# Patient Record
Sex: MALE | Race: WHITE | Employment: OTHER | URBAN - NONMETROPOLITAN AREA
[De-identification: names, ages, dates, MRNs, and addresses within clinical notes are randomized per-mention and may not be internally consistent; named-entity substitution may affect disease eponyms.]

---

## 2017-01-03 ENCOUNTER — HOSPITAL ENCOUNTER (OUTPATIENT)
Dept: PAIN MANAGEMENT | Age: 42
Discharge: HOME | End: 2017-01-03
Payer: MEDICARE

## 2017-01-03 VITALS
HEART RATE: 74 BPM | BODY MASS INDEX: 28.85 KG/M2 | OXYGEN SATURATION: 97 % | WEIGHT: 232 LBS | HEIGHT: 75 IN | SYSTOLIC BLOOD PRESSURE: 177 MMHG | RESPIRATION RATE: 20 BRPM | DIASTOLIC BLOOD PRESSURE: 123 MMHG | TEMPERATURE: 97.5 F

## 2017-01-03 DIAGNOSIS — N50.819 TESTALGIA: ICD-10-CM

## 2017-01-03 PROCEDURE — G0463 HOSPITAL OUTPT CLINIC VISIT: HCPCS

## 2017-01-03 PROCEDURE — 80307 DRUG TEST PRSMV CHEM ANLYZR: CPT

## 2017-01-03 ASSESSMENT — PAIN DESCRIPTION - DESCRIPTORS: DESCRIPTORS: ACHING;CONSTANT

## 2017-01-03 ASSESSMENT — PAIN DESCRIPTION - ONSET: ONSET: ON-GOING

## 2017-01-03 ASSESSMENT — PAIN DESCRIPTION - FREQUENCY: FREQUENCY: CONTINUOUS

## 2017-01-03 ASSESSMENT — PAIN SCALES - GENERAL: PAINLEVEL_OUTOF10: 4

## 2017-01-03 ASSESSMENT — PAIN DESCRIPTION - LOCATION: LOCATION: SCROTUM;FLANK

## 2017-01-03 ASSESSMENT — PAIN DESCRIPTION - PAIN TYPE: TYPE: CHRONIC PAIN

## 2017-01-03 ASSESSMENT — ACTIVITIES OF DAILY LIVING (ADL): EFFECT OF PAIN ON DAILY ACTIVITIES: DAILY ACTIVITY

## 2017-01-03 ASSESSMENT — PAIN DESCRIPTION - PROGRESSION: CLINICAL_PROGRESSION: NOT CHANGED

## 2017-01-03 ASSESSMENT — PAIN DESCRIPTION - ORIENTATION: ORIENTATION: RIGHT;LEFT

## 2017-01-16 ENCOUNTER — APPOINTMENT (OUTPATIENT)
Dept: CT IMAGING | Facility: HOSPITAL | Age: 42
End: 2017-01-16

## 2017-01-16 ENCOUNTER — HOSPITAL ENCOUNTER (OUTPATIENT)
Facility: HOSPITAL | Age: 42
Setting detail: HOSPITAL OUTPATIENT SURGERY
End: 2017-01-16
Attending: UROLOGY | Admitting: UROLOGY

## 2017-01-16 ENCOUNTER — APPOINTMENT (OUTPATIENT)
Dept: INTERVENTIONAL RADIOLOGY/VASCULAR | Facility: HOSPITAL | Age: 42
End: 2017-01-16

## 2017-01-16 ENCOUNTER — HOSPITAL ENCOUNTER (INPATIENT)
Facility: HOSPITAL | Age: 42
LOS: 4 days | Discharge: HOME | End: 2017-01-20
Attending: INTERNAL MEDICINE | Admitting: INTERNAL MEDICINE

## 2017-01-16 DIAGNOSIS — N19 RENAL FAILURE: ICD-10-CM

## 2017-01-16 DIAGNOSIS — N13.5 URETERAL OBSTRUCTION, RIGHT: Primary | ICD-10-CM

## 2017-01-16 DIAGNOSIS — N13.9 OBSTRUCTIVE UROPATHY: ICD-10-CM

## 2017-01-16 DIAGNOSIS — N17.9 ACUTE RENAL FAILURE, UNSPECIFIED ACUTE RENAL FAILURE TYPE (HCC): ICD-10-CM

## 2017-01-16 LAB
ALBUMIN SERPL-MCNC: 3.9 G/DL (ref 3.5–5)
ALBUMIN/GLOB SERPL: 1.3 G/DL (ref 1.1–2.5)
ALP SERPL-CCNC: 104 U/L (ref 24–120)
ALT SERPL W P-5'-P-CCNC: 28 U/L (ref 0–54)
AMYLASE SERPL-CCNC: 61 U/L (ref 30–110)
ANION GAP SERPL CALCULATED.3IONS-SCNC: 17 MMOL/L (ref 4–13)
APTT PPP: 45 SECONDS (ref 24.1–34.8)
AST SERPL-CCNC: 20 U/L (ref 7–45)
BASOPHILS # BLD AUTO: 0.02 10*3/MM3 (ref 0–0.2)
BASOPHILS NFR BLD AUTO: 0.2 % (ref 0–2)
BILIRUB SERPL-MCNC: 0.7 MG/DL (ref 0.1–1)
BUN BLD-MCNC: 73 MG/DL (ref 5–21)
BUN/CREAT SERPL: 5.1 (ref 7–25)
CALCIUM SPEC-SCNC: 9.1 MG/DL (ref 8.4–10.4)
CHLORIDE SERPL-SCNC: 98 MMOL/L (ref 98–110)
CO2 SERPL-SCNC: 21 MMOL/L (ref 24–31)
CREAT BLD-MCNC: 14.34 MG/DL (ref 0.5–1.4)
CRP SERPL-MCNC: 13.92 MG/DL (ref 0–0.99)
DEPRECATED RDW RBC AUTO: 42.9 FL (ref 40–54)
EOSINOPHIL # BLD AUTO: 0.22 10*3/MM3 (ref 0–0.7)
EOSINOPHIL NFR BLD AUTO: 2.3 % (ref 0–4)
ERYTHROCYTE [DISTWIDTH] IN BLOOD BY AUTOMATED COUNT: 13.3 % (ref 12–15)
GFR SERPL CREATININE-BSD FRML MDRD: 4 ML/MIN/1.73
GLOBULIN UR ELPH-MCNC: 3 GM/DL
GLUCOSE BLD-MCNC: 96 MG/DL (ref 70–100)
HCT VFR BLD AUTO: 37.1 % (ref 40–52)
HGB BLD-MCNC: 12.6 G/DL (ref 14–18)
IMM GRANULOCYTES # BLD: 0.03 10*3/MM3 (ref 0–0.03)
IMM GRANULOCYTES NFR BLD: 0.3 % (ref 0–5)
INR PPP: 1.06 (ref 0.91–1.09)
LIPASE SERPL-CCNC: 29 U/L (ref 23–203)
LYMPHOCYTES # BLD AUTO: 1.09 10*3/MM3 (ref 0.72–4.86)
LYMPHOCYTES NFR BLD AUTO: 11.6 % (ref 15–45)
MCH RBC QN AUTO: 30.1 PG (ref 28–32)
MCHC RBC AUTO-ENTMCNC: 34 G/DL (ref 33–36)
MCV RBC AUTO: 88.8 FL (ref 82–95)
MONOCYTES # BLD AUTO: 0.87 10*3/MM3 (ref 0.19–1.3)
MONOCYTES NFR BLD AUTO: 9.2 % (ref 4–12)
NEUTROPHILS # BLD AUTO: 7.2 10*3/MM3 (ref 1.87–8.4)
NEUTROPHILS NFR BLD AUTO: 76.4 % (ref 39–78)
PLATELET # BLD AUTO: 157 10*3/MM3 (ref 130–400)
PMV BLD AUTO: 10.1 FL (ref 6–12)
POTASSIUM BLD-SCNC: 5.2 MMOL/L (ref 3.5–5.3)
PROT SERPL-MCNC: 6.9 G/DL (ref 6.3–8.7)
PROTHROMBIN TIME: 14.1 SECONDS (ref 11.9–14.6)
RBC # BLD AUTO: 4.18 10*6/MM3 (ref 4.8–5.9)
SODIUM BLD-SCNC: 136 MMOL/L (ref 135–145)
WBC NRBC COR # BLD: 9.43 10*3/MM3 (ref 4.8–10.8)

## 2017-01-16 PROCEDURE — 25010000002 HYDROMORPHONE PER 4 MG: Performed by: INTERNAL MEDICINE

## 2017-01-16 PROCEDURE — 99223 1ST HOSP IP/OBS HIGH 75: CPT | Performed by: UROLOGY

## 2017-01-16 PROCEDURE — 25010000002 MIDAZOLAM PER 1 MG: Performed by: RADIOLOGY

## 2017-01-16 PROCEDURE — 0 IOPAMIDOL 61 % SOLUTION: Performed by: RADIOLOGY

## 2017-01-16 PROCEDURE — 25010000002 HYDROMORPHONE PER 4 MG: Performed by: NURSE PRACTITIONER

## 2017-01-16 PROCEDURE — 85610 PROTHROMBIN TIME: CPT | Performed by: NURSE PRACTITIONER

## 2017-01-16 PROCEDURE — 0T9030Z DRAINAGE OF RIGHT KIDNEY WITH DRAINAGE DEVICE, PERCUTANEOUS APPROACH: ICD-10-PCS | Performed by: RADIOLOGY

## 2017-01-16 PROCEDURE — 25010000002 FENTANYL CITRATE (PF) 100 MCG/2ML SOLUTION: Performed by: RADIOLOGY

## 2017-01-16 PROCEDURE — 80053 COMPREHEN METABOLIC PANEL: CPT | Performed by: NURSE PRACTITIONER

## 2017-01-16 PROCEDURE — 86140 C-REACTIVE PROTEIN: CPT | Performed by: NURSE PRACTITIONER

## 2017-01-16 PROCEDURE — 99284 EMERGENCY DEPT VISIT MOD MDM: CPT

## 2017-01-16 PROCEDURE — 82150 ASSAY OF AMYLASE: CPT | Performed by: NURSE PRACTITIONER

## 2017-01-16 PROCEDURE — 85025 COMPLETE CBC W/AUTO DIFF WBC: CPT | Performed by: NURSE PRACTITIONER

## 2017-01-16 PROCEDURE — 85730 THROMBOPLASTIN TIME PARTIAL: CPT | Performed by: NURSE PRACTITIONER

## 2017-01-16 PROCEDURE — 25010000002 HYDRALAZINE PER 20 MG: Performed by: INTERNAL MEDICINE

## 2017-01-16 PROCEDURE — 25010000002 ONDANSETRON PER 1 MG: Performed by: INTERNAL MEDICINE

## 2017-01-16 PROCEDURE — 25010000002 ONDANSETRON PER 1 MG: Performed by: NURSE PRACTITIONER

## 2017-01-16 PROCEDURE — 74176 CT ABD & PELVIS W/O CONTRAST: CPT

## 2017-01-16 PROCEDURE — 83690 ASSAY OF LIPASE: CPT | Performed by: NURSE PRACTITIONER

## 2017-01-16 PROCEDURE — 25010000003 CEFAZOLIN PER 500 MG: Performed by: UROLOGY

## 2017-01-16 RX ORDER — PROPRANOLOL HCL 60 MG
60 CAPSULE, EXTENDED RELEASE 24HR ORAL DAILY
Status: DISCONTINUED | OUTPATIENT
Start: 2017-01-16 | End: 2017-01-20 | Stop reason: HOSPADM

## 2017-01-16 RX ORDER — ACETAMINOPHEN 325 MG/1
650 TABLET ORAL EVERY 4 HOURS PRN
Status: DISCONTINUED | OUTPATIENT
Start: 2017-01-16 | End: 2017-01-20 | Stop reason: HOSPADM

## 2017-01-16 RX ORDER — HYDROCODONE BITARTRATE AND ACETAMINOPHEN 10; 325 MG/1; MG/1
TABLET ORAL
COMMUNITY
Start: 2017-01-01

## 2017-01-16 RX ORDER — SODIUM CHLORIDE 0.9 % (FLUSH) 0.9 %
1-10 SYRINGE (ML) INJECTION AS NEEDED
Status: DISCONTINUED | OUTPATIENT
Start: 2017-01-16 | End: 2017-01-20 | Stop reason: HOSPADM

## 2017-01-16 RX ORDER — CLONIDINE HYDROCHLORIDE 0.1 MG/1
0.2 TABLET ORAL EVERY 6 HOURS PRN
Status: DISCONTINUED | OUTPATIENT
Start: 2017-01-16 | End: 2017-01-20 | Stop reason: HOSPADM

## 2017-01-16 RX ORDER — FENTANYL CITRATE 50 UG/ML
INJECTION, SOLUTION INTRAMUSCULAR; INTRAVENOUS
Status: COMPLETED | OUTPATIENT
Start: 2017-01-16 | End: 2017-01-16

## 2017-01-16 RX ORDER — LABETALOL HYDROCHLORIDE 5 MG/ML
10 INJECTION, SOLUTION INTRAVENOUS ONCE
Status: COMPLETED | OUTPATIENT
Start: 2017-01-16 | End: 2017-01-16

## 2017-01-16 RX ORDER — PANTOPRAZOLE SODIUM 40 MG/1
40 TABLET, DELAYED RELEASE ORAL
Status: DISCONTINUED | OUTPATIENT
Start: 2017-01-17 | End: 2017-01-20 | Stop reason: HOSPADM

## 2017-01-16 RX ORDER — LISINOPRIL 40 MG/1
40 TABLET ORAL DAILY
COMMUNITY
End: 2017-01-20 | Stop reason: HOSPADM

## 2017-01-16 RX ORDER — SODIUM CHLORIDE 0.9 % (FLUSH) 0.9 %
10 SYRINGE (ML) INJECTION AS NEEDED
Status: DISCONTINUED | OUTPATIENT
Start: 2017-01-16 | End: 2017-01-20 | Stop reason: HOSPADM

## 2017-01-16 RX ORDER — SUMATRIPTAN 100 MG/1
100 TABLET, FILM COATED ORAL ONCE AS NEEDED
COMMUNITY
Start: 2016-01-08

## 2017-01-16 RX ORDER — SODIUM CHLORIDE 9 MG/ML
125 INJECTION, SOLUTION INTRAVENOUS CONTINUOUS
Status: DISCONTINUED | OUTPATIENT
Start: 2017-01-16 | End: 2017-01-18

## 2017-01-16 RX ORDER — ONDANSETRON 2 MG/ML
4 INJECTION INTRAMUSCULAR; INTRAVENOUS ONCE
Status: COMPLETED | OUTPATIENT
Start: 2017-01-16 | End: 2017-01-16

## 2017-01-16 RX ORDER — LABETALOL HYDROCHLORIDE 5 MG/ML
10 INJECTION, SOLUTION INTRAVENOUS EVERY 4 HOURS PRN
Status: DISCONTINUED | OUTPATIENT
Start: 2017-01-16 | End: 2017-01-20 | Stop reason: HOSPADM

## 2017-01-16 RX ORDER — MIDAZOLAM HYDROCHLORIDE 1 MG/ML
INJECTION INTRAMUSCULAR; INTRAVENOUS
Status: COMPLETED | OUTPATIENT
Start: 2017-01-16 | End: 2017-01-16

## 2017-01-16 RX ORDER — NARATRIPTAN 2.5 MG/1
2.5 TABLET ORAL ONCE AS NEEDED
COMMUNITY
Start: 2016-01-08

## 2017-01-16 RX ORDER — HYDRALAZINE HYDROCHLORIDE 20 MG/ML
10 INJECTION INTRAMUSCULAR; INTRAVENOUS EVERY 4 HOURS PRN
Status: DISCONTINUED | OUTPATIENT
Start: 2017-01-16 | End: 2017-01-20 | Stop reason: HOSPADM

## 2017-01-16 RX ORDER — LIDOCAINE HYDROCHLORIDE 10 MG/ML
INJECTION, SOLUTION INFILTRATION; PERINEURAL
Status: COMPLETED | OUTPATIENT
Start: 2017-01-16 | End: 2017-01-16

## 2017-01-16 RX ORDER — PROPRANOLOL HCL 60 MG
60 CAPSULE, EXTENDED RELEASE 24HR ORAL DAILY
COMMUNITY
Start: 2016-06-15

## 2017-01-16 RX ORDER — OMEPRAZOLE 40 MG/1
40 CAPSULE, DELAYED RELEASE ORAL DAILY
COMMUNITY
Start: 2016-06-07

## 2017-01-16 RX ORDER — ONDANSETRON 2 MG/ML
4 INJECTION INTRAMUSCULAR; INTRAVENOUS EVERY 6 HOURS PRN
Status: DISCONTINUED | OUTPATIENT
Start: 2017-01-16 | End: 2017-01-20 | Stop reason: HOSPADM

## 2017-01-16 RX ORDER — CLONIDINE HYDROCHLORIDE 0.2 MG/1
0.2 TABLET ORAL AS NEEDED
COMMUNITY
Start: 2016-01-27

## 2017-01-16 RX ADMIN — MIDAZOLAM 1 MG: 1 INJECTION INTRAMUSCULAR; INTRAVENOUS at 17:43

## 2017-01-16 RX ADMIN — LIDOCAINE HYDROCHLORIDE 10 ML: 10 INJECTION, SOLUTION INFILTRATION; PERINEURAL at 17:47

## 2017-01-16 RX ADMIN — LABETALOL HYDROCHLORIDE 10 MG: 5 INJECTION, SOLUTION INTRAVENOUS at 11:49

## 2017-01-16 RX ADMIN — SODIUM CHLORIDE 500 ML: 0.9 INJECTION, SOLUTION INTRAVENOUS at 13:19

## 2017-01-16 RX ADMIN — HYDROMORPHONE HYDROCHLORIDE 0.5 MG: 1 INJECTION, SOLUTION INTRAMUSCULAR; INTRAVENOUS; SUBCUTANEOUS at 20:07

## 2017-01-16 RX ADMIN — HYDROMORPHONE HYDROCHLORIDE 0.5 MG: 1 INJECTION, SOLUTION INTRAMUSCULAR; INTRAVENOUS; SUBCUTANEOUS at 16:26

## 2017-01-16 RX ADMIN — ONDANSETRON HYDROCHLORIDE 4 MG: 2 SOLUTION INTRAMUSCULAR; INTRAVENOUS at 21:02

## 2017-01-16 RX ADMIN — HYDRALAZINE HYDROCHLORIDE 10 MG: 20 INJECTION INTRAMUSCULAR; INTRAVENOUS at 16:25

## 2017-01-16 RX ADMIN — SODIUM CHLORIDE 125 ML/HR: 9 INJECTION, SOLUTION INTRAVENOUS at 18:35

## 2017-01-16 RX ADMIN — CEFAZOLIN SODIUM 2 G: 2 SOLUTION INTRAVENOUS at 17:42

## 2017-01-16 RX ADMIN — IOPAMIDOL 10 ML: 612 INJECTION, SOLUTION INTRAVENOUS at 17:45

## 2017-01-16 RX ADMIN — HYDROMORPHONE HYDROCHLORIDE 1 MG: 1 INJECTION, SOLUTION INTRAMUSCULAR; INTRAVENOUS; SUBCUTANEOUS at 10:21

## 2017-01-16 RX ADMIN — ONDANSETRON 4 MG: 2 INJECTION INTRAMUSCULAR; INTRAVENOUS at 10:22

## 2017-01-16 RX ADMIN — LABETALOL HYDROCHLORIDE 10 MG: 5 INJECTION, SOLUTION INTRAVENOUS at 20:54

## 2017-01-16 RX ADMIN — FENTANYL CITRATE 100 MCG: 50 INJECTION, SOLUTION INTRAMUSCULAR; INTRAVENOUS at 17:43

## 2017-01-16 RX ADMIN — HYDROMORPHONE HYDROCHLORIDE 0.5 MG: 1 INJECTION, SOLUTION INTRAMUSCULAR; INTRAVENOUS; SUBCUTANEOUS at 12:54

## 2017-01-16 RX ADMIN — PROPRANOLOL HYDROCHLORIDE 60 MG: 60 CAPSULE, EXTENDED RELEASE ORAL at 16:25

## 2017-01-16 RX ADMIN — SODIUM CHLORIDE 500 ML: 0.9 INJECTION, SOLUTION INTRAVENOUS at 10:29

## 2017-01-16 RX ADMIN — MIDAZOLAM 1 MG: 1 INJECTION INTRAMUSCULAR; INTRAVENOUS at 17:50

## 2017-01-16 NOTE — CONSULTS
Consults         Referring Provider: Freddy  Reason for Consultation: Anuric ARI, Solitary Kidney, Hydronephrosis, Ureteral Obstruction    Chief complaint: Right Flank Pain    Subjective .     History of present illness:  Abnormal radiographic finding   This patient presents with a possible abnormal finding of the right kidneyon CT that included abdominal cuts. This is a  new finding. This test was done 4 day(s) ago. Onset is sudden. This was done in context of evaluation for abdominal pain. Symptoms include Abdominal pain  on the right in the  lower portion.     The patient has a long complicated  history.  He had rectal cancer at the age of 31.  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  He is managed by Dr. Ramon Montano at  [facility] and he underwent a recent right ureteral stent placement December 2016.  He has been seen by Dr. Saxena in our group.  Last seen in September 2015 with a similar course or a percutaneous nephrostomy tube was placed for decompression.  Patient has been anuric for 4 days.  He has also had a right sided lower quadrant and right-sided flank pain.    Review of Systems  The following systems were reviewed and negative;  constitution, eyes, ENT, respiratory, cardiovascular, integument, breast, hematologic / lymphatic, musculoskeletal, neurological, behavioral/psych, endocrine and allergies / immunologic     History  Past Medical History   Diagnosis Date   • Hypertension    • Kidney stone        Past Surgical History   Procedure Laterality Date   • Knee surgery     • Colon surgery     • Colostomy     • Nephrectomy     • Cystoscopy         History reviewed. No pertinent family history.    Social History     Social History   • Marital status:      Spouse name: N/A   • Number of children: N/A   • Years of education: N/A     Occupational History   • Not on file.     Social History Main Topics   • Smoking status:  Never Smoker   • Smokeless tobacco: Not on file   • Alcohol use No   • Drug use: No   • Sexual activity: Defer     Other Topics Concern   • Not on file     Social History Narrative   • No narrative on file       Current Facility-Administered Medications   Medication Dose Route Frequency Provider Last Rate Last Dose   • acetaminophen (TYLENOL) tablet 650 mg  650 mg Oral Q4H PRN Krish Hayes MD       • ceFAZolin (ANCEF) IVPB (duplex) 2 g  2 g Intravenous Once Nirav Quezada MD       • CloNIDine (CATAPRES) tablet 0.2 mg  0.2 mg Oral Q6H PRN Krish Hayes MD       • [START ON 1/17/2017] enoxaparin (LOVENOX) syringe 30 mg  30 mg Subcutaneous Q24H Krish Hayes MD       • hydrALAZINE (APRESOLINE) injection 10 mg  10 mg Intravenous Q4H PRN Krish Hayes MD       • HYDROmorphone (DILAUDID) injection 0.5 mg  0.5 mg Intravenous Q3H PRN Krish Hayes MD       • labetalol (NORMODYNE,TRANDATE) injection 10 mg  10 mg Intravenous Q4H PRN Krish Hayes MD       • ondansetron (ZOFRAN) injection 4 mg  4 mg Intravenous Q6H PRN Krish Hayes MD       • [START ON 1/17/2017] pantoprazole (PROTONIX) EC tablet 40 mg  40 mg Oral Q AM Krish Hayes MD       • propranolol LA (INDERAL LA) 24 hr capsule 60 mg  60 mg Oral Daily Krish Hayes MD       • sodium chloride 0.9 % flush 1-10 mL  1-10 mL Intravenous PRN Krish Hayes MD       • sodium chloride 0.9 % flush 10 mL  10 mL Intravenous PRN LINDA Handy       • sodium chloride 0.9 % infusion  125 mL/hr Intravenous Continuous Krish Hayes MD            Allergies   Allergen Reactions   • Morphine And Related      Doesn't work    • Percocet [Oxycodone-Acetaminophen]      Give me migraines        Objective     Vital Signs   Temp:  [97.2 °F (36.2 °C)] 97.2 °F (36.2 °C)  Heart Rate:  [67-78] 75  Resp:  [12-20] 20  BP: (169-186)/(102-125) 177/107    Intake/Output Summary (Last 24 hours) at 01/16/17  1621  Last data filed at 01/16/17 1319   Gross per 24 hour   Intake   1500 ml   Output      0 ml   Net   1500 ml       Physical Exam:     General Appearance:    Alert, cooperative, in no acute distress   Head:    Normocephalic, without obvious abnormality, atraumatic   Eyes:            Lids and lashes normal, conjunctivae and sclerae normal, no   icterus, no pallor, corneas clear, PERRLA   Ears:    Ears appear intact with no abnormalities noted   Throat:   No oral lesions, no thrush, oral mucosa moist   Neck:   No adenopathy, supple, trachea midline, no thyromegaly, no   carotid bruit, no JVD   Back:     No kyphosis present, no scoliosis present, no skin lesions,      erythema or scars, no tenderness to percussion or                   palpation,   range of motion normal   Lungs:     Clear to auscultation,respirations regular, even and                  unlabored    Heart:    Regular rhythm and normal rate, normal S1 and S2, no            murmur, no gallop, no rub, no click   Chest Wall:    No abnormalities observed   Abdomen:     Normal bowel sounds, no masses, no organomegaly, soft        non-tender, non-distended, no guarding, no rebound                Tenderness     Genitorurinary:     Extremities:   Moves all extremities well, no edema, no cyanosis, no             redness   Pulses:   Pulses palpable and equal bilaterally   Skin:   No bleeding, bruising or rash   Lymph nodes:   No palpable adenopathy   Neurologic:   Cranial nerves 2 - 12 grossly intact, sensation intact, DTR       present and equal bilaterally       Results Review:   I reviewed the patient's new clinical results.      [unfilled]    @[facility]    Imaging Results (last 24 hours)     Procedure Component Value Units Date/Time    CT Abdomen Pelvis Without Contrast [[id number]] Collected:  01/16/17 1231     Updated:  01/16/17 1245    Narrative:       CT ABDOMEN PELVIS WO CONTRAST- 1/16/2017 11:32 AM CST     HISTORY: right flank pain      COMPARISON: 04/24/2015       DLP: 698 mGy cm. Automated exposure control was utilized to diminish  patient radiation dose.     TECHNIQUE: Noncontrast enhanced images of the abdomen and pelvis  obtained without oral contrast.      FINDINGS:   Tiny effusions are present with bibasilar atelectasis. The base of the  heart is unremarkable..      LIVER: No focal liver lesion.      BILIARY SYSTEM: Cholelithiasis is present. There is no evidence of  pericholecystic inflammatory change or biliary dilatation..      PANCREAS: No focal pancreatic lesion.      SPLEEN: Unremarkable.      KIDNEYS AND ADRENALS: Patient's undergone previous left nephrectomy.  There is moderate dilatation of the upper tracts of the right kidney  with perinephric stranding consistent with obstructive uropathy. A  right-sided double-J ureteral stent is well-positioned but is apparently  not functioning well. No perinephric fluid collections are present..      .     RETROPERITONEUM: There is an interaortocaval node measuring 10 mm in  size previously measured at 6 mm. Some smaller left periaortic nodes are  present unchanged from the previous study. Presacral thickening is  present likely representing a combination of postoperative and  postradiation changes. Overall I feel this has shown mild interval  improvement from the previous study. No new adenopathy is present..      GI TRACT: The patient's undergone previous rectosigmoid resection. There  is a left lower quadrant colostomy. Previously noted dilated loops of  small bowel are no longer visualized with no evidence of mechanical  obstruction on the current exam.. The appendix is not visualized..     OTHER: There is no mesenteric mass, lymphadenopathy or fluid collection.  The osseous structures and soft tissues demonstrate no worrisome  lesions. No free fluid is present.      PELVIS: Soft tissue nodularity within the presacral space is again  present and stable to slightly improved from the previous study. No new  mass  lesion is present.. The urinary bladder is normal in appearance.       Impression:       1. The patient's undergone previous rectosigmoid resection as well as  left nephrectomy.  2. There is moderate hydronephrosis of the right renal collecting system  with perinephric stranding consistent with acute obstructive uropathy. A  stent is well-positioned but I suspect is malfunctioning. Urology  consultation is recommended. No perinephric fluid collections  identified.  3. There is presacral thickening extending into the lower bony pelvis.  This is stable to slightly improved from the previous study likely  representing a combination of postoperative scarring and postradiation  change. No new mass lesions are identified.  4. The patient's previously noted small bowel obstruction has resolved  with a normal bowel gas pattern noted on today's exam. Left lower  quadrant colostomy is present. There is a fat-containing peristomal  hernia present..   5. Tiny effusions with bibasilar atelectasis present.  6. Cholelithiasis.     Electronically Signed By-Dr. Tate Jenkins MD On:1/16/2017 1:43 PM  EST  This report was finalized on 01/16/2017 12:43 by Dr. Tate Jenkins MD.      CT independent review  The CT scan of the abdomen/pelvis done without contrast is available for me to review.  Treatment recommendations require an independent review.  First I scanned the liver, spleen, and bowel pattern.  The retroperitoneum including the major vessels and lymphatic packages are briefly reviewed.  This film as been reviewed by the radiologist to determine any non urologic abnormalities that are present.  The kidneys are closely inspected for size, symmetry, contour, parenchymal thickness, perinephric reaction, presence of calcifications, and intrarenal dilation of the collecting system.  The ureters are inspected for their course, caliber, and any calcifications.  The bladder is inspected  for its thickness, size, and presence of  any calcifications.  This scan shows:    The right kidney appears hydronephrotic despite stent in good position    The left kidney appears absent    The bladder appears normal on this non-contrasted CT scan.  The bladder appears normal in thickness.  There no masses or stones seen on this exam.        Assessment/Plan     Active Problems:    Obstructive uropathy    Patient with solitary kidney with anuric acute kidney injury.  This is secondary to obstructive uropathy despite a recent stent placement in December 2016.  I think the best course of action is to place a percutaneous nephrostomy tube.  I discussed this with the patient and his family.  Also discussed with Dr. Alec Bustamante with radiology who agrees in addition I discussed with Dr. Krish Hayes and he agrees with the above plan.  After this he needs to follow-up with Dr. Ramon Montano at [place].      I discussed the patients findings and my recommendations with patient, family, nursing staff and consulting provider    Nirav Quezada MD  01/16/17  4:21 PM

## 2017-01-16 NOTE — ED NOTES
Report called to Geovanna DAVID RN. Patient care transferred.     Arash Painter RN  01/16/17 5876

## 2017-01-16 NOTE — ED PROVIDER NOTES
"Subjective   HPI Comments: C/o pain in right flank with recurrent N&V.  H/o stent in right ureter for \"scar tissue\".      Patient is a 41 y.o. male presenting with abdominal pain.   History provided by:  Patient and spouse   used: No    Abdominal Pain   Pain location:  R flank  Pain quality: aching    Pain radiates to:  Does not radiate  Pain severity:  Severe  Onset quality:  Gradual  Duration:  2 days  Timing:  Constant  Progression:  Worsening  Chronicity:  New  Relieved by:  Nothing  Worsened by:  Nothing  Ineffective treatments:  None tried  Associated symptoms: anorexia, nausea and vomiting    Risk factors: multiple surgeries        Review of Systems   Constitutional: Negative.    HENT: Negative.    Respiratory: Negative.    Cardiovascular: Negative.    Gastrointestinal: Positive for abdominal pain, anorexia, nausea and vomiting.   Genitourinary: Negative.    Neurological: Negative.        Past Medical History   Diagnosis Date   • Hypertension    • Kidney stone        Allergies   Allergen Reactions   • Morphine And Related      Doesn't work    • Percocet [Oxycodone-Acetaminophen]      Give me migraines        Past Surgical History   Procedure Laterality Date   • Knee surgery     • Colon surgery     • Colostomy     • Nephrectomy     • Cystoscopy         History reviewed. No pertinent family history.    Social History     Social History   • Marital status:      Spouse name: N/A   • Number of children: N/A   • Years of education: N/A     Social History Main Topics   • Smoking status: Never Smoker   • Smokeless tobacco: None   • Alcohol use No   • Drug use: No   • Sexual activity: Defer     Other Topics Concern   • None     Social History Narrative   • None       Prior to Admission medications    Medication Sig Start Date End Date Taking? Authorizing Provider   CloNIDine (CATAPRES) 0.2 MG tablet  1/27/16  Yes Historical Provider, MD   HYDROcodone-acetaminophen (NORCO)  MG per " tablet Take one tablet every 3-4 hours PRN pain. Earliest Fill Date: 1/1/17 1/1/17  Yes Historical Provider, MD   lisinopril (PRINIVIL,ZESTRIL) 40 MG tablet Take 40 mg by mouth Daily.   Yes Historical Provider, MD   naratriptan (AMERGE) 2.5 MG tablet  1/8/16  Yes Historical Provider, MD   omeprazole (priLOSEC) 40 MG capsule  6/7/16  Yes Historical Provider, MD   propranolol LA (INDERAL LA) 60 MG 24 hr capsule  6/15/16  Yes Historical Provider, MD   SUMAtriptan (IMITREX) 100 MG tablet  1/8/16  Yes Historical Provider, MD       Medications   sodium chloride 0.9 % flush 10 mL (not administered)   sodium chloride 0.9 % bolus 500 mL (0 mL Intravenous Stopped 1/16/17 1237)   HYDROmorphone (DILAUDID) injection 1 mg (1 mg Intravenous Given 1/16/17 1021)   ondansetron (ZOFRAN) injection 4 mg (4 mg Intravenous Given 1/16/17 1022)   labetalol (NORMODYNE,TRANDATE) injection 10 mg (10 mg Intravenous Given 1/16/17 1149)   HYDROmorphone (DILAUDID) injection 0.5 mg (0.5 mg Intravenous Given 1/16/17 1254)   sodium chloride 0.9 % bolus 500 mL (500 mL Intravenous New Bag 1/16/17 1319)       Vitals:    01/16/17 1326   BP: (!) 177/107   Pulse: 75   Resp: 20   Temp:    SpO2: 98%         Objective   Physical Exam   Constitutional: He is oriented to person, place, and time. He appears well-developed and well-nourished.   HENT:   Head: Normocephalic and atraumatic.   Neck: Normal range of motion. Neck supple.   Cardiovascular: Normal rate and regular rhythm.    Pulmonary/Chest: Effort normal and breath sounds normal.   Abdominal: Soft. Bowel sounds are normal. There is tenderness.   Right colostomy   Musculoskeletal: Normal range of motion.   Neurological: He is alert and oriented to person, place, and time.   Skin: Skin is warm and dry.   Psychiatric: He has a normal mood and affect. His behavior is normal.   Nursing note and vitals reviewed.      Procedures         Lab Results (last 24 hours)     Procedure Component Value Units Date/Time     CBC & Differential [[id number]] Collected:  01/16/17 1020    Specimen:  Blood Updated:  01/16/17 1032    Narrative:       The following orders were created for panel order CBC & Differential.  Procedure                               Abnormality         Status                     ---------                               -----------         ------                     CBC Auto Differential[[id number]]         Abnormal            Final result                 Please view results for these tests on the individual orders.    Comprehensive Metabolic Panel [[id number]]  (Abnormal) Collected:  01/16/17 1020    Specimen:  Blood Updated:  01/16/17 1053     Glucose 96 mg/dL      BUN 73 (H) mg/dL      Creatinine 14.34 (H) mg/dL      Sodium 136 mmol/L      Potassium 5.2 mmol/L      Chloride 98 mmol/L      CO2 21.0 (L) mmol/L      Calcium 9.1 mg/dL      Total Protein 6.9 g/dL      Albumin 3.90 g/dL      ALT (SGPT) 28 U/L      AST (SGOT) 20 U/L      Alkaline Phosphatase 104 U/L      Total Bilirubin 0.7 mg/dL      eGFR Non African Amer 4 (L) mL/min/1.73      Globulin 3.0 gm/dL      A/G Ratio 1.3 g/dL      BUN/Creatinine Ratio 5.1 (L)      Anion Gap 17.0 (H) mmol/L     Amylase [[id number]]  (Normal) Collected:  01/16/17 1020    Specimen:  Blood Updated:  01/16/17 1045     Amylase 61 U/L     Lipase [[id number]]  (Normal) Collected:  01/16/17 1020    Specimen:  Blood Updated:  01/16/17 1045     Lipase 29 U/L     C-reactive Protein [[id number]]  (Abnormal) Collected:  01/16/17 1020    Specimen:  Blood Updated:  01/16/17 1127     C-Reactive Protein 13.92 (H) mg/dL     CBC Auto Differential [[id number]]  (Abnormal) Collected:  01/16/17 1020    Specimen:  Blood Updated:  01/16/17 1032     WBC 9.43 10*3/mm3      RBC 4.18 (L) 10*6/mm3      Hemoglobin 12.6 (L) g/dL      Hematocrit 37.1 (L) %      MCV 88.8 fL      MCH 30.1 pg      MCHC 34.0 g/dL      RDW 13.3 %      RDW-SD 42.9 fl      MPV 10.1 fL      Platelets 157 10*3/mm3      Neutrophil % 76.4 %       Lymphocyte % 11.6 (L) %      Monocyte % 9.2 %      Eosinophil % 2.3 %      Basophil % 0.2 %      Immature Grans % 0.3 %      Neutrophils, Absolute 7.20 10*3/mm3      Lymphocytes, Absolute 1.09 10*3/mm3      Monocytes, Absolute 0.87 10*3/mm3      Eosinophils, Absolute 0.22 10*3/mm3      Basophils, Absolute 0.02 10*3/mm3      Immature Grans, Absolute 0.03 10*3/mm3     aPTT [[id number]]  (Abnormal) Collected:  01/16/17 1021    Specimen:  Blood Updated:  01/16/17 1042     PTT 45.0 (H) seconds     Protime-INR [[id number]]  (Normal) Collected:  01/16/17 1021    Specimen:  Blood Updated:  01/16/17 1042     Protime 14.1 Seconds      INR 1.06           CT Abdomen Pelvis Without Contrast   Final Result   1. The patient's undergone previous rectosigmoid resection as well as   left nephrectomy.   2. There is moderate hydronephrosis of the right renal collecting system   with perinephric stranding consistent with acute obstructive uropathy. A   stent is well-positioned but I suspect is malfunctioning. Urology   consultation is recommended. No perinephric fluid collections   identified.   3. There is presacral thickening extending into the lower bony pelvis.   This is stable to slightly improved from the previous study likely   representing a combination of postoperative scarring and postradiation   change. No new mass lesions are identified.   4. The patient's previously noted small bowel obstruction has resolved   with a normal bowel gas pattern noted on today's exam. Left lower   quadrant colostomy is present. There is a fat-containing peristomal   hernia present..    5. Tiny effusions with bibasilar atelectasis present.   6. Cholelithiasis.       Electronically Signed By-Dr. Tate Jenkins MD On:1/16/2017 1:43 PM   EST   This report was finalized on 01/16/2017 12:43 by Dr. Tate Jenkins MD.          ED Course  ED Course   Value Comment By Time    CT abd/pelvis: 1. The patient's undergone previous rectosigmoid resection as  well as  left nephrectomy.  2. There is moderate hydronephrosis of the right renal collecting system  with perinephric stranding consistent with acute obstructive uropathy. A  stent is well-positioned but I suspect is malfunctioning. Urology  consultation is recommended. No perinephric fluid collections  identified.  3. There is presacral thickening extending into the lower bony pelvis.  This is stable to slightly improved from the previous study likely  representing a combination of postoperative scarring and postradiation  change. No new mass lesions are identified.  4. The patient's previously noted small bowel obstruction has resolved  with a normal bowel gas pattern noted on today's exam. Left lower  quadrant colostomy is present. There is a fat-containing peristomal  hernia present..   5. Tiny effusions with bibasilar atelectasis present.  6. Cholelithiasis. Catherine Pimentel, APRN 01/16 1252   Creatinine: (!) 14.34 (Reviewed) Catherine Pimentel APRN 01/16 1252   BUN: (!) 73 (Reviewed) Catherine Pimentel APRN 01/16 1252   CO2: (!) 21.0 (Reviewed) Catherine Pimentel APRN 01/16 1252   eGFR Non  Amer: (!) 4 (Reviewed) Catherine Pimentel APRN 01/16 1252   C-Reactive Protein: (!) 13.92 (Reviewed) Catherine Pimentel APRN 01/16 1252   aPTT: (!) 45.0 (Reviewed) Catherine Pimentel APRN 01/16 1252   Hemoglobin: (!) 12.6 (Reviewed) Catherine Pimentel APRN 01/16 1252   Hematocrit: (!) 37.1 (Reviewed) Catherine Pimentel APRN 01/16 1252   WBC: 9.43 (Reviewed) Catherine Pimentel APRN 01/16 1252    Patient CT reviewed at this time.  Patient labs reviewed as well.  Case discussed with Dr. Black was also seen in evaluation.  At this time a call was placed to urology for consultation. Catherine Pimentel APRN 01/16 1315    Dr. Quezada has agreed to see the pt in consultation. Hospitalist Dr. Garibay was contacted and is agreeable to admission for pt after discussion with Dr. Black. Pt will be admitted to his services at  this time.  Catherine Pimentel, APRN 01/16 1811          MDM  Number of Diagnoses or Management Options  Obstructive uropathy: new and requires workup     Amount and/or Complexity of Data Reviewed  Clinical lab tests: ordered and reviewed  Tests in the radiology section of CPT®: ordered and reviewed  Tests in the medicine section of CPT®: ordered and reviewed  Decide to obtain previous medical records or to obtain history from someone other than the patient: yes    Risk of Complications, Morbidity, and/or Mortality  Presenting problems: moderate  Diagnostic procedures: moderate  Management options: moderate    Patient Progress  Patient progress: stable      Final diagnoses:   Obstructive uropathy        Rigoberto Black Jr., MD  01/16/17 3342

## 2017-01-16 NOTE — ED NOTES
Updated FLORI Pimentel regarding patient's BP of 177/107.     Arash Painter, JUDY  01/16/17 2285

## 2017-01-16 NOTE — IP AVS SNAPSHOT
AFTER VISIT SUMMARY             Felipe Veronica           About your hospitalization     You were admitted on:  January 16, 2017 You last received care in the:  [facility]       Procedures & Surgeries         Medications    If you or your caregiver advised us that you are currently taking a medication and that medication is marked below as “Resume”, this simply indicates that we have reviewed those medications to make sure our new therapy recommendations do not interfere.  If you have concerns about medications other than those new ones which we are prescribing today, please consult the physician who prescribed them (or your primary physician).  Our review of your home medications is not meant to indicate that we are directing their use.             Your Medications      START taking these medications     amLODIPine 10 MG tablet   Take 1 tablet by mouth Daily.   Last time this was given:  1/20/2017  8:51 AM   Commonly known as:  NORVASC   Next Dose Due:  1/21/2017 at 8:00AM           hydrALAZINE 50 MG tablet   Take 1 tablet by mouth 3 (Three) Times a Day.   Last time this was given:  1/20/2017  1:24 PM   Commonly known as:  APRESOLINE   Next Dose Due:  1/20/2017 at 9:00PM             CONTINUE taking these medications     CloNIDine 0.2 MG tablet   Take 0.2 mg by mouth As Needed.   Last time this was given:  1/19/2017  5:22 PM   Commonly known as:  CATAPRES   Next Dose Due:  If needed           naratriptan 2.5 MG tablet   Take 2.5 mg by mouth 1 (One) Time As Needed for migraine.   Commonly known as:  AMERGE   Next Dose Due:  If needed           NORCO  MG per tablet   Take one tablet every 3-4 hours PRN pain. Earliest Fill Date: 1/1/17   Generic drug:  HYDROcodone-acetaminophen   Next Dose Due:  If needed           omeprazole 40 MG capsule   Take 40 mg by mouth Daily.   Commonly known as:  priLOSEC   Next Dose Due:  1/21/2017 at 8:00AM           propranolol LA 60 MG 24 hr capsule   Take 60 mg by  mouth Daily.   Last time this was given:  1/20/2017  8:51 AM   Commonly known as:  INDERAL LA   Next Dose Due:  1/21/2017 at 8:00AM           SUMAtriptan 100 MG tablet   Take 100 mg by mouth 1 (One) Time As Needed for migraine.   Last time this was given:  1/20/2017  1:24 PM   Commonly known as:  IMITREX   Next Dose Due:  If needed             STOP taking these medications     lisinopril 40 MG tablet   Commonly known as:  PRINIVILZESTRIL                Where to Get Your Medications      These medications were sent to [facility]/pharmacy #[id number] - [place], [place] - [address] AT ACROSS FROM [address]  [phone] [facility] [phone]   [address] [place] [address]    Hours:  24-hours Phone:  [phone]     amLODIPine 10 MG tablet    hydrALAZINE 50 MG tablet                  Your Medications      Your Medication List           Morning Noon Evening Bedtime As Needed    amLODIPine 10 MG tablet   Take 1 tablet by mouth Daily.   Commonly known as:  NORVASC            1/21/2017 at 8:00AM                       CloNIDine 0.2 MG tablet   Take 0.2 mg by mouth As Needed.   Commonly known as:  CATAPRES                                   hydrALAZINE 50 MG tablet   Take 1 tablet by mouth 3 (Three) Times a Day.   Commonly known as:  APRESOLINE                        1/20/2017 at 9:00PM           naratriptan 2.5 MG tablet   Take 2.5 mg by mouth 1 (One) Time As Needed for migraine.   Commonly known as:  AMERGE                                   NORCO  MG per tablet   Take one tablet every 3-4 hours PRN pain. Earliest Fill Date: 1/1/17   Generic drug:  HYDROcodone-acetaminophen                                omeprazole 40 MG capsule   Take 40 mg by mouth Daily.   Commonly known as:  priLOSEC            1/21/2017 at 8:00AM                       propranolol LA 60 MG 24 hr capsule   Take 60 mg by mouth Daily.   Commonly known as:  INDERAL LA            1/21/2017 at 8:00AM                       SUMAtriptan 100 MG tablet   Take  100 mg by mouth 1 (One) Time As Needed for migraine.   Commonly known as:  IMITREX                                            Instructions for After Discharge        Activity Instructions     Activity as Tolerated                 Diet Instructions     Diet: Regular; Thin Liquids, No Restrictions       Discharge Diet:  Regular   Fluid Consistency:  Thin Liquids, No Restrictions             Discharge References/Attachments     HYDRONEPHROSIS (ENGLISH)    AMLODIPINE TABLETS (ENGLISH)    HYDRALAZINE TABLETS (ENGLISH)    MYPLATE FROM [employer] (ENGLISH)       Follow-ups for After Discharge        Follow-up Information     Follow up with Fermin Alexander MD .    Specialty:  Family Medicine    Why:  February 3, 2017 at 11:00AM   *First available appointment*    Contact information:    [address] [place] [address] [address]  [facility] [address]  [phone]          Follow up with Demetrio Lopez MD .    Specialty:  Nephrology    Why:  February 3, 2017 at 1:00PM    Contact information:    [address]  [facility] [address]  [phone]          Follow up with Gaurang Montano MD .    Specialty:  Urology    Why:  keep your appointment on Monday at 8 am    Contact information:    [address] [facility] [address]  [phone]        Referrals and Follow-ups to Schedule     Basic Metabolic Panel    2017 (Approximate)              [employer] Signup     [employer] allows you to send messages to your doctor, view your test results, renew your prescriptions, schedule appointments, and more. To sign up, go to [employer] and click on the Sign Up Now link in the New User? box. Enter your [employer] Activation Code exactly as it appears below along with the last four digits of your Social Security Number and your Date of Birth () to complete the sign-up process. If you do not sign up before the expiration date, you must request a new code.    [employer] Activation Code: [email or web address]  Expires:  2/3/2017  3:27 PM    If you have questions, you can email [email or web address] or call [phone] to talk to our MyChart staff. Remember, MyChart is NOT to be used for urgent needs. For medical emergencies, dial 911.           Summary of Your Hospitalization        Reason for Hospitalization     Your primary diagnosis was:  Not on File    Your diagnoses also included:  Obstructive Uropathy, Kidney Failure      Care Providers     Provider Service Role Specialty    Krish Hayes MD -- Attending Provider Hospitalist    Demetrio Lopez MD -- Consulting Physician  Nephrology      Your Allergies  Date Reviewed: 1/18/2017    Allergen Reactions    Morphine And Related Not Noted    Doesn't work          Percocet (Oxycodone-Acetaminophen) Not Noted    Give me migraines       Patient Belongings Returned     Document Return of Belongings Flowsheet     Were the patient bedside belongings sent home?   --   Belongings Retrieved from Security & Sent Home   --    Belongings Sent to Safe   --   Medications Retrieved from Pharmacy & Sent Home   --              More Information      Hydronephrosis  Hydronephrosis is the enlargement of a kidney due to a blockage that stops urine from flowing out of the body.  CAUSES  Common causes of this condition include:  · A birth (congenital) defect of the kidney.  · A congenital defect of the tube through which urine travels (ureter).  · Kidney stones.  · An enlarged prostate gland.  · A tumor.  · Cancer of the prostate, bladder, uterus, ovary, or colon.  · A blood clot.  SYMPTOMS  Symptoms of this condition include:  · Pain or discomfort in your side (flank).  · Swelling of the abdomen.  · Pain in the abdomen.  · Nausea and vomiting.  · Fever.  · Pain while passing urine.  · Feeling of urgency to urinate.  · Frequent urination.  · Infection of the urinary tract.  In some cases, there are no symptoms.  DIAGNOSIS  This condition may be diagnosed with:  · A medical  history.  · A physical exam.  · Blood and urine tests to check kidney function.  · Imaging tests, such as an X-ray, ultrasound, CT scan, or MRI.  · A test in which a rigid or flexible telescope (cystoscope) is used to view the site of the blockage.  TREATMENT  Treatment for this condition depends on where the blockage is located, how long it has been there, and what caused it. The goal of treatment is to remove the blockage. Treatment options include:  · A procedure to put in a soft tube to help drain urine.  · Antibiotic medicines to treat or prevent infection.  · Shock-wave therapy (lithotripsy) to help eliminate kidney stones.  HOME CARE INSTRUCTIONS  · Get lots of rest.  · Drink enough fluid to keep your urine clear or pale yellow.  · If you have a drain in, follow your health care provider's instructions about how to care for it.  · Take medicines only as directed by your health care provider.  · If you were prescribed an antibiotic medicine, finish all of it even if you start to feel better.  · Keep all follow-up visits as directed by your health care provider. This is important.  SEEK MEDICAL CARE IF:  · You continue to have symptoms after treatment.  · You develop new symptoms.  · You have a problem with a drainage device.  · Your urine becomes cloudy or bloody.  · You have a fever.  SEEK IMMEDIATE MEDICAL CARE IF:  · You have severe flank or abdominal pain.  · You develop vomiting and are unable to keep fluids down.     This information is not intended to replace advice given to you by your health care provider. Make sure you discuss any questions you have with your health care provider.     Document Released: 10/14/2008 Document Revised: 05/03/2016 Document Reviewed: 12/14/2015  [employer] Interactive Patient Education ©2016 [employer]          Amlodipine tablets  What is this medicine?  AMLODIPINE (am BRISA di peen) is a calcium-channel blocker. It affects the amount of calcium found in your heart and  muscle cells. This relaxes your blood vessels, which can reduce the amount of work the heart has to do. This medicine is used to lower high blood pressure. It is also used to prevent chest pain.  This medicine may be used for other purposes; ask your health care provider or pharmacist if you have questions.  What should I tell my health care provider before I take this medicine?  They need to know if you have any of these conditions:  -heart problems like heart failure or aortic stenosis  -liver disease  -an unusual or allergic reaction to amlodipine, other medicines, foods, dyes, or preservatives  -pregnant or trying to get pregnant  -breast-feeding  How should I use this medicine?  Take this medicine by mouth with a glass of water. Follow the directions on the prescription label. Take your medicine at regular intervals. Do not take more medicine than directed.  Talk to your pediatrician regarding the use of this medicine in children. Special care may be needed. This medicine has been used in children as young as 6.  Persons over 65 years old may have a stronger reaction to this medicine and need smaller doses.  Overdosage: If you think you have taken too much of this medicine contact a poison control center or emergency room at once.  NOTE: This medicine is only for you. Do not share this medicine with others.  What if I miss a dose?  If you miss a dose, take it as soon as you can. If it is almost time for your next dose, take only that dose. Do not take double or extra doses.  What may interact with this medicine?  -herbal or dietary supplements  -local or general anesthetics  -medicines for high blood pressure  -medicines for prostate problems  -rifampin  This list may not describe all possible interactions. Give your health care provider a list of all the medicines, herbs, non-prescription drugs, or dietary supplements you use. Also tell them if you smoke, drink alcohol, or use illegal drugs. Some items may  interact with your medicine.  What should I watch for while using this medicine?  Visit your doctor or health care professional for regular check ups. Check your blood pressure and pulse rate regularly. Ask your health care professional what your blood pressure and pulse rate should be, and when you should contact him or her.  This medicine may make you feel confused, dizzy or lightheaded. Do not drive, use machinery, or do anything that needs mental alertness until you know how this medicine affects you. To reduce the risk of dizzy or fainting spells, do not sit or stand up quickly, especially if you are an older patient. Avoid alcoholic drinks; they can make you more dizzy.  Do not suddenly stop taking amlodipine. Ask your doctor or health care professional how you can gradually reduce the dose.  What side effects may I notice from receiving this medicine?  Side effects that you should report to your doctor or health care professional as soon as possible:  -allergic reactions like skin rash, itching or hives, swelling of the face, lips, or tongue  -breathing problems  -changes in vision or hearing  -chest pain  -fast, irregular heartbeat  -swelling of legs or ankles  Side effects that usually do not require medical attention (report to your doctor or health care professional if they continue or are bothersome):  -dry mouth  -facial flushing  -nausea, vomiting  -stomach gas, pain  -tired, weak  -trouble sleeping  This list may not describe all possible side effects. Call your doctor for medical advice about side effects. You may report side effects to FDA at 1-816-FDA-9323.  Where should I keep my medicine?  Keep out of the reach of children.  Store at room temperature between 59 and 86 degrees F (15 and 30 degrees C). Protect from light. Keep container tightly closed. Throw away any unused medicine after the expiration date.  NOTE: This sheet is a summary. It may not cover all possible information. If you have  questions about this medicine, talk to your doctor, pharmacist, or health care provider.     © 2016, Elsevier/[employer] Standard. (2013-11-15 11:40:58)          Hydralazine tablets  What is this medicine?  HYDRALAZINE (gildardo jolly) is a type of vasodilator. It relaxes blood vessels, increasing the blood and oxygen supply to your heart. This medicine is used to treat high blood pressure.  This medicine may be used for other purposes; ask your health care provider or pharmacist if you have questions.  What should I tell my health care provider before I take this medicine?  They need to know if you have any of these conditions:  -blood vessel disease  -heart disease including angina or history of heart attack  -kidney or liver disease  -systemic lupus erythematosus (SLE)  -an unusual or allergic reaction to hydralazine, tartrazine dye, other medicines, foods, dyes, or preservatives  -pregnant or trying to get pregnant  -breast-feeding  How should I use this medicine?  Take this medicine by mouth with a glass of water. Follow the directions on the prescription label. Take your doses at regular intervals. Do not take your medicine more often than directed. Do not stop taking except on the advice of your doctor or health care professional.  Talk to your pediatrician regarding the use of this medicine in children. Special care may be needed. While this drug may be prescribed for children for selected conditions, precautions do apply.  Overdosage: If you think you have taken too much of this medicine contact a poison control center or emergency room at once.  NOTE: This medicine is only for you. Do not share this medicine with others.  What if I miss a dose?  If you miss a dose, take it as soon as you can. If it is almost time for your next dose, take only that dose. Do not take double or extra doses.  What may interact with this medicine?  -medicines for high blood pressure  -medicines for mental depression  This list may  not describe all possible interactions. Give your health care provider a list of all the medicines, herbs, non-prescription drugs, or dietary supplements you use. Also tell them if you smoke, drink alcohol, or use illegal drugs. Some items may interact with your medicine.  What should I watch for while using this medicine?  Visit your doctor or health care professional for regular checks on your progress. Check your blood pressure and pulse rate regularly. Ask your doctor or health care professional what your blood pressure and pulse rate should be and when you should contact him or her.  You may get drowsy or dizzy. Do not drive, use machinery, or do anything that needs mental alertness until you know how this medicine affects you. Do not stand or sit up quickly, especially if you are an older patient. This reduces the risk of dizzy or fainting spells. Alcohol may interfere with the effect of this medicine. Avoid alcoholic drinks.  Do not treat yourself for coughs, colds, or pain while you are taking this medicine without asking your doctor or health care professional for advice. Some ingredients may increase your blood pressure.  What side effects may I notice from receiving this medicine?  Side effects that you should report to your doctor or health care professional as soon as possible:  -chest pain, or fast or irregular heartbeat  -fever, chills, or sore throat  -numbness or tingling in the hands or feet  -shortness of breath  -skin rash, redness, blisters or itching  -stiff or swollen joints  -sudden weight gain  -swelling of the feet or legs  -swollen lymph glands  -unusual weakness  Side effects that usually do not require medical attention (report to your doctor or health care professional if they continue or are bothersome):  -diarrhea, or constipation  -headache  -loss of appetite  -nausea, vomiting  This list may not describe all possible side effects. Call your doctor for medical advice about side  effects. You may report side effects to FDA at 5-165-MVK-7954.  Where should I keep my medicine?  Keep out of the reach of children.  Store at room temperature between 15 and 30 degrees C (59 and 86 degrees F). Throw away any unused medicine after the expiration date.  NOTE: This sheet is a summary. It may not cover all possible information. If you have questions about this medicine, talk to your doctor, pharmacist, or health care provider.     © 2016, Elsevier/Gold Standard. (2009-05-01 15:44:58)          MyPlate from [employer]  The general, healthful diet is based on the 2010 Dietary Guidelines for Americans. The amount of food you need to eat from each food group depends on your age, sex, and level of physical activity and can be individualized by a dietitian. Go to ChooseMyPlate.gov for more information.  WHAT DO I NEED TO KNOW ABOUT THE MYPLATE PLAN?  · Enjoy your food, but eat less.    · Avoid oversized portions.      ½ of your plate should include fruits and vegetables.    ¼ of your plate should be grains.    ¼ of your plate should be protein.  Grains  · Make at least half of your grains whole grains.  · For a 2,000 calorie daily food plan, eat 6 oz every day.  · 1 oz is about 1 slice bread, 1 cup cereal, or ½ cup cooked rice, cereal, or pasta.  Vegetables  · Make half your plate fruits and vegetables.  · For a 2,000 calorie daily food plan, eat 2½ cups every day.  · 1 cup is about 1 cup raw or cooked vegetables or vegetable juice or 2 cups raw leafy greens.  Fruits  · Make half your plate fruits and vegetables.  · For a 2,000 calorie daily food plan, eat 2 cups every day.  · 1 cup is about 1 cup fruit or 100% fruit juice or ½ cup dried fruit.  Protein  · For a 2,000 calorie daily food plan, eat 5½ oz every day.  · 1 oz is about 1 oz meat, poultry, or fish, ¼ cup cooked beans, 1 egg, 1 Tbsp peanut butter, or ½ oz nuts or seeds.  Dairy  · Switch to fat-free or low-fat (1%) milk.  · For a 2,000 calorie daily food  plan, eat 3 cups every day.  · 1 cup is about 1 cup milk or yogurt or soy milk (soy beverage), 1½ oz natural cheese, or 2 oz processed cheese.  Fats, Oils, and Empty Calories  · Only small amounts of oils are recommended.  · Empty calories are calories from solid fats or added sugars.  · Compare sodium in foods like soup, bread, and frozen meals. Choose the foods with lower numbers.  · Drink water instead of sugary drinks.  WHAT FOODS CAN I EAT?  Grains  Whole grains such as whole wheat, quinoa, millet, and bulgur. Bread, rolls, and pasta made from whole grains. Brown or wild rice. Hot or cold cereals made from whole grains and without added sugar.  Vegetables  All fresh vegetables, especially fresh red, dark green, or orange vegetables. Peas and beans. Low-sodium frozen or canned vegetables prepared without added salt. Low-sodium vegetable juices.  Fruits  All fresh, frozen, and dried fruits. Canned fruit packed in water or fruit juice without added sugar. Fruit juices without added sugar.  Meats and Other Protein Sources  Boiled, baked, or grilled lean meat trimmed of fat. Skinless poultry. Fresh seafood and shellfish. Canned seafood packed in water. Unsalted nuts and unsalted nut butters. Tofu. Dried beans and pea. Eggs.  Dairy  Low-fat or fat-free milk, yogurt, and cheeses.   Sweets and Desserts  Frozen desserts made from low-fat milk.  Fats and Oils  Olive, peanut, and canola oils and margarine. Salad dressing and mayonnaise made from these oils.  Other  Soups and casseroles made from allowed ingredients and without added fat or salt.  The items listed above may not be a complete list of recommended foods or beverages. Contact your dietitian for more options.   WHAT FOODS ARE NOT RECOMMENDED?  Grains  Sweetened, low-fiber cereals. Packaged baked goods. Snack crackers and chips. Cheese crackers, butter crackers, and biscuits. Frozen waffles, sweet breads, doughnuts, pastries, packaged baking mixes, pancakes,  cakes, and cookies.  Vegetables  Regular canned or frozen vegetables or vegetables prepared with salt. Canned tomatoes. Canned tomato sauce. Fried vegetables. Vegetables in cream sauce or cheese sauce.  Fruits  Fruits packed in syrup or made with added sugar.   Meats and Other Protein Sources  Marbled or fatty meats such as ribs. Poultry with skin. Fried meats, poultry, eggs, or fish. Sausages, hot dogs, and deli meats such as pastrami, bologna, or salami.  Dairy  Whole milk, cream, cheeses made from whole milk, sour cream. Ice cream or yogurt made from whole milk or with added sugar.  Beverages  For adults, no more than one alcoholic drink per day. Regular soft drinks or other sugary beverages. Juice drinks.  Sweets and Desserts  Sugary or fatty desserts, candy, and other sweets.  Fats and Oils  Solid shortening or partially hydrogenated oils. Solid margarine. Margarine that contains trans fats. Butter.  The items listed above may not be a complete list of foods and beverages to avoid. Contact your dietitian for more information.     This information is not intended to replace advice given to you by your health care provider. Make sure you discuss any questions you have with your health care provider.     Document Released: 01/06/2009 Document Revised: 01/08/2016 Document Reviewed: 11/26/2014  [employer] Interactive Patient Education ©2016 [employer]         PREVENTING SURGICAL SITE INFECTIONS     Surgical Site Infections FAQs  What is a Surgical Site Infection (SSI)?  A surgical site infection is an infection that occurs after surgery in the part of the body where the surgery took place. Most patients who have surgery do not develop an infection. However, infections develop in about 1 to 3 out of every 100 patients who have surgery.  Some of the common symptoms of a surgical site infection are:  · Redness and pain around the area where you had surgery  · Drainage of cloudy fluid from your surgical  wound  · Fever  Can SSIs be treated?  Yes. Most surgical site infections can be treated with antibiotics. The antibiotic given to you depends on the bacteria (germs) causing the infection. Sometimes patients with SSIs also need another surgery to treat the infection.  What are some of the things that hospitals are doing to prevent SSIs?  To prevent SSIs, doctors, nurses, and other healthcare providers:  · Clean their hands and arms up to their elbows with an antiseptic agent just before the surgery.  · Clean their hands with soap and water or an alcohol-based hand rub before and after caring for each patient.  · May remove some of your hair immediately before your surgery using electric clippers if the hair is in the same area where the procedure will occur. They should not shave you with a razor.  · Wear special hair covers, masks, gowns, and gloves during surgery to keep the surgery area clean.  · Give you antibiotics before your surgery starts. In most cases, you should get antibiotics within 60 minutes before the surgery starts and the antibiotics should be stopped within 24 hours after surgery.  · Clean the skin at the site of your surgery with a special soap that kills germs.  What can I do to help prevent SSIs?  Before your surgery:  · Tell your doctor about other medical problems you may have. Health problems such as allergies, diabetes, and obesity could affect your surgery and your treatment.  · Quit smoking. Patients who smoke get more infections. Talk to your doctor about how you can quit before your surgery.  · Do not shave near where you will have surgery. Shaving with a razor can irritate your skin and make it easier to develop an infection.  At the time of your surgery:  · Speak up if someone tries to shave you with a razor before surgery. Ask why you need to be shaved and talk with your surgeon if you have any concerns.  · Ask if you will get antibiotics before surgery.  After your surgery:  · Make  sure that your healthcare providers clean their hands before examining you, either with soap and water or an alcohol-based hand rub.    If you do not see your providers clean their hands, please ask them to do so.  · Family and friends who visit you should not touch the surgical wound or dressings.  · Family and friends should clean their hands with soap and water or an alcohol-based hand rub before and after visiting you. If you do not see them clean their hands, ask them to clean their hands.  What do I need to do when I go home from the hospital?  · Before you go home, your doctor or nurse should explain everything you need to know about taking care of your wound. Make sure you understand how to care for your wound before you leave the hospital.  · Always clean your hands before and after caring for your wound.  · Before you go home, make sure you know who to contact if you have questions or problems after you get home.  · If you have any symptoms of an infection, such as redness and pain at the surgery site, drainage, or fever, call your doctor immediately.  If you have additional questions, please ask your doctor or nurse.  Developed and co-sponsored by The Society for Healthcare Epidemiology of [place] (SHEA); Infectious Diseases Society of [place] (IDSA); American Hospital Association; Association for Professionals in Infection Control and Epidemiology (APIC); Centers for Disease Control and Prevention (CDC); and The Joint Commission.     This information is not intended to replace advice given to you by your health care provider. Make sure you discuss any questions you have with your health care provider.     Document Released: 12/23/2014 Document Revised: 01/08/2016 Document Reviewed: 03/02/2016  [employer] Patient Education ©2016 [employer]             SYMPTOMS OF A STROKE    Call 911 or have someone take you to the Emergency Department if you have any of the following:    · Sudden numbness or  weakness of your face, arm or leg especially on one side of the body  · Sudden confusion, diffiiculty speaking or trouble understanding   · Changes in your vision or loss of sight in one eye  · Sudden severe headache with no known cause  · sudden dizziness, trouble walking, loss of balance or coordination    It is important to seek emergency care right away if you have further stroke symptoms. If you get emergency help quickly, the powerful clot-dissolving medicines can reduce the disabilities caused by a stroke.     For more information:    American Stroke Association  0-735-0-STROKE  www.strokeassociation.org           IF YOU SMOKE OR USE TOBACCO PLEASE READ THE FOLLOWING:    Why is smoking bad for me?  Smoking increases the risk of heart disease, lung disease, vascular disease, stroke, and cancer.     If you smoke, STOP!    If you would like more information on quitting smoking, please visit the [facility] website: [email or web address]   This link will provide additional resources including the QUIT line and the [employer] support groups.     For more information:    American Cancer Society  ([phone] 406-5619    American Heart Association  1-515.918.1535               YOU ARE THE MOST IMPORTANT FACTOR IN YOUR RECOVERY.     Follow all instructions carefully.     I have reviewed my discharge instructions with my nurse, including the following information, if applicable:     Information about my illness and diagnosis   Follow up appointments (including lab draws)   Wound Care   Equipment Needs   Medications (new and continuing) along with side effects   Preventative information such as vaccines and smoking cessations   Diet   Pain   I know when to contact my Doctor's office or seek emergency care      I want my nurse to describe the side effects of my medications: YES NO   If the answer is no, I understand the side effects of my medications: YES NO   My nurse described  the side effects of my medications in a way that I could understand: YES NO   I have taken my personal belongings and my own medications with me at discharge: YES NO            I have received this information and my questions have been answered. I have discussed any concerns I see with this plan with the nurse or physician. I understand these instructions.    Signature of Patient or Responsible Person: _____________________________________    Date: _________________  Time: __________________    Signature of Healthcare Provider: _______________________________________  Date: _________________  Time: __________________

## 2017-01-16 NOTE — ED PROVIDER NOTES
"Subjective   HPI Comments: Patient is a 41-year-old white male who presents ER today with complaint of right flank pain.  Patient reports that pain began Thursday evening.  Patient reports that he has had difficult to urinate since that time.  He reports that he is \"dribbling\".  He states that he has noticed some white tissue in his urine when he urinates.  Patient has a history of colon cancer.  Patient reports that he does have a colostomy due to this.  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  Patient reports he has had a previous renal stone in the past and this feels similar to his pain in the past.  He denies nausea vomiting he denies fever.  He presents ER today for further evaluation.    Patient is a 41 y.o. male presenting with flank pain.   History provided by:  Patient   used: No    Flank Pain   Pain location:  Generalized  Pain quality: aching    Pain radiates to:  Does not radiate  Pain severity:  Mild  Onset quality:  Sudden  Duration:  4 days  Timing:  Constant  Progression:  Worsening  Chronicity:  New  Context: previous surgery    Context: not alcohol use, not awakening from sleep, not diet changes, not eating, not laxative use, not medication withdrawal, not recent illness, not recent sexual activity, not recent travel, not retching, not sick contacts, not suspicious food intake and not trauma    Relieved by:  Nothing  Ineffective treatments:  None tried  Associated symptoms: nausea    Associated symptoms: no anorexia, no belching, no chest pain, no chills, no constipation, no cough, no diarrhea, no dysuria, no fatigue, no fever, no flatus, no hematemesis, no hematochezia, no melena, no shortness of breath, no sore throat, no vaginal bleeding, no vaginal discharge and no vomiting    Risk factors: no alcohol abuse, no aspirin use, not elderly, has not had multiple surgeries, no NSAID use, not obese, not pregnant and no " recent hospitalization        Review of Systems   Constitutional: Negative for chills, fatigue and fever.   HENT: Negative for sore throat.    Respiratory: Negative for cough and shortness of breath.    Cardiovascular: Negative for chest pain.   Gastrointestinal: Positive for nausea. Negative for anorexia, constipation, diarrhea, flatus, hematemesis, hematochezia, melena and vomiting.   Genitourinary: Positive for flank pain. Negative for dysuria, vaginal bleeding and vaginal discharge.   All other systems reviewed and are negative.      Past Medical History   Diagnosis Date   • Hypertension    • Kidney stone        Allergies   Allergen Reactions   • Morphine And Related      Doesn't work    • Percocet [Oxycodone-Acetaminophen]      Give me migraines        Past Surgical History   Procedure Laterality Date   • Knee surgery     • Colon surgery     • Colostomy     • Nephrectomy     • Cystoscopy         History reviewed. No pertinent family history.    Social History     Social History   • Marital status:      Spouse name: N/A   • Number of children: N/A   • Years of education: N/A     Social History Main Topics   • Smoking status: Never Smoker   • Smokeless tobacco: None   • Alcohol use No   • Drug use: No   • Sexual activity: Defer     Other Topics Concern   • None     Social History Narrative   • None           Objective   Physical Exam   Constitutional: He is oriented to person, place, and time. He appears well-developed and well-nourished.   HENT:   Head: Normocephalic and atraumatic.   Eyes: Conjunctivae are normal. Pupils are equal, round, and reactive to light.   Neck: Normal range of motion. Neck supple.   Cardiovascular: Normal rate, regular rhythm and normal heart sounds.    Pulmonary/Chest: Effort normal and breath sounds normal.   Abdominal: Soft. Bowel sounds are normal. There is tenderness. There is CVA tenderness.   Musculoskeletal: Normal range of motion.   Neurological: He is alert and oriented  to person, place, and time.   Skin: Skin is warm and dry.   Psychiatric: He has a normal mood and affect.   Nursing note and vitals reviewed.      Procedures         ED Course  ED Course   Value Comment By Time    CT abd/pelvis: 1. The patient's undergone previous rectosigmoid resection as well as  left nephrectomy.  2. There is moderate hydronephrosis of the right renal collecting system  with perinephric stranding consistent with acute obstructive uropathy. A  stent is well-positioned but I suspect is malfunctioning. Urology  consultation is recommended. No perinephric fluid collections  identified.  3. There is presacral thickening extending into the lower bony pelvis.  This is stable to slightly improved from the previous study likely  representing a combination of postoperative scarring and postradiation  change. No new mass lesions are identified.  4. The patient's previously noted small bowel obstruction has resolved  with a normal bowel gas pattern noted on today's exam. Left lower  quadrant colostomy is present. There is a fat-containing peristomal  hernia present..   5. Tiny effusions with bibasilar atelectasis present.  6. Cholelithiasis. Catherine Pimentel, APRN 01/16 1252   Creatinine: (!) 14.34 (Reviewed) Catherine Pimentel APRN 01/16 1252   BUN: (!) 73 (Reviewed) Catherine Pimentel APRN 01/16 1252   CO2: (!) 21.0 (Reviewed) Catherine Pimentel APRN 01/16 1252   eGFR Non  Amer: (!) 4 (Reviewed) Catherine Pimentel APRN 01/16 1252   C-Reactive Protein: (!) 13.92 (Reviewed) Catherine Pimentel APRN 01/16 1252   aPTT: (!) 45.0 (Reviewed) Catherine Pimentel APRN 01/16 1252   Hemoglobin: (!) 12.6 (Reviewed) Catherine Pimentel APRN 01/16 1252   Hematocrit: (!) 37.1 (Reviewed) Catherine Pimentel APRN 01/16 1252   WBC: 9.43 (Reviewed) Catherine Pimentel APRN 01/16 1252    Patient CT reviewed at this time.  Patient labs reviewed as well.  Case discussed with Dr. Black was also seen in evaluation.  At this  time a call was placed to urology for consultation. Catherine Pimentel, APRN 01/16 6599    Dr. Quezada has agreed to see the pt in consultation. Hospitalist Dr. Garibay was contacted and is agreeable to admission for pt after discussion with Dr. Black. Pt will be admitted to his services at this time.  Catherine Pimentel, APRN 01/16 1350                  MDM  Number of Diagnoses or Management Options  Acute renal failure, unspecified acute renal failure type: new and requires workup  Obstructive uropathy: new and requires workup     Amount and/or Complexity of Data Reviewed  Clinical lab tests: ordered and reviewed  Tests in the radiology section of CPT®: ordered and reviewed  Discuss the patient with other providers: yes  Independent visualization of images, tracings, or specimens: yes    Patient Progress  Patient progress: stable      Final diagnoses:   Obstructive uropathy   Acute renal failure, unspecified acute renal failure type            Catherine Pimentel, APRN  01/16/17 [id number]

## 2017-01-16 NOTE — H&P
"    [facility] Medicine Services  HISTORY AND PHYSICAL    Date of Admission: 1/16/2017  Primary Care Physician: Fermin Alexander MD    Subjective     Chief Complaint: Right flank pain    Flank Pain   Associated symptoms include abdominal pain.     Patient is a 41-year-old  male with past medical history significant for rectal cancer (history of chemotherapy and XRT; subsequent colectomy and colostomy), and history of obstructive uropathy that presented to our hospital with a chief complaint of right flank pain.  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  He has also had radiation changes to the right ureter, and reports receiving stent changes approximately every 3-4 months on an outpatient basis at [facility].  On Thursday of this past week, he noticed that his urine output \"stopped\".  He started developing pain in his right flank area, and he attributed this discomfort to the thought of kidney stones.  He reports that he would try to force urine output, and began noticing some sediment and \"tissue\" that would come out when he would urinate.  This occasionally did have some streaks of blood.  No clots.  He has never had similar symptoms like this in the past.  He had a great deal of pain in the right flank region, 10 out of 10 in severity, and very sharp in nature.  Pain is not radiating.  No fevers or chills.  One episode of nausea with vomiting, but those symptoms have resolved.  No lower extremity edema.  A few pounds of \"weight gain\" since all the symptoms began.  He reports a long-standing struggle with hypertension.  No recent history of chest pain or shortness of breath.  He reports that his last stent exchange occurred back in December, and tolerated the procedure without complications.  He reports no underlying history of chronic kidney " disease that he is aware of.        Review of Systems   Constitutional: Positive for appetite change.   HENT: Negative.    Eyes: Negative.    Respiratory: Negative.    Cardiovascular: Negative.    Gastrointestinal: Positive for abdominal pain.   Endocrine: Negative.    Genitourinary: Positive for flank pain.   Skin: Negative.    Neurological: Negative.    Hematological: Negative.    Psychiatric/Behavioral: Negative.         Otherwise complete ROS reviewed and negative except as mentioned in the HPI.      Past Medical History:   Past Medical History   Diagnosis Date   • Hypertension    • Kidney stone        Past Surgical History:  Past Surgical History   Procedure Laterality Date   • Knee surgery     • Colon surgery     • Colostomy     • Nephrectomy     • Cystoscopy         Social History:  reports that he has never smoked. He does not have any smokeless tobacco history on file. He reports that he does not drink alcohol or use illicit drugs.    Family History: No history of colon cancer in family; mother passed away from pancreatic cancer approx 5 years ago    Allergies:  Allergies   Allergen Reactions   • Morphine And Related      Doesn't work    • Percocet [Oxycodone-Acetaminophen]      Give me migraines        Medications:  Prior to Admission medications    Medication Sig Start Date End Date Taking? Authorizing Provider   CloNIDine (CATAPRES) 0.2 MG tablet Take 0.2 mg by mouth As Needed. 1/27/16  Yes Historical Provider, MD   HYDROcodone-acetaminophen (NORCO)  MG per tablet Take one tablet every 3-4 hours PRN pain. Earliest Fill Date: 1/1/17 1/1/17  Yes Historical Provider, MD   lisinopril (PRINIVIL,ZESTRIL) 40 MG tablet Take 40 mg by mouth Daily.   Yes Historical Provider, MD   naratriptan (AMERGE) 2.5 MG tablet Take 2.5 mg by mouth 1 (One) Time As Needed for migraine. 1/8/16  Yes Historical Provider, MD   omeprazole (priLOSEC) 40 MG capsule Take 40 mg by mouth Daily. 6/7/16  Yes Historical Provider, MD  "  propranolol LA (INDERAL LA) 60 MG 24 hr capsule Take 60 mg by mouth Daily. 6/15/16  Yes Historical Provider, MD   SUMAtriptan (IMITREX) 100 MG tablet Take 100 mg by mouth 1 (One) Time As Needed for migraine. 1/8/16  Yes Historical Provider, MD       Objective     Vital Signs:   Visit Vitals   • BP (!) 177/107 (BP Location: Right arm, Patient Position: Lying)   • Pulse 75   • Temp 97.2 °F (36.2 °C) (Temporal Artery )   • Resp 20   • Ht 75\" (190.5 cm)   • Wt 238 lb 6 oz (108 kg)   • SpO2 98%   • BMI 29.79 kg/m2     Physical Exam   Constitutional: He is oriented to person, place, and time. He appears well-developed and well-nourished. No distress.   HENT:   Head: Normocephalic and atraumatic.   Mouth/Throat: No oropharyngeal exudate.   Eyes: EOM are normal. Pupils are equal, round, and reactive to light. No scleral icterus.   Neck: Normal range of motion. No tracheal deviation present.   Cardiovascular: Normal rate.    Pulmonary/Chest: Effort normal and breath sounds normal.   Abdominal: Soft. Bowel sounds are normal. He exhibits no distension. There is tenderness (right CVA region).   Ostomy in place with visible output   Musculoskeletal: He exhibits no edema.   Neurological: He is alert and oriented to person, place, and time. No cranial nerve deficit.   Skin: Skin is warm and dry. He is not diaphoretic.   Psychiatric: He has a normal mood and affect.   Vitals reviewed.      Results Reviewed:  Lab Results (last 24 hours)     Procedure Component Value Units Date/Time    CBC & Differential [[id number]] Collected:  01/16/17 1020    Specimen:  Blood Updated:  01/16/17 1032    Narrative:       The following orders were created for panel order CBC & Differential.  Procedure                               Abnormality         Status                     ---------                               -----------         ------                     CBC Auto Differential[[id number]]         Abnormal            Final result             "     Please view results for these tests on the individual orders.    CBC Auto Differential [[id number]]  (Abnormal) Collected:  01/16/17 1020    Specimen:  Blood Updated:  01/16/17 1032     WBC 9.43 10*3/mm3      RBC 4.18 (L) 10*6/mm3      Hemoglobin 12.6 (L) g/dL      Hematocrit 37.1 (L) %      MCV 88.8 fL      MCH 30.1 pg      MCHC 34.0 g/dL      RDW 13.3 %      RDW-SD 42.9 fl      MPV 10.1 fL      Platelets 157 10*3/mm3      Neutrophil % 76.4 %      Lymphocyte % 11.6 (L) %      Monocyte % 9.2 %      Eosinophil % 2.3 %      Basophil % 0.2 %      Immature Grans % 0.3 %      Neutrophils, Absolute 7.20 10*3/mm3      Lymphocytes, Absolute 1.09 10*3/mm3      Monocytes, Absolute 0.87 10*3/mm3      Eosinophils, Absolute 0.22 10*3/mm3      Basophils, Absolute 0.02 10*3/mm3      Immature Grans, Absolute 0.03 10*3/mm3     aPTT [[id number]]  (Abnormal) Collected:  01/16/17 1021    Specimen:  Blood Updated:  01/16/17 1042     PTT 45.0 (H) seconds     Protime-INR [[id number]]  (Normal) Collected:  01/16/17 1021    Specimen:  Blood Updated:  01/16/17 1042     Protime 14.1 Seconds      INR 1.06     Amylase [[id number]]  (Normal) Collected:  01/16/17 1020    Specimen:  Blood Updated:  01/16/17 1045     Amylase 61 U/L     Lipase [[id number]]  (Normal) Collected:  01/16/17 1020    Specimen:  Blood Updated:  01/16/17 1045     Lipase 29 U/L     Comprehensive Metabolic Panel [[id number]]  (Abnormal) Collected:  01/16/17 1020    Specimen:  Blood Updated:  01/16/17 1053     Glucose 96 mg/dL      BUN 73 (H) mg/dL      Creatinine 14.34 (H) mg/dL      Sodium 136 mmol/L      Potassium 5.2 mmol/L      Chloride 98 mmol/L      CO2 21.0 (L) mmol/L      Calcium 9.1 mg/dL      Total Protein 6.9 g/dL      Albumin 3.90 g/dL      ALT (SGPT) 28 U/L      AST (SGOT) 20 U/L      Alkaline Phosphatase 104 U/L      Total Bilirubin 0.7 mg/dL      eGFR Non African Amer 4 (L) mL/min/1.73      Globulin 3.0 gm/dL      A/G Ratio 1.3 g/dL      BUN/Creatinine Ratio 5.1 (L)       Anion Gap 17.0 (H) mmol/L     C-reactive Protein [[id number]]  (Abnormal) Collected:  01/16/17 1020    Specimen:  Blood Updated:  01/16/17 1127     C-Reactive Protein 13.92 (H) mg/dL         Imaging Results (last 24 hours)     Procedure Component Value Units Date/Time    CT Abdomen Pelvis Without Contrast [[id number]] Collected:  01/16/17 1231     Updated:  01/16/17 1245    Narrative:       CT ABDOMEN PELVIS WO CONTRAST- 1/16/2017 11:32 AM CST     HISTORY: right flank pain      COMPARISON: 04/24/2015      DLP: 698 mGy cm. Automated exposure control was utilized to diminish  patient radiation dose.     TECHNIQUE: Noncontrast enhanced images of the abdomen and pelvis  obtained without oral contrast.      FINDINGS:   Tiny effusions are present with bibasilar atelectasis. The base of the  heart is unremarkable..      LIVER: No focal liver lesion.      BILIARY SYSTEM: Cholelithiasis is present. There is no evidence of  pericholecystic inflammatory change or biliary dilatation..      PANCREAS: No focal pancreatic lesion.      SPLEEN: Unremarkable.      KIDNEYS AND ADRENALS: Patient's undergone previous left nephrectomy.  There is moderate dilatation of the upper tracts of the right kidney  with perinephric stranding consistent with obstructive uropathy. A  right-sided double-J ureteral stent is well-positioned but is apparently  not functioning well. No perinephric fluid collections are present..      .     RETROPERITONEUM: There is an interaortocaval node measuring 10 mm in  size previously measured at 6 mm. Some smaller left periaortic nodes are  present unchanged from the previous study. Presacral thickening is  present likely representing a combination of postoperative and  postradiation changes. Overall I feel this has shown mild interval  improvement from the previous study. No new adenopathy is present..      GI TRACT: The patient's undergone previous rectosigmoid resection. There  is a left lower quadrant  colostomy. Previously noted dilated loops of  small bowel are no longer visualized with no evidence of mechanical  obstruction on the current exam.. The appendix is not visualized..     OTHER: There is no mesenteric mass, lymphadenopathy or fluid collection.  The osseous structures and soft tissues demonstrate no worrisome  lesions. No free fluid is present.      PELVIS: Soft tissue nodularity within the presacral space is again  present and stable to slightly improved from the previous study. No new  mass lesion is present.. The urinary bladder is normal in appearance.       Impression:       1. The patient's undergone previous rectosigmoid resection as well as  left nephrectomy.  2. There is moderate hydronephrosis of the right renal collecting system  with perinephric stranding consistent with acute obstructive uropathy. A  stent is well-positioned but I suspect is malfunctioning. Urology  consultation is recommended. No perinephric fluid collections  identified.  3. There is presacral thickening extending into the lower bony pelvis.  This is stable to slightly improved from the previous study likely  representing a combination of postoperative scarring and postradiation  change. No new mass lesions are identified.  4. The patient's previously noted small bowel obstruction has resolved  with a normal bowel gas pattern noted on today's exam. Left lower  quadrant colostomy is present. There is a fat-containing peristomal  hernia present..   5. Tiny effusions with bibasilar atelectasis present.  6. Cholelithiasis.     Electronically Signed By-Dr. Tate Jenkins MD On:1/16/2017 1:43 PM  EST  This report was finalized on 01/16/2017 12:43 by Dr. Tate Jenkins MD.          I have personally reviewed and interpreted the radiology studies and ECG obtained at time of admission.     Assessment / Plan     Assessment & Plan  Hospital Problem List     Obstructive uropathy        Assessment:  1.  Obstructive Uropathy with  "Right Hydronephrosis; patient receives right ureteral stent exchanges every 4 months on outpatient basis (managed at [facility])  2.  ARI - 2/2 #1  3.  Acute pain - right CVA region; 2/2 #1  4.  HTN - poorly controlled  5.  History of rectal CA s/p chemo and XRT; subsequent colectomy with colostomy  6.  History of left nephrectomy 2/2 \"nonfunctioning\" kidney and XRT changes to left ureter    Plan:  1.  NPO  2.  Urology consulted regarding obstructive uropathy with plans to take to OR this afternoon  3.  IVFs  4.  IV Labetalol and Hydralazine PRN  5.  IV Dilaudid PRN pain  6.  Serial BMP; check UA  7.  Lovenox for PPx (dose to start tomorrow as scheduled planned for today)  8.  SCDs  9.  Hold ACEI given ARI    Code Status: Full Code     I discussed the patients findings and my recommendations with patient and family at bedside    Krish Hayes MD   01/16/17   3:25 PM          "

## 2017-01-17 PROBLEM — N19 RENAL FAILURE: Status: ACTIVE | Noted: 2017-01-17

## 2017-01-17 LAB
ANION GAP SERPL CALCULATED.3IONS-SCNC: 17 MMOL/L (ref 4–13)
BACTERIA UR QL AUTO: ABNORMAL /HPF
BILIRUB UR QL STRIP: NEGATIVE
BUN BLD-MCNC: 74 MG/DL (ref 5–21)
BUN/CREAT SERPL: 5.9 (ref 7–25)
CALCIUM SPEC-SCNC: 8.6 MG/DL (ref 8.4–10.4)
CHLORIDE SERPL-SCNC: 103 MMOL/L (ref 98–110)
CLARITY UR: ABNORMAL
CO2 SERPL-SCNC: 21 MMOL/L (ref 24–31)
COLOR UR: ABNORMAL
CREAT BLD-MCNC: 12.5 MG/DL (ref 0.5–1.4)
GFR SERPL CREATININE-BSD FRML MDRD: 5 ML/MIN/1.73
GLUCOSE BLD-MCNC: 86 MG/DL (ref 70–100)
GLUCOSE UR STRIP-MCNC: NEGATIVE MG/DL
HGB UR QL STRIP.AUTO: ABNORMAL
HYALINE CASTS UR QL AUTO: ABNORMAL /LPF
KETONES UR QL STRIP: NEGATIVE
LEUKOCYTE ESTERASE UR QL STRIP.AUTO: ABNORMAL
NITRITE UR QL STRIP: NEGATIVE
PH UR STRIP.AUTO: 5.5 [PH] (ref 5–8)
POTASSIUM BLD-SCNC: 5.2 MMOL/L (ref 3.5–5.3)
PROT UR QL STRIP: ABNORMAL
RBC # UR: ABNORMAL /HPF
REF LAB TEST METHOD: ABNORMAL
SODIUM BLD-SCNC: 141 MMOL/L (ref 135–145)
SP GR UR STRIP: 1.01 (ref 1–1.03)
SQUAMOUS #/AREA URNS HPF: ABNORMAL /HPF
UROBILINOGEN UR QL STRIP: ABNORMAL
WBC UR QL AUTO: ABNORMAL /HPF

## 2017-01-17 PROCEDURE — 25010000002 HYDRALAZINE PER 20 MG: Performed by: INTERNAL MEDICINE

## 2017-01-17 PROCEDURE — 25010000002 ONDANSETRON PER 1 MG: Performed by: INTERNAL MEDICINE

## 2017-01-17 PROCEDURE — 80048 BASIC METABOLIC PNL TOTAL CA: CPT | Performed by: INTERNAL MEDICINE

## 2017-01-17 PROCEDURE — 87086 URINE CULTURE/COLONY COUNT: CPT | Performed by: NURSE PRACTITIONER

## 2017-01-17 PROCEDURE — 81001 URINALYSIS AUTO W/SCOPE: CPT | Performed by: NURSE PRACTITIONER

## 2017-01-17 PROCEDURE — 25010000002 HYDROMORPHONE PER 4 MG: Performed by: INTERNAL MEDICINE

## 2017-01-17 RX ORDER — AMLODIPINE BESYLATE 5 MG/1
5 TABLET ORAL
Status: DISCONTINUED | OUTPATIENT
Start: 2017-01-17 | End: 2017-01-18

## 2017-01-17 RX ORDER — SUMATRIPTAN 50 MG/1
50 TABLET, FILM COATED ORAL
Status: DISCONTINUED | OUTPATIENT
Start: 2017-01-17 | End: 2017-01-20 | Stop reason: HOSPADM

## 2017-01-17 RX ADMIN — ONDANSETRON HYDROCHLORIDE 4 MG: 2 SOLUTION INTRAMUSCULAR; INTRAVENOUS at 08:51

## 2017-01-17 RX ADMIN — HYDROMORPHONE HYDROCHLORIDE 0.5 MG: 1 INJECTION, SOLUTION INTRAMUSCULAR; INTRAVENOUS; SUBCUTANEOUS at 11:46

## 2017-01-17 RX ADMIN — HYDRALAZINE HYDROCHLORIDE 10 MG: 20 INJECTION INTRAMUSCULAR; INTRAVENOUS at 23:52

## 2017-01-17 RX ADMIN — CLONIDINE HYDROCHLORIDE 0.2 MG: 0.1 TABLET ORAL at 21:44

## 2017-01-17 RX ADMIN — HYDROMORPHONE HYDROCHLORIDE 0.5 MG: 1 INJECTION, SOLUTION INTRAMUSCULAR; INTRAVENOUS; SUBCUTANEOUS at 03:09

## 2017-01-17 RX ADMIN — HYDROMORPHONE HYDROCHLORIDE 0.5 MG: 1 INJECTION, SOLUTION INTRAMUSCULAR; INTRAVENOUS; SUBCUTANEOUS at 06:31

## 2017-01-17 RX ADMIN — LABETALOL HYDROCHLORIDE 10 MG: 5 INJECTION, SOLUTION INTRAVENOUS at 12:15

## 2017-01-17 RX ADMIN — CLONIDINE HYDROCHLORIDE 0.2 MG: 0.1 TABLET ORAL at 00:00

## 2017-01-17 RX ADMIN — SODIUM CHLORIDE 125 ML/HR: 9 INJECTION, SOLUTION INTRAVENOUS at 04:14

## 2017-01-17 RX ADMIN — AMLODIPINE BESYLATE 5 MG: 5 TABLET ORAL at 13:27

## 2017-01-17 RX ADMIN — PANTOPRAZOLE SODIUM 40 MG: 40 TABLET, DELAYED RELEASE ORAL at 06:24

## 2017-01-17 RX ADMIN — ONDANSETRON HYDROCHLORIDE 4 MG: 2 SOLUTION INTRAMUSCULAR; INTRAVENOUS at 15:17

## 2017-01-17 RX ADMIN — HYDROMORPHONE HYDROCHLORIDE 0.5 MG: 1 INJECTION, SOLUTION INTRAMUSCULAR; INTRAVENOUS; SUBCUTANEOUS at 20:43

## 2017-01-17 RX ADMIN — SUMATRIPTAN SUCCINATE 50 MG: 50 TABLET, FILM COATED ORAL at 15:17

## 2017-01-17 RX ADMIN — HYDRALAZINE HYDROCHLORIDE 10 MG: 20 INJECTION INTRAMUSCULAR; INTRAVENOUS at 13:50

## 2017-01-17 RX ADMIN — SODIUM CHLORIDE 125 ML/HR: 9 INJECTION, SOLUTION INTRAVENOUS at 12:15

## 2017-01-17 RX ADMIN — SODIUM CHLORIDE 125 ML/HR: 9 INJECTION, SOLUTION INTRAVENOUS at 20:26

## 2017-01-17 RX ADMIN — HYDROMORPHONE HYDROCHLORIDE 0.5 MG: 1 INJECTION, SOLUTION INTRAMUSCULAR; INTRAVENOUS; SUBCUTANEOUS at 16:47

## 2017-01-17 RX ADMIN — PROPRANOLOL HYDROCHLORIDE 60 MG: 60 CAPSULE, EXTENDED RELEASE ORAL at 08:52

## 2017-01-17 RX ADMIN — ACETAMINOPHEN 650 MG: 325 TABLET ORAL at 08:51

## 2017-01-17 RX ADMIN — HYDROMORPHONE HYDROCHLORIDE 0.5 MG: 1 INJECTION, SOLUTION INTRAMUSCULAR; INTRAVENOUS; SUBCUTANEOUS at 00:05

## 2017-01-17 NOTE — PLAN OF CARE
Problem: Patient Care Overview (Adult)  Goal: Plan of Care Review  Outcome: Ongoing (interventions implemented as appropriate)    01/16/17 1850   Coping/Psychosocial Response Interventions   Plan Of Care Reviewed With patient;spouse   Patient Care Overview   Progress improving   Outcome Evaluation   Outcome Summary/Follow up Plan Pt arrived from ED w/ c/o anuria since Thursday. Pt is a chronic kidney pt at [facility] w/ recent stent placement. Pt sent to radiology for Perc Neph tube placement to right flank. Pt is experiencing HTN barely changed w/ prn medication. Pt does have other prn options available also. To f/u w/ BP if procedure, meds, and pain meds continue to not assist in decrease.        Goal: Adult Individualization and Mutuality  Outcome: Ongoing (interventions implemented as appropriate)  Goal: Discharge Needs Assessment  Outcome: Ongoing (interventions implemented as appropriate)    Problem: Perioperative Period (Adult)  Goal: Signs and Symptoms of Listed Potential Problems Will be Absent or Manageable (Perioperative Period)  Outcome: Ongoing (interventions implemented as appropriate)

## 2017-01-17 NOTE — PROGRESS NOTES
LOS: 1 day   Patient Care Team:  Fermin Alexander MD as PCP - General  Fermin Alexander MD as PCP - Family Medicine    Chief Complaint:  Ureteral obstruction, ARI    Subjective     Interval History:     Patient Complaints: headache  Patient Denies:  fevers  History taken from: patient    Nephrostomy tube placed overnight no complications    Review of Systems:   Review of Systems   Constitutional: Negative for fever.   Respiratory: Negative for shortness of breath.    Gastrointestinal: Negative for abdominal pain, nausea and vomiting.       Objective     Vital Signs  Temp:  [98 °F (36.7 °C)-98.6 °F (37 °C)] (P) 98.2 °F (36.8 °C)  Heart Rate:  [61-81] (P) 68  Resp:  [14-20] (P) 16  BP: (138-204)/() (P) 172/102    Physical Exam:  Physical Exam   Genitourinary:   Genitourinary Comments: Right nephrostomy tube in place draining clear yellow urine        Results Review:       Lab Results (last 72 hours)     Procedure Component Value Units Date/Time    CBC & Differential [[id number]] Collected:  01/16/17 1020    Specimen:  Blood Updated:  01/16/17 1032    Narrative:       The following orders were created for panel order CBC & Differential.  Procedure                               Abnormality         Status                     ---------                               -----------         ------                     CBC Auto Differential[[id number]]         Abnormal            Final result                 Please view results for these tests on the individual orders.    CBC Auto Differential [[id number]]  (Abnormal) Collected:  01/16/17 1020    Specimen:  Blood Updated:  01/16/17 1032     WBC 9.43 10*3/mm3      RBC 4.18 (L) 10*6/mm3      Hemoglobin 12.6 (L) g/dL      Hematocrit 37.1 (L) %      MCV 88.8 fL      MCH 30.1 pg      MCHC 34.0 g/dL      RDW 13.3 %      RDW-SD 42.9 fl      MPV 10.1 fL      Platelets 157 10*3/mm3      Neutrophil % 76.4 %      Lymphocyte % 11.6 (L) %      Monocyte % 9.2 %       Eosinophil % 2.3 %      Basophil % 0.2 %      Immature Grans % 0.3 %      Neutrophils, Absolute 7.20 10*3/mm3      Lymphocytes, Absolute 1.09 10*3/mm3      Monocytes, Absolute 0.87 10*3/mm3      Eosinophils, Absolute 0.22 10*3/mm3      Basophils, Absolute 0.02 10*3/mm3      Immature Grans, Absolute 0.03 10*3/mm3     aPTT [[id number]]  (Abnormal) Collected:  01/16/17 1021    Specimen:  Blood Updated:  01/16/17 1042     PTT 45.0 (H) seconds     Protime-INR [[id number]]  (Normal) Collected:  01/16/17 1021    Specimen:  Blood Updated:  01/16/17 1042     Protime 14.1 Seconds      INR 1.06     Amylase [[id number]]  (Normal) Collected:  01/16/17 1020    Specimen:  Blood Updated:  01/16/17 1045     Amylase 61 U/L     Lipase [[id number]]  (Normal) Collected:  01/16/17 1020    Specimen:  Blood Updated:  01/16/17 1045     Lipase 29 U/L     Comprehensive Metabolic Panel [[id number]]  (Abnormal) Collected:  01/16/17 1020    Specimen:  Blood Updated:  01/16/17 1053     Glucose 96 mg/dL      BUN 73 (H) mg/dL      Creatinine 14.34 (H) mg/dL      Sodium 136 mmol/L      Potassium 5.2 mmol/L      Chloride 98 mmol/L      CO2 21.0 (L) mmol/L      Calcium 9.1 mg/dL      Total Protein 6.9 g/dL      Albumin 3.90 g/dL      ALT (SGPT) 28 U/L      AST (SGOT) 20 U/L      Alkaline Phosphatase 104 U/L      Total Bilirubin 0.7 mg/dL      eGFR Non African Amer 4 (L) mL/min/1.73      Globulin 3.0 gm/dL      A/G Ratio 1.3 g/dL      BUN/Creatinine Ratio 5.1 (L)      Anion Gap 17.0 (H) mmol/L     C-reactive Protein [[id number]]  (Abnormal) Collected:  01/16/17 1020    Specimen:  Blood Updated:  01/16/17 1127     C-Reactive Protein 13.92 (H) mg/dL     Urine Culture [[id number]] Collected:  01/17/17 0010    Specimen:  Urine from Urine, Clean Catch Updated:  01/17/17 0035    Urinalysis With / Culture If Indicated [[id number]]  (Abnormal) Collected:  01/17/17 0010    Specimen:  Urine from Urine, Clean Catch Updated:  01/17/17 0106     Color, UA Red (A)      Appearance,  UA Cloudy (A)      pH, UA 5.5      Specific Gravity, UA 1.011      Glucose, UA Negative      Ketones, UA Negative      Bilirubin, UA Negative      Blood, UA Large (3+) (A)      Protein,  mg/dL (2+) (A)      Leuk Esterase, UA Small (1+) (A)      Nitrite, UA Negative      Urobilinogen, UA 0.2 E.U./dL     Urinalysis, Microscopic Only [[id number]]  (Abnormal) Collected:  01/17/17 0010    Specimen:  Urine from Urine, Clean Catch Updated:  01/17/17 0106     RBC, UA Too Numerous to Count (A) /HPF      WBC, UA 6-12 (A) /HPF      Bacteria, UA None Seen /HPF      Squamous Epithelial Cells, UA None Seen /HPF      Hyaline Casts, UA 0-2 /LPF      Methodology Manual Light Microscopy     Basic Metabolic Panel [[id number]]  (Abnormal) Collected:  01/17/17 0514    Specimen:  Blood Updated:  01/17/17 0613     Glucose 86 mg/dL      BUN 74 (H) mg/dL      Creatinine 12.50 (H) mg/dL      Sodium 141 mmol/L      Potassium 5.2 mmol/L      Chloride 103 mmol/L      CO2 21.0 (L) mmol/L      Calcium 8.6 mg/dL      eGFR Non African Amer 5 (L) mL/min/1.73      BUN/Creatinine Ratio 5.9 (L)      Anion Gap 17.0 (H) mmol/L     Narrative:       GFR Normal >60  Chronic Kidney Disease <60  Kidney Failure <15        Imaging Results (last 72 hours)     Procedure Component Value Units Date/Time    CT Abdomen Pelvis Without Contrast [[id number]] Collected:  01/16/17 1231     Updated:  01/16/17 1245    Narrative:       CT ABDOMEN PELVIS WO CONTRAST- 1/16/2017 11:32 AM CST     HISTORY: right flank pain      COMPARISON: 04/24/2015      DLP: 698 mGy cm. Automated exposure control was utilized to diminish  patient radiation dose.     TECHNIQUE: Noncontrast enhanced images of the abdomen and pelvis  obtained without oral contrast.      FINDINGS:   Tiny effusions are present with bibasilar atelectasis. The base of the  heart is unremarkable..      LIVER: No focal liver lesion.      BILIARY SYSTEM: Cholelithiasis is present. There is no evidence of  pericholecystic  inflammatory change or biliary dilatation..      PANCREAS: No focal pancreatic lesion.      SPLEEN: Unremarkable.      KIDNEYS AND ADRENALS: Patient's undergone previous left nephrectomy.  There is moderate dilatation of the upper tracts of the right kidney  with perinephric stranding consistent with obstructive uropathy. A  right-sided double-J ureteral stent is well-positioned but is apparently  not functioning well. No perinephric fluid collections are present..      .     RETROPERITONEUM: There is an interaortocaval node measuring 10 mm in  size previously measured at 6 mm. Some smaller left periaortic nodes are  present unchanged from the previous study. Presacral thickening is  present likely representing a combination of postoperative and  postradiation changes. Overall I feel this has shown mild interval  improvement from the previous study. No new adenopathy is present..      GI TRACT: The patient's undergone previous rectosigmoid resection. There  is a left lower quadrant colostomy. Previously noted dilated loops of  small bowel are no longer visualized with no evidence of mechanical  obstruction on the current exam.. The appendix is not visualized..     OTHER: There is no mesenteric mass, lymphadenopathy or fluid collection.  The osseous structures and soft tissues demonstrate no worrisome  lesions. No free fluid is present.      PELVIS: Soft tissue nodularity within the presacral space is again  present and stable to slightly improved from the previous study. No new  mass lesion is present.. The urinary bladder is normal in appearance.       Impression:       1. The patient's undergone previous rectosigmoid resection as well as  left nephrectomy.  2. There is moderate hydronephrosis of the right renal collecting system  with perinephric stranding consistent with acute obstructive uropathy. A  stent is well-positioned but I suspect is malfunctioning. Urology  consultation is recommended. No perinephric  fluid collections  identified.  3. There is presacral thickening extending into the lower bony pelvis.  This is stable to slightly improved from the previous study likely  representing a combination of postoperative scarring and postradiation  change. No new mass lesions are identified.  4. The patient's previously noted small bowel obstruction has resolved  with a normal bowel gas pattern noted on today's exam. Left lower  quadrant colostomy is present. There is a fat-containing peristomal  hernia present..   5. Tiny effusions with bibasilar atelectasis present.  6. Cholelithiasis.     Electronically Signed By-Dr. Tate Jenkins MD On:1/16/2017 1:43 PM  EST  This report was finalized on 01/16/2017 12:43 by Dr. Tate Jenkins MD.    IR Nephrostomy Tube Placement Right [[id number]] Collected:  01/17/17 0720     Updated:  01/17/17 0727    Narrative:       IR NEPHROSTOMY TUBE PLACEMENT RT- 1/16/2017 17:20 CST     HISTORY: ureteral stent obstruction; N13.5-Crossing vessel and stricture  of ureter without hydronephrosis; N13.9-Obstructive and reflux uropathy,  unspecified; N17.9-Acute kidney failure, unspecified      COMPARISON: CT abdomen and pelvis 01/16/2017     Fluoroscopy time: 1.9 minutes     Estimated blood loss: Approximately 10 mL     MEDICATIONS: 2 mg IV Versed and 100 mcg IV fentanyl. 2 g IV Ancef were  also administered during the exam.     Consultations: None.     Contrast: Approximately 10 mL Isovue-300 contrast were injected into the  patient's renal collecting system. No intravenous contrast was  administered.     Consent: Informed consent was obtained from the patient. All questions  were answered to the patient's satisfaction.     PROCEDURE:  The patient was placed in the prone position. The RIGHT renal collecting  system was visualized under ultrasound, and the skin was marked in the  region of the optimal site for percutaneous access.     A timeout was performed.     The patient was prepped for the  procedure, which included cleaning the  skin with Betadine solution and placing a large drape over the entire  body.     The skin and deeper soft tissues were anesthetized with approximately 10  mL of 1% lidocaine.     Utilizing a translumbar needle and real-time ultrasound guidance, access  into the RIGHT renal collecting system was obtained and confirmed by  return of urine. Contrast was injected into the RIGHT renal collecting  system demonstrating marked hydronephrosis and proximal hydroureter. The  existing ureteral stent extends up into the superior calyces. A wire was  advanced into the RIGHT renal collecting system.     The soft tissues were dilated. An 8.5 Czech nephrostomy tube was  positioned within the renal collecting system. Contrast was injected to  confirm position of the catheter.     The images demonstrate moderate to marked right-sided hydronephrosis and  proximal hydroureter.       Impression:       1. Successful placement of an 8.5 Czech drain into the RIGHT renal  collecting system.     Electronically Signed By-Dr. Alec Sena MD On:1/17/2017 8:25 AM  EST  This report was finalized on 01/17/2017 07:25 by Dr. Alec Sena MD.          Medication Review:     Current Facility-Administered Medications:   •  acetaminophen (TYLENOL) tablet 650 mg, 650 mg, Oral, Q4H PRN, Krish Hayes MD, 650 mg at 01/17/17 0851  •  amLODIPine (NORVASC) tablet 5 mg, 5 mg, Oral, Q24H, Krish Hayes MD, 5 mg at 01/17/17 1327  •  CloNIDine (CATAPRES) tablet 0.2 mg, 0.2 mg, Oral, Q6H PRN, Krish Hayes MD, 0.2 mg at 01/17/17 0000  •  enoxaparin (LOVENOX) syringe 30 mg, 30 mg, Subcutaneous, Q24H, Krish Hayes MD, 30 mg at 01/16/17 2359  •  hydrALAZINE (APRESOLINE) injection 10 mg, 10 mg, Intravenous, Q4H PRN, Krish Hayes MD, 10 mg at 01/17/17 1350  •  HYDROmorphone (DILAUDID) injection 0.5 mg, 0.5 mg, Intravenous, Q3H PRN, Krish Hayes MD, 0.5 mg at 01/17/17 1146  •   iopamidol (ISOVUE-300) 61 % injection, , , Code / Trauma / Sedation Medication, Alec Sena MD, 10 mL at 01/16/17 1745  •  labetalol (NORMODYNE,TRANDATE) injection 10 mg, 10 mg, Intravenous, Q4H PRN, Krish Hayes MD, 10 mg at 01/17/17 1215  •  ondansetron (ZOFRAN) injection 4 mg, 4 mg, Intravenous, Q6H PRN, Krish Hayes MD, 4 mg at 01/17/17 1517  •  pantoprazole (PROTONIX) EC tablet 40 mg, 40 mg, Oral, Q AM, Krish Hayes MD, 40 mg at 01/17/17 0624  •  propranolol LA (INDERAL LA) 24 hr capsule 60 mg, 60 mg, Oral, Daily, Krish Hayes MD, 60 mg at 01/17/17 0852  •  sodium chloride 0.9 % flush 1-10 mL, 1-10 mL, Intravenous, PRN, Krish Hayes MD  •  Insert peripheral IV, , , Once **AND** sodium chloride 0.9 % flush 10 mL, 10 mL, Intravenous, PRN, Catherine Pimentel, APRN  •  sodium chloride 0.9 % infusion, 125 mL/hr, Intravenous, Continuous, Krish Hayes MD, Last Rate: 125 mL/hr at 01/17/17 1215, 125 mL/hr at 01/17/17 1215  •  SUMAtriptan (IMITREX) tablet 50 mg, 50 mg, Oral, Q2H PRN, Krish Hayes MD, 50 mg at 01/17/17 1517    Assessment/Plan     Active Problems:    Obstructive uropathy    Renal failure    Right percutaneous nephrostomy in place.  This is draining well.  I discussed with Mr. 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  His last stent change was 3 weeks ago and stents should typically last at least 3 months.  I believe that his obstructive uropathy on the right knees be managed permanently with a percutaneous nephrostomy tube.  I will defer definitive management of this to his [place] urologist.  His creatinine did improve somewhat with the nephrostomy tube and he has high urine output.  I would suggest continued close monitoring of his electrolytes as he is at high risk of post obstructive diuresis.  In addition I would suggest a nephrology consult.  At this point there is no more need for a  inpatient urologic follow-up.  Patient should follow up with his outpatient urologist at [place].      Alex Saxena MD  01/17/17  3:58 PM    EMR Dragon/Transcription disclaimer:  Much of this encounter note is an electronic transcription/translation of spoken language to printed text. The electronic translation of spoken language may permit erroneous, or at times, nonsensical words or phrases to be inadvertently transcribed; although I have reviewed the note for such errors, some may still exist.

## 2017-01-17 NOTE — PAYOR COMM NOTE
"1/17 PROGRESS NOTES  ADDITIONAL INFO      Felipe Veronica (41 y.o. Male)     Date of Birth Social Security Number Address Home Phone MRN    [id number]  [id number] [place] [address] [phone] [id number]    Yarsanism Marital Status          [employer]atsdin        Admission Date Admission Type Admitting Provider Attending Provider Department, Room/Bed    1/16/17 Emergency Krish Hayes MD Thompson, Benjamin H, MD [facility] 3C, 377/1    Discharge Date Discharge Disposition Discharge Destination                      Attending Provider: Krish Hayes MD     Allergies:  Morphine And Related, Percocet [Oxycodone-acetaminophen]    Isolation:  None   Infection:  None   Code Status:  FULL    Ht:  75\" (190.5 cm)   Wt:  238 lb 6 oz (108 kg)    Admission Cmt:  None   Principal Problem:  None                Active Insurance as of 1/16/2017     Primary Coverage     Payor Plan Insurance Group Employer/Plan Group    HUMANA MEDICARE REPL HUMANA MEDICARE REPL [id number]     Payor Plan Address Payor Plan Phone Number Effective From Effective To    [address] [address] [phone] 5/1/2013     [place] [address]       Subscriber Name Subscriber Birth Date Member ID       FELIPE VERONICA 1975 [id number]                 Emergency Contacts      (Rel.) Home Phone Work Phone Mobile Phone    Milad Veronica (Spouse) [phone] -- --               Physician Progress Notes (last 24 hours) (Notes from 1/16/2017  1:45 PM through 1/17/2017  1:45 PM)      Krish Hayes MD at 1/17/2017 11:25 AM  Version 1 of 1             [facility] Medicine Services  INPATIENT PROGRESS NOTE    Length of Stay: 1  Date of Admission: 1/16/2017  Primary Care Physician: Fermin Alexander MD    Subjective   Chief Complaint: Headache  HPI   Patient reports that his right-sided abdominal pain is better today, located in the right flank region, but not as severe and " "sharp as compared to yesterday.  He does have a headache today, but he does not feel like a migraine which she has chronically on an outpatient basis.  He states that he was \"up all night\" and thinks the back of sleep has contributed to his headache.  He reports good urine output from his nephrostomy tube and states that it is been changed/emptied frequently.    Review of Systems     All pertinent negatives and positives are as above. All other systems have been reviewed and are negative unless otherwise stated.     Objective    Temp:  [98 °F (36.7 °C)-98.6 °F (37 °C)] (P) 98 °F (36.7 °C)  Heart Rate:  [64-81] (P) 68  Resp:  [13-20] (P) 16  BP: (138-204)/() (P) 168/104  Physical Exam   Constitutional: He is oriented to person, place, and time. He appears well-developed and well-nourished. No distress.   HENT:   Head: Normocephalic and atraumatic.   Mouth/Throat: No oropharyngeal exudate.   Eyes: EOM are normal. Pupils are equal, round, and reactive to light. No scleral icterus.   Neck: Normal range of motion. No tracheal deviation present.   Pulmonary/Chest: Effort normal and breath sounds normal.   Genitourinary:   Genitourinary Comments: Right sided nephrostomy tube in place with good UOP (heladio color)   Musculoskeletal: He exhibits no edema.   Neurological: He is alert and oriented to person, place, and time.   Skin: Skin is warm and dry.   Psychiatric: He has a normal mood and affect. His behavior is normal.   Vitals reviewed.    Results Review:  I have reviewed the labs, radiology results, and diagnostic studies.    Laboratory Data:     Results from last 7 days  Lab Units 01/16/17  1020   WBC 10*3/mm3 9.43   HEMOGLOBIN g/dL 12.6*   HEMATOCRIT % 37.1*   PLATELETS 10*3/mm3 157          Results from last 7 days  Lab Units 01/17/17  0514 01/16/17  1020   SODIUM mmol/L 141 136   POTASSIUM mmol/L 5.2 5.2   CHLORIDE mmol/L 103 98   TOTAL CO2 mmol/L 21.0* 21.0*   BUN mg/dL 74* 73*   CREATININE mg/dL 12.50* 14.34* "   CALCIUM mg/dL 8.6 9.1   BILIRUBIN mg/dL  --  0.7   ALK PHOS U/L  --  104   ALT (SGPT) U/L  --  28   AST (SGOT) U/L  --  20   GLUCOSE mg/dL 86 96     Radiology Data:   Imaging Results (last 24 hours)     Procedure Component Value Units Date/Time    CT Abdomen Pelvis Without Contrast [[id number]] Collected:  01/16/17 1231     Updated:  01/16/17 1245    Narrative:       CT ABDOMEN PELVIS WO CONTRAST- 1/16/2017 11:32 AM CST     HISTORY: right flank pain      COMPARISON: 04/24/2015      DLP: 698 mGy cm. Automated exposure control was utilized to diminish  patient radiation dose.     TECHNIQUE: Noncontrast enhanced images of the abdomen and pelvis  obtained without oral contrast.      FINDINGS:   Tiny effusions are present with bibasilar atelectasis. The base of the  heart is unremarkable..      LIVER: No focal liver lesion.      BILIARY SYSTEM: Cholelithiasis is present. There is no evidence of  pericholecystic inflammatory change or biliary dilatation..      PANCREAS: No focal pancreatic lesion.      SPLEEN: Unremarkable.      KIDNEYS AND ADRENALS: Patient's undergone previous left nephrectomy.  There is moderate dilatation of the upper tracts of the right kidney  with perinephric stranding consistent with obstructive uropathy. A  right-sided double-J ureteral stent is well-positioned but is apparently  not functioning well. No perinephric fluid collections are present..      .     RETROPERITONEUM: There is an interaortocaval node measuring 10 mm in  size previously measured at 6 mm. Some smaller left periaortic nodes are  present unchanged from the previous study. Presacral thickening is  present likely representing a combination of postoperative and  postradiation changes. Overall I feel this has shown mild interval  improvement from the previous study. No new adenopathy is present..      GI TRACT: The patient's undergone previous rectosigmoid resection. There  is a left lower quadrant colostomy. Previously noted  dilated loops of  small bowel are no longer visualized with no evidence of mechanical  obstruction on the current exam.. The appendix is not visualized..     OTHER: There is no mesenteric mass, lymphadenopathy or fluid collection.  The osseous structures and soft tissues demonstrate no worrisome  lesions. No free fluid is present.      PELVIS: Soft tissue nodularity within the presacral space is again  present and stable to slightly improved from the previous study. No new  mass lesion is present.. The urinary bladder is normal in appearance.       Impression:       1. The patient's undergone previous rectosigmoid resection as well as  left nephrectomy.  2. There is moderate hydronephrosis of the right renal collecting system  with perinephric stranding consistent with acute obstructive uropathy. A  stent is well-positioned but I suspect is malfunctioning. Urology  consultation is recommended. No perinephric fluid collections  identified.  3. There is presacral thickening extending into the lower bony pelvis.  This is stable to slightly improved from the previous study likely  representing a combination of postoperative scarring and postradiation  change. No new mass lesions are identified.  4. The patient's previously noted small bowel obstruction has resolved  with a normal bowel gas pattern noted on today's exam. Left lower  quadrant colostomy is present. There is a fat-containing peristomal  hernia present..   5. Tiny effusions with bibasilar atelectasis present.  6. Cholelithiasis.     Electronically Signed By-Dr. Tate Jenkins MD On:1/16/2017 1:43 PM  EST  This report was finalized on 01/16/2017 12:43 by Dr. Tate Jenkins MD.    IR Nephrostomy Tube Placement Right [[id number]] Collected:  01/17/17 0720     Updated:  01/17/17 0727    Narrative:       IR NEPHROSTOMY TUBE PLACEMENT RT- 1/16/2017 17:20 CST     HISTORY: ureteral stent obstruction; N13.5-Crossing vessel and stricture  of ureter without  hydronephrosis; N13.9-Obstructive and reflux uropathy,  unspecified; N17.9-Acute kidney failure, unspecified      COMPARISON: CT abdomen and pelvis 01/16/2017     Fluoroscopy time: 1.9 minutes     Estimated blood loss: Approximately 10 mL     MEDICATIONS: 2 mg IV Versed and 100 mcg IV fentanyl. 2 g IV Ancef were  also administered during the exam.     Consultations: None.     Contrast: Approximately 10 mL Isovue-300 contrast were injected into the  patient's renal collecting system. No intravenous contrast was  administered.     Consent: Informed consent was obtained from the patient. All questions  were answered to the patient's satisfaction.     PROCEDURE:  The patient was placed in the prone position. The RIGHT renal collecting  system was visualized under ultrasound, and the skin was marked in the  region of the optimal site for percutaneous access.     A timeout was performed.     The patient was prepped for the procedure, which included cleaning the  skin with Betadine solution and placing a large drape over the entire  body.     The skin and deeper soft tissues were anesthetized with approximately 10  mL of 1% lidocaine.     Utilizing a translumbar needle and real-time ultrasound guidance, access  into the RIGHT renal collecting system was obtained and confirmed by  return of urine. Contrast was injected into the RIGHT renal collecting  system demonstrating marked hydronephrosis and proximal hydroureter. The  existing ureteral stent extends up into the superior calyces. A wire was  advanced into the RIGHT renal collecting system.     The soft tissues were dilated. An 8.5 Nicaraguan nephrostomy tube was  positioned within the renal collecting system. Contrast was injected to  confirm position of the catheter.     The images demonstrate moderate to marked right-sided hydronephrosis and  proximal hydroureter.       Impression:       1. Successful placement of an 8.5 [place]ar[place]uan drain into the RIGHT renal  collecting  "system.     Electronically Signed By-Dr. Alec Sena MD On:1/17/2017 8:25 AM  EST  This report was finalized on 01/17/2017 07:25 by Dr. Alec Sena MD.          I have reviewed the patient current medications.     Assessment/Plan     Hospital Problem List     Obstructive uropathy        Assessment:  1. Obstructive Uropathy with Right Hydronephrosis s/p percutaneous nephrostomy tube on 1/16; patient receives right ureteral stent exchanges every 3-4 months on outpatient basis (managed at [facility] by Dr. Montano)  2. ARI - 2/2 #1  3. Acute pain - right CVA region; 2/2 #1  4. HTN - poorly controlled  5. History of rectal CA s/p chemo and XRT; subsequent colectomy with colostomy  6. History of left nephrectomy 2/2 \"nonfunctioning\" kidney and XRT changes to left ureter     Plan:  1. Nephrology consult today  2. Trend BMP  3. IVFs  4. IV Labetalol and Hydralazine PRN; PO Clonidine PRN  5. PO Propranolol  6. Add Norvasc; hold ACEI given ARI and elevated K  7. IV Dilaudid PRN pain  8. Lovenox for PPx    Krish Hayes MD   01/17/17   11:26 AM     Electronically signed by Krish Hayes MD at 1/17/2017 11:43 AM        "

## 2017-01-17 NOTE — PROGRESS NOTES
"    [facility] Medicine Services  INPATIENT PROGRESS NOTE    Length of Stay: 1  Date of Admission: 1/16/2017  Primary Care Physician: Fermin Alexander MD    Subjective   Chief Complaint: Headache  HPI   Patient reports that his right-sided abdominal pain is better today, located in the right flank region, but not as severe and sharp as compared to yesterday.  He does have a headache today, but he does not feel like a migraine which she has chronically on an outpatient basis.  He states that he was \"up all night\" and thinks the back of sleep has contributed to his headache.  He reports good urine output from his nephrostomy tube and states that it is been changed/emptied frequently.    Review of Systems     All pertinent negatives and positives are as above. All other systems have been reviewed and are negative unless otherwise stated.     Objective    Temp:  [98 °F (36.7 °C)-98.6 °F (37 °C)] (P) 98 °F (36.7 °C)  Heart Rate:  [64-81] (P) 68  Resp:  [13-20] (P) 16  BP: (138-204)/() (P) 168/104  Physical Exam   Constitutional: He is oriented to person, place, and time. He appears well-developed and well-nourished. No distress.   HENT:   Head: Normocephalic and atraumatic.   Mouth/Throat: No oropharyngeal exudate.   Eyes: EOM are normal. Pupils are equal, round, and reactive to light. No scleral icterus.   Neck: Normal range of motion. No tracheal deviation present.   Pulmonary/Chest: Effort normal and breath sounds normal.   Genitourinary:   Genitourinary Comments: Right sided nephrostomy tube in place with good UOP (heladio color)   Musculoskeletal: He exhibits no edema.   Neurological: He is alert and oriented to person, place, and time.   Skin: Skin is warm and dry.   Psychiatric: He has a normal mood and affect. His behavior is normal.   Vitals reviewed.    Results Review:  I have reviewed the labs, radiology results, and diagnostic studies.    Laboratory Data: "     Results from last 7 days  Lab Units 01/16/17  1020   WBC 10*3/mm3 9.43   HEMOGLOBIN g/dL 12.6*   HEMATOCRIT % 37.1*   PLATELETS 10*3/mm3 157          Results from last 7 days  Lab Units 01/17/17  0514 01/16/17  1020   SODIUM mmol/L 141 136   POTASSIUM mmol/L 5.2 5.2   CHLORIDE mmol/L 103 98   TOTAL CO2 mmol/L 21.0* 21.0*   BUN mg/dL 74* 73*   CREATININE mg/dL 12.50* 14.34*   CALCIUM mg/dL 8.6 9.1   BILIRUBIN mg/dL  --  0.7   ALK PHOS U/L  --  104   ALT (SGPT) U/L  --  28   AST (SGOT) U/L  --  20   GLUCOSE mg/dL 86 96     Radiology Data:   Imaging Results (last 24 hours)     Procedure Component Value Units Date/Time    CT Abdomen Pelvis Without Contrast [[id number]] Collected:  01/16/17 1231     Updated:  01/16/17 1245    Narrative:       CT ABDOMEN PELVIS WO CONTRAST- 1/16/2017 11:32 AM CST     HISTORY: right flank pain      COMPARISON: 04/24/2015      DLP: 698 mGy cm. Automated exposure control was utilized to diminish  patient radiation dose.     TECHNIQUE: Noncontrast enhanced images of the abdomen and pelvis  obtained without oral contrast.      FINDINGS:   Tiny effusions are present with bibasilar atelectasis. The base of the  heart is unremarkable..      LIVER: No focal liver lesion.      BILIARY SYSTEM: Cholelithiasis is present. There is no evidence of  pericholecystic inflammatory change or biliary dilatation..      PANCREAS: No focal pancreatic lesion.      SPLEEN: Unremarkable.      KIDNEYS AND ADRENALS: Patient's undergone previous left nephrectomy.  There is moderate dilatation of the upper tracts of the right kidney  with perinephric stranding consistent with obstructive uropathy. A  right-sided double-J ureteral stent is well-positioned but is apparently  not functioning well. No perinephric fluid collections are present..      .     RETROPERITONEUM: There is an interaortocaval node measuring 10 mm in  size previously measured at 6 mm. Some smaller left periaortic nodes are  present unchanged from  the previous study. Presacral thickening is  present likely representing a combination of postoperative and  postradiation changes. Overall I feel this has shown mild interval  improvement from the previous study. No new adenopathy is present..      GI TRACT: The patient's undergone previous rectosigmoid resection. There  is a left lower quadrant colostomy. Previously noted dilated loops of  small bowel are no longer visualized with no evidence of mechanical  obstruction on the current exam.. The appendix is not visualized..     OTHER: There is no mesenteric mass, lymphadenopathy or fluid collection.  The osseous structures and soft tissues demonstrate no worrisome  lesions. No free fluid is present.      PELVIS: Soft tissue nodularity within the presacral space is again  present and stable to slightly improved from the previous study. No new  mass lesion is present.. The urinary bladder is normal in appearance.       Impression:       1. The patient's undergone previous rectosigmoid resection as well as  left nephrectomy.  2. There is moderate hydronephrosis of the right renal collecting system  with perinephric stranding consistent with acute obstructive uropathy. A  stent is well-positioned but I suspect is malfunctioning. Urology  consultation is recommended. No perinephric fluid collections  identified.  3. There is presacral thickening extending into the lower bony pelvis.  This is stable to slightly improved from the previous study likely  representing a combination of postoperative scarring and postradiation  change. No new mass lesions are identified.  4. The patient's previously noted small bowel obstruction has resolved  with a normal bowel gas pattern noted on today's exam. Left lower  quadrant colostomy is present. There is a fat-containing peristomal  hernia present..   5. Tiny effusions with bibasilar atelectasis present.  6. Cholelithiasis.     Electronically Signed By-Dr. Tate Jenkins MD  On:1/16/2017 1:43 PM  EST  This report was finalized on 01/16/2017 12:43 by Dr. Tate Jenkins MD.    IR Nephrostomy Tube Placement Right [[id number]] Collected:  01/17/17 0720     Updated:  01/17/17 0727    Narrative:       IR NEPHROSTOMY TUBE PLACEMENT RT- 1/16/2017 17:20 CST     HISTORY: ureteral stent obstruction; N13.5-Crossing vessel and stricture  of ureter without hydronephrosis; N13.9-Obstructive and reflux uropathy,  unspecified; N17.9-Acute kidney failure, unspecified      COMPARISON: CT abdomen and pelvis 01/16/2017     Fluoroscopy time: 1.9 minutes     Estimated blood loss: Approximately 10 mL     MEDICATIONS: 2 mg IV Versed and 100 mcg IV fentanyl. 2 g IV Ancef were  also administered during the exam.     Consultations: None.     Contrast: Approximately 10 mL Isovue-300 contrast were injected into the  patient's renal collecting system. No intravenous contrast was  administered.     Consent: Informed consent was obtained from the patient. All questions  were answered to the patient's satisfaction.     PROCEDURE:  The patient was placed in the prone position. The RIGHT renal collecting  system was visualized under ultrasound, and the skin was marked in the  region of the optimal site for percutaneous access.     A timeout was performed.     The patient was prepped for the procedure, which included cleaning the  skin with Betadine solution and placing a large drape over the entire  body.     The skin and deeper soft tissues were anesthetized with approximately 10  mL of 1% lidocaine.     Utilizing a translumbar needle and real-time ultrasound guidance, access  into the RIGHT renal collecting system was obtained and confirmed by  return of urine. Contrast was injected into the RIGHT renal collecting  system demonstrating marked hydronephrosis and proximal hydroureter. The  existing ureteral stent extends up into the superior calyces. A wire was  advanced into the RIGHT renal collecting system.     The  "soft tissues were dilated. An 8.5 Slovenian nephrostomy tube was  positioned within the renal collecting system. Contrast was injected to  confirm position of the catheter.     The images demonstrate moderate to marked right-sided hydronephrosis and  proximal hydroureter.       Impression:       1. Successful placement of an 8.5 Slovenian drain into the RIGHT renal  collecting system.     Electronically Signed By-Dr. Alec Sena MD On:1/17/2017 8:25 AM  EST  This report was finalized on 01/17/2017 07:25 by Dr. Alec Sena MD.          I have reviewed the patient current medications.     Assessment/Plan     Hospital Problem List     Obstructive uropathy        Assessment:  1. Obstructive Uropathy with Right Hydronephrosis s/p percutaneous nephrostomy tube on 1/16; patient receives right ureteral stent exchanges every 3-4 months on outpatient basis (managed at [facility] by Dr. Montano)  2. ARI - 2/2 #1  3. Acute pain - right CVA region; 2/2 #1  4. HTN - poorly controlled  5. History of rectal CA s/p chemo and XRT; subsequent colectomy with colostomy  6. History of left nephrectomy 2/2 \"nonfunctioning\" kidney and XRT changes to left ureter     Plan:  1. Nephrology consult today  2. Trend BMP  3. IVFs  4. IV Labetalol and Hydralazine PRN; PO Clonidine PRN  5. PO Propranolol  6. Add Norvasc; hold ACEI given ARI and elevated K  7. IV Dilaudid PRN pain  8. Lovenox for PPx    Krish Hayes MD   01/17/17   11:26 AM  "

## 2017-01-17 NOTE — CONSULTS
Nephrology ([facility]) Consult Note      Patient:  Felipe Veronica  YOB: 1975  Date of Service: 1/17/2017  MRN: [id number]   Acct: [id number]   Primary Care Physician: Fermin Alexander MD  Advance Directive: Full Code  Admit Date: 1/16/2017       Hospital Day: 1  Referring Provider: Krish Hayes MD      Patient Seen, Chart, Consults, Notes, Labs, Radiology studies reviewed.        Subjective:  Felipe Veronica is a 41 y.o. male  whom we were consulted for ARI.  Known obstructive uropathy followed with [facility].  Developed flank pain, nausea.  Found to have ARI with obstruction.  Improved with Nephrostomy tube.  Cr improving.  Still with poor appetite.    Allergies:  Morphine and related and Percocet [oxycodone-acetaminophen]    Home Meds:  Prescriptions Prior to Admission   Medication Sig Dispense Refill Last Dose   • CloNIDine (CATAPRES) 0.2 MG tablet Take 0.2 mg by mouth As Needed.      • HYDROcodone-acetaminophen (NORCO)  MG per tablet Take one tablet every 3-4 hours PRN pain. Earliest Fill Date: 1/1/17      • lisinopril (PRINIVIL,ZESTRIL) 40 MG tablet Take 40 mg by mouth Daily.      • naratriptan (AMERGE) 2.5 MG tablet Take 2.5 mg by mouth 1 (One) Time As Needed for migraine.      • omeprazole (priLOSEC) 40 MG capsule Take 40 mg by mouth Daily.      • propranolol LA (INDERAL LA) 60 MG 24 hr capsule Take 60 mg by mouth Daily.      • SUMAtriptan (IMITREX) 100 MG tablet Take 100 mg by mouth 1 (One) Time As Needed for migraine.          Medicines:  Current Facility-Administered Medications   Medication Dose Route Frequency Provider Last Rate Last Dose   • acetaminophen (TYLENOL) tablet 650 mg  650 mg Oral Q4H PRN Krish Hayes MD   650 mg at 01/17/17 0851   • amLODIPine (NORVASC) tablet 5 mg  5 mg Oral Q24H Krish Hayes MD   5 mg at 01/17/17 1327   • CloNIDine (CATAPRES) tablet 0.2 mg  0.2 mg Oral Q6H PRN Krish Hayes MD    0.2 mg at 01/17/17 0000   • enoxaparin (LOVENOX) syringe 30 mg  30 mg Subcutaneous Q24H Krish Hayes MD   30 mg at 01/16/17 2359   • hydrALAZINE (APRESOLINE) injection 10 mg  10 mg Intravenous Q4H PRN Krish Hayes MD   10 mg at 01/17/17 1350   • HYDROmorphone (DILAUDID) injection 0.5 mg  0.5 mg Intravenous Q3H PRN Krish Hayes MD   0.5 mg at 01/17/17 1146   • iopamidol (ISOVUE-300) 61 % injection    Code / Trauma / Sedation Medication Alec Sena MD   10 mL at 01/16/17 1745   • labetalol (NORMODYNE,TRANDATE) injection 10 mg  10 mg Intravenous Q4H PRN Krish Hayes MD   10 mg at 01/17/17 1215   • ondansetron (ZOFRAN) injection 4 mg  4 mg Intravenous Q6H PRN Krish Hayes MD   4 mg at 01/17/17 1517   • pantoprazole (PROTONIX) EC tablet 40 mg  40 mg Oral Q AM Krish Hayes MD   40 mg at 01/17/17 0624   • propranolol LA (INDERAL LA) 24 hr capsule 60 mg  60 mg Oral Daily Krish Hayes MD   60 mg at 01/17/17 0852   • sodium chloride 0.9 % flush 1-10 mL  1-10 mL Intravenous PRN Krish Hayes MD       • sodium chloride 0.9 % flush 10 mL  10 mL Intravenous PRN LNIDA Handy       • sodium chloride 0.9 % infusion  125 mL/hr Intravenous Continuous Krish Hayes  mL/hr at 01/17/17 1215 125 mL/hr at 01/17/17 1215   • SUMAtriptan (IMITREX) tablet 50 mg  50 mg Oral Q2H PRN Krish Hayes MD   50 mg at 01/17/17 1517       Past Medical History:  Past Medical History   Diagnosis Date   • Hypertension    • Kidney stone        Past Surgical History:  Past Surgical History   Procedure Laterality Date   • Knee surgery     • Colon surgery     • Colostomy     • Nephrectomy     • Cystoscopy         Family History  History reviewed. No pertinent family history.    Social History  Social History     Social History   • Marital status:      Spouse name: N/A   • Number of children: N/A   • Years of education: N/A     Occupational History   • Not on  "file.     Social History Main Topics   • Smoking status: Never Smoker   • Smokeless tobacco: Not on file   • Alcohol use No   • Drug use: No   • Sexual activity: Defer     Other Topics Concern   • Not on file     Social History Narrative   • No narrative on file         Review of Systems:  History obtained from chart review and the patient  General ROS: No fever or chills  Respiratory ROS: No cough, shortness of breath, wheezing  Cardiovascular ROS: no chest pain or dyspnea on exertion  Gastrointestinal ROS:  +n/v  Genito-Urinary ROS: positive for flank pain/dysuria  14 point ROS reviewed with the patient and negative except as noted above and in the HPI unless unable to obtain.    Objective:  Visit Vitals   • BP (!) 175/109 (BP Location: Left arm, Patient Position: Lying)   • Pulse 70   • Temp 97.6 °F (36.4 °C) (Oral)   • Resp 20   • Ht 75\" (190.5 cm)   • Wt 238 lb 6 oz (108 kg)   • SpO2 98%   • BMI 29.79 kg/m2       Intake/Output Summary (Last 24 hours) at 01/17/17 1627  Last data filed at 01/17/17 1500   Gross per 24 hour   Intake   2372 ml   Output   5550 ml   Net  -3178 ml     General: awake/alert   Chest:  clear to auscultation bilaterally without respiratory distress  CVS: regular rate and rhythm  Abdominal: soft, nontender, normal bowel sounds  Extremities: no cyanosis or edema  Skin: warm and dry without rash      Labs:    Results from last 7 days  Lab Units 01/16/17  1020   WBC 10*3/mm3 9.43   HEMOGLOBIN g/dL 12.6*   HEMATOCRIT % 37.1*   PLATELETS 10*3/mm3 157           Results from last 7 days  Lab Units 01/17/17  0514 01/16/17  1020   SODIUM mmol/L 141 136   POTASSIUM mmol/L 5.2 5.2   CHLORIDE mmol/L 103 98   TOTAL CO2 mmol/L 21.0* 21.0*   BUN mg/dL 74* 73*   CREATININE mg/dL 12.50* 14.34*   CALCIUM mg/dL 8.6 9.1   BILIRUBIN mg/dL  --  0.7   ALK PHOS U/L  --  104   ALT (SGPT) U/L  --  28   AST (SGOT) U/L  --  20   GLUCOSE mg/dL 86 96       Radiology:   Imaging Results (last 72 hours)     Procedure " Component Value Units Date/Time    CT Abdomen Pelvis Without Contrast [[id number]] Collected:  01/16/17 1231     Updated:  01/16/17 1245    Narrative:       CT ABDOMEN PELVIS WO CONTRAST- 1/16/2017 11:32 AM CST     HISTORY: right flank pain      COMPARISON: 04/24/2015      DLP: 698 mGy cm. Automated exposure control was utilized to diminish  patient radiation dose.     TECHNIQUE: Noncontrast enhanced images of the abdomen and pelvis  obtained without oral contrast.      FINDINGS:   Tiny effusions are present with bibasilar atelectasis. The base of the  heart is unremarkable..      LIVER: No focal liver lesion.      BILIARY SYSTEM: Cholelithiasis is present. There is no evidence of  pericholecystic inflammatory change or biliary dilatation..      PANCREAS: No focal pancreatic lesion.      SPLEEN: Unremarkable.      KIDNEYS AND ADRENALS: Patient's undergone previous left nephrectomy.  There is moderate dilatation of the upper tracts of the right kidney  with perinephric stranding consistent with obstructive uropathy. A  right-sided double-J ureteral stent is well-positioned but is apparently  not functioning well. No perinephric fluid collections are present..      .     RETROPERITONEUM: There is an interaortocaval node measuring 10 mm in  size previously measured at 6 mm. Some smaller left periaortic nodes are  present unchanged from the previous study. Presacral thickening is  present likely representing a combination of postoperative and  postradiation changes. Overall I feel this has shown mild interval  improvement from the previous study. No new adenopathy is present..      GI TRACT: The patient's undergone previous rectosigmoid resection. There  is a left lower quadrant colostomy. Previously noted dilated loops of  small bowel are no longer visualized with no evidence of mechanical  obstruction on the current exam.. The appendix is not visualized..     OTHER: There is no mesenteric mass, lymphadenopathy or fluid  collection.  The osseous structures and soft tissues demonstrate no worrisome  lesions. No free fluid is present.      PELVIS: Soft tissue nodularity within the presacral space is again  present and stable to slightly improved from the previous study. No new  mass lesion is present.. The urinary bladder is normal in appearance.       Impression:       1. The patient's undergone previous rectosigmoid resection as well as  left nephrectomy.  2. There is moderate hydronephrosis of the right renal collecting system  with perinephric stranding consistent with acute obstructive uropathy. A  stent is well-positioned but I suspect is malfunctioning. Urology  consultation is recommended. No perinephric fluid collections  identified.  3. There is presacral thickening extending into the lower bony pelvis.  This is stable to slightly improved from the previous study likely  representing a combination of postoperative scarring and postradiation  change. No new mass lesions are identified.  4. The patient's previously noted small bowel obstruction has resolved  with a normal bowel gas pattern noted on today's exam. Left lower  quadrant colostomy is present. There is a fat-containing peristomal  hernia present..   5. Tiny effusions with bibasilar atelectasis present.  6. Cholelithiasis.     Electronically Signed By-Dr. Tate Jenkins MD On:1/16/2017 1:43 PM  EST  This report was finalized on 01/16/2017 12:43 by Dr. Tate Jenkins MD.    IR Nephrostomy Tube Placement Right [[id number]] Collected:  01/17/17 0720     Updated:  01/17/17 0727    Narrative:       IR NEPHROSTOMY TUBE PLACEMENT RT- 1/16/2017 17:20 CST     HISTORY: ureteral stent obstruction; N13.5-Crossing vessel and stricture  of ureter without hydronephrosis; N13.9-Obstructive and reflux uropathy,  unspecified; N17.9-Acute kidney failure, unspecified      COMPARISON: CT abdomen and pelvis 01/16/2017     Fluoroscopy time: 1.9 minutes     Estimated blood loss:  Approximately 10 mL     MEDICATIONS: 2 mg IV Versed and 100 mcg IV fentanyl. 2 g IV Ancef were  also administered during the exam.     Consultations: None.     Contrast: Approximately 10 mL Isovue-300 contrast were injected into the  patient's renal collecting system. No intravenous contrast was  administered.     Consent: Informed consent was obtained from the patient. All questions  were answered to the patient's satisfaction.     PROCEDURE:  The patient was placed in the prone position. The RIGHT renal collecting  system was visualized under ultrasound, and the skin was marked in the  region of the optimal site for percutaneous access.     A timeout was performed.     The patient was prepped for the procedure, which included cleaning the  skin with Betadine solution and placing a large drape over the entire  body.     The skin and deeper soft tissues were anesthetized with approximately 10  mL of 1% lidocaine.     Utilizing a translumbar needle and real-time ultrasound guidance, access  into the RIGHT renal collecting system was obtained and confirmed by  return of urine. Contrast was injected into the RIGHT renal collecting  system demonstrating marked hydronephrosis and proximal hydroureter. The  existing ureteral stent extends up into the superior calyces. A wire was  advanced into the RIGHT renal collecting system.     The soft tissues were dilated. An 8.5 [place] nephrostomy tube was  positioned within the renal collecting system. Contrast was injected to  confirm position of the catheter.     The images demonstrate moderate to marked right-sided hydronephrosis and  proximal hydroureter.       Impression:       1. Successful placement of an 8.5 [place] drain into the RIGHT renal  collecting system.     Electronically Signed By-Dr. Alec Sena MD On:1/17/2017 8:25 AM  EST  This report was finalized on 01/17/2017 07:25 by Dr. Alec Sena MD.          Culture:         Assessment   ARI- obstructive  uropathy  HTN  H/o rectal CA/left nephrectomy    Plan:  D/w IM/pt/family  Continue IVF  Agree with Dr. Saxena that he is high risk for post obstructive diuresis, will follow and monitor lytes.  Hold ace for now      Thank you for the consult, we appreciate the opportunity to provide care to your patients.  Feel free to contact me if I can be of any further assistance.      Demetrio Lopez MD  1/17/2017  4:27 PM

## 2017-01-17 NOTE — PAYOR COMM NOTE
"[facility]  NICOLAS PRATHER RN [phone]  FAX [phone]    Felipe Marcano (41 y.o. Male)     Date of Birth Social Security Number Address Home Phone MRN    1975  [id number] [facility] [address] [phone] [id number]    Catholic Marital Status          [facility]        Admission Date Admission Type Admitting Provider Attending Provider Department, Room/Bed    1/16/17 Emergency Krish Hayes MD Thompson, Benjamin H, MD [facility] 3C, 377/1    Discharge Date Discharge Disposition Discharge Destination                      Attending Provider: Krish Hayes MD     Allergies:  Morphine And Related, Percocet [Oxycodone-acetaminophen]    Isolation:  None   Infection:  None   Code Status:  FULL    Ht:  75\" (190.5 cm)   Wt:  238 lb 6 oz (108 kg)    Admission Cmt:  None   Principal Problem:  None                Active Insurance as of 1/16/2017     Primary Coverage     Payor Plan Insurance Group Employer/Plan Group    HUMANA MEDICARE REPL HUMANA MEDICARE REPL [id number]     Payor Plan Address Payor Plan Phone Number Effective From Effective To    [address] [address] [phone] 5/1/2013     [place] [address]       Subscriber Name Subscriber Birth Date Member ID       FELIPE MARCANO 1975 [id number]                 Emergency Contacts      (Rel.) Home Phone Work Phone Mobile Phone    GlenysMilad (Spouse) [phone] -- --               History & Physical      Krish Hayes MD at 1/16/2017  3:25 PM              [facility]  HISTORY AND PHYSICAL    Date of Admission: 1/16/2017  Primary Care Physician: Fermin Alexander MD    Subjective     Chief Complaint: Right flank pain    Flank Pain   Associated symptoms include abdominal pain.     Patient is a 41-year-old  male with past medical history significant for rectal cancer (history of chemotherapy and XRT; subsequent " "colectomy and colostomy), and history of obstructive uropathy that presented to our hospital with a chief complaint of right flank pain.  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  He has also had radiation changes to the right ureter, and reports receiving stent changes approximately every 3-4 months on an outpatient basis at [facility].  On Thursday of this past week, he noticed that his urine output \"stopped\".  He started developing pain in his right flank area, and he attributed this discomfort to the thought of kidney stones.  He reports that he would try to force urine output, and began noticing some sediment and \"tissue\" that would come out when he would urinate.  This occasionally did have some streaks of blood.  No clots.  He has never had similar symptoms like this in the past.  He had a great deal of pain in the right flank region, 10 out of 10 in severity, and very sharp in nature.  Pain is not radiating.  No fevers or chills.  One episode of nausea with vomiting, but those symptoms have resolved.  No lower extremity edema.  A few pounds of \"weight gain\" since all the symptoms began.  He reports a long-standing struggle with hypertension.  No recent history of chest pain or shortness of breath.  He reports that his last stent exchange occurred back in December, and tolerated the procedure without complications.  He reports no underlying history of chronic kidney disease that he is aware of.        Review of Systems   Constitutional: Positive for appetite change.   HENT: Negative.    Eyes: Negative.    Respiratory: Negative.    Cardiovascular: Negative.    Gastrointestinal: Positive for abdominal pain.   Endocrine: Negative.    Genitourinary: Positive for flank pain.   Skin: Negative.    Neurological: Negative.    Hematological: Negative.    Psychiatric/Behavioral: Negative.     "     Otherwise complete ROS reviewed and negative except as mentioned in the HPI.      Past Medical History:   Past Medical History   Diagnosis Date   • Hypertension    • Kidney stone        Past Surgical History:  Past Surgical History   Procedure Laterality Date   • Knee surgery     • Colon surgery     • Colostomy     • Nephrectomy     • Cystoscopy         Social History:  reports that he has never smoked. He does not have any smokeless tobacco history on file. He reports that he does not drink alcohol or use illicit drugs.    Family History: No history of colon cancer in family; mother passed away from pancreatic cancer approx 5 years ago    Allergies:  Allergies   Allergen Reactions   • Morphine And Related      Doesn't work    • Percocet [Oxycodone-Acetaminophen]      Give me migraines        Medications:  Prior to Admission medications    Medication Sig Start Date End Date Taking? Authorizing Provider   CloNIDine (CATAPRES) 0.2 MG tablet Take 0.2 mg by mouth As Needed. 1/27/16  Yes Historical Provider, MD   HYDROcodone-acetaminophen (NORCO)  MG per tablet Take one tablet every 3-4 hours PRN pain. Earliest Fill Date: 1/1/17 1/1/17  Yes Historical Provider, MD   lisinopril (PRINIVIL,ZESTRIL) 40 MG tablet Take 40 mg by mouth Daily.   Yes Historical Provider, MD   naratriptan (AMERGE) 2.5 MG tablet Take 2.5 mg by mouth 1 (One) Time As Needed for migraine. 1/8/16  Yes Historical Provider, MD   omeprazole (priLOSEC) 40 MG capsule Take 40 mg by mouth Daily. 6/7/16  Yes Historical Provider, MD   propranolol LA (INDERAL LA) 60 MG 24 hr capsule Take 60 mg by mouth Daily. 6/15/16  Yes Historical Provider, MD   SUMAtriptan (IMITREX) 100 MG tablet Take 100 mg by mouth 1 (One) Time As Needed for migraine. 1/8/16  Yes Historical Provider, MD       Objective     Vital Signs:   Visit Vitals   • BP (!) 177/107 (BP Location: Right arm, Patient Position: Lying)   • Pulse 75   • Temp 97.2 °F (36.2 °C) (Temporal Artery )   •  "Resp 20   • Ht 75\" (190.5 cm)   • Wt 238 lb 6 oz (108 kg)   • SpO2 98%   • BMI 29.79 kg/m2     Physical Exam   Constitutional: He is oriented to person, place, and time. He appears well-developed and well-nourished. No distress.   HENT:   Head: Normocephalic and atraumatic.   Mouth/Throat: No oropharyngeal exudate.   Eyes: EOM are normal. Pupils are equal, round, and reactive to light. No scleral icterus.   Neck: Normal range of motion. No tracheal deviation present.   Cardiovascular: Normal rate.    Pulmonary/Chest: Effort normal and breath sounds normal.   Abdominal: Soft. Bowel sounds are normal. He exhibits no distension. There is tenderness (right CVA region).   Ostomy in place with visible output   Musculoskeletal: He exhibits no edema.   Neurological: He is alert and oriented to person, place, and time. No cranial nerve deficit.   Skin: Skin is warm and dry. He is not diaphoretic.   Psychiatric: He has a normal mood and affect.   Vitals reviewed.      Results Reviewed:  Lab Results (last 24 hours)     Procedure Component Value Units Date/Time    CBC & Differential [[id number]] Collected:  01/16/17 1020    Specimen:  Blood Updated:  01/16/17 1032    Narrative:       The following orders were created for panel order CBC & Differential.  Procedure                               Abnormality         Status                     ---------                               -----------         ------                     CBC Auto Differential[[id number]]         Abnormal            Final result                 Please view results for these tests on the individual orders.    CBC Auto Differential [[id number]]  (Abnormal) Collected:  01/16/17 1020    Specimen:  Blood Updated:  01/16/17 1032     WBC 9.43 10*3/mm3      RBC 4.18 (L) 10*6/mm3      Hemoglobin 12.6 (L) g/dL      Hematocrit 37.1 (L) %      MCV 88.8 fL      MCH 30.1 pg      MCHC 34.0 g/dL      RDW 13.3 %      RDW-SD 42.9 fl      MPV 10.1 fL      Platelets 157 10*3/mm3      " Neutrophil % 76.4 %      Lymphocyte % 11.6 (L) %      Monocyte % 9.2 %      Eosinophil % 2.3 %      Basophil % 0.2 %      Immature Grans % 0.3 %      Neutrophils, Absolute 7.20 10*3/mm3      Lymphocytes, Absolute 1.09 10*3/mm3      Monocytes, Absolute 0.87 10*3/mm3      Eosinophils, Absolute 0.22 10*3/mm3      Basophils, Absolute 0.02 10*3/mm3      Immature Grans, Absolute 0.03 10*3/mm3     aPTT [[id number]]  (Abnormal) Collected:  01/16/17 1021    Specimen:  Blood Updated:  01/16/17 1042     PTT 45.0 (H) seconds     Protime-INR [[id number]]  (Normal) Collected:  01/16/17 1021    Specimen:  Blood Updated:  01/16/17 1042     Protime 14.1 Seconds      INR 1.06     Amylase [[id number]]  (Normal) Collected:  01/16/17 1020    Specimen:  Blood Updated:  01/16/17 1045     Amylase 61 U/L     Lipase [[id number]]  (Normal) Collected:  01/16/17 1020    Specimen:  Blood Updated:  01/16/17 1045     Lipase 29 U/L     Comprehensive Metabolic Panel [[id number]]  (Abnormal) Collected:  01/16/17 1020    Specimen:  Blood Updated:  01/16/17 1053     Glucose 96 mg/dL      BUN 73 (H) mg/dL      Creatinine 14.34 (H) mg/dL      Sodium 136 mmol/L      Potassium 5.2 mmol/L      Chloride 98 mmol/L      CO2 21.0 (L) mmol/L      Calcium 9.1 mg/dL      Total Protein 6.9 g/dL      Albumin 3.90 g/dL      ALT (SGPT) 28 U/L      AST (SGOT) 20 U/L      Alkaline Phosphatase 104 U/L      Total Bilirubin 0.7 mg/dL      eGFR Non African Amer 4 (L) mL/min/1.73      Globulin 3.0 gm/dL      A/G Ratio 1.3 g/dL      BUN/Creatinine Ratio 5.1 (L)      Anion Gap 17.0 (H) mmol/L     C-reactive Protein [[id number]]  (Abnormal) Collected:  01/16/17 1020    Specimen:  Blood Updated:  01/16/17 1127     C-Reactive Protein 13.92 (H) mg/dL         Imaging Results (last 24 hours)     Procedure Component Value Units Date/Time    CT Abdomen Pelvis Without Contrast [[id number]] Collected:  01/16/17 1231     Updated:  01/16/17 1245    Narrative:       CT ABDOMEN PELVIS WO CONTRAST-  1/16/2017 11:32 AM CST     HISTORY: right flank pain      COMPARISON: 04/24/2015      DLP: 698 mGy cm. Automated exposure control was utilized to diminish  patient radiation dose.     TECHNIQUE: Noncontrast enhanced images of the abdomen and pelvis  obtained without oral contrast.      FINDINGS:   Tiny effusions are present with bibasilar atelectasis. The base of the  heart is unremarkable..      LIVER: No focal liver lesion.      BILIARY SYSTEM: Cholelithiasis is present. There is no evidence of  pericholecystic inflammatory change or biliary dilatation..      PANCREAS: No focal pancreatic lesion.      SPLEEN: Unremarkable.      KIDNEYS AND ADRENALS: Patient's undergone previous left nephrectomy.  There is moderate dilatation of the upper tracts of the right kidney  with perinephric stranding consistent with obstructive uropathy. A  right-sided double-J ureteral stent is well-positioned but is apparently  not functioning well. No perinephric fluid collections are present..      .     RETROPERITONEUM: There is an interaortocaval node measuring 10 mm in  size previously measured at 6 mm. Some smaller left periaortic nodes are  present unchanged from the previous study. Presacral thickening is  present likely representing a combination of postoperative and  postradiation changes. Overall I feel this has shown mild interval  improvement from the previous study. No new adenopathy is present..      GI TRACT: The patient's undergone previous rectosigmoid resection. There  is a left lower quadrant colostomy. Previously noted dilated loops of  small bowel are no longer visualized with no evidence of mechanical  obstruction on the current exam.. The appendix is not visualized..     OTHER: There is no mesenteric mass, lymphadenopathy or fluid collection.  The osseous structures and soft tissues demonstrate no worrisome  lesions. No free fluid is present.      PELVIS: Soft tissue nodularity within the presacral space is  "again  present and stable to slightly improved from the previous study. No new  mass lesion is present.. The urinary bladder is normal in appearance.       Impression:       1. The patient's undergone previous rectosigmoid resection as well as  left nephrectomy.  2. There is moderate hydronephrosis of the right renal collecting system  with perinephric stranding consistent with acute obstructive uropathy. A  stent is well-positioned but I suspect is malfunctioning. Urology  consultation is recommended. No perinephric fluid collections  identified.  3. There is presacral thickening extending into the lower bony pelvis.  This is stable to slightly improved from the previous study likely  representing a combination of postoperative scarring and postradiation  change. No new mass lesions are identified.  4. The patient's previously noted small bowel obstruction has resolved  with a normal bowel gas pattern noted on today's exam. Left lower  quadrant colostomy is present. There is a fat-containing peristomal  hernia present..   5. Tiny effusions with bibasilar atelectasis present.  6. Cholelithiasis.     Electronically Signed By-Dr. Tate Jenkins MD On:1/16/2017 1:43 PM  EST  This report was finalized on 01/16/2017 12:43 by Dr. Tate Jenkins MD.          I have personally reviewed and interpreted the radiology studies and ECG obtained at time of admission.     Assessment / Plan     Assessment & Plan  Hospital Problem List     Obstructive uropathy        Assessment:  1.  Obstructive Uropathy with Right Hydronephrosis; patient receives right ureteral stent exchanges every 4 months on outpatient basis (managed at [facility])  2.  ARI - 2/2 #1  3.  Acute pain - right CVA region; 2/2 #1  4.  HTN - poorly controlled  5.  History of rectal CA s/p chemo and XRT; subsequent colectomy with colostomy  6.  History of left nephrectomy 2/2 \"nonfunctioning\" kidney and XRT changes to left ureter    Plan:  1.  " "NPO  2.  Urology consulted regarding obstructive uropathy with plans to take to OR this afternoon  3.  IVFs  4.  IV Labetalol and Hydralazine PRN  5.  IV Dilaudid PRN pain  6.  Serial BMP; check UA  7.  Lovenox for PPx (dose to start tomorrow as scheduled planned for today)  8.  SCDs  9.  Hold ACEI given ARI    Code Status: Full Code     I discussed the patients findings and my recommendations with patient and family at bedside    Krish Hayes MD   01/16/17   3:25 PM             Electronically signed by Krish Hayes MD at 1/16/2017  3:40 PM           Emergency Department Notes      Catherine Pimentel, APRN at 1/16/2017  9:52 AM          Subjective   HPI Comments: Patient is a 41-year-old white male who presents ER today with complaint of right flank pain.  Patient reports that pain began Thursday evening.  Patient reports that he has had difficult to urinate since that time.  He reports that he is \"dribbling\".  He states that he has noticed some white tissue in his urine when he urinates.  Patient has a history of colon cancer.  Patient reports that he does have a colostomy due to this.  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  Patient reports he has had a previous renal stone in the past and this feels similar to his pain in the past.  He denies nausea vomiting he denies fever.  He presents ER today for further evaluation.    Patient is a 41 y.o. male presenting with flank pain.   History provided by:  Patient   used: No    Flank Pain   Pain location:  Generalized  Pain quality: aching    Pain radiates to:  Does not radiate  Pain severity:  Mild  Onset quality:  Sudden  Duration:  4 days  Timing:  Constant  Progression:  Worsening  Chronicity:  New  Context: previous surgery    Context: not alcohol use, not awakening from sleep, not diet changes, not eating, not laxative use, not medication withdrawal, not recent illness, " not recent sexual activity, not recent travel, not retching, not sick contacts, not suspicious food intake and not trauma    Relieved by:  Nothing  Ineffective treatments:  None tried  Associated symptoms: nausea    Associated symptoms: no anorexia, no belching, no chest pain, no chills, no constipation, no cough, no diarrhea, no dysuria, no fatigue, no fever, no flatus, no hematemesis, no hematochezia, no melena, no shortness of breath, no sore throat, no vaginal bleeding, no vaginal discharge and no vomiting    Risk factors: no alcohol abuse, no aspirin use, not elderly, has not had multiple surgeries, no NSAID use, not obese, not pregnant and no recent hospitalization        Review of Systems   Constitutional: Negative for chills, fatigue and fever.   HENT: Negative for sore throat.    Respiratory: Negative for cough and shortness of breath.    Cardiovascular: Negative for chest pain.   Gastrointestinal: Positive for nausea. Negative for anorexia, constipation, diarrhea, flatus, hematemesis, hematochezia, melena and vomiting.   Genitourinary: Positive for flank pain. Negative for dysuria, vaginal bleeding and vaginal discharge. other systems reviewed and are negative.      Past Medical History   Diagnosis Date   • Hypertension    • Kidney stone        Allergies   Allergen Reactions   • Morphine And Related      Doesn't work    • Percocet [Oxycodone-Acetaminophen]      Give me migraines        Past Surgical History   Procedure Laterality Date   • Knee surgery     • Colon surgery     • Colostomy     • Nephrectomy     • Cystoscopy         History reviewed. No pertinent family history.    Social History     Social History   • Marital status:      Spouse name: N/A   • Number of children: N/A   • Years of education: N/A     Social History Main Topics   • Smoking status: Never Smoker   • Smokeless tobacco: None   • Alcohol use No   • Drug use: No   • Sexual activity: Defer     Other Topics Concern   • None      Social History Narrative   • None           Objective   Physical Exam   Constitutional: He is oriented to person, place, and time. He appears well-developed and well-nourished.   HENT:   Head: Normocephalic and atraumatic.   Eyes: Conjunctivae are normal. Pupils are equal, round, and reactive to light.   Neck: Normal range of motion. Neck supple.   Cardiovascular: Normal rate, regular rhythm and normal heart sounds.    Pulmonary/Chest: Effort normal and breath sounds normal.   Abdominal: Soft. Bowel sounds are normal. There is tenderness. There is CVA tenderness.   Musculoskeletal: Normal range of motion.   Neurological: He is alert and oriented to person, place, and time.   Skin: Skin is warm and dry.   Psychiatric: He has a normal mood and affect. note and vitals reviewed.      Procedures        ED Course  ED Course   Value Comment By Time    CT abd/pelvis: 1. The patient's undergone previous rectosigmoid resection as well as  left nephrectomy.  2. There is moderate hydronephrosis of the right renal collecting system  with perinephric stranding consistent with acute obstructive uropathy. A  stent is well-positioned but I suspect is malfunctioning. Urology  consultation is recommended. No perinephric fluid collections  identified.  3. There is presacral thickening extending into the lower bony pelvis.  This is stable to slightly improved from the previous study likely  representing a combination of postoperative scarring and postradiation  change. No new mass lesions are identified.  4. The patient's previously noted small bowel obstruction has resolved  with a normal bowel gas pattern noted on today's exam. Left lower  quadrant colostomy is present. There is a fat-containing peristomal  hernia present..   5. Tiny effusions with bibasilar atelectasis present.  6. Cholelithiasis. Catherine Pimentel, APRN 01/16 1252   Creatinine: (!) 14.34 (Reviewed) Catherine Pimentel, APRN 01/16 1252   BUN: (!) 73 (Reviewed)  Catherine Pimentel, APRN 01/16 1252   CO2: (!) 21.0 (Reviewed) Catherine Pimentel, APRN 01/16 1252   eGFR Non  Amer: (!) 4 (Reviewed) Catherine Pimentel, APRN 01/16 1252   C-Reactive Protein: (!) 13.92 (Reviewed) Catherine Pimentel APRN 01/16 1252   aPTT: (!) 45.0 (Reviewed) Catherine Pimentel APRN 01/16 1252   Hemoglobin: (!) 12.6 (Reviewed) Catherine Pimentel, APRN 01/16 1252   Hematocrit: (!) 37.1 (Reviewed) Catherine Pimentel APRN 01/16 1252   WBC: 9.43 (Reviewed) Catherine Pimentel, APRN 01/16 1252    Patient CT reviewed at this time.  Patient labs reviewed as well.  Case discussed with Dr. Black was also seen in evaluation.  At this time a call was placed to urology for consultation. Catherine ELLIOTT Pimentel, APRN 01/16 1315    Dr. Quezada has agreed to see the pt in consultation. Hospitalist Dr. Garibay was contacted and is agreeable to admission for pt after discussion with Dr. Black. Pt will be admitted to his services at this time.  Catherine Pimentel, APRN 01/16 1350                  MDM  Number of Diagnoses or Management Options  Acute renal failure, unspecified acute renal failure type: new and requires workup  Obstructive uropathy: new and requires workup     Amount and/or Complexity of Data Reviewed  Clinical lab tests: ordered and reviewed  Tests in the radiology section of CPT®:  ordered and reviewed  Discuss the patient with other providers: yes  Independent visualization of images, tracings, or specimens: yes    Patient Progress  Patient progress: stable      Final diagnoses:   Obstructive uropathy   Acute renal failure, unspecified acute renal failure type           Catherine EARL LINDA Pimentel  01/16/17 1403       Electronically signed by LINDA Handy at 1/16/2017  2:03 PM      Arash Painter, JUDY at 1/16/2017 10:59 AM          Bladder scan completed - 15ml.     Arash Painter RN  01/16/17 1059       Electronically signed by Arash Painter RN at 1/16/2017 10:59 AM      Arash Painter,  "RN at 1/16/2017  1:29 PM          Updated FLORI Pimentel regarding patient's BP of 177/107.     Arash Painter RN  01/16/17 1329       Electronically signed by Arash Painter RN at 1/16/2017  1:29 PM      Rigoberto Black Jr., MD at 1/16/2017  1:46 PM          Subjective   HPI Comments: C/o pain in right flank with recurrent N&V.  H/o stent in right ureter for \"scar tissue\".      Patient is a 41 y.o. male presenting with abdominal pain.   History provided by:  Patient and spouse   used: No    Abdominal Pain   Pain location:  R flank  Pain quality: aching    Pain radiates to:  Does not radiate  Pain severity:  Severe  Onset quality:  Gradual  Duration:  2 days  Timing:  Constant  Progression:  Worsening  Chronicity:  New  Relieved by:  Nothing  Worsened by:  Nothing  Ineffective treatments:  None tried  Associated symptoms: anorexia, nausea and vomiting    Risk factors: multiple surgeries        Review of Systems   Constitutional: Negative.    HENT: Negative.    Respiratory: Negative.    Cardiovascular: Negative.    Gastrointestinal: Positive for abdominal pain, anorexia, nausea and vomiting.   Genitourinary: Negative.    Neurological: Negative.        Past Medical History   Diagnosis Date   • Hypertension    • Kidney stone        Allergies   Allergen Reactions   • Morphine And Related      Doesn't work    • Percocet [Oxycodone-Acetaminophen]      Give me migraines        Past Surgical History   Procedure Laterality Date   • Knee surgery     • Colon surgery     • Colostomy     • Nephrectomy     • Cystoscopy         History reviewed. No pertinent family history.    Social History     Social History   • Marital status:      Spouse name: N/A   • Number of children: N/A   • Years of education: N/A     Social History Main Topics   • Smoking status: Never Smoker   • Smokeless tobacco: None   • Alcohol use No   • Drug use: No   • Sexual activity: Defer     Other Topics Concern   • None "     Social History Narrative   • None       Prior to Admission medications    Medication Sig Start Date End Date Taking? Authorizing Provider   CloNIDine (CATAPRES) 0.2 MG tablet  1/27/16  Yes Historical Provider, MD   HYDROcodone-acetaminophen (NORCO)  MG per tablet Take one tablet every 3-4 hours PRN pain. Earliest Fill Date: 1/1/17 1/1/17  Yes Historical Provider, MD   lisinopril (PRINIVIL,ZESTRIL) 40 MG tablet Take 40 mg by mouth Daily.   Yes Historical Provider, MD   naratriptan (AMERGE) 2.5 MG tablet  1/8/16  Yes Historical Provider, MD   omeprazole (priLOSEC) 40 MG capsule  6/7/16  Yes Historical Provider, MD   propranolol LA (INDERAL LA) 60 MG 24 hr capsule  6/15/16  Yes Historical Provider, MD   SUMAtriptan (IMITREX) 100 MG tablet  1/8/16  Yes Historical Provider, MD       Medications   sodium chloride 0.9 % flush 10 mL (not administered)   sodium chloride 0.9 % bolus 500 mL (0 mL Intravenous Stopped 1/16/17 1237)   HYDROmorphone (DILAUDID) injection 1 mg (1 mg Intravenous Given 1/16/17 1021)   ondansetron (ZOFRAN) injection 4 mg (4 mg Intravenous Given 1/16/17 1022)   labetalol (NORMODYNE,TRANDATE) injection 10 mg (10 mg Intravenous Given 1/16/17 1149)   HYDROmorphone (DILAUDID) injection 0.5 mg (0.5 mg Intravenous Given 1/16/17 1254)   sodium chloride 0.9 % bolus 500 mL (500 mL Intravenous New Bag 1/16/17 1319)       Vitals:    01/16/17 1326   BP: (!) 177/107   Pulse: 75   Resp: 20   Temp:    SpO2: 98%         Objective   Physical Exam   Constitutional: He is oriented to person, place, and time. He appears well-developed and well-nourished.   HENT:   Head: Normocephalic and atraumatic.   Neck: Normal range of motion. Neck supple.   Cardiovascular: Normal rate and regular rhythm.    Pulmonary/Chest: Effort normal and breath sounds normal.   Abdominal: Soft. Bowel sounds are normal. There is tenderness.   Right colostomy   Musculoskeletal: Normal range of motion.   Neurological: He is alert and  oriented to person, place, and time.   Skin: Skin is warm and dry.   Psychiatric: He has a normal mood and affect. His behavior is normal. note and vitals reviewed.      Procedures        Lab Results (last 24 hours)     Procedure Component Value Units Date/Time    CBC & Differential [[id number]] Collected:  01/16/17 1020    Specimen:  Blood Updated:  01/16/17 1032    Narrative:       The following orders were created for panel order CBC & Differential.  Procedure                               Abnormality         Status                     ---------                               -----------         ------                     CBC Auto Differential[[id number]]         Abnormal            Final result                 Please view results for these tests on the individual orders.    Comprehensive Metabolic Panel [[id number]]  (Abnormal) Collected:  01/16/17 1020    Specimen:  Blood Updated:  01/16/17 1053     Glucose 96 mg/dL      BUN 73 (H) mg/dL      Creatinine 14.34 (H) mg/dL      Sodium 136 mmol/L      Potassium 5.2 mmol/L      Chloride 98 mmol/L      CO2 21.0 (L) mmol/L      Calcium 9.1 mg/dL      Total Protein 6.9 g/dL      Albumin 3.90 g/dL      ALT (SGPT) 28 U/L      AST (SGOT) 20 U/L      Alkaline Phosphatase 104 U/L      Total Bilirubin 0.7 mg/dL      eGFR Non African Amer 4 (L) mL/min/1.73      Globulin 3.0 gm/dL      A/G Ratio 1.3 g/dL      BUN/Creatinine Ratio 5.1 (L)      Anion Gap 17.0 (H) mmol/L     Amylase [[id number]]  (Normal) Collected:  01/16/17 1020    Specimen:  Blood Updated:  01/16/17 1045     Amylase 61 U/L     Lipase [[id number]]  (Normal) Collected:  01/16/17 1020    Specimen:  Blood Updated:  01/16/17 1045     Lipase 29 U/L     C-reactive Protein [[id number]]  (Abnormal) Collected:  01/16/17 1020    Specimen:  Blood Updated:  01/16/17 1127     C-Reactive Protein 13.92 (H) mg/dL     CBC Auto Differential [[id number]]  (Abnormal) Collected:  01/16/17 1020    Specimen:  Blood Updated:  01/16/17 1032     WBC  9.43 10*3/mm3      RBC 4.18 (L) 10*6/mm3      Hemoglobin 12.6 (L) g/dL      Hematocrit 37.1 (L) %      MCV 88.8 fL      MCH 30.1 pg      MCHC 34.0 g/dL      RDW 13.3 %      RDW-SD 42.9 fl      MPV 10.1 fL      Platelets 157 10*3/mm3      Neutrophil % 76.4 %      Lymphocyte % 11.6 (L) %      Monocyte % 9.2 %      Eosinophil % 2.3 %      Basophil % 0.2 %      Immature Grans % 0.3 %      Neutrophils, Absolute 7.20 10*3/mm3      Lymphocytes, Absolute 1.09 10*3/mm3      Monocytes, Absolute 0.87 10*3/mm3      Eosinophils, Absolute 0.22 10*3/mm3      Basophils, Absolute 0.02 10*3/mm3      Immature Grans, Absolute 0.03 10*3/mm3     aPTT [[id number]]  (Abnormal) Collected:  01/16/17 1021    Specimen:  Blood Updated:  01/16/17 1042     PTT 45.0 (H) seconds     Protime-INR [[id number]]  (Normal) Collected:  01/16/17 1021    Specimen:  Blood Updated:  01/16/17 1042     Protime 14.1 Seconds      INR 1.06           CT Abdomen Pelvis Without Contrast   Final Result   1. The patient's undergone previous rectosigmoid resection as well as   left nephrectomy.   2. There is moderate hydronephrosis of the right renal collecting system   with perinephric stranding consistent with acute obstructive uropathy. A   stent is well-positioned but I suspect is malfunctioning. Urology   consultation is recommended. No perinephric fluid collections   identified.   3. There is presacral thickening extending into the lower bony pelvis.   This is stable to slightly improved from the previous study likely   representing a combination of postoperative scarring and postradiation   change. No new mass lesions are identified.   4. The patient's previously noted small bowel obstruction has resolved   with a normal bowel gas pattern noted on today's exam. Left lower   quadrant colostomy is present. There is a fat-containing peristomal   hernia present..    5. Tiny effusions with bibasilar atelectasis present.   6. Cholelithiasis.       Electronically Signed  By-Dr. Tate Jenkins MD On:1/16/2017 1:43 PM   EST   This report was finalized on 01/16/2017 12:43 by Dr. Tate Jenkins MD.          ED Course  ED Course   Value Comment By Time    CT abd/pelvis: 1. The patient's undergone previous rectosigmoid resection as well as  left nephrectomy.  2. There is moderate hydronephrosis of the right renal collecting system  with perinephric stranding consistent with acute obstructive uropathy. A  stent is well-positioned but I suspect is malfunctioning. Urology  consultation is recommended. No perinephric fluid collections  identified.  3. There is presacral thickening extending into the lower bony pelvis.  This is stable to slightly improved from the previous study likely  representing a combination of postoperative scarring and postradiation  change. No new mass lesions are identified.  4. The patient's previously noted small bowel obstruction has resolved  with a normal bowel gas pattern noted on today's exam. Left lower  quadrant colostomy is present. There is a fat-containing peristomal  hernia present..   5. Tiny effusions with bibasilar atelectasis present.  6. Cholelithiasis. Catherine Pimentel APRN 01/16 1252   Creatinine: (!) 14.34 (Reviewed) Catherine Pimentel APRN 01/16 1252   BUN: (!) 73 (Reviewed) Catherine Pimentel APRN 01/16 1252   CO2: (!) 21.0 (Reviewed) Catherine Pimentel APRN 01/16 1252   eGFR Non  Amer: (!) 4 (Reviewed) Catherine Pimentel APRN 01/16 1252   C-Reactive Protein: (!) 13.92 (Reviewed) Catherine Pimentel APRN 01/16 1252   aPTT: (!) 45.0 (Reviewed) Catherine Pimentel APRN 01/16 1252   Hemoglobin: (!) 12.6 (Reviewed) Catherine Pimentel APRN 01/16 1252   Hematocrit: (!) 37.1 (Reviewed) Catherine Pimentel APRN 01/16 1252   WBC: 9.43 (Reviewed) Catherine Pimentel APRN 01/16 1252    Patient CT reviewed at this time.  Patient labs reviewed as well.  Case discussed with Dr. Black was also seen in evaluation.  At this time a call was placed to  urology for consultation. Catherine Pimentel, APRN 01/16 1315    Dr. Quezada has agreed to see the pt in consultation. Hospitalist Dr. Garibay was contacted and is agreeable to admission for pt after discussion with Dr. Black. Pt will be admitted to his services at this time.  Catherine Pimentel, APRN 01/16 1350          MDM  Number of Diagnoses or Management Options  Obstructive uropathy: new and requires workup     Amount and/or Complexity of Data Reviewed  Clinical lab tests: ordered and reviewed  Tests in the radiology section of CPT®:  ordered and reviewed  Tests in the medicine section of CPT®:  ordered and reviewed  Decide to obtain previous medical records or to obtain history from someone other than the patient: yes    Risk of Complications, Morbidity, and/or Mortality  Presenting problems: moderate  Diagnostic procedures: moderate  Management options: moderate    Patient Progress  Patient progress: stable      Final diagnoses:   Obstructive uropathy       Rigoberto Black Jr., MD  01/16/17 1357       Electronically signed by Rigoberto Black Jr., MD at 1/16/2017  1:57 PM      Arash Painter RN at 1/16/2017  2:15 PM          Report called to Geovanna DAVID RN. Patient care transferred.     rAash Painter RN  01/16/17 1416       Electronically signed by Arash Painter RN at 1/16/2017  2:16 PM        Intake & Output (last day)       01/16 0701 - 01/17 0700 01/17 0701 - 01/18 0700    I.V. (mL/kg) 1008 (9.3)     IV Piggyback 1500     Total Intake(mL/kg) 2508 (23.2)     Urine (mL/kg/hr) 3450 300 (2.3)    Emesis/NG output 0     Total Output 3450 300    Net -942 -300          Unmeasured Emesis Occurrence 1 x         Lab Results (last 24 hours)     Procedure Component Value Units Date/Time    CBC & Differential [[id number]] Collected:  01/16/17 1020    Specimen:  Blood Updated:  01/16/17 1032    Narrative:       The following orders were created for panel order CBC & Differential.  Procedure                                Abnormality         Status                     ---------                               -----------         ------                     CBC Auto Differential[[id number]]         Abnormal            Final result                 Please view results for these tests on the individual orders.    CBC Auto Differential [[id number]]  (Abnormal) Collected:  01/16/17 1020    Specimen:  Blood Updated:  01/16/17 1032     WBC 9.43 10*3/mm3      RBC 4.18 (L) 10*6/mm3      Hemoglobin 12.6 (L) g/dL      Hematocrit 37.1 (L) %      MCV 88.8 fL      MCH 30.1 pg      MCHC 34.0 g/dL      RDW 13.3 %      RDW-SD 42.9 fl      MPV 10.1 fL      Platelets 157 10*3/mm3      Neutrophil % 76.4 %      Lymphocyte % 11.6 (L) %      Monocyte % 9.2 %      Eosinophil % 2.3 %      Basophil % 0.2 %      Immature Grans % 0.3 %      Neutrophils, Absolute 7.20 10*3/mm3      Lymphocytes, Absolute 1.09 10*3/mm3      Monocytes, Absolute 0.87 10*3/mm3      Eosinophils, Absolute 0.22 10*3/mm3      Basophils, Absolute 0.02 10*3/mm3      Immature Grans, Absolute 0.03 10*3/mm3     aPTT [[id number]]  (Abnormal) Collected:  01/16/17 1021    Specimen:  Blood Updated:  01/16/17 1042     PTT 45.0 (H) seconds     Protime-INR [[id number]]  (Normal) Collected:  01/16/17 1021    Specimen:  Blood Updated:  01/16/17 1042     Protime 14.1 Seconds      INR 1.06     Amylase [[id number]]  (Normal) Collected:  01/16/17 1020    Specimen:  Blood Updated:  01/16/17 1045     Amylase 61 U/L     Lipase [[id number]]  (Normal) Collected:  01/16/17 1020    Specimen:  Blood Updated:  01/16/17 1045     Lipase 29 U/L     Comprehensive Metabolic Panel [[id number]]  (Abnormal) Collected:  01/16/17 1020    Specimen:  Blood Updated:  01/16/17 1053     Glucose 96 mg/dL      BUN 73 (H) mg/dL      Creatinine 14.34 (H) mg/dL      Sodium 136 mmol/L      Potassium 5.2 mmol/L      Chloride 98 mmol/L      CO2 21.0 (L) mmol/L      Calcium 9.1 mg/dL      Total Protein 6.9 g/dL      Albumin 3.90 g/dL      ALT  (SGPT) 28 U/L      AST (SGOT) 20 U/L      Alkaline Phosphatase 104 U/L      Total Bilirubin 0.7 mg/dL      eGFR Non African Amer 4 (L) mL/min/1.73      Globulin 3.0 gm/dL      A/G Ratio 1.3 g/dL      BUN/Creatinine Ratio 5.1 (L)      Anion Gap 17.0 (H) mmol/L     C-reactive Protein [[id number]]  (Abnormal) Collected:  01/16/17 1020    Specimen:  Blood Updated:  01/16/17 1127     C-Reactive Protein 13.92 (H) mg/dL     Urine Culture [[id number]] Collected:  01/17/17 0010    Specimen:  Urine from Urine, Clean Catch Updated:  01/17/17 0035    Urinalysis With / Culture If Indicated [[id number]]  (Abnormal) Collected:  01/17/17 0010    Specimen:  Urine from Urine, Clean Catch Updated:  01/17/17 0106     Color, UA Red (A)      Appearance, UA Cloudy (A)      pH, UA 5.5      Specific Gravity, UA 1.011      Glucose, UA Negative      Ketones, UA Negative      Bilirubin, UA Negative      Blood, UA Large (3+) (A)      Protein,  mg/dL (2+) (A)      Leuk Esterase, UA Small (1+) (A)      Nitrite, UA Negative      Urobilinogen, UA 0.2 E.U./dL     Urinalysis, Microscopic Only [[id number]]  (Abnormal) Collected:  01/17/17 0010    Specimen:  Urine from Urine, Clean Catch Updated:  01/17/17 0106     RBC, UA Too Numerous to Count (A) /HPF      WBC, UA 6-12 (A) /HPF      Bacteria, UA None Seen /HPF      Squamous Epithelial Cells, UA None Seen /HPF      Hyaline Casts, UA 0-2 /LPF      Methodology Manual Light Microscopy     Basic Metabolic Panel [[id number]]  (Abnormal) Collected:  01/17/17 0514    Specimen:  Blood Updated:  01/17/17 0613     Glucose 86 mg/dL      BUN 74 (H) mg/dL      Creatinine 12.50 (H) mg/dL      Sodium 141 mmol/L      Potassium 5.2 mmol/L      Chloride 103 mmol/L      CO2 21.0 (L) mmol/L      Calcium 8.6 mg/dL      eGFR Non African Amer 5 (L) mL/min/1.73      BUN/Creatinine Ratio 5.9 (L)      Anion Gap 17.0 (H) mmol/L     Narrative:       GFR Normal >60  Chronic Kidney Disease <60  Kidney Failure <15          Imaging  Results (last 24 hours)     Procedure Component Value Units Date/Time    CT Abdomen Pelvis Without Contrast [[id number]] Collected:  01/16/17 1231     Updated:  01/16/17 1245    Narrative:       CT ABDOMEN PELVIS WO CONTRAST- 1/16/2017 11:32 AM CST     HISTORY: right flank pain      COMPARISON: 04/24/2015      DLP: 698 mGy cm. Automated exposure control was utilized to diminish  patient radiation dose.     TECHNIQUE: Noncontrast enhanced images of the abdomen and pelvis  obtained without oral contrast.      FINDINGS:   Tiny effusions are present with bibasilar atelectasis. The base of the  heart is unremarkable..      LIVER: No focal liver lesion.      BILIARY SYSTEM: Cholelithiasis is present. There is no evidence of  pericholecystic inflammatory change or biliary dilatation..      PANCREAS: No focal pancreatic lesion.      SPLEEN: Unremarkable.      KIDNEYS AND ADRENALS: Patient's undergone previous left nephrectomy.  There is moderate dilatation of the upper tracts of the right kidney  with perinephric stranding consistent with obstructive uropathy. A  right-sided double-J ureteral stent is well-positioned but is apparently  not functioning well. No perinephric fluid collections are present..      .     RETROPERITONEUM: There is an interaortocaval node measuring 10 mm in  size previously measured at 6 mm. Some smaller left periaortic nodes are  present unchanged from the previous study. Presacral thickening is  present likely representing a combination of postoperative and  postradiation changes. Overall I feel this has shown mild interval  improvement from the previous study. No new adenopathy is present..      GI TRACT: The patient's undergone previous rectosigmoid resection. There  is a left lower quadrant colostomy. Previously noted dilated loops of  small bowel are no longer visualized with no evidence of mechanical  obstruction on the current exam.. The appendix is not visualized..     OTHER: There is no  mesenteric mass, lymphadenopathy or fluid collection.  The osseous structures and soft tissues demonstrate no worrisome  lesions. No free fluid is present.      PELVIS: Soft tissue nodularity within the presacral space is again  present and stable to slightly improved from the previous study. No new  mass lesion is present.. The urinary bladder is normal in appearance.       Impression:       1. The patient's undergone previous rectosigmoid resection as well as  left nephrectomy.  2. There is moderate hydronephrosis of the right renal collecting system  with perinephric stranding consistent with acute obstructive uropathy. A  stent is well-positioned but I suspect is malfunctioning. Urology  consultation is recommended. No perinephric fluid collections  identified.  3. There is presacral thickening extending into the lower bony pelvis.  This is stable to slightly improved from the previous study likely  representing a combination of postoperative scarring and postradiation  change. No new mass lesions are identified.  4. The patient's previously noted small bowel obstruction has resolved  with a normal bowel gas pattern noted on today's exam. Left lower  quadrant colostomy is present. There is a fat-containing peristomal  hernia present..   5. Tiny effusions with bibasilar atelectasis present.  6. Cholelithiasis.     Electronically Signed By-Dr. Tate Jenkins MD On:1/16/2017 1:43 PM  EST  This report was finalized on 01/16/2017 12:43 by Dr. Tate Jenkins MD.    IR Nephrostomy Tube Placement Right [[id number]] Collected:  01/17/17 0720     Updated:  01/17/17 0727    Narrative:       IR NEPHROSTOMY TUBE PLACEMENT RT- 1/16/2017 17:20 CST     HISTORY: ureteral stent obstruction; N13.5-Crossing vessel and stricture  of ureter without hydronephrosis; N13.9-Obstructive and reflux uropathy,  unspecified; N17.9-Acute kidney failure, unspecified      COMPARISON: CT abdomen and pelvis 01/16/2017     Fluoroscopy time: 1.9  minutes     Estimated blood loss: Approximately 10 mL     MEDICATIONS: 2 mg IV Versed and 100 mcg IV fentanyl. 2 g IV Ancef were  also administered during the exam.     Consultations: None.     Contrast: Approximately 10 mL Isovue-300 contrast were injected into the  patient's renal collecting system. No intravenous contrast was  administered.     Consent: Informed consent was obtained from the patient. All questions  were answered to the patient's satisfaction.     PROCEDURE:  The patient was placed in the prone position. The RIGHT renal collecting  system was visualized under ultrasound, and the skin was marked in the  region of the optimal site for percutaneous access.     A timeout was performed.     The patient was prepped for the procedure, which included cleaning the  skin with Betadine solution and placing a large drape over the entire  body.     The skin and deeper soft tissues were anesthetized with approximately 10  mL of 1% lidocaine.     Utilizing a translumbar needle and real-time ultrasound guidance, access  into the RIGHT renal collecting system was obtained and confirmed by  return of urine. Contrast was injected into the RIGHT renal collecting  system demonstrating marked hydronephrosis and proximal hydroureter. The  existing ureteral stent extends up into the superior calyces. A wire was  advanced into the RIGHT renal collecting system.     The soft tissues were dilated. An 8.5 [place]ian nephrostomy tube was  positioned within the renal collecting system. Contrast was injected to  confirm position of the catheter.     The images demonstrate moderate to marked right-sided hydronephrosis and  proximal hydroureter.       Impression:       1. Successful placement of an 8.5 [place]ian drain into the RIGHT renal  collecting system.     Electronically Signed By-Dr. Alec Sena MD On:1/17/2017 8:25 AM  EST  This report was finalized on 01/17/2017 07:25 by Dr. Alec Sena MD.          Orders (last 24 hrs)      Start     Ordered    01/17/17 0600  pantoprazole (PROTONIX) EC tablet 40 mg  Every Early Morning      01/16/17 1525    01/17/17 0600  Basic Metabolic Panel  Morning Draw      01/16/17 1525    01/17/17 0036  Urine Culture  Once      01/17/17 0035    01/17/17 0035  Urinalysis, Microscopic Only  Once      01/17/17 0035    01/17/17 0000  enoxaparin (LOVENOX) syringe 30 mg  Every 24 Hours      01/16/17 1525    01/16/17 1840  Diet Regular  Diet Effective Now      01/16/17 1840    01/16/17 1747  lidocaine (XYLOCAINE) 1 % injection  Code / Trauma / Sedation Medication      01/16/17 1747    01/16/17 1745  iopamidol (ISOVUE-300) 61 % injection  Code / Trauma / Sedation Medication      01/16/17 1810    01/16/17 1743  midazolam (VERSED) injection  Code / Trauma / Sedation Medication      01/16/17 1743    01/16/17 1743  FentaNYL Citrate (PF) (SUBLIMAZE) injection  Code / Trauma / Sedation Medication      01/16/17 1743    01/16/17 1622  IR Nephrostomy Tube Placement Right  1 Time Imaging      01/16/17 1622    01/16/17 1600  propranolol LA (INDERAL LA) 24 hr capsule 60 mg  Daily      01/16/17 1525    01/16/17 1600  Vital Signs  Every 4 Hours      01/16/17 1525    01/16/17 1600  Strict Intake and Output  Every Hour      01/16/17 1525    01/16/17 1600  sodium chloride 0.9 % infusion  Continuous      01/16/17 1525    01/16/17 1530  ceFAZolin (ANCEF) IVPB (duplex) 2 g  Once      01/16/17 1457    01/16/17 1524  ondansetron (ZOFRAN) injection 4 mg  Every 6 Hours PRN      01/16/17 1525    01/16/17 1524  acetaminophen (TYLENOL) tablet 650 mg  Every 4 Hours PRN      01/16/17 1525    01/16/17 1524  Weigh patient  Once      01/16/17 1525    01/16/17 1524  Insert Peripheral IV  Once      01/16/17 1525    01/16/17 1524  Saline Lock & Maintain IV Access  Continuous      01/16/17 1525    01/16/17 1524  Full Code  Continuous      01/16/17 1525    01/16/17 1523  sodium chloride 0.9 % flush 1-10 mL  As Needed      01/16/17 1525    01/16/17 1523   CloNIDine (CATAPRES) tablet 0.2 mg  Every 6 Hours PRN      01/16/17 1525    01/16/17 1522  HYDROmorphone (DILAUDID) injection 0.5 mg  Every 3 Hours PRN      01/16/17 1522    01/16/17 1507  hydrALAZINE (APRESOLINE) injection 10 mg  Every 4 Hours PRN      01/16/17 1507    01/16/17 1507  labetalol (NORMODYNE,TRANDATE) injection 10 mg  Every 4 Hours PRN      01/16/17 1507    01/16/17 1458  Verify NPO Status  Until Discontinued      01/16/17 1457    01/16/17 1458  Verify informed consent  Once      01/16/17 1457    01/16/17 1458  ANGELIKA hose- To be placed on patient in pre-op  Once      01/16/17 1457    01/16/17 1458  SCD (sequential compression device)- to be placed on patient in Pre-op  Once      01/16/17 1457    01/16/17 1356  Inpatient Admission  Once      01/16/17 1356    01/16/17 1352  Urology (on-call MD unless specified)  Once     Specialty:  Urology  Provider:  Nirav Quezada MD    01/16/17 1351    01/16/17 1332  Case Request  Once      01/16/17 1333    01/16/17 1243  sodium chloride 0.9 % bolus 500 mL  Once      01/16/17 1241    01/16/17 1241  HYDROmorphone (DILAUDID) injection 0.5 mg  Once      01/16/17 1239    01/16/17 1135  labetalol (NORMODYNE,TRANDATE) injection 10 mg  Once      01/16/17 1133    01/16/17 0958  sodium chloride 0.9 % bolus 500 mL  Once      01/16/17 0956    01/16/17 0958  HYDROmorphone (DILAUDID) injection 1 mg  Once      01/16/17 0956    01/16/17 0958  ondansetron (ZOFRAN) injection 4 mg  Once      01/16/17 0956    01/16/17 0957  C-reactive Protein  Once      01/16/17 0956    01/16/17 0957  CT Abdomen Pelvis Without Contrast  1 Time Imaging     Comments:  NON-CONTRASTED STUDY      01/16/17 0956    01/16/17 0957  NPO Diet  Diet Effective Now,   Status:  Canceled      01/16/17 0956    01/16/17 0957  Cardiac monitoring  Once,   Status:  Canceled      01/16/17 0956    01/16/17 0957  Pulse Oximetry, Continuous  Per Hospital Policy,   Status:  Canceled      01/16/17 0956    01/16/17 0957   Vital signs  Per Hospital Policy      01/16/17 0956    01/16/17 0957  Insert peripheral IV  Once      01/16/17 0956    01/16/17 0956  sodium chloride 0.9 % flush 10 mL  As Needed      01/16/17 0956    01/16/17 0956  CBC & Differential  Once      01/16/17 0956    01/16/17 0956  Comprehensive Metabolic Panel  Once      01/16/17 0956    01/16/17 0956  aPTT  Once      01/16/17 0956    01/16/17 0956  Protime-INR  Once      01/16/17 0956    01/16/17 0956  Amylase  Once      01/16/17 0956    01/16/17 0956  Lipase  Once      01/16/17 0956    01/16/17 0956  Urinalysis With / Culture If Indicated  Once      01/16/17 0956    01/16/17 0956  CBC Auto Differential  PROCEDURE ONCE      01/16/17 0956    01/01/17 0000  HYDROcodone-acetaminophen (NORCO)  MG per tablet      01/16/17 0928    06/15/16 0000  propranolol LA (INDERAL LA) 60 MG 24 hr capsule  Daily      01/16/17 0928    06/07/16 0000  omeprazole (priLOSEC) 40 MG capsule  Daily      01/16/17 0928    01/27/16 0000  CloNIDine (CATAPRES) 0.2 MG tablet  As Needed      01/16/17 0928    01/08/16 0000  naratriptan (AMERGE) 2.5 MG tablet  Once As Needed      01/16/17 0928    01/08/16 0000  SUMAtriptan (IMITREX) 100 MG tablet  Once As Needed      01/16/17 0928    --  lisinopril (PRINIVIL,ZESTRIL) 40 MG tablet  Daily      01/16/17 0928                  Consult Notes (last 24 hours) (Notes from 1/16/2017  8:14 AM through 1/17/2017  8:14 AM)      Nirav Quezada MD at 1/16/2017  4:18 PM  Version 1 of 1         Consults         Referring Provider: Freddy  Reason for Consultation: Anuric ARI, Solitary Kidney, Hydronephrosis, Ureteral Obstruction    Chief complaint: Right Flank Pain    Subjective .     History of present illness:  Abnormal radiographic finding   This patient presents with a possible abnormal finding of the right kidneyon CT that included abdominal cuts. This is a  new finding. This test was done 4 day(s) ago. Onset is sudden. This was done in context of  evaluation for abdominal pain. Symptoms include Abdominal pain  on the right in the  lower portion.   patient has a long complicated  history.  He had rectal cancer at the age of 31.  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  He is managed by Dr. Ramon Montano at  [facility] and he underwent a recent right ureteral stent placement December 2016.  He has been seen by Dr. Saxena in our group.  Last seen in September 2015 with a similar course or a percutaneous nephrostomy tube was placed for decompression.  Patient has been anuric for 4 days.  He has also had a right sided lower quadrant and right-sided flank pain.    Review of Systems  The following systems were reviewed and negative;  constitution, eyes, ENT, respiratory, cardiovascular, integument, breast, hematologic / lymphatic, musculoskeletal, neurological, behavioral/psych, endocrine and allergies / immunologic     History  Past Medical History   Diagnosis Date   • Hypertension    • Kidney stone        Past Surgical History   Procedure Laterality Date   • Knee surgery     • Colon surgery     • Colostomy     • Nephrectomy     • Cystoscopy         History reviewed. No pertinent family history.    Social History     Social History   • Marital status:      Spouse name: N/A   • Number of children: N/A   • Years of education: N/A     Occupational History   • Not on file.     Social History Main Topics   • Smoking status: Never Smoker   • Smokeless tobacco: Not on file   • Alcohol use No   • Drug use: No   • Sexual activity: Defer     Other Topics Concern   • Not on file     Social History Narrative   • No narrative on file       Current Facility-Administered Medications   Medication Dose Route Frequency Provider Last Rate Last Dose   • acetaminophen (TYLENOL) tablet 650 mg  650 mg Oral Q4H PRN Krish Hayes MD       • ceFAZolin (ANCEF) IVPB (duplex) 2 g  2 g Intravenous Once Nirav  Frank Quezada MD       • CloNIDine (CATAPRES) tablet 0.2 mg  0.2 mg Oral Q6H PRN Krish Hayes MD       • [START ON 1/17/2017] enoxaparin (LOVENOX) syringe 30 mg  30 mg Subcutaneous Q24H Krish Hayes MD       • hydrALAZINE (APRESOLINE) injection 10 mg  10 mg Intravenous Q4H PRN Krish Hayes MD       • HYDROmorphone (DILAUDID) injection 0.5 mg  0.5 mg Intravenous Q3H PRN Krish Hayes MD       • labetalol (NORMODYNE,TRANDATE) injection 10 mg  10 mg Intravenous Q4H PRN Krish Hayes MD       • ondansetron (ZOFRAN) injection 4 mg  4 mg Intravenous Q6H PRN Krish Hayes MD       • [START ON 1/17/2017] pantoprazole (PROTONIX) EC tablet 40 mg  40 mg Oral Q AM Krish Hayes MD       • propranolol LA (INDERAL LA) 24 hr capsule 60 mg  60 mg Oral Daily Krish Hayes MD       • sodium chloride 0.9 % flush 1-10 mL  1-10 mL Intravenous PRN Krish Hayes MD       • sodium chloride 0.9 % flush 10 mL  10 mL Intravenous PRN LINDA Handy       • sodium chloride 0.9 % infusion  125 mL/hr Intravenous Continuous Krish Hayes MD            Allergies   Allergen Reactions   • Morphine And Related      Doesn't work    • Percocet [Oxycodone-Acetaminophen]      Give me migraines        Objective     Vital Signs   Temp:  [97.2 °F (36.2 °C)] 97.2 °F (36.2 °C)  Heart Rate:  [67-78] 75  Resp:  [12-20] 20  BP: (169-186)/(102-125) 177/107    Intake/Output Summary (Last 24 hours) at 01/16/17 1621  Last data filed at 01/16/17 1319   Gross per 24 hour   Intake   1500 ml   Output      0 ml   Net   1500 ml       Physical Exam:     General Appearance:    Alert, cooperative, in no acute distress   Head:    Normocephalic, without obvious abnormality, atraumatic   Eyes:            Lids and lashes normal, conjunctivae and sclerae normal, no   icterus, no pallor, corneas clear, PERRLA   Ears:    Ears appear intact with no abnormalities noted   Throat:   No oral lesions, no  thrush, oral mucosa moist   Neck:   No adenopathy, supple, trachea midline, no thyromegaly, no   carotid bruit, no JVD   Back:     No kyphosis present, no scoliosis present, no skin lesions,      erythema or scars, no tenderness to percussion or                   palpation,   range of motion normal   Lungs:     Clear to auscultation,respirations regular, even and                  unlabored    Heart:    Regular rhythm and normal rate, normal S1 and S2, no            murmur, no gallop, no rub, no click   Chest Wall:    No abnormalities observed   Abdomen:     Normal bowel sounds, no masses, no organomegaly, soft        non-tender, non-distended, no guarding, no rebound                Tenderness     Genitorurinary:     Extremities:   Moves all extremities well, no edema, no cyanosis, no             redness   Pulses:   Pulses palpable and equal bilaterally   Skin:   No bleeding, bruising or rash   Lymph nodes:   No palpable adenopathy   Neurologic:   Cranial nerves 2 - 12 grossly intact, sensation intact, DTR       present and equal bilaterally       Results Review:   I reviewed the patient's new clinical results.      @[facility]@    @[facility]@    Imaging Results (last 24 hours)     Procedure Component Value Units Date/Time    CT Abdomen Pelvis Without Contrast [[id number]] Collected:  01/16/17 1231     Updated:  01/16/17 1245    Narrative:       CT ABDOMEN PELVIS WO CONTRAST- 1/16/2017 11:32 AM CST     HISTORY: right flank pain      COMPARISON: 04/24/2015      DLP: 698 mGy cm. Automated exposure control was utilized to diminish  patient radiation dose.     TECHNIQUE: Noncontrast enhanced images of the abdomen and pelvis  obtained without oral contrast.      FINDINGS:   Tiny effusions are present with bibasilar atelectasis. The base of the  heart is unremarkable..      LIVER: No focal liver lesion.      BILIARY SYSTEM: Cholelithiasis is present. There is no evidence of  pericholecystic inflammatory change or biliary dilatation..       PANCREAS: No focal pancreatic lesion.      SPLEEN: Unremarkable.      KIDNEYS AND ADRENALS: Patient's undergone previous left nephrectomy.  There is moderate dilatation of the upper tracts of the right kidney  with perinephric stranding consistent with obstructive uropathy. A  right-sided double-J ureteral stent is well-positioned but is apparently  not functioning well. No perinephric fluid collections are present..      .     RETROPERITONEUM: There is an interaortocaval node measuring 10 mm in  size previously measured at 6 mm. Some smaller left periaortic nodes are  present unchanged from the previous study. Presacral thickening is  present likely representing a combination of postoperative and  postradiation changes. Overall I feel this has shown mild interval  improvement from the previous study. No new adenopathy is present..      GI TRACT: The patient's undergone previous rectosigmoid resection. There  is a left lower quadrant colostomy. Previously noted dilated loops of  small bowel are no longer visualized with no evidence of mechanical  obstruction on the current exam.. The appendix is not visualized..     OTHER: There is no mesenteric mass, lymphadenopathy or fluid collection.  The osseous structures and soft tissues demonstrate no worrisome  lesions. No free fluid is present.      PELVIS: Soft tissue nodularity within the presacral space is again  present and stable to slightly improved from the previous study. No new  mass lesion is present.. The urinary bladder is normal in appearance.       Impression:       1. The patient's undergone previous rectosigmoid resection as well as  left nephrectomy.  2. There is moderate hydronephrosis of the right renal collecting system  with perinephric stranding consistent with acute obstructive uropathy. A  stent is well-positioned but I suspect is malfunctioning. Urology  consultation is recommended. No perinephric fluid collections  identified.  3. There is  presacral thickening extending into the lower bony pelvis.  This is stable to slightly improved from the previous study likely  representing a combination of postoperative scarring and postradiation  change. No new mass lesions are identified.  4. The patient's previously noted small bowel obstruction has resolved  with a normal bowel gas pattern noted on today's exam. Left lower  quadrant colostomy is present. There is a fat-containing peristomal  hernia present..   5. Tiny effusions with bibasilar atelectasis present.  6. Cholelithiasis.     Electronically Signed By-Dr. Tate Jenkins MD On:1/16/2017 1:43 PM  EST  This report was finalized on 01/16/2017 12:43 by Dr. Tate Jenkins MD.      CT independent review  The CT scan of the abdomen/pelvis done without contrast is available for me to review.  Treatment recommendations require an independent review.  First I scanned the liver, spleen, and bowel pattern.  The retroperitoneum including the major vessels and lymphatic packages are briefly reviewed.  This film as been reviewed by the radiologist to determine any non urologic abnormalities that are present.  The kidneys are closely inspected for size, symmetry, contour, parenchymal thickness, perinephric reaction, presence of calcifications, and intrarenal dilation of the collecting system.  The ureters are inspected for their course, caliber, and any calcifications.  The bladder is inspected  for its thickness, size, and presence of any calcifications.  This scan shows:    The right kidney appears hydronephrotic despite stent in good position    The left kidney appears absent    The bladder appears normal on this non-contrasted CT scan.  The bladder appears normal in thickness.  There no masses or stones seen on this exam.        Assessment&#47;Plan     Active Problems:    Obstructive uropathy  with solitary kidney with anuric acute kidney injury.  This is secondary to obstructive uropathy despite a recent  stent placement in December 2016.  I think the best course of action is to place a percutaneous nephrostomy tube.  I discussed this with the patient and his family.  Also discussed with Dr. Alec Bustamante with radiology who agrees in addition I discussed with Dr. Krish Hayes and he agrees with the above plan.  After this he needs to follow-up with Dr. Ramon Montano at [facility].      I discussed the patients findings and my recommendations with patient, family, nursing staff and consulting provider    Nirav Quezada MD  01/16/17  4:21 PM               Electronically signed by Nirav Quezada MD at 1/16/2017  4:35 PM

## 2017-01-17 NOTE — PLAN OF CARE
Problem: Patient Care Overview (Adult)  Goal: Plan of Care Review  Outcome: Ongoing (interventions implemented as appropriate)    01/17/17 0339   Coping/Psychosocial Response Interventions   Plan Of Care Reviewed With patient   Patient Care Overview   Progress improving   Outcome Evaluation   Outcome Summary/Follow up Plan C/o pain around nephrostomy tube placed yesterday, medicated PRN per orders. Has HTN, PRN BP meds given per orders with little change in BP. Urine output clear and bright red to pink in color. UA sent. IVF cont. Will continue to monitor.       Goal: Adult Individualization and Mutuality  Outcome: Ongoing (interventions implemented as appropriate)  Goal: Discharge Needs Assessment  Outcome: Ongoing (interventions implemented as appropriate)    Problem: Perioperative Period (Adult)  Goal: Signs and Symptoms of Listed Potential Problems Will be Absent or Manageable (Perioperative Period)  Outcome: Ongoing (interventions implemented as appropriate)

## 2017-01-18 LAB
ANION GAP SERPL CALCULATED.3IONS-SCNC: 16 MMOL/L (ref 4–13)
ANION GAP SERPL CALCULATED.3IONS-SCNC: 18 MMOL/L (ref 4–13)
BUN BLD-MCNC: 53 MG/DL (ref 5–21)
BUN BLD-MCNC: 60 MG/DL (ref 5–21)
BUN/CREAT SERPL: 7.2 (ref 7–25)
BUN/CREAT SERPL: 8.2 (ref 7–25)
CALCIUM SPEC-SCNC: 8.9 MG/DL (ref 8.4–10.4)
CALCIUM SPEC-SCNC: 9.3 MG/DL (ref 8.4–10.4)
CHLORIDE SERPL-SCNC: 102 MMOL/L (ref 98–110)
CHLORIDE SERPL-SCNC: 104 MMOL/L (ref 98–110)
CO2 SERPL-SCNC: 23 MMOL/L (ref 24–31)
CO2 SERPL-SCNC: 23 MMOL/L (ref 24–31)
CREAT BLD-MCNC: 6.43 MG/DL (ref 0.5–1.4)
CREAT BLD-MCNC: 8.32 MG/DL (ref 0.5–1.4)
GFR SERPL CREATININE-BSD FRML MDRD: 10 ML/MIN/1.73
GFR SERPL CREATININE-BSD FRML MDRD: 7 ML/MIN/1.73
GLUCOSE BLD-MCNC: 107 MG/DL (ref 70–100)
GLUCOSE BLD-MCNC: 137 MG/DL (ref 70–100)
POTASSIUM BLD-SCNC: 4.5 MMOL/L (ref 3.5–5.3)
POTASSIUM BLD-SCNC: 4.9 MMOL/L (ref 3.5–5.3)
SODIUM BLD-SCNC: 143 MMOL/L (ref 135–145)
SODIUM BLD-SCNC: 143 MMOL/L (ref 135–145)

## 2017-01-18 PROCEDURE — 80048 BASIC METABOLIC PNL TOTAL CA: CPT | Performed by: NURSE PRACTITIONER

## 2017-01-18 PROCEDURE — 80048 BASIC METABOLIC PNL TOTAL CA: CPT | Performed by: INTERNAL MEDICINE

## 2017-01-18 PROCEDURE — 25010000002 HYDROMORPHONE PER 4 MG: Performed by: INTERNAL MEDICINE

## 2017-01-18 PROCEDURE — 25010000002 HYDROMORPHONE PER 4 MG: Performed by: NURSE PRACTITIONER

## 2017-01-18 PROCEDURE — 25010000002 HYDRALAZINE PER 20 MG: Performed by: INTERNAL MEDICINE

## 2017-01-18 PROCEDURE — 25010000002 ONDANSETRON PER 1 MG: Performed by: INTERNAL MEDICINE

## 2017-01-18 PROCEDURE — 25010000002 PROMETHAZINE PER 50 MG: Performed by: NURSE PRACTITIONER

## 2017-01-18 RX ORDER — AMLODIPINE BESYLATE 10 MG/1
10 TABLET ORAL
Status: DISCONTINUED | OUTPATIENT
Start: 2017-01-19 | End: 2017-01-20 | Stop reason: HOSPADM

## 2017-01-18 RX ORDER — DEXTROSE AND SODIUM CHLORIDE 5; .45 G/100ML; G/100ML
100 INJECTION, SOLUTION INTRAVENOUS CONTINUOUS
Status: DISCONTINUED | OUTPATIENT
Start: 2017-01-18 | End: 2017-01-19

## 2017-01-18 RX ORDER — ZOLPIDEM TARTRATE 5 MG/1
5 TABLET ORAL NIGHTLY PRN
Status: DISCONTINUED | OUTPATIENT
Start: 2017-01-18 | End: 2017-01-20 | Stop reason: HOSPADM

## 2017-01-18 RX ORDER — HYDRALAZINE HYDROCHLORIDE 25 MG/1
25 TABLET, FILM COATED ORAL EVERY 8 HOURS SCHEDULED
Status: DISCONTINUED | OUTPATIENT
Start: 2017-01-18 | End: 2017-01-19

## 2017-01-18 RX ORDER — HYDRALAZINE HYDROCHLORIDE 25 MG/1
25 TABLET, FILM COATED ORAL EVERY 8 HOURS SCHEDULED
Status: DISCONTINUED | OUTPATIENT
Start: 2017-01-18 | End: 2017-01-18

## 2017-01-18 RX ORDER — PROMETHAZINE HYDROCHLORIDE 25 MG/ML
12.5 INJECTION, SOLUTION INTRAMUSCULAR; INTRAVENOUS EVERY 6 HOURS PRN
Status: DISCONTINUED | OUTPATIENT
Start: 2017-01-18 | End: 2017-01-20 | Stop reason: HOSPADM

## 2017-01-18 RX ADMIN — HYDROMORPHONE HYDROCHLORIDE 0.5 MG: 1 INJECTION, SOLUTION INTRAMUSCULAR; INTRAVENOUS; SUBCUTANEOUS at 23:45

## 2017-01-18 RX ADMIN — CLONIDINE HYDROCHLORIDE 0.2 MG: 0.1 TABLET ORAL at 15:51

## 2017-01-18 RX ADMIN — PANTOPRAZOLE SODIUM 40 MG: 40 TABLET, DELAYED RELEASE ORAL at 06:21

## 2017-01-18 RX ADMIN — HYDROMORPHONE HYDROCHLORIDE 0.5 MG: 1 INJECTION, SOLUTION INTRAMUSCULAR; INTRAVENOUS; SUBCUTANEOUS at 02:57

## 2017-01-18 RX ADMIN — SODIUM CHLORIDE 125 ML/HR: 9 INJECTION, SOLUTION INTRAVENOUS at 04:16

## 2017-01-18 RX ADMIN — DEXTROSE AND SODIUM CHLORIDE 100 ML/HR: 5; 450 INJECTION, SOLUTION INTRAVENOUS at 23:50

## 2017-01-18 RX ADMIN — ONDANSETRON HYDROCHLORIDE 4 MG: 2 SOLUTION INTRAMUSCULAR; INTRAVENOUS at 16:28

## 2017-01-18 RX ADMIN — HYDROMORPHONE HYDROCHLORIDE 0.5 MG: 1 INJECTION, SOLUTION INTRAMUSCULAR; INTRAVENOUS; SUBCUTANEOUS at 14:23

## 2017-01-18 RX ADMIN — AMLODIPINE BESYLATE 5 MG: 5 TABLET ORAL at 09:09

## 2017-01-18 RX ADMIN — SODIUM CHLORIDE 125 ML/HR: 9 INJECTION, SOLUTION INTRAVENOUS at 12:56

## 2017-01-18 RX ADMIN — HYDROMORPHONE HYDROCHLORIDE 0.5 MG: 1 INJECTION, SOLUTION INTRAMUSCULAR; INTRAVENOUS; SUBCUTANEOUS at 11:15

## 2017-01-18 RX ADMIN — HYDRALAZINE HYDROCHLORIDE 10 MG: 20 INJECTION INTRAMUSCULAR; INTRAVENOUS at 06:36

## 2017-01-18 RX ADMIN — HYDRALAZINE HYDROCHLORIDE 25 MG: 25 TABLET ORAL at 18:04

## 2017-01-18 RX ADMIN — SUMATRIPTAN SUCCINATE 50 MG: 50 TABLET, FILM COATED ORAL at 16:28

## 2017-01-18 RX ADMIN — PROPRANOLOL HYDROCHLORIDE 60 MG: 60 CAPSULE, EXTENDED RELEASE ORAL at 09:09

## 2017-01-18 RX ADMIN — PROMETHAZINE HYDROCHLORIDE 12.5 MG: 25 INJECTION INTRAMUSCULAR; INTRAVENOUS at 11:44

## 2017-01-18 RX ADMIN — HYDRALAZINE HYDROCHLORIDE 10 MG: 20 INJECTION INTRAMUSCULAR; INTRAVENOUS at 14:17

## 2017-01-18 RX ADMIN — DEXTROSE AND SODIUM CHLORIDE 100 ML/HR: 5; 450 INJECTION, SOLUTION INTRAVENOUS at 14:17

## 2017-01-18 RX ADMIN — HYDROMORPHONE HYDROCHLORIDE 0.5 MG: 1 INJECTION, SOLUTION INTRAMUSCULAR; INTRAVENOUS; SUBCUTANEOUS at 07:50

## 2017-01-18 RX ADMIN — HYDROMORPHONE HYDROCHLORIDE 0.5 MG: 1 INJECTION, SOLUTION INTRAMUSCULAR; INTRAVENOUS; SUBCUTANEOUS at 18:05

## 2017-01-18 NOTE — PLAN OF CARE
Problem: Patient Care Overview (Adult)  Goal: Plan of Care Review  Outcome: Ongoing (interventions implemented as appropriate)    01/17/17 1950   Coping/Psychosocial Response Interventions   Plan Of Care Reviewed With patient;spouse   Outcome Evaluation   Outcome Summary/Follow up Plan pt continues to c/o headaches and hypertension. seems to have improved somewhat with imitrex. wife is at the bedside and supportive.        Goal: Adult Individualization and Mutuality  Outcome: Ongoing (interventions implemented as appropriate)

## 2017-01-18 NOTE — PLAN OF CARE
Problem: Patient Care Overview (Adult)  Goal: Plan of Care Review  Outcome: Ongoing (interventions implemented as appropriate)  Nephrostomy tube with good uop.imitrex for h/a, prn b/p meds utilized.    01/18/17 1640   Coping/Psychosocial Response Interventions   Plan Of Care Reviewed With patient   Patient Care Overview   Progress no change       Goal: Adult Individualization and Mutuality  Outcome: Ongoing (interventions implemented as appropriate)  Goal: Discharge Needs Assessment  Outcome: Ongoing (interventions implemented as appropriate)    Problem: Perioperative Period (Adult)  Goal: Signs and Symptoms of Listed Potential Problems Will be Absent or Manageable (Perioperative Period)  Outcome: Ongoing (interventions implemented as appropriate)

## 2017-01-18 NOTE — PROGRESS NOTES
Nephrology ([facility]) Consult Note      Patient:  Felipe Veronica  YOB: 1975  Date of Service: 1/18/2017  MRN: [id number]   Acct: [id number]   Primary Care Physician: Fermin Alexander MD  Advance Directive: Full Code  Admit Date: 1/16/2017       Hospital Day: 2  Referring Provider: Krish Hayes MD      Patient Seen, Chart, Consults, Notes, Labs, Radiology studies reviewed.        Subjective:  Felipe Veronica is a 41 y.o. male  whom we were consulted for acute kidney injury.  Known obstructive uropathy followed with [facility].  Also has had prior left nephrectomy.  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  Nephrostomy tube placed 1/16, renal function now improving.  Today complains of nausea and vomiting, Zofran minimally effective.    Allergies:  Morphine and related and Percocet [oxycodone-acetaminophen]    Home Meds:  Prescriptions Prior to Admission   Medication Sig Dispense Refill Last Dose   • CloNIDine (CATAPRES) 0.2 MG tablet Take 0.2 mg by mouth As Needed.      • HYDROcodone-acetaminophen (NORCO)  MG per tablet Take one tablet every 3-4 hours PRN pain. Earliest Fill Date: 1/1/17      • lisinopril (PRINIVIL,ZESTRIL) 40 MG tablet Take 40 mg by mouth Daily.      • naratriptan (AMERGE) 2.5 MG tablet Take 2.5 mg by mouth 1 (One) Time As Needed for migraine.      • omeprazole (priLOSEC) 40 MG capsule Take 40 mg by mouth Daily.      • propranolol LA (INDERAL LA) 60 MG 24 hr capsule Take 60 mg by mouth Daily.      • SUMAtriptan (IMITREX) 100 MG tablet Take 100 mg by mouth 1 (One) Time As Needed for migraine.          Medicines:  Current Facility-Administered Medications   Medication Dose Route Frequency Provider Last Rate Last Dose   • acetaminophen (TYLENOL) tablet 650 mg  650 mg Oral Q4H PRN Krish Hayes MD   650 mg at 01/17/17 0851   • amLODIPine (NORVASC) tablet 5 mg  5 mg Oral Q24H Krish Hayes  MD   5 mg at 01/18/17 0909   • CloNIDine (CATAPRES) tablet 0.2 mg  0.2 mg Oral Q6H PRN Krish Hayes MD   0.2 mg at 01/17/17 2144   • enoxaparin (LOVENOX) syringe 30 mg  30 mg Subcutaneous Q24H Krish Hayes MD   30 mg at 01/16/17 2359   • hydrALAZINE (APRESOLINE) injection 10 mg  10 mg Intravenous Q4H PRN Krish Hayes MD   10 mg at 01/18/17 0636   • HYDROmorphone (DILAUDID) injection 0.5 mg  0.5 mg Intravenous Q3H PRN Krish Hayes MD   0.5 mg at 01/18/17 0750   • iopamidol (ISOVUE-300) 61 % injection    Code / Trauma / Sedation Medication Alec Sena MD   10 mL at 01/16/17 1745   • labetalol (NORMODYNE,TRANDATE) injection 10 mg  10 mg Intravenous Q4H PRN Krish Hayes MD   10 mg at 01/17/17 1215   • ondansetron (ZOFRAN) injection 4 mg  4 mg Intravenous Q6H PRN Krish Hayes MD   4 mg at 01/17/17 1517   • pantoprazole (PROTONIX) EC tablet 40 mg  40 mg Oral Q AM Krish Hayes MD   40 mg at 01/18/17 0621   • promethazine (PHENERGAN) IV syringe 12.5 mg  12.5 mg Intravenous Q6H PRN Hermilo Matos, APRN       • propranolol LA (INDERAL LA) 24 hr capsule 60 mg  60 mg Oral Daily Krish Hayes MD   60 mg at 01/18/17 0909   • sodium chloride 0.9 % flush 1-10 mL  1-10 mL Intravenous PRN Krish Hayes MD       • sodium chloride 0.9 % flush 10 mL  10 mL Intravenous PRN Catherine Pimentel, LINDA       • sodium chloride 0.9 % infusion  125 mL/hr Intravenous Continuous Krish Hayes  mL/hr at 01/18/17 0416 125 mL/hr at 01/18/17 0416   • SUMAtriptan (IMITREX) tablet 50 mg  50 mg Oral Q2H PRN Krish Hayes MD   50 mg at 01/17/17 1517       Past Medical History:  Past Medical History   Diagnosis Date   • Hypertension    • Kidney stone        Past Surgical History:  Past Surgical History   Procedure Laterality Date   • Knee surgery     • Colon surgery     • Colostomy     • Nephrectomy     • Cystoscopy         Family History  History reviewed. No  "pertinent family history.    Social History  Social History     Social History   • Marital status:      Spouse name: N/A   • Number of children: N/A   • Years of education: N/A     Occupational History   • Not on file.     Social History Main Topics   • Smoking status: Never Smoker   • Smokeless tobacco: Not on file   • Alcohol use No   • Drug use: No   • Sexual activity: Defer     Other Topics Concern   • Not on file     Social History Narrative   • No narrative on file         Review of Systems:  History obtained from chart review and the patient  General ROS: No fever or chills  Respiratory ROS: No cough, shortness of breath, wheezing  Cardiovascular ROS: no chest pain or dyspnea on exertion  Gastrointestinal ROS: positive for - nausea/vomiting  No abdominal pain or melena  Genito-Urinary ROS: No dysuria or hematuria  14 point ROS reviewed with the patient and negative except as noted above and in the HPI unless unable to obtain.    Objective:  Visit Vitals   • BP (!) 182/99 (BP Location: Left arm, Patient Position: Lying)   • Pulse 70   • Temp 97.4 °F (36.3 °C) (Oral)   • Resp 18   • Ht 75\" (190.5 cm)   • Wt 238 lb 6 oz (108 kg)   • SpO2 95%   • BMI 29.79 kg/m2       Intake/Output Summary (Last 24 hours) at 01/18/17 1111  Last data filed at 01/18/17 0754   Gross per 24 hour   Intake 3112.21 ml   Output   4550 ml   Net -1437.79 ml     General: awake/alert   Chest:  clear to auscultation bilaterally without respiratory distress  CVS: regular rate and rhythm  Abdominal: soft, nontender, normal bowel sounds  Extremities: no cyanosis or edema  Skin: warm and dry without rash      Labs:    Results from last 7 days  Lab Units 01/16/17  1020   WBC 10*3/mm3 9.43   HEMOGLOBIN g/dL 12.6*   HEMATOCRIT % 37.1*   PLATELETS 10*3/mm3 157           Results from last 7 days  Lab Units 01/18/17  0612 01/17/17  0514 01/16/17  1020   SODIUM mmol/L 143 141 136   POTASSIUM mmol/L 4.9 5.2 5.2   CHLORIDE mmol/L 104 103 98   TOTAL " CO2 mmol/L 23.0* 21.0* 21.0*   BUN mg/dL 60* 74* 73*   CREATININE mg/dL 8.32* 12.50* 14.34*   CALCIUM mg/dL 8.9 8.6 9.1   BILIRUBIN mg/dL  --   --  0.7   ALK PHOS U/L  --   --  104   ALT (SGPT) U/L  --   --  28   AST (SGOT) U/L  --   --  20   GLUCOSE mg/dL 107* 86 96       Radiology:   Imaging Results (last 72 hours)     Procedure Component Value Units Date/Time    CT Abdomen Pelvis Without Contrast [[id number]] Collected:  01/16/17 1231     Updated:  01/16/17 1245    Narrative:       CT ABDOMEN PELVIS WO CONTRAST- 1/16/2017 11:32 AM CST     HISTORY: right flank pain      COMPARISON: 04/24/2015      DLP: 698 mGy cm. Automated exposure control was utilized to diminish  patient radiation dose.     TECHNIQUE: Noncontrast enhanced images of the abdomen and pelvis  obtained without oral contrast.      FINDINGS:   Tiny effusions are present with bibasilar atelectasis. The base of the  heart is unremarkable..      LIVER: No focal liver lesion.      BILIARY SYSTEM: Cholelithiasis is present. There is no evidence of  pericholecystic inflammatory change or biliary dilatation..      PANCREAS: No focal pancreatic lesion.      SPLEEN: Unremarkable.      KIDNEYS AND ADRENALS: Patient's undergone previous left nephrectomy.  There is moderate dilatation of the upper tracts of the right kidney  with perinephric stranding consistent with obstructive uropathy. A  right-sided double-J ureteral stent is well-positioned but is apparently  not functioning well. No perinephric fluid collections are present..      .     RETROPERITONEUM: There is an interaortocaval node measuring 10 mm in  size previously measured at 6 mm. Some smaller left periaortic nodes are  present unchanged from the previous study. Presacral thickening is  present likely representing a combination of postoperative and  postradiation changes. Overall I feel this has shown mild interval  improvement from the previous study. No new adenopathy is present..      GI TRACT: The  patient's undergone previous rectosigmoid resection. There  is a left lower quadrant colostomy. Previously noted dilated loops of  small bowel are no longer visualized with no evidence of mechanical  obstruction on the current exam.. The appendix is not visualized..     OTHER: There is no mesenteric mass, lymphadenopathy or fluid collection.  The osseous structures and soft tissues demonstrate no worrisome  lesions. No free fluid is present.      PELVIS: Soft tissue nodularity within the presacral space is again  present and stable to slightly improved from the previous study. No new  mass lesion is present.. The urinary bladder is normal in appearance.       Impression:       1. The patient's undergone previous rectosigmoid resection as well as  left nephrectomy.  2. There is moderate hydronephrosis of the right renal collecting system  with perinephric stranding consistent with acute obstructive uropathy. A  stent is well-positioned but I suspect is malfunctioning. Urology  consultation is recommended. No perinephric fluid collections  identified.  3. There is presacral thickening extending into the lower bony pelvis.  This is stable to slightly improved from the previous study likely  representing a combination of postoperative scarring and postradiation  change. No new mass lesions are identified.  4. The patient's previously noted small bowel obstruction has resolved  with a normal bowel gas pattern noted on today's exam. Left lower  quadrant colostomy is present. There is a fat-containing peristomal  hernia present..   5. Tiny effusions with bibasilar atelectasis present.  6. Cholelithiasis.     Electronically Signed By-Dr. Tate Jenkins MD On:1/16/2017 1:43 PM  EST  This report was finalized on 01/16/2017 12:43 by Dr. Tate Jenkins MD.    IR Nephrostomy Tube Placement Right [[id number]] Collected:  01/17/17 0720     Updated:  01/17/17 0727    Narrative:       IR NEPHROSTOMY TUBE PLACEMENT RT- 1/16/2017  17:20 CST     HISTORY: ureteral stent obstruction; N13.5-Crossing vessel and stricture  of ureter without hydronephrosis; N13.9-Obstructive and reflux uropathy,  unspecified; N17.9-Acute kidney failure, unspecified      COMPARISON: CT abdomen and pelvis 01/16/2017     Fluoroscopy time: 1.9 minutes     Estimated blood loss: Approximately 10 mL     MEDICATIONS: 2 mg IV Versed and 100 mcg IV fentanyl. 2 g IV Ancef were  also administered during the exam.     Consultations: None.     Contrast: Approximately 10 mL Isovue-300 contrast were injected into the  patient's renal collecting system. No intravenous contrast was  administered.     Consent: Informed consent was obtained from the patient. All questions  were answered to the patient's satisfaction.     PROCEDURE:  The patient was placed in the prone position. The RIGHT renal collecting  system was visualized under ultrasound, and the skin was marked in the  region of the optimal site for percutaneous access.     A timeout was performed.     The patient was prepped for the procedure, which included cleaning the  skin with Betadine solution and placing a large drape over the entire  body.     The skin and deeper soft tissues were anesthetized with approximately 10  mL of 1% lidocaine.     Utilizing a translumbar needle and real-time ultrasound guidance, access  into the RIGHT renal collecting system was obtained and confirmed by  return of urine. Contrast was injected into the RIGHT renal collecting  system demonstrating marked hydronephrosis and proximal hydroureter. The  existing ureteral stent extends up into the superior calyces. A wire was  advanced into the RIGHT renal collecting system.     The soft tissues were dilated. An 8.5 Polish nephrostomy tube was  positioned within the renal collecting system. Contrast was injected to  confirm position of the catheter.     The images demonstrate moderate to marked right-sided hydronephrosis and  proximal hydroureter.        Impression:       1. Successful placement of an 8.5 Thai drain into the RIGHT renal  collecting system.     Electronically Signed By-Dr. Alec Sena MD On:1/17/2017 8:25 AM  EST  This report was finalized on 01/17/2017 07:25 by Dr. Alec Sena MD.          Culture:  URINE CULTURE   Date Value Ref Range Status   01/17/2017 No growth at 24 hours  Preliminary         Assessment   1.  Acute kidney injury secondary to obstructive uropathy  2.  HTN  3.  Anemia  4.   History of rectal CA, left nephrectomy    Plan:  1.  Continue IV fluids  2.  Monitor urine output; follow electrolytes closely      Hermilo Matos, APRN  1/18/2017  11:11 AM

## 2017-01-18 NOTE — PLAN OF CARE
Problem: Patient Care Overview (Adult)  Goal: Plan of Care Review  Outcome: Ongoing (interventions implemented as appropriate)    01/18/17 0509   Coping/Psychosocial Response Interventions   Plan Of Care Reviewed With patient   Patient Care Overview   Progress no change   Outcome Evaluation   Outcome Summary/Follow up Plan Pt. continues to have headache and hypertension. PRN blood pressure medications given x's 2. PRN pain medication given x's 2. Cool cloth applied to head for pain. Nephrostomy has yellow, clear output.        Goal: Adult Individualization and Mutuality  Outcome: Ongoing (interventions implemented as appropriate)  Goal: Discharge Needs Assessment  Outcome: Ongoing (interventions implemented as appropriate)    Problem: Perioperative Period (Adult)  Goal: Signs and Symptoms of Listed Potential Problems Will be Absent or Manageable (Perioperative Period)  Outcome: Ongoing (interventions implemented as appropriate)

## 2017-01-18 NOTE — PROGRESS NOTES
[facility] Medicine Services  INPATIENT PROGRESS NOTE    Length of Stay: 2  Date of Admission: 1/16/2017  Primary Care Physician: Fermin Alexander MD    Subjective   Chief Complaint: Bilateral eye pain    HPI   Patient reports pain scored 5 on a scale of 1-10, right flank.  He also reports bilateral eye pain, has been having headache also.  When I asked patient to return to side to evaluate lung fields.  Again dry heaving.  States bowels are moving, did not evaluate ostomy to nausea and vomiting patient's generalized discomfort.  Wife at bedside    Review of Systems   Constitutional: Positive for fatigue. Negative for activity change, appetite change and fever.   HENT: Negative for congestion, mouth sores, rhinorrhea, sinus pressure and trouble swallowing.         Eye pain, bilaterally   Respiratory: Negative for cough, chest tightness, shortness of breath and wheezing.    Cardiovascular: Negative for chest pain, palpitations and leg swelling.   Gastrointestinal: Positive for nausea and vomiting. Negative for abdominal distention, abdominal pain, constipation and diarrhea.   Genitourinary: Positive for flank pain (right flank). Negative for difficulty urinating, dysuria and frequency.   Musculoskeletal: Negative for arthralgias and myalgias.   Neurological: Negative for dizziness, weakness and light-headedness.   Psychiatric/Behavioral: Negative for agitation and sleep disturbance. The patient is not nervous/anxious.         All pertinent negatives and positives are as above. All other systems have been reviewed and are negative unless otherwise stated.     Objective    Temp:  [97.4 °F (36.3 °C)-98.2 °F (36.8 °C)] 98.2 °F (36.8 °C)  Heart Rate:  [58-77] 58  Resp:  [16-18] 16  BP: (138-182)/() 179/117    Physical Exam  Constitutional: He is oriented to person, place, and time. He appears well-developed and well-nourished. No distress.   HENT:   Head: Normocephalic  and atraumatic.   Mouth/Throat: No oropharyngeal exudate.   Eyes: EOM are normal. Pupils are equal, round, and reactive to light. No scleral icterus.   Neck: Normal range of motion. No tracheal deviation present.   Pulmonary/Chest: Effort normal and breath sounds normal.   Genitourinary:   Genitourinary Comments: Right sided nephrostomy tube in place with good UOP (color of apple juice)   Musculoskeletal: He exhibits no edema.   Neurological: He is alert and oriented to person, place, and time.   Skin: Skin is warm and dry.   Psychiatric: He has a normal mood and affect. His behavior is normal.   Vitals reviewed.       Results Review:  Recent Results (from the past 12 hour(s))   Basic Metabolic Panel    Collection Time: 01/18/17  6:12 AM   Result Value Ref Range    Glucose 107 (H) 70 - 100 mg/dL    BUN 60 (H) 5 - 21 mg/dL    Creatinine 8.32 (H) 0.50 - 1.40 mg/dL    Sodium 143 135 - 145 mmol/L    Potassium 4.9 3.5 - 5.3 mmol/L    Chloride 104 98 - 110 mmol/L    CO2 23.0 (L) 24.0 - 31.0 mmol/L    Calcium 8.9 8.4 - 10.4 mg/dL    eGFR Non African Amer 7 (L) >60 mL/min/1.73    BUN/Creatinine Ratio 7.2 7.0 - 25.0    Anion Gap 16.0 (H) 4.0 - 13.0 mmol/L       Cultures:  URINE CULTURE   Date Value Ref Range Status   01/17/2017 No growth at 24 hours  Preliminary       Radiology Data:    Imaging Results (last 24 hours)     ** No results found for the last 24 hours. **          Intake/Output Summary (Last 24 hours) at 01/18/17 1554  Last data filed at 01/18/17 1417   Gross per 24 hour   Intake 2989.21 ml   Output   3550 ml   Net -560.79 ml       Allergies   Allergen Reactions   • Morphine And Related      Doesn't work    • Percocet [Oxycodone-Acetaminophen]      Give me migraines        Scheduled meds:     amLODIPine 5 mg Oral Q24H   enoxaparin 30 mg Subcutaneous Q24H   pantoprazole 40 mg Oral Q AM   propranolol LA 60 mg Oral Daily       PRN meds:  •  acetaminophen  •  CloNIDine  •  hydrALAZINE  •  HYDROmorphone  •   "iopamidol  •  labetalol  •  ondansetron  •  promethazine  •  sodium chloride  •  Insert peripheral IV **AND** sodium chloride  •  SUMAtriptan    Assessment/Plan     Active Problems:    Obstructive uropathy    Renal failure    Assessment:  1. Obstructive Uropathy with Right Hydronephrosis s/p percutaneous nephrostomy tube on 1/16; patient receives right ureteral stent exchanges every 3-4 months on outpatient basis (managed at [facility] by Dr. Montano)  2. ARI - 2/2 #1  3. Acute pain - right CVA region; 2/2 #1  4. HTN - poorly controlled  5. History of rectal CA s/p chemo and XRT; subsequent colectomy with colostomy  6. History of left nephrectomy 2/2 \"nonfunctioning\" kidney and XRT changes to left ureter      Plan:  1. Nephrology following  2. Trend BMP  3. IVFs  4. IV Labetalol and Hydralazine PRN; PO Clonidine PRN  5. PO Propranolol, Norvasc increase to 10mg; hold ACEI given ARI and elevated K  6. Lovenox for PPx  7. IV Dilaudid PRN pain- increased frequency to q 2 hours  8. Urology signed off - recommendation f/u with [place] [facility] urologist  9. Add oral hydralazine 25mg tid    Discharge planning: will return to [place] [facility] when stable.    LINDA Garibay   01/18/17   3:54 PM    I personally evaluated and examined the patient in conjunction with LINDA Villatoro and agree with the assessment, treatment plan, and disposition of the patient as recorded by her. My history, exam, and further recommendations are: patient reports that he just doesn't feel well.  Just received some pain medication which is helping.  Reports that he hasn't had much sleep and feels really tired.  Has been having good output from his nephrostomy and will monitor closely for post-obstructive diuresis.  BPs are not well controlled.  Continue propranolol, titrate Norvasc, and start scheduled Hydralazine.  Clonidine and Labetalol available on a PRN basis as well.  IVFs adjusted by Nephrology.  BMP in AM.    Krish " ESSIE Hayes MD  01/18/17  4:54 PM

## 2017-01-19 LAB
ANION GAP SERPL CALCULATED.3IONS-SCNC: 14 MMOL/L (ref 4–13)
BACTERIA SPEC AEROBE CULT: NORMAL
BUN BLD-MCNC: 47 MG/DL (ref 5–21)
BUN/CREAT SERPL: 8.9 (ref 7–25)
CALCIUM SPEC-SCNC: 9.1 MG/DL (ref 8.4–10.4)
CHLORIDE SERPL-SCNC: 105 MMOL/L (ref 98–110)
CO2 SERPL-SCNC: 26 MMOL/L (ref 24–31)
CREAT BLD-MCNC: 5.27 MG/DL (ref 0.5–1.4)
DEPRECATED RDW RBC AUTO: 43.5 FL (ref 40–54)
ERYTHROCYTE [DISTWIDTH] IN BLOOD BY AUTOMATED COUNT: 13.7 % (ref 12–15)
GFR SERPL CREATININE-BSD FRML MDRD: 12 ML/MIN/1.73
GLUCOSE BLD-MCNC: 106 MG/DL (ref 70–100)
HCT VFR BLD AUTO: 38.8 % (ref 40–52)
HGB BLD-MCNC: 13.1 G/DL (ref 14–18)
MCH RBC QN AUTO: 30 PG (ref 28–32)
MCHC RBC AUTO-ENTMCNC: 33.8 G/DL (ref 33–36)
MCV RBC AUTO: 88.8 FL (ref 82–95)
PLATELET # BLD AUTO: 245 10*3/MM3 (ref 130–400)
PMV BLD AUTO: 9.7 FL (ref 6–12)
POTASSIUM BLD-SCNC: 4.5 MMOL/L (ref 3.5–5.3)
RBC # BLD AUTO: 4.37 10*6/MM3 (ref 4.8–5.9)
SODIUM BLD-SCNC: 145 MMOL/L (ref 135–145)
WBC NRBC COR # BLD: 7.51 10*3/MM3 (ref 4.8–10.8)

## 2017-01-19 PROCEDURE — 85027 COMPLETE CBC AUTOMATED: CPT | Performed by: NURSE PRACTITIONER

## 2017-01-19 PROCEDURE — 25010000002 HYDROMORPHONE PER 4 MG: Performed by: NURSE PRACTITIONER

## 2017-01-19 PROCEDURE — 80048 BASIC METABOLIC PNL TOTAL CA: CPT | Performed by: NURSE PRACTITIONER

## 2017-01-19 RX ORDER — HYDRALAZINE HYDROCHLORIDE 50 MG/1
50 TABLET, FILM COATED ORAL EVERY 8 HOURS SCHEDULED
Status: DISCONTINUED | OUTPATIENT
Start: 2017-01-19 | End: 2017-01-20 | Stop reason: HOSPADM

## 2017-01-19 RX ADMIN — HYDROMORPHONE HYDROCHLORIDE 0.5 MG: 1 INJECTION, SOLUTION INTRAMUSCULAR; INTRAVENOUS; SUBCUTANEOUS at 18:43

## 2017-01-19 RX ADMIN — HYDRALAZINE HYDROCHLORIDE 25 MG: 25 TABLET ORAL at 05:23

## 2017-01-19 RX ADMIN — HYDROMORPHONE HYDROCHLORIDE 0.5 MG: 1 INJECTION, SOLUTION INTRAMUSCULAR; INTRAVENOUS; SUBCUTANEOUS at 14:41

## 2017-01-19 RX ADMIN — AMLODIPINE BESYLATE 10 MG: 10 TABLET ORAL at 08:04

## 2017-01-19 RX ADMIN — HYDRALAZINE HYDROCHLORIDE 50 MG: 50 TABLET ORAL at 14:34

## 2017-01-19 RX ADMIN — HYDROMORPHONE HYDROCHLORIDE 0.5 MG: 1 INJECTION, SOLUTION INTRAMUSCULAR; INTRAVENOUS; SUBCUTANEOUS at 21:26

## 2017-01-19 RX ADMIN — HYDRALAZINE HYDROCHLORIDE 50 MG: 50 TABLET ORAL at 22:25

## 2017-01-19 RX ADMIN — DEXTROSE AND SODIUM CHLORIDE 100 ML/HR: 5; 450 INJECTION, SOLUTION INTRAVENOUS at 09:47

## 2017-01-19 RX ADMIN — HYDROMORPHONE HYDROCHLORIDE 0.5 MG: 1 INJECTION, SOLUTION INTRAMUSCULAR; INTRAVENOUS; SUBCUTANEOUS at 08:04

## 2017-01-19 RX ADMIN — LABETALOL HYDROCHLORIDE 10 MG: 5 INJECTION, SOLUTION INTRAVENOUS at 12:37

## 2017-01-19 RX ADMIN — PANTOPRAZOLE SODIUM 40 MG: 40 TABLET, DELAYED RELEASE ORAL at 05:23

## 2017-01-19 RX ADMIN — HYDROMORPHONE HYDROCHLORIDE 0.5 MG: 1 INJECTION, SOLUTION INTRAMUSCULAR; INTRAVENOUS; SUBCUTANEOUS at 09:50

## 2017-01-19 RX ADMIN — CLONIDINE HYDROCHLORIDE 0.2 MG: 0.1 TABLET ORAL at 17:22

## 2017-01-19 RX ADMIN — PROPRANOLOL HYDROCHLORIDE 60 MG: 60 CAPSULE, EXTENDED RELEASE ORAL at 08:04

## 2017-01-19 RX ADMIN — SUMATRIPTAN SUCCINATE 50 MG: 50 TABLET, FILM COATED ORAL at 10:55

## 2017-01-19 NOTE — PROGRESS NOTES
Nephrology ([facility]) Consult Note      Patient:  Felipe Veronica  YOB: 1975  Date of Service: 1/19/2017  MRN: [id number]   Acct: [id number]   Primary Care Physician: Fermin Alexander MD  Advance Directive: Full Code  Admit Date: 1/16/2017       Hospital Day: 3  Referring Provider: Krish Hayes MD      Patient Seen, Chart, Consults, Notes, Labs, Radiology studies reviewed.        Subjective:  Felipe Veronica is a 41 y.o. male  whom we were consulted for acute kidney injury.  Known obstructive uropathy followed with [facility].  Also had prior left nephrectomy.  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  Nephrostomy tube placed 1/16; renal function improving.  Today nausea is improved, feeling better, he is having some urine output.    Allergies:  Morphine and related and Percocet [oxycodone-acetaminophen]    Home Meds:  Prescriptions Prior to Admission   Medication Sig Dispense Refill Last Dose   • CloNIDine (CATAPRES) 0.2 MG tablet Take 0.2 mg by mouth As Needed.      • HYDROcodone-acetaminophen (NORCO)  MG per tablet Take one tablet every 3-4 hours PRN pain. Earliest Fill Date: 1/1/17      • lisinopril (PRINIVIL,ZESTRIL) 40 MG tablet Take 40 mg by mouth Daily.      • naratriptan (AMERGE) 2.5 MG tablet Take 2.5 mg by mouth 1 (One) Time As Needed for migraine.      • omeprazole (priLOSEC) 40 MG capsule Take 40 mg by mouth Daily.      • propranolol LA (INDERAL LA) 60 MG 24 hr capsule Take 60 mg by mouth Daily.      • SUMAtriptan (IMITREX) 100 MG tablet Take 100 mg by mouth 1 (One) Time As Needed for migraine.          Medicines:  Current Facility-Administered Medications   Medication Dose Route Frequency Provider Last Rate Last Dose   • acetaminophen (TYLENOL) tablet 650 mg  650 mg Oral Q4H PRN Krish Hayes MD   650 mg at 01/17/17 0851   • amLODIPine (NORVASC) tablet 10 mg  10 mg Oral Q24H Shara Reid, APRN    10 mg at 01/19/17 0804   • CloNIDine (CATAPRES) tablet 0.2 mg  0.2 mg Oral Q6H PRN Krish Hayes MD   0.2 mg at 01/18/17 1551   • dextrose 5 % and sodium chloride 0.45 % infusion  100 mL/hr Intravenous Continuous Demetrio Lopez  mL/hr at 01/19/17 0947 100 mL/hr at 01/19/17 0947   • enoxaparin (LOVENOX) syringe 30 mg  30 mg Subcutaneous Q24H Krish Hayes MD   30 mg at 01/16/17 2359   • hydrALAZINE (APRESOLINE) injection 10 mg  10 mg Intravenous Q4H PRN Krish Hayes MD   10 mg at 01/18/17 1417   • hydrALAZINE (APRESOLINE) tablet 25 mg  25 mg Oral Q8H Krish Hayes MD   25 mg at 01/19/17 0523   • HYDROmorphone (DILAUDID) injection 0.5 mg  0.5 mg Intravenous Q2H PRN LINDA Todd   0.5 mg at 01/19/17 0950   • iopamidol (ISOVUE-300) 61 % injection    Code / Trauma / Sedation Medication Alec Sena MD   10 mL at 01/16/17 1745   • labetalol (NORMODYNE,TRANDATE) injection 10 mg  10 mg Intravenous Q4H PRN Krish Hayes MD   10 mg at 01/17/17 1215   • ondansetron (ZOFRAN) injection 4 mg  4 mg Intravenous Q6H PRN Krish Hayes MD   4 mg at 01/18/17 1628   • pantoprazole (PROTONIX) EC tablet 40 mg  40 mg Oral Q AM Krish Hayes MD   40 mg at 01/19/17 0523   • promethazine (PHENERGAN) injection 12.5 mg  12.5 mg Intravenous Q6H PRN LINDA Azevedo   12.5 mg at 01/18/17 1144   • propranolol LA (INDERAL LA) 24 hr capsule 60 mg  60 mg Oral Daily Krish Hayes MD   60 mg at 01/19/17 0804   • sodium chloride 0.9 % flush 1-10 mL  1-10 mL Intravenous PRN Krish Hayes MD       • sodium chloride 0.9 % flush 10 mL  10 mL Intravenous PRN Catherine Pimentel, LINDA       • SUMAtriptan (IMITREX) tablet 50 mg  50 mg Oral Q2H PRN Krish Hayes MD   50 mg at 01/18/17 1628   • zolpidem (AMBIEN) tablet 5 mg  5 mg Oral Nightly PRN Krish Hayes MD           Past Medical History:  Past Medical History   Diagnosis Date   • Hypertension    •  "Kidney stone        Past Surgical History:  Past Surgical History   Procedure Laterality Date   • Knee surgery     • Colon surgery     • Colostomy     • Nephrectomy     • Cystoscopy         Family History  History reviewed. No pertinent family history.    Social History  Social History     Social History   • Marital status:      Spouse name: N/A   • Number of children: N/A   • Years of education: N/A     Occupational History   • Not on file.     Social History Main Topics   • Smoking status: Never Smoker   • Smokeless tobacco: Not on file   • Alcohol use No   • Drug use: No   • Sexual activity: Defer     Other Topics Concern   • Not on file     Social History Narrative   • No narrative on file         Review of Systems:  History obtained from chart review and the patient  General ROS: No fever or chills  Respiratory ROS: No cough, shortness of breath, wheezing  Cardiovascular ROS: no chest pain or dyspnea on exertion  Gastrointestinal ROS: No abdominal pain or melena  Genito-Urinary ROS: No dysuria or hematuria  14 point ROS reviewed with the patient and negative except as noted above and in the HPI unless unable to obtain.    Objective:  Visit Vitals   • BP (!) 156/102 (BP Location: Left arm, Patient Position: Lying)  Comment: dayton dobson notified   • Pulse 67   • Temp 98 °F (36.7 °C) (Oral)   • Resp 14   • Ht 75\" (190.5 cm)   • Wt 238 lb 6 oz (108 kg)   • SpO2 97%   • BMI 29.79 kg/m2       Intake/Output Summary (Last 24 hours) at 01/19/17 1052  Last data filed at 01/19/17 0948   Gross per 24 hour   Intake   3366 ml   Output   2975 ml   Net    391 ml     General: awake/alert   Chest:  clear to auscultation bilaterally without respiratory distress  CVS: regular rate and rhythm  Abdominal: soft, nontender, normal bowel sounds  Extremities: no cyanosis or edema  Skin: warm and dry without rash      Labs:    Results from last 7 days  Lab Units 01/19/17  0509 01/16/17  1020   WBC 10*3/mm3 7.51 9.43   HEMOGLOBIN " g/dL 13.1* 12.6*   HEMATOCRIT % 38.8* 37.1*   PLATELETS 10*3/mm3 245 157           Results from last 7 days  Lab Units 01/19/17  0509 01/18/17  1732 01/18/17  0612  01/16/17  1020   SODIUM mmol/L 145 143 143  < > 136   POTASSIUM mmol/L 4.5 4.5 4.9  < > 5.2   CHLORIDE mmol/L 105 102 104  < > 98   TOTAL CO2 mmol/L 26.0 23.0* 23.0*  < > 21.0*   BUN mg/dL 47* 53* 60*  < > 73*   CREATININE mg/dL 5.27* 6.43* 8.32*  < > 14.34*   CALCIUM mg/dL 9.1 9.3 8.9  < > 9.1   BILIRUBIN mg/dL  --   --   --   --  0.7   ALK PHOS U/L  --   --   --   --  104   ALT (SGPT) U/L  --   --   --   --  28   AST (SGOT) U/L  --   --   --   --  20   GLUCOSE mg/dL 106* 137* 107*  < > 96   < > = values in this interval not displayed.    Radiology:   Imaging Results (last 72 hours)     Procedure Component Value Units Date/Time    CT Abdomen Pelvis Without Contrast [[id number]] Collected:  01/16/17 1231     Updated:  01/16/17 1245    Narrative:       CT ABDOMEN PELVIS WO CONTRAST- 1/16/2017 11:32 AM CST     HISTORY: right flank pain      COMPARISON: 04/24/2015      DLP: 698 mGy cm. Automated exposure control was utilized to diminish  patient radiation dose.     TECHNIQUE: Noncontrast enhanced images of the abdomen and pelvis  obtained without oral contrast.      FINDINGS:   Tiny effusions are present with bibasilar atelectasis. The base of the  heart is unremarkable..      LIVER: No focal liver lesion.      BILIARY SYSTEM: Cholelithiasis is present. There is no evidence of  pericholecystic inflammatory change or biliary dilatation..      PANCREAS: No focal pancreatic lesion.      SPLEEN: Unremarkable.      KIDNEYS AND ADRENALS: Patient's undergone previous left nephrectomy.  There is moderate dilatation of the upper tracts of the right kidney  with perinephric stranding consistent with obstructive uropathy. A  right-sided double-J ureteral stent is well-positioned but is apparently  not functioning well. No perinephric fluid collections are present..       .     RETROPERITONEUM: There is an interaortocaval node measuring 10 mm in  size previously measured at 6 mm. Some smaller left periaortic nodes are  present unchanged from the previous study. Presacral thickening is  present likely representing a combination of postoperative and  postradiation changes. Overall I feel this has shown mild interval  improvement from the previous study. No new adenopathy is present..      GI TRACT: The patient's undergone previous rectosigmoid resection. There  is a left lower quadrant colostomy. Previously noted dilated loops of  small bowel are no longer visualized with no evidence of mechanical  obstruction on the current exam.. The appendix is not visualized..     OTHER: There is no mesenteric mass, lymphadenopathy or fluid collection.  The osseous structures and soft tissues demonstrate no worrisome  lesions. No free fluid is present.      PELVIS: Soft tissue nodularity within the presacral space is again  present and stable to slightly improved from the previous study. No new  mass lesion is present.. The urinary bladder is normal in appearance.       Impression:       1. The patient's undergone previous rectosigmoid resection as well as  left nephrectomy.  2. There is moderate hydronephrosis of the right renal collecting system  with perinephric stranding consistent with acute obstructive uropathy. A  stent is well-positioned but I suspect is malfunctioning. Urology  consultation is recommended. No perinephric fluid collections  identified.  3. There is presacral thickening extending into the lower bony pelvis.  This is stable to slightly improved from the previous study likely  representing a combination of postoperative scarring and postradiation  change. No new mass lesions are identified.  4. The patient's previously noted small bowel obstruction has resolved  with a normal bowel gas pattern noted on today's exam. Left lower  quadrant colostomy is present. There is a  fat-containing peristomal  hernia present..   5. Tiny effusions with bibasilar atelectasis present.  6. Cholelithiasis.     Electronically Signed By-Dr. Tate Jenkins MD On:1/16/2017 1:43 PM  EST  This report was finalized on 01/16/2017 12:43 by Dr. Tate Jenkins MD.    IR Nephrostomy Tube Placement Right [[id number]] Collected:  01/17/17 0720     Updated:  01/17/17 0727    Narrative:       IR NEPHROSTOMY TUBE PLACEMENT RT- 1/16/2017 17:20 CST     HISTORY: ureteral stent obstruction; N13.5-Crossing vessel and stricture  of ureter without hydronephrosis; N13.9-Obstructive and reflux uropathy,  unspecified; N17.9-Acute kidney failure, unspecified      COMPARISON: CT abdomen and pelvis 01/16/2017     Fluoroscopy time: 1.9 minutes     Estimated blood loss: Approximately 10 mL     MEDICATIONS: 2 mg IV Versed and 100 mcg IV fentanyl. 2 g IV Ancef were  also administered during the exam.     Consultations: None.     Contrast: Approximately 10 mL Isovue-300 contrast were injected into the  patient's renal collecting system. No intravenous contrast was  administered.     Consent: Informed consent was obtained from the patient. All questions  were answered to the patient's satisfaction.     PROCEDURE:  The patient was placed in the prone position. The RIGHT renal collecting  system was visualized under ultrasound, and the skin was marked in the  region of the optimal site for percutaneous access.     A timeout was performed.     The patient was prepped for the procedure, which included cleaning the  skin with Betadine solution and placing a large drape over the entire  body.     The skin and deeper soft tissues were anesthetized with approximately 10  mL of 1% lidocaine.     Utilizing a translumbar needle and real-time ultrasound guidance, access  into the RIGHT renal collecting system was obtained and confirmed by  return of urine. Contrast was injected into the RIGHT renal collecting  system demonstrating marked  hydronephrosis and proximal hydroureter. The  existing ureteral stent extends up into the superior calyces. A wire was  advanced into the RIGHT renal collecting system.     The soft tissues were dilated. An 8.5 [place] nephrostomy tube was  positioned within the renal collecting system. Contrast was injected to  confirm position of the catheter.     The images demonstrate moderate to marked right-sided hydronephrosis and  proximal hydroureter.       Impression:       1. Successful placement of an 8.5 [place] drain into the RIGHT renal  collecting system.     Electronically Signed By-Dr. Alec Sena MD On:1/17/2017 8:25 AM  EST  This report was finalized on 01/17/2017 07:25 by Dr. Alec Sena MD.          Culture:  URINE CULTURE   Date Value Ref Range Status   01/17/2017 No growth at 2 days  Final         Assessment   1.  Acute kidney injury--improving  2.  Obstructive uropathy causing #1 above  3.  HTN--poorly controlled  4.  Anemia  5.  History of rectal CA, left nephrectomy    Plan:  1.  Increase Hydralazine to 50mg TID  2.  Continue to follow renal function      Hermilo Matos, APRN  1/19/2017  10:52 AM

## 2017-01-19 NOTE — PLAN OF CARE
Problem: Patient Care Overview (Adult)  Goal: Plan of Care Review  Outcome: Ongoing (interventions implemented as appropriate)    01/19/17 0525 01/19/17 1559   Coping/Psychosocial Response Interventions   Plan Of Care Reviewed With patient --    Patient Care Overview   Progress --  improving   Outcome Evaluation   Outcome Summary/Follow up Plan --  pt has been voiding in the urinal as well as large outputs out neph tube, c/o pain x3, prn bp meds given x1 today       Goal: Adult Individualization and Mutuality  Outcome: Ongoing (interventions implemented as appropriate)  Goal: Discharge Needs Assessment  Outcome: Ongoing (interventions implemented as appropriate)    01/16/17 1850 01/18/17 1640 01/19/17 1559   Discharge Needs Assessment   Concerns To Be Addressed denies needs/concerns at this time --  --    Readmission Within The Last 30 Days --  --  no previous admission in last 30 days   Equipment Needed After Discharge --  --  none   Discharge Disposition --  still a patient --    Living Environment   Transportation Available --  --  none   Self-Care   Equipment Currently Used at Home --  --  none   Current Health   Anticipated Changes Related to Illness --  --  none         Problem: Perioperative Period (Adult)  Goal: Signs and Symptoms of Listed Potential Problems Will be Absent or Manageable (Perioperative Period)  Outcome: Ongoing (interventions implemented as appropriate)    01/18/17 1640   Perioperative Period   Problems Assessed (Perioperative Period) all   Problems Present (Perioperative Period) pain

## 2017-01-19 NOTE — PROGRESS NOTES
"    [facility] Medicine Services  INPATIENT PROGRESS NOTE    Length of Stay: 3  Date of Admission: 1/16/2017  Primary Care Physician: Fermin Alexander MD    Subjective   Chief Complaint: none  HPI   Pt states his right flank pain is \"nothing I can't handle.\" Ostomy is working. Had to empty twice yesterday. No further nausea or vomiting. Actually ate today which he states is the first time in 5 days. No chest pain. States his blood pressure has elevated for years.     Review of Systems   All pertinent negatives and positives are as above. All other systems have been reviewed and are negative unless otherwise stated.     Objective    Temp:  [97.9 °F (36.6 °C)-100 °F (37.8 °C)] 98.3 °F (36.8 °C)  Heart Rate:  [58-73] 69  Resp:  [14-16] 14  BP: (149-188)/() 150/102  Physical Exam   Constitutional: He is oriented to person, place, and time. He appears well-developed and well-nourished. No distress.   HENT:   Head: Normocephalic and atraumatic.   Mouth/Throat: No oropharyngeal exudate.   Eyes: EOM are normal. Pupils are equal, round, and reactive to light. No scleral icterus.   Neck: Normal range of motion. No tracheal deviation present.   Pulmonary/Chest: Effort normal and breath sounds normal.   Genitourinary:   Genitourinary Comments: Right sided nephrostomy tube in place with good UOP  Musculoskeletal: He exhibits no edema.   Neurological: He is alert and oriented to person, place, and time.   Skin: Skin is warm and dry.   Psychiatric: He has a normal mood and affect. His behavior is normal      Results Review:  I have reviewed the labs, radiology results, and diagnostic studies.    Laboratory Data:     Results from last 7 days  Lab Units 01/19/17  0509 01/16/17  1020   WBC 10*3/mm3 7.51 9.43   HEMOGLOBIN g/dL 13.1* 12.6*   HEMATOCRIT % 38.8* 37.1*   PLATELETS 10*3/mm3 245 157          Results from last 7 days  Lab Units 01/19/17  0509 01/18/17  1732 01/18/17  0612  " "01/16/17  1020   SODIUM mmol/L 145 143 143  < > 136   POTASSIUM mmol/L 4.5 4.5 4.9  < > 5.2   CHLORIDE mmol/L 105 102 104  < > 98   TOTAL CO2 mmol/L 26.0 23.0* 23.0*  < > 21.0*   BUN mg/dL 47* 53* 60*  < > 73*   CREATININE mg/dL 5.27* 6.43* 8.32*  < > 14.34*   CALCIUM mg/dL 9.1 9.3 8.9  < > 9.1   BILIRUBIN mg/dL  --   --   --   --  0.7   ALK PHOS U/L  --   --   --   --  104   ALT (SGPT) U/L  --   --   --   --  28   AST (SGOT) U/L  --   --   --   --  20   GLUCOSE mg/dL 106* 137* 107*  < > 96   < > = values in this interval not displayed.    Culture Data:   URINE CULTURE   Date Value Ref Range Status   01/17/2017 No growth at 2 days  Final       Radiology Data:   Imaging Results (last 24 hours)     ** No results found for the last 24 hours. **          I have reviewed the patient current medications.     Assessment/Plan     Hospital Problem List     Obstructive uropathy    Renal failure      Assessment:  1. Obstructive Uropathy with Right Hydronephrosis s/p percutaneous nephrostomy tube on 1/16; patient receives right ureteral stent exchanges every 3-4 months on outpatient basis (managed at [facility] by Dr. Montano)  2. ARI - 2/2 #1  3. Acute pain - right CVA region; 2/2 #1  4. HTN - poorly controlled  5. History of rectal CA s/p chemo and XRT; subsequent colectomy with colostomy  6. History of left nephrectomy 2/2 \"nonfunctioning\" kidney and XRT changes to left ureter      Plan:  1. Hydralazine increase to 50 mg every 8 hours per nephrology.  2. Creatinine overall improved.   3. ACEI on hold secondary to ARI and hyperkalemia  4. Repeat bmp in am.    Discharge Planning: I expect patient to be discharged to home in ? days.    LINDA Gauthier   01/19/17   12:11 PM     I personally evaluated and examined the patient in conjunction with LINDA Locke and agree with the assessment, treatment plan, and disposition of the patient as recorded by her. My history, exam, and further " "recommendations are: patient very excited.  He reports that he's starting \"peeing\" and has pretty good UOP, and as a result says his nephrostomy output has gone down a bit.  Pain much better.  BPs are improving, and agree with modifications made earlier today; continue to hold ACEI.  BMP in AM.  Possibly home in 1-2 days if he continues to make good improvement.  He looks great today.    Krish Hayes MD  01/19/17  4:40 PM    "

## 2017-01-20 VITALS
DIASTOLIC BLOOD PRESSURE: 98 MMHG | BODY MASS INDEX: 29.64 KG/M2 | OXYGEN SATURATION: 96 % | WEIGHT: 238.38 LBS | TEMPERATURE: 98 F | HEART RATE: 72 BPM | SYSTOLIC BLOOD PRESSURE: 147 MMHG | RESPIRATION RATE: 16 BRPM | HEIGHT: 75 IN

## 2017-01-20 LAB
ANION GAP SERPL CALCULATED.3IONS-SCNC: 12 MMOL/L (ref 4–13)
BUN BLD-MCNC: 36 MG/DL (ref 5–21)
BUN/CREAT SERPL: 10.3 (ref 7–25)
CALCIUM SPEC-SCNC: 8.7 MG/DL (ref 8.4–10.4)
CHLORIDE SERPL-SCNC: 106 MMOL/L (ref 98–110)
CO2 SERPL-SCNC: 26 MMOL/L (ref 24–31)
CREAT BLD-MCNC: 3.49 MG/DL (ref 0.5–1.4)
GFR SERPL CREATININE-BSD FRML MDRD: 20 ML/MIN/1.73
GLUCOSE BLD-MCNC: 94 MG/DL (ref 70–100)
POTASSIUM BLD-SCNC: 4.6 MMOL/L (ref 3.5–5.3)
SODIUM BLD-SCNC: 144 MMOL/L (ref 135–145)

## 2017-01-20 PROCEDURE — 80048 BASIC METABOLIC PNL TOTAL CA: CPT | Performed by: NURSE PRACTITIONER

## 2017-01-20 PROCEDURE — 25010000002 HYDROMORPHONE PER 4 MG: Performed by: NURSE PRACTITIONER

## 2017-01-20 PROCEDURE — 25010000002 ONDANSETRON PER 1 MG: Performed by: INTERNAL MEDICINE

## 2017-01-20 RX ORDER — HYDRALAZINE HYDROCHLORIDE 50 MG/1
50 TABLET, FILM COATED ORAL 3 TIMES DAILY
Qty: 90 TABLET | Refills: 1 | Status: SHIPPED | OUTPATIENT
Start: 2017-01-20

## 2017-01-20 RX ORDER — AMLODIPINE BESYLATE 10 MG/1
10 TABLET ORAL
Qty: 30 TABLET | Refills: 1 | Status: SHIPPED | OUTPATIENT
Start: 2017-01-20

## 2017-01-20 RX ADMIN — HYDROMORPHONE HYDROCHLORIDE 0.5 MG: 1 INJECTION, SOLUTION INTRAMUSCULAR; INTRAVENOUS; SUBCUTANEOUS at 05:32

## 2017-01-20 RX ADMIN — PROPRANOLOL HYDROCHLORIDE 60 MG: 60 CAPSULE, EXTENDED RELEASE ORAL at 08:51

## 2017-01-20 RX ADMIN — HYDROMORPHONE HYDROCHLORIDE 0.5 MG: 1 INJECTION, SOLUTION INTRAMUSCULAR; INTRAVENOUS; SUBCUTANEOUS at 08:56

## 2017-01-20 RX ADMIN — HYDRALAZINE HYDROCHLORIDE 50 MG: 50 TABLET ORAL at 05:28

## 2017-01-20 RX ADMIN — AMLODIPINE BESYLATE 10 MG: 10 TABLET ORAL at 08:51

## 2017-01-20 RX ADMIN — SUMATRIPTAN SUCCINATE 50 MG: 50 TABLET, FILM COATED ORAL at 13:24

## 2017-01-20 RX ADMIN — HYDROMORPHONE HYDROCHLORIDE 0.5 MG: 1 INJECTION, SOLUTION INTRAMUSCULAR; INTRAVENOUS; SUBCUTANEOUS at 00:10

## 2017-01-20 RX ADMIN — HYDROMORPHONE HYDROCHLORIDE 0.5 MG: 1 INJECTION, SOLUTION INTRAMUSCULAR; INTRAVENOUS; SUBCUTANEOUS at 13:24

## 2017-01-20 RX ADMIN — HYDRALAZINE HYDROCHLORIDE 50 MG: 50 TABLET ORAL at 13:24

## 2017-01-20 RX ADMIN — PANTOPRAZOLE SODIUM 40 MG: 40 TABLET, DELAYED RELEASE ORAL at 05:28

## 2017-01-20 RX ADMIN — ONDANSETRON HYDROCHLORIDE 4 MG: 2 SOLUTION INTRAMUSCULAR; INTRAVENOUS at 08:56

## 2017-01-20 RX ADMIN — HYDROMORPHONE HYDROCHLORIDE 0.5 MG: 1 INJECTION, SOLUTION INTRAMUSCULAR; INTRAVENOUS; SUBCUTANEOUS at 11:29

## 2017-01-20 NOTE — DISCHARGE SUMMARY
"    [facility] Medicine Services  DISCHARGE SUMMARY       Date of Admission: 1/16/2017  Date of Discharge:  1/20/2017  Primary Care Physician: Fermin Alexander MD    Presenting Problem/History of Present Illness:  Obstructive uropathy [N13.9]     Final Discharge Diagnoses:  Hospital Problem List     Obstructive uropathy    Renal failure      Discharge Diagnoses:  1. Obstructive Uropathy with Right Hydronephrosis s/p percutaneous nephrostomy tube on 1/16; patient receives right ureteral stent exchanges every 3-4 months on outpatient basis (managed at [facility] by Dr. Montano)  2. ARI - 2/2 #1  3. Acute pain - right CVA region; 2/2 #1  4. HTN - poorly controlled  5. History of rectal CA s/p chemo and XRT; subsequent colectomy with colostomy  6. History of left nephrectomy 2/2 \"nonfunctioning\" kidney and XRT changes to left ureter        Consults: Dr. Nirav Quezada-Urology  Dr. Demetrio Lopez-nephrology    Procedures Performed: percutaneous placement nephrostomy tube Right side    Pertinent Test Results:   Imaging Results (last 7 days)     Procedure Component Value Units Date/Time    CT Abdomen Pelvis Without Contrast [[id number]] Collected:  01/16/17 1231     Updated:  01/16/17 1245    Narrative:       CT ABDOMEN PELVIS WO CONTRAST- 1/16/2017 11:32 AM CST     HISTORY: right flank pain      COMPARISON: 04/24/2015      DLP: 698 mGy cm. Automated exposure control was utilized to diminish  patient radiation dose.     TECHNIQUE: Noncontrast enhanced images of the abdomen and pelvis  obtained without oral contrast.      FINDINGS:   Tiny effusions are present with bibasilar atelectasis. The base of the  heart is unremarkable..      LIVER: No focal liver lesion.      BILIARY SYSTEM: Cholelithiasis is present. There is no evidence of  pericholecystic inflammatory change or biliary dilatation..      PANCREAS: No focal pancreatic lesion.      SPLEEN: Unremarkable.    "   KIDNEYS AND ADRENALS: Patient's undergone previous left nephrectomy.  There is moderate dilatation of the upper tracts of the right kidney  with perinephric stranding consistent with obstructive uropathy. A  right-sided double-J ureteral stent is well-positioned but is apparently  not functioning well. No perinephric fluid collections are present..      .     RETROPERITONEUM: There is an interaortocaval node measuring 10 mm in  size previously measured at 6 mm. Some smaller left periaortic nodes are  present unchanged from the previous study. Presacral thickening is  present likely representing a combination of postoperative and  postradiation changes. Overall I feel this has shown mild interval  improvement from the previous study. No new adenopathy is present..      GI TRACT: The patient's undergone previous rectosigmoid resection. There  is a left lower quadrant colostomy. Previously noted dilated loops of  small bowel are no longer visualized with no evidence of mechanical  obstruction on the current exam.. The appendix is not visualized..     OTHER: There is no mesenteric mass, lymphadenopathy or fluid collection.  The osseous structures and soft tissues demonstrate no worrisome  lesions. No free fluid is present.      PELVIS: Soft tissue nodularity within the presacral space is again  present and stable to slightly improved from the previous study. No new  mass lesion is present.. The urinary bladder is normal in appearance.       Impression:       1. The patient's undergone previous rectosigmoid resection as well as  left nephrectomy.  2. There is moderate hydronephrosis of the right renal collecting system  with perinephric stranding consistent with acute obstructive uropathy. A  stent is well-positioned but I suspect is malfunctioning. Urology  consultation is recommended. No perinephric fluid collections  identified.  3. There is presacral thickening extending into the lower bony pelvis.  This is stable  to slightly improved from the previous study likely  representing a combination of postoperative scarring and postradiation  change. No new mass lesions are identified.  4. The patient's previously noted small bowel obstruction has resolved  with a normal bowel gas pattern noted on today's exam. Left lower  quadrant colostomy is present. There is a fat-containing peristomal  hernia present..   5. Tiny effusions with bibasilar atelectasis present.  6. Cholelithiasis.     Electronically Signed By-Dr. Tate Jenkins MD On:1/16/2017 1:43 PM  EST  This report was finalized on 01/16/2017 12:43 by Dr. Tate Jenkins MD.    IR Nephrostomy Tube Placement Right [[id number]] Collected:  01/17/17 0720     Updated:  01/17/17 0727    Narrative:       IR NEPHROSTOMY TUBE PLACEMENT RT- 1/16/2017 17:20 CST     HISTORY: ureteral stent obstruction; N13.5-Crossing vessel and stricture  of ureter without hydronephrosis; N13.9-Obstructive and reflux uropathy,  unspecified; N17.9-Acute kidney failure, unspecified      COMPARISON: CT abdomen and pelvis 01/16/2017     Fluoroscopy time: 1.9 minutes     Estimated blood loss: Approximately 10 mL     MEDICATIONS: 2 mg IV Versed and 100 mcg IV fentanyl. 2 g IV Ancef were  also administered during the exam.     Consultations: None.     Contrast: Approximately 10 mL Isovue-300 contrast were injected into the  patient's renal collecting system. No intravenous contrast was  administered.     Consent: Informed consent was obtained from the patient. All questions  were answered to the patient's satisfaction.     PROCEDURE:  The patient was placed in the prone position. The RIGHT renal collecting  system was visualized under ultrasound, and the skin was marked in the  region of the optimal site for percutaneous access.     A timeout was performed.     The patient was prepped for the procedure, which included cleaning the  skin with Betadine solution and placing a large drape over the entire  body.      The skin and deeper soft tissues were anesthetized with approximately 10  mL of 1% lidocaine.     Utilizing a translumbar needle and real-time ultrasound guidance, access  into the RIGHT renal collecting system was obtained and confirmed by  return of urine. Contrast was injected into the RIGHT renal collecting  system demonstrating marked hydronephrosis and proximal hydroureter. The  existing ureteral stent extends up into the superior calyces. A wire was  advanced into the RIGHT renal collecting system.     The soft tissues were dilated. An 8.5 Portuguese nephrostomy tube was  positioned within the renal collecting system. Contrast was injected to  confirm position of the catheter.     The images demonstrate moderate to marked right-sided hydronephrosis and  proximal hydroureter.       Impression:       1. Successful placement of an 8.5 Portuguese drain into the RIGHT renal  collecting system.     Electronically Signed By-Dr. Alec Sena MD On:1/17/2017 8:25 AM  EST  This report was finalized on 01/17/2017 07:25 by Dr. Alec Sena MD.        Lab Results (last 7 days)     Procedure Component Value Units Date/Time    CBC & Differential [[id number]] Collected:  01/16/17 1020    Specimen:  Blood Updated:  01/16/17 1032    Narrative:       The following orders were created for panel order CBC & Differential.  Procedure                               Abnormality         Status                     ---------                               -----------         ------                     CBC Auto Differential[[id number]]         Abnormal            Final result                 Please view results for these tests on the individual orders.    CBC Auto Differential [[id number]]  (Abnormal) Collected:  01/16/17 1020    Specimen:  Blood Updated:  01/16/17 1032     WBC 9.43 10*3/mm3      RBC 4.18 (L) 10*6/mm3      Hemoglobin 12.6 (L) g/dL      Hematocrit 37.1 (L) %      MCV 88.8 fL      MCH 30.1 pg      MCHC 34.0 g/dL      RDW 13.3 %       RDW-SD 42.9 fl      MPV 10.1 fL      Platelets 157 10*3/mm3      Neutrophil % 76.4 %      Lymphocyte % 11.6 (L) %      Monocyte % 9.2 %      Eosinophil % 2.3 %      Basophil % 0.2 %      Immature Grans % 0.3 %      Neutrophils, Absolute 7.20 10*3/mm3      Lymphocytes, Absolute 1.09 10*3/mm3      Monocytes, Absolute 0.87 10*3/mm3      Eosinophils, Absolute 0.22 10*3/mm3      Basophils, Absolute 0.02 10*3/mm3      Immature Grans, Absolute 0.03 10*3/mm3     aPTT [[id number]]  (Abnormal) Collected:  01/16/17 1021    Specimen:  Blood Updated:  01/16/17 1042     PTT 45.0 (H) seconds     Protime-INR [[id number]]  (Normal) Collected:  01/16/17 1021    Specimen:  Blood Updated:  01/16/17 1042     Protime 14.1 Seconds      INR 1.06     Amylase [[id number]]  (Normal) Collected:  01/16/17 1020    Specimen:  Blood Updated:  01/16/17 1045     Amylase 61 U/L     Lipase [[id number]]  (Normal) Collected:  01/16/17 1020    Specimen:  Blood Updated:  01/16/17 1045     Lipase 29 U/L     Comprehensive Metabolic Panel [[id number]]  (Abnormal) Collected:  01/16/17 1020    Specimen:  Blood Updated:  01/16/17 1053     Glucose 96 mg/dL      BUN 73 (H) mg/dL      Creatinine 14.34 (H) mg/dL      Sodium 136 mmol/L      Potassium 5.2 mmol/L      Chloride 98 mmol/L      CO2 21.0 (L) mmol/L      Calcium 9.1 mg/dL      Total Protein 6.9 g/dL      Albumin 3.90 g/dL      ALT (SGPT) 28 U/L      AST (SGOT) 20 U/L      Alkaline Phosphatase 104 U/L      Total Bilirubin 0.7 mg/dL      eGFR Non African Amer 4 (L) mL/min/1.73      Globulin 3.0 gm/dL      A/G Ratio 1.3 g/dL      BUN/Creatinine Ratio 5.1 (L)      Anion Gap 17.0 (H) mmol/L     C-reactive Protein [[id number]]  (Abnormal) Collected:  01/16/17 1020    Specimen:  Blood Updated:  01/16/17 1127     C-Reactive Protein 13.92 (H) mg/dL     Urinalysis With / Culture If Indicated [[id number]]  (Abnormal) Collected:  01/17/17 0010    Specimen:  Urine from Urine, Clean Catch Updated:  01/17/17 0106     Color, UA Red  (A)      Appearance, UA Cloudy (A)      pH, UA 5.5      Specific Gravity, UA 1.011      Glucose, UA Negative      Ketones, UA Negative      Bilirubin, UA Negative      Blood, UA Large (3+) (A)      Protein,  mg/dL (2+) (A)      Leuk Esterase, UA Small (1+) (A)      Nitrite, UA Negative      Urobilinogen, UA 0.2 E.U./dL     Urinalysis, Microscopic Only [[id number]]  (Abnormal) Collected:  01/17/17 0010    Specimen:  Urine from Urine, Clean Catch Updated:  01/17/17 0106     RBC, UA Too Numerous to Count (A) /HPF      WBC, UA 6-12 (A) /HPF      Bacteria, UA None Seen /HPF      Squamous Epithelial Cells, UA None Seen /HPF      Hyaline Casts, UA 0-2 /LPF      Methodology Manual Light Microscopy     Basic Metabolic Panel [[id number]]  (Abnormal) Collected:  01/17/17 0514    Specimen:  Blood Updated:  01/17/17 0613     Glucose 86 mg/dL      BUN 74 (H) mg/dL      Creatinine 12.50 (H) mg/dL      Sodium 141 mmol/L      Potassium 5.2 mmol/L      Chloride 103 mmol/L      CO2 21.0 (L) mmol/L      Calcium 8.6 mg/dL      eGFR Non African Amer 5 (L) mL/min/1.73      BUN/Creatinine Ratio 5.9 (L)      Anion Gap 17.0 (H) mmol/L     Narrative:       GFR Normal >60  Chronic Kidney Disease <60  Kidney Failure <15    Basic Metabolic Panel [[id number]]  (Abnormal) Collected:  01/18/17 0612    Specimen:  Blood Updated:  01/18/17 0718     Glucose 107 (H) mg/dL      BUN 60 (H) mg/dL      Creatinine 8.32 (H) mg/dL      Sodium 143 mmol/L      Potassium 4.9 mmol/L      Chloride 104 mmol/L      CO2 23.0 (L) mmol/L      Calcium 8.9 mg/dL      eGFR Non African Amer 7 (L) mL/min/1.73      BUN/Creatinine Ratio 7.2      Anion Gap 16.0 (H) mmol/L     Narrative:       GFR Normal >60  Chronic Kidney Disease <60  Kidney Failure <15    Basic Metabolic Panel [[id number]]  (Abnormal) Collected:  01/18/17 1732    Specimen:  Blood Updated:  01/18/17 1815     Glucose 137 (H) mg/dL      BUN 53 (H) mg/dL      Creatinine 6.43 (H) mg/dL      Sodium 143 mmol/L       Potassium 4.5 mmol/L      Chloride 102 mmol/L      CO2 23.0 (L) mmol/L      Calcium 9.3 mg/dL      eGFR Non African Amer 10 (L) mL/min/1.73      BUN/Creatinine Ratio 8.2      Anion Gap 18.0 (H) mmol/L     Narrative:       GFR Normal >60  Chronic Kidney Disease <60  Kidney Failure <15    Basic Metabolic Panel [[id number]]  (Abnormal) Collected:  01/19/17 0509    Specimen:  Blood Updated:  01/19/17 0559     Glucose 106 (H) mg/dL      BUN 47 (H) mg/dL      Creatinine 5.27 (H) mg/dL      Sodium 145 mmol/L      Potassium 4.5 mmol/L      Chloride 105 mmol/L      CO2 26.0 mmol/L      Calcium 9.1 mg/dL      eGFR Non African Amer 12 (L) mL/min/1.73      BUN/Creatinine Ratio 8.9      Anion Gap 14.0 (H) mmol/L     Narrative:       GFR Normal >60  Chronic Kidney Disease <60  Kidney Failure <15    CBC (No Diff) [[id number]]  (Abnormal) Collected:  01/19/17 0509    Specimen:  Blood Updated:  01/19/17 0604     WBC 7.51 10*3/mm3      RBC 4.37 (L) 10*6/mm3      Hemoglobin 13.1 (L) g/dL      Hematocrit 38.8 (L) %      MCV 88.8 fL      MCH 30.0 pg      MCHC 33.8 g/dL      RDW 13.7 %      RDW-SD 43.5 fl      MPV 9.7 fL      Platelets 245 10*3/mm3     Urine Culture [[id number]]  (Normal) Collected:  01/17/17 0010    Specimen:  Urine from Urine, Clean Catch Updated:  01/19/17 0635     Urine Culture No growth at 2 days     Basic Metabolic Panel [[id number]]  (Abnormal) Collected:  01/20/17 0543    Specimen:  Blood Updated:  01/20/17 0635     Glucose 94 mg/dL      BUN 36 (H) mg/dL      Creatinine 3.49 (H) mg/dL      Sodium 144 mmol/L      Potassium 4.6 mmol/L      Chloride 106 mmol/L      CO2 26.0 mmol/L      Calcium 8.7 mg/dL      eGFR Non African Amer 20 (L) mL/min/1.73      BUN/Creatinine Ratio 10.3      Anion Gap 12.0 mmol/L     Narrative:       GFR Normal >60  Chronic Kidney Disease <60  Kidney Failure <15          Chief Complaint on Day of Discharge: none     Hospital Course:  The patient is a 41 y.o. male who also Dr. Zimmerman listed for his  primary care.  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  Patient presented to the emergency department 1/16/2017 with complaints of significant abdominal pain.  He was complaining of right sided abdominal pain.  Received stents approximately 3 weeks prior to his admission.  Patient tells me that he was in his normal state of health fell asleep woke up and had significant pain.  He did have some associated nausea vomiting as well.  CT of the abdomen and pelvis revealed moderate hydronephrosis on the right renal collecting system with stranding consistent with acute discharge uropathy.  He was admitted to the hospital service for further outpatient management.  Consultation was sought from Dr. Quezada with urology.  He did recommend a nephrostomy tube.  This was placed by interventional radiology.  Patient's admission creatinine was 14.  Consultation was then sought from Dr. Demetrio Lopez with nephrology.  He was given IV fluid resuscitation.  He continued to have significant pain as well as nausea and vomiting.  For the past 48 hours he has had extensive output out of his nephrostomy tube as well as urinating via his bladder.  His abdominal pain has significantly improved.  His nausea vomiting has resolved.  He is tolerating a regular diet.  He has been afebrile.  His creatinine is now down to 3.49.  He only has an appointment with his urologist on Monday.  Urine cultures did not reveal any growth.  Patient is stable for discharge.     He had significant hypertension.  He was held on his ACE inhibitor secondary to acute kidney injury.  He was added hydralazine and this was titrated up.  His most recent blood pressure was 147/98.  He tellsthe best that has been in years.  He has when necessary clonidine at home.    Condition on Discharge:  stable    Physical  "Exam on Discharge:  Visit Vitals   • /98 (BP Location: Left arm, Patient Position: Lying)   • Pulse 72   • Temp 98 °F (36.7 °C) (Oral)   • Resp 16   • Ht 75\" (190.5 cm)   • Wt 238 lb 6 oz (108 kg)   • SpO2 96%   • BMI 29.79 kg/m2     Physical Exam   Constitutional: He is oriented to person, place, and time. He appears well-developed and well-nourished.   HENT:   Head: Normocephalic and atraumatic.   Eyes: EOM are normal. Pupils are equal, round, and reactive to light. No scleral icterus.   Neck: Normal range of motion. Neck supple. No JVD present. Carotid bruit is not present. No tracheal deviation present. No thyromegaly present.   Cardiovascular: Normal rate and regular rhythm.  Exam reveals no gallop and no friction rub.    No murmur heard.  Pulmonary/Chest: Effort normal and breath sounds normal. No respiratory distress. He has no wheezes. He has no rales. He exhibits no tenderness.   Abdominal: Soft. Bowel sounds are normal. He exhibits no distension. There is no tenderness.   Nephrostomy tube right: ostomy LLQ   Musculoskeletal: Normal range of motion. He exhibits no edema.   Neurological: He is alert and oriented to person, place, and time. No cranial nerve deficit.   Skin: Skin is warm and dry. No rash noted.   Psychiatric: He has a normal mood and affect. His behavior is normal. Judgment and thought content normal.     Discharge Disposition:  Home or Self Care    Discharge Medications:   Felipe Veronica   Home Medication Instructions ANDREW:[id number]    Printed on:01/20/17 1527   Medication Information                      amLODIPine (NORVASC) 10 MG tablet  Take 1 tablet by mouth Daily.             CloNIDine (CATAPRES) 0.2 MG tablet  Take 0.2 mg by mouth As Needed.             hydrALAZINE (APRESOLINE) 50 MG tablet  Take 1 tablet by mouth 3 (Three) Times a Day.             HYDROcodone-acetaminophen (NORCO)  MG per tablet  Take one tablet every 3-4 hours PRN pain. Earliest Fill Date: 1/1/17       "       naratriptan (AMERGE) 2.5 MG tablet  Take 2.5 mg by mouth 1 (One) Time As Needed for migraine.             omeprazole (priLOSEC) 40 MG capsule  Take 40 mg by mouth Daily.             propranolol LA (INDERAL LA) 60 MG 24 hr capsule  Take 60 mg by mouth Daily.             SUMAtriptan (IMITREX) 100 MG tablet  Take 100 mg by mouth 1 (One) Time As Needed for migraine.                 Discharge Diet:   Diet Instructions     Diet: Regular; Thin Liquids, No Restrictions       Discharge Diet:  Regular   Fluid Consistency:  Thin Liquids, No Restrictions                 Activity at Discharge:   Activity Instructions     Activity as Tolerated                     Discharge Care Plan/Instructions: He will be discharged home today to follow up with his urologist Dr. Marcos on Monday.  He will need a repeat BMP to assess his renal function.    Follow-up Appointments:   Here is a follow-up with Dr. Marcos in on Monday.  He will need a BMP.  Dr. Erasmo Alexander in 3 weeks  Dr. Lopez in 3 weeks    Test Results Pending at Discharge: none    LINDA Gauthier  01/20/17  3:27 PM    Time: 35    Please note that portions of this note may have been completed with a voice recognition program. Efforts were made to edit the dictations, but occasionally words are mistranscribed.    I personally evaluated and examined the patient in conjunction with LINDA Locke and agree with the assessment, treatment plan, and disposition of the patient as recorded by her. My history, exam, and further recommendations are: Patient feels much better, and once to go home.  I discussed the case with Dr. Lopez this afternoon, and in fact we both were in the room together with Mr. Veronica discussing his discharge plan.  He has follow-up Arty established at [facility] on Monday, with plans for a repeat basic metabolic panel.  He will continue to hold his lisinopril for now, and we will provide new prescriptions for Norvasc and hydralazine  area at his blood pressures are much better controlled.  He will hydrate himself well in anticipation of follow-up on Monday.  Creatinine on admission was 14, and on the day of discharge his trend all the way down to 3.49.    Krish Hayes MD  01/20/17  3:55 PM

## 2017-01-20 NOTE — PROGRESS NOTES
Nephrology ([facility]) Consult Note      Patient:  Felipe Veronica  YOB: 1975  Date of Service: 1/20/2017  MRN: [id number]   Acct: [id number]   Primary Care Physician: Fermin Alexander MD  Advance Directive: Full Code  Admit Date: 1/16/2017       Hospital Day: 4  Referring Provider: Krish Hayes MD      Patient Seen, Chart, Consults, Notes, Labs, Radiology studies reviewed.        Subjective:  Felipe Veronica is a 41 y.o. male  whom we were consulted for acute kidney injury. Known obstructive uropathy followed with [place] [facility]. Also had prior left nephrectomy. 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 Nephrostomy tube placed 1/16; renal function improving. Today is feeling better, urine output continues to improve.    Allergies:  Morphine and related and Percocet [oxycodone-acetaminophen]    Home Meds:  Prescriptions Prior to Admission   Medication Sig Dispense Refill Last Dose   • CloNIDine (CATAPRES) 0.2 MG tablet Take 0.2 mg by mouth As Needed.      • HYDROcodone-acetaminophen (NORCO)  MG per tablet Take one tablet every 3-4 hours PRN pain. Earliest Fill Date: 1/1/17      • lisinopril (PRINIVIL,ZESTRIL) 40 MG tablet Take 40 mg by mouth Daily.      • naratriptan (AMERGE) 2.5 MG tablet Take 2.5 mg by mouth 1 (One) Time As Needed for migraine.      • omeprazole (priLOSEC) 40 MG capsule Take 40 mg by mouth Daily.      • propranolol LA (INDERAL LA) 60 MG 24 hr capsule Take 60 mg by mouth Daily.      • SUMAtriptan (IMITREX) 100 MG tablet Take 100 mg by mouth 1 (One) Time As Needed for migraine.          Medicines:  Current Facility-Administered Medications   Medication Dose Route Frequency Provider Last Rate Last Dose   • acetaminophen (TYLENOL) tablet 650 mg  650 mg Oral Q4H PRN Krish Hayes MD   650 mg at 01/17/17 0851   • amLODIPine (NORVASC) tablet 10 mg  10 mg Oral Q24H LINDA Todd   10 mg at 01/20/17  0851   • CloNIDine (CATAPRES) tablet 0.2 mg  0.2 mg Oral Q6H PRN Krish Hayes MD   0.2 mg at 01/19/17 1722   • enoxaparin (LOVENOX) syringe 30 mg  30 mg Subcutaneous Q24H Krish Hayes MD   30 mg at 01/16/17 2359   • hydrALAZINE (APRESOLINE) injection 10 mg  10 mg Intravenous Q4H PRN Krish Hayes MD   10 mg at 01/18/17 1417   • hydrALAZINE (APRESOLINE) tablet 50 mg  50 mg Oral Q8H LINDA Azevedo   50 mg at 01/20/17 0528   • HYDROmorphone (DILAUDID) injection 0.5 mg  0.5 mg Intravenous Q2H PRN LINDA Todd   0.5 mg at 01/20/17 0856   • iopamidol (ISOVUE-300) 61 % injection    Code / Trauma / Sedation Medication Alec Sena MD   10 mL at 01/16/17 1745   • labetalol (NORMODYNE,TRANDATE) injection 10 mg  10 mg Intravenous Q4H PRN Krish Hayes MD   10 mg at 01/19/17 1237   • ondansetron (ZOFRAN) injection 4 mg  4 mg Intravenous Q6H PRN Krish Hayes MD   4 mg at 01/20/17 0856   • pantoprazole (PROTONIX) EC tablet 40 mg  40 mg Oral Q AM Krish Hayes MD   40 mg at 01/20/17 0528   • promethazine (PHENERGAN) injection 12.5 mg  12.5 mg Intravenous Q6H PRN LINDA Azevedo   12.5 mg at 01/18/17 1144   • propranolol LA (INDERAL LA) 24 hr capsule 60 mg  60 mg Oral Daily Krish Hayes MD   60 mg at 01/20/17 0851   • sodium chloride 0.9 % flush 1-10 mL  1-10 mL Intravenous PRN Krish Hayes MD       • sodium chloride 0.9 % flush 10 mL  10 mL Intravenous PRN Catherine Pimentel APRN       • SUMAtriptan (IMITREX) tablet 50 mg  50 mg Oral Q2H PRN Krish Hayes MD   50 mg at 01/19/17 1055   • zolpidem (AMBIEN) tablet 5 mg  5 mg Oral Nightly PRN Krish Hayes MD           Past Medical History:  Past Medical History   Diagnosis Date   • Hypertension    • Kidney stone        Past Surgical History:  Past Surgical History   Procedure Laterality Date   • Knee surgery     • Colon surgery     • Colostomy     • Nephrectomy     • Cystoscopy    "      Family History  History reviewed. No pertinent family history.    Social History  Social History     Social History   • Marital status:      Spouse name: N/A   • Number of children: N/A   • Years of education: N/A     Occupational History   • Not on file.     Social History Main Topics   • Smoking status: Never Smoker   • Smokeless tobacco: Not on file   • Alcohol use No   • Drug use: No   • Sexual activity: Defer     Other Topics Concern   • Not on file     Social History Narrative   • No narrative on file         Review of Systems:  History obtained from chart review and the patient  General ROS: No fever or chills  Respiratory ROS: No cough, shortness of breath, wheezing  Cardiovascular ROS: no chest pain or dyspnea on exertion  Gastrointestinal ROS: No abdominal pain or melena  Genito-Urinary ROS: No dysuria or hematuria  14 point ROS reviewed with the patient and negative except as noted above and in the HPI unless unable to obtain.    Objective:  Visit Vitals   • /88 (BP Location: Left arm, Patient Position: Lying)   • Pulse 66   • Temp 98.1 °F (36.7 °C) (Oral)   • Resp 14   • Ht 75\" (190.5 cm)   • Wt 238 lb 6 oz (108 kg)   • SpO2 97%   • BMI 29.79 kg/m2       Intake/Output Summary (Last 24 hours) at 01/20/17 1115  Last data filed at 01/20/17 0907   Gross per 24 hour   Intake    360 ml   Output   1895 ml   Net  -1535 ml     General: awake/alert   Chest:  clear to auscultation bilaterally without respiratory distress  CVS: regular rate and rhythm  Abdominal: soft, nontender, normal bowel sounds  Extremities: no cyanosis or edema  Skin: warm and dry without rash      Labs:    Results from last 7 days  Lab Units 01/19/17  0509 01/16/17  1020   WBC 10*3/mm3 7.51 9.43   HEMOGLOBIN g/dL 13.1* 12.6*   HEMATOCRIT % 38.8* 37.1*   PLATELETS 10*3/mm3 245 157           Results from last 7 days  Lab Units 01/20/17  0543 01/19/17  0509 01/18/17  1732  01/16/17  1020   SODIUM mmol/L 144 145 143  < > 136 "   POTASSIUM mmol/L 4.6 4.5 4.5  < > 5.2   CHLORIDE mmol/L 106 105 102  < > 98   TOTAL CO2 mmol/L 26.0 26.0 23.0*  < > 21.0*   BUN mg/dL 36* 47* 53*  < > 73*   CREATININE mg/dL 3.49* 5.27* 6.43*  < > 14.34*   CALCIUM mg/dL 8.7 9.1 9.3  < > 9.1   BILIRUBIN mg/dL  --   --   --   --  0.7   ALK PHOS U/L  --   --   --   --  104   ALT (SGPT) U/L  --   --   --   --  28   AST (SGOT) U/L  --   --   --   --  20   GLUCOSE mg/dL 94 106* 137*  < > 96   < > = values in this interval not displayed.    Radiology:   Imaging Results (last 72 hours)     ** No results found for the last 72 hours. **          Culture:  URINE CULTURE   Date Value Ref Range Status   01/17/2017 No growth at 2 days  Final         Assessment   1. Acute kidney injury--improving  2. Obstructive uropathy causing #1 above  3. HTN--poorly controlled  4. Anemia  5. History of rectal CA, left nephrectomy    Plan:  1.  Continue to follow renal function  2.  Ok for discharge soon    Hermilo Matos, APRN  1/20/2017  11:15 AM

## 2017-01-20 NOTE — PLAN OF CARE
Problem: Patient Care Overview (Adult)  Goal: Plan of Care Review  Outcome: Ongoing (interventions implemented as appropriate)    01/20/17 1516   Coping/Psychosocial Response Interventions   Plan Of Care Reviewed With patient   Patient Care Overview   Progress improving   Outcome Evaluation   Outcome Summary/Follow up Plan pt voiding, c/o pain x2, improving        Goal: Adult Individualization and Mutuality  Outcome: Ongoing (interventions implemented as appropriate)  Goal: Discharge Needs Assessment  Outcome: Ongoing (interventions implemented as appropriate)    Problem: Perioperative Period (Adult)  Goal: Signs and Symptoms of Listed Potential Problems Will be Absent or Manageable (Perioperative Period)  Outcome: Ongoing (interventions implemented as appropriate)

## 2017-01-23 NOTE — PAYOR COMM NOTE
"PA HOME 1-20-17    [id number]  Felipe Veronica (41 y.o. Male)     Date of Birth Social Security Number Address Home Phone MRN    [id number]  [id number] [facility] [address] [facility] [address] [phone] [id number]    Sabianist Marital Status          Pent[employer]alism        Admission Date Admission Type Admitting Provider Attending Provider Department, Room/Bed    1/16/17 Emergency Krish Hayes MD  [facility] 3C, 377/1    Discharge Date Discharge Disposition Discharge Destination        1/20/2017 Home or Self Care             Attending Provider: (none)    Allergies:  Morphine And Related, Percocet [Oxycodone-acetaminophen]    Isolation:  None   Infection:  None   Code Status:  Prior    Ht:  75\" (190.5 cm)   Wt:  238 lb 6 oz (108 kg)    Admission Cmt:  None   Principal Problem:  None                Active Insurance as of 1/16/2017     Primary Coverage     Payor Plan Insurance Group Employer/Plan Group    HUMANA MEDICARE REPL HUMANA MEDICARE REPL [id number]     Payor Plan Address Payor Plan Phone Number Effective From Effective To    [address] [address] [phone] 5/1/2013     [place] [address]       Subscriber Name Subscriber Birth Date Member ID       FELIPE VERONICA 1975 [id number]                 Emergency Contacts      (Rel.) Home Phone Work Phone Mobile Phone    GlenysMilad (Spouse) [phone] -- --               Discharge Summary      Krish Hayes MD at 1/20/2017  3:26 PM              [facility] Medicine Services  DISCHARGE SUMMARY       Date of Admission: 1/16/2017  Date of Discharge:  1/20/2017  Primary Care Physician: Fermin Alexander MD    Presenting Problem/History of Present Illness:  Obstructive uropathy [N13.9]     Final Discharge Diagnoses:  Hospital Problem List     Obstructive uropathy    Renal failure      Discharge Diagnoses:  1. Obstructive Uropathy with Right Hydronephrosis s/p percutaneous nephrostomy tube on " "1/16; patient receives right ureteral stent exchanges every 3-4 months on outpatient basis (managed at [facility] by Dr. Montano)  2. ARI - 2/2 #1  3. Acute pain - right CVA region; 2/2 #1  4. HTN - poorly controlled  5. History of rectal CA s/p chemo and XRT; subsequent colectomy with colostomy  6. History of left nephrectomy 2/2 \"nonfunctioning\" kidney and XRT changes to left ureter        Consults: Dr. Nirav Quezada-Urology  Dr. Demetrio Lopez-nephrology    Procedures Performed: percutaneous placement nephrostomy tube Right side    Pertinent Test Results:   Imaging Results (last 7 days)     Procedure Component Value Units Date/Time    CT Abdomen Pelvis Without Contrast [[id number]] Collected:  01/16/17 1231     Updated:  01/16/17 1245    Narrative:       CT ABDOMEN PELVIS WO CONTRAST- 1/16/2017 11:32 AM CST     HISTORY: right flank pain      COMPARISON: 04/24/2015      DLP: 698 mGy cm. Automated exposure control was utilized to diminish  patient radiation dose.     TECHNIQUE: Noncontrast enhanced images of the abdomen and pelvis  obtained without oral contrast.      FINDINGS:   Tiny effusions are present with bibasilar atelectasis. The base of the  heart is unremarkable..      LIVER: No focal liver lesion.      BILIARY SYSTEM: Cholelithiasis is present. There is no evidence of  pericholecystic inflammatory change or biliary dilatation..      PANCREAS: No focal pancreatic lesion.      SPLEEN: Unremarkable.      KIDNEYS AND ADRENALS: Patient's undergone previous left nephrectomy.  There is moderate dilatation of the upper tracts of the right kidney  with perinephric stranding consistent with obstructive uropathy. A  right-sided double-J ureteral stent is well-positioned but is apparently  not functioning well. No perinephric fluid collections are present..      .     RETROPERITONEUM: There is an interaortocaval node measuring 10 mm in  size previously measured at 6 mm. Some smaller left periaortic " nodes are  present unchanged from the previous study. Presacral thickening is  present likely representing a combination of postoperative and  postradiation changes. Overall I feel this has shown mild interval  improvement from the previous study. No new adenopathy is present..      GI TRACT: The patient's undergone previous rectosigmoid resection. There  is a left lower quadrant colostomy. Previously noted dilated loops of  small bowel are no longer visualized with no evidence of mechanical  obstruction on the current exam.. The appendix is not visualized..     OTHER: There is no mesenteric mass, lymphadenopathy or fluid collection.  The osseous structures and soft tissues demonstrate no worrisome  lesions. No free fluid is present.      PELVIS: Soft tissue nodularity within the presacral space is again  present and stable to slightly improved from the previous study. No new  mass lesion is present.. The urinary bladder is normal in appearance.       Impression:       1. The patient's undergone previous rectosigmoid resection as well as  left nephrectomy.  2. There is moderate hydronephrosis of the right renal collecting system  with perinephric stranding consistent with acute obstructive uropathy. A  stent is well-positioned but I suspect is malfunctioning. Urology  consultation is recommended. No perinephric fluid collections  identified.  3. There is presacral thickening extending into the lower bony pelvis.  This is stable to slightly improved from the previous study likely  representing a combination of postoperative scarring and postradiation  change. No new mass lesions are identified.  4. The patient's previously noted small bowel obstruction has resolved  with a normal bowel gas pattern noted on today's exam. Left lower  quadrant colostomy is present. There is a fat-containing peristomal  hernia present..   5. Tiny effusions with bibasilar atelectasis present.  6. Cholelithiasis.     Electronically Signed  By-Dr. Tate Jenkins MD On:1/16/2017 1:43 PM  EST  This report was finalized on 01/16/2017 12:43 by Dr. Tate Jenkins MD.    IR Nephrostomy Tube Placement Right [[id number]] Collected:  01/17/17 0720     Updated:  01/17/17 0727    Narrative:       IR NEPHROSTOMY TUBE PLACEMENT RT- 1/16/2017 17:20 CST     HISTORY: ureteral stent obstruction; N13.5-Crossing vessel and stricture  of ureter without hydronephrosis; N13.9-Obstructive and reflux uropathy,  unspecified; N17.9-Acute kidney failure, unspecified      COMPARISON: CT abdomen and pelvis 01/16/2017     Fluoroscopy time: 1.9 minutes     Estimated blood loss: Approximately 10 mL     MEDICATIONS: 2 mg IV Versed and 100 mcg IV fentanyl. 2 g IV Ancef were  also administered during the exam.     Consultations: None.     Contrast: Approximately 10 mL Isovue-300 contrast were injected into the  patient's renal collecting system. No intravenous contrast was  administered.     Consent: Informed consent was obtained from the patient. All questions  were answered to the patient's satisfaction.     PROCEDURE:  The patient was placed in the prone position. The RIGHT renal collecting  system was visualized under ultrasound, and the skin was marked in the  region of the optimal site for percutaneous access.     A timeout was performed.     The patient was prepped for the procedure, which included cleaning the  skin with Betadine solution and placing a large drape over the entire  body.     The skin and deeper soft tissues were anesthetized with approximately 10  mL of 1% lidocaine.     Utilizing a translumbar needle and real-time ultrasound guidance, access  into the RIGHT renal collecting system was obtained and confirmed by  return of urine. Contrast was injected into the RIGHT renal collecting  system demonstrating marked hydronephrosis and proximal hydroureter. The  existing ureteral stent extends up into the superior calyces. A wire was  advanced into the RIGHT renal  collecting system.     The soft tissues were dilated. An 8.5 Lithuanian nephrostomy tube was  positioned within the renal collecting system. Contrast was injected to  confirm position of the catheter.     The images demonstrate moderate to marked right-sided hydronephrosis and  proximal hydroureter.       Impression:       1. Successful placement of an 8.5 Lithuanian drain into the RIGHT renal  collecting system.     Electronically Signed By-Dr. Alec Sena MD On:1/17/2017 8:25 AM  EST  This report was finalized on 01/17/2017 07:25 by Dr. Alec Sena MD.        Lab Results (last 7 days)     Procedure Component Value Units Date/Time    CBC & Differential [[id number]] Collected:  01/16/17 1020    Specimen:  Blood Updated:  01/16/17 1032    Narrative:       The following orders were created for panel order CBC & Differential.  Procedure                               Abnormality         Status                     ---------                               -----------         ------                     CBC Auto Differential[[id number]]         Abnormal            Final result                 Please view results for these tests on the individual orders.    CBC Auto Differential [[id number]]  (Abnormal) Collected:  01/16/17 1020    Specimen:  Blood Updated:  01/16/17 1032     WBC 9.43 10*3/mm3      RBC 4.18 (L) 10*6/mm3      Hemoglobin 12.6 (L) g/dL      Hematocrit 37.1 (L) %      MCV 88.8 fL      MCH 30.1 pg      MCHC 34.0 g/dL      RDW 13.3 %      RDW-SD 42.9 fl      MPV 10.1 fL      Platelets 157 10*3/mm3      Neutrophil % 76.4 %      Lymphocyte % 11.6 (L) %      Monocyte % 9.2 %      Eosinophil % 2.3 %      Basophil % 0.2 %      Immature Grans % 0.3 %      Neutrophils, Absolute 7.20 10*3/mm3      Lymphocytes, Absolute 1.09 10*3/mm3      Monocytes, Absolute 0.87 10*3/mm3      Eosinophils, Absolute 0.22 10*3/mm3      Basophils, Absolute 0.02 10*3/mm3      Immature Grans, Absolute 0.03 10*3/mm3     aPTT [[id number]]  (Abnormal)  Collected:  01/16/17 1021    Specimen:  Blood Updated:  01/16/17 1042     PTT 45.0 (H) seconds     Protime-INR [[id number]]  (Normal) Collected:  01/16/17 1021    Specimen:  Blood Updated:  01/16/17 1042     Protime 14.1 Seconds      INR 1.06     Amylase [[id number]]  (Normal) Collected:  01/16/17 1020    Specimen:  Blood Updated:  01/16/17 1045     Amylase 61 U/L     Lipase [[id number]]  (Normal) Collected:  01/16/17 1020    Specimen:  Blood Updated:  01/16/17 1045     Lipase 29 U/L     Comprehensive Metabolic Panel [[id number]]  (Abnormal) Collected:  01/16/17 1020    Specimen:  Blood Updated:  01/16/17 1053     Glucose 96 mg/dL      BUN 73 (H) mg/dL      Creatinine 14.34 (H) mg/dL      Sodium 136 mmol/L      Potassium 5.2 mmol/L      Chloride 98 mmol/L      CO2 21.0 (L) mmol/L      Calcium 9.1 mg/dL      Total Protein 6.9 g/dL      Albumin 3.90 g/dL      ALT (SGPT) 28 U/L      AST (SGOT) 20 U/L      Alkaline Phosphatase 104 U/L      Total Bilirubin 0.7 mg/dL      eGFR Non African Amer 4 (L) mL/min/1.73      Globulin 3.0 gm/dL      A/G Ratio 1.3 g/dL      BUN/Creatinine Ratio 5.1 (L)      Anion Gap 17.0 (H) mmol/L     C-reactive Protein [[id number]]  (Abnormal) Collected:  01/16/17 1020    Specimen:  Blood Updated:  01/16/17 1127     C-Reactive Protein 13.92 (H) mg/dL     Urinalysis With / Culture If Indicated [[id number]]  (Abnormal) Collected:  01/17/17 0010    Specimen:  Urine from Urine, Clean Catch Updated:  01/17/17 0106     Color, UA Red (A)      Appearance, UA Cloudy (A)      pH, UA 5.5      Specific Gravity, UA 1.011      Glucose, UA Negative      Ketones, UA Negative      Bilirubin, UA Negative      Blood, UA Large (3+) (A)      Protein,  mg/dL (2+) (A)      Leuk Esterase, UA Small (1+) (A)      Nitrite, UA Negative      Urobilinogen, UA 0.2 E.U./dL     Urinalysis, Microscopic Only [[id number]]  (Abnormal) Collected:  01/17/17 0010    Specimen:  Urine from Urine, Clean Catch Updated:  01/17/17 0106     RBC, UA  Too Numerous to Count (A) /HPF      WBC, UA 6-12 (A) /HPF      Bacteria, UA None Seen /HPF      Squamous Epithelial Cells, UA None Seen /HPF      Hyaline Casts, UA 0-2 /LPF      Methodology Manual Light Microscopy     Basic Metabolic Panel [[id number]]  (Abnormal) Collected:  01/17/17 0514    Specimen:  Blood Updated:  01/17/17 0613     Glucose 86 mg/dL      BUN 74 (H) mg/dL      Creatinine 12.50 (H) mg/dL      Sodium 141 mmol/L      Potassium 5.2 mmol/L      Chloride 103 mmol/L      CO2 21.0 (L) mmol/L      Calcium 8.6 mg/dL      eGFR Non African Amer 5 (L) mL/min/1.73      BUN/Creatinine Ratio 5.9 (L)      Anion Gap 17.0 (H) mmol/L     Narrative:       GFR Normal >60  Chronic Kidney Disease <60  Kidney Failure <15    Basic Metabolic Panel [[id number]]  (Abnormal) Collected:  01/18/17 0612    Specimen:  Blood Updated:  01/18/17 0718     Glucose 107 (H) mg/dL      BUN 60 (H) mg/dL      Creatinine 8.32 (H) mg/dL      Sodium 143 mmol/L      Potassium 4.9 mmol/L      Chloride 104 mmol/L      CO2 23.0 (L) mmol/L      Calcium 8.9 mg/dL      eGFR Non African Amer 7 (L) mL/min/1.73      BUN/Creatinine Ratio 7.2      Anion Gap 16.0 (H) mmol/L     Narrative:       GFR Normal >60  Chronic Kidney Disease <60  Kidney Failure <15    Basic Metabolic Panel [[id number]]  (Abnormal) Collected:  01/18/17 1732    Specimen:  Blood Updated:  01/18/17 1815     Glucose 137 (H) mg/dL      BUN 53 (H) mg/dL      Creatinine 6.43 (H) mg/dL      Sodium 143 mmol/L      Potassium 4.5 mmol/L      Chloride 102 mmol/L      CO2 23.0 (L) mmol/L      Calcium 9.3 mg/dL      eGFR Non African Amer 10 (L) mL/min/1.73      BUN/Creatinine Ratio 8.2      Anion Gap 18.0 (H) mmol/L     Narrative:       GFR Normal >60  Chronic Kidney Disease <60  Kidney Failure <15    Basic Metabolic Panel [[id number]]  (Abnormal) Collected:  01/19/17 0509    Specimen:  Blood Updated:  01/19/17 0559     Glucose 106 (H) mg/dL      BUN 47 (H) mg/dL      Creatinine 5.27 (H) mg/dL       Sodium 145 mmol/L      Potassium 4.5 mmol/L      Chloride 105 mmol/L      CO2 26.0 mmol/L      Calcium 9.1 mg/dL      eGFR Non African Amer 12 (L) mL/min/1.73      BUN/Creatinine Ratio 8.9      Anion Gap 14.0 (H) mmol/L     Narrative:       GFR Normal >60  Chronic Kidney Disease <60  Kidney Failure <15    CBC (No Diff) [[id number]]  (Abnormal) Collected:  01/19/17 0509    Specimen:  Blood Updated:  01/19/17 0604     WBC 7.51 10*3/mm3      RBC 4.37 (L) 10*6/mm3      Hemoglobin 13.1 (L) g/dL      Hematocrit 38.8 (L) %      MCV 88.8 fL      MCH 30.0 pg      MCHC 33.8 g/dL      RDW 13.7 %      RDW-SD 43.5 fl      MPV 9.7 fL      Platelets 245 10*3/mm3     Urine Culture [[id number]]  (Normal) Collected:  01/17/17 0010    Specimen:  Urine from Urine, Clean Catch Updated:  01/19/17 0635     Urine Culture No growth at 2 days     Basic Metabolic Panel [[id number]]  (Abnormal) Collected:  01/20/17 0543    Specimen:  Blood Updated:  01/20/17 0635     Glucose 94 mg/dL      BUN 36 (H) mg/dL      Creatinine 3.49 (H) mg/dL      Sodium 144 mmol/L      Potassium 4.6 mmol/L      Chloride 106 mmol/L      CO2 26.0 mmol/L      Calcium 8.7 mg/dL      eGFR Non African Amer 20 (L) mL/min/1.73      BUN/Creatinine Ratio 10.3      Anion Gap 12.0 mmol/L     Narrative:       GFR Normal >60  Chronic Kidney Disease <60  Kidney Failure <15          Chief Complaint on Day of Discharge: none     Hospital Course:  The patient is a 41 y.o. male who also Dr. Zimmerman listed for his primary care.  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  Patient presented to the emergency department 1/16/2017 with complaints of significant abdominal pain.  He was complaining of right sided abdominal pain.  Received stents approximately 3 weeks prior to his admission.  Patient tells me that he was in his normal state of  "health fell asleep woke up and had significant pain.  He did have some associated nausea vomiting as well.  CT of the abdomen and pelvis revealed moderate hydronephrosis on the right renal collecting system with stranding consistent with acute discharge uropathy.  He was admitted to the hospital service for further outpatient management.  Consultation was sought from Dr. Quezada with urology.  He did recommend a nephrostomy tube.  This was placed by interventional radiology.  Patient's admission creatinine was 14.  Consultation was then sought from Dr. Demetrio Lopez with nephrology.  He was given IV fluid resuscitation.  He continued to have significant pain as well as nausea and vomiting.  For the past 48 hours he has had extensive output out of his nephrostomy tube as well as urinating via his bladder.  His abdominal pain has significantly improved.  His nausea vomiting has resolved.  He is tolerating a regular diet.  He has been afebrile.  His creatinine is now down to 3.49.  He only has an appointment with his urologist on Monday.  Urine cultures did not reveal any growth.  Patient is stable for discharge.     He had significant hypertension.  He was held on his ACE inhibitor secondary to acute kidney injury.  He was added hydralazine and this was titrated up.  His most recent blood pressure was 147/98.  He tellsthe best that has been in years.  He has when necessary clonidine at home.    Condition on Discharge:  stable    Physical Exam on Discharge:  Visit Vitals   • /98 (BP Location: Left arm, Patient Position: Lying)   • Pulse 72   • Temp 98 °F (36.7 °C) (Oral)   • Resp 16   • Ht 75\" (190.5 cm)   • Wt 238 lb 6 oz (108 kg)   • SpO2 96%   • BMI 29.79 kg/m2     Physical Exam   Constitutional: He is oriented to person, place, and time. He appears well-developed and well-nourished.   HENT:   Head: Normocephalic and atraumatic.   Eyes: EOM are normal. Pupils are equal, round, and reactive to light. No " scleral icterus.   Neck: Normal range of motion. Neck supple. No JVD present. Carotid bruit is not present. No tracheal deviation present. No thyromegaly present.   Cardiovascular: Normal rate and regular rhythm.  Exam reveals no gallop and no friction rub.    No murmur heard.  Pulmonary/Chest: Effort normal and breath sounds normal. No respiratory distress. He has no wheezes. He has no rales. He exhibits no tenderness.   Abdominal: Soft. Bowel sounds are normal. He exhibits no distension. There is no tenderness.   Nephrostomy tube right: ostomy LLQ   Musculoskeletal: Normal range of motion. He exhibits no edema.   Neurological: He is alert and oriented to person, place, and time. No cranial nerve deficit.   Skin: Skin is warm and dry. No rash noted.   Psychiatric: He has a normal mood and affect. His behavior is normal. Judgment and thought content normal.     Discharge Disposition:  Home or Self Care    Discharge Medications:   Felipe Veronica   Home Medication Instructions ANDREW:[id number]    Printed on:01/20/17 5641   Medication Information                      amLODIPine (NORVASC) 10 MG tablet  Take 1 tablet by mouth Daily.             CloNIDine (CATAPRES) 0.2 MG tablet  Take 0.2 mg by mouth As Needed.             hydrALAZINE (APRESOLINE) 50 MG tablet  Take 1 tablet by mouth 3 (Three) Times a Day.             HYDROcodone-acetaminophen (NORCO)  MG per tablet  Take one tablet every 3-4 hours PRN pain. Earliest Fill Date: 1/1/17             naratriptan (AMERGE) 2.5 MG tablet  Take 2.5 mg by mouth 1 (One) Time As Needed for migraine.             omeprazole (priLOSEC) 40 MG capsule  Take 40 mg by mouth Daily.             propranolol LA (INDERAL LA) 60 MG 24 hr capsule  Take 60 mg by mouth Daily.             SUMAtriptan (IMITREX) 100 MG tablet  Take 100 mg by mouth 1 (One) Time As Needed for migraine.                 Discharge Diet:   Diet Instructions     Diet: Regular; Thin Liquids, No Restrictions        Discharge Diet:  Regular   Fluid Consistency:  Thin Liquids, No Restrictions                 Activity at Discharge:   Activity Instructions     Activity as Tolerated                     Discharge Care Plan/Instructions: He will be discharged home today to follow up with his urologist Dr. Marcos on Monday.  He will need a repeat BMP to assess his renal function.    Follow-up Appointments:   Here is a follow-up with Dr. Marcos in on Monday.  He will need a BMP.  Dr. Erasmo Alexander in 3 weeks  Dr. Lopez in 3 weeks    Test Results Pending at Discharge: none    LINDA Gauthier  01/20/17  3:27 PM    Time: 35    Please note that portions of this note may have been completed with a voice recognition program. Efforts were made to edit the dictations, but occasionally words are mistranscribed.    I personally evaluated and examined the patient in conjunction with LINDA Locke and agree with the assessment, treatment plan, and disposition of the patient as recorded by her. My history, exam, and further recommendations are: Patient feels much better, and once to go home.  I discussed the case with Dr. Lopez this afternoon, and in fact we both were in the room together with Mr. Veronica discussing his discharge plan.  He has follow-up Arty established at [place] on Monday, with plans for a repeat basic metabolic panel.  He will continue to hold his lisinopril for now, and we will provide new prescriptions for Norvasc and hydralazine area at his blood pressures are much better controlled.  He will hydrate himself well in anticipation of follow-up on Monday.  Creatinine on admission was 14, and on the day of discharge his trend all the way down to 3.49.    Krish Hayes MD  01/20/17  3:55 PM         Electronically signed by Krish Hayes MD at 1/20/2017  3:55 PM

## 2017-01-25 LAB
AMPHETAMINES, URINE: NEGATIVE NG/ML
BARBITURATES, URINE: NEGATIVE NG/ML
BENZODIAZEPINES, URINE: NEGATIVE NG/ML
CANNABINOIDS, URINE: NEGATIVE NG/ML
COCAINE METABOLITE, URINE: NEGATIVE NG/ML
CODEINE, URINE: NEGATIVE
CREATININE, URINE: 163.4 MG/DL (ref 20–300)
ETHANOL U, QUAN: NEGATIVE %
FENTANYL URINE: NEGATIVE PG/ML
HYDROCODONE, UR CONF: >3000 NG/ML
HYDROCODONE, URINE: POSITIVE
HYDROMORPHONE, UR CONF: 1600 NG/ML
HYDROMORPHONE, URINE: POSITIVE
MEPERIDINE, UR: NEGATIVE NG/ML
METHADONE SCREEN, URINE: NEGATIVE NG/ML
MORPHINE URINE: NEGATIVE
OPIATES, URINE: ABNORMAL NG/ML
OPIATES, URINE: POSITIVE NG/ML
OXYCODONE/OXYMORPHONE, UR: NEGATIVE NG/ML
PH, URINE: 5.9 (ref 4.5–8.9)
PHENCYCLIDINE, URINE: NEGATIVE NG/ML
PROPOXYPHENE, URINE: NEGATIVE NG/ML

## 2017-01-26 RX ORDER — HYDROCODONE BITARTRATE AND ACETAMINOPHEN 10; 325 MG/1; MG/1
TABLET ORAL
Qty: 180 TABLET | Refills: 0 | Status: SHIPPED | OUTPATIENT
Start: 2017-01-31 | End: 2017-02-24 | Stop reason: SDUPTHER

## 2017-01-27 ENCOUNTER — HOSPITAL ENCOUNTER (EMERGENCY)
Facility: HOSPITAL | Age: 42
Discharge: OTHER INSTITUTION | End: 2017-01-28
Attending: EMERGENCY MEDICINE | Admitting: EMERGENCY MEDICINE

## 2017-01-27 ENCOUNTER — APPOINTMENT (OUTPATIENT)
Dept: CT IMAGING | Facility: HOSPITAL | Age: 42
End: 2017-01-27

## 2017-01-27 DIAGNOSIS — K56.609 SMALL BOWEL OBSTRUCTION (HCC): Primary | ICD-10-CM

## 2017-01-27 LAB
ALBUMIN SERPL-MCNC: 4.9 G/DL (ref 3.5–5)
ALBUMIN/GLOB SERPL: 1.4 G/DL (ref 1.1–2.5)
ALP SERPL-CCNC: 130 U/L (ref 24–120)
ALT SERPL W P-5'-P-CCNC: 44 U/L (ref 0–54)
ANION GAP SERPL CALCULATED.3IONS-SCNC: 13 MMOL/L (ref 4–13)
AST SERPL-CCNC: 37 U/L (ref 7–45)
BACTERIA UR QL AUTO: ABNORMAL /HPF
BASOPHILS # BLD AUTO: 0.02 10*3/MM3 (ref 0–0.2)
BASOPHILS NFR BLD AUTO: 0.1 % (ref 0–2)
BILIRUB SERPL-MCNC: 0.7 MG/DL (ref 0.1–1)
BILIRUB UR QL STRIP: NEGATIVE
BUN BLD-MCNC: 29 MG/DL (ref 5–21)
BUN/CREAT SERPL: 11.9 (ref 7–25)
CALCIUM SPEC-SCNC: 10.7 MG/DL (ref 8.4–10.4)
CHLORIDE SERPL-SCNC: 101 MMOL/L (ref 98–110)
CLARITY UR: CLEAR
CO2 SERPL-SCNC: 26 MMOL/L (ref 24–31)
COLOR UR: YELLOW
CREAT BLD-MCNC: 2.44 MG/DL (ref 0.5–1.4)
DEPRECATED RDW RBC AUTO: 44.1 FL (ref 40–54)
EOSINOPHIL # BLD AUTO: 0.01 10*3/MM3 (ref 0–0.7)
EOSINOPHIL NFR BLD AUTO: 0.1 % (ref 0–4)
ERYTHROCYTE [DISTWIDTH] IN BLOOD BY AUTOMATED COUNT: 13.4 % (ref 12–15)
GFR SERPL CREATININE-BSD FRML MDRD: 30 ML/MIN/1.73
GLOBULIN UR ELPH-MCNC: 3.6 GM/DL
GLUCOSE BLD-MCNC: 133 MG/DL (ref 70–100)
GLUCOSE UR STRIP-MCNC: NEGATIVE MG/DL
HCT VFR BLD AUTO: 41.5 % (ref 40–52)
HGB BLD-MCNC: 14 G/DL (ref 14–18)
HGB UR QL STRIP.AUTO: ABNORMAL
HYALINE CASTS UR QL AUTO: ABNORMAL /LPF
IMM GRANULOCYTES # BLD: 0.08 10*3/MM3 (ref 0–0.03)
IMM GRANULOCYTES NFR BLD: 0.5 % (ref 0–5)
KETONES UR QL STRIP: ABNORMAL
LEUKOCYTE ESTERASE UR QL STRIP.AUTO: ABNORMAL
LYMPHOCYTES # BLD AUTO: 0.9 10*3/MM3 (ref 0.72–4.86)
LYMPHOCYTES NFR BLD AUTO: 5.2 % (ref 15–45)
MCH RBC QN AUTO: 30.6 PG (ref 28–32)
MCHC RBC AUTO-ENTMCNC: 33.7 G/DL (ref 33–36)
MCV RBC AUTO: 90.8 FL (ref 82–95)
MONOCYTES # BLD AUTO: 0.57 10*3/MM3 (ref 0.19–1.3)
MONOCYTES NFR BLD AUTO: 3.3 % (ref 4–12)
NEUTROPHILS # BLD AUTO: 15.75 10*3/MM3 (ref 1.87–8.4)
NEUTROPHILS NFR BLD AUTO: 90.8 % (ref 39–78)
NITRITE UR QL STRIP: NEGATIVE
PH UR STRIP.AUTO: 6 [PH] (ref 5–8)
PLATELET # BLD AUTO: 336 10*3/MM3 (ref 130–400)
PMV BLD AUTO: 10.2 FL (ref 6–12)
POTASSIUM BLD-SCNC: 4.9 MMOL/L (ref 3.5–5.3)
PROT SERPL-MCNC: 8.5 G/DL (ref 6.3–8.7)
PROT UR QL STRIP: ABNORMAL
RBC # BLD AUTO: 4.57 10*6/MM3 (ref 4.8–5.9)
RBC # UR: ABNORMAL /HPF
REF LAB TEST METHOD: ABNORMAL
SODIUM BLD-SCNC: 140 MMOL/L (ref 135–145)
SP GR UR STRIP: 1.02 (ref 1–1.03)
SQUAMOUS #/AREA URNS HPF: ABNORMAL /HPF
UROBILINOGEN UR QL STRIP: ABNORMAL
WBC NRBC COR # BLD: 17.33 10*3/MM3 (ref 4.8–10.8)
WBC UR QL AUTO: ABNORMAL /HPF

## 2017-01-27 PROCEDURE — 80053 COMPREHEN METABOLIC PANEL: CPT | Performed by: EMERGENCY MEDICINE

## 2017-01-27 PROCEDURE — 81001 URINALYSIS AUTO W/SCOPE: CPT | Performed by: EMERGENCY MEDICINE

## 2017-01-27 PROCEDURE — 85025 COMPLETE CBC W/AUTO DIFF WBC: CPT | Performed by: EMERGENCY MEDICINE

## 2017-01-27 PROCEDURE — 87040 BLOOD CULTURE FOR BACTERIA: CPT | Performed by: EMERGENCY MEDICINE

## 2017-01-27 PROCEDURE — 96375 TX/PRO/DX INJ NEW DRUG ADDON: CPT

## 2017-01-27 PROCEDURE — 99285 EMERGENCY DEPT VISIT HI MDM: CPT

## 2017-01-27 PROCEDURE — 0 IOHEXOL 300 MG/ML SOLUTION: Performed by: EMERGENCY MEDICINE

## 2017-01-27 PROCEDURE — 96376 TX/PRO/DX INJ SAME DRUG ADON: CPT

## 2017-01-27 PROCEDURE — 25010000002 HYDROMORPHONE PER 4 MG: Performed by: EMERGENCY MEDICINE

## 2017-01-27 PROCEDURE — 96361 HYDRATE IV INFUSION ADD-ON: CPT

## 2017-01-27 PROCEDURE — 96365 THER/PROPH/DIAG IV INF INIT: CPT

## 2017-01-27 PROCEDURE — 25010000002 ONDANSETRON PER 1 MG: Performed by: EMERGENCY MEDICINE

## 2017-01-27 PROCEDURE — 87086 URINE CULTURE/COLONY COUNT: CPT | Performed by: EMERGENCY MEDICINE

## 2017-01-27 PROCEDURE — 25010000002 PROMETHAZINE PER 50 MG

## 2017-01-27 RX ORDER — SODIUM CHLORIDE 9 MG/ML
125 INJECTION, SOLUTION INTRAVENOUS CONTINUOUS
Status: DISCONTINUED | OUTPATIENT
Start: 2017-01-27 | End: 2017-01-28 | Stop reason: HOSPADM

## 2017-01-27 RX ORDER — ONDANSETRON 2 MG/ML
4 INJECTION INTRAMUSCULAR; INTRAVENOUS ONCE
Status: COMPLETED | OUTPATIENT
Start: 2017-01-27 | End: 2017-01-27

## 2017-01-27 RX ORDER — ONDANSETRON 2 MG/ML
4 INJECTION INTRAMUSCULAR; INTRAVENOUS ONCE
Status: COMPLETED | OUTPATIENT
Start: 2017-01-27 | End: 2017-01-28

## 2017-01-27 RX ORDER — PROMETHAZINE HYDROCHLORIDE 25 MG/ML
INJECTION, SOLUTION INTRAMUSCULAR; INTRAVENOUS
Status: COMPLETED
Start: 2017-01-27 | End: 2017-01-27

## 2017-01-27 RX ORDER — PANTOPRAZOLE SODIUM 40 MG/10ML
80 INJECTION, POWDER, LYOPHILIZED, FOR SOLUTION INTRAVENOUS ONCE
Status: COMPLETED | OUTPATIENT
Start: 2017-01-27 | End: 2017-01-28

## 2017-01-27 RX ORDER — PROMETHAZINE HYDROCHLORIDE 25 MG/ML
12.5 INJECTION, SOLUTION INTRAMUSCULAR; INTRAVENOUS ONCE
Status: COMPLETED | OUTPATIENT
Start: 2017-01-28 | End: 2017-01-27

## 2017-01-27 RX ORDER — LEVOFLOXACIN 5 MG/ML
500 INJECTION, SOLUTION INTRAVENOUS ONCE
Status: COMPLETED | OUTPATIENT
Start: 2017-01-27 | End: 2017-01-28

## 2017-01-27 RX ADMIN — PROMETHAZINE HYDROCHLORIDE 12.5 MG: 25 INJECTION INTRAMUSCULAR; INTRAVENOUS at 23:42

## 2017-01-27 RX ADMIN — ONDANSETRON HYDROCHLORIDE 4 MG: 2 SOLUTION INTRAMUSCULAR; INTRAVENOUS at 21:10

## 2017-01-27 RX ADMIN — HYDROMORPHONE HYDROCHLORIDE 1 MG: 1 INJECTION, SOLUTION INTRAMUSCULAR; INTRAVENOUS; SUBCUTANEOUS at 23:50

## 2017-01-27 RX ADMIN — PROMETHAZINE HYDROCHLORIDE 12.5 MG: 25 INJECTION, SOLUTION INTRAMUSCULAR; INTRAVENOUS at 23:42

## 2017-01-27 RX ADMIN — SODIUM CHLORIDE 1000 ML: 9 INJECTION, SOLUTION INTRAVENOUS at 23:43

## 2017-01-27 RX ADMIN — IOHEXOL 50 ML: 300 INJECTION, SOLUTION INTRAVENOUS at 23:56

## 2017-01-27 RX ADMIN — HYDROMORPHONE HYDROCHLORIDE 1 MG: 1 INJECTION, SOLUTION INTRAMUSCULAR; INTRAVENOUS; SUBCUTANEOUS at 21:10

## 2017-01-27 RX ADMIN — METRONIDAZOLE 500 MG: 500 INJECTION, SOLUTION INTRAVENOUS at 23:55

## 2017-01-27 RX ADMIN — SODIUM CHLORIDE 1000 ML: 9 INJECTION, SOLUTION INTRAVENOUS at 21:12

## 2017-01-28 ENCOUNTER — APPOINTMENT (OUTPATIENT)
Dept: CT IMAGING | Facility: HOSPITAL | Age: 42
End: 2017-01-28

## 2017-01-28 VITALS
HEART RATE: 101 BPM | TEMPERATURE: 98.2 F | BODY MASS INDEX: 27.98 KG/M2 | OXYGEN SATURATION: 100 % | SYSTOLIC BLOOD PRESSURE: 179 MMHG | RESPIRATION RATE: 13 BRPM | WEIGHT: 225 LBS | DIASTOLIC BLOOD PRESSURE: 112 MMHG | HEIGHT: 75 IN

## 2017-01-28 LAB
HOLD SPECIMEN: NORMAL
HOLD SPECIMEN: NORMAL
WHOLE BLOOD HOLD SPECIMEN: NORMAL

## 2017-01-28 PROCEDURE — 96367 TX/PROPH/DG ADDL SEQ IV INF: CPT

## 2017-01-28 PROCEDURE — 25010000002 HYDROMORPHONE PER 4 MG: Performed by: EMERGENCY MEDICINE

## 2017-01-28 PROCEDURE — 25010000002 ONDANSETRON PER 1 MG: Performed by: EMERGENCY MEDICINE

## 2017-01-28 PROCEDURE — 74176 CT ABD & PELVIS W/O CONTRAST: CPT

## 2017-01-28 PROCEDURE — 25010000002 LEVOFLOXACIN PER 250 MG: Performed by: EMERGENCY MEDICINE

## 2017-01-28 PROCEDURE — 25010000002 PROMETHAZINE PER 50 MG: Performed by: FAMILY MEDICINE

## 2017-01-28 PROCEDURE — 25010000002 HYDROMORPHONE PER 4 MG: Performed by: FAMILY MEDICINE

## 2017-01-28 PROCEDURE — 96375 TX/PRO/DX INJ NEW DRUG ADDON: CPT

## 2017-01-28 PROCEDURE — 96376 TX/PRO/DX INJ SAME DRUG ADON: CPT

## 2017-01-28 RX ORDER — PROMETHAZINE HYDROCHLORIDE 25 MG/ML
25 INJECTION, SOLUTION INTRAMUSCULAR; INTRAVENOUS ONCE
Status: COMPLETED | OUTPATIENT
Start: 2017-01-28 | End: 2017-01-28

## 2017-01-28 RX ORDER — HYDROMORPHONE HCL 110MG/55ML
2 PATIENT CONTROLLED ANALGESIA SYRINGE INTRAVENOUS ONCE
Status: COMPLETED | OUTPATIENT
Start: 2017-01-28 | End: 2017-01-28

## 2017-01-28 RX ADMIN — LEVOFLOXACIN 500 MG: 500 INJECTION, SOLUTION INTRAVENOUS at 00:02

## 2017-01-28 RX ADMIN — PROMETHAZINE HYDROCHLORIDE 25 MG: 25 INJECTION INTRAMUSCULAR; INTRAVENOUS at 05:14

## 2017-01-28 RX ADMIN — HYDROMORPHONE HYDROCHLORIDE 1 MG: 1 INJECTION, SOLUTION INTRAMUSCULAR; INTRAVENOUS; SUBCUTANEOUS at 08:25

## 2017-01-28 RX ADMIN — PANTOPRAZOLE SODIUM 80 MG: 40 INJECTION, POWDER, FOR SOLUTION INTRAVENOUS at 00:00

## 2017-01-28 RX ADMIN — HYDROMORPHONE HYDROCHLORIDE 1 MG: 1 INJECTION, SOLUTION INTRAMUSCULAR; INTRAVENOUS; SUBCUTANEOUS at 03:20

## 2017-01-28 RX ADMIN — ONDANSETRON 4 MG: 2 INJECTION INTRAMUSCULAR; INTRAVENOUS at 08:26

## 2017-01-28 RX ADMIN — ONDANSETRON 4 MG: 2 INJECTION INTRAMUSCULAR; INTRAVENOUS at 03:20

## 2017-01-28 RX ADMIN — HYDROMORPHONE HYDROCHLORIDE 2 MG: 2 INJECTION, SOLUTION INTRAMUSCULAR; INTRAVENOUS; SUBCUTANEOUS at 05:12

## 2017-01-28 NOTE — ED PROVIDER NOTES
Subjective   HPI Comments: 41-year-old gentleman presents from facility with a complaint of nausea vomiting abdominal pain started last night at approximately 8 o'clock after eating a meal at home.  There are no other family members who have similar symptoms at this time.  He does have a colostomy bag in a nephrostomy tube secondary to colorectal cancer surgically resected.    He states that he is unable to keep anything down essentially vomits after attempting to drink anything.    He was recently diagnosed with L bladder stones however he has had no gallbladder attacks in the recent months.    Patient is a 41 y.o. male presenting with abdominal pain.   Abdominal Pain   Pain location:  Epigastric  Pain quality: sharp    Pain radiates to:  Does not radiate  Timing:  Constant  Progression:  Worsening  Chronicity:  New  Context: eating    Associated symptoms: diarrhea, nausea and vomiting        Review of Systems   Constitutional: Negative.    HENT: Negative.    Respiratory: Negative.    Cardiovascular: Negative.    Gastrointestinal: Positive for abdominal pain, diarrhea, nausea and vomiting.   Musculoskeletal: Negative.    Allergic/Immunologic: Negative.    Hematological: Negative.    Psychiatric/Behavioral: Negative.    All other systems reviewed and are negative.      Past Medical History   Diagnosis Date   • Hypertension    • Kidney stone        Allergies   Allergen Reactions   • Morphine And Related      Doesn't work    • Percocet [Oxycodone-Acetaminophen]      Give me migraines        Past Surgical History   Procedure Laterality Date   • Knee surgery     • Colon surgery     • Colostomy     • Nephrectomy     • Cystoscopy         History reviewed. No pertinent family history.    Social History     Social History   • Marital status:      Spouse name: N/A   • Number of children: N/A   • Years of education: N/A     Social History Main Topics   • Smoking status: Never Smoker   • Smokeless tobacco: None   •  Alcohol use No   • Drug use: No   • Sexual activity: Defer     Other Topics Concern   • None     Social History Narrative           Objective   Physical Exam   Constitutional: He is oriented to person, place, and time. He appears well-developed and well-nourished.   HENT:   Head: Normocephalic and atraumatic.   Eyes: EOM are normal. Pupils are equal, round, and reactive to light.   Neck: Normal range of motion. Neck supple.   Cardiovascular: Normal rate and regular rhythm.    Pulmonary/Chest: Effort normal and breath sounds normal.   Abdominal: Soft.   Significant amount of pain in the epigastric and umbilical area there is no rebound there is no palpable mass.  She does have voluntary guarding.   Musculoskeletal: Normal range of motion.   Neurological: He is alert and oriented to person, place, and time. He has normal reflexes.   Skin: Skin is warm and dry.   Psychiatric: He has a normal mood and affect. His behavior is normal. Judgment and thought content normal.   Nursing note and vitals reviewed.      Procedures         ED Course  ED Course   Comment By Time   On repeat examination he continues to have pain and is retching and vomiting.    From Dilaudid and Zofran again.  He also has an elevated white count with a left shift.  His examination at this time points to the mid abdomen and epigastric area was complaining of pain not specifically to the right upper quadrant.  I will obtain a CT with contrast however his creatinine is elevated 2.44 she will obtain a oral contrast only. Danica CARTER MD 01/27 0402   Patient care is being transferred to Dr. Denton CARTER MD 01/27 6436   Patient has small bowel obstruction on CT of abdomen.  Patient will be transferred to [place].  I did notify Dr. Lorena Carrillo reported that there was no urology that could handle his nephrostomy tube given any possible complications.  I and addition this will complete continuity of care because patient's surgeries prior  to this were handled at [facility].  Patient will be discharged in stable condition with improved pain after having had pain medicine. Viola GARCIA Darekpoly, DO 01/28 0322                  MDM  Number of Diagnoses or Management Options  Diagnosis management comments: Given his significant amount of bowel discomfort elevated white count left shift nausea vomiting at this time I have obtained blood cultures started him on Flagyl and Levaquin.  His creatinine is slightly high which emitting my ability to obtain an IV contrast CT however I am awaiting the by mouth contrast CT.       Amount and/or Complexity of Data Reviewed  Clinical lab tests: ordered and reviewed  Tests in the radiology section of CPT®: ordered and reviewed  Discuss the patient with other providers: yes  Independent visualization of images, tracings, or specimens: yes    Risk of Complications, Morbidity, and/or Mortality  Presenting problems: high  Diagnostic procedures: high  Management options: high    Critical Care  Total time providing critical care: < 30 minutes    Patient Progress  Patient progress: stable      Final diagnoses:   Small bowel obstruction            Danica CARTER MD  01/28/17 6010

## 2017-01-28 NOTE — DISCHARGE INSTRUCTIONS

## 2017-01-28 NOTE — ED NOTES
Call placed to [facility] ([facility]) and speaking to Dr. Chawla.     Maddi Damian  01/28/17 3959

## 2017-01-28 NOTE — ED NOTES
Pt accepted to [place] ED by Dr. Souleymane De Leon per  ([facility]).     Maddi Damian  01/28/17 0321

## 2017-01-29 LAB — BACTERIA SPEC AEROBE CULT: NORMAL

## 2017-02-02 LAB
BACTERIA SPEC AEROBE CULT: NORMAL
BACTERIA SPEC AEROBE CULT: NORMAL

## 2017-02-27 RX ORDER — HYDROCODONE BITARTRATE AND ACETAMINOPHEN 10; 325 MG/1; MG/1
TABLET ORAL
Qty: 180 TABLET | Refills: 0 | Status: SHIPPED | OUTPATIENT
Start: 2017-03-02 | End: 2017-03-28 | Stop reason: SDUPTHER

## 2017-03-29 RX ORDER — HYDROCODONE BITARTRATE AND ACETAMINOPHEN 10; 325 MG/1; MG/1
TABLET ORAL
Qty: 180 TABLET | Refills: 0 | Status: SHIPPED | OUTPATIENT
Start: 2017-04-01 | End: 2017-04-24 | Stop reason: SDUPTHER

## 2017-04-03 ENCOUNTER — HOSPITAL ENCOUNTER (OUTPATIENT)
Dept: PAIN MANAGEMENT | Age: 42
Discharge: HOME | End: 2017-04-03
Payer: MEDICARE

## 2017-04-03 VITALS
HEART RATE: 84 BPM | SYSTOLIC BLOOD PRESSURE: 140 MMHG | TEMPERATURE: 97.1 F | RESPIRATION RATE: 20 BRPM | HEIGHT: 75 IN | WEIGHT: 226 LBS | OXYGEN SATURATION: 100 % | DIASTOLIC BLOOD PRESSURE: 93 MMHG | BODY MASS INDEX: 28.1 KG/M2

## 2017-04-03 DIAGNOSIS — N50.819 TESTALGIA: ICD-10-CM

## 2017-04-03 PROCEDURE — 99213 OFFICE O/P EST LOW 20 MIN: CPT

## 2017-04-03 RX ORDER — HYDRALAZINE HYDROCHLORIDE 50 MG/1
50 TABLET, FILM COATED ORAL 3 TIMES DAILY
COMMUNITY
Start: 2017-01-20 | End: 2017-10-02 | Stop reason: SDUPTHER

## 2017-04-03 ASSESSMENT — PAIN DESCRIPTION - LOCATION: LOCATION: FLANK;LEG

## 2017-04-03 ASSESSMENT — PAIN DESCRIPTION - ORIENTATION: ORIENTATION: RIGHT

## 2017-04-03 ASSESSMENT — PAIN DESCRIPTION - PAIN TYPE: TYPE: CHRONIC PAIN

## 2017-04-03 ASSESSMENT — PAIN SCALES - GENERAL: PAINLEVEL_OUTOF10: 4

## 2017-04-03 ASSESSMENT — PAIN DESCRIPTION - ONSET: ONSET: ON-GOING

## 2017-04-03 ASSESSMENT — PAIN DESCRIPTION - FREQUENCY: FREQUENCY: CONTINUOUS

## 2017-04-03 ASSESSMENT — PAIN DESCRIPTION - PROGRESSION: CLINICAL_PROGRESSION: NOT CHANGED

## 2017-04-03 ASSESSMENT — ACTIVITIES OF DAILY LIVING (ADL): EFFECT OF PAIN ON DAILY ACTIVITIES: DAILY ACTIVITY

## 2017-04-03 ASSESSMENT — PAIN DESCRIPTION - DESCRIPTORS: DESCRIPTORS: ACHING;CONSTANT

## 2017-04-14 ENCOUNTER — LAB REQUISITION (OUTPATIENT)
Dept: LAB | Facility: HOSPITAL | Age: 42
End: 2017-04-14

## 2017-04-14 DIAGNOSIS — Z00.00 ENCOUNTER FOR GENERAL ADULT MEDICAL EXAMINATION WITHOUT ABNORMAL FINDINGS: ICD-10-CM

## 2017-04-14 PROCEDURE — 87086 URINE CULTURE/COLONY COUNT: CPT | Performed by: FAMILY MEDICINE

## 2017-04-16 LAB — BACTERIA SPEC AEROBE CULT: NORMAL

## 2017-04-24 RX ORDER — HYDROCODONE BITARTRATE AND ACETAMINOPHEN 10; 325 MG/1; MG/1
TABLET ORAL
Qty: 180 TABLET | Refills: 0 | Status: SHIPPED | OUTPATIENT
Start: 2017-05-01 | End: 2017-05-22 | Stop reason: SDUPTHER

## 2017-05-23 RX ORDER — HYDROCODONE BITARTRATE AND ACETAMINOPHEN 10; 325 MG/1; MG/1
TABLET ORAL
Qty: 180 TABLET | Refills: 0 | Status: SHIPPED | OUTPATIENT
Start: 2017-05-31 | End: 2017-06-21 | Stop reason: SDUPTHER

## 2017-06-22 RX ORDER — HYDROCODONE BITARTRATE AND ACETAMINOPHEN 10; 325 MG/1; MG/1
TABLET ORAL
Qty: 180 TABLET | Refills: 0 | Status: SHIPPED | OUTPATIENT
Start: 2017-06-30 | End: 2017-07-10 | Stop reason: SDUPTHER

## 2017-07-10 ENCOUNTER — HOSPITAL ENCOUNTER (OUTPATIENT)
Dept: PAIN MANAGEMENT | Age: 42
Discharge: HOME | End: 2017-07-10
Payer: MEDICARE

## 2017-07-10 VITALS
BODY MASS INDEX: 28.35 KG/M2 | SYSTOLIC BLOOD PRESSURE: 138 MMHG | TEMPERATURE: 98.1 F | HEART RATE: 86 BPM | WEIGHT: 228 LBS | HEIGHT: 75 IN | RESPIRATION RATE: 18 BRPM | DIASTOLIC BLOOD PRESSURE: 95 MMHG | OXYGEN SATURATION: 100 %

## 2017-07-10 DIAGNOSIS — N50.819 TESTALGIA: ICD-10-CM

## 2017-07-10 PROCEDURE — G0463 HOSPITAL OUTPT CLINIC VISIT: HCPCS

## 2017-07-10 PROCEDURE — 99214 OFFICE O/P EST MOD 30 MIN: CPT

## 2017-07-10 RX ORDER — HYDROCODONE BITARTRATE AND ACETAMINOPHEN 10; 325 MG/1; MG/1
TABLET ORAL
Qty: 180 TABLET | Refills: 0 | Status: SHIPPED | OUTPATIENT
Start: 2017-07-30 | End: 2017-08-21 | Stop reason: SDUPTHER

## 2017-07-10 RX ORDER — TAMSULOSIN HYDROCHLORIDE 0.4 MG/1
CAPSULE ORAL
Refills: 0 | COMMUNITY
Start: 2017-05-12 | End: 2017-09-29 | Stop reason: ALTCHOICE

## 2017-07-10 RX ORDER — CIPROFLOXACIN 500 MG/1
TABLET, FILM COATED ORAL
Refills: 0 | COMMUNITY
Start: 2017-06-29 | End: 2017-07-17 | Stop reason: SDUPTHER

## 2017-07-10 ASSESSMENT — PAIN DESCRIPTION - LOCATION: LOCATION: FLANK;OTHER (COMMENT)

## 2017-07-10 ASSESSMENT — PAIN DESCRIPTION - DESCRIPTORS: DESCRIPTORS: ACHING;CONSTANT

## 2017-07-10 ASSESSMENT — ACTIVITIES OF DAILY LIVING (ADL): EFFECT OF PAIN ON DAILY ACTIVITIES: LIMITS ACTIVITY

## 2017-07-10 ASSESSMENT — PAIN DESCRIPTION - ONSET: ONSET: ON-GOING

## 2017-07-10 ASSESSMENT — PAIN DESCRIPTION - PROGRESSION: CLINICAL_PROGRESSION: NOT CHANGED

## 2017-07-10 ASSESSMENT — PAIN DESCRIPTION - ORIENTATION: ORIENTATION: RIGHT;LEFT

## 2017-07-10 ASSESSMENT — PAIN DESCRIPTION - FREQUENCY: FREQUENCY: CONTINUOUS

## 2017-07-10 ASSESSMENT — PAIN SCALES - GENERAL: PAINLEVEL_OUTOF10: 5

## 2017-07-13 ENCOUNTER — TELEPHONE (OUTPATIENT)
Dept: FAMILY MEDICINE CLINIC | Age: 42
End: 2017-07-13

## 2017-07-13 DIAGNOSIS — N18.30 CKD (CHRONIC KIDNEY DISEASE) STAGE 3, GFR 30-59 ML/MIN (HCC): Primary | ICD-10-CM

## 2017-07-13 DIAGNOSIS — R30.0 DYSURIA: ICD-10-CM

## 2017-07-14 ENCOUNTER — TELEPHONE (OUTPATIENT)
Dept: FAMILY MEDICINE CLINIC | Age: 42
End: 2017-07-14

## 2017-07-17 ENCOUNTER — TELEPHONE (OUTPATIENT)
Dept: FAMILY MEDICINE CLINIC | Age: 42
End: 2017-07-17

## 2017-07-17 DIAGNOSIS — N18.30 CKD (CHRONIC KIDNEY DISEASE) STAGE 3, GFR 30-59 ML/MIN (HCC): ICD-10-CM

## 2017-07-17 DIAGNOSIS — N30.01 ACUTE CYSTITIS WITH HEMATURIA: Primary | ICD-10-CM

## 2017-07-17 DIAGNOSIS — R30.0 DYSURIA: ICD-10-CM

## 2017-07-17 LAB
ANION GAP SERPL CALCULATED.3IONS-SCNC: 18 MMOL/L (ref 7–19)
BACTERIA: ABNORMAL /HPF
BILIRUBIN URINE: NEGATIVE
BLOOD, URINE: ABNORMAL
BUN BLDV-MCNC: 23 MG/DL (ref 6–20)
CALCIUM SERPL-MCNC: 9.3 MG/DL (ref 8.6–10)
CHLORIDE BLD-SCNC: 105 MMOL/L (ref 98–111)
CLARITY: ABNORMAL
CO2: 23 MMOL/L (ref 22–29)
COLOR: YELLOW
CREAT SERPL-MCNC: 3 MG/DL (ref 0.5–1.2)
EPITHELIAL CELLS, UA: 1 /HPF (ref 0–5)
GFR NON-AFRICAN AMERICAN: 23
GLUCOSE BLD-MCNC: 66 MG/DL (ref 74–109)
GLUCOSE URINE: NEGATIVE MG/DL
HYALINE CASTS: 4 /HPF (ref 0–8)
KETONES, URINE: NEGATIVE MG/DL
LEUKOCYTE ESTERASE, URINE: ABNORMAL
NITRITE, URINE: POSITIVE
PH UA: 6
POTASSIUM SERPL-SCNC: 4.6 MMOL/L (ref 3.5–5)
PROTEIN UA: 30 MG/DL
RBC UA: 33 /HPF (ref 0–4)
SODIUM BLD-SCNC: 146 MMOL/L (ref 136–145)
SPECIFIC GRAVITY UA: 1.01
UROBILINOGEN, URINE: 0.2 E.U./DL
WBC UA: 531 /HPF (ref 0–5)

## 2017-07-17 RX ORDER — CIPROFLOXACIN 500 MG/1
500 TABLET, FILM COATED ORAL 2 TIMES DAILY
Qty: 14 TABLET | Refills: 0 | Status: SHIPPED | OUTPATIENT
Start: 2017-07-17 | End: 2017-07-24

## 2017-07-20 LAB
ORGANISM: ABNORMAL
URINE CULTURE, ROUTINE: ABNORMAL
URINE CULTURE, ROUTINE: ABNORMAL

## 2017-07-20 RX ORDER — SULFAMETHOXAZOLE AND TRIMETHOPRIM 800; 160 MG/1; MG/1
1 TABLET ORAL 2 TIMES DAILY
Qty: 14 TABLET | Refills: 0 | Status: SHIPPED | OUTPATIENT
Start: 2017-07-20 | End: 2017-07-27

## 2017-08-21 RX ORDER — HYDROCODONE BITARTRATE AND ACETAMINOPHEN 10; 325 MG/1; MG/1
TABLET ORAL
Qty: 180 TABLET | Refills: 0 | Status: SHIPPED | OUTPATIENT
Start: 2017-08-29 | End: 2017-09-18 | Stop reason: SDUPTHER

## 2017-09-18 RX ORDER — HYDROCODONE BITARTRATE AND ACETAMINOPHEN 10; 325 MG/1; MG/1
TABLET ORAL
Qty: 180 TABLET | Refills: 0 | Status: SHIPPED | OUTPATIENT
Start: 2017-09-28 | End: 2017-10-17 | Stop reason: SDUPTHER

## 2017-09-29 RX ORDER — OMEPRAZOLE 20 MG/1
20 CAPSULE, DELAYED RELEASE ORAL DAILY
COMMUNITY
End: 2017-10-02 | Stop reason: SDUPTHER

## 2017-09-29 RX ORDER — PROPRANOLOL HCL 60 MG
60 CAPSULE, EXTENDED RELEASE 24HR ORAL DAILY
COMMUNITY
End: 2017-10-02 | Stop reason: ALTCHOICE

## 2017-09-29 RX ORDER — ALPRAZOLAM 0.5 MG/1
0.5 TABLET ORAL 2 TIMES DAILY PRN
COMMUNITY
End: 2017-10-02 | Stop reason: SDUPTHER

## 2017-10-02 ENCOUNTER — OFFICE VISIT (OUTPATIENT)
Dept: FAMILY MEDICINE CLINIC | Age: 42
End: 2017-10-02
Payer: MEDICARE

## 2017-10-02 VITALS
HEART RATE: 86 BPM | SYSTOLIC BLOOD PRESSURE: 130 MMHG | HEIGHT: 75 IN | WEIGHT: 228 LBS | OXYGEN SATURATION: 97 % | TEMPERATURE: 97.6 F | DIASTOLIC BLOOD PRESSURE: 90 MMHG | BODY MASS INDEX: 28.35 KG/M2

## 2017-10-02 DIAGNOSIS — F51.04 CHRONIC INSOMNIA: ICD-10-CM

## 2017-10-02 DIAGNOSIS — I10 ESSENTIAL (PRIMARY) HYPERTENSION: ICD-10-CM

## 2017-10-02 DIAGNOSIS — N40.1 BENIGN NON-NODULAR PROSTATIC HYPERPLASIA WITH LOWER URINARY TRACT SYMPTOMS: Primary | ICD-10-CM

## 2017-10-02 DIAGNOSIS — K21.9 GASTROESOPHAGEAL REFLUX DISEASE WITHOUT ESOPHAGITIS: ICD-10-CM

## 2017-10-02 DIAGNOSIS — F41.8 DEPRESSION WITH ANXIETY: ICD-10-CM

## 2017-10-02 PROCEDURE — 99214 OFFICE O/P EST MOD 30 MIN: CPT | Performed by: FAMILY MEDICINE

## 2017-10-02 RX ORDER — HYDRALAZINE HYDROCHLORIDE 50 MG/1
50 TABLET, FILM COATED ORAL 3 TIMES DAILY
Qty: 90 TABLET | Refills: 3 | Status: SHIPPED | OUTPATIENT
Start: 2017-10-02 | End: 2018-01-25 | Stop reason: SDUPTHER

## 2017-10-02 RX ORDER — OMEPRAZOLE 20 MG/1
20 CAPSULE, DELAYED RELEASE ORAL DAILY
Qty: 90 CAPSULE | Refills: 3 | Status: SHIPPED | OUTPATIENT
Start: 2017-10-02 | End: 2018-01-09 | Stop reason: ALTCHOICE

## 2017-10-02 RX ORDER — ONDANSETRON 4 MG/1
4 TABLET, FILM COATED ORAL DAILY PRN
Qty: 30 TABLET | Refills: 2 | Status: SHIPPED | OUTPATIENT
Start: 2017-10-02 | End: 2018-12-24 | Stop reason: SDUPTHER

## 2017-10-02 RX ORDER — TAMSULOSIN HYDROCHLORIDE 0.4 MG/1
0.4 CAPSULE ORAL DAILY
Qty: 30 CAPSULE | Refills: 2 | Status: SHIPPED | OUTPATIENT
Start: 2017-10-02 | End: 2017-12-12 | Stop reason: SDUPTHER

## 2017-10-02 RX ORDER — ALPRAZOLAM 0.5 MG/1
0.5 TABLET ORAL 2 TIMES DAILY PRN
Qty: 60 TABLET | Refills: 2 | Status: SHIPPED | OUTPATIENT
Start: 2017-10-02 | End: 2018-01-09 | Stop reason: SDUPTHER

## 2017-10-02 NOTE — PROGRESS NOTES
[facility]  MEDICINE  [facility] [facility]  [address] [facility] [address]  Dept: [phone]  Dept Fax: [phone]  Loc: [phone]    Markus Sutton is a 38 y.o. male who presents today for his medical conditions/complaints as noted below. Markus Sutotn is here for Annual Exam        HPI:   CC: Here today to discuss the following:    He is requesting be placed on Flomax as that has helped his urine output in the past. He has multiple kidney and bladder issues related to his history of rectal cancer. He currently has a ureteral stent placed and states he typically expects better urine output. He has tried Flomax without adverse effect. Gastroesophageal Reflux Disease  Symptoms currently under control. Medication adequately controls his symptoms. No hematochezia or melena. He does continue to have issues associated with nausea and requesting a refill of Zofran    Insomnia  Currently stable. Satisfied with current treatment regimen. No adverse effects from medication. Compliant with his benzodiazepine medication. He states he takes his medication as needed. He abstains from alcohol while taking his medication. He denies drowsiness and impairment on his medication. Depression with Anxiety  Compliant with medications. No adverse effects from medication. Depression and anxiety symptoms are stable today. No suicidal or homicidal ideation. Excessive worry, insomnia, and anxiousness are stable. Energy, concentration, and apathy are stable. Hypertension  Compliant with medications. No adverse effects from medication. No lightheadedness, palpitations, or chest pain.     He has an appointment with his nephrologist next month        HPI    Past Medical History:   Diagnosis Date    Asthma     during winter months    Cancer [facility])     rectal    Hx of migraine headaches     Hypertension     Pericarditis     [facility] [address]     Testalgia 2/1/2016 Past Surgical History:   Procedure Laterality Date    ANTERIOR CRUCIATE LIGAMENT REPAIR  2007    ACL and MCL repair    COLON SURGERY      colon resection with colostomy    HERNIA REPAIR      As an infant    KIDNEY REMOVAL Left     URETER STENT PLACEMENT         Family History   Problem Relation Age of Onset    Heart Attack Mother     Cancer Mother      Liver and pancreatic    Diabetes Father     interval family history    Social History   Substance Use Topics    Smoking status: Never Smoker    Smokeless tobacco: Never Used    Alcohol use No      Current Outpatient Prescriptions   Medication Sig Dispense Refill    ALPRAZolam (XANAX) 0.5 MG tablet Take 1 tablet by mouth 2 times daily as needed for Sleep 60 tablet 2    hydrALAZINE (APRESOLINE) 50 MG tablet Take 1 tablet by mouth 3 times daily 90 tablet 3    omeprazole (PRILOSEC) 20 MG delayed release capsule Take 1 capsule by mouth Daily 90 capsule 3    ondansetron (ZOFRAN) 4 MG tablet Take 1 tablet by mouth daily as needed for Nausea or Vomiting 30 tablet 2    tamsulosin (FLOMAX) 0.4 MG capsule Take 1 capsule by mouth daily 30 capsule 2    HYDROcodone-acetaminophen (NORCO)  MG per tablet Take one tablet every 3-4 hours PRN pain (may fill 9/28/17). 180 tablet 0    amLODIPine (NORVASC) 10 MG tablet 10 mg daily       cloNIDine (CATAPRES) 0.2 MG tablet 0.2 mg nightly       naratriptan (AMERGE) 2.5 MG tablet   11    SUMAtriptan (IMITREX) 100 MG tablet   5     No current facility-administered medications for this visit. Allergies   Allergen Reactions    Morphine And Related      Doesn't work     Oxycodone-Acetaminophen      Give me migraines        Health Maintenance   Topic Date Due    HIV screen  08/11/1992    DTaP/Tdap/Td vaccine (1 - Tdap) 08/11/1996    Lipid screen  08/11/2017    Flu vaccine (1) 09/01/2017       Subjective:      Review of Systems  Review of Systems   Constitutional: Negative for chills and fever.    HENT: Negative for congestion. Respiratory: Negative for cough, chest tightness and shortness of breath. Cardiovascular: Negative for chest pain, palpitations and leg swelling. Gastrointestinal: Negative for abdominal pain, anal bleeding, constipation, diarrhea and nausea. Genitourinary: Negative for difficulty urinating. Psychiatric/Behavioral: Negative. See HPI for visit specific review of symptoms. All others negative      Objective:   BP (!) 130/90  Pulse 86  Temp 97.6 °F (36.4 °C)  Ht 6' 3\" (1.905 m)  Wt 228 lb (103.4 kg)  SpO2 97%  BMI 28.5 kg/m2  Physical Exam  Physical Exam   Constitutional: appears well-developed. does not appear ill. Eyes: Pupils are equal, round, and reactive to light. Neck: Normal range of motion. Neck supple. Cardiovascular: Normal rate and regular rhythm. Exam reveals no friction rub. No murmur heard. Pulmonary/Chest: Effort normal and breath sounds normal. No respiratory distress. He has no wheezes. no rales. Abdominal: Soft. Bowel sounds are normal. He exhibits no distension. There is no tenderness. There is no rebound and no guarding. Musculoskeletal: exhibits no edema. Neurological: alert. Psychiatric: normal mood and affect. behavior is normal.       No results found for this or any previous visit (from the past 672 hour(s)). Assessment & Plan: The following diagnoses and conditions are stable with no further orders unless indicated:  1. Benign non-nodular prostatic hyperplasia with lower urinary tract symptoms  - tamsulosin (FLOMAX) 0.4 MG capsule; Take 1 capsule by mouth daily  Dispense: 30 capsule; Refill: 2    2. Gastroesophageal reflux disease without esophagitis  Refill the following medications  - omeprazole (PRILOSEC) 20 MG delayed release capsule; Take 1 capsule by mouth Daily  Dispense: 90 capsule; Refill: 3  - ondansetron (ZOFRAN) 4 MG tablet;  Take 1 tablet by mouth daily as needed for Nausea or Vomiting  Dispense: 30 tablet; Refill: 2    3. Chronic insomnia  Continue with Xanax when necessary  Discussed risk and benefits of taking benzodiazepines. Risks include addiction and tolerance. If develops impairment or over sedation with medication, discontinue and contact me. Do not take medication with alcohol. Advised to take sparingly. Advised to keep medication hidden and locked up as theft is high with these medications as well. Discussed FDA warning on taking benzodiazepines and opioids concurrently. This includes sedation, respiratory suppression and possibly death. Do not consume alcohol while taking these medications. Recommend not taking these two medications together and only take as needed. Do not operate motor vehicle while on these medications. 4. Depression with anxiety  - ALPRAZolam (XANAX) 0.5 MG tablet; Take 1 tablet by mouth 2 times daily as needed for Sleep  Dispense: 60 tablet; Refill: 2    5. Essential (primary) hypertension  Refilled the following medications  - hydrALAZINE (APRESOLINE) 50 MG tablet; Take 1 tablet by mouth 3 times daily  Dispense: 90 tablet; Refill: 3  Blood pressure is borderline elevated today. Most likely related to his fluid retention. We'll continue to monitor. Return in about 3 months (around 1/2/2018) for AWV. Discussed use, benefit, and side effects of prescribed medications. All patient questions answered. Pt voiced understanding. Reviewed health maintenance. Instructed to continue current medications, diet and exercise. Patient agreed with treatment plan. Follow up as directed.

## 2017-10-17 ENCOUNTER — HOSPITAL ENCOUNTER (OUTPATIENT)
Dept: PAIN MANAGEMENT | Age: 42
Discharge: HOME | End: 2017-10-17
Payer: MEDICARE

## 2017-10-17 VITALS
HEIGHT: 75 IN | DIASTOLIC BLOOD PRESSURE: 96 MMHG | OXYGEN SATURATION: 98 % | HEART RATE: 91 BPM | WEIGHT: 228 LBS | BODY MASS INDEX: 28.35 KG/M2 | RESPIRATION RATE: 18 BRPM | SYSTOLIC BLOOD PRESSURE: 155 MMHG | TEMPERATURE: 97.7 F

## 2017-10-17 DIAGNOSIS — N50.819 TESTALGIA: ICD-10-CM

## 2017-10-17 PROCEDURE — 99213 OFFICE O/P EST LOW 20 MIN: CPT

## 2017-10-17 RX ORDER — HYDROCODONE BITARTRATE AND ACETAMINOPHEN 10; 325 MG/1; MG/1
TABLET ORAL
Qty: 180 TABLET | Refills: 0 | Status: SHIPPED | OUTPATIENT
Start: 2017-10-28 | End: 2017-11-22 | Stop reason: SDUPTHER

## 2017-10-17 ASSESSMENT — PAIN DESCRIPTION - PAIN TYPE: TYPE: CHRONIC PAIN

## 2017-10-17 ASSESSMENT — PAIN SCALES - GENERAL: PAINLEVEL_OUTOF10: 4

## 2017-10-17 ASSESSMENT — PAIN DESCRIPTION - FREQUENCY: FREQUENCY: CONTINUOUS

## 2017-10-17 ASSESSMENT — PAIN DESCRIPTION - DESCRIPTORS: DESCRIPTORS: ACHING;CONSTANT;PRESSURE

## 2017-10-17 ASSESSMENT — PAIN DESCRIPTION - LOCATION: LOCATION: FLANK

## 2017-10-17 ASSESSMENT — PAIN DESCRIPTION - PROGRESSION: CLINICAL_PROGRESSION: NOT CHANGED

## 2017-10-17 ASSESSMENT — PAIN DESCRIPTION - ONSET: ONSET: ON-GOING

## 2017-10-17 ASSESSMENT — ACTIVITIES OF DAILY LIVING (ADL): EFFECT OF PAIN ON DAILY ACTIVITIES: LIMITS ACTIVITY

## 2017-10-17 ASSESSMENT — PAIN DESCRIPTION - ORIENTATION: ORIENTATION: RIGHT;LEFT

## 2017-10-17 NOTE — PROGRESS NOTES
Nursing Admission Record    Current Issues / Falls / ER Visits:  No    Percentage of Pain Relief after Last Procedure:  na %    How long lasted:  0 days    Radiology exams received during the last 12 months: No       When na                                              Where na       Imaging on chart: No         Imaging records requested: No  MRI exams received in the past 2 years:  No  Physical therapy during the last 6 months: No       When: na                                             Where  na  Labs during the last 12 months: Yes    Education Provided:  [x] Review of [place]  [x] Agreement Review  [x] Compliance Issues Discussed    [] Cognitive Behavior Needs [x] Exercise [] Review of Test [] Financial Issues  [] Tobacco/Alcohol Use [x] Teaching [] New Patient [] Picture Obtained    Physician Plan:  [] Outgoing Referral  [] Pharmacy Consult  [] Test Ordered   [] Obtained Test Results / Consult Notes  [] UDS due at next visit, verified per EPIC      [] Suspected Physical Abuse or Suicide Risk assessed - IF YES COMPLETE QUESTIONS BELOW    If any of the following questions are answered yes - contact attending physician for referral:    Has been considering harming self to escape stress, pain problems? [] YES  [x] NO  Has a suicide plan? [] YES  [x] NO  Has attempted suicide in the past?   [] YES  [x] NO  Has a close friend or family member who committed suicide? [] YES  [x] NO    Patient Referred To :      Additional Notes:    Assessment Completed by:  Electronically signed by Ann Wooten RN on 10/17/2017 at 11:02 AM

## 2017-10-17 NOTE — PROGRESS NOTES
[facility] Villatoro/Baljinder  Patient Pain Assessment    Primary / Referring Physician: Mojgan Rubio MD    Chief complaint:   Chief Complaint   Patient presents with    Testicle Pain     bilateral    Flank Pain     bilateral       History of Present Illness -   Brittany Owens is a 38 y.o. male that presents to the office for follow-up of testicular and flank pain. He says that he is to have the stent replaced in November. Says that his pain medication controls the pain. He says that the testicular pain increases in cold weather. Current Pain Assessment  Pain Assessment  Pain Assessment: 0-10  Pain Level: 4  Pain Type: Chronic pain  Pain Location: Flank (testicular pain)  Pain Orientation: Right, Left  Pain Radiating Towards: urination makes it worse  Pain Descriptors: Aching, Constant, Pressure  Pain Frequency: Continuous  Pain Onset: On-going  Clinical Progression: Not changed  Effect of Pain on Daily Activities: limits activity  Patient's Stated Pain Goal: No pain  Pain Intervention(s): Medication (see eMar), Repositioned, Rest  Response to Pain Intervention: Patient Satisfied  Multiple Pain Sites: Yes      Pain medication effectiveness:   Reports that pain medication helps to increase activities of daily living    Issues of concern (physical, mental, social) or changes in condition since last visit per patient report: none    BMI: Body mass index is 28.5 kg/m². Activity: discussed exercise as beneficial to pain reduction, encouraged stretching exercise with a focus on torso strengthening.      Acute Bowel or Bladder Changes: no    Side Effects of Medication: no    Social History  Social History     Social History    Marital status:      Spouse name: Sevier Living Number of children: N/A    Years of education: N/A     Social History Main Topics    Smoking status: Never Smoker    Smokeless tobacco: Never Used    Alcohol use No    Drug use: No    Sexual activity: Not Asked     Other Topics Concern    None     Social History Narrative    None      reports that he does not use drugs. History   Alcohol Use No       Tobacco Use:    none    Review of Systems:  Other than stated above in the HPI, is negative     UDS done today? no  Are you currently pregnant? no     Daniel Ochoa compliant? yes  Concern for prescription abuse? no          Past Medical History      Diagnosis Date    Asthma     during winter months    Cancer [facility])     rectal    Hx of migraine headaches     Hypertension     Pericarditis     Rectal cancer ([facility] [address]     Testalgia 2/1/2016       Surgical History  Past Surgical History:   Procedure Laterality Date    ANTERIOR CRUCIATE LIGAMENT REPAIR  2007    ACL and MCL repair    COLON SURGERY      colon resection with colostomy    HERNIA REPAIR      As an infant    KIDNEY REMOVAL Left     URETER STENT PLACEMENT         Medications  Current Outpatient Prescriptions   Medication Sig Dispense Refill    ALPRAZolam (XANAX) 0.5 MG tablet Take 1 tablet by mouth 2 times daily as needed for Sleep 60 tablet 2    hydrALAZINE (APRESOLINE) 50 MG tablet Take 1 tablet by mouth 3 times daily 90 tablet 3    omeprazole (PRILOSEC) 20 MG delayed release capsule Take 1 capsule by mouth Daily 90 capsule 3    ondansetron (ZOFRAN) 4 MG tablet Take 1 tablet by mouth daily as needed for Nausea or Vomiting 30 tablet 2    tamsulosin (FLOMAX) 0.4 MG capsule Take 1 capsule by mouth daily 30 capsule 2    HYDROcodone-acetaminophen (NORCO)  MG per tablet Take one tablet every 3-4 hours PRN pain (may fill 9/28/17). 180 tablet 0    amLODIPine (NORVASC) 10 MG tablet 10 mg daily       cloNIDine (CATAPRES) 0.2 MG tablet 0.2 mg nightly       naratriptan (AMERGE) 2.5 MG tablet   11    SUMAtriptan (IMITREX) 100 MG tablet   5     No current facility-administered medications for this encounter.           Allergies  Morphine and related and Oxycodone-acetaminophen    Family History  family history includes Cancer in Needs [x] Exercise [] Review of Test [] Financial Issues  [] Tobacco/Alcohol Use [x] Teaching [] New Patient [] Picture Obtained    Controlled Substance Monitoring:   Discussed with patient possible medication side effects, risk of tolerance and/or dependence, and alternative treatments. Discussed the growing epidemic in the [address] [place] [address] with the overprescribing and at times the abuse of narcotics. Discussed the detrimental effects of long term narcotic use in younger patients. Patient encouraged to set daily goals of exercising and decreasing daily narcotic intake. Discussed with the patient about the development of hyperalgesia with long term narcotic intake.      Electronically signed by CALLY Ibarra on 10/17/2017 at 11:14 AM

## 2017-10-24 ENCOUNTER — TELEPHONE (OUTPATIENT)
Dept: FAMILY MEDICINE CLINIC | Age: 42
End: 2017-10-24

## 2017-10-24 NOTE — TELEPHONE ENCOUNTER
Wife called and states pt is experiencing bloating and feels like he is getting backed up. Pt is having output in his colostomy but not a lot. Is there anything you can recommend to help so he does not have to go to [address] [place] [address]?

## 2017-11-15 RX ORDER — AMLODIPINE BESYLATE 10 MG/1
TABLET ORAL
Qty: 30 TABLET | Refills: 5 | Status: SHIPPED | OUTPATIENT
Start: 2017-11-15 | End: 2018-06-21 | Stop reason: SDUPTHER

## 2017-11-15 RX ORDER — CLONIDINE HYDROCHLORIDE 0.2 MG/1
TABLET ORAL
Qty: 30 TABLET | Refills: 5 | Status: SHIPPED | OUTPATIENT
Start: 2017-11-15 | End: 2018-05-18 | Stop reason: SDUPTHER

## 2017-11-27 RX ORDER — HYDROCODONE BITARTRATE AND ACETAMINOPHEN 10; 325 MG/1; MG/1
TABLET ORAL
Qty: 180 TABLET | Refills: 0 | Status: SHIPPED | OUTPATIENT
Start: 2017-11-27 | End: 2017-12-14 | Stop reason: SDUPTHER

## 2017-12-18 RX ORDER — HYDROCODONE BITARTRATE AND ACETAMINOPHEN 10; 325 MG/1; MG/1
TABLET ORAL
Qty: 180 TABLET | Refills: 0 | Status: SHIPPED | OUTPATIENT
Start: 2017-12-27 | End: 2018-01-17 | Stop reason: SDUPTHER

## 2018-01-09 ENCOUNTER — OFFICE VISIT (OUTPATIENT)
Dept: FAMILY MEDICINE CLINIC | Age: 43
End: 2018-01-09

## 2018-01-09 VITALS
WEIGHT: 227 LBS | RESPIRATION RATE: 18 BRPM | DIASTOLIC BLOOD PRESSURE: 98 MMHG | OXYGEN SATURATION: 98 % | HEART RATE: 81 BPM | TEMPERATURE: 98.8 F | BODY MASS INDEX: 28.37 KG/M2 | SYSTOLIC BLOOD PRESSURE: 138 MMHG

## 2018-01-09 DIAGNOSIS — F41.1 GENERALIZED ANXIETY DISORDER: Primary | ICD-10-CM

## 2018-01-09 DIAGNOSIS — G43.709 CHRONIC MIGRAINE WITHOUT AURA WITHOUT STATUS MIGRAINOSUS, NOT INTRACTABLE: ICD-10-CM

## 2018-01-09 DIAGNOSIS — N18.30 CKD (CHRONIC KIDNEY DISEASE) STAGE 3, GFR 30-59 ML/MIN (HCC): ICD-10-CM

## 2018-01-09 DIAGNOSIS — N40.1 BENIGN NON-NODULAR PROSTATIC HYPERPLASIA WITH LOWER URINARY TRACT SYMPTOMS: ICD-10-CM

## 2018-01-09 DIAGNOSIS — F41.8 DEPRESSION WITH ANXIETY: ICD-10-CM

## 2018-01-09 DIAGNOSIS — K21.9 GASTROESOPHAGEAL REFLUX DISEASE WITHOUT ESOPHAGITIS: ICD-10-CM

## 2018-01-09 DIAGNOSIS — I10 ESSENTIAL (PRIMARY) HYPERTENSION: ICD-10-CM

## 2018-01-09 PROCEDURE — 1036F TOBACCO NON-USER: CPT | Performed by: FAMILY MEDICINE

## 2018-01-09 PROCEDURE — G8427 DOCREV CUR MEDS BY ELIG CLIN: HCPCS | Performed by: FAMILY MEDICINE

## 2018-01-09 PROCEDURE — 99214 OFFICE O/P EST MOD 30 MIN: CPT | Performed by: FAMILY MEDICINE

## 2018-01-09 PROCEDURE — 90686 IIV4 VACC NO PRSV 0.5 ML IM: CPT | Performed by: FAMILY MEDICINE

## 2018-01-09 PROCEDURE — G8484 FLU IMMUNIZE NO ADMIN: HCPCS | Performed by: FAMILY MEDICINE

## 2018-01-09 PROCEDURE — G0008 ADMIN INFLUENZA VIRUS VAC: HCPCS | Performed by: FAMILY MEDICINE

## 2018-01-09 PROCEDURE — G8419 CALC BMI OUT NRM PARAM NOF/U: HCPCS | Performed by: FAMILY MEDICINE

## 2018-01-09 RX ORDER — ALPRAZOLAM 0.5 MG/1
0.5 TABLET ORAL 2 TIMES DAILY PRN
Qty: 60 TABLET | Refills: 2 | Status: SHIPPED | OUTPATIENT
Start: 2018-01-09 | End: 2018-02-08

## 2018-01-09 RX ORDER — TAMSULOSIN HYDROCHLORIDE 0.4 MG/1
0.4 CAPSULE ORAL DAILY
Qty: 30 CAPSULE | Refills: 5 | Status: SHIPPED | OUTPATIENT
Start: 2018-01-09 | End: 2018-07-20 | Stop reason: SDUPTHER

## 2018-01-09 RX ORDER — CARVEDILOL 6.25 MG/1
6.25 TABLET ORAL DAILY
Qty: 30 TABLET | Refills: 5 | Status: SHIPPED | OUTPATIENT
Start: 2018-01-09 | End: 2019-07-10

## 2018-01-09 RX ORDER — DOCUSATE SODIUM 100 MG/1
100 CAPSULE, LIQUID FILLED ORAL DAILY
Refills: 10 | COMMUNITY
Start: 2017-12-29 | End: 2018-02-25 | Stop reason: SDUPTHER

## 2018-01-09 RX ORDER — CARVEDILOL 6.25 MG/1
6.25 TABLET ORAL DAILY
Qty: 30 TABLET | Refills: 5 | Status: SHIPPED | OUTPATIENT
Start: 2018-01-09 | End: 2018-01-09 | Stop reason: SDUPTHER

## 2018-01-09 RX ORDER — ALPRAZOLAM 0.5 MG/1
0.5 TABLET ORAL 2 TIMES DAILY PRN
Qty: 60 TABLET | Refills: 2 | Status: SHIPPED | OUTPATIENT
Start: 2018-01-09 | End: 2018-01-09 | Stop reason: SDUPTHER

## 2018-01-09 RX ORDER — TAMSULOSIN HYDROCHLORIDE 0.4 MG/1
0.4 CAPSULE ORAL DAILY
Qty: 30 CAPSULE | Refills: 5 | Status: SHIPPED | OUTPATIENT
Start: 2018-01-09 | End: 2018-01-09 | Stop reason: SDUPTHER

## 2018-01-09 NOTE — PROGRESS NOTES
[facility] [address] [place], [place] [address]jose alfredo [address]  Dept: [phone]  Dept Fax: [phone]  Loc: [phone]    Darlene Campos is a 38 y.o. male who presents today for his medical conditions/complaints as noted below. Darlene Campos is here for 3 Month Follow-Up        HPI:   CC: Here today to discuss the following:    Hypertension  Compliant with medications. No adverse effects from medication. No lightheadedness, palpitations, or chest pain. Anxiety  Compliant with medications. No adverse effects from medication. No panic or anxiety attacks since last visit. Symptoms are controlled. No excessive worry or insomnia. No issues with depression    Compliant with his benzodiazepine medication. He states he takes his medication as needed. He abstains from alcohol while taking his medication. He denies drowsiness and impairment on his medication. Insomnia  Currently stable. Satisfied with current treatment regimen. No adverse effects from medication. Benign Prostatic Hypertrophy  Tolerating medication without adverse effects. Symptoms of hesitancy, nocturia, and dribbling are adequately controlled. No hematuria or dysuria    Gastroesophageal Reflux Disease  Symptoms currently under control. Medication adequately controls his symptoms. No hematochezia or melena.      Discontinue Prilosec due to concerns over possible worsening renal failure    HPI    Past Medical History:   Diagnosis Date    Asthma     during winter months    Cancer [facility])     rectal    Hx of migraine headaches     Hypertension     Pericarditis     [facility]     Testalgia 2/1/2016      Past Surgical History:   Procedure Laterality Date    ANTERIOR CRUCIATE LIGAMENT REPAIR  2007    ACL and MCL repair    COLON SURGERY      colon resection with colostomy    HERNIA REPAIR      As an infant    KIDNEY REMOVAL Left     URETER [address] [facility] [address] Family History   Problem Relation Age of Onset    Heart Attack Mother     Cancer Mother      Liver and pancreatic    Diabetes Father        Social History   Substance Use Topics    Smoking status: Never Smoker    Smokeless tobacco: Never Used    Alcohol use No      Current Outpatient Prescriptions   Medication Sig Dispense Refill    tamsulosin (FLOMAX) 0.4 MG capsule Take 1 capsule by mouth daily 30 capsule 5    carvedilol (COREG) 6.25 MG tablet Take 1 tablet by mouth daily 30 tablet 5    ALPRAZolam (XANAX) 0.5 MG tablet Take 1 tablet by mouth 2 times daily as needed for Sleep for up to 30 days. 60 tablet 2    HYDROcodone-acetaminophen (NORCO)  MG per tablet Take one tablet every 3-4 hours PRN pain month supply(may fill 12/27/17). 180 tablet 0    amLODIPine (NORVASC) 10 MG tablet TAKE 1 TABLET DAILY 30 tablet 5    cloNIDine (CATAPRES) 0.2 MG tablet TAKE AS DIRECTED AS NEEDED. 30 tablet 5    hydrALAZINE (APRESOLINE) 50 MG tablet Take 1 tablet by mouth 3 times daily 90 tablet 3    omeprazole (PRILOSEC) 20 MG delayed release capsule Take 1 capsule by mouth Daily 90 capsule 3    ondansetron (ZOFRAN) 4 MG tablet Take 1 tablet by mouth daily as needed for Nausea or Vomiting 30 tablet 2    naratriptan (AMERGE) 2.5 MG tablet   11    SUMAtriptan (IMITREX) 100 MG tablet   5    docusate sodium (COLACE) 100 MG capsule Take 100 mg by mouth daily  10     No current facility-administered medications for this visit.       Allergies   Allergen Reactions    Morphine And Related      Doesn't work     Oxycodone-Acetaminophen      Give me migraines        Health Maintenance   Topic Date Due    HIV screen  08/11/1992    Lipid screen  08/11/2017    DTaP/Tdap/Td vaccine (1 - Tdap) 10/01/2018 (Originally 8/11/1996)    Flu vaccine (1) 10/01/2018 (Originally 9/1/2017)    Potassium monitoring  07/17/2018    Creatinine monitoring  07/17/2018       Subjective:      Review of Systems    Review of Systems

## 2018-01-10 PROBLEM — N18.30 CKD (CHRONIC KIDNEY DISEASE) STAGE 3, GFR 30-59 ML/MIN (HCC): Status: ACTIVE | Noted: 2018-01-10

## 2018-01-10 PROBLEM — G43.709 CHRONIC MIGRAINE WITHOUT AURA WITHOUT STATUS MIGRAINOSUS, NOT INTRACTABLE: Status: ACTIVE | Noted: 2018-01-10

## 2018-01-10 PROBLEM — F41.1 GENERALIZED ANXIETY DISORDER: Status: ACTIVE | Noted: 2018-01-10

## 2018-01-17 ENCOUNTER — HOSPITAL ENCOUNTER (OUTPATIENT)
Dept: PAIN MANAGEMENT | Age: 43
Discharge: HOME | End: 2018-01-17
Payer: MEDICARE

## 2018-01-17 VITALS
WEIGHT: 225 LBS | SYSTOLIC BLOOD PRESSURE: 140 MMHG | BODY MASS INDEX: 27.98 KG/M2 | TEMPERATURE: 97.5 F | HEART RATE: 80 BPM | HEIGHT: 75 IN | RESPIRATION RATE: 16 BRPM | OXYGEN SATURATION: 98 % | DIASTOLIC BLOOD PRESSURE: 94 MMHG

## 2018-01-17 DIAGNOSIS — N50.819 TESTALGIA: ICD-10-CM

## 2018-01-17 PROCEDURE — 80307 DRUG TEST PRSMV CHEM ANLYZR: CPT

## 2018-01-17 PROCEDURE — 99214 OFFICE O/P EST MOD 30 MIN: CPT

## 2018-01-17 RX ORDER — HYDROCODONE BITARTRATE AND ACETAMINOPHEN 10; 325 MG/1; MG/1
TABLET ORAL
Qty: 180 TABLET | Refills: 0 | Status: SHIPPED | OUTPATIENT
Start: 2018-01-26 | End: 2018-02-19 | Stop reason: SDUPTHER

## 2018-01-17 ASSESSMENT — PAIN DESCRIPTION - PROGRESSION: CLINICAL_PROGRESSION: NOT CHANGED

## 2018-01-17 ASSESSMENT — PAIN DESCRIPTION - PAIN TYPE: TYPE: CHRONIC PAIN

## 2018-01-17 ASSESSMENT — PAIN DESCRIPTION - LOCATION: LOCATION: SCROTUM;FLANK

## 2018-01-17 ASSESSMENT — ACTIVITIES OF DAILY LIVING (ADL): EFFECT OF PAIN ON DAILY ACTIVITIES: DAILY CHORES AND ACTIVIITES

## 2018-01-17 ASSESSMENT — PAIN DESCRIPTION - ORIENTATION: ORIENTATION: RIGHT;LEFT

## 2018-01-17 ASSESSMENT — PAIN DESCRIPTION - FREQUENCY: FREQUENCY: CONTINUOUS

## 2018-01-17 ASSESSMENT — PAIN DESCRIPTION - ONSET: ONSET: ON-GOING

## 2018-01-17 ASSESSMENT — PAIN DESCRIPTION - DIRECTION: RADIATING_TOWARDS: DOWN THE RIGHT LEG TO THE KNEE

## 2018-01-17 ASSESSMENT — PAIN SCALES - GENERAL: PAINLEVEL_OUTOF10: 4

## 2018-01-17 NOTE — PROGRESS NOTES
daily 30 capsule 5    amLODIPine (NORVASC) 10 MG tablet TAKE 1 TABLET DAILY 30 tablet 5    cloNIDine (CATAPRES) 0.2 MG tablet TAKE AS DIRECTED AS NEEDED. 30 tablet 5    hydrALAZINE (APRESOLINE) 50 MG tablet Take 1 tablet by mouth 3 times daily 90 tablet 3    ondansetron (ZOFRAN) 4 MG tablet Take 1 tablet by mouth daily as needed for Nausea or Vomiting 30 tablet 2    naratriptan (AMERGE) 2.5 MG tablet   11    SUMAtriptan (IMITREX) 100 MG tablet   5     No current facility-administered medications for this encounter. Allergies  Morphine and related and Oxycodone-acetaminophen    Family History  family history includes Cancer in his mother; Diabetes in his father; Heart Attack in his mother. Social History  Social History     Social History    Marital status:      Spouse name: Ilsa Rios Number of children: N/A    Years of education: N/A     Social History Main Topics    Smoking status: Never Smoker    Smokeless tobacco: Never Used    Alcohol use No    Drug use: No    Sexual activity: Not Asked     Other Topics Concern    None     Social History Narrative    None      reports that he does not use drugs. REVIEW OF SYSTEMS:  ROS         GENERAL PHYSICAL EXAM:  Vitals: BP (!) 140/94   Pulse 80   Temp 97.5 °F (36.4 °C) (Temporal)   Resp 16   Ht 6' 3\" (1.905 m)   Wt 225 lb (102.1 kg)   SpO2 98%   BMI 28.12 kg/m² , Body mass index is 28.12 kg/m². Physical Exam Ortho Exam    DATA  Labs: Barbiturates   Date Value Ref Range Status   09/20/2013 NEGATIVE  Final     Benzodiazepines   Date Value Ref Range Status   09/20/2013 NEGATIVE  Final        Imaging:  Radiology Images and Reports reviewed where indicated and necessary    ASSESSMENT  Willian Michaud is a 38 y.o. male with bilateral flank and testicle pain that radiates down the left leg to the knee. Patient states that pain is increased with bending forward.  Patient has been going to [place], [place] every 3 months with Renal Stent placement. Patient states that he is having Hematuria. Patient states that he also has swelling in his testicles. Patient has history of Rectal Cancer. Patient has higher risk of recurrence of Cancer. Patient states that he was informed that he has extensive scar tissue, that is wrapped around his ureter and his bladder. Informed patient that scar tissue has no nerve innervation, and would not be a source of his pain. Patient states that he was informed he is not a candidate for removal of scar tissue in relation to high risk. 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 Discussed with the patient about the possible placement of a Dorsal Column Stimulator. Patient has failed conservative treatment with injections and medications. Discussed with the patient about the process of undergoing Trial Dorsal Column Stimulator placement. Informed patient that the success rate is 70%  Vs 30% failure. Informed patient that if success with the trial a permanent Dorsal Column Stimulator can be placed that can lasts up to 7 years. Informed patient about the targeted area for the permanent placement with the patient. Discussed with the patient that he would be an ideal candidate for this procedure in relation to his extensive Neuralgia. Patient is hesitant at this time in relation to surgeries that he has had to undergo in the past.  Informed patient that he will have to undergo Psychology Evaluation prior to proceeding with a Trial Dorsal Column Stimulator. Patient given literature in relation to the Dorsal Column Stimulator. Discussed the detrimental effects of long term narcotics. Patient states that he is having Stent replacement in a month. Patient informed to call if he wants to proceed with this. Patient will need Thoracic MRI and Psych Evaluation.      PLAN  3 month       Electronically signed by John Cassidy RN on 1/17/2018 at 10:00 AM

## 2018-01-25 DIAGNOSIS — I10 ESSENTIAL (PRIMARY) HYPERTENSION: ICD-10-CM

## 2018-01-25 LAB
AMPHETAMINES, URINE: NEGATIVE NG/ML
BARBITURATES, URINE: NEGATIVE NG/ML
BENZODIAZEPINES, URINE: NEGATIVE NG/ML
CANNABINOIDS, URINE: NEGATIVE NG/ML
COCAINE METABOLITE, URINE: NEGATIVE NG/ML
CODEINE, URINE: NEGATIVE
CREATININE, URINE: 85 MG/DL (ref 20–300)
ETHANOL U, QUAN: NEGATIVE %
FENTANYL URINE: NEGATIVE PG/ML
HYDROCODONE, UR CONF: 1240 NG/ML
HYDROCODONE, URINE: POSITIVE
HYDROMORPHONE, UR CONF: 732 NG/ML
HYDROMORPHONE, URINE: POSITIVE
MEPERIDINE, UR: NEGATIVE NG/ML
METHADONE SCREEN, URINE: NEGATIVE NG/ML
MORPHINE URINE: NEGATIVE
OPIATES, URINE: ABNORMAL NG/ML
OPIATES, URINE: POSITIVE NG/ML
OXYCODONE/OXYMORPHONE, UR: NEGATIVE NG/ML
PH, URINE: 5.6 (ref 4.5–8.9)
PHENCYCLIDINE, URINE: NEGATIVE NG/ML
PROPOXYPHENE, URINE: NEGATIVE NG/ML

## 2018-01-25 RX ORDER — HYDRALAZINE HYDROCHLORIDE 50 MG/1
50 TABLET, FILM COATED ORAL 3 TIMES DAILY
Qty: 90 TABLET | Refills: 3 | Status: SHIPPED | OUTPATIENT
Start: 2018-01-25 | End: 2019-01-04 | Stop reason: SDUPTHER

## 2018-02-19 RX ORDER — HYDROCODONE BITARTRATE AND ACETAMINOPHEN 10; 325 MG/1; MG/1
TABLET ORAL
Qty: 180 TABLET | Refills: 0 | Status: SHIPPED | OUTPATIENT
Start: 2018-02-25 | End: 2018-03-20 | Stop reason: SDUPTHER

## 2018-03-21 RX ORDER — HYDROCODONE BITARTRATE AND ACETAMINOPHEN 10; 325 MG/1; MG/1
TABLET ORAL
Qty: 180 TABLET | Refills: 0 | Status: SHIPPED | OUTPATIENT
Start: 2018-03-27 | End: 2018-04-18 | Stop reason: SDUPTHER

## 2018-04-18 ENCOUNTER — HOSPITAL ENCOUNTER (OUTPATIENT)
Dept: PAIN MANAGEMENT | Age: 43
Discharge: HOME | End: 2018-04-18
Payer: MEDICARE

## 2018-04-18 VITALS
DIASTOLIC BLOOD PRESSURE: 105 MMHG | TEMPERATURE: 97.7 F | SYSTOLIC BLOOD PRESSURE: 152 MMHG | RESPIRATION RATE: 18 BRPM | WEIGHT: 228 LBS | BODY MASS INDEX: 28.35 KG/M2 | HEART RATE: 84 BPM | HEIGHT: 75 IN | OXYGEN SATURATION: 99 %

## 2018-04-18 DIAGNOSIS — N50.819 TESTALGIA: ICD-10-CM

## 2018-04-18 PROCEDURE — 99212 OFFICE O/P EST SF 10 MIN: CPT

## 2018-04-18 ASSESSMENT — PAIN DESCRIPTION - PAIN TYPE: TYPE: CHRONIC PAIN

## 2018-04-18 ASSESSMENT — PAIN DESCRIPTION - ONSET: ONSET: ON-GOING

## 2018-04-18 ASSESSMENT — PAIN DESCRIPTION - LOCATION: LOCATION: SCROTUM

## 2018-04-18 ASSESSMENT — PAIN DESCRIPTION - ORIENTATION: ORIENTATION: RIGHT;LEFT

## 2018-04-18 ASSESSMENT — PAIN DESCRIPTION - FREQUENCY: FREQUENCY: CONTINUOUS

## 2018-04-18 ASSESSMENT — ACTIVITIES OF DAILY LIVING (ADL): EFFECT OF PAIN ON DAILY ACTIVITIES: LIMITS ACTIVIITES

## 2018-04-18 ASSESSMENT — PAIN DESCRIPTION - PROGRESSION: CLINICAL_PROGRESSION: NOT CHANGED

## 2018-04-18 ASSESSMENT — PAIN SCALES - GENERAL: PAINLEVEL_OUTOF10: 4

## 2018-04-20 RX ORDER — HYDROCODONE BITARTRATE AND ACETAMINOPHEN 10; 325 MG/1; MG/1
TABLET ORAL
Qty: 180 TABLET | Refills: 0 | Status: SHIPPED | OUTPATIENT
Start: 2018-04-26 | End: 2018-05-21 | Stop reason: SDUPTHER

## 2018-05-18 RX ORDER — CLONIDINE HYDROCHLORIDE 0.2 MG/1
TABLET ORAL
Qty: 30 TABLET | Refills: 5 | Status: SHIPPED | OUTPATIENT
Start: 2018-05-18 | End: 2019-02-10 | Stop reason: SDUPTHER

## 2018-05-21 RX ORDER — HYDROCODONE BITARTRATE AND ACETAMINOPHEN 10; 325 MG/1; MG/1
TABLET ORAL
Qty: 180 TABLET | Refills: 0 | Status: SHIPPED | OUTPATIENT
Start: 2018-05-26 | End: 2018-06-19 | Stop reason: SDUPTHER

## 2018-06-19 DIAGNOSIS — N50.819 TESTALGIA: Primary | ICD-10-CM

## 2018-06-19 RX ORDER — HYDROCODONE BITARTRATE AND ACETAMINOPHEN 10; 325 MG/1; MG/1
TABLET ORAL
Qty: 180 TABLET | Refills: 0 | Status: SHIPPED | OUTPATIENT
Start: 2018-06-25 | End: 2018-07-19 | Stop reason: SDUPTHER

## 2018-06-21 RX ORDER — AMLODIPINE BESYLATE 10 MG/1
TABLET ORAL
Qty: 30 TABLET | Refills: 5 | Status: SHIPPED | OUTPATIENT
Start: 2018-06-21 | End: 2018-12-22 | Stop reason: SDUPTHER

## 2018-06-26 RX ORDER — SUMATRIPTAN 100 MG/1
TABLET, FILM COATED ORAL
Qty: 30 TABLET | Refills: 1 | Status: SHIPPED | OUTPATIENT
Start: 2018-06-26 | End: 2019-07-02 | Stop reason: SDUPTHER

## 2018-06-26 RX ORDER — NARATRIPTAN 2.5 MG/1
TABLET ORAL
Qty: 9 TABLET | Refills: 6 | Status: SHIPPED | OUTPATIENT
Start: 2018-06-26 | End: 2019-07-02 | Stop reason: SDUPTHER

## 2018-07-09 ENCOUNTER — OFFICE VISIT (OUTPATIENT)
Dept: FAMILY MEDICINE CLINIC | Age: 43
End: 2018-07-09
Payer: MEDICARE

## 2018-07-09 VITALS
HEART RATE: 86 BPM | RESPIRATION RATE: 18 BRPM | SYSTOLIC BLOOD PRESSURE: 136 MMHG | BODY MASS INDEX: 30 KG/M2 | WEIGHT: 240 LBS | TEMPERATURE: 97.8 F | OXYGEN SATURATION: 97 % | DIASTOLIC BLOOD PRESSURE: 88 MMHG

## 2018-07-09 DIAGNOSIS — N40.1 BENIGN NON-NODULAR PROSTATIC HYPERPLASIA WITH LOWER URINARY TRACT SYMPTOMS: ICD-10-CM

## 2018-07-09 DIAGNOSIS — F41.8 DEPRESSION WITH ANXIETY: ICD-10-CM

## 2018-07-09 DIAGNOSIS — N18.30 CKD (CHRONIC KIDNEY DISEASE) STAGE 3, GFR 30-59 ML/MIN (HCC): ICD-10-CM

## 2018-07-09 DIAGNOSIS — G43.709 CHRONIC MIGRAINE WITHOUT AURA WITHOUT STATUS MIGRAINOSUS, NOT INTRACTABLE: ICD-10-CM

## 2018-07-09 DIAGNOSIS — Z13.220 LIPID SCREENING: ICD-10-CM

## 2018-07-09 DIAGNOSIS — K21.9 GASTROESOPHAGEAL REFLUX DISEASE WITHOUT ESOPHAGITIS: ICD-10-CM

## 2018-07-09 DIAGNOSIS — E66.09 CLASS 1 OBESITY DUE TO EXCESS CALORIES WITHOUT SERIOUS COMORBIDITY WITH BODY MASS INDEX (BMI) OF 30.0 TO 30.9 IN ADULT: ICD-10-CM

## 2018-07-09 DIAGNOSIS — K59.03 DRUG-INDUCED CONSTIPATION: ICD-10-CM

## 2018-07-09 DIAGNOSIS — F51.04 CHRONIC INSOMNIA: Primary | ICD-10-CM

## 2018-07-09 DIAGNOSIS — I10 ESSENTIAL (PRIMARY) HYPERTENSION: ICD-10-CM

## 2018-07-09 PROCEDURE — 99214 OFFICE O/P EST MOD 30 MIN: CPT | Performed by: FAMILY MEDICINE

## 2018-07-09 PROCEDURE — G8417 CALC BMI ABV UP PARAM F/U: HCPCS | Performed by: FAMILY MEDICINE

## 2018-07-09 PROCEDURE — G8427 DOCREV CUR MEDS BY ELIG CLIN: HCPCS | Performed by: FAMILY MEDICINE

## 2018-07-09 PROCEDURE — 1036F TOBACCO NON-USER: CPT | Performed by: FAMILY MEDICINE

## 2018-07-09 RX ORDER — ALPRAZOLAM 0.5 MG/1
0.5 TABLET ORAL 2 TIMES DAILY
Qty: 60 TABLET | Refills: 2 | Status: SHIPPED | OUTPATIENT
Start: 2018-07-09 | End: 2019-02-12 | Stop reason: SDUPTHER

## 2018-07-09 RX ORDER — ALPRAZOLAM 0.5 MG/1
TABLET ORAL
Refills: 2 | COMMUNITY
Start: 2018-07-01 | End: 2018-07-09 | Stop reason: SDUPTHER

## 2018-07-09 ASSESSMENT — ENCOUNTER SYMPTOMS
NAUSEA: 0
ANAL BLEEDING: 0
DIARRHEA: 0
CHEST TIGHTNESS: 0
ABDOMINAL PAIN: 0
CONSTIPATION: 0
SHORTNESS OF BREATH: 0
COUGH: 0

## 2018-07-09 NOTE — PROGRESS NOTES
[facility]  MEDICINE  [facility]  [address] [facility] [place] [address]  Dept: [phone]  Dept Fax: [phone]  Loc: [phone]    Rodolfo Portillo is a 38 y.o. male who presents today for his medical conditions/complaints as noted below. Rodolfo Portillo is here for Hypertension        HPI:   CC: Here today to discuss the following:    Ureteral Stent exchange later this week at [address] [place] [address]. Hypertension  Compliant with medications. No adverse effects from medication. No lightheadedness, palpitations, or chest pain. Migraine Headaches  Headaches are currently stable. Satisfied with current medication without side effects. Insomnia  Currently stable. Satisfied with current treatment regimen. No adverse effects from medication. Compliant with his benzodiazepine medication. He states he takes his medication as needed. He abstains from alcohol while taking his medication. He denies drowsiness and impairment on his medication. Benign Prostatic Hypertrophy  Tolerating medication without adverse effects. Symptoms of hesitancy, nocturia, and dribbling are adequately controlled. No hematuria or dysuria    Constipation continues to be a problem.  Continues to take stool softeners          HPI    Past Medical History:   Diagnosis Date    Asthma     during winter months    Cancer [facility])     rectal    Hx of migraine headaches     Hypertension     Pericarditis     Rectal cancer ([facility] [address]     Testalgia 2/1/2016      Past Surgical History:   Procedure Laterality Date    ANTERIOR CRUCIATE LIGAMENT REPAIR  2007    ACL and MCL repair    COLON SURGERY      colon resection with colostomy    HERNIA REPAIR      As an infant    KIDNEY REMOVAL Left     URETER STENT PLACEMENT         Family History   Problem Relation Age of Onset    Heart Attack Mother     Cancer Mother         Liver and pancreatic    Diabetes Father        Social History   Substance Use Topics  Smoking status: Never Smoker    Smokeless tobacco: Never Used    Alcohol use No      Current Outpatient Prescriptions   Medication Sig Dispense Refill    ALPRAZolam (XANAX) 0.5 MG tablet Take 1 tablet by mouth 2 times daily for 30 days. . 60 tablet 2    SUMAtriptan (IMITREX) 100 MG tablet TAKE 1 TABLET AT ONSET OF MIGRAINE HEADACHE. MAY REPEAT IN 2 HOURS IF NEEDED. 30 tablet 1    naratriptan (AMERGE) 2.5 MG tablet TAKE AS DIRECTED. 9 tablet 6    amLODIPine (NORVASC) 10 MG tablet TAKE 1 TABLET DAILY 30 tablet 5    HYDROcodone-acetaminophen (NORCO)  MG per tablet Take one tablet every 3-4 hours PRN pain (month supply). Earliest Fill Date: 6/25/18 180 tablet 0    cloNIDine (CATAPRES) 0.2 MG tablet TAKE AS DIRECTED AS NEEDED 30 tablet 5    docusate sodium (COLACE) 100 MG capsule TAKE ONE CAPSULE BY MOUTH TWICE A DAY FOR 14 DAYS AS NEEDED FOR CONSTIPATION 60 capsule 11    hydrALAZINE (APRESOLINE) 50 MG tablet TAKE 1 TABLET BY MOUTH 3 TIMES DAILY 90 tablet 3    carvedilol (COREG) 6.25 MG tablet Take 1 tablet by mouth daily 30 tablet 5    tamsulosin (FLOMAX) 0.4 MG capsule Take 1 capsule by mouth daily 30 capsule 5    ondansetron (ZOFRAN) 4 MG tablet Take 1 tablet by mouth daily as needed for Nausea or Vomiting 30 tablet 2     No current facility-administered medications for this visit. Allergies   Allergen Reactions    Morphine And Related      Doesn't work     Oxycodone-Acetaminophen      Give me migraines        Health Maintenance   Topic Date Due    HIV screen  08/11/1992    Lipid screen  08/11/2017    Potassium monitoring  07/17/2018    Creatinine monitoring  07/17/2018    DTaP/Tdap/Td vaccine (1 - Tdap) 10/01/2018 (Originally 8/11/1996)    Flu vaccine (1) 09/01/2018       Subjective:      Review of Systems   Constitutional: Negative for chills and fever. HENT: Negative for congestion. Respiratory: Negative for cough, chest tightness and shortness of breath.     Cardiovascular: My fitness pal and Lose it. Discussed healthy weight. Return in about 3 months (around 10/9/2018) for AWV - 30 minute visit. Discussed use, benefit, and side effects of prescribed medications. All patient questions answered. Pt voiced understanding. Reviewed health maintenance. Instructed to continue current medications, diet and exercise. Patient agreed with treatment plan. Follow up as directed.

## 2018-07-09 NOTE — PATIENT INSTRUCTIONS
Calorie Counting  1. Download the Ludmila \"Loseit\" or \"beSUCCESSPal\"  2. Setup a free account  · May go to [email or web address] or www. [email or web address] for more information. 3. Calculate weight loss goal.  · Recommend starting with a goal of 1 pound of weight loss per week. 4. Input everything you eat and drink while trying to meet your calculated calorie count. 5. If you fail to get results in 6-8 weeks subtract 100 jorje from the estimated calorie count from the ludmila.  6. Success depends on being consistent with using the ludmila and planning your meals in advance    _______________________________________________________________    Go to room 201 for labs prior to next visit. Be sure to fast for at least 12 hours prior to laboratory appointment. Would recommend getting labs about 1 week prior to the appointment time to ensure they are available at the time of the appointment. No appointment is necessary for laboratory work as the orders have already been placed.     Lab opens at 6:30 am and closes at 5:00 pm.

## 2018-07-18 ENCOUNTER — HOSPITAL ENCOUNTER (OUTPATIENT)
Dept: PAIN MANAGEMENT | Age: 43
Discharge: HOME | End: 2018-07-18
Payer: MEDICARE

## 2018-07-18 VITALS
HEIGHT: 75 IN | RESPIRATION RATE: 18 BRPM | TEMPERATURE: 97.3 F | SYSTOLIC BLOOD PRESSURE: 149 MMHG | DIASTOLIC BLOOD PRESSURE: 105 MMHG | BODY MASS INDEX: 29.22 KG/M2 | OXYGEN SATURATION: 99 % | WEIGHT: 235 LBS | HEART RATE: 76 BPM

## 2018-07-18 DIAGNOSIS — Z85.038 HX OF COLON CANCER, STAGE III: ICD-10-CM

## 2018-07-18 DIAGNOSIS — N50.819 TESTALGIA: ICD-10-CM

## 2018-07-18 DIAGNOSIS — Z92.21 HX ANTINEOPLASTIC CHEMOTHERAPY: ICD-10-CM

## 2018-07-18 PROCEDURE — 99213 OFFICE O/P EST LOW 20 MIN: CPT | Performed by: NURSE PRACTITIONER

## 2018-07-18 PROCEDURE — 99213 OFFICE O/P EST LOW 20 MIN: CPT

## 2018-07-18 ASSESSMENT — PAIN DESCRIPTION - DIRECTION: RADIATING_TOWARDS: TESTICULAR PAIN

## 2018-07-18 ASSESSMENT — ENCOUNTER SYMPTOMS
BLURRED VISION: 0
CHANGE IN BOWEL HABIT: 1
CONSTIPATION: 0
WHEEZING: 0
SORE THROAT: 0
SHORTNESS OF BREATH: 0

## 2018-07-18 ASSESSMENT — PAIN DESCRIPTION - LOCATION: LOCATION: SCROTUM

## 2018-07-18 ASSESSMENT — PAIN SCALES - GENERAL: PAINLEVEL_OUTOF10: 4

## 2018-07-18 ASSESSMENT — PAIN DESCRIPTION - ORIENTATION: ORIENTATION: RIGHT;LEFT

## 2018-07-18 ASSESSMENT — ACTIVITIES OF DAILY LIVING (ADL): EFFECT OF PAIN ON DAILY ACTIVITIES: LIMITS ACTIVITY

## 2018-07-18 ASSESSMENT — PAIN DESCRIPTION - DESCRIPTORS: DESCRIPTORS: ACHING;CONSTANT;SHARP;THROBBING

## 2018-07-18 ASSESSMENT — PAIN DESCRIPTION - PROGRESSION: CLINICAL_PROGRESSION: NOT CHANGED

## 2018-07-18 ASSESSMENT — PAIN DESCRIPTION - FREQUENCY: FREQUENCY: CONTINUOUS

## 2018-07-18 ASSESSMENT — PAIN DESCRIPTION - ONSET: ONSET: ON-GOING

## 2018-07-18 ASSESSMENT — PAIN DESCRIPTION - PAIN TYPE: TYPE: CHRONIC PAIN

## 2018-07-18 NOTE — PROGRESS NOTES
Intervention(s): Medication (see eMar), Repositioned, Rest  Response to Pain Intervention: Patient Satisfied  Multiple Pain Sites: No          Previous Injury: Yes []  No  [x]    Previous Physical Therapy In the last 6 months? Yes []  No [x]      Injections in the past? Yes []  No [x]      Past Medical Histoy  Past Medical History:   Diagnosis Date    Asthma     during winter months    Cancer [facility])     rectal    Hx of migraine headaches     Hypertension     Pericarditis     Rectal cancer ([facility] [address]     Testalgia 2/1/2016       Surgery History  Past Surgical History:   Procedure Laterality Date    ANTERIOR CRUCIATE LIGAMENT REPAIR  2007    ACL and MCL repair    COLON SURGERY      colon resection with colostomy    HERNIA REPAIR      As an infant    KIDNEY REMOVAL Left     URETER STENT PLACEMENT          Family History  family history includes Cancer in his mother; Diabetes in his father; Heart Attack in his mother. Social History    Social History   Substance Use Topics    Smoking status: Never Smoker    Smokeless tobacco: Never Used    Alcohol use No       Allergies  Morphine and related and Oxycodone-acetaminophen     Current Medications  Current Outpatient Prescriptions   Medication Sig Dispense Refill    ALPRAZolam (XANAX) 0.5 MG tablet Take 1 tablet by mouth 2 times daily for 30 days. . 60 tablet 2    SUMAtriptan (IMITREX) 100 MG tablet TAKE 1 TABLET AT ONSET OF MIGRAINE HEADACHE. MAY REPEAT IN 2 HOURS IF NEEDED. 30 tablet 1    naratriptan (AMERGE) 2.5 MG tablet TAKE AS DIRECTED. 9 tablet 6    amLODIPine (NORVASC) 10 MG tablet TAKE 1 TABLET DAILY 30 tablet 5    HYDROcodone-acetaminophen (NORCO)  MG per tablet Take one tablet every 3-4 hours PRN pain (month supply).  Earliest Fill Date: 6/25/18 180 tablet 0    cloNIDine (CATAPRES) 0.2 MG tablet TAKE AS DIRECTED AS NEEDED 30 tablet 5    docusate sodium (COLACE) 100 MG capsule TAKE ONE CAPSULE BY MOUTH TWICE A DAY FOR 14 DAYS AS NEEDED FOR CONSTIPATION 60 capsule 11    hydrALAZINE (APRESOLINE) 50 MG tablet TAKE 1 TABLET BY MOUTH 3 TIMES DAILY 90 tablet 3    carvedilol (COREG) 6.25 MG tablet Take 1 tablet by mouth daily 30 tablet 5    tamsulosin (FLOMAX) 0.4 MG capsule Take 1 capsule by mouth daily 30 capsule 5    ondansetron (ZOFRAN) 4 MG tablet Take 1 tablet by mouth daily as needed for Nausea or Vomiting 30 tablet 2     No current facility-administered medications for this encounter. Review of Systems:  Review of Systems   Constitutional: Negative for chills and fever. HENT: Negative for nosebleeds and sore throat. Eyes: Negative for blurred vision. Respiratory: Negative for shortness of breath and wheezing. Cardiovascular: Negative for chest pain. Gastrointestinal: Positive for change in bowel habit ( colostomy bag from CA surgery). Negative for constipation. Genitourinary: Negative for dysuria. Musculoskeletal: Positive for myalgias ( bilateral testicle pain). Skin: Negative for rash. Neurological: Negative for seizures and loss of consciousness. Endo/Heme/Allergies: Does not bruise/bleed easily. Psychiatric/Behavioral: Negative for depression and suicidal ideas. 14 point ROS negative besides that noted in HPI    Physical Exam:    Vitals:    07/18/18 1010   BP: (!) 149/105   Pulse: 76   Resp: 18   Temp: 97.3 °F (36.3 °C)   TempSrc: Oral   SpO2: 99%   Weight: 235 lb (106.6 kg)   Height: 6' 3\" (1.905 m)       Body mass index is 29.37 kg/m². Physical Exam   Constitutional: He is oriented to person, place, and time. He appears well-developed and well-nourished. No distress. HENT:   Head: Normocephalic. Right Ear: External ear normal.   Left Ear: External ear normal.   Nose: Nose normal.   Eyes: Conjunctivae and EOM are normal. Pupils are equal, round, and reactive to light. Neck: Normal range of motion. Neck supple. Cardiovascular: Normal rate and regular rhythm.     Pulmonary/Chest: Effort normal and breath sounds normal.   Abdominal: Soft. Bowel sounds are normal. There is no tenderness. Genitourinary:         Neurological: He is alert and oriented to person, place, and time. Coordination and gait normal.   Skin: Skin is warm and dry. He is not diaphoretic. Psychiatric: He has a normal mood and affect. His behavior is normal. Judgment and thought content normal.   Nursing note and vitals reviewed. Recent Imaging:    Assessment:  Active Problems:    Testalgia    Hx of colon cancer, stage III    Hx antineoplastic chemotherapy  Resolved Problems:    * No resolved hospital problems. *      Plan:  1. Refill current pain management medication as directed via Dr. minor when do  2. Follow-up with Dr. Lala Tong in  his new office setting  3. Continue in follow-up with nephrology, GI, and oncology specialist    Discussion: Discussed exam findings and plan of care with patient. Patient agreed with POC and questions were asked and answered. I discussed examination findings the patient. Patient remains with chronic bilateral testicle swelling and pain. Patient reports radiation chemotherapy related to stage III colorectal cancer may be responsible. Patient continues to see nephrology and currently has right kidney stent. Patient continues to see GI specialty has a colostomy left lower abdomen. Opiate medication presently for daily activities and range of motion exercises. Activity: discussed exercise as beneficial to pain reduction, encouraged stretching exercise with a focus on torso strengthening. Education Provided:  [x] Review of Godwin Ibrahim [x] Agreement Review [] ORT Score  [] Review of Test  [] Compliance Issues Discussed [] Cognitive Behavior Needs [] Exercise  [] Financial Issues [] Teaching  []  Smoking Cessation    Controlled Substance Monitoring:   Discussed with patient possible medication side effects, risk of tolerance, dependence and alternative treatments.  Discussed the growing epidemic in the [place] with

## 2018-07-19 DIAGNOSIS — N50.819 TESTALGIA: ICD-10-CM

## 2018-07-19 RX ORDER — HYDROCODONE BITARTRATE AND ACETAMINOPHEN 10; 325 MG/1; MG/1
TABLET ORAL
Qty: 180 TABLET | Refills: 0 | Status: SHIPPED | OUTPATIENT
Start: 2018-07-25 | End: 2018-08-17 | Stop reason: SDUPTHER

## 2018-07-20 DIAGNOSIS — N40.1 BENIGN NON-NODULAR PROSTATIC HYPERPLASIA WITH LOWER URINARY TRACT SYMPTOMS: ICD-10-CM

## 2018-07-20 RX ORDER — TAMSULOSIN HYDROCHLORIDE 0.4 MG/1
0.4 CAPSULE ORAL DAILY
Qty: 30 CAPSULE | Refills: 0 | Status: SHIPPED | OUTPATIENT
Start: 2018-07-20 | End: 2018-08-21 | Stop reason: SDUPTHER

## 2018-07-27 ENCOUNTER — TELEPHONE (OUTPATIENT)
Dept: PAIN MANAGEMENT | Age: 43
End: 2018-07-27

## 2018-07-27 NOTE — TELEPHONE ENCOUNTER
Spoke with Dr. James Panda about patient's medication refills. He was seen in the office on 7/18 by Subhash Han, however he will be following Dr. James Panda to his new office location. Patient has received his refill for July and has called the new office to make an appointment, however his next appointment is not until October. I asked Dr. James Panda about what he would like to do regarding the patient's future refills since he cannot be seen until October. He stated that the patient would need to have a sooner appointment. Since we cannot make appointments for that practice, I called the nurse line at [facility] and left a voicemail on the nurse line with the nature of the call and that I had spoken to Dr. James Panda that patient will need sooner appointment.

## 2018-08-17 DIAGNOSIS — N50.819 TESTALGIA: ICD-10-CM

## 2018-08-20 RX ORDER — HYDROCODONE BITARTRATE AND ACETAMINOPHEN 10; 325 MG/1; MG/1
TABLET ORAL
Qty: 180 TABLET | Refills: 0 | Status: SHIPPED | OUTPATIENT
Start: 2018-08-24 | End: 2022-06-21

## 2018-08-21 DIAGNOSIS — N40.1 BENIGN NON-NODULAR PROSTATIC HYPERPLASIA WITH LOWER URINARY TRACT SYMPTOMS: ICD-10-CM

## 2018-08-21 RX ORDER — TAMSULOSIN HYDROCHLORIDE 0.4 MG/1
0.4 CAPSULE ORAL DAILY
Qty: 30 CAPSULE | Refills: 0 | Status: SHIPPED | OUTPATIENT
Start: 2018-08-21 | End: 2019-01-04 | Stop reason: SDUPTHER

## 2018-11-16 DIAGNOSIS — N18.30 CKD (CHRONIC KIDNEY DISEASE) STAGE 3, GFR 30-59 ML/MIN (HCC): ICD-10-CM

## 2018-11-16 DIAGNOSIS — N30.01 ACUTE CYSTITIS WITH HEMATURIA: ICD-10-CM

## 2018-11-16 DIAGNOSIS — R30.0 DYSURIA: ICD-10-CM

## 2018-11-16 LAB
BACTERIA: NEGATIVE /HPF
BILIRUBIN URINE: NEGATIVE
BLOOD, URINE: ABNORMAL
CLARITY: ABNORMAL
COLOR: YELLOW
EPITHELIAL CELLS, UA: 1 /HPF (ref 0–5)
GLUCOSE URINE: NEGATIVE MG/DL
HYALINE CASTS: 5 /HPF (ref 0–8)
KETONES, URINE: NEGATIVE MG/DL
LEUKOCYTE ESTERASE, URINE: ABNORMAL
NITRITE, URINE: NEGATIVE
PH UA: 6
PROTEIN UA: 100 MG/DL
RBC UA: 673 /HPF (ref 0–4)
SPECIFIC GRAVITY UA: 1.01
UROBILINOGEN, URINE: 0.2 E.U./DL
WBC UA: 26 /HPF (ref 0–5)

## 2018-12-24 DIAGNOSIS — K21.9 GASTROESOPHAGEAL REFLUX DISEASE WITHOUT ESOPHAGITIS: ICD-10-CM

## 2018-12-26 RX ORDER — ONDANSETRON 4 MG/1
4 TABLET, FILM COATED ORAL DAILY PRN
Qty: 30 TABLET | Refills: 1 | Status: SHIPPED | OUTPATIENT
Start: 2018-12-26 | End: 2019-08-11 | Stop reason: SDUPTHER

## 2018-12-26 RX ORDER — AMLODIPINE BESYLATE 10 MG/1
TABLET ORAL
Qty: 30 TABLET | Refills: 5 | Status: SHIPPED | OUTPATIENT
Start: 2018-12-26 | End: 2019-07-09 | Stop reason: SDUPTHER

## 2019-01-04 DIAGNOSIS — I10 ESSENTIAL (PRIMARY) HYPERTENSION: ICD-10-CM

## 2019-01-04 DIAGNOSIS — N40.1 BENIGN NON-NODULAR PROSTATIC HYPERPLASIA WITH LOWER URINARY TRACT SYMPTOMS: ICD-10-CM

## 2019-01-04 RX ORDER — HYDRALAZINE HYDROCHLORIDE 50 MG/1
50 TABLET, FILM COATED ORAL 3 TIMES DAILY
Qty: 90 TABLET | Refills: 3 | Status: SHIPPED | OUTPATIENT
Start: 2019-01-04 | End: 2019-04-06 | Stop reason: SDUPTHER

## 2019-01-04 RX ORDER — TAMSULOSIN HYDROCHLORIDE 0.4 MG/1
0.4 CAPSULE ORAL DAILY
Qty: 90 CAPSULE | Refills: 3 | Status: SHIPPED | OUTPATIENT
Start: 2019-01-04 | End: 2019-06-18 | Stop reason: SDUPTHER

## 2019-01-23 ENCOUNTER — TELEPHONE (OUTPATIENT)
Dept: PAIN MANAGEMENT | Age: 44
End: 2019-01-23

## 2019-02-11 RX ORDER — CLONIDINE HYDROCHLORIDE 0.2 MG/1
TABLET ORAL
Qty: 30 TABLET | Refills: 5 | Status: SHIPPED | OUTPATIENT
Start: 2019-02-11 | End: 2019-08-12 | Stop reason: SDUPTHER

## 2019-02-12 ENCOUNTER — OFFICE VISIT (OUTPATIENT)
Dept: FAMILY MEDICINE CLINIC | Age: 44
End: 2019-02-12
Payer: MEDICARE

## 2019-02-12 VITALS
SYSTOLIC BLOOD PRESSURE: 172 MMHG | DIASTOLIC BLOOD PRESSURE: 112 MMHG | OXYGEN SATURATION: 98 % | HEIGHT: 75 IN | TEMPERATURE: 99.1 F | HEART RATE: 86 BPM | WEIGHT: 242 LBS | BODY MASS INDEX: 30.09 KG/M2

## 2019-02-12 DIAGNOSIS — N18.30 CKD (CHRONIC KIDNEY DISEASE) STAGE 3, GFR 30-59 ML/MIN (HCC): ICD-10-CM

## 2019-02-12 DIAGNOSIS — F41.1 GENERALIZED ANXIETY DISORDER: ICD-10-CM

## 2019-02-12 DIAGNOSIS — K21.9 GASTROESOPHAGEAL REFLUX DISEASE WITHOUT ESOPHAGITIS: ICD-10-CM

## 2019-02-12 DIAGNOSIS — Z13.220 LIPID SCREENING: ICD-10-CM

## 2019-02-12 DIAGNOSIS — Z00.00 ANNUAL PHYSICAL EXAM: Primary | ICD-10-CM

## 2019-02-12 DIAGNOSIS — F51.04 CHRONIC INSOMNIA: ICD-10-CM

## 2019-02-12 DIAGNOSIS — E66.09 CLASS 1 OBESITY DUE TO EXCESS CALORIES WITHOUT SERIOUS COMORBIDITY WITH BODY MASS INDEX (BMI) OF 30.0 TO 30.9 IN ADULT: ICD-10-CM

## 2019-02-12 DIAGNOSIS — I10 ESSENTIAL (PRIMARY) HYPERTENSION: ICD-10-CM

## 2019-02-12 LAB
ALBUMIN SERPL-MCNC: 4.1 G/DL (ref 3.5–5.2)
ALP BLD-CCNC: 137 U/L (ref 40–130)
ALT SERPL-CCNC: 12 U/L (ref 5–41)
ANION GAP SERPL CALCULATED.3IONS-SCNC: 18 MMOL/L (ref 7–19)
AST SERPL-CCNC: 18 U/L (ref 5–40)
BASOPHILS ABSOLUTE: 0 K/UL (ref 0–0.2)
BASOPHILS RELATIVE PERCENT: 0.7 % (ref 0–1)
BILIRUB SERPL-MCNC: <0.2 MG/DL (ref 0.2–1.2)
BUN BLDV-MCNC: 23 MG/DL (ref 6–20)
CALCIUM SERPL-MCNC: 9.2 MG/DL (ref 8.6–10)
CHLORIDE BLD-SCNC: 106 MMOL/L (ref 98–111)
CHOLESTEROL, TOTAL: 196 MG/DL (ref 160–199)
CO2: 22 MMOL/L (ref 22–29)
CREAT SERPL-MCNC: 2.8 MG/DL (ref 0.5–1.2)
EOSINOPHILS ABSOLUTE: 0.3 K/UL (ref 0–0.6)
EOSINOPHILS RELATIVE PERCENT: 4.4 % (ref 0–5)
GFR NON-AFRICAN AMERICAN: 25
GLUCOSE BLD-MCNC: 90 MG/DL (ref 74–109)
HCT VFR BLD CALC: 38.4 % (ref 42–52)
HDLC SERPL-MCNC: 29 MG/DL (ref 55–121)
HEMOGLOBIN: 11.8 G/DL (ref 14–18)
LDL CHOLESTEROL CALCULATED: 112 MG/DL
LYMPHOCYTES ABSOLUTE: 1.3 K/UL (ref 1.1–4.5)
LYMPHOCYTES RELATIVE PERCENT: 23.5 % (ref 20–40)
MCH RBC QN AUTO: 28.5 PG (ref 27–31)
MCHC RBC AUTO-ENTMCNC: 30.7 G/DL (ref 33–37)
MCV RBC AUTO: 92.8 FL (ref 80–94)
MONOCYTES ABSOLUTE: 0.3 K/UL (ref 0–0.9)
MONOCYTES RELATIVE PERCENT: 6 % (ref 0–10)
NEUTROPHILS ABSOLUTE: 3.7 K/UL (ref 1.5–7.5)
NEUTROPHILS RELATIVE PERCENT: 64.7 % (ref 50–65)
PDW BLD-RTO: 13.7 % (ref 11.5–14.5)
PLATELET # BLD: 196 K/UL (ref 130–400)
PMV BLD AUTO: 9.3 FL (ref 9.4–12.4)
POTASSIUM SERPL-SCNC: 5 MMOL/L (ref 3.5–5)
RBC # BLD: 4.14 M/UL (ref 4.7–6.1)
SODIUM BLD-SCNC: 146 MMOL/L (ref 136–145)
TOTAL PROTEIN: 6.8 G/DL (ref 6.6–8.7)
TRIGL SERPL-MCNC: 273 MG/DL (ref 0–149)
WBC # BLD: 5.7 K/UL (ref 4.8–10.8)

## 2019-02-12 PROCEDURE — 1036F TOBACCO NON-USER: CPT | Performed by: FAMILY MEDICINE

## 2019-02-12 PROCEDURE — G8484 FLU IMMUNIZE NO ADMIN: HCPCS | Performed by: FAMILY MEDICINE

## 2019-02-12 PROCEDURE — 99214 OFFICE O/P EST MOD 30 MIN: CPT | Performed by: FAMILY MEDICINE

## 2019-02-12 PROCEDURE — G8417 CALC BMI ABV UP PARAM F/U: HCPCS | Performed by: FAMILY MEDICINE

## 2019-02-12 PROCEDURE — G0439 PPPS, SUBSEQ VISIT: HCPCS | Performed by: FAMILY MEDICINE

## 2019-02-12 PROCEDURE — G8427 DOCREV CUR MEDS BY ELIG CLIN: HCPCS | Performed by: FAMILY MEDICINE

## 2019-02-12 RX ORDER — ALPRAZOLAM 1 MG/1
1 TABLET ORAL 2 TIMES DAILY
Qty: 60 TABLET | Refills: 5 | Status: SHIPPED | OUTPATIENT
Start: 2019-02-12 | End: 2019-08-20 | Stop reason: SDUPTHER

## 2019-02-12 ASSESSMENT — ANXIETY QUESTIONNAIRES: GAD7 TOTAL SCORE: 2

## 2019-02-12 ASSESSMENT — PATIENT HEALTH QUESTIONNAIRE - PHQ9
SUM OF ALL RESPONSES TO PHQ QUESTIONS 1-9: 0
SUM OF ALL RESPONSES TO PHQ QUESTIONS 1-9: 0

## 2019-02-12 ASSESSMENT — LIFESTYLE VARIABLES: HOW OFTEN DO YOU HAVE A DRINK CONTAINING ALCOHOL: 0

## 2019-02-16 ASSESSMENT — ENCOUNTER SYMPTOMS
CONSTIPATION: 0
COUGH: 0
ANAL BLEEDING: 0
CHEST TIGHTNESS: 0
DIARRHEA: 0
ABDOMINAL PAIN: 0
SHORTNESS OF BREATH: 0
NAUSEA: 0

## 2019-04-06 DIAGNOSIS — I10 ESSENTIAL (PRIMARY) HYPERTENSION: ICD-10-CM

## 2019-04-08 RX ORDER — HYDRALAZINE HYDROCHLORIDE 50 MG/1
TABLET, FILM COATED ORAL
Qty: 90 TABLET | Refills: 2 | Status: ON HOLD | OUTPATIENT
Start: 2019-04-08 | End: 2022-08-04 | Stop reason: HOSPADM

## 2019-04-12 RX ORDER — DOCUSATE SODIUM 100 MG/1
CAPSULE, LIQUID FILLED ORAL
Qty: 60 CAPSULE | Refills: 5 | Status: SHIPPED | OUTPATIENT
Start: 2019-04-12 | End: 2019-12-16

## 2019-04-22 DIAGNOSIS — K21.9 GASTROESOPHAGEAL REFLUX DISEASE WITHOUT ESOPHAGITIS: ICD-10-CM

## 2019-04-22 RX ORDER — OMEPRAZOLE 20 MG/1
20 CAPSULE, DELAYED RELEASE ORAL DAILY
Qty: 90 CAPSULE | Refills: 1 | Status: SHIPPED | OUTPATIENT
Start: 2019-04-22 | End: 2019-07-17 | Stop reason: SDUPTHER

## 2019-04-22 NOTE — TELEPHONE ENCOUNTER
Shannan Varner called requesting a refill of the below medication which has been pended for you:     Requested Prescriptions     Pending Prescriptions Disp Refills    omeprazole (PRILOSEC) 20 MG delayed release capsule [Pharmacy Med Name: OMEPRAZOLE DR 20 MG CAPSULE] 90 capsule 3     Sig: TAKE 1 CAPSULE BY MOUTH DAILY       Last Appointment Date: 2/12/2019  Next Appointment Date: 5/17/2019    Allergies   Allergen Reactions    Morphine And Related      Doesn't work     Oxycodone-Acetaminophen      Give me migraines

## 2019-05-17 ENCOUNTER — HOSPITAL ENCOUNTER (OUTPATIENT)
Dept: GENERAL RADIOLOGY | Age: 44
Discharge: HOME | End: 2019-05-17
Payer: MEDICARE

## 2019-05-17 ENCOUNTER — OFFICE VISIT (OUTPATIENT)
Dept: FAMILY MEDICINE CLINIC | Age: 44
End: 2019-05-17
Payer: MEDICARE

## 2019-05-17 VITALS
DIASTOLIC BLOOD PRESSURE: 76 MMHG | WEIGHT: 226 LBS | BODY MASS INDEX: 28.25 KG/M2 | OXYGEN SATURATION: 99 % | TEMPERATURE: 97.5 F | HEART RATE: 81 BPM | SYSTOLIC BLOOD PRESSURE: 108 MMHG

## 2019-05-17 DIAGNOSIS — N18.30 CKD (CHRONIC KIDNEY DISEASE) STAGE 3, GFR 30-59 ML/MIN (HCC): ICD-10-CM

## 2019-05-17 DIAGNOSIS — I10 ESSENTIAL (PRIMARY) HYPERTENSION: ICD-10-CM

## 2019-05-17 DIAGNOSIS — R10.84 GENERALIZED ABDOMINAL PAIN: ICD-10-CM

## 2019-05-17 DIAGNOSIS — K21.9 GASTROESOPHAGEAL REFLUX DISEASE WITHOUT ESOPHAGITIS: ICD-10-CM

## 2019-05-17 DIAGNOSIS — R10.84 GENERALIZED ABDOMINAL PAIN: Primary | ICD-10-CM

## 2019-05-17 LAB
ALBUMIN SERPL-MCNC: 3.6 G/DL (ref 3.5–5.2)
ALP BLD-CCNC: 149 U/L (ref 40–130)
ALT SERPL-CCNC: 13 U/L (ref 5–41)
AMYLASE: 141 U/L (ref 28–100)
ANION GAP SERPL CALCULATED.3IONS-SCNC: 18 MMOL/L (ref 7–19)
AST SERPL-CCNC: 9 U/L (ref 5–40)
BASOPHILS ABSOLUTE: 0.1 K/UL (ref 0–0.2)
BASOPHILS RELATIVE PERCENT: 0.7 % (ref 0–1)
BILIRUB SERPL-MCNC: 0.3 MG/DL (ref 0.2–1.2)
BUN BLDV-MCNC: 64 MG/DL (ref 6–20)
CALCIUM SERPL-MCNC: 7.7 MG/DL (ref 8.6–10)
CHLORIDE BLD-SCNC: 104 MMOL/L (ref 98–111)
CO2: 15 MMOL/L (ref 22–29)
CREAT SERPL-MCNC: 7.5 MG/DL (ref 0.5–1.2)
EOSINOPHILS ABSOLUTE: 0.1 K/UL (ref 0–0.6)
EOSINOPHILS RELATIVE PERCENT: 1.5 % (ref 0–5)
GFR NON-AFRICAN AMERICAN: 8
GLUCOSE BLD-MCNC: 94 MG/DL (ref 74–109)
HCT VFR BLD CALC: 33 % (ref 42–52)
HEMOGLOBIN: 10 G/DL (ref 14–18)
LIPASE: 110 U/L (ref 13–60)
LYMPHOCYTES ABSOLUTE: 0.9 K/UL (ref 1.1–4.5)
LYMPHOCYTES RELATIVE PERCENT: 9.7 % (ref 20–40)
MCH RBC QN AUTO: 28 PG (ref 27–31)
MCHC RBC AUTO-ENTMCNC: 30.3 G/DL (ref 33–37)
MCV RBC AUTO: 92.4 FL (ref 80–94)
MONOCYTES ABSOLUTE: 0.6 K/UL (ref 0–0.9)
MONOCYTES RELATIVE PERCENT: 6.2 % (ref 0–10)
NEUTROPHILS ABSOLUTE: 7.6 K/UL (ref 1.5–7.5)
NEUTROPHILS RELATIVE PERCENT: 79.7 % (ref 50–65)
PDW BLD-RTO: 15.6 % (ref 11.5–14.5)
PLATELET # BLD: 409 K/UL (ref 130–400)
PMV BLD AUTO: 9 FL (ref 9.4–12.4)
POTASSIUM SERPL-SCNC: 5.4 MMOL/L (ref 3.5–5)
RBC # BLD: 3.57 M/UL (ref 4.7–6.1)
SEDIMENTATION RATE, ERYTHROCYTE: 94 MM/HR (ref 0–10)
SODIUM BLD-SCNC: 137 MMOL/L (ref 136–145)
TOTAL PROTEIN: 7.4 G/DL (ref 6.6–8.7)
WBC # BLD: 9.5 K/UL (ref 4.8–10.8)

## 2019-05-17 PROCEDURE — G8417 CALC BMI ABV UP PARAM F/U: HCPCS | Performed by: FAMILY MEDICINE

## 2019-05-17 PROCEDURE — 1036F TOBACCO NON-USER: CPT | Performed by: FAMILY MEDICINE

## 2019-05-17 PROCEDURE — 74022 RADEX COMPL AQT ABD SERIES: CPT

## 2019-05-17 PROCEDURE — 99214 OFFICE O/P EST MOD 30 MIN: CPT | Performed by: FAMILY MEDICINE

## 2019-05-17 PROCEDURE — G8427 DOCREV CUR MEDS BY ELIG CLIN: HCPCS | Performed by: FAMILY MEDICINE

## 2019-05-17 NOTE — PROGRESS NOTES
Ana [address] [place] [address]  [address] [place] [address]  Dept: [phone]  Dept Fax: [phone]  Loc: [phone]    Jimmy Lake is a 41 y.o. male who presents today for his medical conditions/complaints as noted below. Jimmy Lake is here for 3 Month Follow-Up (recent intestinal blockage)        HPI:   CC: Here today to discuss the following:    He states he did experience symptoms consistent with a bowel obstruction 2 weeks ago. He did not seek medical attention. He states he placed himself nothing by mouth and was primarily bedridden. States he was experiencing severe abdominal discomfort as well as herniation of his ostomy site. He tells me that he's been slowly able to advance his diet over the past week and is feeling better today. He has no nausea or abdominal pain today. He has no lightheadedness. No fever or chills. Hypertension  Compliant with medications. No adverse effects from medication. No lightheadedness, palpitations, or chest pain. Anxiety  Compliant with medications. No adverse effects from medication. No panic or anxiety attacks since last visit. Symptoms are controlled. No excessive worry or insomnia. No issues with depression    Migraine Headaches  Headaches are currently stable. Satisfied with current medication without side effects. Gastroesophageal Reflux Disease  Symptoms currently under control. Medication adequately controls his symptoms. No hematochezia or melena.              HPI    Past Medical History:   Diagnosis Date    Asthma     during winter months    Cancer [facility])     rectal    Hx of migraine headaches     Hypertension     Pericarditis     [facility] ([facility]     Testalgia 2/1/2016      Past Surgical History:   Procedure Laterality Date    ANTERIOR CRUCIATE LIGAMENT REPAIR  2007    ACL and MCL repair    COLON SURGERY      colon resection with colostomy    HERNIA REPAIR      As an infant    KIDNEY REMOVAL Left     URETER STENT PLACEMENT         Family History   Problem Relation Age of Onset    Heart Attack Mother     Cancer Mother         Liver and pancreatic    Diabetes Father        Social History     Tobacco Use    Smoking status: Never Smoker    Smokeless tobacco: Never Used   Substance Use Topics    Alcohol use: No     Current Outpatient Medications   Medication Sig Dispense Refill    omeprazole (PRILOSEC) 20 MG delayed release capsule TAKE 1 CAPSULE BY MOUTH DAILY 90 capsule 1    docusate sodium (COLACE) 100 MG capsule TAKE ONE CAPSULE BY MOUTH TWICE A DAY FOR 14 DAYS AS NEEDED FOR CONSTIPATION 60 capsule 5    hydrALAZINE (APRESOLINE) 50 MG tablet TAKE 1 TABLET BY MOUTH THREE TIMES A DAY 90 tablet 2    cloNIDine (CATAPRES) 0.2 MG tablet TAKE AS DIRECTED AS NEEDED 30 tablet 5    tamsulosin (FLOMAX) 0.4 MG capsule Take 1 capsule by mouth daily 90 capsule 3    amLODIPine (NORVASC) 10 MG tablet TAKE 1 TABLET DAILY 30 tablet 5    ondansetron (ZOFRAN) 4 MG tablet TAKE 1 TABLET BY MOUTH DAILY AS NEEDED FOR NAUSEA OR VOMITING 30 tablet 1    HYDROcodone-acetaminophen (NORCO)  MG per tablet Take one tablet every 3-4 hours PRN pain (month supply) (may fill 8/24/18). 180 tablet 0    SUMAtriptan (IMITREX) 100 MG tablet TAKE 1 TABLET AT ONSET OF MIGRAINE HEADACHE. MAY REPEAT IN 2 HOURS IF NEEDED. 30 tablet 1    naratriptan (AMERGE) 2.5 MG tablet TAKE AS DIRECTED. 9 tablet 6    carvedilol (COREG) 6.25 MG tablet Take 1 tablet by mouth daily 30 tablet 5     No current facility-administered medications for this visit.       Allergies   Allergen Reactions    Morphine And Related      Doesn't work     Oxycodone-Acetaminophen      Give me migraines        Health Maintenance   Topic Date Due    HIV screen  08/11/1992    DTaP/Tdap/Td vaccine (1 - Tdap) 08/11/1996    Flu vaccine (Season Ended) 09/01/2019    Potassium monitoring  05/17/2020    Creatinine monitoring  05/17/2020    Lipid screen  02/12/2024    Pneumococcal 0-64 years Vaccine  Aged Out       Subjective:      Review of Systems   Constitutional: Negative for chills and fever. HENT: Negative for congestion. Respiratory: Negative for cough, chest tightness and shortness of breath. Cardiovascular: Negative for chest pain, palpitations and leg swelling. Gastrointestinal: Positive for abdominal pain. Negative for anal bleeding, constipation, diarrhea and nausea. Genitourinary: Negative for difficulty urinating. Psychiatric/Behavioral: Negative. SeeHPI for visit specific review of symptoms. All others negative      Objective:   /76   Pulse 81   Temp 97.5 °F (36.4 °C)   Wt 226 lb (102.5 kg)   SpO2 99%   BMI 28.25 kg/m²   Physical Exam    Physical Exam   Constitutional: He appears well-developed. Does not appear ill. Eyes: Pupils are equal, round, and reactive to light. Conjunctiva and Lids normal.  Neck: Normal range of motion. Neck supple. No masses. Neck Symmetric. Normal tracheal position. No thyroid enlargement  Cardiovascular: Normal rate and regular rhythm. Exam reveals no friction rub. Carotid arteries: no bruit observed. No murmur heard. Respiratory:  Effort normal and breath sounds normal. No respiratory distress. No wheezes. No rales. No use of accessory muscles or intercostal retractions. Abdominal: Soft. Bowel sounds are normal. exhibits no distension. There is no tenderness. There is no rebound and no guarding. Musculoskeletal: exhibits no edema. Normal gait. Neurological: alert. Psychiatric: normal mood and affect. His behavior is normal. Normal judgement and insight observed.     Recent Results (from the past 672 hour(s))   Lipase    Collection Time: 05/17/19 10:22 AM   Result Value Ref Range    Lipase 110 (H) 13 - 60 U/L   Amylase    Collection Time: 05/17/19 10:22 AM   Result Value Ref Range    Amylase 141 (H) 28 - 100 U/L   Sedimentation Rate    Collection Time: 05/17/19 10:22 AM   Result Value Ref Range    Sed Rate 94 (H) 0 - 10 mm/Hr   CBC Auto Differential    Collection Time: 05/17/19 10:22 AM   Result Value Ref Range    WBC 9.5 4.8 - 10.8 K/uL    RBC 3.57 (L) 4.70 - 6.10 M/uL    Hemoglobin 10.0 (L) 14.0 - 18.0 g/dL    Hematocrit 33.0 (L) 42.0 - 52.0 %    MCV 92.4 80.0 - 94.0 fL    MCH 28.0 27.0 - 31.0 pg    MCHC 30.3 (L) 33.0 - 37.0 g/dL    RDW 15.6 (H) 11.5 - 14.5 %    Platelets 049 (H) 521 - 400 K/uL    MPV 9.0 (L) 9.4 - 12.4 fL    Neutrophils % 79.7 (H) 50.0 - 65.0 %    Lymphocytes % 9.7 (L) 20.0 - 40.0 %    Monocytes % 6.2 0.0 - 10.0 %    Eosinophils % 1.5 0.0 - 5.0 %    Basophils % 0.7 0.0 - 1.0 %    Neutrophils # 7.6 (H) 1.5 - 7.5 K/uL    Lymphocytes # 0.9 (L) 1.1 - 4.5 K/uL    Monocytes # 0.60 0.00 - 0.90 K/uL    Eosinophils # 0.10 0.00 - 0.60 K/uL    Basophils # 0.10 0.00 - 0.20 K/uL   Comprehensive Metabolic Panel    Collection Time: 05/17/19 10:22 AM   Result Value Ref Range    Sodium 137 136 - 145 mmol/L    Potassium 5.4 (H) 3.5 - 5.0 mmol/L    Chloride 104 98 - 111 mmol/L    CO2 15 (L) 22 - 29 mmol/L    Anion Gap 18 7 - 19 mmol/L    Glucose 94 74 - 109 mg/dL    BUN 64 (H) 6 - 20 mg/dL    CREATININE 7.5 (H) 0.5 - 1.2 mg/dL    GFR Non-African American 8 (A) >60    Calcium 7.7 (L) 8.6 - 10.0 mg/dL    Total Protein 7.4 6.6 - 8.7 g/dL    Alb 3.6 3.5 - 5.2 g/dL    Total Bilirubin 0.3 0.2 - 1.2 mg/dL    Alkaline Phosphatase 149 (H) 40 - 130 U/L    ALT 13 5 - 41 U/L    AST 9 5 - 40 U/L               Assessment & Plan: The following diagnoses and conditions are stable with no further orders unless indicated:  1. Generalized abdominal pain  Abdominal series shows no evidence of obstruction. Amylase and lipase are both elevated suggesting that episode of mild pancreatitis. Would recommend rechecking laboratory work in 1 week  - Comprehensive Metabolic Panel; Future  - CBC Auto Differential; Future  - Sedimentation Rate;  Future  - XR Acute Abd Series Chest 1 VW; Future  - Amylase; Future  - Lipase; Future  - Amylase; Future  - Lipase; Future  - Comprehensive Metabolic Panel; Future    2. Gastroesophageal reflux disease without esophagitis  Symptoms are stable    3. Essential (primary) hypertension  Blood pressure stable    4. CKD (chronic kidney disease) stage 3, GFR 30-59 ml/min (HCC)  Severe elevation in his creatinine. He states he is taking by mouth easily now. He is advancing his fluid intake as well. Would recommend rechecking blood work to confirm improvement of his creatinine otherwise will need to go to the emergency department  He is nontoxic appearing today and states his previous symptoms have almost completely resolved          Return in about 3 months (around 8/17/2019) for Routine follow up - 15 minute visit. Discussed use, benefit, and side effects of prescribed medications. All patient questions answered. Pt voiced understanding. Reviewed health maintenance. Instructedto continue current medications, diet and exercise. Patient agreed with treatmentplan. Follow up as directed.

## 2019-05-22 ASSESSMENT — ENCOUNTER SYMPTOMS
CONSTIPATION: 0
ANAL BLEEDING: 0
COUGH: 0
DIARRHEA: 0
SHORTNESS OF BREATH: 0
NAUSEA: 0
CHEST TIGHTNESS: 0
ABDOMINAL PAIN: 1

## 2019-06-18 DIAGNOSIS — N40.1 BENIGN NON-NODULAR PROSTATIC HYPERPLASIA WITH LOWER URINARY TRACT SYMPTOMS: ICD-10-CM

## 2019-06-18 RX ORDER — TAMSULOSIN HYDROCHLORIDE 0.4 MG/1
0.4 CAPSULE ORAL DAILY
Qty: 90 CAPSULE | Refills: 3 | Status: SHIPPED | OUTPATIENT
Start: 2019-06-18 | End: 2020-09-27

## 2019-06-18 NOTE — TELEPHONE ENCOUNTER
Patient requesting new refill for 90 days, having trouble getting the other on refilled at [facility].      Wm  called to request a refill on his medication.       Last office visit : 5/17/2019   Next office visit : 8/20/2019     Requested Prescriptions     Pending Prescriptions Disp Refills    tamsulosin (FLOMAX) 0.4 MG capsule 90 capsule 3     Sig: Take 1 capsule by mouth daily            Yasmine Shen MA

## 2019-07-03 RX ORDER — SUMATRIPTAN 100 MG/1
TABLET, FILM COATED ORAL
Qty: 9 TABLET | Refills: 6 | Status: SHIPPED | OUTPATIENT
Start: 2019-07-03 | End: 2020-08-07

## 2019-07-03 RX ORDER — NARATRIPTAN 2.5 MG/1
TABLET ORAL
Qty: 9 TABLET | Refills: 6 | Status: SHIPPED | OUTPATIENT
Start: 2019-07-03 | End: 2019-12-16

## 2019-07-09 RX ORDER — AMLODIPINE BESYLATE 10 MG/1
TABLET ORAL
Qty: 90 TABLET | Refills: 1 | Status: SHIPPED | OUTPATIENT
Start: 2019-07-09 | End: 2020-10-28

## 2019-07-10 ENCOUNTER — APPOINTMENT (OUTPATIENT)
Dept: CT IMAGING | Age: 44
DRG: 673 | End: 2019-07-10
Payer: MEDICARE

## 2019-07-10 ENCOUNTER — OFFICE VISIT (OUTPATIENT)
Dept: FAMILY MEDICINE CLINIC | Age: 44
End: 2019-07-10
Payer: MEDICARE

## 2019-07-10 ENCOUNTER — APPOINTMENT (OUTPATIENT)
Dept: GENERAL RADIOLOGY | Age: 44
DRG: 673 | End: 2019-07-10
Payer: MEDICARE

## 2019-07-10 ENCOUNTER — HOSPITAL ENCOUNTER (INPATIENT)
Age: 44
LOS: 7 days | Discharge: HOME | DRG: 673 | End: 2019-07-17
Attending: EMERGENCY MEDICINE | Admitting: FAMILY MEDICINE
Payer: MEDICARE

## 2019-07-10 VITALS
TEMPERATURE: 99.2 F | SYSTOLIC BLOOD PRESSURE: 178 MMHG | WEIGHT: 222 LBS | OXYGEN SATURATION: 98 % | BODY MASS INDEX: 27.75 KG/M2 | DIASTOLIC BLOOD PRESSURE: 100 MMHG | HEART RATE: 99 BPM

## 2019-07-10 DIAGNOSIS — R25.1 SHAKING: ICD-10-CM

## 2019-07-10 DIAGNOSIS — R19.5 OCCULT GI BLEEDING: ICD-10-CM

## 2019-07-10 DIAGNOSIS — E87.5 HYPERKALEMIA: ICD-10-CM

## 2019-07-10 DIAGNOSIS — E87.20 METABOLIC ACIDOSIS: ICD-10-CM

## 2019-07-10 DIAGNOSIS — N18.4 ACUTE RENAL FAILURE SUPERIMPOSED ON STAGE 4 CHRONIC KIDNEY DISEASE, UNSPECIFIED ACUTE RENAL FAILURE TYPE (HCC): Primary | ICD-10-CM

## 2019-07-10 DIAGNOSIS — K92.2 GI (GASTROINTESTINAL BLEED): ICD-10-CM

## 2019-07-10 DIAGNOSIS — K21.9 GASTROESOPHAGEAL REFLUX DISEASE WITHOUT ESOPHAGITIS: ICD-10-CM

## 2019-07-10 DIAGNOSIS — E83.51 HYPOCALCEMIA: ICD-10-CM

## 2019-07-10 DIAGNOSIS — R53.1 GENERALIZED WEAKNESS: ICD-10-CM

## 2019-07-10 DIAGNOSIS — R11.2 NAUSEA AND VOMITING, INTRACTABILITY OF VOMITING NOT SPECIFIED, UNSPECIFIED VOMITING TYPE: Primary | ICD-10-CM

## 2019-07-10 DIAGNOSIS — N17.9 ACUTE RENAL FAILURE SUPERIMPOSED ON STAGE 4 CHRONIC KIDNEY DISEASE, UNSPECIFIED ACUTE RENAL FAILURE TYPE (HCC): Primary | ICD-10-CM

## 2019-07-10 LAB
ABO/RH: NORMAL
ALBUMIN SERPL-MCNC: 3.9 G/DL (ref 3.5–5.2)
ALP BLD-CCNC: 85 U/L (ref 40–130)
ALT SERPL-CCNC: 7 U/L (ref 5–41)
ANION GAP SERPL CALCULATED.3IONS-SCNC: 25 MMOL/L (ref 7–19)
ANISOCYTOSIS: ABNORMAL
ANTIBODY SCREEN: NORMAL
AST SERPL-CCNC: 10 U/L (ref 5–40)
ATYPICAL LYMPHOCYTE RELATIVE PERCENT: 1 % (ref 0–8)
BACTERIA: NEGATIVE /HPF
BANDED NEUTROPHILS RELATIVE PERCENT: 6 % (ref 0–5)
BASE EXCESS ARTERIAL: -22.7 MMOL/L (ref -2–2)
BASE EXCESS ARTERIAL: 0.1 MMOL/L (ref -2–2)
BASOPHILS ABSOLUTE: 0 K/UL (ref 0–0.2)
BASOPHILS RELATIVE PERCENT: 0 % (ref 0–1)
BILIRUB SERPL-MCNC: 0.4 MG/DL (ref 0.2–1.2)
BILIRUBIN URINE: NEGATIVE
BLOOD, URINE: ABNORMAL
BUN BLDV-MCNC: 119 MG/DL (ref 6–20)
CALCIUM SERPL-MCNC: 7 MG/DL (ref 8.6–10)
CARBOXYHEMOGLOBIN ARTERIAL: 0.7 % (ref 0–5)
CARBOXYHEMOGLOBIN ARTERIAL: 0.9 % (ref 0–5)
CHLORIDE BLD-SCNC: 108 MMOL/L (ref 98–111)
CLARITY: CLEAR
CO2: 7 MMOL/L (ref 22–29)
COLOR: YELLOW
CREAT SERPL-MCNC: 16.6 MG/DL (ref 0.5–1.2)
CREATININE URINE: 73.3 MG/DL (ref 4.2–622)
EOSINOPHILS ABSOLUTE: 0.25 K/UL (ref 0–0.6)
EOSINOPHILS RELATIVE PERCENT: 3 % (ref 0–5)
EPITHELIAL CELLS, UA: 0 /HPF (ref 0–5)
FERRITIN: 317.4 NG/ML (ref 30–400)
FERRITIN: 343.6 NG/ML (ref 30–400)
GFR NON-AFRICAN AMERICAN: 3
GLUCOSE BLD-MCNC: 101 MG/DL (ref 74–109)
GLUCOSE BLD-MCNC: 123 MG/DL (ref 70–99)
GLUCOSE URINE: NEGATIVE MG/DL
HBV SURFACE AB TITR SER: NORMAL {TITER}
HCO3 ARTERIAL: 22.6 MMOL/L (ref 22–26)
HCO3 ARTERIAL: 5.2 MMOL/L (ref 22–26)
HCT VFR BLD CALC: 18.9 % (ref 42–52)
HCT VFR BLD CALC: 20.6 % (ref 42–52)
HCT VFR BLD CALC: 24.8 % (ref 42–52)
HEMOGLOBIN, ART, EXTENDED: 7.2 G/DL (ref 14–18)
HEMOGLOBIN, ART, EXTENDED: 7.9 G/DL (ref 14–18)
HEMOGLOBIN: 6.5 G/DL (ref 14–18)
HEMOGLOBIN: 7.3 G/DL (ref 14–18)
HEMOGLOBIN: 7.7 G/DL (ref 14–18)
HEPATITIS B SURFACE ANTIGEN INTERPRETATION: NORMAL
HYALINE CASTS: 0 /HPF (ref 0–8)
HYPOCHROMIA: ABNORMAL
IRON SATURATION: 91 % (ref 14–50)
IRON: 137 UG/DL (ref 59–158)
KETONES, URINE: NEGATIVE MG/DL
LEUKOCYTE ESTERASE, URINE: ABNORMAL
LIPASE: 99 U/L (ref 13–60)
LYMPHOCYTES ABSOLUTE: 1.6 K/UL (ref 1.1–4.5)
LYMPHOCYTES RELATIVE PERCENT: 18 % (ref 20–40)
MCH RBC QN AUTO: 28.7 PG (ref 27–31)
MCHC RBC AUTO-ENTMCNC: 31 G/DL (ref 33–37)
MCV RBC AUTO: 92.5 FL (ref 80–94)
METHEMOGLOBIN ARTERIAL: 2.1 %
METHEMOGLOBIN ARTERIAL: 2.2 %
MONOCYTES ABSOLUTE: 0.4 K/UL (ref 0–0.9)
MONOCYTES RELATIVE PERCENT: 5 % (ref 0–10)
NEUTROPHILS ABSOLUTE: 6.1 K/UL (ref 1.5–7.5)
NEUTROPHILS RELATIVE PERCENT: 67 % (ref 50–65)
NITRITE, URINE: NEGATIVE
NUCLEATED RED BLOOD CELLS: 1 /100 WBC
O2 CONTENT ARTERIAL: 11.2 ML/DL
O2 CONTENT ARTERIAL: 9.9 ML/DL
O2 SAT, ARTERIAL: 94.9 %
O2 SAT, ARTERIAL: 95.6 %
O2 THERAPY: ABNORMAL
O2 THERAPY: ABNORMAL
OVALOCYTES: ABNORMAL
PARATHYROID HORMONE INTACT: 1517 PG/ML (ref 15–65)
PCO2 ARTERIAL: 17 MMHG (ref 35–45)
PCO2 ARTERIAL: 27 MMHG (ref 35–45)
PDW BLD-RTO: 17.4 % (ref 11.5–14.5)
PERFORMED ON: ABNORMAL
PH ARTERIAL: 7.09 (ref 7.35–7.45)
PH ARTERIAL: 7.53 (ref 7.35–7.45)
PH UA: 6 (ref 5–8)
PLATELET # BLD: 98 K/UL (ref 130–400)
PLATELET SLIDE REVIEW: ABNORMAL
PMV BLD AUTO: 8.9 FL (ref 9.4–12.4)
PO2 ARTERIAL: 130 MMHG (ref 80–100)
PO2 ARTERIAL: 231 MMHG (ref 80–100)
POTASSIUM REFLEX MAGNESIUM: 5.6 MMOL/L (ref 3.5–5)
POTASSIUM, WHOLE BLOOD: 2.2
POTASSIUM, WHOLE BLOOD: 6.3
PROTEIN PROTEIN: 46 MG/DL (ref 15–45)
PROTEIN UA: 30 MG/DL
RBC # BLD: 2.68 M/UL (ref 4.7–6.1)
RBC UA: 5 /HPF (ref 0–4)
SODIUM BLD-SCNC: 140 MMOL/L (ref 136–145)
SODIUM URINE: 85 MMOL/L
SPECIFIC GRAVITY UA: 1.01 (ref 1–1.03)
TOTAL IRON BINDING CAPACITY: 150 UG/DL (ref 250–400)
TOTAL PROTEIN: 6.8 G/DL (ref 6.6–8.7)
URINE REFLEX TO CULTURE: YES
UROBILINOGEN, URINE: 0.2 E.U./DL
VITAMIN D 25-HYDROXY: 7.4 NG/ML
WBC # BLD: 8.3 K/UL (ref 4.8–10.8)
WBC UA: 20 /HPF (ref 0–5)

## 2019-07-10 PROCEDURE — 87086 URINE CULTURE/COLONY COUNT: CPT

## 2019-07-10 PROCEDURE — 82948 REAGENT STRIP/BLOOD GLUCOSE: CPT

## 2019-07-10 PROCEDURE — 31500 INSERT EMERGENCY AIRWAY: CPT

## 2019-07-10 PROCEDURE — 6360000002 HC RX W HCPCS

## 2019-07-10 PROCEDURE — 82306 VITAMIN D 25 HYDROXY: CPT

## 2019-07-10 PROCEDURE — 2700000000 HC OXYGEN THERAPY PER DAY

## 2019-07-10 PROCEDURE — 94002 VENT MGMT INPAT INIT DAY: CPT

## 2019-07-10 PROCEDURE — 87340 HEPATITIS B SURFACE AG IA: CPT

## 2019-07-10 PROCEDURE — 84160 ASSAY OF PROTEIN ANY SOURCE: CPT

## 2019-07-10 PROCEDURE — 96375 TX/PRO/DX INJ NEW DRUG ADDON: CPT

## 2019-07-10 PROCEDURE — 86706 HEP B SURFACE ANTIBODY: CPT

## 2019-07-10 PROCEDURE — 82803 BLOOD GASES ANY COMBINATION: CPT

## 2019-07-10 PROCEDURE — 6360000002 HC RX W HCPCS: Performed by: INTERNAL MEDICINE

## 2019-07-10 PROCEDURE — 86923 COMPATIBILITY TEST ELECTRIC: CPT

## 2019-07-10 PROCEDURE — 86901 BLOOD TYPING SEROLOGIC RH(D): CPT

## 2019-07-10 PROCEDURE — 8010000000 HC HEMODIALYSIS ACUTE INPT

## 2019-07-10 PROCEDURE — 06HY33Z INSERTION OF INFUSION DEVICE INTO LOWER VEIN, PERCUTANEOUS APPROACH: ICD-10-PCS | Performed by: SURGERY

## 2019-07-10 PROCEDURE — 83970 ASSAY OF PARATHORMONE: CPT

## 2019-07-10 PROCEDURE — 36415 COLL VENOUS BLD VENIPUNCTURE: CPT

## 2019-07-10 PROCEDURE — 6370000000 HC RX 637 (ALT 250 FOR IP): Performed by: EMERGENCY MEDICINE

## 2019-07-10 PROCEDURE — 84300 ASSAY OF URINE SODIUM: CPT

## 2019-07-10 PROCEDURE — 83540 ASSAY OF IRON: CPT

## 2019-07-10 PROCEDURE — 99223 1ST HOSP IP/OBS HIGH 75: CPT | Performed by: INTERNAL MEDICINE

## 2019-07-10 PROCEDURE — 96374 THER/PROPH/DIAG INJ IV PUSH: CPT

## 2019-07-10 PROCEDURE — 86850 RBC ANTIBODY SCREEN: CPT

## 2019-07-10 PROCEDURE — 84132 ASSAY OF SERUM POTASSIUM: CPT

## 2019-07-10 PROCEDURE — 5A1945Z RESPIRATORY VENTILATION, 24-96 CONSECUTIVE HOURS: ICD-10-PCS | Performed by: INTERNAL MEDICINE

## 2019-07-10 PROCEDURE — 84156 ASSAY OF PROTEIN URINE: CPT

## 2019-07-10 PROCEDURE — G8427 DOCREV CUR MEDS BY ELIG CLIN: HCPCS | Performed by: CLINICAL NURSE SPECIALIST

## 2019-07-10 PROCEDURE — 6360000002 HC RX W HCPCS: Performed by: EMERGENCY MEDICINE

## 2019-07-10 PROCEDURE — 85025 COMPLETE CBC W/AUTO DIFF WBC: CPT

## 2019-07-10 PROCEDURE — 99214 OFFICE O/P EST MOD 30 MIN: CPT | Performed by: CLINICAL NURSE SPECIALIST

## 2019-07-10 PROCEDURE — 2500000003 HC RX 250 WO HCPCS: Performed by: INTERNAL MEDICINE

## 2019-07-10 PROCEDURE — 80053 COMPREHEN METABOLIC PANEL: CPT

## 2019-07-10 PROCEDURE — 6360000002 HC RX W HCPCS: Performed by: PHYSICIAN ASSISTANT

## 2019-07-10 PROCEDURE — 85014 HEMATOCRIT: CPT

## 2019-07-10 PROCEDURE — P9016 RBC LEUKOCYTES REDUCED: HCPCS

## 2019-07-10 PROCEDURE — 36600 WITHDRAWAL OF ARTERIAL BLOOD: CPT

## 2019-07-10 PROCEDURE — 74176 CT ABD & PELVIS W/O CONTRAST: CPT

## 2019-07-10 PROCEDURE — 06JY3ZZ INSPECTION OF LOWER VEIN, PERCUTANEOUS APPROACH: ICD-10-PCS | Performed by: INTERNAL MEDICINE

## 2019-07-10 PROCEDURE — 36430 TRANSFUSION BLD/BLD COMPNT: CPT

## 2019-07-10 PROCEDURE — 85018 HEMOGLOBIN: CPT

## 2019-07-10 PROCEDURE — 84165 PROTEIN E-PHORESIS SERUM: CPT

## 2019-07-10 PROCEDURE — G8417 CALC BMI ABV UP PARAM F/U: HCPCS | Performed by: CLINICAL NURSE SPECIALIST

## 2019-07-10 PROCEDURE — 83690 ASSAY OF LIPASE: CPT

## 2019-07-10 PROCEDURE — 82570 ASSAY OF URINE CREATININE: CPT

## 2019-07-10 PROCEDURE — 2000000000 HC ICU R&B

## 2019-07-10 PROCEDURE — 81001 URINALYSIS AUTO W/SCOPE: CPT

## 2019-07-10 PROCEDURE — 99285 EMERGENCY DEPT VISIT HI MDM: CPT | Performed by: EMERGENCY MEDICINE

## 2019-07-10 PROCEDURE — 82728 ASSAY OF FERRITIN: CPT

## 2019-07-10 PROCEDURE — 71045 X-RAY EXAM CHEST 1 VIEW: CPT

## 2019-07-10 PROCEDURE — 0BH17EZ INSERTION OF ENDOTRACHEAL AIRWAY INTO TRACHEA, VIA NATURAL OR ARTIFICIAL OPENING: ICD-10-PCS | Performed by: INTERNAL MEDICINE

## 2019-07-10 PROCEDURE — 2580000003 HC RX 258: Performed by: INTERNAL MEDICINE

## 2019-07-10 PROCEDURE — 86900 BLOOD TYPING SEROLOGIC ABO: CPT

## 2019-07-10 PROCEDURE — 99285 EMERGENCY DEPT VISIT HI MDM: CPT

## 2019-07-10 PROCEDURE — 36556 INSERT NON-TUNNEL CV CATH: CPT | Performed by: SURGERY

## 2019-07-10 PROCEDURE — 83550 IRON BINDING TEST: CPT

## 2019-07-10 PROCEDURE — 5A1D70Z PERFORMANCE OF URINARY FILTRATION, INTERMITTENT, LESS THAN 6 HOURS PER DAY: ICD-10-PCS | Performed by: INTERNAL MEDICINE

## 2019-07-10 PROCEDURE — 1036F TOBACCO NON-USER: CPT | Performed by: CLINICAL NURSE SPECIALIST

## 2019-07-10 RX ORDER — LEVETIRACETAM 10 MG/ML
1000 INJECTION INTRAVASCULAR ONCE
Status: COMPLETED | OUTPATIENT
Start: 2019-07-10 | End: 2019-07-10

## 2019-07-10 RX ORDER — ONDANSETRON 2 MG/ML
4 INJECTION INTRAMUSCULAR; INTRAVENOUS ONCE
Status: COMPLETED | OUTPATIENT
Start: 2019-07-10 | End: 2019-07-10

## 2019-07-10 RX ORDER — MIDAZOLAM HYDROCHLORIDE 1 MG/ML
INJECTION INTRAMUSCULAR; INTRAVENOUS
Status: COMPLETED | OUTPATIENT
Start: 2019-07-10 | End: 2019-07-10

## 2019-07-10 RX ORDER — ONDANSETRON 4 MG/1
4 TABLET, FILM COATED ORAL DAILY PRN
Status: DISCONTINUED | OUTPATIENT
Start: 2019-07-10 | End: 2019-07-17 | Stop reason: HOSPADM

## 2019-07-10 RX ORDER — SODIUM CHLORIDE 0.9 % (FLUSH) 0.9 %
10 SYRINGE (ML) INJECTION EVERY 12 HOURS SCHEDULED
Status: DISCONTINUED | OUTPATIENT
Start: 2019-07-10 | End: 2019-07-11 | Stop reason: SDUPTHER

## 2019-07-10 RX ORDER — 0.9 % SODIUM CHLORIDE 0.9 %
1000 INTRAVENOUS SOLUTION INTRAVENOUS ONCE
Status: DISCONTINUED | OUTPATIENT
Start: 2019-07-10 | End: 2019-07-10

## 2019-07-10 RX ORDER — TAMSULOSIN HYDROCHLORIDE 0.4 MG/1
0.4 CAPSULE ORAL DAILY
Status: DISCONTINUED | OUTPATIENT
Start: 2019-07-10 | End: 2019-07-17 | Stop reason: HOSPADM

## 2019-07-10 RX ORDER — SODIUM CHLORIDE 0.9 % (FLUSH) 0.9 %
10 SYRINGE (ML) INJECTION PRN
Status: DISCONTINUED | OUTPATIENT
Start: 2019-07-10 | End: 2019-07-15 | Stop reason: SDUPTHER

## 2019-07-10 RX ORDER — HYDROCODONE BITARTRATE AND ACETAMINOPHEN 10; 325 MG/1; MG/1
1 TABLET ORAL EVERY 6 HOURS PRN
Status: DISCONTINUED | OUTPATIENT
Start: 2019-07-10 | End: 2019-07-15

## 2019-07-10 RX ORDER — LORAZEPAM 2 MG/ML
1 INJECTION INTRAMUSCULAR ONCE
Status: COMPLETED | OUTPATIENT
Start: 2019-07-10 | End: 2019-07-10

## 2019-07-10 RX ORDER — PANTOPRAZOLE SODIUM 40 MG/1
40 TABLET, DELAYED RELEASE ORAL
Status: DISCONTINUED | OUTPATIENT
Start: 2019-07-11 | End: 2019-07-11

## 2019-07-10 RX ORDER — 0.9 % SODIUM CHLORIDE 0.9 %
250 INTRAVENOUS SOLUTION INTRAVENOUS ONCE
Status: DISCONTINUED | OUTPATIENT
Start: 2019-07-10 | End: 2019-07-13

## 2019-07-10 RX ORDER — ONDANSETRON 2 MG/ML
4 INJECTION INTRAMUSCULAR; INTRAVENOUS EVERY 6 HOURS PRN
Status: DISCONTINUED | OUTPATIENT
Start: 2019-07-10 | End: 2019-07-17 | Stop reason: HOSPADM

## 2019-07-10 RX ORDER — PROPOFOL 10 MG/ML
INJECTION, EMULSION INTRAVENOUS
Status: COMPLETED
Start: 2019-07-10 | End: 2019-07-10

## 2019-07-10 RX ORDER — PROPOFOL 10 MG/ML
10 INJECTION, EMULSION INTRAVENOUS
Status: DISCONTINUED | OUTPATIENT
Start: 2019-07-10 | End: 2019-07-11

## 2019-07-10 RX ORDER — ETOMIDATE 2 MG/ML
INJECTION INTRAVENOUS
Status: COMPLETED | OUTPATIENT
Start: 2019-07-10 | End: 2019-07-10

## 2019-07-10 RX ADMIN — HYDROMORPHONE HYDROCHLORIDE 0.5 MG: 1 INJECTION, SOLUTION INTRAMUSCULAR; INTRAVENOUS; SUBCUTANEOUS at 12:28

## 2019-07-10 RX ADMIN — ONDANSETRON 4 MG: 2 INJECTION INTRAMUSCULAR; INTRAVENOUS at 12:28

## 2019-07-10 RX ADMIN — Medication: at 16:41

## 2019-07-10 RX ADMIN — LEVETIRACETAM 1000 MG: 10 INJECTION INTRAVENOUS at 16:56

## 2019-07-10 RX ADMIN — PROPOFOL 50 MCG/KG/MIN: 10 INJECTION, EMULSION INTRAVENOUS at 16:46

## 2019-07-10 RX ADMIN — LIDOCAINE HYDROCHLORIDE: 20 SOLUTION ORAL; TOPICAL at 12:29

## 2019-07-10 RX ADMIN — MIDAZOLAM 1 MG: 1 INJECTION INTRAMUSCULAR; INTRAVENOUS at 14:10

## 2019-07-10 RX ADMIN — PROPOFOL 20 MCG/KG/MIN: 10 INJECTION, EMULSION INTRAVENOUS at 14:10

## 2019-07-10 RX ADMIN — ETOMIDATE 20 MG: 2 INJECTION, SOLUTION INTRAVENOUS at 14:14

## 2019-07-10 RX ADMIN — LORAZEPAM 1 MG: 2 INJECTION INTRAMUSCULAR; INTRAVENOUS at 14:05

## 2019-07-10 RX ADMIN — Medication 10 ML: at 20:29

## 2019-07-10 RX ADMIN — PROPOFOL 50 MCG/KG/MIN: 10 INJECTION, EMULSION INTRAVENOUS at 20:29

## 2019-07-10 RX ADMIN — LORAZEPAM 1 MG: 2 INJECTION INTRAMUSCULAR; INTRAVENOUS at 14:06

## 2019-07-10 RX ADMIN — PROPOFOL 50 MCG/KG/MIN: 10 INJECTION, EMULSION INTRAVENOUS at 21:43

## 2019-07-10 ASSESSMENT — ENCOUNTER SYMPTOMS
EYE DISCHARGE: 0
SHORTNESS OF BREATH: 1
VOMITING: 1
EYE DISCHARGE: 0
ABDOMINAL PAIN: 0
SHORTNESS OF BREATH: 0
SINUS PRESSURE: 0
EYE PAIN: 0
BACK PAIN: 0
EYE ITCHING: 0
COUGH: 0
NAUSEA: 1
APNEA: 0
VOMITING: 1
TROUBLE SWALLOWING: 0
COLOR CHANGE: 0
DIARRHEA: 0
COLOR CHANGE: 1
BACK PAIN: 0
CHEST TIGHTNESS: 0
DIARRHEA: 1
COUGH: 0
WHEEZING: 0
SHORTNESS OF BREATH: 0
CONSTIPATION: 0
NAUSEA: 1
CONSTIPATION: 0
PHOTOPHOBIA: 0
SORE THROAT: 0

## 2019-07-10 ASSESSMENT — PULMONARY FUNCTION TESTS
PIF_VALUE: 6.7
PIF_VALUE: 6.5
PIF_VALUE: 13.7
PIF_VALUE: 22.2
PIF_VALUE: 20
PIF_VALUE: 20.8
PIF_VALUE: 6.5
PIF_VALUE: 10.1

## 2019-07-10 NOTE — OP NOTE
[unfilled]    Preoperative Diagnosis:  acute renal failure    Postoperative Diagnosis:  Same    Operative procedure:    1. Ultrasound guided access of right femoral vein  2. Placement of a dual-lumen non-tunneled right femoral vein dialysis catheter 14 Fr 20cm     Surgeon:  Avila Giron DO    Anesthesia:  Local    EBL:  Less than 50 ml    Findings:    1. The vein was patent per ultrasound. 2.  The catheter was placed without resistance. 3.  Both ports aspirated and flushed easily. The catheter is ready for use. Procedure in Detail:    After informed consent was obtained, the patient's right groin was prepped and draped in the usual sterile fashion. Skin and subcutaneous tissues over the femoral vein were anesthetized with lidocaine. Under ultrasound guidance, the femoral vein was cannulated with a micropuncture needle. An 0.018 wire was placed through the micropuncture needle and then the needle was replaced with the micropuncture catheter. A 0.035 j wire was then placed through the micropuncture catheter. The micropuncture catheter was removed and a small incision made over the wire with an 11 blade. Sequential dilators were passed over the wire without resistance and finally the catheter was placed over the wire without resistance. The wire and wire guide were removed from the catheter. Both ports aspirated and flushed easily with saline. Appropriate caps were placed. The catheter was secured to the skin with two 2-0 silk sutures. A biopatch was placed at the exit site and sterile dressings were applied.

## 2019-07-10 NOTE — ED NOTES
Delay in transport to ICU. Will give report to ICU ADAN Corral once available.      Ofelia Mcguire RN  07/10/19 0377

## 2019-07-10 NOTE — ED PROVIDER NOTES
Attending Supervisory Note/Shared Visit   I have personally performed a face to face diagnostic evaluation on this patient. I have reviewed the mid-levels findings and agree. History and Exam by me shows ill appearing patient. With markedly elevated Creat 17 and uremia. Has worsened by my Review of chart greatly since May. He had a Ureter stent placed at [facility] May 30. I cannot see any repeat creat since 7.5 he is not on HD. He has one kidney. He is critically ill. I have discussed with family and pt his need for HD today. He has some tremor from his uremia. Has pain asking for pain meds. We called nephrology to arrange for HD from the ED and called I personally discussed case with Dr Ilya Varner hospitalist to admit to ICU and institute HD and Dr Ilya Varner saw the patient in the ED and the patient was stable for transfer to the ICU. He was taken to the ICU in critical but stable condition for HD and continued care. FINAL IMPRESSION      1. Acute renal failure superimposed on stage 4 chronic kidney disease, unspecified acute renal failure type ([address]    2. Hypocalcemia    3. Hyperkalemia    4.  Occult GI bleeding        Min Corbett MD  Attending Emergency Physician       Min Corbett MD  07/10/19 9344

## 2019-07-10 NOTE — ED PROVIDER NOTES
pallor. Negative for color change and rash. Neurological: Positive for tremors and weakness. Negative for dizziness, seizures and syncope. Psychiatric/Behavioral: Negative for agitation. The patient is not nervous/anxious. Except as noted above the remainder of the review of systems was reviewed and negative. PAST MEDICAL HISTORY     Past Medical History:   Diagnosis Date    Asthma     during winter months    Cancer [facility])     rectal    Hx of migraine headaches     Hypertension     Pericarditis     [facility]     Testalgia 2/1/2016         SURGICAL HISTORY       Past Surgical History:   Procedure Laterality Date    ANTERIOR CRUCIATE LIGAMENT REPAIR  2007    ACL and MCL repair    COLON SURGERY      colon resection with colostomy    HERNIA REPAIR      As an infant    KIDNEY REMOVAL Left     URETER STENT PLACEMENT           CURRENT MEDICATIONS       Current Discharge Medication List      CONTINUE these medications which have NOT CHANGED    Details   amLODIPine (NORVASC) 10 MG tablet TAKE 1 TABLET DAILY  Qty: 90 tablet, Refills: 1      naratriptan (AMERGE) 2.5 MG tablet TAKE AS DIRECTED. Qty: 9 tablet, Refills: 6      SUMAtriptan (IMITREX) 100 MG tablet TAKE 1 TABLET AT ONSET OF MIGRAINE HEADACHE. MAY REPEAT IN 2 HOURS IF NEEDED.   Qty: 9 tablet, Refills: 6      tamsulosin (FLOMAX) 0.4 MG capsule Take 1 capsule by mouth daily  Qty: 90 capsule, Refills: 3    Associated Diagnoses: Benign non-nodular prostatic hyperplasia with lower urinary tract symptoms      omeprazole (PRILOSEC) 20 MG delayed release capsule TAKE 1 CAPSULE BY MOUTH DAILY  Qty: 90 capsule, Refills: 1    Associated Diagnoses: Gastroesophageal reflux disease without esophagitis      docusate sodium (COLACE) 100 MG capsule TAKE ONE CAPSULE BY MOUTH TWICE A DAY FOR 14 DAYS AS NEEDED FOR CONSTIPATION  Qty: 60 capsule, Refills: 5    Comments: NEEDS MORE REFILLS      hydrALAZINE (APRESOLINE) 50 MG tablet TAKE 1 TABLET BY Intimate partner violence:     Fear of current or ex partner: Not on file     Emotionally abused: Not on file     Physically abused: Not on file     Forced sexual activity: Not on file   Other Topics Concern    Not on file   Social History Narrative    Not on file       SCREENINGS           PHYSICAL EXAM    (up to 7 forlevel 4, 8 or more for level 5)     ED Triage Vitals [07/10/19 0950]   BP Temp Temp Source Pulse Resp SpO2 Height Weight   (!) 163/95 97.6 °F (36.4 °C) Oral 104 21 96 % 6' 3\" (1.905 m) 229 lb (103.9 kg)       Physical Exam   Constitutional: He is oriented to person, place, and time. He appears well-developed and well-nourished. No distress. HENT:   Head: Normocephalic and atraumatic. Right Ear: External ear normal.   Left Ear: External ear normal.   Nose: Nose normal.   Mouth/Throat: Oropharynx is clear and moist.   Eyes: Pupils are equal, round, and reactive to light. Conjunctivae and EOM are normal.   Neck: Normal range of motion. Neck supple. No tracheal deviation present. Cardiovascular: Normal rate, regular rhythm, normal heart sounds and intact distal pulses. No murmur heard. Pulmonary/Chest: Effort normal and breath sounds normal. No stridor. He has no wheezes. He exhibits no tenderness. Abdominal: Soft. Bowel sounds are normal. He exhibits no distension. There is no tenderness. Normal ostomy stoma. Stool looks appropriate   Genitourinary: Rectal exam shows guaiac positive stool. Musculoskeletal: Normal range of motion. Neurological: He is alert and oriented to person, place, and time. He displays normal reflexes. No cranial nerve deficit or sensory deficit. He exhibits normal muscle tone. Coordination normal.   tremors are noted   Skin: Skin is warm and dry. Capillary refill takes less than 2 seconds. He is not diaphoretic. There is pallor. Psychiatric: He has a normal mood and affect.  His behavior is normal. Judgment and thought content normal.   Nursing note and vitals tel. ,  Chemistry results called to and read back by Deborah [facility],  07/10/2019 11:30, by ANDRIA   URINE RT REFLEX TO CULTURE - Abnormal; Notable for the following components:    Blood, Urine SMALL (*)     Protein, UA 30 (*)     Leukocyte Esterase, Urine SMALL (*)     All other components within normal limits   MICROSCOPIC URINALYSIS - Abnormal; Notable for the following components:    WBC, UA 20 (*)     RBC, UA 5 (*)     All other components within normal limits   PROTEIN, URINE, RANDOM - Abnormal; Notable for the following components:    Protein, Ur 46 (*)     All other components within normal limits   PTH, INTACT - Abnormal; Notable for the following components:    PTH 1,517.0 (*)     All other components within normal limits   IRON AND TIBC - Abnormal; Notable for the following components:    TIBC 150 (*)     Iron Saturation 91 (*)     All other components within normal limits   BLOOD GAS, ARTERIAL - Abnormal; Notable for the following components:    pH, Arterial 7.090 (*)     pCO2, Arterial 17.0 (*)     pO2, Arterial 231.0 (*)     HCO3, Arterial 5.2 (*)     Base Excess, Arterial -22.7 (*)     Hemoglobin, Art, Extended 7.9 (*)     All other components within normal limits    Narrative:     CALL  [facility] tel. , PH 7.78  NII BRADLEY RN, 07/10/2019 16:07, by [facility] [email or web address], BLOOD - Abnormal; Notable for the following components:    Hemoglobin 6.5 (*)     Hematocrit 18.9 (*)     All other components within normal limits    Narrative:     CALL  Araiza  [facility]IRK[facility] tel. ,  Hematology results called to and read back by Hannah Mcbride rn icu, 07/10/2019 17:24,  by DIANA   POCT GLUCOSE - Abnormal; Notable for the following components:    POC Glucose 123 (*)     All other components within normal limits   URINE CULTURE   FERRITIN   SODIUM, URINE, RANDOM   CREATININE, RANDOM URINE   FERRITIN   POTASSIUM, WHOLE BLOOD    Narrative:     CALL  [facility] tel. , PH 1.66  NII BRADLEY RN, 07/10/2019 16:07, by Elie Calloway HEPATITIS B SURFACE ANTIBODY   HEPATITIS B SURFACE ANTIGEN   ELECROPHORESIS PROTEIN, SERUM WITHOUT REFLEX TO IMMUNOFIXATION   VITAMIN D 25 HYDROXY   TYPE AND SCREEN   PREPARE RBC (CROSSMATCH)       All other labs were within normal range or notreturned as of this dictation. RE-ASSESSMENT        EMERGENCY DEPARTMENT COURSE and DIFFERENTIAL DIAGNOSIS/MDM:   Vitals:    Vitals:    07/10/19 1301 07/10/19 1332 07/10/19 1421 07/10/19 1608   BP: (!) 160/91 (!) 143/94     Pulse:   128    Resp:   (!) 31 19   Temp:       TempSrc:       SpO2: 94% 95% 94% 98%   Weight:       Height:           MDM  Plan for admission. This patient is very sick toxic looking positive blood in his stool. Tremors pallor low calcium critical high potassium in metabolic acidosis. Plan to go to unit. Dr Sami Scott accepts patients care. Richie Olson with Dr Raffi Mcelroy  nephrologist on call agrees to consult with plan to dialyze this patient. PROCEDURES:    Procedures      FINAL IMPRESSION      1. Acute renal failure superimposed on stage 4 chronic kidney disease, unspecified acute renal failure type ([address]    2. Hypocalcemia    3. Hyperkalemia    4. Occult GI bleeding    5.  Metabolic acidosis          DISPOSITION/PLAN   DISPOSITION Admitted 07/10/2019 12:31:18 PM      PATIENT REFERRED TO:  Edgardo Pyle MD  [address]  [phone]            DISCHARGE MEDICATIONS:  Current Discharge Medication List          (Please note that portions of this note were completed with a voice recognition program.  Efforts were made to edit the dictations but occasionallywords are mis-transcribed.)    Kristi Rosales [address] [place] [address]  07/10/19 1535

## 2019-07-10 NOTE — H&P
[facility] Medicine H&P    Patient:  Williams Rucker  MRN: [id number]    Consulting Physician: Lakeshia Mtat MD  Reason for Consult: Medical Management  Primacy Care Physician: Naina Ortiz MD    HISTORY OF PRESENT ILLNESS:   The patient is a 41 y.o. male presents with nausea and vomiting. He has an incredibly complicated medical and surgical history. He has a history of rectal cancer and has had a colectomy with colostomy. 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 He has a history of chronic ureteral stenosis and undergoes frequent stent change-outs at [facility]. The last time he had his stent changed was in May. His creatinine was 7 at that time. He was unaware that it was that high at that time. He has not had any blood work since his stent change. He has been sick for the past month. This is common for him because of his GI issues. Over the past week, he has been unable to walk and has developed tremors. On intake, his creatinine is 16, , CO2 7, and K 5.6. He will be admitted to ICU for further monitoring. CT done in the ER does not show raimundo obstruction of the ureter. Ureteral stent is in place. Past Medical History:        Diagnosis Date    Asthma     during winter months    Cancer [facility])     rectal    Hx of migraine headaches     Hypertension     Pericarditis     [facility]     Testalgia 2/1/2016       Past Surgical History:        Procedure Laterality Date    ANTERIOR CRUCIATE LIGAMENT REPAIR  2007    ACL and MCL repair    COLON SURGERY      colon resection with colostomy    HERNIA REPAIR      As an infant    KIDNEY REMOVAL Left     URETER STENT PLACEMENT         Medications: Scheduled Meds:  Continuous Infusions:  PRN Meds:. Allergies:  Morphine and related and Oxycodone-acetaminophen    Social History:   TOBACCO:   reports that he has never smoked.  He has never used smokeless tobacco.  ETOH:   reports that he does not drink wall, retroperitoneal or mesenteric lymphadenopathy by size criteria. . VESSELS:  Unremarkable. PERITONEUM / RETROPERITONEUM:  No organized fluid collection. No free air. [place] Saucer BONES:  No acute osseous pathology. [place] Saucer SOFT TISSUES:  No acute pathology. 1.  Right perinephric and periureteral fat stranding. Although this was present on 9/15/2015, acute infection is not excluded. Correlation with laboratory values is recommended. 2.  Masslike soft tissue density in the presacral region is unchanged. This is favored to reflect posttreatment changes. 3.  Cholelithiasis, without evidence acute cholecystitis. Signed by Dr Shayna Altman on 7/10/2019 11:08 AM          Assessment and Plan   1. Acute on chronic renal failure. 2. Azotemia. 3. Severe metabolic acidosis. 4. H/O chronic ureteral obstruction. Admit to ICU. IVF resuscitation with normal bicarb. Consult nephrology.     Trinda Blizzard, DO

## 2019-07-10 NOTE — CONSULTS
Intravenous, Titrated, Trinda Blizzard, DO  propofol 1000 MG/100ML injection, , , ,   levetiracetam (KEPPRA) 1000 mg/100 mL IVPB, 1,000 mg, Intravenous, Once, Trinda Blizzard, DO  0.9 % sodium chloride infusion 250 mL, 250 mL, Intravenous, Once, Trinda Blizzard, DO        Past Medical History:  Past Medical History:  No date: Asthma      Comment:  during winter months  No date: Cancer [facility])      Comment:  rectal  No date: Hx of migraine headaches  No date: Hypertension  No date: Pericarditis  No date: Rectal cancer ([facility] [address]  2/1/2016Lotus Justice    Past Surgical History:  Past Surgical History:  2007: ANTERIOR CRUCIATE LIGAMENT REPAIR      Comment:  ACL and MCL repair  No date: COLON SURGERY      Comment:  colon resection with colostomy  No date: HERNIA REPAIR      Comment:  As an infant  No date: KIDNEY REMOVAL;  Left  No date: URETER STENT PLACEMENT    Family History  Review of patient's family history indicates:  Problem: Heart Attack      Relation: Mother          Age of Onset: (Not Specified)  Problem: Cancer      Relation: Mother          Age of Onset: (Not Specified)          Comment: Liver and pancreatic  Problem: Diabetes      Relation: Father          Age of Onset: (Not Specified)      Social History  Social History    Socioeconomic History      Marital status:       Spouse name: Aileen Weiner      Number of children: Not on file      Years of education: Not on file      Highest education level: Not on file    Occupational History      Not on file    Social Needs      Financial resource strain: Not on file      Food insecurity:        Worry: Not on file        Inability: Not on file      Transportation needs:        Medical: Not on file        Non-medical: Not on file    Tobacco Use      Smoking status: Never Smoker      Smokeless tobacco: Never Used    Substance and Sexual Activity      Alcohol use: No      Drug use: No      Sexual activity: Not on file    Lifestyle      Physical activity: 7/10/2019 11:08 AM       Assessment  1. Acute kidney injury/right renal obstruction. 2.  Stage IV chronic kidney disease baseline. 3.  History of left nephrectomy. 4.  Severe metabolic acidosis. 5.  Hyperkalemia. 6.  History of rectal CA. 7.  Recurrent right renal stents. Plan:  1. Initiate emergent dialysis  2. Hemodialysis up to 3-1/2-hour  3. Repeat hemodialysis tomorrow again. 4.  Urology consultation. 5.  Renal ultrasound. Thank you for the consult, we appreciate the opportunity to provide care to your patients. Feel free to contact me if I can be of any further assistance.       Alejo Bay MD  07/10/19  3:30 PM

## 2019-07-10 NOTE — PROGRESS NOTES
SUBJECTIVE:  Chuck Martinez is a 41 y.o. who presents today for Other (stent placed in ureter on June 10th.); Fatigue (Decreased appetite.); and Shaking      HPI    Mr Lita Agarwal presents today with his wife. He has been dealing with acute symptoms for approximately one week. He has nausea and vomiting. States he feels hungry, but then has dry heaving. When he eats there is emesis. He has restlessness, shaking all over, facial swelling and pedal edema. No fever noted, 98.2 range at home. Is sleeping more, generalized weakness and fatigue. He has history of colorectal cancer, diagnosed in 2008 underwent resection and radiation. He then had radiation damage to left kidney, removed. Then had colostomy placed due to radiation damage as well. Now he is followed by urology at [facility] as well as nephrology locally. They are seeing signs of reduced renal function to right kidney. He has 2 ureter stents, most recent placed in June. He is urinating very little currently. They report he was treated for UTI in June but really did not have any specific UTI symptoms. It is of note when he was here in May he had been ill the week prior. On exam he was better but labs showed GFR at 8. He was to continue to push po and return 1 week later for recheck. He failed to do this and wife was not aware.         Past Medical History:   Diagnosis Date    Asthma     during winter months    Cancer [facility])     rectal    Hx of migraine headaches     Hypertension     Pericarditis     Rectal cancer ([facility] [address]     Testalgia 2/1/2016     Past Surgical History:   Procedure Laterality Date    ANTERIOR CRUCIATE LIGAMENT REPAIR  2007    ACL and MCL repair    COLON SURGERY      colon resection with colostomy    HERNIA REPAIR      As an infant    KIDNEY REMOVAL Left     URETER STENT PLACEMENT       Family History   Problem Relation Age of Onset    Heart Attack Mother     Cancer Mother         Liver and pancreatic    Diabetes and visual disturbance. Respiratory: Negative for cough, chest tightness, shortness of breath and wheezing. Cardiovascular: Positive for leg swelling. Negative for chest pain and palpitations. Gastrointestinal: Positive for nausea and vomiting. Negative for abdominal pain, constipation and diarrhea. Endocrine: Negative for cold intolerance and heat intolerance. Genitourinary: Positive for decreased urine volume and difficulty urinating. Negative for dysuria, flank pain, frequency, hematuria and urgency. Musculoskeletal: Negative for arthralgias, back pain, joint swelling and neck pain. Skin: Positive for color change. Negative for rash. Neurological: Positive for tremors and weakness. Negative for dizziness, syncope, light-headedness and headaches. Hematological: Negative for adenopathy. Does not bruise/bleed easily. Psychiatric/Behavioral: Negative for confusion. The patient is not nervous/anxious. OBJECTIVE:  BP (!) 178/100   Pulse 99   Temp 99.2 °F (37.3 °C) (Temporal)   Wt 222 lb (100.7 kg)   SpO2 98%   BMI 27.75 kg/m²    Physical Exam   Constitutional: He is oriented to person, place, and time. He appears well-developed. He appears lethargic. He appears toxic. HENT:   Head: Normocephalic and atraumatic. Mouth/Throat: Oropharynx is clear and moist.   Eyes: Pupils are equal, round, and reactive to light. Conjunctivae are normal. Right eye exhibits no discharge. Left eye exhibits no discharge. Neck: Normal range of motion. Neck supple. Cardiovascular: Regular rhythm. Tachycardia present. No murmur heard. Pulmonary/Chest: Effort normal and breath sounds normal. No respiratory distress. He has no wheezes. He has no rales. Musculoskeletal: Normal range of motion. He exhibits edema (periorbital and pedal). He exhibits no deformity. Neurological: He is oriented to person, place, and time. He appears lethargic. No cranial nerve deficit. Skin: Skin is warm and dry.  No

## 2019-07-10 NOTE — PROGRESS NOTES
Pharmacy Renal Adjustment    Nyssalayo Ledesma is a 41 y.o. male. Pharmacy has renally adjusted medications per protocol. Recent Labs     07/10/19  0958   *       Recent Labs     07/10/19  0958   CREATININE 16.6*       Estimated Creatinine Clearance: 8 mL/min (A) (based on SCr of 16.6 mg/dL (H)).     Height:   Ht Readings from Last 1 Encounters:   07/10/19 6' 3\" (1.905 m)     Weight:  Wt Readings from Last 1 Encounters:   07/10/19 229 lb (103.9 kg)       CKD stage: 4        Baseline SCr: 16.6    Plan: Adjust the following medications based on renal function:           Change lovenox to 30 mg sq daily    Electronically signed by Gray Lyons, [address] [place] [address] on 7/10/2019 at 3:39 PM

## 2019-07-11 ENCOUNTER — APPOINTMENT (OUTPATIENT)
Dept: ULTRASOUND IMAGING | Age: 44
DRG: 673 | End: 2019-07-11
Payer: MEDICARE

## 2019-07-11 PROBLEM — K92.2 GI (GASTROINTESTINAL BLEED): Status: ACTIVE | Noted: 2019-07-10

## 2019-07-11 PROBLEM — Z51.5 PALLIATIVE CARE PATIENT: Status: ACTIVE | Noted: 2019-07-11

## 2019-07-11 LAB
ALBUMIN SERPL-MCNC: 3.3 G/DL (ref 3.5–5.2)
ALP BLD-CCNC: 73 U/L (ref 40–130)
ALT SERPL-CCNC: 7 U/L (ref 5–41)
ANION GAP SERPL CALCULATED.3IONS-SCNC: 24 MMOL/L (ref 7–19)
AST SERPL-CCNC: 11 U/L (ref 5–40)
BASE EXCESS ARTERIAL: -2.7 MMOL/L (ref -2–2)
BASE EXCESS ARTERIAL: 3.1 MMOL/L (ref -2–2)
BILIRUB SERPL-MCNC: 0.3 MG/DL (ref 0.2–1.2)
BUN BLDV-MCNC: 60 MG/DL (ref 6–20)
CALCIUM SERPL-MCNC: 6.4 MG/DL (ref 8.6–10)
CARBOXYHEMOGLOBIN ARTERIAL: 1.2 % (ref 0–5)
CARBOXYHEMOGLOBIN ARTERIAL: 1.4 % (ref 0–5)
CHLORIDE BLD-SCNC: 97 MMOL/L (ref 98–111)
CO2: 22 MMOL/L (ref 22–29)
CREAT SERPL-MCNC: 9.8 MG/DL (ref 0.5–1.2)
GFR NON-AFRICAN AMERICAN: 6
GLUCOSE BLD-MCNC: 97 MG/DL (ref 74–109)
HCO3 ARTERIAL: 21.2 MMOL/L (ref 22–26)
HCO3 ARTERIAL: 26.8 MMOL/L (ref 22–26)
HCT VFR BLD CALC: 22.5 % (ref 42–52)
HEMOGLOBIN, ART, EXTENDED: 7.1 G/DL (ref 14–18)
HEMOGLOBIN, ART, EXTENDED: 7.4 G/DL (ref 14–18)
HEMOGLOBIN: 7.5 G/DL (ref 14–18)
MCH RBC QN AUTO: 29.1 PG (ref 27–31)
MCHC RBC AUTO-ENTMCNC: 33.3 G/DL (ref 33–37)
MCV RBC AUTO: 87.2 FL (ref 80–94)
METHEMOGLOBIN ARTERIAL: 1.7 %
METHEMOGLOBIN ARTERIAL: 2 %
O2 CONTENT ARTERIAL: 10.3 ML/DL
O2 CONTENT ARTERIAL: 9.8 ML/DL
O2 SAT, ARTERIAL: 95.2 %
O2 SAT, ARTERIAL: 95.5 %
O2 THERAPY: ABNORMAL
O2 THERAPY: ABNORMAL
OCCULT BLOOD QC: NORMAL
OCCULT BLOOD, OTHER: NORMAL
PCO2 ARTERIAL: 32 MMHG (ref 35–45)
PCO2 ARTERIAL: 36 MMHG (ref 35–45)
PDW BLD-RTO: 15.9 % (ref 11.5–14.5)
PH ARTERIAL: 7.43 (ref 7.35–7.45)
PH ARTERIAL: 7.48 (ref 7.35–7.45)
PH, GASTRIC: NORMAL
PLATELET # BLD: 91 K/UL (ref 130–400)
PMV BLD AUTO: 9.7 FL (ref 9.4–12.4)
PO2 ARTERIAL: 125 MMHG (ref 80–100)
PO2 ARTERIAL: 136 MMHG (ref 80–100)
POTASSIUM SERPL-SCNC: 3.1 MMOL/L (ref 3.5–5)
POTASSIUM, WHOLE BLOOD: 2.4
POTASSIUM, WHOLE BLOOD: 2.8
RBC # BLD: 2.58 M/UL (ref 4.7–6.1)
SODIUM BLD-SCNC: 143 MMOL/L (ref 136–145)
TOTAL PROTEIN: 5.7 G/DL (ref 6.6–8.7)
WBC # BLD: 8.3 K/UL (ref 4.8–10.8)

## 2019-07-11 PROCEDURE — 84132 ASSAY OF SERUM POTASSIUM: CPT

## 2019-07-11 PROCEDURE — 2700000000 HC OXYGEN THERAPY PER DAY

## 2019-07-11 PROCEDURE — 2580000003 HC RX 258: Performed by: INTERNAL MEDICINE

## 2019-07-11 PROCEDURE — 6360000002 HC RX W HCPCS: Performed by: INTERNAL MEDICINE

## 2019-07-11 PROCEDURE — 82803 BLOOD GASES ANY COMBINATION: CPT

## 2019-07-11 PROCEDURE — 99221 1ST HOSP IP/OBS SF/LOW 40: CPT | Performed by: INTERNAL MEDICINE

## 2019-07-11 PROCEDURE — 36600 WITHDRAWAL OF ARTERIAL BLOOD: CPT

## 2019-07-11 PROCEDURE — 8010000000 HC HEMODIALYSIS ACUTE INPT

## 2019-07-11 PROCEDURE — 6370000000 HC RX 637 (ALT 250 FOR IP): Performed by: INTERNAL MEDICINE

## 2019-07-11 PROCEDURE — 76770 US EXAM ABDO BACK WALL COMP: CPT

## 2019-07-11 PROCEDURE — 94003 VENT MGMT INPAT SUBQ DAY: CPT

## 2019-07-11 PROCEDURE — 80053 COMPREHEN METABOLIC PANEL: CPT

## 2019-07-11 PROCEDURE — 99291 CRITICAL CARE FIRST HOUR: CPT | Performed by: INTERNAL MEDICINE

## 2019-07-11 PROCEDURE — 82271 OCCULT BLOOD OTHER SOURCES: CPT

## 2019-07-11 PROCEDURE — 36415 COLL VENOUS BLD VENIPUNCTURE: CPT

## 2019-07-11 PROCEDURE — 99222 1ST HOSP IP/OBS MODERATE 55: CPT | Performed by: NURSE PRACTITIONER

## 2019-07-11 PROCEDURE — 2000000000 HC ICU R&B

## 2019-07-11 PROCEDURE — 85027 COMPLETE CBC AUTOMATED: CPT

## 2019-07-11 PROCEDURE — 2500000003 HC RX 250 WO HCPCS: Performed by: INTERNAL MEDICINE

## 2019-07-11 PROCEDURE — C9113 INJ PANTOPRAZOLE SODIUM, VIA: HCPCS | Performed by: INTERNAL MEDICINE

## 2019-07-11 RX ORDER — HYDRALAZINE HYDROCHLORIDE 20 MG/ML
10 INJECTION INTRAMUSCULAR; INTRAVENOUS EVERY 6 HOURS PRN
Status: DISCONTINUED | OUTPATIENT
Start: 2019-07-11 | End: 2019-07-17 | Stop reason: HOSPADM

## 2019-07-11 RX ORDER — LOSARTAN POTASSIUM 25 MG/1
25 TABLET ORAL 2 TIMES DAILY
Status: DISCONTINUED | OUTPATIENT
Start: 2019-07-11 | End: 2019-07-12

## 2019-07-11 RX ORDER — CALCITRIOL 1 UG/ML
0.3 INJECTION INTRAVENOUS
Status: DISCONTINUED | OUTPATIENT
Start: 2019-07-11 | End: 2019-07-13

## 2019-07-11 RX ORDER — SODIUM CHLORIDE 0.9 % (FLUSH) 0.9 %
10 SYRINGE (ML) INJECTION EVERY 12 HOURS SCHEDULED
Status: DISCONTINUED | OUTPATIENT
Start: 2019-07-11 | End: 2019-07-15 | Stop reason: SDUPTHER

## 2019-07-11 RX ORDER — HEPARIN SODIUM 1000 [USP'U]/ML
2500 INJECTION, SOLUTION INTRAVENOUS; SUBCUTANEOUS
Status: ACTIVE | OUTPATIENT
Start: 2019-07-11 | End: 2019-07-11

## 2019-07-11 RX ORDER — POLYETHYLENE GLYCOL 3350 17 G/17G
155 POWDER, FOR SOLUTION ORAL ONCE
Status: COMPLETED | OUTPATIENT
Start: 2019-07-11 | End: 2019-07-11

## 2019-07-11 RX ORDER — CARVEDILOL 6.25 MG/1
6.25 TABLET ORAL 2 TIMES DAILY WITH MEALS
Status: DISCONTINUED | OUTPATIENT
Start: 2019-07-11 | End: 2019-07-12

## 2019-07-11 RX ORDER — AMLODIPINE BESYLATE 10 MG/1
10 TABLET ORAL DAILY
Status: DISCONTINUED | OUTPATIENT
Start: 2019-07-11 | End: 2019-07-17 | Stop reason: HOSPADM

## 2019-07-11 RX ORDER — 0.9 % SODIUM CHLORIDE 0.9 %
10 VIAL (ML) INJECTION PRN
Status: DISCONTINUED | OUTPATIENT
Start: 2019-07-11 | End: 2019-07-17 | Stop reason: HOSPADM

## 2019-07-11 RX ORDER — LEVETIRACETAM 5 MG/ML
500 INJECTION INTRAVASCULAR EVERY 12 HOURS
Status: DISCONTINUED | OUTPATIENT
Start: 2019-07-11 | End: 2019-07-14

## 2019-07-11 RX ORDER — SODIUM CHLORIDE, SODIUM LACTATE, POTASSIUM CHLORIDE, CALCIUM CHLORIDE 600; 310; 30; 20 MG/100ML; MG/100ML; MG/100ML; MG/100ML
INJECTION, SOLUTION INTRAVENOUS CONTINUOUS
Status: DISCONTINUED | OUTPATIENT
Start: 2019-07-11 | End: 2019-07-11

## 2019-07-11 RX ORDER — DOXERCALCIFEROL 2 UG/ML
1 INJECTION, SOLUTION INTRAVENOUS DAILY
Status: DISCONTINUED | OUTPATIENT
Start: 2019-07-11 | End: 2019-07-11

## 2019-07-11 RX ADMIN — PROPOFOL 55 MCG/KG/MIN: 10 INJECTION, EMULSION INTRAVENOUS at 04:21

## 2019-07-11 RX ADMIN — SODIUM CHLORIDE 8 MG/HR: 9 INJECTION, SOLUTION INTRAVENOUS at 12:26

## 2019-07-11 RX ADMIN — CALCITRIOL 0.3 MCG: 1 INJECTION INTRAVENOUS at 16:11

## 2019-07-11 RX ADMIN — LOSARTAN POTASSIUM 25 MG: 25 TABLET ORAL at 19:58

## 2019-07-11 RX ADMIN — PROPOFOL 80 MCG/KG/MIN: 10 INJECTION, EMULSION INTRAVENOUS at 11:17

## 2019-07-11 RX ADMIN — PROPOFOL 50 MCG/KG/MIN: 10 INJECTION, EMULSION INTRAVENOUS at 00:47

## 2019-07-11 RX ADMIN — CALCIUM GLUCONATE 2 G: 94 INJECTION, SOLUTION INTRAVENOUS at 12:26

## 2019-07-11 RX ADMIN — ONDANSETRON 4 MG: 2 INJECTION INTRAMUSCULAR; INTRAVENOUS at 13:05

## 2019-07-11 RX ADMIN — Medication: at 00:24

## 2019-07-11 RX ADMIN — AMLODIPINE BESYLATE 10 MG: 10 TABLET ORAL at 18:03

## 2019-07-11 RX ADMIN — SODIUM CHLORIDE, POTASSIUM CHLORIDE, SODIUM LACTATE AND CALCIUM CHLORIDE: 600; 310; 30; 20 INJECTION, SOLUTION INTRAVENOUS at 10:55

## 2019-07-11 RX ADMIN — Medication 10 ML: at 19:58

## 2019-07-11 RX ADMIN — POLYETHYLENE GLYCOL 3350 155 G: 17 POWDER, FOR SOLUTION ORAL at 20:55

## 2019-07-11 RX ADMIN — PROPOFOL 70 MCG/KG/MIN: 10 INJECTION, EMULSION INTRAVENOUS at 09:12

## 2019-07-11 RX ADMIN — CARVEDILOL 6.25 MG: 6.25 TABLET, FILM COATED ORAL at 18:03

## 2019-07-11 RX ADMIN — DARBEPOETIN ALFA 100 MCG: 100 SOLUTION INTRAVENOUS; SUBCUTANEOUS at 12:26

## 2019-07-11 RX ADMIN — Medication: at 07:00

## 2019-07-11 RX ADMIN — SODIUM CHLORIDE 8 MG/HR: 9 INJECTION, SOLUTION INTRAVENOUS at 20:55

## 2019-07-11 RX ADMIN — LEVETIRACETAM 500 MG: 5 INJECTION INTRAVENOUS at 20:00

## 2019-07-11 RX ADMIN — PROPOFOL 50 MCG/KG/MIN: 10 INJECTION, EMULSION INTRAVENOUS at 06:33

## 2019-07-11 RX ADMIN — LEVETIRACETAM 500 MG: 5 INJECTION INTRAVENOUS at 12:10

## 2019-07-11 RX ADMIN — HYDRALAZINE HYDROCHLORIDE 10 MG: 20 INJECTION INTRAMUSCULAR; INTRAVENOUS at 19:58

## 2019-07-11 ASSESSMENT — PULMONARY FUNCTION TESTS
PIF_VALUE: 22.4
PIF_VALUE: 17.8
PIF_VALUE: 20.9
PIF_VALUE: 24.1
PIF_VALUE: 22.5
PIF_VALUE: 20.9
PIF_VALUE: 23.9
PIF_VALUE: 18.4
PIF_VALUE: 10.3
PIF_VALUE: 21.1
PIF_VALUE: 25.6
PIF_VALUE: 24.5
PIF_VALUE: 22.2
PIF_VALUE: 24.1
PIF_VALUE: 25.8

## 2019-07-11 ASSESSMENT — PAIN SCALES - GENERAL
PAINLEVEL_OUTOF10: 0
PAINLEVEL_OUTOF10: 0

## 2019-07-11 NOTE — PROGRESS NOTES
7/11/2019 12:38 AM - Vidal, Kyin Incoming Lab Results From [employer] Value Ref Range & Units Status Collected Lab   pH, Arterial 7.430  7.350 - 7.450 Final 07/11/2019 12:38 AM [facility] Lab   pCO2, Arterial 32. 0Low   35.0 - 45.0 mmHg Final 07/11/2019 12:38 AM [facility] Lab   pO2, Arterial 136. 0High   80.0 - 100.0 mmHg Final 07/11/2019 12:38 AM [facility] Lab   HCO3, Arterial 21.2Low   22.0 - 26.0 mmol/L Final 07/11/2019 12:38 AM [facility] Excess, Arterial -2.7Low   -2.0 - 2.0 mmol/L Final 07/11/2019 12:38 AM [facility] Lab   Hemoglobin, Art, Extended 7.4Low   14.0 - 18.0 g/dL Final 07/11/2019 12:38 AM [facility] Lab   O2 Sat, Arterial 95.5  >92 % Final 07/11/2019 12:38 AM [facility] Lab   Carboxyhgb, Arterial 1.2  0.0 - 5.0 % Final 07/11/2019 12:38 AM [facility] Lab        0.0-1.5   (Smokers 1.5-5.0)    Methemoglobin, Arterial 1.7  <1.5 % Final 07/11/2019 12:38 AM [facility] Lab   O2 Content, Arterial 10.3  Not Established mL/dL Final 07/11/2019 12:38 AM [facility] Lab   O2 Therapy Unknown   Final 07/11/2019 12:38 AM [facility] Performed By     Doug Lima Name Director Address Valid Date Range   [id number] - [address] [place] [address] [facility] [address] 08/30/17 0733-Present       Right Radial, +MAT, 98%  VC 12, 600, +5, 30%

## 2019-07-11 NOTE — PROGRESS NOTES
phone: Not on file     Gets together: Not on file     Attends [employer] service: Not on file     Active member of club or organization: Not on file     Attends meetings of clubs or organizations: Not on file     Relationship status: Not on file    Intimate partner violence:     Fear of current or ex partner: Not on file     Emotionally abused: Not on file     Physically abused: Not on file     Forced sexual activity: Not on file   Other Topics Concern    Not on file   Social History Narrative    Not on file         Review of Systems:    Review of Systems   Unable to perform ROS: Intubated           Objective:  Blood pressure (!) 152/90, pulse 78, temperature 99.1 °F (37.3 °C), temperature source Temporal, resp. rate (!) 37, height 6' 3\" (1.905 m), weight 236 lb 12.8 oz (107.4 kg), SpO2 96 %. Intake/Output Summary (Last 24 hours) at 7/11/2019 0752  Last data filed at 7/11/2019 0200  Gross per 24 hour   Intake 3270.57 ml   Output 910 ml   Net 2360.57 ml       Physical Exam   Constitutional: He appears well-developed and well-nourished. No distress. HENT:   Head: Normocephalic and atraumatic. Mouth/Throat: Oropharynx is clear and moist.   Eyes: Conjunctivae are normal.   Neck: No JVD present. Cardiovascular: Normal rate, regular rhythm and normal heart sounds. Pulmonary/Chest: Effort normal and breath sounds normal. He has no rales. Abdominal: Soft. Musculoskeletal: He exhibits no edema. Neurological: He is alert. Skin: Skin is warm and dry. Vitals reviewed. Labs:  BMP:   Recent Labs     07/10/19  0958  07/11/19  0038 07/11/19  0222 07/11/19  0429     --   --  143  --    K 5.6*   < > 2.4 3.1* 2.8     --   --  97*  --    CO2 7*  --   --  22  --    *  --   --  60*  --    CREATININE 16.6*  --   --  9.8*  --    CALCIUM 7.0*  --   --  6.4*  --     < > = values in this interval not displayed.      CBC:   Recent Labs     07/10/19  0958 07/10/19  1710 07/10/19  1937 07/11/19  0223

## 2019-07-11 NOTE — PROGRESS NOTES
Nephrology ([facility]) Consult Note      Patient:  Beltran Morrissey  YOB: 1975  Date of Service: 7/11/2019  MRN: [id number]   Acct: [de-identified]   Primary Care Physician: Ge Ibarra MD  Advance Directive: Full Code  Admit Date: 7/10/2019       Hospital Day: 1  Referring Provider: Jorgito Duong DO    Patient independently seen and examined, Chart, Consults, Notes, Operative notes, Labs, Cardiology, and Radiology studies reviewed as able. Chief complaints: Abnormal labs. Subjective:  Beltran Morrissey is a 41 y.o. male  whom we were consulted for acute kidney injury/chronic kidney disease. Patient has stage IV chronic kidney disease baseline as of 2017 and no follow-up in our office. Patient has a history of left nephrectomy in the past and recurrent episode of hydronephrosis in the right kidney. Patient has multiple right renal stent as he has scarring of the ureter from radiation treatment of his rectal cancer. Presented with profound weakness, lack of energy and tiredness. He follows [facility] urology and last stent was placed about a month ago. On presentation his creatinine was 16 mg with serum potassium of 5.6 mmol with severe metabolic acidosis. Patient had an episode of grand mal seizure, intubated in ICU. [facility] course remarkable for admission to ICU, initiation of dialysis via right femoral line and stabilization of hemodynamics. Today he is seen in ICU and receiving second hemodialysis treatment. Currently seen on hemodialysis  Hemodialysis access: Right femoral line  Hemodialysis: 3-1/2-hour  Ultrafiltration: 1000 cc  2K bath  Blood flow rate is 400 cc/min.       Allergies:  Morphine and related and Oxycodone-acetaminophen    Medicines:  Current Facility-Administered Medications   Medication Dose Route Frequency Provider Last Rate Last Dose    levetiracetam (KEPPRA) 500 mg/100 mL IVPB  500 mg Intravenous Q12H Jorgito Duong  Diabetes Father        Social History  Social History     Socioeconomic History    Marital status:      Spouse name: Branda Hamman Number of children: Not on file    Years of education: Not on file    Highest education level: Not on file   Occupational History    Not on file   Social Needs    Financial resource strain: Not on file    Food insecurity:     Worry: Not on file     Inability: Not on file    Transportation needs:     Medical: Not on file     Non-medical: Not on file   Tobacco Use    Smoking status: Never Smoker    Smokeless tobacco: Never Used   Substance and Sexual Activity    Alcohol use: No    Drug use: No    Sexual activity: Not on file   Lifestyle    Physical activity:     Days per week: Not on file     Minutes per session: Not on file    Stress: Not on file   Relationships    Social connections:     Talks on phone: Not on file     Gets together: Not on file     Attends Hoah[employer] service: Not on file     Active member of club or organization: Not on file     Attends meetings of clubs or organizations: Not on file     Relationship status: Not on file    Intimate partner violence:     Fear of current or ex partner: Not on file     Emotionally abused: Not on file     Physically abused: Not on file     Forced sexual activity: Not on file   Other Topics Concern    Not on file   Social History Narrative    Not on file         Review of Systems:  Intubated/not sedated and responding to vocal command.     Objective:  Patient Vitals for the past 24 hrs:   BP Temp Temp src Pulse Resp SpO2 Height Weight   07/11/19 0700 (!) 152/90 -- -- 78 (!) 37 96 % -- --   07/11/19 0645 -- -- -- 77 (!) 49 96 % -- --   07/11/19 0500 (!) 153/91 -- -- 74 (!) 38 96 % -- --   07/11/19 0400 (!) 165/88 99.1 °F (37.3 °C) Temporal 96 17 97 % -- 236 lb 12.8 oz (107.4 kg)   07/11/19 0300 (!) 154/90 -- -- 84 18 96 % -- --   07/11/19 0200 (!) 145/88 -- -- 86 20 96 % -- --   07/11/19 0100 (!) 153/91 -- -- 102 24 07/10/19  1937 07/11/19 0222   WBC 8.3  --   --  8.3   HGB 7.7* 6.5* 7.3* 7.5*   HCT 24.8* 18.9* 20.6* 22.5*   MCV 92.5  --   --  87.2   PLT 98*  --   --  91*     LIVER PROFILE:   Recent Labs     07/10/19  0958 07/11/19 0222   AST 10 11   ALT 7 7   LIPASE 99*  --    BILITOT 0.4 0.3   ALKPHOS 85 73     PT/INR: No results for input(s): PROTIME, INR in the last 72 hours. APTT: No results for input(s): APTT in the last 72 hours. BNP:  No results for input(s): BNP in the last 72 hours. Ionized Calcium:No results for input(s): IONCA in the last 72 hours. Magnesium:No results for input(s): MG in the last 72 hours. Phosphorus:No results for input(s): PHOS in the last 72 hours. HgbA1C: No results for input(s): LABA1C in the last 72 hours. Hepatic:   Recent Labs     07/10/19  0958 07/11/19 0222   ALKPHOS 85 73   ALT 7 7   AST 10 11   PROT 6.8 5.7*   BILITOT 0.4 0.3   LABALBU 3.9 3.3*     Lactic Acid: No results for input(s): LACTA in the last 72 hours. Troponin: No results for input(s): CKTOTAL, CKMB, TROPONINT in the last 72 hours. ABGs: No results for input(s): PH, PCO2, PO2, HCO3, O2SAT in the last 72 hours. CRP:  No results for input(s): CRP in the last 72 hours. Sed Rate:  No results for input(s): SEDRATE in the last 72 hours. Cultures:   No results for input(s): CULTURE in the last 72 hours. No results for input(s): BC, Rubia Crow in the last 72 hours. No results for input(s): CXSURG in the last 72 hours. Radiology reports as per the Radiologist  Radiology: Ct Abdomen Pelvis Wo Contrast Additional Contrast? None    Result Date: 7/10/2019  EXAM: CT ABDOMEN PELVIS WO CONTRAST   7/10/2019 11:02 AM INDICATION: Fat-containing and tail COMPARISON: CT abdomen pelvis dated 9/15/2015. TECHNIQUE: Abdomen and pelvis were scanned utilizing a multidetector helical scanner from the diaphragm to the ischial tuberosities without administration of IV contrast. Coronal and sagittal reformations were obtained. patient.     Miguel A Fenton MD  07/11/19  9:26 AM

## 2019-07-12 ENCOUNTER — ANESTHESIA EVENT (OUTPATIENT)
Dept: ENDOSCOPY | Age: 44
DRG: 673 | End: 2019-07-12
Payer: MEDICARE

## 2019-07-12 ENCOUNTER — ANESTHESIA (OUTPATIENT)
Dept: ENDOSCOPY | Age: 44
DRG: 673 | End: 2019-07-12
Payer: MEDICARE

## 2019-07-12 VITALS
SYSTOLIC BLOOD PRESSURE: 136 MMHG | DIASTOLIC BLOOD PRESSURE: 93 MMHG | RESPIRATION RATE: 14 BRPM | OXYGEN SATURATION: 93 %

## 2019-07-12 LAB
ALBUMIN SERPL-MCNC: 3.3 G/DL (ref 3.5–5.2)
ALP BLD-CCNC: 71 U/L (ref 40–130)
ALT SERPL-CCNC: 7 U/L (ref 5–41)
ANION GAP SERPL CALCULATED.3IONS-SCNC: 17 MMOL/L (ref 7–19)
AST SERPL-CCNC: 12 U/L (ref 5–40)
BILIRUB SERPL-MCNC: 0.4 MG/DL (ref 0.2–1.2)
BUN BLDV-MCNC: 26 MG/DL (ref 6–20)
CALCIUM SERPL-MCNC: 6.8 MG/DL (ref 8.6–10)
CHLORIDE BLD-SCNC: 96 MMOL/L (ref 98–111)
CO2: 30 MMOL/L (ref 22–29)
CREAT SERPL-MCNC: 5.5 MG/DL (ref 0.5–1.2)
GFR NON-AFRICAN AMERICAN: 11
GLUCOSE BLD-MCNC: 110 MG/DL (ref 74–109)
HCT VFR BLD CALC: 22.3 % (ref 42–52)
HEMOGLOBIN: 7.2 G/DL (ref 14–18)
MCH RBC QN AUTO: 28.1 PG (ref 27–31)
MCHC RBC AUTO-ENTMCNC: 32.3 G/DL (ref 33–37)
MCV RBC AUTO: 87.1 FL (ref 80–94)
PDW BLD-RTO: 15.8 % (ref 11.5–14.5)
PLATELET # BLD: 77 K/UL (ref 130–400)
PMV BLD AUTO: 9.8 FL (ref 9.4–12.4)
POTASSIUM SERPL-SCNC: 3.2 MMOL/L (ref 3.5–5)
POTASSIUM SERPL-SCNC: 3.5 MMOL/L (ref 3.5–5)
RBC # BLD: 2.56 M/UL (ref 4.7–6.1)
SODIUM BLD-SCNC: 143 MMOL/L (ref 136–145)
TOTAL PROTEIN: 6.2 G/DL (ref 6.6–8.7)
URINE CULTURE, ROUTINE: NORMAL
WBC # BLD: 7.4 K/UL (ref 4.8–10.8)

## 2019-07-12 PROCEDURE — 6360000002 HC RX W HCPCS: Performed by: INTERNAL MEDICINE

## 2019-07-12 PROCEDURE — 80053 COMPREHEN METABOLIC PANEL: CPT

## 2019-07-12 PROCEDURE — 0JH63XZ INSERTION OF TUNNELED VASCULAR ACCESS DEVICE INTO CHEST SUBCUTANEOUS TISSUE AND FASCIA, PERCUTANEOUS APPROACH: ICD-10-PCS | Performed by: SURGERY

## 2019-07-12 PROCEDURE — 3609012800 HC EGD DIAGNOSTIC ONLY: Performed by: INTERNAL MEDICINE

## 2019-07-12 PROCEDURE — 02HV33Z INSERTION OF INFUSION DEVICE INTO SUPERIOR VENA CAVA, PERCUTANEOUS APPROACH: ICD-10-PCS | Performed by: SURGERY

## 2019-07-12 PROCEDURE — 2580000003 HC RX 258: Performed by: NURSE ANESTHETIST, CERTIFIED REGISTERED

## 2019-07-12 PROCEDURE — 2709999900 HC NON-CHARGEABLE SUPPLY: Performed by: INTERNAL MEDICINE

## 2019-07-12 PROCEDURE — 2700000000 HC OXYGEN THERAPY PER DAY

## 2019-07-12 PROCEDURE — 0DBN8ZX EXCISION OF SIGMOID COLON, VIA NATURAL OR ARTIFICIAL OPENING ENDOSCOPIC, DIAGNOSTIC: ICD-10-PCS | Performed by: INTERNAL MEDICINE

## 2019-07-12 PROCEDURE — 6370000000 HC RX 637 (ALT 250 FOR IP): Performed by: INTERNAL MEDICINE

## 2019-07-12 PROCEDURE — 2580000003 HC RX 258: Performed by: INTERNAL MEDICINE

## 2019-07-12 PROCEDURE — 43239 EGD BIOPSY SINGLE/MULTIPLE: CPT | Performed by: INTERNAL MEDICINE

## 2019-07-12 PROCEDURE — C9113 INJ PANTOPRAZOLE SODIUM, VIA: HCPCS | Performed by: INTERNAL MEDICINE

## 2019-07-12 PROCEDURE — 85027 COMPLETE CBC AUTOMATED: CPT

## 2019-07-12 PROCEDURE — 6360000002 HC RX W HCPCS: Performed by: NURSE ANESTHETIST, CERTIFIED REGISTERED

## 2019-07-12 PROCEDURE — 99233 SBSQ HOSP IP/OBS HIGH 50: CPT | Performed by: INTERNAL MEDICINE

## 2019-07-12 PROCEDURE — 3700000001 HC ADD 15 MINUTES (ANESTHESIA): Performed by: INTERNAL MEDICINE

## 2019-07-12 PROCEDURE — 3700000000 HC ANESTHESIA ATTENDED CARE: Performed by: INTERNAL MEDICINE

## 2019-07-12 PROCEDURE — 36415 COLL VENOUS BLD VENIPUNCTURE: CPT

## 2019-07-12 PROCEDURE — 2500000003 HC RX 250 WO HCPCS: Performed by: NURSE ANESTHETIST, CERTIFIED REGISTERED

## 2019-07-12 PROCEDURE — 8010000000 HC HEMODIALYSIS ACUTE INPT

## 2019-07-12 PROCEDURE — 44401 COLONOSCOPY WITH ABLATION: CPT | Performed by: INTERNAL MEDICINE

## 2019-07-12 PROCEDURE — 1210000000 HC MED SURG R&B

## 2019-07-12 PROCEDURE — 84132 ASSAY OF SERUM POTASSIUM: CPT

## 2019-07-12 PROCEDURE — 0DB68ZX EXCISION OF STOMACH, VIA NATURAL OR ARTIFICIAL OPENING ENDOSCOPIC, DIAGNOSTIC: ICD-10-PCS | Performed by: INTERNAL MEDICINE

## 2019-07-12 PROCEDURE — 0W3P8ZZ CONTROL BLEEDING IN GASTROINTESTINAL TRACT, VIA NATURAL OR ARTIFICIAL OPENING ENDOSCOPIC: ICD-10-PCS | Performed by: INTERNAL MEDICINE

## 2019-07-12 PROCEDURE — 3609009300 HC COLONOSCOPY BIOPSY/STOMA: Performed by: INTERNAL MEDICINE

## 2019-07-12 PROCEDURE — 2720000010 HC SURG SUPPLY STERILE: Performed by: INTERNAL MEDICINE

## 2019-07-12 RX ORDER — LIDOCAINE HYDROCHLORIDE 10 MG/ML
INJECTION, SOLUTION INFILTRATION; PERINEURAL PRN
Status: DISCONTINUED | OUTPATIENT
Start: 2019-07-12 | End: 2019-07-12 | Stop reason: SDUPTHER

## 2019-07-12 RX ORDER — HYDRALAZINE HYDROCHLORIDE 25 MG/1
25 TABLET, FILM COATED ORAL EVERY 8 HOURS SCHEDULED
Status: DISCONTINUED | OUTPATIENT
Start: 2019-07-12 | End: 2019-07-17 | Stop reason: HOSPADM

## 2019-07-12 RX ORDER — CARVEDILOL 12.5 MG/1
12.5 TABLET ORAL 2 TIMES DAILY WITH MEALS
Status: DISCONTINUED | OUTPATIENT
Start: 2019-07-12 | End: 2019-07-17 | Stop reason: HOSPADM

## 2019-07-12 RX ORDER — PROPOFOL 10 MG/ML
INJECTION, EMULSION INTRAVENOUS CONTINUOUS PRN
Status: DISCONTINUED | OUTPATIENT
Start: 2019-07-12 | End: 2019-07-12 | Stop reason: SDUPTHER

## 2019-07-12 RX ORDER — PANTOPRAZOLE SODIUM 40 MG/1
40 TABLET, DELAYED RELEASE ORAL
Status: DISCONTINUED | OUTPATIENT
Start: 2019-07-12 | End: 2019-07-16

## 2019-07-12 RX ORDER — LOSARTAN POTASSIUM 50 MG/1
50 TABLET ORAL 2 TIMES DAILY
Status: DISCONTINUED | OUTPATIENT
Start: 2019-07-12 | End: 2019-07-13

## 2019-07-12 RX ORDER — SODIUM CHLORIDE, SODIUM LACTATE, POTASSIUM CHLORIDE, CALCIUM CHLORIDE 600; 310; 30; 20 MG/100ML; MG/100ML; MG/100ML; MG/100ML
INJECTION, SOLUTION INTRAVENOUS CONTINUOUS PRN
Status: DISCONTINUED | OUTPATIENT
Start: 2019-07-12 | End: 2019-07-12 | Stop reason: SDUPTHER

## 2019-07-12 RX ORDER — PROPOFOL 10 MG/ML
INJECTION, EMULSION INTRAVENOUS PRN
Status: DISCONTINUED | OUTPATIENT
Start: 2019-07-12 | End: 2019-07-12 | Stop reason: SDUPTHER

## 2019-07-12 RX ADMIN — CARVEDILOL 12.5 MG: 12.5 TABLET, FILM COATED ORAL at 16:48

## 2019-07-12 RX ADMIN — SODIUM CHLORIDE, SODIUM LACTATE, POTASSIUM CHLORIDE, AND CALCIUM CHLORIDE: 600; 310; 30; 20 INJECTION, SOLUTION INTRAVENOUS at 13:14

## 2019-07-12 RX ADMIN — AMLODIPINE BESYLATE 10 MG: 10 TABLET ORAL at 09:23

## 2019-07-12 RX ADMIN — Medication 10 ML: at 03:07

## 2019-07-12 RX ADMIN — LOSARTAN POTASSIUM 50 MG: 50 TABLET ORAL at 21:25

## 2019-07-12 RX ADMIN — LEVETIRACETAM 500 MG: 5 INJECTION INTRAVENOUS at 21:25

## 2019-07-12 RX ADMIN — LIDOCAINE HYDROCHLORIDE 5 ML: 10 INJECTION, SOLUTION INFILTRATION; PERINEURAL at 13:17

## 2019-07-12 RX ADMIN — LOSARTAN POTASSIUM 50 MG: 50 TABLET ORAL at 09:58

## 2019-07-12 RX ADMIN — PANTOPRAZOLE SODIUM 40 MG: 40 TABLET, DELAYED RELEASE ORAL at 16:48

## 2019-07-12 RX ADMIN — LEVETIRACETAM 500 MG: 5 INJECTION INTRAVENOUS at 12:00

## 2019-07-12 RX ADMIN — Medication 10 ML: at 21:29

## 2019-07-12 RX ADMIN — CARVEDILOL 12.5 MG: 12.5 TABLET, FILM COATED ORAL at 09:24

## 2019-07-12 RX ADMIN — PROPOFOL 50 MG: 10 INJECTION, EMULSION INTRAVENOUS at 13:17

## 2019-07-12 RX ADMIN — PROPOFOL 75 MCG/KG/MIN: 10 INJECTION, EMULSION INTRAVENOUS at 13:18

## 2019-07-12 RX ADMIN — Medication 10 ML: at 09:24

## 2019-07-12 RX ADMIN — HYDRALAZINE HYDROCHLORIDE 25 MG: 25 TABLET, FILM COATED ORAL at 21:25

## 2019-07-12 RX ADMIN — TAMSULOSIN HYDROCHLORIDE 0.4 MG: 0.4 CAPSULE ORAL at 09:58

## 2019-07-12 RX ADMIN — SODIUM CHLORIDE 8 MG/HR: 9 INJECTION, SOLUTION INTRAVENOUS at 05:09

## 2019-07-12 RX ADMIN — HYDRALAZINE HYDROCHLORIDE 25 MG: 25 TABLET, FILM COATED ORAL at 09:58

## 2019-07-12 RX ADMIN — CALCIUM GLUCONATE 1 G: 98 INJECTION, SOLUTION INTRAVENOUS at 04:29

## 2019-07-12 RX ADMIN — HYDRALAZINE HYDROCHLORIDE 10 MG: 20 INJECTION INTRAMUSCULAR; INTRAVENOUS at 03:07

## 2019-07-12 RX ADMIN — HYDROCODONE BITARTRATE AND ACETAMINOPHEN 1 TABLET: 10; 325 TABLET ORAL at 00:51

## 2019-07-12 ASSESSMENT — ENCOUNTER SYMPTOMS
SHORTNESS OF BREATH: 0
BACK PAIN: 0
CONSTIPATION: 0
NAUSEA: 0
VOMITING: 0
DIARRHEA: 0

## 2019-07-12 ASSESSMENT — PAIN SCALES - WONG BAKER
WONGBAKER_NUMERICALRESPONSE: 0
WONGBAKER_NUMERICALRESPONSE: 0

## 2019-07-12 ASSESSMENT — PAIN SCALES - GENERAL
PAINLEVEL_OUTOF10: 0
PAINLEVEL_OUTOF10: 4
PAINLEVEL_OUTOF10: 0
PAINLEVEL_OUTOF10: 0

## 2019-07-12 NOTE — ANESTHESIA PRE PROCEDURE
Allergen Reactions    Morphine And Related      Doesn't work     Oxycodone-Acetaminophen      Give me migraines        Problem List:    Patient Active Problem List   Diagnosis Code    Testalgia N50.819    Benign non-nodular prostatic hyperplasia with lower urinary tract symptoms N40.1    Gastroesophageal reflux disease without esophagitis K21.9    Chronic insomnia F51.04    Depression with anxiety F41.8    Essential (primary) hypertension I10    Chronic migraine without aura without status migrainosus, not intractable G43.709    CKD (chronic kidney disease) stage 3, GFR 30-59 ml/min (HCC) N18.3    Generalized anxiety disorder F41.1    Hx of colon cancer, stage III Z85.038    Hx antineoplastic chemotherapy Z92.21    MILAGROS (acute kidney injury) ([facility] [address] N17.9    Acute renal failure superimposed on stage 4 chronic kidney disease (HCC) N17.9, N18.4    Palliative care patient Z51.5    GI (gastrointestinal bleed) K92.2       Past Medical History:        Diagnosis Date    Asthma     during winter months    Cancer [facility])     rectal    Hx of migraine headaches     Hypertension     Palliative care patient 07/11/2019    Pericarditis     Rectal cancer ([facility] [address]     Testalgia 2/1/2016       Past Surgical History:        Procedure Laterality Date    ANTERIOR CRUCIATE LIGAMENT REPAIR  2007    ACL and MCL repair    COLON SURGERY      colon resection with colostomy    HERNIA REPAIR      As an infant    KIDNEY REMOVAL Left     URETER STENT PLACEMENT         Social History:    Social History     Tobacco Use    Smoking status: Never Smoker    Smokeless tobacco: Never Used   Substance Use Topics    Alcohol use:  No                                Counseling given: Not Answered      Vital Signs (Current):   Vitals:    07/12/19 0645 07/12/19 0700 07/12/19 0800 07/12/19 0830   BP: (!) 172/96 (!) 166/101 (!) 174/103 (!) 168/101   Pulse: 94 88 85 85   Resp: 29 (!) 49 20 26   Temp:   100.4 °F (38 °C)    TempSrc:

## 2019-07-12 NOTE — PROGRESS NOTES
3018.15 ml   Output 3125 ml   Net -106.85 ml       Physical Exam   Constitutional: He is oriented to person, place, and time. He appears well-developed and well-nourished. HENT:   Head: Normocephalic and atraumatic. Mouth/Throat: Oropharynx is clear and moist.   Eyes: Pupils are equal, round, and reactive to light. Conjunctivae are normal.   Neck: No JVD present. Cardiovascular: Normal rate, regular rhythm and normal heart sounds. Pulmonary/Chest: Effort normal and breath sounds normal.   Abdominal: Soft. Musculoskeletal: He exhibits no edema. Neurological: He is alert and oriented to person, place, and time. Skin: Skin is warm and dry. Vitals reviewed. Labs:  BMP:   Recent Labs     07/10/19  0958  07/11/19  0222 07/11/19  0429 07/12/19  0200     --  143  --  143   K 5.6*   < > 3.1* 2.8 3.2*     --  97*  --  96*   CO2 7*  --  22  --  30*   *  --  60*  --  26*   CREATININE 16.6*  --  9.8*  --  5.5*   CALCIUM 7.0*  --  6.4*  --  6.8*    < > = values in this interval not displayed. CBC:   Recent Labs     07/10/19  0958  07/10/19  1937 07/11/19  0222 07/12/19  0200   WBC 8.3  --   --  8.3 7.4   HGB 7.7*   < > 7.3* 7.5* 7.2*   HCT 24.8*   < > 20.6* 22.5* 22.3*   MCV 92.5  --   --  87.2 87.1   PLT 98*  --   --  91* 77*    < > = values in this interval not displayed. LIVER PROFILE:   Recent Labs     07/10/19  0958 07/11/19  0222 07/12/19  0200   AST 10 11 12   ALT 7 7 7   LIPASE 99*  --   --    BILITOT 0.4 0.3 0.4   ALKPHOS 85 73 71     PT/INR: No results for input(s): PROTIME, INR in the last 72 hours. APTT: No results for input(s): APTT in the last 72 hours. BNP:  No results for input(s): BNP in the last 72 hours. Ionized Calcium:No results for input(s): IONCA in the last 72 hours. Magnesium:No results for input(s): MG in the last 72 hours. Phosphorus:No results for input(s): PHOS in the last 72 hours.   HgbA1C: No results for input(s): LABA1C in the last 72 hours. Hepatic:   Recent Labs     07/10/19  0958 07/11/19  0222 07/12/19  0200   ALKPHOS 85 73 71   ALT 7 7 7   AST 10 11 12   PROT 6.8 5.7* 6.2*   BILITOT 0.4 0.3 0.4   LABALBU 3.9 3.3* 3.3*     Lactic Acid: No results for input(s): LACTA in the last 72 hours. Troponin: No results for input(s): CKTOTAL, CKMB, TROPONINT in the last 72 hours. ABGs: No results for input(s): PH, PCO2, PO2, HCO3, O2SAT in the last 72 hours. CRP:  No results for input(s): CRP in the last 72 hours. Sed Rate:  No results for input(s): SEDRATE in the last 72 hours. Cultures:   No results for input(s): CULTURE in the last 72 hours. No results for input(s): BC, Emani Alvine in the last 72 hours. No results for input(s): CXSURG in the last 72 hours. Radiology reports as per the Radiologist  Radiology: Ct Abdomen Pelvis Wo Contrast Additional Contrast? None    Result Date: 7/10/2019  EXAM: CT ABDOMEN PELVIS WO CONTRAST   7/10/2019 11:02 AM INDICATION: Fat-containing and tail COMPARISON: CT abdomen pelvis dated 9/15/2015. TECHNIQUE: Abdomen and pelvis were scanned utilizing a multidetector helical scanner from the diaphragm to the ischial tuberosities without administration of IV contrast. Coronal and sagittal reformations were obtained. [Routine protocol is performed.] Radiation dose equals DLP 1595 mGy-cm. Automated exposure control dose reduction technique was implemented. FINDINGS: LOWER THORAX: No focal consolidation, pleural effusion or pneumothorax. HEPATOBILIARY:  No focal hepatic lesions. No liver laceration. No biliary ductal dilatation. GALLBLADDER:  Cholelithiasis, without evidence acute cholecystitis. .  No wall thickening. SPLEEN:  No splenomegaly. No splenic laceration. PANCREAS:  No focal masses or ductal dilatation. ADRENALS:  No adrenal nodules. KIDNEYS/URETERS: The left kidney is absent. There is a right double-J stent in place. Dilatation of the right renal collecting system and ureter.  Dystrophic calcification versus nonobstructive stone in the inferior pole of the right kidney. Right perinephric and periureteral fat stranding is noted. GI TRACT: There is no bowel obstruction. Left lower quadrant ileostomy, without inflammatory changes. .   No acute appendicitis. Postsurgical changes in the midabdomen. PELVIC ORGANS/BLADDER:  Soft tissue density masslike in the presacral region is redemonstrated, overall similar to CT abdomen pelvis dated 9/15/2015. Double-J stent coiled multiple times in the bladder. James Beltran LYMPH NODES:  No inguinal canal, pelvic wall, retroperitoneal or mesenteric lymphadenopathy by size criteria. . VESSELS:  Unremarkable. PERITONEUM / RETROPERITONEUM:  No organized fluid collection. No free air. James Beltran BONES:  No acute osseous pathology. James Beltran SOFT TISSUES:  No acute pathology. 1.  Right perinephric and periureteral fat stranding. Although this was present on 9/15/2015, acute infection is not excluded. Correlation with laboratory values is recommended. 2.  Masslike soft tissue density in the presacral region is unchanged. This is favored to reflect posttreatment changes. 3.  Cholelithiasis, without evidence acute cholecystitis. Signed by Dr Kelly Conley on 7/10/2019 11:08 AM    Us Renal Complete    Result Date: 7/11/2019  Examination. US RENAL COMPLETE History: Acute on chronic renal failure. The ultrasound examination of the kidneys bilaterally is performed and compared with the previous study dated 9/15/2015. The right kidney measures 12 cm x 6.5 x 5 cm. There is a persistent moderate hydronephrosis. There are delineated echogenic areas in the collecting system which may represent the nephrostomy or stent catheter? James Beltran There is a hypoechoic area located anteriorly in the upper pole measuring 1.7 cm in greatest dimension. This may represent a dilated calyx or a nephrogenic cysts? James Beltran The renal cortex measures 1.3-1.8 cm. The left kidney is not visualized. A persistent right hydronephrosis.  The etiology is not certain. Signed by Dr Neyda Duque on 7/11/2019 8:43 AM    Xr Chest Portable    Result Date: 7/10/2019  EXAMINATION: XR CHEST PORTABLE 7/10/2019 4:33 PM HISTORY: XR CHEST PORTABLE 7/10/2019 1:30 PM HISTORY: Endotracheal tube COMPARISON: May 1, 2019. FINDINGS: The lungs are clear. The cardiac silhouette is normal. Endotracheal tube is present the distal tip just proximal to the clavicles. . The osseous structures and surrounding soft tissues demonstrate no acute abnormality. 1. No radiographic evidence of acute cardiopulmonary process. Signed by Dr Shanti Ellis on 7/10/2019 4:36 PM       Assessment   Acute on chronic renal failure-he has most likely progressed to end-stage renal disease given his history of chronic obstructive uropathy. Metabolic acidosis-resolved. New onset seizure-most likely related to metabolic abnormalities. GI bleed      Plan:  Dialysis today. Panendoscopy today. Further management decisions pending results.     Cecile Giles DO

## 2019-07-12 NOTE — CONSULTS
[facility] [employer] OF [facility] EMELIA [address], [address] [facility]                                  CONSULTATION    PATIENT NAME: Jareth Philip                      :        1975  MED REC NO:   [id number]                              ROOM:       [facility]  ACCOUNT NO:   [de-identified]                           ADMIT DATE: 07/10/2019  PROVIDER:     Michelle Auguste MD    CONSULT DATE:  2019    GI CONSULTATION    ASSESSMENT:  1. Upper GI bleed, manifested by coffee-ground emesis and fall in blood  count to hemoglobin of 7.5 gm, requiring transfusion. 2.  New onset of diarrhea. 3.  Prior distal colectomy and left-sided colostomy for rectal cancer in  . RECOMMENDATION:  1. Protonix IV. 2.  Monitor H and H, and transfuse as needed to maintain a hemoglobin  greater than 7 gm. 3.  EGD and colonoscopy tomorrow morning. 4.  Other diagnostic and therapeutic recommendations were contingent on  findings and clinical course. HISTORY OF PRESENT ILLNESS:  This unfortunate 62-year-old white male has  an extensive medical history and surgical history, which is summarized  in the past medical history section of this dictation. Pertinent GI  history is diagnosis of rectal carcinoma in , for which he underwent  colectomy and left-sided colostomy. He has had followup colonoscopy a  couple of times, but none in the last 3 to 4 years. The patient was, otherwise, well with no upper GI complaints until  developing nausea, vomiting, weakness and diarrhea a few days prior to  admission. He is not aware of any consumption of contaminated food or  beverage. He is not having any increasing abdominal pain or other  symptoms of ulcer. He does not take any GI irritants such as NSAIDs. On the day of admission, the patient had coffee-ground emesis of a  moderate amount, which was Gastroccult positive. He denies any  dysphagia, heartburn or odynophagia.   Stools have become dark

## 2019-07-12 NOTE — BRIEF OP NOTE
Brief Postoperative Note  ______________________________________________________________    Patient: Samreen Sherman  YOB: 1975  MRN: [id number]  Date of Procedure: 7/12/2019    Pre-Op Diagnosis: GI bleed, hematemesis    Post-Op Diagnosis: Same       Procedure(s):  EGD DIAGNOSTIC with biopsy  COLONOSCOPY BIOPSY of infammatory polyps below stoma    Anesthesia: General    Surgeon(s):  Milton Gonzalez MD    Assistant: Amalia ARELLANO    Estimated Blood Loss (mL): none    Complications: none    Specimens:   ID Type Source Tests Collected by Time Destination   A : Gastric ulcer body of stomach Tissue Stomach SURGICAL PATHOLOGY Milton Gonzalez MD 7/12/2019 1327    B : Distal colon inside ostomy bx Tissue Colon SURGICAL PATHOLOGY Milton Gonzalez MD 7/12/2019 1354        Implants:  * No implants in log *      Drains:   Colostomy LLQ Descending/sigmoid (Active)   Stool Appearance Watery 7/12/2019 12:00 PM   Stool Color Brown 7/12/2019 12:00 PM   Stool Amount Small 7/12/2019 12:00 PM   Output (mL) 50 ml 7/12/2019 12:00 PM       [REMOVED] NG/OG/NJ/NE Tube Orogastric Center mouth (Removed)   Surrounding Skin Dry; Intact 7/11/2019  8:00 AM   Securement device Yes 7/11/2019  8:00 AM   Status Suction-low intermittent 7/11/2019  8:00 AM   Placement Verified by Gastric Contents;by External Catheter Length 7/11/2019  8:00 AM   NG/OG/NJ/NE External Measurement (cm) 63 cm 7/11/2019  8:00 AM   Drainage Appearance Mavis Ruddle; Coffee Ground 7/11/2019  8:00 AM   Output (mL) 350 ml 7/11/2019  2:00 PM       [REMOVED] Urethral Catheter (Removed)   Catheter Indications Need for fluid management in critically ill patients in a critical care setting not able to be managed by other means such as BSC with hat, bedpan, urinal, condom catheter, or short term intermittent urethral catherization 7/12/2019  4:00 AM   Site Assessment No urethral drainage 7/12/2019  4:00 AM   Urine Color Yellow 7/12/2019  4:00 AM   Urine Appearance Clear 7/12/2019 4:00 AM   Output (mL) 200 mL 7/12/2019 12:00 PM       Findings:  Two 1 cm superficial gastric ulcers in body of stomach, biopsied, no gastric mass    Shantelle Zavala MD  Date: 7/12/2019  Time: 2:15 PM

## 2019-07-12 NOTE — PROGRESS NOTES
Corrinne Mill transferred to [facility] from [facility] via wheelchair. Reason for transfer: lower level of care. Explained reason for transfer to Patient and Family. Belongings: phone with patient at bedside . Soft chart transferred with patient: Yes. Telemetry box number N/A transferred with patient: N/A. Report given to: Marly 3rd floor , via telephone.       Electronically signed by Cassidy Patel RN on 7/12/2019 at 6:31 PM

## 2019-07-13 LAB
ALBUMIN SERPL-MCNC: 3.4 G/DL (ref 3.5–5.2)
ALP BLD-CCNC: 75 U/L (ref 40–130)
ALT SERPL-CCNC: 7 U/L (ref 5–41)
ANION GAP SERPL CALCULATED.3IONS-SCNC: 16 MMOL/L (ref 7–19)
AST SERPL-CCNC: 12 U/L (ref 5–40)
BILIRUB SERPL-MCNC: 0.4 MG/DL (ref 0.2–1.2)
BUN BLDV-MCNC: 21 MG/DL (ref 6–20)
CALCIUM SERPL-MCNC: 7.6 MG/DL (ref 8.6–10)
CHLORIDE BLD-SCNC: 98 MMOL/L (ref 98–111)
CO2: 27 MMOL/L (ref 22–29)
CREAT SERPL-MCNC: 4.9 MG/DL (ref 0.5–1.2)
GFR NON-AFRICAN AMERICAN: 13
GLUCOSE BLD-MCNC: 95 MG/DL (ref 74–109)
HCT VFR BLD CALC: 23.7 % (ref 42–52)
HEMOGLOBIN: 7.5 G/DL (ref 14–18)
MCH RBC QN AUTO: 28.3 PG (ref 27–31)
MCHC RBC AUTO-ENTMCNC: 31.6 G/DL (ref 33–37)
MCV RBC AUTO: 89.4 FL (ref 80–94)
PDW BLD-RTO: 15.3 % (ref 11.5–14.5)
PLATELET # BLD: 95 K/UL (ref 130–400)
PMV BLD AUTO: 10 FL (ref 9.4–12.4)
POTASSIUM SERPL-SCNC: 3.5 MMOL/L (ref 3.5–5)
RBC # BLD: 2.65 M/UL (ref 4.7–6.1)
SODIUM BLD-SCNC: 141 MMOL/L (ref 136–145)
TOTAL PROTEIN: 6.2 G/DL (ref 6.6–8.7)
WBC # BLD: 9.9 K/UL (ref 4.8–10.8)

## 2019-07-13 PROCEDURE — 85027 COMPLETE CBC AUTOMATED: CPT

## 2019-07-13 PROCEDURE — 6370000000 HC RX 637 (ALT 250 FOR IP): Performed by: INTERNAL MEDICINE

## 2019-07-13 PROCEDURE — 2700000000 HC OXYGEN THERAPY PER DAY

## 2019-07-13 PROCEDURE — 6360000002 HC RX W HCPCS: Performed by: INTERNAL MEDICINE

## 2019-07-13 PROCEDURE — 99233 SBSQ HOSP IP/OBS HIGH 50: CPT | Performed by: FAMILY MEDICINE

## 2019-07-13 PROCEDURE — 80053 COMPREHEN METABOLIC PANEL: CPT

## 2019-07-13 PROCEDURE — 1210000000 HC MED SURG R&B

## 2019-07-13 PROCEDURE — 36415 COLL VENOUS BLD VENIPUNCTURE: CPT

## 2019-07-13 PROCEDURE — 2580000003 HC RX 258: Performed by: INTERNAL MEDICINE

## 2019-07-13 RX ORDER — ALPRAZOLAM 0.25 MG/1
0.25 TABLET ORAL NIGHTLY PRN
Status: DISCONTINUED | OUTPATIENT
Start: 2019-07-13 | End: 2019-07-17 | Stop reason: HOSPADM

## 2019-07-13 RX ORDER — LISINOPRIL 20 MG/1
20 TABLET ORAL DAILY
Status: DISCONTINUED | OUTPATIENT
Start: 2019-07-13 | End: 2019-07-17 | Stop reason: HOSPADM

## 2019-07-13 RX ADMIN — Medication 10 ML: at 20:45

## 2019-07-13 RX ADMIN — CARVEDILOL 12.5 MG: 12.5 TABLET, FILM COATED ORAL at 17:23

## 2019-07-13 RX ADMIN — LEVETIRACETAM 500 MG: 5 INJECTION INTRAVENOUS at 20:42

## 2019-07-13 RX ADMIN — LEVETIRACETAM 500 MG: 5 INJECTION INTRAVENOUS at 09:05

## 2019-07-13 RX ADMIN — TAMSULOSIN HYDROCHLORIDE 0.4 MG: 0.4 CAPSULE ORAL at 09:05

## 2019-07-13 RX ADMIN — Medication 10 ML: at 09:13

## 2019-07-13 RX ADMIN — LOSARTAN POTASSIUM 50 MG: 50 TABLET ORAL at 09:05

## 2019-07-13 RX ADMIN — LISINOPRIL 20 MG: 20 TABLET ORAL at 11:15

## 2019-07-13 RX ADMIN — HYDROCODONE BITARTRATE AND ACETAMINOPHEN 1 TABLET: 10; 325 TABLET ORAL at 19:22

## 2019-07-13 RX ADMIN — HYDROCODONE BITARTRATE AND ACETAMINOPHEN 1 TABLET: 10; 325 TABLET ORAL at 00:31

## 2019-07-13 RX ADMIN — CARVEDILOL 12.5 MG: 12.5 TABLET, FILM COATED ORAL at 09:13

## 2019-07-13 RX ADMIN — PANTOPRAZOLE SODIUM 40 MG: 40 TABLET, DELAYED RELEASE ORAL at 06:26

## 2019-07-13 RX ADMIN — PANTOPRAZOLE SODIUM 40 MG: 40 TABLET, DELAYED RELEASE ORAL at 15:21

## 2019-07-13 RX ADMIN — AMLODIPINE BESYLATE 10 MG: 10 TABLET ORAL at 09:05

## 2019-07-13 RX ADMIN — HYDRALAZINE HYDROCHLORIDE 25 MG: 25 TABLET, FILM COATED ORAL at 20:42

## 2019-07-13 RX ADMIN — HYDRALAZINE HYDROCHLORIDE 25 MG: 25 TABLET, FILM COATED ORAL at 06:26

## 2019-07-13 RX ADMIN — HYDRALAZINE HYDROCHLORIDE 25 MG: 25 TABLET, FILM COATED ORAL at 15:21

## 2019-07-13 ASSESSMENT — PAIN SCALES - GENERAL
PAINLEVEL_OUTOF10: 3
PAINLEVEL_OUTOF10: 7
PAINLEVEL_OUTOF10: 2
PAINLEVEL_OUTOF10: 7

## 2019-07-13 NOTE — PROGRESS NOTES
Oral 3 times per day Cecilejose alfredo Carbajalter, DO   25 mg at 07/13/19 4188    carvedilol (COREG) tablet 12.5 mg  12.5 mg Oral BID WC Cecile Giles, DO   12.5 mg at 07/13/19 0913    pantoprazole (PROTONIX) tablet 40 mg  40 mg Oral BID AC Kaur Coombs MD   40 mg at 07/13/19 4808    levetiracetam (KEPPRA) 500 mg/100 mL IVPB  500 mg Intravenous Q12H Cecilejose alfredo Giles, DO   Stopped at 07/13/19 1110    darbepoetin aliza-polysorbate (ARANESP) injection 100 mcg  100 mcg Subcutaneous Weekly - Monday Cecile Giles, DO   100 mcg at 07/11/19 1226    amLODIPine (NORVASC) tablet 10 mg  10 mg Oral Daily Cecile Hurter, DO   10 mg at 07/13/19 2687    sodium chloride flush 0.9 % injection 10 mL  10 mL Intravenous 2 times per day Cecile Giles, DO   10 mL at 07/13/19 0913    sodium chloride (PF) 0.9 % injection 10 mL  10 mL Intravenous PRN Cecile Carbajalter, DO   10 mL at 07/12/19 1374    hydrALAZINE (APRESOLINE) injection 10 mg  10 mg Intravenous Q6H PRN Cecile Giles, DO   10 mg at 07/12/19 0307    HYDROcodone-acetaminophen (NORCO)  MG per tablet 1 tablet  1 tablet Oral Q6H PRN Cecile Giles, DO   1 tablet at 07/13/19 0031    ondansetron (ZOFRAN) tablet 4 mg  4 mg Oral Daily PRN Cecile Giles, DO        tamsulosin [facility]) capsule 0.4 mg  0.4 mg Oral Daily eCcile Carbajalter, DO   0.4 mg at 07/13/19 9398    sodium chloride flush 0.9 % injection 10 mL  10 mL Intravenous PRN Cecile Carbajalter, DO        magnesium hydroxide (MILK OF MAGNESIA) 400 MG/5ML suspension 30 mL  30 mL Oral Daily PRN Cecile Carbajalter, DO        ondansetron [facility]) injection 4 mg  4 mg Intravenous Q6H PRN Cecile Giles, DO   4 mg at 07/11/19 1305        Labs:     Recent Labs     07/11/19  0222 07/12/19  0200 07/13/19  0327   WBC 8.3 7.4 9.9   RBC 2.58* 2.56* 2.65*   HGB 7.5* 7.2* 7.5*   HCT 22.5* 22.3* 23.7*   MCV 87.2 87.1 89.4   MCH 29.1 28.1 28.3   MCHC 33.3 32.3* 31.6*   PLT 91*

## 2019-07-14 LAB
ALBUMIN SERPL-MCNC: 3.4 G/DL (ref 3.5–5.2)
ALBUMIN SERPL-MCNC: 3.76 G/DL (ref 3.75–5.01)
ALP BLD-CCNC: 66 U/L (ref 40–130)
ALPHA-1-GLOBULIN: 0.5 G/DL (ref 0.19–0.46)
ALPHA-2-GLOBULIN: 0.67 G/DL (ref 0.48–1.05)
ALT SERPL-CCNC: 7 U/L (ref 5–41)
ANION GAP SERPL CALCULATED.3IONS-SCNC: 18 MMOL/L (ref 7–19)
AST SERPL-CCNC: 12 U/L (ref 5–40)
BETA GLOBULIN: 0.6 G/DL (ref 0.48–1.1)
BILIRUB SERPL-MCNC: 0.3 MG/DL (ref 0.2–1.2)
BLOOD BANK DISPENSE STATUS: NORMAL
BLOOD BANK PRODUCT CODE: NORMAL
BPU ID: NORMAL
BUN BLDV-MCNC: 30 MG/DL (ref 6–20)
CALCIUM SERPL-MCNC: 7.3 MG/DL (ref 8.6–10)
CHLORIDE BLD-SCNC: 102 MMOL/L (ref 98–111)
CO2: 24 MMOL/L (ref 22–29)
CREAT SERPL-MCNC: 6.3 MG/DL (ref 0.5–1.2)
DESCRIPTION BLOOD BANK: NORMAL
GAMMA GLOBULIN: 0.87 G/DL (ref 0.62–1.51)
GFR NON-AFRICAN AMERICAN: 10
GLUCOSE BLD-MCNC: 95 MG/DL (ref 74–109)
HCT VFR BLD CALC: 23.6 % (ref 42–52)
HEMOGLOBIN: 7.5 G/DL (ref 14–18)
MCH RBC QN AUTO: 29.3 PG (ref 27–31)
MCHC RBC AUTO-ENTMCNC: 31.8 G/DL (ref 33–37)
MCV RBC AUTO: 92.2 FL (ref 80–94)
PDW BLD-RTO: 15.4 % (ref 11.5–14.5)
PLATELET # BLD: 117 K/UL (ref 130–400)
PMV BLD AUTO: 10.5 FL (ref 9.4–12.4)
POTASSIUM SERPL-SCNC: 3.4 MMOL/L (ref 3.5–5)
PROTEIN ELECTROPHORESIS, SERUM: ABNORMAL
RBC # BLD: 2.56 M/UL (ref 4.7–6.1)
SODIUM BLD-SCNC: 144 MMOL/L (ref 136–145)
SPE/IFE INTERPRETATION: ABNORMAL
TOTAL PROTEIN: 6.4 G/DL (ref 6.6–8.7)
TOTAL PROTEIN: 6.4 G/DL (ref 6–8.3)
WBC # BLD: 8.7 K/UL (ref 4.8–10.8)

## 2019-07-14 PROCEDURE — 36558 INSERT TUNNELED CV CATH: CPT | Performed by: SURGERY

## 2019-07-14 PROCEDURE — 6370000000 HC RX 637 (ALT 250 FOR IP): Performed by: INTERNAL MEDICINE

## 2019-07-14 PROCEDURE — 6360000002 HC RX W HCPCS: Performed by: INTERNAL MEDICINE

## 2019-07-14 PROCEDURE — 76937 US GUIDE VASCULAR ACCESS: CPT | Performed by: SURGERY

## 2019-07-14 PROCEDURE — 93970 EXTREMITY STUDY: CPT

## 2019-07-14 PROCEDURE — 99152 MOD SED SAME PHYS/QHP 5/>YRS: CPT | Performed by: SURGERY

## 2019-07-14 PROCEDURE — 6370000000 HC RX 637 (ALT 250 FOR IP): Performed by: FAMILY MEDICINE

## 2019-07-14 PROCEDURE — 36415 COLL VENOUS BLD VENIPUNCTURE: CPT

## 2019-07-14 PROCEDURE — 77001 FLUOROGUIDE FOR VEIN DEVICE: CPT | Performed by: SURGERY

## 2019-07-14 PROCEDURE — 85027 COMPLETE CBC AUTOMATED: CPT

## 2019-07-14 PROCEDURE — 99231 SBSQ HOSP IP/OBS SF/LOW 25: CPT | Performed by: SURGERY

## 2019-07-14 PROCEDURE — 80053 COMPREHEN METABOLIC PANEL: CPT

## 2019-07-14 PROCEDURE — 2580000003 HC RX 258: Performed by: INTERNAL MEDICINE

## 2019-07-14 PROCEDURE — G0365 VESSEL MAPPING HEMO ACCESS: HCPCS

## 2019-07-14 PROCEDURE — 1210000000 HC MED SURG R&B

## 2019-07-14 RX ORDER — LEVETIRACETAM 500 MG/1
500 TABLET ORAL 2 TIMES DAILY
Status: DISCONTINUED | OUTPATIENT
Start: 2019-07-14 | End: 2019-07-17 | Stop reason: HOSPADM

## 2019-07-14 RX ADMIN — PANTOPRAZOLE SODIUM 40 MG: 40 TABLET, DELAYED RELEASE ORAL at 06:30

## 2019-07-14 RX ADMIN — CARVEDILOL 12.5 MG: 12.5 TABLET, FILM COATED ORAL at 07:55

## 2019-07-14 RX ADMIN — Medication 10 ML: at 21:15

## 2019-07-14 RX ADMIN — CARVEDILOL 12.5 MG: 12.5 TABLET, FILM COATED ORAL at 17:11

## 2019-07-14 RX ADMIN — HYDRALAZINE HYDROCHLORIDE 25 MG: 25 TABLET, FILM COATED ORAL at 21:14

## 2019-07-14 RX ADMIN — TAMSULOSIN HYDROCHLORIDE 0.4 MG: 0.4 CAPSULE ORAL at 07:55

## 2019-07-14 RX ADMIN — HYDROCODONE BITARTRATE AND ACETAMINOPHEN 1 TABLET: 10; 325 TABLET ORAL at 07:58

## 2019-07-14 RX ADMIN — LEVETIRACETAM 500 MG: 500 TABLET ORAL at 21:14

## 2019-07-14 RX ADMIN — LISINOPRIL 20 MG: 20 TABLET ORAL at 07:55

## 2019-07-14 RX ADMIN — Medication 10 ML: at 07:55

## 2019-07-14 RX ADMIN — AMLODIPINE BESYLATE 10 MG: 10 TABLET ORAL at 07:55

## 2019-07-14 RX ADMIN — HYDROCODONE BITARTRATE AND ACETAMINOPHEN 1 TABLET: 10; 325 TABLET ORAL at 14:53

## 2019-07-14 RX ADMIN — HYDRALAZINE HYDROCHLORIDE 25 MG: 25 TABLET, FILM COATED ORAL at 14:22

## 2019-07-14 RX ADMIN — PANTOPRAZOLE SODIUM 40 MG: 40 TABLET, DELAYED RELEASE ORAL at 17:11

## 2019-07-14 RX ADMIN — ALPRAZOLAM 0.25 MG: 0.25 TABLET ORAL at 00:32

## 2019-07-14 RX ADMIN — LEVETIRACETAM 500 MG: 5 INJECTION INTRAVENOUS at 07:55

## 2019-07-14 RX ADMIN — HYDROCODONE BITARTRATE AND ACETAMINOPHEN 1 TABLET: 10; 325 TABLET ORAL at 21:14

## 2019-07-14 RX ADMIN — HYDRALAZINE HYDROCHLORIDE 25 MG: 25 TABLET, FILM COATED ORAL at 06:30

## 2019-07-14 ASSESSMENT — PAIN SCALES - GENERAL
PAINLEVEL_OUTOF10: 3
PAINLEVEL_OUTOF10: 4
PAINLEVEL_OUTOF10: 1
PAINLEVEL_OUTOF10: 4
PAINLEVEL_OUTOF10: 1

## 2019-07-14 NOTE — PROGRESS NOTES
Oral 3 times per day Isidra Calle, DO   25 mg at 07/14/19 0630    carvedilol (COREG) tablet 12.5 mg  12.5 mg Oral BID WC Isidra Calle DO   12.5 mg at 07/14/19 0755    pantoprazole (PROTONIX) tablet 40 mg  40 mg Oral BID AC Carrie Stokes MD   40 mg at 07/14/19 0630    darbepoetin aliza-polysorbate (ARANESP) injection 100 mcg  100 mcg Subcutaneous Weekly - Monday Isidra Calle DO   100 mcg at 07/11/19 1226    amLODIPine (NORVASC) tablet 10 mg  10 mg Oral Daily Isidra Calle DO   10 mg at 07/14/19 0755    sodium chloride flush 0.9 % injection 10 mL  10 mL Intravenous 2 times per day Isidra Calle, DO   10 mL at 07/14/19 0755    sodium chloride (PF) 0.9 % injection 10 mL  10 mL Intravenous PRN Isidra Calle, DO   10 mL at 07/12/19 5614    hydrALAZINE (APRESOLINE) injection 10 mg  10 mg Intravenous Q6H PRN Isidra Calle, DO   10 mg at 07/12/19 0307    HYDROcodone-acetaminophen (NORCO)  MG per tablet 1 tablet  1 tablet Oral Q6H PRN Isidra Calle, DO   1 tablet at 07/14/19 3827    ondansetron (ZOFRAN) tablet 4 mg  4 mg Oral Daily PRN Isidra Calle, DO        tamsulosin [facility]) capsule 0.4 mg  0.4 mg Oral Daily Isidra Calle, DO   0.4 mg at 07/14/19 2918    sodium chloride flush 0.9 % injection 10 mL  10 mL Intravenous PRN Isidra Calle, DO        magnesium hydroxide (MILK OF MAGNESIA) 400 MG/5ML suspension 30 mL  30 mL Oral Daily PRN Isidra Calle DO        ondansetron [facility]) injection 4 mg  4 mg Intravenous Q6H PRN Isidra Calle, DO   4 mg at 07/11/19 1305        Labs:     Recent Labs     07/12/19  0200 07/13/19  0327 07/14/19  0413   WBC 7.4 9.9 8.7   RBC 2.56* 2.65* 2.56*   HGB 7.2* 7.5* 7.5*   HCT 22.3* 23.7* 23.6*   MCV 87.1 89.4 92.2   MCH 28.1 28.3 29.3   MCHC 32.3* 31.6* 31.8*   PLT 77* 95* 117*     Recent Labs     07/12/19  0200 07/12/19  1130 07/13/19  0327 07/14/19  0413     --  141 144   K 3.2* 3.5 3.5 3.4*   ANIONGAP 17  --  16 18   CL 96*  --  98 102   CO2 30*  --  27 24   BUN 26*  --  21* 30*   CREATININE 5.5*  --  4.9* 6.3*   GLUCOSE 110*  --  95 95   CALCIUM 6.8*  --  7.6* 7.3*     No results for input(s): MG, PHOS in the last 72 hours. Recent Labs     07/12/19  0200 07/13/19  0327 07/14/19  0413   AST 12 12 12   ALT 7 7 7   BILITOT 0.4 0.4 0.3   ALKPHOS 71 75 66     ABGs:No results for input(s): PH, PO2, PCO2, HCO3, BE, O2SAT in the last 72 hours. Troponin T: No results for input(s): TROPONINI in the last 72 hours. INR: No results for input(s): INR in the last 72 hours. Lactic Acid: No results for input(s): LACTA in the last 72 hours.     Objective:   Vitals: BP (!) 158/103   Pulse 94   Temp 99.8 °F (37.7 °C) (Temporal)   Resp 16   Ht 6' 3\" (1.905 m)   Wt 228 lb (103.4 kg)   SpO2 96%   BMI 28.50 kg/m²   24HR INTAKE/OUTPUT:      Intake/Output Summary (Last 24 hours) at 7/14/2019 0953  Last data filed at 7/14/2019 0755  Gross per 24 hour   Intake 790 ml   Output 820 ml   Net -30 ml     General appearance: alert and cooperative with exam  HEENT: atraumatic, eyes with clear conjunctiva and normal lids, pupils and irises normal, external ears and nose are normal, lips normal  Neck without masses, lympadenopathy, bruit, thyroid normal  Lungs: no increased work of breathing, \"clear to auscultation bilaterally\" without rales, rhonchi or wheezes  Heart: regular rate and rhythm, S1, S2 normal, no murmur, click, rub or gallop  Abdomen: colostomy putting out liquid stool without visible blood, nontender, bowel sounds present  Extremities: extremities normal, atraumatic, no cyanosis or edema  Neurologic: no focal neurologic deficits, normal sensation, alert and oriented, affect and mood appropriate  Skin: no rashes, nodules    Assessment and Plan:   Principal Problem:    GI (gastrointestinal bleed)  Active Problems:    Chronic insomnia    Depression with anxiety    Essential (primary) hypertension

## 2019-07-14 NOTE — PROGRESS NOTES
Vascular Surgery Rounding Note    7/14/2019  9:48 AM    Post Hosp day - 5    Subjective- No complaints    Objective-   Invalid input(s): 24H    Intake/Output Summary (Last 24 hours) at 7/14/2019 0948  Last data filed at 7/14/2019 0755  Gross per 24 hour   Intake 790 ml   Output 820 ml   Net -30 ml     In: 10 [I.V.:10]  Out: -     CBC:   Recent Labs     07/12/19  0200 07/13/19 0327 07/14/19  0413   WBC 7.4 9.9 8.7   RBC 2.56* 2.65* 2.56*   HGB 7.2* 7.5* 7.5*   HCT 22.3* 23.7* 23.6*   MCV 87.1 89.4 92.2   MCH 28.1 28.3 29.3   MCHC 32.3* 31.6* 31.8*   RDW 15.8* 15.3* 15.4*   PLT 77* 95* 117*   MPV 9.8 10.0 10.5      Last 3 CMP:   Recent Labs     07/12/19  0200 07/12/19  1130 07/13/19 0327 07/14/19  0413     --  141 144   K 3.2* 3.5 3.5 3.4*   CL 96*  --  98 102   CO2 30*  --  27 24   BUN 26*  --  21* 30*   CREATININE 5.5*  --  4.9* 6.3*   GLUCOSE 110*  --  95 95   CALCIUM 6.8*  --  7.6* 7.3*   PROT 6.2*  --  6.2* 6.4*   LABALBU 3.3*  --  3.4* 3.4*   BILITOT 0.4  --  0.4 0.3   ALKPHOS 71  --  75 66   AST 12  --  12 12   ALT 7  --  7 7              Physical Exam:  Awake, alert, appropriate Yes  BP (!) 158/103   Pulse 94   Temp 99.8 °F (37.7 °C) (Temporal)   Resp 16   Ht 6' 3\" (1.905 m)   Wt 228 lb (103.4 kg)   SpO2 96%   BMI 28.50 kg/m²   Heart-Normal S1 and S2.  Regular rhythm. No murmurs, gallops, or rubs. Lung-negative  Neck-suppleno adenopathy, no carotid bruit, no JVD, supple, symmetrical, trachea midline and thyroid not enlarged, symmetric, no tenderness/mass/nodules  Abdomen-Abdomen soft, non-tender. BS normal. No masses,  No organomegaly(colostomy)  Extremities-negative  Neurologic- alert, oriented, normal speech, no focal findings or movement disorder noted    Assessment/Plan  1. CKD will need permacath, he has had prior left port(removed)   2. Will check imaging of neck for patency of veins, he has had multiple central lines.   3. Vancomycin prophalaxis  Note- I have gone over with him what is involved in a permacath placement and care and risks. Risk of bleeding, infection, damage to nerves arteries and veins of neck, chest and shoulder. Death, MI, pnuemothorax, hemothorax. He agrees to proceed. He would like catheter placed on right side of neck if possible.

## 2019-07-14 NOTE — PROGRESS NOTES
History Narrative    Not on file         Review of Systems:  General: Complaining of weakness, lack of energy  Cardiac: Denies any chest pain and chest pressure  Pulmonary: Denies any shortness of breath  Gastrointestinal: Reports some nausea but no vomiting. : denies any dysuria or poor urinary stream.    Objective:  Patient Vitals for the past 24 hrs:   BP Temp Temp src Pulse Resp SpO2   07/14/19 0611 (!) 158/103 99.8 °F (37.7 °C) Temporal 94 16 96 %   07/13/19 1919 (!) 161/105 98.2 °F (36.8 °C) Temporal 97 18 95 %       Intake/Output Summary (Last 24 hours) at 7/14/2019 0949  Last data filed at 7/14/2019 0755  Gross per 24 hour   Intake 790 ml   Output 820 ml   Net -30 ml     General: Alert and awake x3. HEENT: Normocephalic atraumatic head  Neck: Supple with no JVD or carotid bruits. Chest:  clear to auscultation bilaterally without respiratory distress  CVS: regular rate and rhythm  Abdominal: soft, nontender, normal bowel sounds/left lower quadrant colostomy  Extremities: no cyanosis or edema  Skin: warm and dry without rash      Labs:  BMP:   Recent Labs     07/12/19  0200 07/12/19  1130 07/13/19 0327 07/14/19  0413     --  141 144   K 3.2* 3.5 3.5 3.4*   CL 96*  --  98 102   CO2 30*  --  27 24   BUN 26*  --  21* 30*   CREATININE 5.5*  --  4.9* 6.3*   CALCIUM 6.8*  --  7.6* 7.3*     CBC:   Recent Labs     07/12/19  0200 07/13/19 0327 07/14/19  0413   WBC 7.4 9.9 8.7   HGB 7.2* 7.5* 7.5*   HCT 22.3* 23.7* 23.6*   MCV 87.1 89.4 92.2   PLT 77* 95* 117*     LIVER PROFILE:   Recent Labs     07/12/19  0200 07/13/19  0327 07/14/19  0413   AST 12 12 12   ALT 7 7 7   BILITOT 0.4 0.4 0.3   ALKPHOS 71 75 66     PT/INR: No results for input(s): PROTIME, INR in the last 72 hours. APTT: No results for input(s): APTT in the last 72 hours. BNP:  No results for input(s): BNP in the last 72 hours. Ionized Calcium:No results for input(s): IONCA in the last 72 hours.   Magnesium:No results for input(s): MG in the

## 2019-07-15 ENCOUNTER — APPOINTMENT (OUTPATIENT)
Dept: INTERVENTIONAL RADIOLOGY/VASCULAR | Age: 44
DRG: 673 | End: 2019-07-15
Payer: MEDICARE

## 2019-07-15 ENCOUNTER — APPOINTMENT (OUTPATIENT)
Dept: GENERAL RADIOLOGY | Age: 44
DRG: 673 | End: 2019-07-15
Payer: MEDICARE

## 2019-07-15 LAB
ALBUMIN SERPL-MCNC: 3.3 G/DL (ref 3.5–5.2)
ALP BLD-CCNC: 61 U/L (ref 40–130)
ALT SERPL-CCNC: 6 U/L (ref 5–41)
ANION GAP SERPL CALCULATED.3IONS-SCNC: 17 MMOL/L (ref 7–19)
AST SERPL-CCNC: 10 U/L (ref 5–40)
BILIRUB SERPL-MCNC: 0.3 MG/DL (ref 0.2–1.2)
BUN BLDV-MCNC: 36 MG/DL (ref 6–20)
CALCIUM SERPL-MCNC: 6.4 MG/DL (ref 8.6–10)
CHLORIDE BLD-SCNC: 99 MMOL/L (ref 98–111)
CO2: 24 MMOL/L (ref 22–29)
CREAT SERPL-MCNC: 7 MG/DL (ref 0.5–1.2)
GFR NON-AFRICAN AMERICAN: 9
GLUCOSE BLD-MCNC: 100 MG/DL (ref 74–109)
HCT VFR BLD CALC: 23.6 % (ref 42–52)
HEMOGLOBIN: 7.3 G/DL (ref 14–18)
MCH RBC QN AUTO: 28.6 PG (ref 27–31)
MCHC RBC AUTO-ENTMCNC: 30.9 G/DL (ref 33–37)
MCV RBC AUTO: 92.5 FL (ref 80–94)
PDW BLD-RTO: 15.3 % (ref 11.5–14.5)
PLATELET # BLD: 134 K/UL (ref 130–400)
PMV BLD AUTO: 10 FL (ref 9.4–12.4)
POTASSIUM SERPL-SCNC: 3 MMOL/L (ref 3.5–5)
RBC # BLD: 2.55 M/UL (ref 4.7–6.1)
SODIUM BLD-SCNC: 140 MMOL/L (ref 136–145)
TOTAL PROTEIN: 6.4 G/DL (ref 6.6–8.7)
WBC # BLD: 9 K/UL (ref 4.8–10.8)

## 2019-07-15 PROCEDURE — 2580000003 HC RX 258: Performed by: INTERNAL MEDICINE

## 2019-07-15 PROCEDURE — 6360000002 HC RX W HCPCS: Performed by: INTERNAL MEDICINE

## 2019-07-15 PROCEDURE — 6360000002 HC RX W HCPCS: Performed by: SURGERY

## 2019-07-15 PROCEDURE — 36558 INSERT TUNNELED CV CATH: CPT | Performed by: SURGERY

## 2019-07-15 PROCEDURE — 99152 MOD SED SAME PHYS/QHP 5/>YRS: CPT | Performed by: SURGERY

## 2019-07-15 PROCEDURE — 6370000000 HC RX 637 (ALT 250 FOR IP): Performed by: SURGERY

## 2019-07-15 PROCEDURE — 76937 US GUIDE VASCULAR ACCESS: CPT | Performed by: SURGERY

## 2019-07-15 PROCEDURE — 80053 COMPREHEN METABOLIC PANEL: CPT

## 2019-07-15 PROCEDURE — 6370000000 HC RX 637 (ALT 250 FOR IP): Performed by: FAMILY MEDICINE

## 2019-07-15 PROCEDURE — 36415 COLL VENOUS BLD VENIPUNCTURE: CPT

## 2019-07-15 PROCEDURE — 85027 COMPLETE CBC AUTOMATED: CPT

## 2019-07-15 PROCEDURE — 99153 MOD SED SAME PHYS/QHP EA: CPT | Performed by: SURGERY

## 2019-07-15 PROCEDURE — 2580000003 HC RX 258: Performed by: SURGERY

## 2019-07-15 PROCEDURE — 2709999900 IR TUNNELED CVC PLACE WO SQ PORT/PUMP > 5 YEARS

## 2019-07-15 PROCEDURE — 6370000000 HC RX 637 (ALT 250 FOR IP): Performed by: HOSPITALIST

## 2019-07-15 PROCEDURE — 8010000000 HC HEMODIALYSIS ACUTE INPT

## 2019-07-15 PROCEDURE — 6370000000 HC RX 637 (ALT 250 FOR IP): Performed by: INTERNAL MEDICINE

## 2019-07-15 PROCEDURE — 99232 SBSQ HOSP IP/OBS MODERATE 35: CPT | Performed by: HOSPITALIST

## 2019-07-15 PROCEDURE — 1210000000 HC MED SURG R&B

## 2019-07-15 PROCEDURE — 2500000003 HC RX 250 WO HCPCS: Performed by: SURGERY

## 2019-07-15 PROCEDURE — 77001 FLUOROGUIDE FOR VEIN DEVICE: CPT | Performed by: SURGERY

## 2019-07-15 PROCEDURE — 71046 X-RAY EXAM CHEST 2 VIEWS: CPT

## 2019-07-15 RX ORDER — FENTANYL CITRATE 50 UG/ML
INJECTION, SOLUTION INTRAMUSCULAR; INTRAVENOUS
Status: COMPLETED | OUTPATIENT
Start: 2019-07-15 | End: 2019-07-15

## 2019-07-15 RX ORDER — HEPARIN SODIUM 5000 [USP'U]/ML
INJECTION, SOLUTION INTRAVENOUS; SUBCUTANEOUS
Status: COMPLETED | OUTPATIENT
Start: 2019-07-15 | End: 2019-07-15

## 2019-07-15 RX ORDER — POTASSIUM CHLORIDE 20 MEQ/1
20 TABLET, EXTENDED RELEASE ORAL ONCE
Status: COMPLETED | OUTPATIENT
Start: 2019-07-15 | End: 2019-07-15

## 2019-07-15 RX ORDER — SODIUM CHLORIDE 0.9 % (FLUSH) 0.9 %
10 SYRINGE (ML) INJECTION EVERY 12 HOURS SCHEDULED
Status: DISCONTINUED | OUTPATIENT
Start: 2019-07-15 | End: 2019-07-17 | Stop reason: HOSPADM

## 2019-07-15 RX ORDER — MIDAZOLAM HYDROCHLORIDE 1 MG/ML
INJECTION INTRAMUSCULAR; INTRAVENOUS
Status: COMPLETED | OUTPATIENT
Start: 2019-07-15 | End: 2019-07-15

## 2019-07-15 RX ORDER — HEPARIN SODIUM 1000 [USP'U]/ML
3600 INJECTION, SOLUTION INTRAVENOUS; SUBCUTANEOUS ONCE
Status: COMPLETED | OUTPATIENT
Start: 2019-07-15 | End: 2019-07-15

## 2019-07-15 RX ORDER — HEPARIN SODIUM 1000 [USP'U]/ML
INJECTION, SOLUTION INTRAVENOUS; SUBCUTANEOUS
Status: COMPLETED | OUTPATIENT
Start: 2019-07-15 | End: 2019-07-15

## 2019-07-15 RX ORDER — LIDOCAINE HYDROCHLORIDE 20 MG/ML
INJECTION, SOLUTION INFILTRATION; PERINEURAL
Status: COMPLETED | OUTPATIENT
Start: 2019-07-15 | End: 2019-07-15

## 2019-07-15 RX ORDER — HYDROMORPHONE HYDROCHLORIDE 2 MG/1
2 TABLET ORAL EVERY 4 HOURS PRN
Status: DISCONTINUED | OUTPATIENT
Start: 2019-07-15 | End: 2019-07-17 | Stop reason: HOSPADM

## 2019-07-15 RX ORDER — SODIUM CHLORIDE 0.9 % (FLUSH) 0.9 %
10 SYRINGE (ML) INJECTION PRN
Status: DISCONTINUED | OUTPATIENT
Start: 2019-07-15 | End: 2019-07-17 | Stop reason: HOSPADM

## 2019-07-15 RX ADMIN — CARVEDILOL 12.5 MG: 12.5 TABLET, FILM COATED ORAL at 17:08

## 2019-07-15 RX ADMIN — LIDOCAINE HYDROCHLORIDE 30 ML: 20 INJECTION, SOLUTION INFILTRATION; PERINEURAL at 09:45

## 2019-07-15 RX ADMIN — HYDROCODONE BITARTRATE AND ACETAMINOPHEN 1 TABLET: 10; 325 TABLET ORAL at 15:32

## 2019-07-15 RX ADMIN — HYDROMORPHONE HYDROCHLORIDE 2 MG: 2 TABLET ORAL at 17:08

## 2019-07-15 RX ADMIN — HYDROMORPHONE HYDROCHLORIDE 2 MG: 2 TABLET ORAL at 21:38

## 2019-07-15 RX ADMIN — HEPARIN SODIUM 2000 UNITS: 1000 INJECTION, SOLUTION INTRAVENOUS; SUBCUTANEOUS at 10:06

## 2019-07-15 RX ADMIN — VANCOMYCIN HYDROCHLORIDE 1500 MG: 10 INJECTION, POWDER, LYOPHILIZED, FOR SOLUTION INTRAVENOUS at 09:32

## 2019-07-15 RX ADMIN — HYDROCODONE BITARTRATE AND ACETAMINOPHEN 1 TABLET: 10; 325 TABLET ORAL at 06:26

## 2019-07-15 RX ADMIN — AMLODIPINE BESYLATE 10 MG: 10 TABLET ORAL at 08:40

## 2019-07-15 RX ADMIN — DARBEPOETIN ALFA 100 MCG: 100 SOLUTION INTRAVENOUS; SUBCUTANEOUS at 08:41

## 2019-07-15 RX ADMIN — HEPARIN SODIUM 3600 UNITS: 1000 INJECTION, SOLUTION INTRAVENOUS; SUBCUTANEOUS at 15:25

## 2019-07-15 RX ADMIN — CALCIUM GLUCONATE 1 G: 98 INJECTION, SOLUTION INTRAVENOUS at 08:41

## 2019-07-15 RX ADMIN — Medication 10 ML: at 20:33

## 2019-07-15 RX ADMIN — HYDRALAZINE HYDROCHLORIDE 25 MG: 25 TABLET, FILM COATED ORAL at 15:16

## 2019-07-15 RX ADMIN — ENOXAPARIN SODIUM 30 MG: 30 INJECTION SUBCUTANEOUS at 15:16

## 2019-07-15 RX ADMIN — POTASSIUM CHLORIDE 20 MEQ: 20 TABLET, EXTENDED RELEASE ORAL at 08:40

## 2019-07-15 RX ADMIN — FENTANYL CITRATE 25 MCG: 50 INJECTION INTRAMUSCULAR; INTRAVENOUS at 09:46

## 2019-07-15 RX ADMIN — PANTOPRAZOLE SODIUM 40 MG: 40 TABLET, DELAYED RELEASE ORAL at 15:16

## 2019-07-15 RX ADMIN — MIDAZOLAM HYDROCHLORIDE 1 MG: 1 INJECTION, SOLUTION INTRAMUSCULAR; INTRAVENOUS at 09:39

## 2019-07-15 RX ADMIN — TAMSULOSIN HYDROCHLORIDE 0.4 MG: 0.4 CAPSULE ORAL at 08:40

## 2019-07-15 RX ADMIN — FENTANYL CITRATE 25 MCG: 50 INJECTION INTRAMUSCULAR; INTRAVENOUS at 09:51

## 2019-07-15 RX ADMIN — FENTANYL CITRATE 25 MCG: 50 INJECTION INTRAMUSCULAR; INTRAVENOUS at 09:39

## 2019-07-15 RX ADMIN — LEVETIRACETAM 500 MG: 500 TABLET ORAL at 08:41

## 2019-07-15 RX ADMIN — ALPRAZOLAM 0.25 MG: 0.25 TABLET ORAL at 06:26

## 2019-07-15 RX ADMIN — MIDAZOLAM HYDROCHLORIDE 1 MG: 1 INJECTION, SOLUTION INTRAMUSCULAR; INTRAVENOUS at 09:45

## 2019-07-15 RX ADMIN — HYDRALAZINE HYDROCHLORIDE 25 MG: 25 TABLET, FILM COATED ORAL at 06:27

## 2019-07-15 RX ADMIN — LEVETIRACETAM 500 MG: 500 TABLET ORAL at 20:32

## 2019-07-15 RX ADMIN — MIDAZOLAM HYDROCHLORIDE 1 MG: 1 INJECTION, SOLUTION INTRAMUSCULAR; INTRAVENOUS at 09:51

## 2019-07-15 RX ADMIN — CARVEDILOL 12.5 MG: 12.5 TABLET, FILM COATED ORAL at 08:40

## 2019-07-15 RX ADMIN — PANTOPRAZOLE SODIUM 40 MG: 40 TABLET, DELAYED RELEASE ORAL at 06:27

## 2019-07-15 RX ADMIN — HEPARIN SODIUM 5000 UNITS: 5000 INJECTION, SOLUTION INTRAVENOUS; SUBCUTANEOUS at 09:41

## 2019-07-15 RX ADMIN — HYDRALAZINE HYDROCHLORIDE 25 MG: 25 TABLET, FILM COATED ORAL at 20:32

## 2019-07-15 RX ADMIN — LISINOPRIL 20 MG: 20 TABLET ORAL at 08:40

## 2019-07-15 RX ADMIN — Medication 10 ML: at 08:51

## 2019-07-15 ASSESSMENT — PAIN DESCRIPTION - ONSET: ONSET: GRADUAL

## 2019-07-15 ASSESSMENT — PAIN - FUNCTIONAL ASSESSMENT: PAIN_FUNCTIONAL_ASSESSMENT: PREVENTS OR INTERFERES SOME ACTIVE ACTIVITIES AND ADLS

## 2019-07-15 ASSESSMENT — PAIN DESCRIPTION - DESCRIPTORS: DESCRIPTORS: ACHING

## 2019-07-15 ASSESSMENT — PAIN SCALES - GENERAL
PAINLEVEL_OUTOF10: 3
PAINLEVEL_OUTOF10: 5
PAINLEVEL_OUTOF10: 0
PAINLEVEL_OUTOF10: 5
PAINLEVEL_OUTOF10: 7
PAINLEVEL_OUTOF10: 7
PAINLEVEL_OUTOF10: 4

## 2019-07-15 ASSESSMENT — PAIN DESCRIPTION - ORIENTATION: ORIENTATION: RIGHT;LEFT

## 2019-07-15 ASSESSMENT — PAIN DESCRIPTION - PAIN TYPE: TYPE: ACUTE PAIN

## 2019-07-15 ASSESSMENT — PAIN DESCRIPTION - FREQUENCY: FREQUENCY: INTERMITTENT

## 2019-07-15 ASSESSMENT — PAIN DESCRIPTION - PROGRESSION: CLINICAL_PROGRESSION: NOT CHANGED

## 2019-07-15 ASSESSMENT — PAIN SCALES - WONG BAKER: WONGBAKER_NUMERICALRESPONSE: 0

## 2019-07-15 ASSESSMENT — PAIN DESCRIPTION - LOCATION: LOCATION: NECK

## 2019-07-15 NOTE — PROGRESS NOTES
Pharmacy Renal Adjustment    Gt Washington is a 41 y.o. male. Pharmacy has renally adjusted medications per protocol. Recent Labs     07/14/19  0413 07/15/19  0342   BUN 30* 36*       Recent Labs     07/14/19  0413 07/15/19  0342   CREATININE 6.3* 7.0*       Estimated Creatinine Clearance: 18 mL/min (A) (based on SCr of 7 mg/dL (H)). Height:   Ht Readings from Last 1 Encounters:   07/10/19 6' 3\" (1.905 m)     Weight:  Wt Readings from Last 1 Encounters:   07/12/19 228 lb (103.4 kg)       Crcl= 18    Plan: Adjust the following medications based on renal function:           Change Lovenox 40 mg Sq daily to Lovenox 30 mg Sq daily.     Electronically signed by Diana Obregon [facility] on 7/15/2019 at 1:18 PM

## 2019-07-15 NOTE — PROGRESS NOTES
Nephrology ([facility]) Progress Note    Patient:  Loise Dancer  YOB: 1975  Date of Service: 7/15/2019  MRN: [id number]   Acct: [de-identified]   Primary Care Physician: Alba Jolley MD  Advance Directive: Full Code  Admit Date: 7/10/2019       Hospital Day: 5  Referring Provider: Robin Duque, *    Patient independently seen and examined, Chart, Consults, Notes, Operative notes, Labs, Cardiology, and Radiology studies reviewed as able. Subjective:  Loise Dancer is a 41 y.o. male  whom we were consulted for acute kidney injury/chronic kidney disease. Patient has stage IV chronic kidney disease baseline as of 2017 and has been lost to follow-up in our office. Patient has a history of left nephrectomy in the past and recurrent episode of hydronephrosis in the right kidney. Patient has multiple right renal stent as he has scarring of the ureter from radiation treatment of his rectal cancer. Presented with profound weakness, lack of energy and tiredness. He follows with [facility] urology and last stent was placed about a month ago. On presentation his creatinine was 16 with serum potassium of 5.6  with severe metabolic acidosis. Patient had an episode of grand mal seizure, intubated in ICU. [facility] course remarkable for admission to ICU and he had 3 consecutive hemodialysis treatments. He was then successfully extubated. He was transferred to regular floor.     Today, no overnight events. Tolerated PermCath placement without issue. Tolerating dialysis without issue. Denies fever, chills, chest pain, shortness of air.     Dialysis   Pt was seen on RRT  Modality: Hemodialysis  Access: Catheter  Location: right upper  QB: 400  QD: 600  UF: 360            Allergies:  Morphine and related and Oxycodone-acetaminophen    Medicines:  Current Facility-Administered Medications   Medication Dose Route Frequency Provider Last Rate Last Dose    levETIRAcetam (KEPPRA) tablet 500 mg  500 mg Oral BID Melissa Amadeoatman, DO   500 mg at 07/15/19 0841    lisinopril (PRINIVIL;ZESTRIL) tablet 20 mg  20 mg Oral Daily Kimmy Amaya MD   20 mg at 07/15/19 0840    ALPRAZolam (XANAX) tablet 0.25 mg  0.25 mg Oral Nightly PRN Melissa Aguirre, DO   0.25 mg at 07/15/19 9270    hydrALAZINE (APRESOLINE) tablet 25 mg  25 mg Oral 3 times per day [place] Hem[place], DO   25 mg at 07/15/19 7503    carvedilol (COREG) tablet 12.5 mg  12.5 mg Oral BID WC Fredi Hemming, DO   12.5 mg at 07/15/19 0840    pantoprazole (PROTONIX) tablet 40 mg  40 mg Oral BID AC Milton Gonzalez MD   40 mg at 07/15/19 2088    darbepoetin aliza-polysorbate (ARANESP) injection 100 mcg  100 mcg Subcutaneous Weekly - Monday [place], DO   100 mcg at 07/15/19 2079    amLODIPine (NORVASC) tablet 10 mg  10 mg Oral Daily Fredi Hemming, DO   10 mg at 07/15/19 0840    sodium chloride flush 0.9 % injection 10 mL  10 mL Intravenous 2 times per day Fredi Hemming, DO   10 mL at 07/15/19 0851    sodium chloride (PF) 0.9 % injection 10 mL  10 mL Intravenous PRN [place], DO   10 mL at 07/12/19 8902    hydrALAZINE (APRESOLINE) injection 10 mg  10 mg Intravenous Q6H PRN Fredi Hemming, DO   10 mg at 07/12/19 0307    HYDROcodone-acetaminophen (NORCO)  MG per tablet 1 tablet  1 tablet Oral Q6H PRN Fredi Hemming, DO   1 tablet at 07/15/19 9893    ondansetron (ZOFRAN) tablet 4 mg  4 mg Oral Daily PRN [place], DO        tamsulosin [facility]) capsule 0.4 mg  0.4 mg Oral Daily [place], DO   0.4 mg at 07/15/19 0840    sodium chloride flush 0.9 % injection 10 mL  10 mL Intravenous PRN [place], DO        magnesium hydroxide (MILK OF MAGNESIA) 400 MG/5ML suspension 30 mL  30 mL Oral Daily PRN Fredi Snow, DO        ondansetron [facility]) injection 4 mg  4 mg Intravenous Q6H PRN Fredi Snow, DO   4 mg at 07/11/19 1305       Past [id number]     Interpreting Physician Mira Martinez MD   Date of Birth    1975    Referring Physician    Mira Martinez MD   Accession Number [id number]     [id number]            [facility], [facility] [place]  Procedure Type of Study:   Veins:Upper Extremities Veins, VASC EXTREMITY VENOUS DUPLEX BILATERAL,  Upper Extremity Vein Mapping, VL VEIN MAPPING UPPER BILATERAL. Indications for Study:Pre-op check for IJV/SUBCLAVIAN VEIN patency. Impression   The internal jugular, subclavian, were examined. No evidence of thrombus  seen and sizes as noted above. Signature   ----------------------------------------------------------------  Electronically signed by Mira Martinez MD(Interpreting  physician) on 07/15/2019 05:07 AM  ----------------------------------------------------------------  Velocities are measured in cm/s ; Diameters are measured in mm Right UE Vein Measurements 2D Measurements +---------------+-----------------+----------------------+-----------------+ ! Location       ! Visualized       ! Compressibility       ! Thrombosis       ! +---------------+-----------------+----------------------+-----------------+ ! IJV            ! Yes              ! Yes                   ! None             ! +---------------+-----------------+----------------------+-----------------+ ! EJV            ! Yes              ! Yes                   ! None             ! +---------------+-----------------+----------------------+-----------------+ Left UE Vein Measurements 2D Measurements +---------------+-----------------+----------------------+-----------------+ ! Location       ! Visualized       ! Compressibility       ! Thrombosis       ! +---------------+-----------------+----------------------+-----------------+ ! IJV            ! Yes              ! Yes                   ! None             ! +---------------+-----------------+----------------------+-----------------+ ! EJV            ! Yes              ! Yes                   ! None             !

## 2019-07-15 NOTE — CARE COORDINATION
ELBERT faxed paperwork to [facility] to initiate new HD set-up.     Electronically signed by YU Pierce on 7/15/2019 at 1:01 PM

## 2019-07-16 LAB
ALBUMIN SERPL-MCNC: 3.5 G/DL (ref 3.5–5.2)
ALP BLD-CCNC: 64 U/L (ref 40–130)
ALT SERPL-CCNC: 8 U/L (ref 5–41)
ANION GAP SERPL CALCULATED.3IONS-SCNC: 17 MMOL/L (ref 7–19)
AST SERPL-CCNC: 11 U/L (ref 5–40)
BILIRUB SERPL-MCNC: <0.2 MG/DL (ref 0.2–1.2)
BUN BLDV-MCNC: 26 MG/DL (ref 6–20)
CALCIUM SERPL-MCNC: 7.4 MG/DL (ref 8.6–10)
CHLORIDE BLD-SCNC: 97 MMOL/L (ref 98–111)
CO2: 26 MMOL/L (ref 22–29)
CREAT SERPL-MCNC: 4.7 MG/DL (ref 0.5–1.2)
FOLATE: 3.1 NG/ML (ref 4.5–32.2)
GFR NON-AFRICAN AMERICAN: 14
GLUCOSE BLD-MCNC: 96 MG/DL (ref 74–109)
HCT VFR BLD CALC: 24.6 % (ref 42–52)
HEMOGLOBIN: 7.4 G/DL (ref 14–18)
MCH RBC QN AUTO: 28.1 PG (ref 27–31)
MCHC RBC AUTO-ENTMCNC: 30.1 G/DL (ref 33–37)
MCV RBC AUTO: 93.5 FL (ref 80–94)
OCCULT BLOOD QC: ABNORMAL
OCCULT BLOOD SCREENING: ABNORMAL
PDW BLD-RTO: 15.7 % (ref 11.5–14.5)
PLATELET # BLD: 139 K/UL (ref 130–400)
PMV BLD AUTO: 9.4 FL (ref 9.4–12.4)
POTASSIUM SERPL-SCNC: 3.9 MMOL/L (ref 3.5–5)
RBC # BLD: 2.63 M/UL (ref 4.7–6.1)
SODIUM BLD-SCNC: 140 MMOL/L (ref 136–145)
TOTAL PROTEIN: 6.7 G/DL (ref 6.6–8.7)
VITAMIN B-12: 487 PG/ML (ref 211–946)
WBC # BLD: 8.6 K/UL (ref 4.8–10.8)

## 2019-07-16 PROCEDURE — 6360000002 HC RX W HCPCS: Performed by: HOSPITALIST

## 2019-07-16 PROCEDURE — 6370000000 HC RX 637 (ALT 250 FOR IP): Performed by: SURGERY

## 2019-07-16 PROCEDURE — 2580000003 HC RX 258: Performed by: SURGERY

## 2019-07-16 PROCEDURE — 6370000000 HC RX 637 (ALT 250 FOR IP): Performed by: HOSPITALIST

## 2019-07-16 PROCEDURE — 2580000003 HC RX 258: Performed by: HOSPITALIST

## 2019-07-16 PROCEDURE — 99232 SBSQ HOSP IP/OBS MODERATE 35: CPT | Performed by: HOSPITALIST

## 2019-07-16 PROCEDURE — 1210000000 HC MED SURG R&B

## 2019-07-16 PROCEDURE — 80053 COMPREHEN METABOLIC PANEL: CPT

## 2019-07-16 PROCEDURE — 82607 VITAMIN B-12: CPT

## 2019-07-16 PROCEDURE — G0328 FECAL BLOOD SCRN IMMUNOASSAY: HCPCS

## 2019-07-16 PROCEDURE — 82746 ASSAY OF FOLIC ACID SERUM: CPT

## 2019-07-16 PROCEDURE — 36415 COLL VENOUS BLD VENIPUNCTURE: CPT

## 2019-07-16 PROCEDURE — C9113 INJ PANTOPRAZOLE SODIUM, VIA: HCPCS | Performed by: HOSPITALIST

## 2019-07-16 PROCEDURE — 85027 COMPLETE CBC AUTOMATED: CPT

## 2019-07-16 RX ORDER — FOLIC ACID 1 MG/1
1 TABLET ORAL DAILY
Status: DISCONTINUED | OUTPATIENT
Start: 2019-07-16 | End: 2019-07-17 | Stop reason: HOSPADM

## 2019-07-16 RX ORDER — PANTOPRAZOLE SODIUM 40 MG/10ML
40 INJECTION, POWDER, LYOPHILIZED, FOR SOLUTION INTRAVENOUS 2 TIMES DAILY
Status: DISCONTINUED | OUTPATIENT
Start: 2019-07-16 | End: 2019-07-17 | Stop reason: HOSPADM

## 2019-07-16 RX ORDER — 0.9 % SODIUM CHLORIDE 0.9 %
10 VIAL (ML) INJECTION DAILY
Status: DISCONTINUED | OUTPATIENT
Start: 2019-07-16 | End: 2019-07-17 | Stop reason: HOSPADM

## 2019-07-16 RX ADMIN — HYDROMORPHONE HYDROCHLORIDE 2 MG: 2 TABLET ORAL at 11:00

## 2019-07-16 RX ADMIN — Medication 10 ML: at 21:45

## 2019-07-16 RX ADMIN — HYDRALAZINE HYDROCHLORIDE 25 MG: 25 TABLET, FILM COATED ORAL at 05:50

## 2019-07-16 RX ADMIN — Medication 10 ML: at 17:06

## 2019-07-16 RX ADMIN — CARVEDILOL 12.5 MG: 12.5 TABLET, FILM COATED ORAL at 08:35

## 2019-07-16 RX ADMIN — HYDRALAZINE HYDROCHLORIDE 25 MG: 25 TABLET, FILM COATED ORAL at 21:45

## 2019-07-16 RX ADMIN — HYDROMORPHONE HYDROCHLORIDE 2 MG: 2 TABLET ORAL at 05:50

## 2019-07-16 RX ADMIN — ALPRAZOLAM 0.25 MG: 0.25 TABLET ORAL at 21:45

## 2019-07-16 RX ADMIN — HYDRALAZINE HYDROCHLORIDE 25 MG: 25 TABLET, FILM COATED ORAL at 13:53

## 2019-07-16 RX ADMIN — AMLODIPINE BESYLATE 10 MG: 10 TABLET ORAL at 08:35

## 2019-07-16 RX ADMIN — LISINOPRIL 20 MG: 20 TABLET ORAL at 08:35

## 2019-07-16 RX ADMIN — LEVETIRACETAM 500 MG: 500 TABLET ORAL at 08:34

## 2019-07-16 RX ADMIN — LEVETIRACETAM 500 MG: 500 TABLET ORAL at 20:04

## 2019-07-16 RX ADMIN — HYDROMORPHONE HYDROCHLORIDE 2 MG: 2 TABLET ORAL at 15:34

## 2019-07-16 RX ADMIN — PANTOPRAZOLE SODIUM 40 MG: 40 TABLET, DELAYED RELEASE ORAL at 05:50

## 2019-07-16 RX ADMIN — Medication 10 ML: at 08:35

## 2019-07-16 RX ADMIN — HYDROMORPHONE HYDROCHLORIDE 2 MG: 2 TABLET ORAL at 20:03

## 2019-07-16 RX ADMIN — HYDROMORPHONE HYDROCHLORIDE 2 MG: 2 TABLET ORAL at 01:46

## 2019-07-16 RX ADMIN — PANTOPRAZOLE SODIUM 40 MG: 40 INJECTION, POWDER, FOR SOLUTION INTRAVENOUS at 21:45

## 2019-07-16 RX ADMIN — FOLIC ACID 1 MG: 1 TABLET ORAL at 17:05

## 2019-07-16 RX ADMIN — CARVEDILOL 12.5 MG: 12.5 TABLET, FILM COATED ORAL at 17:05

## 2019-07-16 RX ADMIN — TAMSULOSIN HYDROCHLORIDE 0.4 MG: 0.4 CAPSULE ORAL at 08:35

## 2019-07-16 ASSESSMENT — PAIN SCALES - GENERAL
PAINLEVEL_OUTOF10: 1
PAINLEVEL_OUTOF10: 5
PAINLEVEL_OUTOF10: 5
PAINLEVEL_OUTOF10: 4
PAINLEVEL_OUTOF10: 5
PAINLEVEL_OUTOF10: 5
PAINLEVEL_OUTOF10: 3
PAINLEVEL_OUTOF10: 5

## 2019-07-16 ASSESSMENT — PAIN SCALES - WONG BAKER: WONGBAKER_NUMERICALRESPONSE: 0

## 2019-07-16 NOTE — PROGRESS NOTES
Nutrition Assessment    Type and Reason for Visit: Initial    Nutrition Recommendations: continue current POC    Nutrition Assessment: Following for LOS x 6 days. PO intake is good with intake ranging %. \"I'm eating better than I have in the past few weeks. \"    Malnutrition Assessment:  · Malnutrition Status: No malnutrition  · Context: Acute illness or injury  · Findings of the 6 clinical characteristics of malnutrition (Minimum of 2 out of 6 clinical characteristics is required to make the diagnosis of moderate or severe Protein Calorie Malnutrition based on AND/ASPEN Guidelines):  1. Energy Intake- ,      2. Weight Loss-No significant weight loss,    3. Fat Loss-No significant subcutaneous fat loss,    4. Muscle Loss-No significant muscle mass loss,    5. Fluid Accumulation-No significant fluid accumulation, Extremities  6.  Strength-Not measured    Nutrition Risk Level: Low    Nutrition Diagnosis:   · Problem: Altered nutrition-related lab values  · Etiology: related to Renal dysfunction     Signs and symptoms:  as evidenced by Lab values    Objective Information:  · Nutrition-Focused Physical Findings: well nourished appearing gentleman, colostomy  · Wound Type: None  · Current Nutrition Therapies:  · Oral Diet Orders: Renal   · Oral Diet intake: 51-75%, %  · Oral Nutrition Supplement (ONS) Orders: None    · Anthropometric Measures:  · Ht: 6' 3\" (190.5 cm)   · Current Body Wt: 228 lb (103.4 kg)  · Admission Body Wt: 229 lb (103.9 kg)(stated)  · Usual Body Wt: 226 lb (102.5 kg)(5/2019)  · Ideal Body Wt: 196 lb (88.9 kg), % Ideal Body 116.3%  · BMI Classification: BMI 25.0 - 29.9 Overweight    Nutrition Interventions:   Continue current diet  Continued Inpatient Monitoring    Nutrition Evaluation:   · Evaluation: Goals set   · Goals: po intake 75% or greater.   Weight stable    · Monitoring: Meal Intake, Diet Tolerance, Skin Integrity, Weight, Pertinent Labs      Electronically signed by Stef Cortez, MS, RD, LD on 7/16/19 at 10:04 AM    Contact Number: [phone]

## 2019-07-16 NOTE — PLAN OF CARE
Problem: Aspiration:  Goal: Absence of aspiration  Description  Absence of aspiration  Outcome: Ongoing     Problem: Health Behavior:  Goal: Ability to manage health-related needs will improve  Description  Ability to manage health-related needs will improve  Outcome: Ongoing     Problem: Safety:  Goal: Ability to remain free from injury will improve  Description  Ability to remain free from injury will improve  Outcome: Ongoing     Problem: Self-Concept:  Goal: Level of anxiety will decrease  Description  Level of anxiety will decrease  Outcome: Ongoing  Goal: Ability to verbalize feelings about condition will improve  Description  Ability to verbalize feelings about condition will improve  Outcome: Ongoing     Problem: Falls - Risk of:  Goal: Will remain free from falls  Description  Will remain free from falls  Outcome: Ongoing  Goal: Absence of physical injury  Description  Absence of physical injury  Outcome: Ongoing   Electronically signed by General Raul RN on 7/14/2019 at 1:45 AM
by Ara Mckeon RN  Outcome: Ongoing     Problem: Skin Integrity:  Goal: Status of oral mucous membranes will improve  Description  Status of oral mucous membranes will improve  7/16/2019 0924 by Karyle Banner, RN  Outcome: Ongoing  7/16/2019 0001 by Ara Mckeon RN  Outcome: Ongoing  Goal: Skin integrity will be maintained  Description  Skin integrity will be maintained  7/16/2019 0924 by Karyle Banner, RN  Outcome: Ongoing  7/16/2019 0001 by Ara Mckeon RN  Outcome: Ongoing

## 2019-07-16 NOTE — PROGRESS NOTES
Nephrology ([facility]) Progress Note    Patient:  Stephanie Dougherty  YOB: 1975  Date of Service: 7/16/2019  MRN: [id number]   Acct: [de-identified]   Primary Care Physician: Chitra Ball MD  Advance Directive: Full Code  Admit Date: 7/10/2019       Hospital Day: 6  Referring Provider: Virginia Blanton, *    Patient independently seen and examined, Chart, Consults, Notes, Operative notes, Labs, Cardiology, and Radiology studies reviewed as able. Subjective:  Stephanie Dougherty is a 41 y.o. male  whom we were consulted for acute kidney injury/chronic kidney disease. Patient has stage IV chronic kidney disease baseline as of 2017 and has been lost to follow-up in our office. Patient has a history of left nephrectomy in the past and recurrent episode of hydronephrosis in the right kidney. Patient has multiple right renal stent as he has scarring of the ureter from radiation treatment of his rectal cancer. Presented with profound weakness, lack of energy and tiredness. He follows with [facility] urology and last stent was placed about a month ago. On presentation his creatinine was 16 with serum potassium of 5.6  with severe metabolic acidosis. Patient had an episode of grand mal seizure, intubated in ICU. [facility] course remarkable for admission to ICU and he had 3 consecutive hemodialysis treatments. He was then successfully extubated. He was transferred to regular floor.     Today, no overnight events. Tolerated PermCath placement without issue. Tolerating dialysis without issue. Denies fever, chills, chest pain, shortness of air.   Some soreness at PermCath site.              Allergies:  Morphine and related and Oxycodone-acetaminophen    Medicines:  Current Facility-Administered Medications   Medication Dose Route Frequency Provider Last Rate Last Dose    sodium chloride flush 0.9 % injection 10 mL  10 mL Intravenous 2 times per day Joey Valladares MD

## 2019-07-17 VITALS
TEMPERATURE: 97.3 F | WEIGHT: 209 LBS | RESPIRATION RATE: 16 BRPM | BODY MASS INDEX: 25.99 KG/M2 | DIASTOLIC BLOOD PRESSURE: 99 MMHG | HEART RATE: 95 BPM | SYSTOLIC BLOOD PRESSURE: 158 MMHG | OXYGEN SATURATION: 97 % | HEIGHT: 75 IN

## 2019-07-17 LAB
ALBUMIN SERPL-MCNC: 3.6 G/DL (ref 3.5–5.2)
ALP BLD-CCNC: 64 U/L (ref 40–130)
ALT SERPL-CCNC: 8 U/L (ref 5–41)
ANION GAP SERPL CALCULATED.3IONS-SCNC: 14 MMOL/L (ref 7–19)
AST SERPL-CCNC: 9 U/L (ref 5–40)
BILIRUB SERPL-MCNC: <0.2 MG/DL (ref 0.2–1.2)
BUN BLDV-MCNC: 41 MG/DL (ref 6–20)
CALCIUM SERPL-MCNC: 8.3 MG/DL (ref 8.6–10)
CHLORIDE BLD-SCNC: 103 MMOL/L (ref 98–111)
CO2: 24 MMOL/L (ref 22–29)
CREAT SERPL-MCNC: 6.6 MG/DL (ref 0.5–1.2)
GFR NON-AFRICAN AMERICAN: 9
GLUCOSE BLD-MCNC: 98 MG/DL (ref 74–109)
HCT VFR BLD CALC: 25.5 % (ref 42–52)
HEMOGLOBIN: 7.7 G/DL (ref 14–18)
MCH RBC QN AUTO: 28.2 PG (ref 27–31)
MCHC RBC AUTO-ENTMCNC: 30.2 G/DL (ref 33–37)
MCV RBC AUTO: 93.4 FL (ref 80–94)
PDW BLD-RTO: 15.6 % (ref 11.5–14.5)
PLATELET # BLD: 158 K/UL (ref 130–400)
PMV BLD AUTO: 9.8 FL (ref 9.4–12.4)
POTASSIUM SERPL-SCNC: 4.5 MMOL/L (ref 3.5–5)
RBC # BLD: 2.73 M/UL (ref 4.7–6.1)
SODIUM BLD-SCNC: 141 MMOL/L (ref 136–145)
TOTAL PROTEIN: 6.6 G/DL (ref 6.6–8.7)
WBC # BLD: 8 K/UL (ref 4.8–10.8)

## 2019-07-17 PROCEDURE — 6360000002 HC RX W HCPCS: Performed by: HOSPITALIST

## 2019-07-17 PROCEDURE — 6370000000 HC RX 637 (ALT 250 FOR IP): Performed by: HOSPITALIST

## 2019-07-17 PROCEDURE — 6370000000 HC RX 637 (ALT 250 FOR IP): Performed by: SURGERY

## 2019-07-17 PROCEDURE — 80053 COMPREHEN METABOLIC PANEL: CPT

## 2019-07-17 PROCEDURE — 2580000003 HC RX 258: Performed by: SURGERY

## 2019-07-17 PROCEDURE — 99239 HOSP IP/OBS DSCHRG MGMT >30: CPT | Performed by: HOSPITALIST

## 2019-07-17 PROCEDURE — C9113 INJ PANTOPRAZOLE SODIUM, VIA: HCPCS | Performed by: HOSPITALIST

## 2019-07-17 PROCEDURE — 2580000003 HC RX 258: Performed by: HOSPITALIST

## 2019-07-17 PROCEDURE — 8010000000 HC HEMODIALYSIS ACUTE INPT

## 2019-07-17 PROCEDURE — 36415 COLL VENOUS BLD VENIPUNCTURE: CPT

## 2019-07-17 PROCEDURE — 85027 COMPLETE CBC AUTOMATED: CPT

## 2019-07-17 RX ORDER — OMEPRAZOLE 20 MG/1
40 CAPSULE, DELAYED RELEASE ORAL DAILY
Qty: 90 CAPSULE | Refills: 1
Start: 2019-07-17 | End: 2019-10-26 | Stop reason: SDUPTHER

## 2019-07-17 RX ORDER — LEVETIRACETAM 500 MG/1
500 TABLET ORAL 2 TIMES DAILY
Qty: 60 TABLET | Refills: 0 | Status: SHIPPED | OUTPATIENT
Start: 2019-07-17 | End: 2019-08-20 | Stop reason: SDUPTHER

## 2019-07-17 RX ORDER — FOLIC ACID 1 MG/1
1 TABLET ORAL DAILY
Qty: 30 TABLET | Refills: 2 | Status: SHIPPED | OUTPATIENT
Start: 2019-07-18

## 2019-07-17 RX ADMIN — LEVETIRACETAM 500 MG: 500 TABLET ORAL at 11:53

## 2019-07-17 RX ADMIN — HYDROMORPHONE HYDROCHLORIDE 2 MG: 2 TABLET ORAL at 11:53

## 2019-07-17 RX ADMIN — HYDROMORPHONE HYDROCHLORIDE 2 MG: 2 TABLET ORAL at 05:11

## 2019-07-17 RX ADMIN — FOLIC ACID 1 MG: 1 TABLET ORAL at 11:53

## 2019-07-17 RX ADMIN — TAMSULOSIN HYDROCHLORIDE 0.4 MG: 0.4 CAPSULE ORAL at 11:53

## 2019-07-17 RX ADMIN — Medication 10 ML: at 11:52

## 2019-07-17 RX ADMIN — HYDRALAZINE HYDROCHLORIDE 25 MG: 25 TABLET, FILM COATED ORAL at 14:04

## 2019-07-17 RX ADMIN — HYDRALAZINE HYDROCHLORIDE 25 MG: 25 TABLET, FILM COATED ORAL at 05:11

## 2019-07-17 RX ADMIN — CARVEDILOL 12.5 MG: 12.5 TABLET, FILM COATED ORAL at 11:53

## 2019-07-17 RX ADMIN — AMLODIPINE BESYLATE 10 MG: 10 TABLET ORAL at 11:53

## 2019-07-17 RX ADMIN — Medication 10 ML: at 11:54

## 2019-07-17 RX ADMIN — LISINOPRIL 20 MG: 20 TABLET ORAL at 11:53

## 2019-07-17 RX ADMIN — PANTOPRAZOLE SODIUM 40 MG: 40 INJECTION, POWDER, FOR SOLUTION INTRAVENOUS at 11:52

## 2019-07-17 ASSESSMENT — PAIN SCALES - GENERAL
PAINLEVEL_OUTOF10: 7
PAINLEVEL_OUTOF10: 5
PAINLEVEL_OUTOF10: 0
PAINLEVEL_OUTOF10: 4

## 2019-07-17 NOTE — PROGRESS NOTES
Nephrology ([facility]) Progress Note    Patient:  Corrinne Mill  YOB: 1975  Date of Service: 7/17/2019  MRN: [id number]   Acct: [de-identified]   Primary Care Physician: Grabiel Andrews MD  Advance Directive: Full Code  Admit Date: 7/10/2019       Hospital Day: 7  Referring Provider: Caterina Galdamez, *    Patient independently seen and examined, Chart, Consults, Notes, Operative notes, Labs, Cardiology, and Radiology studies reviewed as able. Subjective:  Corrinne Mill is a 41 y.o. male  whom we were consulted for acute kidney injury/chronic kidney disease. Patient has stage IV chronic kidney disease baseline as of 2017 and has been lost to follow-up in our office. Patient has a history of left nephrectomy in the past and recurrent episode of hydronephrosis in the right kidney. Patient has multiple right renal stent as he has scarring of the ureter from radiation treatment of his rectal cancer. Presented with profound weakness, lack of energy and tiredness. He follows with [facility] urology and last stent was placed about a month ago. On presentation his creatinine was 16 with serum potassium of 5.6  with severe metabolic acidosis. Patient had an episode of grand mal seizure, intubated in ICU. [facility] course remarkable for admission to ICU and he had 3 consecutive hemodialysis treatments. He was then successfully extubated. He was transferred to regular floor.     Today, no overnight events. Tolerated PermCath placement without issue. Tolerating dialysis without issue. Denies fever, chills, chest pain, shortness of air.   Awaiting final outpatient placement    Dialysis   Pt was seen on RRT  Modality: Hemodialysis  Access: Catheter  Location: right upper  QB: 400  QD: 600  UF: 430                Allergies:  Morphine and related and Oxycodone-acetaminophen    Medicines:  Current Facility-Administered Medications   Medication Dose Route Frequency density in the presacral region is unchanged. This is favored to reflect posttreatment changes. 3.  Cholelithiasis, without evidence acute cholecystitis. Signed by Dr Monica Alarcon on 7/10/2019 11:08 AM    Xr Chest Standard (2 Vw)    Result Date: 7/15/2019  XR CHEST (2 VW) 7/15/2019 9:15 AM HISTORY: Catheter COMPARISON: July 10, 2019. FINDINGS: The lungs are clear. The cardiac silhouette is normal. Right internal jugular double lumen catheter is satisfactorily positioned. There is no pneumothorax. . The osseous structures and surrounding soft tissues demonstrate no acute abnormality. 1. No radiographic evidence of acute cardiopulmonary process. Signed by Dr Jaylene Aragon on 7/15/2019 10:28 AM    Us Renal Complete    Result Date: 7/11/2019  Examination. US RENAL COMPLETE History: Acute on chronic renal failure. The ultrasound examination of the kidneys bilaterally is performed and compared with the previous study dated 9/15/2015. The right kidney measures 12 cm x 6.5 x 5 cm. There is a persistent moderate hydronephrosis. There are delineated echogenic areas in the collecting system which may represent the nephrostomy or stent catheter? [employer] There is a hypoechoic area located anteriorly in the upper pole measuring 1.7 cm in greatest dimension. This may represent a dilated calyx or a nephrogenic cysts? [employer] The renal cortex measures 1.3-1.8 cm. The left kidney is not visualized. A persistent right hydronephrosis. The etiology is not certain. Signed by Dr Tanya Fine on 7/11/2019 8:43 AM    Xr Chest Portable    Result Date: 7/10/2019  EXAMINATION: XR CHEST PORTABLE 7/10/2019 4:33 PM HISTORY: XR CHEST PORTABLE 7/10/2019 1:30 PM HISTORY: Endotracheal tube COMPARISON: May 1, 2019. FINDINGS: The lungs are clear. The cardiac silhouette is normal. Endotracheal tube is present the distal tip just proximal to the clavicles. . The osseous structures and surrounding soft tissues demonstrate no acute abnormality.     1. No

## 2019-07-17 NOTE — DISCHARGE SUMMARY
this admission he was found to have a creatinine of 16 mg and potassium 5.6 mmol with blood urea nitrogen of 116. In the emergency room he had a grand mal seizure, he was intubated and transferred to  intensive care unit. Patient underwent right femoral dialysis catheter placement by vascular surgery for HD. He has OP HD chair set up and is cleared for dc home today.      Consults: GI, nephrology and vascular surgery    Significant Diagnostic Studies:     IR TUNNELED CATHETER PLACEMENT GREATER THAN 5 YEARS [[id number]] Resulted: 07/15/19 1148      Order Status: Completed Updated: 07/15/19 1150     Narrative:       Preprocedure diagnosis: Acute on chronic kidney failure needing  dialysis access  POSTOPERATIVE DIAGNOSIS: Same. Procedure performed: Placement of 23 cm, cuff to tip dual-lumen,  cuffed, tunneled, central venous access catheter with fluoroscopy and  ultrasound guidance. Surgeon: Sameer Azevedo md  Assistant: FOUZIA Shaffer. Conscious Sedation Summary     ASA class III     Conscious sedation start time: 0933     Conscious sedation and time 1009.     Conscious sedation medications        3.0 mg Versed, IV, divided doses        Fentanyl 75 mcg, IV, divided doses.     Under my supervision, fentanyl and Versed were administered  intravenously for moderate sedation. Pulse oximetry, heart rate, heart  rhythm and blood pressure were continuously monitored by an  independent trained observer who was present. I spent 36 minutes of  face-to-face sedation time with the patient. PROCEDURE: After going over the risks, benefits, and how the procedure  would be performed,  the patient agreed to proceed. He was brought  into the radiology special procedure room placed on the table in the  supine position and the right side of his neck,  chest and shoulder  area were all prepped and draped in the usual sterile manner using  ChloraPrep. The patient had received intravenous vancomycin  prophylactically.   After

## 2019-07-18 ENCOUNTER — TELEPHONE (OUTPATIENT)
Dept: INTERNAL MEDICINE | Age: 44
End: 2019-07-18

## 2019-07-18 ENCOUNTER — TELEPHONE (OUTPATIENT)
Dept: GASTROENTEROLOGY | Age: 44
End: 2019-07-18

## 2019-07-18 ENCOUNTER — TELEPHONE (OUTPATIENT)
Dept: ADMINISTRATIVE | Age: 44
End: 2019-07-18

## 2019-07-18 NOTE — TELEPHONE ENCOUNTER
Tried calling patient back to set up an appointment, but there was no answer. If patient returns call, he can be set up with any provider at first available.  He can then be placed on wait list. Andrey Headings

## 2019-07-23 ENCOUNTER — OFFICE VISIT (OUTPATIENT)
Dept: FAMILY MEDICINE CLINIC | Age: 44
End: 2019-07-23
Payer: MEDICARE

## 2019-07-23 VITALS
WEIGHT: 213 LBS | OXYGEN SATURATION: 98 % | SYSTOLIC BLOOD PRESSURE: 108 MMHG | BODY MASS INDEX: 26.62 KG/M2 | HEART RATE: 99 BPM | TEMPERATURE: 97.2 F | DIASTOLIC BLOOD PRESSURE: 76 MMHG

## 2019-07-23 DIAGNOSIS — K21.9 GASTROESOPHAGEAL REFLUX DISEASE WITHOUT ESOPHAGITIS: ICD-10-CM

## 2019-07-23 DIAGNOSIS — I10 ESSENTIAL (PRIMARY) HYPERTENSION: ICD-10-CM

## 2019-07-23 DIAGNOSIS — N17.0 ACUTE RENAL FAILURE WITH ACUTE TUBULAR NECROSIS SUPERIMPOSED ON STAGE 4 CHRONIC KIDNEY DISEASE (HCC): ICD-10-CM

## 2019-07-23 DIAGNOSIS — K92.2 GASTROINTESTINAL HEMORRHAGE, UNSPECIFIED GASTROINTESTINAL HEMORRHAGE TYPE: ICD-10-CM

## 2019-07-23 DIAGNOSIS — N18.4 ACUTE RENAL FAILURE WITH ACUTE TUBULAR NECROSIS SUPERIMPOSED ON STAGE 4 CHRONIC KIDNEY DISEASE (HCC): ICD-10-CM

## 2019-07-23 DIAGNOSIS — Z99.2 PATIENT REQUIRING ACUTE DIALYSIS (HCC): Primary | ICD-10-CM

## 2019-07-23 DIAGNOSIS — N17.9 AKI (ACUTE KIDNEY INJURY) (HCC): ICD-10-CM

## 2019-07-23 DIAGNOSIS — F41.8 DEPRESSION WITH ANXIETY: ICD-10-CM

## 2019-07-23 DIAGNOSIS — K27.9 PEPTIC ULCER: ICD-10-CM

## 2019-07-23 PROCEDURE — 99496 TRANSJ CARE MGMT HIGH F2F 7D: CPT | Performed by: FAMILY MEDICINE

## 2019-07-23 NOTE — PROGRESS NOTES
PCV13) 06/08/2011    Flu vaccine (1) 09/01/2019    Annual Wellness Visit (AWV)  08/11/2040    HPV vaccine  Aged Out       Subjective:      Review of Systems   Constitutional: Positive for fatigue. Negative for chills and fever. HENT: Negative for congestion. Respiratory: Negative for cough, chest tightness and shortness of breath. Cardiovascular: Negative for chest pain, palpitations and leg swelling. Gastrointestinal: Negative for abdominal pain, anal bleeding, constipation, diarrhea and nausea. Genitourinary: Negative for difficulty urinating. Psychiatric/Behavioral: Negative. See HPI for visit specific review of symptoms. All others negative      Objective:   /76   Pulse 99   Temp 97.2 °F (36.2 °C)   Wt 213 lb (96.6 kg)   SpO2 98%   BMI 26.62 kg/m²   Physical Exam   Constitutional: He appears well-developed. He does not appear ill. Eyes: Pupils are equal, round, and reactive to light. Neck: Normal range of motion. Neck supple. Cardiovascular: Normal rate and regular rhythm. Exam reveals no friction rub. No murmur heard. Pulmonary/Chest: Effort normal and breath sounds normal. No respiratory distress. He has no wheezes. He has no rales. Abdominal: Soft. Bowel sounds are normal. He exhibits no distension. There is no tenderness. There is no rebound and no guarding. Musculoskeletal: He exhibits no edema. Neurological: He is alert. Psychiatric: He has a normal mood and affect.  His behavior is normal.         Recent Results (from the past 672 hour(s))   Lipase    Collection Time: 07/10/19  9:58 AM   Result Value Ref Range    Lipase 99 (H) 13 - 60 U/L   CBC Auto Differential    Collection Time: 07/10/19  9:58 AM   Result Value Ref Range    WBC 8.3 4.8 - 10.8 K/uL    RBC 2.68 (L) 4.70 - 6.10 M/uL    Hemoglobin 7.7 (L) 14.0 - 18.0 g/dL    Hematocrit 24.8 (L) 42.0 - 52.0 %    MCV 92.5 80.0 - 94.0 fL    MCH 28.7 27.0 - 31.0 pg    MCHC 31.0 (L) 33.0 - 37.0 g/dL    RDW 20.6 (L) 42.0 - 52.0 %   Blood Gas, Arterial    Collection Time: 07/10/19  8:42 PM   Result Value Ref Range    pH, Arterial 7.530 (HH) 7.350 - 7.450    pCO2, Arterial 27.0 (L) 35.0 - 45.0 mmHg    pO2, Arterial 130.0 (H) 80.0 - 100.0 mmHg    HCO3, Arterial 22.6 22.0 - 26.0 mmol/L    Base Excess, Arterial 0.1 -2.0 - 2.0 mmol/L    Hemoglobin, Art, Extended 7.2 (L) 14.0 - 18.0 g/dL    O2 Sat, Arterial 94.9 >92 %    Carboxyhgb, Arterial 0.7 0.0 - 5.0 %    Methemoglobin, Arterial 2.1 <1.5 %    O2 Content, Arterial 9.9 Not Established mL/dL    O2 Therapy Unknown    Potassium, Whole Blood    Collection Time: 07/10/19  8:42 PM   Result Value Ref Range    Potassium, Whole Blood 2.2    Blood Gas, Arterial    Collection Time: 07/11/19 12:38 AM   Result Value Ref Range    pH, Arterial 7.430 7.350 - 7.450    pCO2, Arterial 32.0 (L) 35.0 - 45.0 mmHg    pO2, Arterial 136.0 (H) 80.0 - 100.0 mmHg    HCO3, Arterial 21.2 (L) 22.0 - 26.0 mmol/L    Base Excess, Arterial -2.7 (L) -2.0 - 2.0 mmol/L    Hemoglobin, Art, Extended 7.4 (L) 14.0 - 18.0 g/dL    O2 Sat, Arterial 95.5 >92 %    Carboxyhgb, Arterial 1.2 0.0 - 5.0 %    Methemoglobin, Arterial 1.7 <1.5 %    O2 Content, Arterial 10.3 Not Established mL/dL    O2 Therapy Unknown    Potassium, Whole Blood    Collection Time: 07/11/19 12:38 AM   Result Value Ref Range    Potassium, Whole Blood 2.4    CBC    Collection Time: 07/11/19  2:22 AM   Result Value Ref Range    WBC 8.3 4.8 - 10.8 K/uL    RBC 2.58 (L) 4.70 - 6.10 M/uL    Hemoglobin 7.5 (L) 14.0 - 18.0 g/dL    Hematocrit 22.5 (L) 42.0 - 52.0 %    MCV 87.2 80.0 - 94.0 fL    MCH 29.1 27.0 - 31.0 pg    MCHC 33.3 33.0 - 37.0 g/dL    RDW 15.9 (H) 11.5 - 14.5 %    Platelets 91 (L) 580 - 400 K/uL    MPV 9.7 9.4 - 12.4 fL   Comprehensive Metabolic Panel    Collection Time: 07/11/19  2:22 AM   Result Value Ref Range    Sodium 143 136 - 145 mmol/L    Potassium 3.1 (L) 3.5 - 5.0 mmol/L    Chloride 97 (L) 98 - 111 mmol/L    CO2 22 22 - 29 mmol/L Anion Gap 24 (H) 7 - 19 mmol/L    Glucose 97 74 - 109 mg/dL    BUN 60 (H) 6 - 20 mg/dL    CREATININE 9.8 (H) 0.5 - 1.2 mg/dL    GFR Non- 6 (A) >60    Calcium 6.4 (LL) 8.6 - 10.0 mg/dL    Total Protein 5.7 (L) 6.6 - 8.7 g/dL    Alb 3.3 (L) 3.5 - 5.2 g/dL    Total Bilirubin 0.3 0.2 - 1.2 mg/dL    Alkaline Phosphatase 73 40 - 130 U/L    ALT 7 5 - 41 U/L    AST 11 5 - 40 U/L   BLOOD GAS, ARTERIAL    Collection Time: 07/11/19  4:29 AM   Result Value Ref Range    pH, Arterial 7.480 (H) 7.350 - 7.450    pCO2, Arterial 36.0 35.0 - 45.0 mmHg    pO2, Arterial 125.0 (H) 80.0 - 100.0 mmHg    HCO3, Arterial 26.8 (H) 22.0 - 26.0 mmol/L    Base Excess, Arterial 3.1 (H) -2.0 - 2.0 mmol/L    Hemoglobin, Art, Extended 7.1 (L) 14.0 - 18.0 g/dL    O2 Sat, Arterial 95.2 >92 %    Carboxyhgb, Arterial 1.4 0.0 - 5.0 %    Methemoglobin, Arterial 2.0 <1.5 %    O2 Content, Arterial 9.8 Not Established mL/dL    O2 Therapy Unknown    Potassium, Whole Blood    Collection Time: 07/11/19  4:29 AM   Result Value Ref Range    Potassium, Whole Blood 2.8    Occult Blood Gastric / Duodenum    Collection Time: 07/11/19  8:18 AM   Result Value Ref Range    Occult Blood, Other Result: POSITIVE  Normal range: Negative       pH, Gastric pH=2     Occult Blood QC CANCELED    CBC    Collection Time: 07/12/19  2:00 AM   Result Value Ref Range    WBC 7.4 4.8 - 10.8 K/uL    RBC 2.56 (L) 4.70 - 6.10 M/uL    Hemoglobin 7.2 (L) 14.0 - 18.0 g/dL    Hematocrit 22.3 (L) 42.0 - 52.0 %    MCV 87.1 80.0 - 94.0 fL    MCH 28.1 27.0 - 31.0 pg    MCHC 32.3 (L) 33.0 - 37.0 g/dL    RDW 15.8 (H) 11.5 - 14.5 %    Platelets 77 (L) 537 - 400 K/uL    MPV 9.8 9.4 - 12.4 fL   Comprehensive Metabolic Panel    Collection Time: 07/12/19  2:00 AM   Result Value Ref Range    Sodium 143 136 - 145 mmol/L    Potassium 3.2 (L) 3.5 - 5.0 mmol/L    Chloride 96 (L) 98 - 111 mmol/L    CO2 30 (H) 22 - 29 mmol/L    Anion Gap 17 7 - 19 mmol/L    Glucose 110 (H) 74 - 109 mg/dL no further bleeding since going home. Reviewed signs and symptoms of gastrointestinal bleeding including melena and hematochezia. Continue with omeprazole at 40 mg daily. Recommend he avoid any NSAIDs. He does not drink alcohol or smoke  3. Gastroesophageal reflux disease without esophagitis  Continue with the PPI    4. Essential (primary) hypertension  Is been experiencing increased hypotension and orthostasis associated with his dialysis. Advised him to continue checking his blood pressure on a regular basis. He can hold his hydralazine. 5. Depression with anxiety  Long discussion with the patient today regarding his current mood issues. Not currently taking any medication for his mood. Offered counseling services as well. Will readdress in 1 month. 6. MILAGROS (acute kidney injury) ([facility] [address]  Continues to see nephrology locally. Receives dialysis 3 times a week he is having urine output hopefully he will be able to discontinue dialysis shortly    7. Acute renal failure with acute tubular necrosis superimposed on stage 4 chronic kidney disease ([facility] [address]  As above      Diagnostic test results reviewed. Medical Decision Making: high complexity    Return in about 1 month (around 8/23/2019) for Routine follow up - 15 minute visit.

## 2019-07-24 ENCOUNTER — HOSPITAL ENCOUNTER (OUTPATIENT)
Dept: INFUSION THERAPY | Age: 44
Setting detail: INFUSION SERIES
Discharge: HOME | End: 2019-07-24
Payer: MEDICARE

## 2019-07-24 VITALS
HEART RATE: 72 BPM | SYSTOLIC BLOOD PRESSURE: 109 MMHG | DIASTOLIC BLOOD PRESSURE: 69 MMHG | RESPIRATION RATE: 17 BRPM | TEMPERATURE: 97.9 F

## 2019-07-24 LAB
ABO/RH: NORMAL
ANTIBODY SCREEN: NORMAL
BLOOD BANK DISPENSE STATUS: NORMAL
BLOOD BANK DISPENSE STATUS: NORMAL
BLOOD BANK PRODUCT CODE: NORMAL
BLOOD BANK PRODUCT CODE: NORMAL
BPU ID: NORMAL
BPU ID: NORMAL
DESCRIPTION BLOOD BANK: NORMAL
DESCRIPTION BLOOD BANK: NORMAL

## 2019-07-24 PROCEDURE — 6360000002 HC RX W HCPCS: Performed by: CLINICAL NURSE SPECIALIST

## 2019-07-24 PROCEDURE — 2580000003 HC RX 258: Performed by: CLINICAL NURSE SPECIALIST

## 2019-07-24 PROCEDURE — 86901 BLOOD TYPING SEROLOGIC RH(D): CPT

## 2019-07-24 PROCEDURE — 86900 BLOOD TYPING SEROLOGIC ABO: CPT

## 2019-07-24 PROCEDURE — 36430 TRANSFUSION BLD/BLD COMPNT: CPT

## 2019-07-24 PROCEDURE — 86923 COMPATIBILITY TEST ELECTRIC: CPT

## 2019-07-24 PROCEDURE — 96375 TX/PRO/DX INJ NEW DRUG ADDON: CPT

## 2019-07-24 PROCEDURE — 96374 THER/PROPH/DIAG INJ IV PUSH: CPT

## 2019-07-24 PROCEDURE — 86850 RBC ANTIBODY SCREEN: CPT

## 2019-07-24 PROCEDURE — 6370000000 HC RX 637 (ALT 250 FOR IP): Performed by: CLINICAL NURSE SPECIALIST

## 2019-07-24 PROCEDURE — P9016 RBC LEUKOCYTES REDUCED: HCPCS

## 2019-07-24 RX ORDER — 0.9 % SODIUM CHLORIDE 0.9 %
250 INTRAVENOUS SOLUTION INTRAVENOUS ONCE
Status: COMPLETED | OUTPATIENT
Start: 2019-07-24 | End: 2019-07-24

## 2019-07-24 RX ORDER — ACETAMINOPHEN 325 MG/1
650 TABLET ORAL ONCE
Status: COMPLETED | OUTPATIENT
Start: 2019-07-24 | End: 2019-07-24

## 2019-07-24 RX ORDER — DIPHENHYDRAMINE HYDROCHLORIDE 50 MG/ML
25 INJECTION INTRAMUSCULAR; INTRAVENOUS ONCE
Status: COMPLETED | OUTPATIENT
Start: 2019-07-24 | End: 2019-07-24

## 2019-07-24 RX ADMIN — ACETAMINOPHEN 650 MG: 325 TABLET ORAL at 08:59

## 2019-07-24 RX ADMIN — DIPHENHYDRAMINE HYDROCHLORIDE 25 MG: 50 INJECTION, SOLUTION INTRAMUSCULAR; INTRAVENOUS at 09:00

## 2019-07-24 RX ADMIN — HYDROCORTISONE SODIUM SUCCINATE 100 MG: 100 INJECTION, POWDER, FOR SOLUTION INTRAMUSCULAR; INTRAVENOUS at 09:00

## 2019-07-24 RX ADMIN — SODIUM CHLORIDE 250 ML: 9 INJECTION, SOLUTION INTRAVENOUS at 08:59

## 2019-07-24 ASSESSMENT — PAIN SCALES - GENERAL: PAINLEVEL_OUTOF10: 0

## 2019-07-25 ENCOUNTER — TELEPHONE (OUTPATIENT)
Dept: FAMILY MEDICINE CLINIC | Age: 44
End: 2019-07-25

## 2019-07-25 DIAGNOSIS — R30.0 DYSURIA: Primary | ICD-10-CM

## 2019-07-26 DIAGNOSIS — R30.0 DYSURIA: ICD-10-CM

## 2019-07-28 LAB
ORGANISM: ABNORMAL
URINE CULTURE, ROUTINE: ABNORMAL
URINE CULTURE, ROUTINE: ABNORMAL

## 2019-07-29 RX ORDER — SULFAMETHOXAZOLE AND TRIMETHOPRIM 800; 160 MG/1; MG/1
1 TABLET ORAL 2 TIMES DAILY
Qty: 14 TABLET | Refills: 0 | Status: SHIPPED | OUTPATIENT
Start: 2019-07-29 | End: 2019-08-05

## 2019-07-31 PROBLEM — K27.9 PEPTIC ULCER: Status: ACTIVE | Noted: 2019-07-31

## 2019-07-31 ASSESSMENT — ENCOUNTER SYMPTOMS
ANAL BLEEDING: 0
DIARRHEA: 0
NAUSEA: 0
CHEST TIGHTNESS: 0
COUGH: 0
CONSTIPATION: 0
ABDOMINAL PAIN: 0
SHORTNESS OF BREATH: 0

## 2019-08-11 DIAGNOSIS — K21.9 GASTROESOPHAGEAL REFLUX DISEASE WITHOUT ESOPHAGITIS: ICD-10-CM

## 2019-08-12 RX ORDER — ONDANSETRON 4 MG/1
4 TABLET, FILM COATED ORAL DAILY PRN
Qty: 30 TABLET | Refills: 1 | Status: SHIPPED | OUTPATIENT
Start: 2019-08-12 | End: 2021-03-02

## 2019-08-12 RX ORDER — CLONIDINE HYDROCHLORIDE 0.2 MG/1
TABLET ORAL
Qty: 30 TABLET | Refills: 5 | Status: SHIPPED | OUTPATIENT
Start: 2019-08-12 | End: 2020-02-24

## 2019-08-20 ENCOUNTER — OFFICE VISIT (OUTPATIENT)
Dept: FAMILY MEDICINE CLINIC | Age: 44
End: 2019-08-20
Payer: MEDICARE

## 2019-08-20 VITALS
TEMPERATURE: 97 F | DIASTOLIC BLOOD PRESSURE: 82 MMHG | BODY MASS INDEX: 27.5 KG/M2 | HEART RATE: 86 BPM | OXYGEN SATURATION: 98 % | WEIGHT: 220 LBS | SYSTOLIC BLOOD PRESSURE: 122 MMHG

## 2019-08-20 DIAGNOSIS — N40.1 BENIGN NON-NODULAR PROSTATIC HYPERPLASIA WITH LOWER URINARY TRACT SYMPTOMS: ICD-10-CM

## 2019-08-20 DIAGNOSIS — F41.9 ANXIETY: ICD-10-CM

## 2019-08-20 DIAGNOSIS — K21.9 GASTROESOPHAGEAL REFLUX DISEASE WITHOUT ESOPHAGITIS: ICD-10-CM

## 2019-08-20 DIAGNOSIS — F51.04 CHRONIC INSOMNIA: ICD-10-CM

## 2019-08-20 DIAGNOSIS — I10 ESSENTIAL (PRIMARY) HYPERTENSION: ICD-10-CM

## 2019-08-20 DIAGNOSIS — R30.0 DYSURIA: Primary | ICD-10-CM

## 2019-08-20 DIAGNOSIS — G43.709 CHRONIC MIGRAINE WITHOUT AURA WITHOUT STATUS MIGRAINOSUS, NOT INTRACTABLE: ICD-10-CM

## 2019-08-20 PROCEDURE — G8427 DOCREV CUR MEDS BY ELIG CLIN: HCPCS | Performed by: FAMILY MEDICINE

## 2019-08-20 PROCEDURE — 1036F TOBACCO NON-USER: CPT | Performed by: FAMILY MEDICINE

## 2019-08-20 PROCEDURE — G8417 CALC BMI ABV UP PARAM F/U: HCPCS | Performed by: FAMILY MEDICINE

## 2019-08-20 PROCEDURE — 99214 OFFICE O/P EST MOD 30 MIN: CPT | Performed by: FAMILY MEDICINE

## 2019-08-20 RX ORDER — ALPRAZOLAM 1 MG/1
1 TABLET ORAL 2 TIMES DAILY
Qty: 60 TABLET | Refills: 5 | Status: SHIPPED | OUTPATIENT
Start: 2019-08-20 | End: 2020-01-28 | Stop reason: SDUPTHER

## 2019-08-20 RX ORDER — LEVETIRACETAM 500 MG/1
500 TABLET ORAL 2 TIMES DAILY
Qty: 60 TABLET | Refills: 5 | Status: SHIPPED | OUTPATIENT
Start: 2019-08-20 | End: 2020-06-01

## 2019-08-20 NOTE — PROGRESS NOTES
(APRESOLINE) 50 MG tablet TAKE 1 TABLET BY MOUTH THREE TIMES A DAY 90 tablet 2    HYDROcodone-acetaminophen (NORCO)  MG per tablet Take one tablet every 3-4 hours PRN pain (month supply) (may fill 8/24/18). 180 tablet 0     No current facility-administered medications for this visit. Allergies   Allergen Reactions    Morphine And Related      Doesn't work     Oxycodone-Acetaminophen      Give me migraines        Health Maintenance   Topic Date Due    HIV screen  08/11/1992    Hepatitis B Vaccine (1 of 3 - Risk Recombivax 3-dose series) 08/11/1996    Pneumococcal 0-64 years Vaccine (2 of 3 - PCV13) 06/08/2011    Flu vaccine (1) 09/01/2019    Annual Wellness Visit (AWV)  08/11/2040    HPV vaccine  Aged Out       Subjective:      Review of Systems   Constitutional: Positive for fatigue. Negative for chills and fever. HENT: Negative for congestion, ear pain, sinus pressure, sinus pain and sore throat. Respiratory: Negative for cough, chest tightness and shortness of breath. Cardiovascular: Negative for chest pain, palpitations and leg swelling. Gastrointestinal: Negative for abdominal pain, anal bleeding, constipation, diarrhea and nausea. Genitourinary: Negative for difficulty urinating. Neurological: Negative for dizziness and headaches. Psychiatric/Behavioral: Negative. SeeHPI for visit specific review of symptoms. All others negative      Objective:   /82   Pulse 86   Temp 97 °F (36.1 °C)   Wt 220 lb (99.8 kg)   SpO2 98%   BMI 27.50 kg/m²   Physical Exam   Constitutional: He appears well-developed. He does not appear ill. Eyes: Pupils are equal, round, and reactive to light. Neck: Normal range of motion. Neck supple. Cardiovascular: Normal rate and regular rhythm. Exam reveals no friction rub. No murmur heard. Pulmonary/Chest: Effort normal and breath sounds normal. No respiratory distress. He has no wheezes. He has no rales. Abdominal: Soft.  Bowel medications, diet and exercise. Patient agreed with treatmentplan. Follow up as directed.        Note dictated using [id number] [facility]

## 2019-08-26 ASSESSMENT — ENCOUNTER SYMPTOMS
DIARRHEA: 0
SINUS PRESSURE: 0
COUGH: 0
SINUS PAIN: 0
ANAL BLEEDING: 0
NAUSEA: 0
ABDOMINAL PAIN: 0
SHORTNESS OF BREATH: 0
CONSTIPATION: 0
CHEST TIGHTNESS: 0
SORE THROAT: 0

## 2019-10-26 DIAGNOSIS — K21.9 GASTROESOPHAGEAL REFLUX DISEASE WITHOUT ESOPHAGITIS: ICD-10-CM

## 2019-10-28 RX ORDER — OMEPRAZOLE 20 MG/1
CAPSULE, DELAYED RELEASE ORAL
Qty: 90 CAPSULE | Refills: 1 | Status: SHIPPED | OUTPATIENT
Start: 2019-10-28 | End: 2019-12-16

## 2019-11-08 DIAGNOSIS — R10.84 GENERALIZED ABDOMINAL PAIN: ICD-10-CM

## 2019-11-08 DIAGNOSIS — R30.0 DYSURIA: ICD-10-CM

## 2019-11-08 LAB
BACTERIA: ABNORMAL /HPF
BILIRUBIN URINE: NEGATIVE
BLOOD, URINE: ABNORMAL
CLARITY: ABNORMAL
COLOR: YELLOW
EPITHELIAL CELLS, UA: ABNORMAL /HPF
GLUCOSE URINE: NEGATIVE MG/DL
KETONES, URINE: NEGATIVE MG/DL
LEUKOCYTE ESTERASE, URINE: ABNORMAL
NITRITE, URINE: NEGATIVE
PH UA: 7.5 (ref 5–8)
PROTEIN UA: 100 MG/DL
RBC UA: ABNORMAL /HPF (ref 0–2)
SPECIFIC GRAVITY UA: 1.01 (ref 1–1.03)
UROBILINOGEN, URINE: 0.2 E.U./DL

## 2019-11-11 LAB
ORGANISM: ABNORMAL
ORGANISM: ABNORMAL
URINE CULTURE, ROUTINE: ABNORMAL

## 2019-11-11 RX ORDER — CIPROFLOXACIN 250 MG/1
250 TABLET, FILM COATED ORAL 2 TIMES DAILY
Qty: 14 TABLET | Refills: 0 | Status: SHIPPED | OUTPATIENT
Start: 2019-11-11 | End: 2019-11-18

## 2019-12-16 PROBLEM — E78.00 HYPERCHOLESTEROLEMIA: Status: ACTIVE | Noted: 2019-12-16

## 2019-12-16 PROBLEM — N13.5 STRICTURE OF URETER: Status: ACTIVE | Noted: 2019-12-16

## 2019-12-16 PROBLEM — C20 MALIGNANT NEOPLASM OF RECTUM (HCC): Status: ACTIVE | Noted: 2019-12-16

## 2019-12-16 PROBLEM — F41.9 ANXIETY DISORDER: Status: ACTIVE | Noted: 2017-10-02

## 2019-12-16 PROBLEM — R42 DIZZY SPELLS: Status: ACTIVE | Noted: 2019-12-16

## 2019-12-16 PROBLEM — R35.1 NOCTURIA: Status: ACTIVE | Noted: 2019-12-16

## 2019-12-16 PROBLEM — I31.9 PERICARDITIS: Status: ACTIVE | Noted: 2019-12-16

## 2019-12-16 PROBLEM — R20.0 NUMBNESS OF FOOT: Status: ACTIVE | Noted: 2019-12-16

## 2019-12-16 PROBLEM — R63.1 EXCESSIVE THIRST: Status: ACTIVE | Noted: 2019-12-16

## 2019-12-16 PROBLEM — R53.83 FATIGUE: Status: ACTIVE | Noted: 2019-12-16

## 2019-12-16 PROBLEM — R52 PAIN: Status: ACTIVE | Noted: 2019-12-16

## 2019-12-16 PROBLEM — R30.0 DYSURIA: Status: ACTIVE | Noted: 2019-12-16

## 2019-12-16 PROBLEM — N43.3 HYDROCELE: Status: ACTIVE | Noted: 2019-12-16

## 2019-12-16 PROBLEM — N19 RENAL FAILURE: Status: ACTIVE | Noted: 2017-01-17

## 2019-12-16 PROBLEM — M25.50 PAIN IN JOINT: Status: ACTIVE | Noted: 2019-12-16

## 2019-12-16 PROBLEM — L02.91 ABSCESS: Status: ACTIVE | Noted: 2019-12-16

## 2019-12-16 PROBLEM — N13.9 OBSTRUCTIVE UROPATHY: Status: ACTIVE | Noted: 2017-01-16

## 2019-12-16 PROBLEM — R51.9 HEADACHE: Status: ACTIVE | Noted: 2019-12-16

## 2019-12-16 PROBLEM — N94.10 UNSPECIFIED DYSPAREUNIA: Status: ACTIVE | Noted: 2019-12-16

## 2019-12-16 PROBLEM — R63.4 WEIGHT DECREASING: Status: ACTIVE | Noted: 2019-12-16

## 2019-12-16 PROBLEM — H53.8 HAZY VISION: Status: ACTIVE | Noted: 2019-12-16

## 2019-12-16 PROBLEM — R33.9 RETENTION OF URINE: Status: ACTIVE | Noted: 2019-12-16

## 2019-12-16 PROBLEM — R39.198 SLOWING OF URINARY STREAM: Status: ACTIVE | Noted: 2019-12-16

## 2019-12-16 RX ORDER — ERGOCALCIFEROL 1.25 MG/1
CAPSULE ORAL
Refills: 2 | COMMUNITY
Start: 2019-10-24 | End: 2022-09-22 | Stop reason: SDUPTHER

## 2019-12-16 RX ORDER — NARATRIPTAN 2.5 MG/1
TABLET ORAL
COMMUNITY
Start: 2014-02-24 | End: 2019-12-16

## 2019-12-16 RX ORDER — NITROFURANTOIN 25; 75 MG/1; MG/1
CAPSULE ORAL
Status: ON HOLD | COMMUNITY
Start: 2019-11-11 | End: 2020-01-21

## 2019-12-16 RX ORDER — ALBUTEROL SULFATE 90 UG/1
2 AEROSOL, METERED RESPIRATORY (INHALATION) EVERY 4 HOURS PRN
COMMUNITY

## 2019-12-16 RX ORDER — DOCUSATE SODIUM 250 MG
CAPSULE ORAL
COMMUNITY
Start: 2017-12-29 | End: 2021-01-08

## 2019-12-16 RX ORDER — OXYBUTYNIN CHLORIDE 5 MG/1
5 TABLET ORAL
COMMUNITY
Start: 2019-01-10

## 2019-12-16 RX ORDER — FERRIC CITRATE 210 MG/1
TABLET, COATED ORAL
Refills: 3 | COMMUNITY
Start: 2019-10-01

## 2019-12-16 RX ORDER — OMEPRAZOLE 40 MG/1
40 CAPSULE, DELAYED RELEASE ORAL
COMMUNITY
Start: 2016-06-07

## 2019-12-17 ENCOUNTER — OFFICE VISIT (OUTPATIENT)
Dept: VASCULAR SURGERY | Age: 44
End: 2019-12-17
Payer: MEDICARE

## 2019-12-17 ENCOUNTER — TELEPHONE (OUTPATIENT)
Dept: VASCULAR SURGERY | Age: 44
End: 2019-12-17

## 2019-12-17 VITALS
DIASTOLIC BLOOD PRESSURE: 98 MMHG | HEART RATE: 59 BPM | RESPIRATION RATE: 18 BRPM | SYSTOLIC BLOOD PRESSURE: 138 MMHG | OXYGEN SATURATION: 99 %

## 2019-12-17 DIAGNOSIS — N18.6 CKD (CHRONIC KIDNEY DISEASE) STAGE V REQUIRING CHRONIC DIALYSIS (HCC): ICD-10-CM

## 2019-12-17 DIAGNOSIS — Z01.818 PRE-OP TESTING: Primary | ICD-10-CM

## 2019-12-17 DIAGNOSIS — Z99.2 CKD (CHRONIC KIDNEY DISEASE) STAGE V REQUIRING CHRONIC DIALYSIS (HCC): ICD-10-CM

## 2019-12-17 PROCEDURE — G8417 CALC BMI ABV UP PARAM F/U: HCPCS | Performed by: PHYSICIAN ASSISTANT

## 2019-12-17 PROCEDURE — G8428 CUR MEDS NOT DOCUMENT: HCPCS | Performed by: PHYSICIAN ASSISTANT

## 2019-12-17 PROCEDURE — 1036F TOBACCO NON-USER: CPT | Performed by: PHYSICIAN ASSISTANT

## 2019-12-17 PROCEDURE — 99214 OFFICE O/P EST MOD 30 MIN: CPT | Performed by: PHYSICIAN ASSISTANT

## 2019-12-17 PROCEDURE — G8484 FLU IMMUNIZE NO ADMIN: HCPCS | Performed by: PHYSICIAN ASSISTANT

## 2019-12-17 RX ORDER — NARATRIPTAN 2.5 MG/1
2.5 TABLET ORAL
COMMUNITY
End: 2020-08-07

## 2019-12-17 RX ORDER — ALPRAZOLAM 1 MG/1
TABLET ORAL
COMMUNITY
Start: 2019-11-20 | End: 2020-09-02

## 2019-12-17 RX ORDER — CINACALCET 30 MG/1
30 TABLET, FILM COATED ORAL DAILY
COMMUNITY

## 2020-01-02 ENCOUNTER — HOSPITAL ENCOUNTER (OUTPATIENT)
Dept: VASCULAR LAB | Age: 45
Discharge: HOME | End: 2020-01-02
Payer: MEDICARE

## 2020-01-02 PROCEDURE — G0365 VESSEL MAPPING HEMO ACCESS: HCPCS

## 2020-01-02 PROCEDURE — 93986 DUP-SCAN HEMO COMPL UNI STD: CPT

## 2020-01-10 ENCOUNTER — TELEPHONE (OUTPATIENT)
Dept: VASCULAR SURGERY | Age: 45
End: 2020-01-10

## 2020-01-10 NOTE — TELEPHONE ENCOUNTER
Unable to reach patient by phone in regards to scheduling surgery with Dr. Roselyn Lambert, left message to call the office

## 2020-01-13 ENCOUNTER — HOSPITAL ENCOUNTER (OUTPATIENT)
Dept: PREADMISSION TESTING | Age: 45
Discharge: HOME | End: 2020-01-17
Payer: MEDICARE

## 2020-01-13 ENCOUNTER — HOSPITAL ENCOUNTER (OUTPATIENT)
Dept: GENERAL RADIOLOGY | Age: 45
Discharge: HOME | End: 2020-01-13
Payer: MEDICARE

## 2020-01-13 VITALS — BODY MASS INDEX: 26.73 KG/M2 | WEIGHT: 215 LBS | HEIGHT: 75 IN

## 2020-01-13 LAB
EKG P AXIS: 58 DEGREES
EKG P-R INTERVAL: 168 MS
EKG Q-T INTERVAL: 354 MS
EKG QRS DURATION: 98 MS
EKG QTC CALCULATION (BAZETT): 411 MS
EKG T AXIS: 46 DEGREES

## 2020-01-13 PROCEDURE — 93005 ELECTROCARDIOGRAM TRACING: CPT

## 2020-01-13 PROCEDURE — 71046 X-RAY EXAM CHEST 2 VIEWS: CPT

## 2020-01-13 PROCEDURE — 87081 CULTURE SCREEN ONLY: CPT

## 2020-01-13 PROCEDURE — 93010 ELECTROCARDIOGRAM REPORT: CPT | Performed by: INTERNAL MEDICINE

## 2020-01-14 LAB — MRSA CULTURE ONLY: NORMAL

## 2020-01-15 PROBLEM — R52 PAIN: Status: RESOLVED | Noted: 2019-12-16 | Resolved: 2020-01-15

## 2020-01-20 ENCOUNTER — ANESTHESIA EVENT (OUTPATIENT)
Dept: OPERATING ROOM | Age: 45
End: 2020-01-20
Payer: MEDICARE

## 2020-01-20 ENCOUNTER — PREP FOR PROCEDURE (OUTPATIENT)
Dept: VASCULAR SURGERY | Age: 45
End: 2020-01-20

## 2020-01-20 RX ORDER — SODIUM CHLORIDE 0.9 % (FLUSH) 0.9 %
10 SYRINGE (ML) INJECTION PRN
Status: CANCELLED | OUTPATIENT
Start: 2020-01-20

## 2020-01-20 RX ORDER — ASPIRIN 81 MG/1
81 TABLET ORAL ONCE
Status: CANCELLED | OUTPATIENT
Start: 2020-01-20 | End: 2020-01-20

## 2020-01-20 RX ORDER — SODIUM CHLORIDE 0.9 % (FLUSH) 0.9 %
10 SYRINGE (ML) INJECTION EVERY 12 HOURS SCHEDULED
Status: CANCELLED | OUTPATIENT
Start: 2020-01-20

## 2020-01-20 NOTE — H&P (VIEW-ONLY)
Cata Chen PA-C   Physician Assistant   Physician Assistant Medical   Progress Notes   Signed   Encounter Date:  12/17/2019               Signed        Expand All Collapse All         Patient Care Team:  Raymundo Price MD as PCP - General (Family Medicine)  Raymundo Price MD as PCP - [facility] Provider  Penelope Joaquin MD as Consulting Physician (Vascular Surgery)        History and Physical     Mr. Margarita Vela is a 46 yo male who has a history of chronic kidney disease now requiring chronic dialysis. He has a history of colorectal Ca, s/p colostomy. 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  Patient also has recurrent hydronephrosis of the right kidney and gets frequent stenting of right ureter. Messi Wong follows urology at [facility]. He has a right IJ Permcath for which he is now receiving dialysis. He comes in today to discuss possible options for permanent AV access. He is right handed. But does use his left arm for shooting as he hunts as a means of stress release.  He denies any SOB or  chest pain at this time.      Alexander Ray is a 45 y.o. male with the [facility]        Patient Active Problem List     Diagnosis Date Noted    CKD (chronic kidney disease) stage V requiring chronic dialysis ([facility] [address] 12/17/2019    Abscess 12/16/2019       Overview:   pre-sacral       Dysuria 12/16/2019    Hazy vision 12/16/2019    Dizzy spells 12/16/2019    Excessive thirst 12/16/2019    Fatigue 12/16/2019    Headache 12/16/2019    Hydrocele 12/16/2019    Hypercholesterolemia 12/16/2019    Malignant neoplasm of rectum ([facility] [address] 12/16/2019    Nocturia 12/16/2019       Overview:   finding of nocturia       Numbness of foot 12/16/2019    Pain 12/16/2019       Overview:   testicular       Pain in joint 12/16/2019    Pericarditis 12/16/2019    Retention of urine 12/16/2019    Slowing of urinary stream 12/16/2019    Stricture of ureter 12/16/2019       Overview:   R solitary kidney. Managed with perc neph tube at present (stents occluded on two separate occasions)       Unspecified dyspareunia 12/16/2019    Weight decreasing 12/16/2019    Peptic ulcer 07/31/2019    Hypokalemia      Patient requiring acute dialysis [facility])      Palliative care patient 07/11/2019    GI (gastrointestinal bleed) 07/10/2019       Added automatically from request for surgery [id number]       Hx of colon cancer, stage III 07/18/2018    Hx antineoplastic chemotherapy 07/18/2018    Chronic migraine without aura without status migrainosus, not intractable 01/10/2018    CKD (chronic kidney disease) stage 3, GFR 30-59 ml/min ([facility]) 01/10/2018    Generalized anxiety disorder 01/10/2018    Benign non-nodular prostatic hyperplasia with lower urinary tract symptoms 10/02/2017    Gastroesophageal reflux disease without esophagitis 10/02/2017    Chronic insomnia 10/02/2017    Anxiety disorder 10/02/2017    Hypertension 10/02/2017       Overview:   : [  ]       Renal failure 01/17/2017    Obstructive uropathy 01/16/2017      Current Facility-Administered Medications   Current Outpatient Medications   Medication Sig Dispense Refill    cinacalcet (SENSIPAR) 30 MG tablet Take 30 mg by mouth daily        naratriptan (AMERGE) 2.5 MG tablet Take 2.5 mg by mouth once as needed for Migraine 2.5 mg at onset of headache, may repeat in 4 hours if needed        docusate sodium (COLACE) 250 MG capsule TAKE ONE CAPSULE BY MOUTH TWICE A DAY FOR 14 DAYS AS NEEDED FOR CONSTIPATION        albuterol sulfate  (90 Base) MCG/ACT inhaler Inhale 2 puffs into the lungs        vitamin D (ERGOCALCIFEROL) 1.25 MG (83657 UT) CAPS capsule TAKE 1 CAPSULE WEEKLY   2    AURYXIA 1  MG(Fe) TABS TAKE 1 TABLET BY MOUTH THREE TIMES A DAY WITH MEALS.    SWALLOW WHOLE, DO NOT CHEW OR CRUSH MEDICATION   3    nitrofurantoin, macrocrystal-monohydrate, (MACROBID) 100 MG capsule          Info     Author Note Status Last Update User   Scout Long PA-C Signed Scout Long PA-C   Last Update Date/Time: 1/7/2020  8:21 AM   Chart Review Routing History     No routing history on file.

## 2020-01-20 NOTE — H&P
[facility]   [facility]   Physician Assistant Medical   Progress Notes   Signed   Encounter Date:  12/17/2019               Signed        Expand All Collapse All         Patient Care Team:  Rosanna Ochoa MD as PCP - General (Family Medicine)  Rosanna Ochoa MD as PCP - [facility]  Michelle Arias MD as Consulting Physician (Vascular Surgery)        History and Physical     Mr. Oswald Arboleda is a 46 yo male who has a history of chronic kidney disease now requiring chronic dialysis. He has a history of colorectal Ca, s/p colostomy. 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  Patient also has recurrent hydronephrosis of the right kidney and gets frequent stenting of right ureter. Zandra Amador follows urology at [facility]. He has a right IJ Permcath for which he is now receiving dialysis. He comes in today to discuss possible options for permanent AV access. He is right handed. But does use his left arm for shooting as he hunts as a means of stress release.  He denies any SOB or  chest pain at this time.      Steve Grove is a 45 y.o. male with the [facility]        Patient Active Problem List     Diagnosis Date Noted    CKD (chronic kidney disease) stage V requiring chronic dialysis ([facility] [address] 12/17/2019    Abscess 12/16/2019       Overview:   pre-sacral       Dysuria 12/16/2019    Hazy vision 12/16/2019    Dizzy spells 12/16/2019    Excessive thirst 12/16/2019    Fatigue 12/16/2019    Headache 12/16/2019    Hydrocele 12/16/2019    Hypercholesterolemia 12/16/2019    Malignant neoplasm of rectum ([facility] [address] 12/16/2019    Nocturia 12/16/2019       Overview:   finding of nocturia       Numbness of foot 12/16/2019    Pain 12/16/2019       Overview:   testicular       Pain in joint 12/16/2019    Pericarditis 12/16/2019    Retention of urine 12/16/2019    Slowing of urinary stream 12/16/2019    Stricture of ureter 12/16/2019       Overview:   R solitary kidney. Managed with perc neph tube at present (stents occluded on two separate occasions)       Unspecified dyspareunia 12/16/2019    Weight decreasing 12/16/2019    Peptic ulcer 07/31/2019    Hypokalemia      Patient requiring acute dialysis [facility])      Palliative care patient 07/11/2019    GI (gastrointestinal bleed) 07/10/2019       Added automatically from request for surgery [id number]       Hx of colon cancer, stage III 07/18/2018    Hx antineoplastic chemotherapy 07/18/2018    Chronic migraine without aura without status migrainosus, not intractable 01/10/2018    CKD (chronic kidney disease) stage 3, GFR 30-59 ml/min ([facility]) 01/10/2018    Generalized anxiety disorder 01/10/2018    Benign non-nodular prostatic hyperplasia with lower urinary tract symptoms 10/02/2017    Gastroesophageal reflux disease without esophagitis 10/02/2017    Chronic insomnia 10/02/2017    Anxiety disorder 10/02/2017    Hypertension 10/02/2017       Overview:   : [  ]       Renal failure 01/17/2017    Obstructive uropathy 01/16/2017      Current Facility-Administered Medications   Current Outpatient Medications   Medication Sig Dispense Refill    cinacalcet (SENSIPAR) 30 MG tablet Take 30 mg by mouth daily        naratriptan (AMERGE) 2.5 MG tablet Take 2.5 mg by mouth once as needed for Migraine 2.5 mg at onset of headache, may repeat in 4 hours if needed        docusate sodium (COLACE) 250 MG capsule TAKE ONE CAPSULE BY MOUTH TWICE A DAY FOR 14 DAYS AS NEEDED FOR CONSTIPATION        albuterol sulfate  (90 Base) MCG/ACT inhaler Inhale 2 puffs into the lungs        vitamin D (ERGOCALCIFEROL) 1.25 MG (04757 UT) CAPS capsule TAKE 1 CAPSULE WEEKLY   2    AURYXIA 1  MG(Fe) TABS TAKE 1 TABLET BY MOUTH THREE TIMES A DAY WITH MEALS.    SWALLOW WHOLE, DO NOT CHEW OR CRUSH MEDICATION   3    nitrofurantoin, macrocrystal-monohydrate, (MACROBID) 100 MG capsule          oxybutynin (DITROPAN) 5 MG tablet Take 5 mg by mouth        omeprazole (PRILOSEC) 40 MG delayed release capsule Take 40 mg by mouth        levETIRAcetam (KEPPRA) 500 MG tablet Take 1 tablet by mouth 2 times daily 60 tablet 5    ondansetron (ZOFRAN) 4 MG tablet TAKE 1 TABLET BY MOUTH DAILY AS NEEDED FOR NAUSEA OR VOMITING 30 tablet 1    cloNIDine (CATAPRES) 0.2 MG tablet TAKE AS DIRECTED AS NEEDED 30 tablet 5    folic acid (FOLVITE) 1 MG tablet Take 1 tablet by mouth daily 30 tablet 2    amLODIPine (NORVASC) 10 MG tablet TAKE 1 TABLET DAILY 90 tablet 1    SUMAtriptan (IMITREX) 100 MG tablet TAKE 1 TABLET AT ONSET OF MIGRAINE HEADACHE. MAY REPEAT IN 2 HOURS IF NEEDED. 9 tablet 6    tamsulosin (FLOMAX) 0.4 MG capsule Take 1 capsule by mouth daily 90 capsule 3    hydrALAZINE (APRESOLINE) 50 MG tablet TAKE 1 TABLET BY MOUTH THREE TIMES A DAY 90 tablet 2    HYDROcodone-acetaminophen (NORCO)  MG per tablet Take one tablet every 3-4 hours PRN pain (month supply) (may fill 8/24/18).  180 tablet 0    ALPRAZolam (XANAX) 1 MG tablet            No current facility-administered medications for this visit.          Allergies: Oxycodone-acetaminophen; Morphine and related; and Morphine  Past Medical History        Past Medical History:   Diagnosis Date    Asthma       during winter months    Cancer [facility])       rectal    Hx of migraine headaches      Hypercholesterolemia 12/16/2019    Hypertension      Palliative care patient 07/11/2019    Pericarditis      Rectal cancer [facility])      Testalgia 2/1/2016         Past Surgical History         Past Surgical History:   Procedure Laterality Date    ANTERIOR CRUCIATE LIGAMENT REPAIR   2007     ACL and MCL repair    COLON SURGERY         colon resection with colostomy    HC COLONOSCOPY BIOPSY/STOMA N/A 7/12/2019     Dr Purdy-w/APC ablation-Inflammatory polyps inside or below the colostomy opening-Tubular AP (-) dysplasia    HERNIA REPAIR         As an infant  KIDNEY REMOVAL Left      UPPER GASTROINTESTINAL ENDOSCOPY N/A 7/12/2019     Dr Purdy-Gastric ulcers   [facility] Cal[facility]oun  Family History         Family History   Problem Relation Age of Onset    Heart Attack Mother      Cancer Mother           Liver and pancreatic    Diabetes Father           Social History           Tobacco Use    Smoking status: Never Smoker    Smokeless tobacco: Never Used   Substance Use Topics    Alcohol use: No         Old records have been obtained from the referring provider. These records have been reviewed and summarized.        Review of Systems     Constitutional - no significant activity change, appetite change, or unexpected weight change. No fever or chills. No diaphoresis or significant fatigue. HENT - no significant rhinorrhea or epistaxis. No tinnitus or significant hearing loss. Eyes - no sudden vision change or amaurosis. Respiratory - no significant shortness of breath, wheezing, or stridor. No apnea, cough, or chest tightness associated with shortness of breath. Cardiovascular - no chest pain, syncope, or significant dizziness. No palpitations or significant leg swelling. No claudication. Gastrointestinal - no abdominal swelling or pain. No blood in stool. No severe constipation, diarrhea, nausea, or vomiting. Has colostomy. Genitourinary - No dysuria, frequency, or urgency. No flank pain or hematuria. Has a history of right ureteral stents. Musculoskeletal - no back pain, gait disturbance, or myalgia. Skin - no color change, rash, pallor, or new wound. Neurologic - no dizziness, facial asymmetry, or light headedness. No seizures. No speech difficulty or lateralizing weakness. Hematologic - no easy bruising or excessive bleeding. Psychiatric - no severe anxiety or nervousness. No confusion.   All other review of systems are negative.     Physical Exam     BP (!) 138/98 (Site: Left Upper Arm, Position: Sitting, Cuff Size: Info     Author Note Status Last Update User   Christina Long PA-C Signed Christina Long PA-C   Last Update Date/Time: 1/7/2020  8:21 AM   Chart Review Routing History     No routing history on file.

## 2020-01-21 ENCOUNTER — HOSPITAL ENCOUNTER (OUTPATIENT)
Age: 45
Setting detail: OUTPATIENT SURGERY
Discharge: HOME | End: 2020-01-21
Attending: SURGERY | Admitting: SURGERY
Payer: MEDICARE

## 2020-01-21 ENCOUNTER — ANESTHESIA (OUTPATIENT)
Dept: OPERATING ROOM | Age: 45
End: 2020-01-21
Payer: MEDICARE

## 2020-01-21 VITALS
TEMPERATURE: 97.5 F | SYSTOLIC BLOOD PRESSURE: 117 MMHG | RESPIRATION RATE: 18 BRPM | WEIGHT: 215 LBS | HEART RATE: 76 BPM | OXYGEN SATURATION: 98 % | BODY MASS INDEX: 26.73 KG/M2 | HEIGHT: 75 IN | DIASTOLIC BLOOD PRESSURE: 75 MMHG

## 2020-01-21 VITALS
RESPIRATION RATE: 2 BRPM | DIASTOLIC BLOOD PRESSURE: 44 MMHG | OXYGEN SATURATION: 100 % | SYSTOLIC BLOOD PRESSURE: 84 MMHG | TEMPERATURE: 97.8 F

## 2020-01-21 PROBLEM — N18.30 CKD (CHRONIC KIDNEY DISEASE) STAGE 3, GFR 30-59 ML/MIN (HCC): Status: RESOLVED | Noted: 2018-01-10 | Resolved: 2020-01-21

## 2020-01-21 LAB
ANION GAP SERPL CALCULATED.3IONS-SCNC: 18 MMOL/L (ref 7–19)
APTT: 28.6 SEC (ref 26–36.2)
BASOPHILS ABSOLUTE: 0.1 K/UL (ref 0–0.2)
BASOPHILS RELATIVE PERCENT: 0.9 % (ref 0–1)
BUN BLDV-MCNC: 24 MG/DL (ref 6–20)
CALCIUM SERPL-MCNC: 9.4 MG/DL (ref 8.6–10)
CHLORIDE BLD-SCNC: 93 MMOL/L (ref 98–111)
CO2: 31 MMOL/L (ref 22–29)
CREAT SERPL-MCNC: 6.5 MG/DL (ref 0.5–1.2)
EOSINOPHILS ABSOLUTE: 0.3 K/UL (ref 0–0.6)
EOSINOPHILS RELATIVE PERCENT: 4.3 % (ref 0–5)
GFR NON-AFRICAN AMERICAN: 9
GLUCOSE BLD-MCNC: 75 MG/DL (ref 74–109)
HCT VFR BLD CALC: 41.1 % (ref 42–52)
HEMOGLOBIN: 13.4 G/DL (ref 14–18)
IMMATURE GRANULOCYTES #: 0 K/UL
INR BLD: 0.95 (ref 0.88–1.18)
LYMPHOCYTES ABSOLUTE: 1.5 K/UL (ref 1.1–4.5)
LYMPHOCYTES RELATIVE PERCENT: 22 % (ref 20–40)
MCH RBC QN AUTO: 32.5 PG (ref 27–31)
MCHC RBC AUTO-ENTMCNC: 32.6 G/DL (ref 33–37)
MCV RBC AUTO: 99.8 FL (ref 80–94)
MONOCYTES ABSOLUTE: 0.4 K/UL (ref 0–0.9)
MONOCYTES RELATIVE PERCENT: 5.9 % (ref 0–10)
NEUTROPHILS ABSOLUTE: 4.6 K/UL (ref 1.5–7.5)
NEUTROPHILS RELATIVE PERCENT: 66.6 % (ref 50–65)
PDW BLD-RTO: 13.6 % (ref 11.5–14.5)
PLATELET # BLD: 171 K/UL (ref 130–400)
PMV BLD AUTO: 9.5 FL (ref 9.4–12.4)
POTASSIUM REFLEX MAGNESIUM: 3.9 MMOL/L (ref 3.5–4.9)
PROTHROMBIN TIME: 12.1 SEC (ref 12–14.6)
RBC # BLD: 4.12 M/UL (ref 4.7–6.1)
SODIUM BLD-SCNC: 142 MMOL/L (ref 136–145)
WBC # BLD: 6.9 K/UL (ref 4.8–10.8)

## 2020-01-21 PROCEDURE — 36415 COLL VENOUS BLD VENIPUNCTURE: CPT

## 2020-01-21 PROCEDURE — 2500000003 HC RX 250 WO HCPCS: Performed by: ANESTHESIOLOGY

## 2020-01-21 PROCEDURE — 2580000003 HC RX 258: Performed by: SURGERY

## 2020-01-21 PROCEDURE — 7100000001 HC PACU RECOVERY - ADDTL 15 MIN: Performed by: SURGERY

## 2020-01-21 PROCEDURE — 36821 AV FUSION DIRECT ANY SITE: CPT | Performed by: SURGERY

## 2020-01-21 PROCEDURE — 6360000002 HC RX W HCPCS: Performed by: NURSE ANESTHETIST, CERTIFIED REGISTERED

## 2020-01-21 PROCEDURE — 6370000000 HC RX 637 (ALT 250 FOR IP): Performed by: PHYSICIAN ASSISTANT

## 2020-01-21 PROCEDURE — 6370000000 HC RX 637 (ALT 250 FOR IP): Performed by: SURGERY

## 2020-01-21 PROCEDURE — 6360000002 HC RX W HCPCS: Performed by: ANESTHESIOLOGY

## 2020-01-21 PROCEDURE — 85730 THROMBOPLASTIN TIME PARTIAL: CPT

## 2020-01-21 PROCEDURE — 3700000000 HC ANESTHESIA ATTENDED CARE: Performed by: SURGERY

## 2020-01-21 PROCEDURE — 85025 COMPLETE CBC W/AUTO DIFF WBC: CPT

## 2020-01-21 PROCEDURE — 2580000003 HC RX 258: Performed by: NURSE ANESTHETIST, CERTIFIED REGISTERED

## 2020-01-21 PROCEDURE — 3600000004 HC SURGERY LEVEL 4 BASE: Performed by: SURGERY

## 2020-01-21 PROCEDURE — 6360000002 HC RX W HCPCS: Performed by: PHYSICIAN ASSISTANT

## 2020-01-21 PROCEDURE — 7100000010 HC PHASE II RECOVERY - FIRST 15 MIN: Performed by: SURGERY

## 2020-01-21 PROCEDURE — 3600000014 HC SURGERY LEVEL 4 ADDTL 15MIN: Performed by: SURGERY

## 2020-01-21 PROCEDURE — 3700000001 HC ADD 15 MINUTES (ANESTHESIA): Performed by: SURGERY

## 2020-01-21 PROCEDURE — 2580000003 HC RX 258: Performed by: ANESTHESIOLOGY

## 2020-01-21 PROCEDURE — 80048 BASIC METABOLIC PNL TOTAL CA: CPT

## 2020-01-21 PROCEDURE — 64415 NJX AA&/STRD BRCH PLXS IMG: CPT | Performed by: NURSE ANESTHETIST, CERTIFIED REGISTERED

## 2020-01-21 PROCEDURE — 6360000002 HC RX W HCPCS: Performed by: SURGERY

## 2020-01-21 PROCEDURE — 7100000000 HC PACU RECOVERY - FIRST 15 MIN: Performed by: SURGERY

## 2020-01-21 PROCEDURE — 2709999900 HC NON-CHARGEABLE SUPPLY: Performed by: SURGERY

## 2020-01-21 PROCEDURE — 85610 PROTHROMBIN TIME: CPT

## 2020-01-21 PROCEDURE — 2500000003 HC RX 250 WO HCPCS: Performed by: NURSE ANESTHETIST, CERTIFIED REGISTERED

## 2020-01-21 RX ORDER — HYDROCODONE BITARTRATE AND ACETAMINOPHEN 5; 325 MG/1; MG/1
2 TABLET ORAL EVERY 4 HOURS PRN
Status: DISCONTINUED | OUTPATIENT
Start: 2020-01-21 | End: 2020-01-21 | Stop reason: HOSPADM

## 2020-01-21 RX ORDER — ENALAPRILAT 2.5 MG/2ML
1.25 INJECTION INTRAVENOUS
Status: DISCONTINUED | OUTPATIENT
Start: 2020-01-21 | End: 2020-01-21 | Stop reason: HOSPADM

## 2020-01-21 RX ORDER — SODIUM CHLORIDE 0.9 % (FLUSH) 0.9 %
10 SYRINGE (ML) INJECTION PRN
Status: DISCONTINUED | OUTPATIENT
Start: 2020-01-21 | End: 2020-01-21 | Stop reason: HOSPADM

## 2020-01-21 RX ORDER — ONDANSETRON 2 MG/ML
INJECTION INTRAMUSCULAR; INTRAVENOUS PRN
Status: DISCONTINUED | OUTPATIENT
Start: 2020-01-21 | End: 2020-01-21 | Stop reason: SDUPTHER

## 2020-01-21 RX ORDER — SODIUM CHLORIDE 0.9 % (FLUSH) 0.9 %
10 SYRINGE (ML) INJECTION EVERY 12 HOURS SCHEDULED
Status: DISCONTINUED | OUTPATIENT
Start: 2020-01-21 | End: 2020-01-21 | Stop reason: HOSPADM

## 2020-01-21 RX ORDER — SODIUM CHLORIDE 450 MG/100ML
INJECTION, SOLUTION INTRAVENOUS CONTINUOUS PRN
Status: DISCONTINUED | OUTPATIENT
Start: 2020-01-21 | End: 2020-01-21 | Stop reason: SDUPTHER

## 2020-01-21 RX ORDER — MIDAZOLAM HYDROCHLORIDE 1 MG/ML
INJECTION INTRAMUSCULAR; INTRAVENOUS PRN
Status: DISCONTINUED | OUTPATIENT
Start: 2020-01-21 | End: 2020-01-21 | Stop reason: SDUPTHER

## 2020-01-21 RX ORDER — ASPIRIN 81 MG/1
81 TABLET ORAL ONCE
Status: COMPLETED | OUTPATIENT
Start: 2020-01-21 | End: 2020-01-21

## 2020-01-21 RX ORDER — LIDOCAINE HYDROCHLORIDE 10 MG/ML
INJECTION, SOLUTION EPIDURAL; INFILTRATION; INTRACAUDAL; PERINEURAL PRN
Status: DISCONTINUED | OUTPATIENT
Start: 2020-01-21 | End: 2020-01-21 | Stop reason: SDUPTHER

## 2020-01-21 RX ORDER — HEPARIN SODIUM 1000 [USP'U]/ML
INJECTION, SOLUTION INTRAVENOUS; SUBCUTANEOUS PRN
Status: DISCONTINUED | OUTPATIENT
Start: 2020-01-21 | End: 2020-01-21 | Stop reason: SDUPTHER

## 2020-01-21 RX ORDER — ROPIVACAINE HYDROCHLORIDE 5 MG/ML
INJECTION, SOLUTION EPIDURAL; INFILTRATION; PERINEURAL
Status: COMPLETED | OUTPATIENT
Start: 2020-01-21 | End: 2020-01-21

## 2020-01-21 RX ORDER — MIDAZOLAM HYDROCHLORIDE 1 MG/ML
2 INJECTION INTRAMUSCULAR; INTRAVENOUS ONCE
Status: COMPLETED | OUTPATIENT
Start: 2020-01-21 | End: 2020-01-21

## 2020-01-21 RX ORDER — HYDROCODONE BITARTRATE AND ACETAMINOPHEN 5; 325 MG/1; MG/1
1 TABLET ORAL EVERY 4 HOURS PRN
Status: DISCONTINUED | OUTPATIENT
Start: 2020-01-21 | End: 2020-01-21 | Stop reason: HOSPADM

## 2020-01-21 RX ORDER — MEPERIDINE HYDROCHLORIDE 50 MG/ML
12.5 INJECTION INTRAMUSCULAR; INTRAVENOUS; SUBCUTANEOUS EVERY 5 MIN PRN
Status: DISCONTINUED | OUTPATIENT
Start: 2020-01-21 | End: 2020-01-21 | Stop reason: HOSPADM

## 2020-01-21 RX ORDER — LIDOCAINE HYDROCHLORIDE 10 MG/ML
INJECTION, SOLUTION INFILTRATION; PERINEURAL
Status: COMPLETED | OUTPATIENT
Start: 2020-01-21 | End: 2020-01-21

## 2020-01-21 RX ORDER — PROMETHAZINE HYDROCHLORIDE 25 MG/ML
6.25 INJECTION, SOLUTION INTRAMUSCULAR; INTRAVENOUS
Status: DISCONTINUED | OUTPATIENT
Start: 2020-01-21 | End: 2020-01-21 | Stop reason: HOSPADM

## 2020-01-21 RX ORDER — DIPHENHYDRAMINE HYDROCHLORIDE 50 MG/ML
12.5 INJECTION INTRAMUSCULAR; INTRAVENOUS
Status: DISCONTINUED | OUTPATIENT
Start: 2020-01-21 | End: 2020-01-21 | Stop reason: HOSPADM

## 2020-01-21 RX ORDER — PROPOFOL 10 MG/ML
INJECTION, EMULSION INTRAVENOUS PRN
Status: DISCONTINUED | OUTPATIENT
Start: 2020-01-21 | End: 2020-01-21 | Stop reason: SDUPTHER

## 2020-01-21 RX ORDER — LABETALOL 20 MG/4 ML (5 MG/ML) INTRAVENOUS SYRINGE
5 EVERY 10 MIN PRN
Status: DISCONTINUED | OUTPATIENT
Start: 2020-01-21 | End: 2020-01-21 | Stop reason: HOSPADM

## 2020-01-21 RX ORDER — FENTANYL CITRATE 50 UG/ML
INJECTION, SOLUTION INTRAMUSCULAR; INTRAVENOUS PRN
Status: DISCONTINUED | OUTPATIENT
Start: 2020-01-21 | End: 2020-01-21 | Stop reason: SDUPTHER

## 2020-01-21 RX ORDER — LIDOCAINE HYDROCHLORIDE 20 MG/ML
INJECTION, SOLUTION INFILTRATION; PERINEURAL
Status: COMPLETED | OUTPATIENT
Start: 2020-01-21 | End: 2020-01-21

## 2020-01-21 RX ORDER — ONDANSETRON 2 MG/ML
4 INJECTION INTRAMUSCULAR; INTRAVENOUS EVERY 8 HOURS PRN
Status: DISCONTINUED | OUTPATIENT
Start: 2020-01-21 | End: 2020-01-21 | Stop reason: HOSPADM

## 2020-01-21 RX ORDER — METOCLOPRAMIDE HYDROCHLORIDE 5 MG/ML
10 INJECTION INTRAMUSCULAR; INTRAVENOUS
Status: DISCONTINUED | OUTPATIENT
Start: 2020-01-21 | End: 2020-01-21 | Stop reason: HOSPADM

## 2020-01-21 RX ORDER — ONDANSETRON 2 MG/ML
4 INJECTION INTRAMUSCULAR; INTRAVENOUS ONCE
Status: COMPLETED | OUTPATIENT
Start: 2020-01-21 | End: 2020-01-21

## 2020-01-21 RX ORDER — ACETAMINOPHEN 325 MG/1
650 TABLET ORAL EVERY 4 HOURS PRN
Status: DISCONTINUED | OUTPATIENT
Start: 2020-01-21 | End: 2020-01-21 | Stop reason: HOSPADM

## 2020-01-21 RX ORDER — HYDRALAZINE HYDROCHLORIDE 20 MG/ML
5 INJECTION INTRAMUSCULAR; INTRAVENOUS EVERY 10 MIN PRN
Status: DISCONTINUED | OUTPATIENT
Start: 2020-01-21 | End: 2020-01-21 | Stop reason: HOSPADM

## 2020-01-21 RX ORDER — SODIUM CHLORIDE 450 MG/100ML
INJECTION, SOLUTION INTRAVENOUS CONTINUOUS
Status: DISCONTINUED | OUTPATIENT
Start: 2020-01-21 | End: 2020-01-21 | Stop reason: HOSPADM

## 2020-01-21 RX ADMIN — ONDANSETRON HYDROCHLORIDE 4 MG: 2 INJECTION, SOLUTION INTRAMUSCULAR; INTRAVENOUS at 14:49

## 2020-01-21 RX ADMIN — HYDROMORPHONE HYDROCHLORIDE 0.25 MG: 1 INJECTION, SOLUTION INTRAMUSCULAR; INTRAVENOUS; SUBCUTANEOUS at 15:32

## 2020-01-21 RX ADMIN — SODIUM CHLORIDE: 4.5 INJECTION, SOLUTION INTRAVENOUS at 13:44

## 2020-01-21 RX ADMIN — PHENYLEPHRINE HYDROCHLORIDE 80 MCG: 10 INJECTION INTRAVENOUS at 14:29

## 2020-01-21 RX ADMIN — LIDOCAINE HYDROCHLORIDE 50 MG: 10 INJECTION, SOLUTION EPIDURAL; INFILTRATION; INTRACAUDAL; PERINEURAL at 13:52

## 2020-01-21 RX ADMIN — FENTANYL CITRATE 25 MCG: 50 INJECTION INTRAMUSCULAR; INTRAVENOUS at 13:52

## 2020-01-21 RX ADMIN — ROPIVACAINE HYDROCHLORIDE 10 ML: 5 INJECTION, SOLUTION EPIDURAL; INFILTRATION; PERINEURAL at 12:52

## 2020-01-21 RX ADMIN — ASPIRIN 81 MG: 81 TABLET, COATED ORAL at 08:56

## 2020-01-21 RX ADMIN — ONDANSETRON 4 MG: 2 INJECTION INTRAMUSCULAR; INTRAVENOUS at 11:29

## 2020-01-21 RX ADMIN — MIDAZOLAM 2 MG: 1 INJECTION INTRAMUSCULAR; INTRAVENOUS at 13:42

## 2020-01-21 RX ADMIN — MIDAZOLAM 2 MG: 1 INJECTION INTRAMUSCULAR; INTRAVENOUS at 12:47

## 2020-01-21 RX ADMIN — PHENYLEPHRINE HYDROCHLORIDE 80 MCG: 10 INJECTION INTRAVENOUS at 14:37

## 2020-01-21 RX ADMIN — HEPARIN SODIUM 3000 UNITS: 1000 INJECTION, SOLUTION INTRAVENOUS; SUBCUTANEOUS at 14:24

## 2020-01-21 RX ADMIN — PHENYLEPHRINE HYDROCHLORIDE 80 MCG: 10 INJECTION INTRAVENOUS at 14:12

## 2020-01-21 RX ADMIN — PROPOFOL 200 MG: 10 INJECTION, EMULSION INTRAVENOUS at 13:52

## 2020-01-21 RX ADMIN — SODIUM CHLORIDE: 4.5 INJECTION, SOLUTION INTRAVENOUS at 09:02

## 2020-01-21 RX ADMIN — LIDOCAINE HYDROCHLORIDE 10 ML: 20 INJECTION, SOLUTION INFILTRATION; PERINEURAL at 12:52

## 2020-01-21 RX ADMIN — LIDOCAINE HYDROCHLORIDE 1 ML: 10 INJECTION, SOLUTION INFILTRATION; PERINEURAL at 12:52

## 2020-01-21 RX ADMIN — Medication 1000 MG: at 12:07

## 2020-01-21 RX ADMIN — HYDROCODONE BITARTRATE AND ACETAMINOPHEN 2 TABLET: 5; 325 TABLET ORAL at 16:03

## 2020-01-21 RX ADMIN — FENTANYL CITRATE 25 MCG: 50 INJECTION INTRAMUSCULAR; INTRAVENOUS at 14:44

## 2020-01-21 ASSESSMENT — PAIN DESCRIPTION - LOCATION
LOCATION: ARM
LOCATION: INCISION;ARM

## 2020-01-21 ASSESSMENT — PAIN DESCRIPTION - PAIN TYPE
TYPE: SURGICAL PAIN
TYPE: SURGICAL PAIN

## 2020-01-21 ASSESSMENT — PAIN SCALES - GENERAL
PAINLEVEL_OUTOF10: 5
PAINLEVEL_OUTOF10: 6
PAINLEVEL_OUTOF10: 7
PAINLEVEL_OUTOF10: 7

## 2020-01-21 ASSESSMENT — PAIN DESCRIPTION - DESCRIPTORS: DESCRIPTORS: ACHING

## 2020-01-21 ASSESSMENT — PAIN DESCRIPTION - ORIENTATION
ORIENTATION: LEFT
ORIENTATION: LEFT

## 2020-01-21 NOTE — ANESTHESIA PROCEDURE NOTES
Peripheral Block    Patient location during procedure: holding area  Staffing  Anesthesiologist: Divya Dover MD  Performed: anesthesiologist   Preanesthetic Checklist  Completed: patient identified, site marked, surgical consent, pre-op evaluation, timeout performed, IV checked, risks and benefits discussed, monitors and equipment checked, anesthesia consent given, oxygen available and patient being monitored  Peripheral Block  Patient position: sitting  Prep: ChloraPrep  Patient monitoring: cardiac monitor, continuous pulse ox, frequent blood pressure checks and IV access  Block type: Brachial plexus  Laterality: left  Injection technique: single-shot  Procedures: ultrasound guided  Local infiltration: lidocaine  Infiltration strength: 1 %  Dose: 1 mL  Supraclavicular  Provider prep: mask and sterile gloves  Local infiltration: lidocaine  Needle  Needle type: combined needle/nerve stimulator   Needle gauge: 20 G  Needle length: 5 cm  Needle localization: ultrasound guidance  Assessment  Injection assessment: negative aspiration for heme, no paresthesia on injection and local visualized surrounding nerve on ultrasound  Paresthesia pain: none  Slow fractionated injection: yes  Hemodynamics: stable  Additional Notes  20 cc 0.5% Ropivacaine injected    Under ultrasound (\"US\") guidance, a 22 gauge needle was inserted and placed in close proximity to the brachial plexus nerves. Ultrasound was also used to visualize the spread of the anesthetic in close proximity to the nerve being blocked. The nerve appeared anatomically normal, and there were no abnormal pathological findings. A permanent image was recorded.    Reason for block: post-op pain management

## 2020-01-21 NOTE — OP NOTE
Preoperative Diagnosis:  Chronic Kidney Disease Stage V    Postoperative Diagnosis:  Same    Operative Procedure:   1. Left Brachial Artery to Cephalic Vein Arteriovenous Fistula Creation    Surgeon:  Dayna Castro M.D. Anesthesia:  Left upper extremity block plus general    Estimated Blood Loss:  Less than 50 ml    Findings:  1. The artery has no plaque and is around 5 mm in diameter  2. The vein is around 4 mm in diameter    Procedure in detail:    After the patient was consented, a block performed by anesthesia, and IV antibiotics administered, he was brought to the operating room and placed on the operating room table supine position. His arm was then prepped and draped in the usual sterile procedure. A transverse incision was made in the proximal forearm over the brachial artery pulse with knife and subcutaneous tissues were dissected with bovie electrocautery. Sharp dissection was used to expose the cephalic and antecubital veins and the cephalic vein was marked for orientation purposes. The artery was then dissected proximally and distally and vesseloops placed for future vascular control. The patient was given intravenous heparin. After adequate heparinization time, the branches off of the cephalic vein are ligated and a bulldog clamp is placed proximally for vascular control. Two of the branches are then transected and connected with Olson scissors to create a nice garcía for anastomosis. The proximal and distal vesseloops were tightened and a longitudinal ateriotomy made with 11 blade and Olson scissors. An end vein to side artery anastomosis is then created with 6-0 prolene running suture. Prior to completing the anastomosis, standard flushing techniques were utilized to remove any air and debris from the native vessels. Hemostasis was excellent and there is a nicely palpable thrill in the fistula. Doppler signals are present in the distal radial and ulnar arteries.     The wound is closed in layers with 3-0 PDS subcutaneous sutures, 4-0 monocryl subcuticular running suture and dermabond skin adhesive. He tolerated the procedure well and was brought to the recovery room in good condition.

## 2020-01-21 NOTE — ANESTHESIA PRE PROCEDURE
Department of Anesthesiology  Preprocedure Note       Name:  Dominguez Holt   Age:  45 y.o.  :  1975                                          MRN:  [id number]         Date:  2020      Surgeon: Sonu Albert):  Cherie Arboleda MD    Procedure: CREATION AV ACCESS LEFT OR RIGHT RADIAL VERSUS BRACHIAL CEPHALIC AV FISTULA (Bilateral )    Medications prior to admission:   Prior to Admission medications    Medication Sig Start Date End Date Taking? Authorizing Provider   cinacalcet (SENSIPAR) 30 MG tablet Take 30 mg by mouth daily   Yes Historical Provider, MD   naratriptan (AMERGE) 2.5 MG tablet Take 2.5 mg by mouth once as needed for Migraine 2.5 mg at onset of headache, may repeat in 4 hours if needed   Yes Historical Provider, MD   ALPRAZolam Austin Macho) 1 MG tablet  19  Yes Historical Provider, MD   docusate sodium (COLACE) 250 MG capsule TAKE ONE CAPSULE BY MOUTH TWICE A DAY FOR 14 DAYS AS NEEDED FOR CONSTIPATION 17  Yes Historical Provider, MD   albuterol sulfate  (90 Base) MCG/ACT inhaler Inhale 2 puffs into the lungs   Yes Historical Provider, MD   AURYXIA 1  MG(Fe) TABS TAKE 1 TABLET BY MOUTH THREE TIMES A DAY WITH MEALS.    SWALLOW WHOLE, DO NOT CHEW OR CRUSH MEDICATION 10/1/19  Yes Historical Provider, MD   omeprazole (PRILOSEC) 40 MG delayed release capsule Take 40 mg by mouth 16  Yes Historical Provider, MD   levETIRAcetam (KEPPRA) 500 MG tablet Take 1 tablet by mouth 2 times daily 19  Yes Tim Dick MD   ondansetron (ZOFRAN) 4 MG tablet TAKE 1 TABLET BY MOUTH DAILY AS NEEDED FOR NAUSEA OR VOMITING 19  Yes Tim Dick MD   cloNIDine (CATAPRES) 0.2 MG tablet TAKE AS DIRECTED AS NEEDED 19  Yes Celia Diop MD   folic acid (FOLVITE) 1 MG tablet Take 1 tablet by mouth daily 19  Yes Wally Villa MD   amLODIPine (NORVASC) 10 MG tablet TAKE 1 TABLET DAILY 19  Yes CALLY Saravia   SUMAtriptan (IMITREX) 100 MG tablet TAKE 1 TABLET AT ONSET OF MIGRAINE HEADACHE. MAY REPEAT IN 2 HOURS IF NEEDED. 7/3/19  Yes Rory Bateman MD   tamsulosin [facility]) 0.4 MG capsule Take 1 capsule by mouth daily 6/18/19  Yes Rory Bateman MD   hydrALAZINE (APRESOLINE) 50 MG tablet TAKE 1 TABLET BY MOUTH THREE TIMES A DAY 4/8/19  Yes Rory Bateman MD   HYDROcodone-acetaminophen [facility])  MG per tablet Take one tablet every 3-4 hours PRN pain (month supply) (may fill 8/24/18). 8/24/18 5/17/20 Yes CALLY Rodriguez - CNP   vitamin D (ERGOCALCIFEROL) 1.25 MG (63236 UT) CAPS capsule TAKE 1 CAPSULE WEEKLY 10/24/19   Historical Provider, MD   oxybutynin (DITROPAN) 5 MG tablet Take 5 mg by mouth 1/10/19   Historical Provider, MD       Current medications:    Current Facility-Administered Medications   Medication Dose Route Frequency Provider Last Rate Last Dose    sodium chloride flush 0.9 % injection 10 mL  10 mL Intravenous 2 times per day [place] EVA Conklin        sodium chloride flush 0.9 % injection 10 mL  10 mL Intravenous PRN Lali Long PA-C        vancomycin (VANCOCIN) 1 g in dextrose 5% 250 mL IVPB  1,000 mg Intravenous On Call to OR Cardinal Katerin PA-C        0.45 % sodium chloride infusion   Intravenous Continuous Esme Toribio  mL/hr at 01/21/20 0902         Allergies: Allergies   Allergen Reactions    Oxycodone-Acetaminophen      Give me migraines   Other reaction(s): Other (see comments)  Give me migraines   Give me migraines   Reaction: migraines  Give me migraines   Give me migraines   Reaction: migraines    Morphine And Related      Doesn't work     Morphine      Other reaction(s):  Other (see comments)  Doesn't work   Doesn't work        Problem List:    Patient Active Problem List   Diagnosis Code    Benign non-nodular prostatic hyperplasia with lower urinary tract symptoms N40.1    Gastroesophageal reflux disease without esophagitis K21.9    Chronic insomnia F51.04    Anxiety disorder F41.9 HERNIA REPAIR      As an infant    KIDNEY REMOVAL Left     cancer    UPPER GASTROINTESTINAL ENDOSCOPY N/A 7/12/2019    Dr Purdy-Gastric Hilary Billet History:    Social History     Tobacco Use    Smoking status: Never Smoker    Smokeless tobacco: Never Used   Substance Use Topics    Alcohol use: No                                Counseling given: Not Answered      Vital Signs (Current):   Vitals:    01/21/20 0821   BP: 105/66   Pulse: 94   Resp: 16   Temp: 97.4 °F (36.3 °C)   TempSrc: Temporal   SpO2: 100%   Weight: 215 lb (97.5 kg)   Height: 6' 3\" (1.905 m)                                              BP Readings from Last 3 Encounters:   01/21/20 105/66   12/17/19 (!) 138/98   08/20/19 122/82       NPO Status: Time of last liquid consumption: 2230                        Time of last solid consumption: 2230                        Date of last liquid consumption: 01/20/20                        Date of last solid food consumption: 01/20/20    BMI:   Wt Readings from Last 3 Encounters:   01/21/20 215 lb (97.5 kg)   01/13/20 215 lb (97.5 kg)   08/20/19 220 lb (99.8 kg)     Body mass index is 26.87 kg/m². CBC:   Lab Results   Component Value Date    WBC 6.9 01/21/2020    RBC 4.12 01/21/2020    HGB 13.4 01/21/2020    HCT 41.1 01/21/2020    MCV 99.8 01/21/2020    RDW 13.6 01/21/2020     01/21/2020       CMP:   Lab Results   Component Value Date     01/21/2020    K 3.9 01/21/2020    CL 93 01/21/2020    CO2 31 01/21/2020    BUN 24 01/21/2020    CREATININE 6.5 01/21/2020    LABGLOM 9 01/21/2020    GLUCOSE 75 01/21/2020    PROT 6.6 07/17/2019    CALCIUM 9.4 01/21/2020    BILITOT <0.2 07/17/2019    ALKPHOS 64 07/17/2019    AST 9 07/17/2019    ALT 8 07/17/2019       POC Tests: No results for input(s): POCGLU, POCNA, POCK, POCCL, POCBUN, POCHEMO, POCHCT in the last 72 hours.     Coags:   Lab Results   Component Value Date    PROTIME 12.1 01/21/2020    INR 0.95

## 2020-01-28 ENCOUNTER — TELEPHONE (OUTPATIENT)
Dept: VASCULAR SURGERY | Age: 45
End: 2020-01-28

## 2020-01-28 ENCOUNTER — OFFICE VISIT (OUTPATIENT)
Dept: FAMILY MEDICINE CLINIC | Age: 45
End: 2020-01-28
Payer: MEDICARE

## 2020-01-28 VITALS
SYSTOLIC BLOOD PRESSURE: 108 MMHG | DIASTOLIC BLOOD PRESSURE: 82 MMHG | OXYGEN SATURATION: 97 % | BODY MASS INDEX: 27.75 KG/M2 | HEART RATE: 76 BPM | TEMPERATURE: 97.7 F | WEIGHT: 222 LBS

## 2020-01-28 PROCEDURE — 1036F TOBACCO NON-USER: CPT | Performed by: FAMILY MEDICINE

## 2020-01-28 PROCEDURE — 99213 OFFICE O/P EST LOW 20 MIN: CPT | Performed by: FAMILY MEDICINE

## 2020-01-28 PROCEDURE — G8417 CALC BMI ABV UP PARAM F/U: HCPCS | Performed by: FAMILY MEDICINE

## 2020-01-28 PROCEDURE — G8427 DOCREV CUR MEDS BY ELIG CLIN: HCPCS | Performed by: FAMILY MEDICINE

## 2020-01-28 PROCEDURE — G8484 FLU IMMUNIZE NO ADMIN: HCPCS | Performed by: FAMILY MEDICINE

## 2020-01-28 RX ORDER — ALPRAZOLAM 1 MG/1
1 TABLET ORAL 2 TIMES DAILY
Qty: 60 TABLET | Refills: 5 | Status: SHIPPED | OUTPATIENT
Start: 2020-01-28 | End: 2020-02-27

## 2020-01-28 NOTE — TELEPHONE ENCOUNTER
Unable to reach patient by phone in regards to scheduling F'gram/BAM with Dr. La Chew, left message for patient to call the office.

## 2020-01-28 NOTE — PROGRESS NOTES
ENDOSCOPY N/A 7/12/2019    Dr Purdy-Gastric Severiano Freud         Family History   Problem Relation Age of Onset    Heart Attack Mother     Cancer Mother         Liver and pancreatic    Diabetes Father        Social History     Tobacco Use    Smoking status: Never Smoker    Smokeless tobacco: Never Used   Substance Use Topics    Alcohol use: No     Current Outpatient Medications   Medication Sig Dispense Refill    ALPRAZolam (XANAX) 1 MG tablet Take 1 tablet by mouth 2 times daily for 30 days. 60 tablet 5    docusate sodium (COLACE) 100 MG capsule TAKE ONE CAPSULE BY MOUTH TWICE A DAY FOR 14 DAYS AS NEEDED FOR CONSTIPATION 60 capsule 5    cinacalcet (SENSIPAR) 30 MG tablet Take 30 mg by mouth daily      naratriptan (AMERGE) 2.5 MG tablet Take 2.5 mg by mouth once as needed for Migraine 2.5 mg at onset of headache, may repeat in 4 hours if needed      ALPRAZolam (XANAX) 1 MG tablet       docusate sodium (COLACE) 250 MG capsule TAKE ONE CAPSULE BY MOUTH TWICE A DAY FOR 14 DAYS AS NEEDED FOR CONSTIPATION      albuterol sulfate  (90 Base) MCG/ACT inhaler Inhale 2 puffs into the lungs      vitamin D (ERGOCALCIFEROL) 1.25 MG (78527 UT) CAPS capsule TAKE 1 CAPSULE WEEKLY  2    AURYXIA 1  MG(Fe) TABS TAKE 1 TABLET BY MOUTH THREE TIMES A DAY WITH MEALS.    SWALLOW WHOLE, DO NOT CHEW OR CRUSH MEDICATION  3    oxybutynin (DITROPAN) 5 MG tablet Take 5 mg by mouth      omeprazole (PRILOSEC) 40 MG delayed release capsule Take 40 mg by mouth      levETIRAcetam (KEPPRA) 500 MG tablet Take 1 tablet by mouth 2 times daily 60 tablet 5    ondansetron (ZOFRAN) 4 MG tablet TAKE 1 TABLET BY MOUTH DAILY AS NEEDED FOR NAUSEA OR VOMITING 30 tablet 1    cloNIDine (CATAPRES) 0.2 MG tablet TAKE AS DIRECTED AS NEEDED 30 tablet 5    folic acid (FOLVITE) 1 MG tablet Take 1 tablet by mouth daily 30 tablet 2    amLODIPine (NORVASC) 10 MG tablet TAKE 1 TABLET DAILY 90 tablet 1    SUMAtriptan (IMITREX) 100 MG tablet TAKE 1 TABLET AT ONSET OF MIGRAINE HEADACHE. MAY REPEAT IN 2 HOURS IF NEEDED. 9 tablet 6    tamsulosin (FLOMAX) 0.4 MG capsule Take 1 capsule by mouth daily 90 capsule 3    hydrALAZINE (APRESOLINE) 50 MG tablet TAKE 1 TABLET BY MOUTH THREE TIMES A DAY 90 tablet 2    HYDROcodone-acetaminophen (NORCO)  MG per tablet Take one tablet every 3-4 hours PRN pain (month supply) (may fill 8/24/18). 180 tablet 0     No current facility-administered medications for this visit. Allergies   Allergen Reactions    Oxycodone-Acetaminophen      Give me migraines   Other reaction(s): Other (see comments)  Give me migraines   Give me migraines   Reaction: migraines  Give me migraines   Give me migraines   Reaction: migraines    Morphine And Related      Doesn't work     Morphine      Other reaction(s): Other (see comments)  Doesn't work   Doesn't work        Health Maintenance   Topic Date Due    HIV screen  08/11/1992    Pneumococcal 0-64 years Vaccine (2 of 3 - PCV13) 06/08/2011    Flu vaccine (1) 09/01/2019    Annual Wellness Visit (AWV)  02/12/2020    Hepatitis B vaccine  Aged Out    HPV vaccine  Aged Out       Subjective:      Review of Systems   Constitutional: Negative for chills and fever. HENT: Negative for congestion. Respiratory: Negative for cough, chest tightness and shortness of breath. Cardiovascular: Negative for chest pain, palpitations and leg swelling. Gastrointestinal: Positive for abdominal pain. Negative for anal bleeding, constipation, diarrhea and nausea. Genitourinary: Negative for difficulty urinating. Psychiatric/Behavioral: Negative. SeeHPI for visit specific review of symptoms. All others negative      Objective:   /82   Pulse 76   Temp 97.7 °F (36.5 °C)   Wt 222 lb (100.7 kg)   SpO2 97%   BMI 27.75 kg/m²   Physical Exam  Constitutional:       Appearance: He is well-developed. He is not ill-appearing.    Eyes:      Pupils: Pupils are equal, round, and reactive to light. Neck:      Musculoskeletal: Normal range of motion and neck supple. Cardiovascular:      Rate and Rhythm: Normal rate and regular rhythm. Heart sounds: No murmur. No friction rub. Pulmonary:      Effort: Pulmonary effort is normal. No respiratory distress. Breath sounds: Normal breath sounds. No wheezing or rales. Abdominal:      General: Bowel sounds are normal. There is no distension. Palpations: Abdomen is soft. Tenderness: There is no abdominal tenderness. There is no guarding or rebound. Neurological:      Mental Status: He is alert.    Psychiatric:         Behavior: Behavior normal.           Recent Results (from the past 672 hour(s))   MRSA SCREENING CULTURE ONLY    Collection Time: 01/13/20  9:30 AM   Result Value Ref Range    MRSA Culture Only No MRSA detected on culture    EKG 12 Lead    Collection Time: 01/13/20 10:36 AM   Result Value Ref Range    P-R Interval 168 ms    QRS Duration 98 ms    Q-T Interval 354 ms    QTc Calculation (Bazett) 411 ms    P Axis 58 degrees    T Axis 46 degrees   APTT    Collection Time: 01/21/20  8:29 AM   Result Value Ref Range    aPTT 28.6 26.0 - 36.2 sec   Basic Metabolic Panel w/ Reflex to MG    Collection Time: 01/21/20  8:29 AM   Result Value Ref Range    Sodium 142 136 - 145 mmol/L    Potassium reflex Magnesium 3.9 3.5 - 4.9 mmol/L    Chloride 93 (L) 98 - 111 mmol/L    CO2 31 (H) 22 - 29 mmol/L    Anion Gap 18 7 - 19 mmol/L    Glucose 75 74 - 109 mg/dL    BUN 24 (H) 6 - 20 mg/dL    CREATININE 6.5 (H) 0.5 - 1.2 mg/dL    GFR Non-African American 9 (A) >60    Calcium 9.4 8.6 - 10.0 mg/dL   CBC auto differential    Collection Time: 01/21/20  8:29 AM   Result Value Ref Range    WBC 6.9 4.8 - 10.8 K/uL    RBC 4.12 (L) 4.70 - 6.10 M/uL    Hemoglobin 13.4 (L) 14.0 - 18.0 g/dL    Hematocrit 41.1 (L) 42.0 - 52.0 %    MCV 99.8 (H) 80.0 - 94.0 fL    MCH 32.5 (H) 27.0 - 31.0 pg    MCHC 32.6 (L) 33.0 - 37.0 using [address] [facility]

## 2020-01-31 ASSESSMENT — ENCOUNTER SYMPTOMS
CONSTIPATION: 0
ABDOMINAL PAIN: 1
ANAL BLEEDING: 0
SHORTNESS OF BREATH: 0
COUGH: 0
NAUSEA: 0
DIARRHEA: 0
CHEST TIGHTNESS: 0

## 2020-02-05 ENCOUNTER — PREP FOR PROCEDURE (OUTPATIENT)
Dept: VASCULAR SURGERY | Age: 45
End: 2020-02-05

## 2020-02-05 RX ORDER — ONDANSETRON 2 MG/ML
4 INJECTION INTRAMUSCULAR; INTRAVENOUS EVERY 6 HOURS PRN
Status: CANCELLED | OUTPATIENT
Start: 2020-02-05

## 2020-02-05 RX ORDER — SODIUM CHLORIDE 0.9 % (FLUSH) 0.9 %
10 SYRINGE (ML) INJECTION PRN
Status: CANCELLED | OUTPATIENT
Start: 2020-02-05

## 2020-02-05 RX ORDER — SODIUM CHLORIDE 9 MG/ML
INJECTION, SOLUTION INTRAVENOUS CONTINUOUS
Status: CANCELLED | OUTPATIENT
Start: 2020-02-05

## 2020-02-05 RX ORDER — ASPIRIN 81 MG/1
81 TABLET ORAL ONCE
Status: CANCELLED | OUTPATIENT
Start: 2020-02-05 | End: 2020-02-05

## 2020-02-13 ENCOUNTER — TELEPHONE (OUTPATIENT)
Dept: NEUROSURGERY | Age: 45
End: 2020-02-13

## 2020-02-13 ENCOUNTER — TELEPHONE (OUTPATIENT)
Dept: VASCULAR SURGERY | Age: 45
End: 2020-02-13

## 2020-02-13 NOTE — TELEPHONE ENCOUNTER
I sw the pt to let him know an ov f/u was not necessary at this time. Dianna Reeder RN has been trying to reach the pt with details with procedure instructions. I then transferred the pt to Dianna Reeder for further information.

## 2020-02-17 ENCOUNTER — TELEPHONE (OUTPATIENT)
Dept: VASCULAR SURGERY | Age: 45
End: 2020-02-17

## 2020-02-17 NOTE — TELEPHONE ENCOUNTER
Merlinda Majestic with [facility] called wanting to reschedule his f'gram/BAM that he missed on 2/6/20. Details/instructions and medications were addressed (see letter) for rescheduled f'gram/BAM at [facility] on 2/27/20 with Dr. Yolanda Quiroz. He will need to arrive at the [address] [place] [address] at 6:00am. He will need a  to take him home. Merlinda Majestic to inform patient of details/instructions and written instructions were faxed to Merlinda Majestic with fax confirmation received. Merlinda Majestic also reports that he has a couple of clear sutures at the anastomosis that has not dissolved that stick up and bother him a little and he wants to know if Dr. Yolanda Quiroz will look at the sutures when he is there for the F'gram. Per Merlinda Majestic, incision looks good, no redness, drainage or fever. Informed her that I would talk with a provider tomorrow and give her a call.

## 2020-02-18 ENCOUNTER — TELEPHONE (OUTPATIENT)
Dept: VASCULAR SURGERY | Age: 45
End: 2020-02-18

## 2020-02-18 NOTE — TELEPHONE ENCOUNTER
Patient returned my call, [facility] to look at the sutures in his arm. Patient states that he can't come to the office tomorrow but he can come on Thurs. Appointment was rescheduled to 2/20/20 at 3:30pm with Chad Fu.

## 2020-02-20 ENCOUNTER — OFFICE VISIT (OUTPATIENT)
Dept: VASCULAR SURGERY | Age: 45
End: 2020-02-20

## 2020-02-20 VITALS
HEART RATE: 97 BPM | RESPIRATION RATE: 18 BRPM | WEIGHT: 215 LBS | BODY MASS INDEX: 26.73 KG/M2 | TEMPERATURE: 98.2 F | HEIGHT: 75 IN | DIASTOLIC BLOOD PRESSURE: 92 MMHG | OXYGEN SATURATION: 97 % | SYSTOLIC BLOOD PRESSURE: 137 MMHG

## 2020-02-20 PROCEDURE — 99024 POSTOP FOLLOW-UP VISIT: CPT | Performed by: PHYSICIAN ASSISTANT

## 2020-02-24 RX ORDER — CLONIDINE HYDROCHLORIDE 0.2 MG/1
TABLET ORAL
Qty: 30 TABLET | Refills: 5 | Status: SHIPPED | OUTPATIENT
Start: 2020-02-24 | End: 2020-08-24

## 2020-04-01 ENCOUNTER — TELEPHONE (OUTPATIENT)
Dept: VASCULAR SURGERY | Age: 45
End: 2020-04-01

## 2020-04-22 ENCOUNTER — TELEPHONE (OUTPATIENT)
Dept: VASCULAR SURGERY | Age: 45
End: 2020-04-22

## 2020-04-22 NOTE — TELEPHONE ENCOUNTER
Scheduled Rafa Green for a Maturation check in the office with Celso Umanzor 4/30/20 at 2:00 pm. I called  Dialysis and spoke with Mari Chen and got this scheduled while he was there having his treatment.

## 2020-04-23 RX ORDER — OMEPRAZOLE 20 MG/1
CAPSULE, DELAYED RELEASE ORAL
Qty: 90 CAPSULE | Refills: 1 | Status: SHIPPED | OUTPATIENT
Start: 2020-04-23

## 2020-04-30 ENCOUNTER — OFFICE VISIT (OUTPATIENT)
Dept: VASCULAR SURGERY | Age: 45
End: 2020-04-30
Payer: MEDICARE

## 2020-04-30 VITALS
SYSTOLIC BLOOD PRESSURE: 122 MMHG | HEART RATE: 93 BPM | RESPIRATION RATE: 18 BRPM | DIASTOLIC BLOOD PRESSURE: 80 MMHG | OXYGEN SATURATION: 98 %

## 2020-04-30 PROCEDURE — 1036F TOBACCO NON-USER: CPT | Performed by: PHYSICIAN ASSISTANT

## 2020-04-30 PROCEDURE — G8427 DOCREV CUR MEDS BY ELIG CLIN: HCPCS | Performed by: PHYSICIAN ASSISTANT

## 2020-04-30 PROCEDURE — G8417 CALC BMI ABV UP PARAM F/U: HCPCS | Performed by: PHYSICIAN ASSISTANT

## 2020-04-30 PROCEDURE — 99213 OFFICE O/P EST LOW 20 MIN: CPT | Performed by: PHYSICIAN ASSISTANT

## 2020-05-01 ENCOUNTER — TELEPHONE (OUTPATIENT)
Dept: VASCULAR SURGERY | Age: 45
End: 2020-05-01

## 2020-05-01 NOTE — TELEPHONE ENCOUNTER
Yesterday 4/30/20, I spoke with patient while in the office to go over details for fistulogram/BAM with Dr. Yon Hayes for 5/7/20. Informed him that per new guidelines he will need to get a COVID19 test prior to procedure and that after the COVID19 test he would need to self quarantine until his procedure date. He states that he is unable to self quarantine. Informed him that we will not be able to proceed with the procedure without COVID19 test and self quarantine. He states that he will have to postpone the procedure. Informed him that I would speak with [facility] tomorrow and give him a call. 5/1/20 Aneesh Lombardi PA-C was informed that patient can not self quarantine after a COVID19 test. Per [facility], inform him that we can't proceed with the procedure without the test or quarantine. Let him know that they will have to continuing using his Perma-cath but if his Perma-cath becomes poorly functioning or non-functioning that would be an urgent/emergent situation. He would still need the COVID 19 test but no quarantine. Unable to reach patient by phone, left message for patient to call the office. 12:19pm Spoke with patient, informed him of this information.

## 2020-05-01 NOTE — PROGRESS NOTES
insomnia 10/02/2017    Anxiety disorder 10/02/2017    Hypertension 10/02/2017     Overview:   : [  ]      Renal failure 01/17/2017    Obstructive uropathy 01/16/2017     Current Outpatient Medications   Medication Sig Dispense Refill    omeprazole (PRILOSEC) 20 MG delayed release capsule TAKE 1 CAPSULE BY MOUTH DAILY 90 capsule 1    cloNIDine (CATAPRES) 0.2 MG tablet TAKE AS DIRECTED AS NEEDED 30 tablet 5    docusate sodium (COLACE) 100 MG capsule TAKE ONE CAPSULE BY MOUTH TWICE A DAY FOR 14 DAYS AS NEEDED FOR CONSTIPATION 60 capsule 5    cinacalcet (SENSIPAR) 30 MG tablet Take 30 mg by mouth daily      naratriptan (AMERGE) 2.5 MG tablet Take 2.5 mg by mouth once as needed for Migraine 2.5 mg at onset of headache, may repeat in 4 hours if needed      ALPRAZolam (XANAX) 1 MG tablet       docusate sodium (COLACE) 250 MG capsule TAKE ONE CAPSULE BY MOUTH TWICE A DAY FOR 14 DAYS AS NEEDED FOR CONSTIPATION      albuterol sulfate  (90 Base) MCG/ACT inhaler Inhale 2 puffs into the lungs      vitamin D (ERGOCALCIFEROL) 1.25 MG (10448 UT) CAPS capsule TAKE 1 CAPSULE WEEKLY  2    AURYXIA 1  MG(Fe) TABS TAKE 1 TABLET BY MOUTH THREE TIMES A DAY WITH MEALS. SWALLOW WHOLE, DO NOT CHEW OR CRUSH MEDICATION  3    oxybutynin (DITROPAN) 5 MG tablet Take 5 mg by mouth      omeprazole (PRILOSEC) 40 MG delayed release capsule Take 40 mg by mouth      levETIRAcetam (KEPPRA) 500 MG tablet Take 1 tablet by mouth 2 times daily 60 tablet 5    ondansetron (ZOFRAN) 4 MG tablet TAKE 1 TABLET BY MOUTH DAILY AS NEEDED FOR NAUSEA OR VOMITING 30 tablet 1    folic acid (FOLVITE) 1 MG tablet Take 1 tablet by mouth daily 30 tablet 2    amLODIPine (NORVASC) 10 MG tablet TAKE 1 TABLET DAILY 90 tablet 1    SUMAtriptan (IMITREX) 100 MG tablet TAKE 1 TABLET AT ONSET OF MIGRAINE HEADACHE. MAY REPEAT IN 2 HOURS IF NEEDED.  9 tablet 6    tamsulosin (FLOMAX) 0.4 MG capsule Take 1 capsule by mouth daily 90 capsule 3    of Systems    Constitutional - no significant activity change, appetite change, or unexpected weight change. No fever or chills. No diaphoresis or significant fatigue. HENT - no significant rhinorrhea or epistaxis. No tinnitus or significant hearing loss. Eyes - no sudden vision change or amaurosis. Respiratory - no significant shortness of breath, wheezing, or stridor. No apnea, cough, or chest tightness associated with shortness of breath. Cardiovascular - no chest pain, syncope, or significant dizziness. No palpitations or significant leg swelling. No claudication. Gastrointestinal - no abdominal swelling or pain. No blood in stool. No severe constipation, diarrhea, nausea, or vomiting. Genitourinary - No dysuria, frequency, or urgency. No flank pain or hematuria. Musculoskeletal - no back pain, gait disturbance, or myalgia. Skin - no color change, rash, pallor, or new wound. Neurologic - no dizziness, facial asymmetry, or light headedness. No seizures. No speech difficulty or lateralizing weakness. Hematologic - no easy bruising or excessive bleeding. Psychiatric - no severe anxiety or nervousness. No confusion. All other review of systems are negative. Physical Exam    /80 (Site: Left Upper Arm, Position: Sitting, Cuff Size: Medium Adult)   Pulse 93   Resp 18   SpO2 98%     Constitutional - well developed, well nourished. No diaphoresis or acute distress. HENT - head normocephalic. Right external ear canal appears normal.  Left external ear canal appears normal.  Septum appears midline. Eyes - conjunctiva normal.  EOMS normal.  No exudate. No icterus. Neck- ROM appears normal, no tracheal deviation. Cardiovascular - Regular rate and rhythm. Heart sounds are normal.  No murmur, rub, or gallop. Carotid pulses are 2+ to palpation bilaterally without bruit. Extremities - Radial and brachial pulses are 2+ to palpation bilaterally.   No cyanosis, clubbing, or

## 2020-05-07 ENCOUNTER — VIRTUAL VISIT (OUTPATIENT)
Dept: FAMILY MEDICINE CLINIC | Age: 45
End: 2020-05-07
Payer: MEDICARE

## 2020-05-07 PROCEDURE — G8428 CUR MEDS NOT DOCUMENT: HCPCS | Performed by: FAMILY MEDICINE

## 2020-05-07 PROCEDURE — 99214 OFFICE O/P EST MOD 30 MIN: CPT | Performed by: FAMILY MEDICINE

## 2020-05-07 RX ORDER — ALPRAZOLAM 1 MG/1
1 TABLET ORAL 3 TIMES DAILY PRN
Qty: 90 TABLET | Refills: 2 | Status: SHIPPED | OUTPATIENT
Start: 2020-05-07 | End: 2020-06-06

## 2020-05-07 NOTE — PROGRESS NOTES
ALPRAZolam (XANAX) 1 MG tablet Take 1 tablet by mouth 3 times daily as needed for Sleep or Anxiety for up to 30 days. Yes Tanisha Jaime MD   omeprazole (PRILOSEC) 20 MG delayed release capsule TAKE 1 CAPSULE BY MOUTH DAILY  Tanisha Jaime MD   cloNIDine (CATAPRES) 0.2 MG tablet TAKE AS DIRECTED AS NEEDED  Jorge Talamantes MD   docusate sodium (COLACE) 100 MG capsule TAKE ONE CAPSULE BY MOUTH TWICE A DAY FOR 14 DAYS AS NEEDED FOR CONSTIPATION  Tanisha Jaime MD   cinacalcet (SENSIPAR) 30 MG tablet Take 30 mg by mouth daily  Historical Provider, MD   naratriptan (AMERGE) 2.5 MG tablet Take 2.5 mg by mouth once as needed for Migraine 2.5 mg at onset of headache, may repeat in 4 hours if needed  Historical Provider, MD   ALPVICKEYZolam Gloria Codding) 1 MG tablet   Historical Provider, MD   docusate sodium (COLACE) 250 MG capsule TAKE ONE CAPSULE BY MOUTH TWICE A DAY FOR 14 DAYS AS NEEDED FOR CONSTIPATION  Historical Provider, MD   albuterol sulfate  (90 Base) MCG/ACT inhaler Inhale 2 puffs into the lungs  Historical Provider, MD   vitamin D (ERGOCALCIFEROL) 1.25 MG (21882 UT) CAPS capsule TAKE 1 CAPSULE WEEKLY  Historical Provider, MD   AURYXIA 1  MG(Fe) TABS TAKE 1 TABLET BY MOUTH THREE TIMES A DAY WITH MEALS.    SWALLOW WHOLE, DO NOT CHEW OR CRUSH MEDICATION  Historical Provider, MD   oxybutynin (DITROPAN) 5 MG tablet Take 5 mg by mouth  Historical Provider, MD   omeprazole (PRILOSEC) 40 MG delayed release capsule Take 40 mg by mouth  Historical Provider, MD   levETIRAcetam (KEPPRA) 500 MG tablet Take 1 tablet by mouth 2 times daily  Tanisha Jaime MD   ondansetron (ZOFRAN) 4 MG tablet TAKE 1 TABLET BY MOUTH DAILY AS NEEDED FOR NAUSEA OR VOMITING  Tanisha Jaime MD   folic acid (FOLVITE) 1 MG tablet Take 1 tablet by mouth daily  Ray Cabot, MD   amLODIPine (NORVASC) 10 MG tablet TAKE 1 TABLET DAILY  CALLY Mendoza   SUMAtriptan (IMITREX) 100 MG tablet TAKE 1 TABLET AT ONSET OF conducted with patient's (and/or legal guardian's) consent, to reduce the patient's risk of exposure to COVID-19 and provide necessary medical care. The patient (and/or legal guardian) has also been advised to contact this office for worsening conditions or problems, and seek emergency medical treatment and/or call 911 if deemed necessary. Patient identification was verified at the start of the visit: Yes    Total time spent on this encounter: Not billed by time    Services were provided through a video synchronous discussion virtually to substitute for in-person clinic visit. Patient and provider were located at their individual homes. --Alfa Pavon MD on 5/13/2020 at 11:20 AM    An electronic signature was used to authenticate this note.

## 2020-05-27 ENCOUNTER — TELEPHONE (OUTPATIENT)
Dept: VASCULAR SURGERY | Age: 45
End: 2020-05-27

## 2020-06-01 RX ORDER — LEVETIRACETAM 500 MG/1
TABLET ORAL
Qty: 60 TABLET | Refills: 5 | Status: SHIPPED | OUTPATIENT
Start: 2020-06-01 | End: 2022-02-22

## 2020-06-01 NOTE — TELEPHONE ENCOUNTER
Puneet Bradford called to request a refill on his medication.       Last office visit : 1/28/2020   Next office visit : 8/7/2020     Requested Prescriptions     Signed Prescriptions Disp Refills    levETIRAcetam (KEPPRA) 500 MG tablet 60 tablet 5     Sig: TAKE 1 TABLET BY MOUTH TWICE DAILY     Authorizing Provider: Riya George     Ordering User: Zoila Solis

## 2020-06-15 ENCOUNTER — TELEPHONE (OUTPATIENT)
Dept: VASCULAR SURGERY | Age: 45
End: 2020-06-15

## 2020-08-07 RX ORDER — NARATRIPTAN 2.5 MG/1
TABLET ORAL
Qty: 9 TABLET | Refills: 6 | Status: SHIPPED | OUTPATIENT
Start: 2020-08-07 | End: 2020-08-11 | Stop reason: SDUPTHER

## 2020-08-07 RX ORDER — SUMATRIPTAN 100 MG/1
TABLET, FILM COATED ORAL
Qty: 9 TABLET | Refills: 6 | Status: SHIPPED | OUTPATIENT
Start: 2020-08-07 | End: 2020-08-11 | Stop reason: SDUPTHER

## 2020-08-11 ENCOUNTER — VIRTUAL VISIT (OUTPATIENT)
Dept: FAMILY MEDICINE CLINIC | Age: 45
End: 2020-08-11
Payer: MEDICARE

## 2020-08-11 PROCEDURE — 99214 OFFICE O/P EST MOD 30 MIN: CPT | Performed by: FAMILY MEDICINE

## 2020-08-11 PROCEDURE — 1036F TOBACCO NON-USER: CPT | Performed by: FAMILY MEDICINE

## 2020-08-11 PROCEDURE — G8428 CUR MEDS NOT DOCUMENT: HCPCS | Performed by: FAMILY MEDICINE

## 2020-08-11 PROCEDURE — G8417 CALC BMI ABV UP PARAM F/U: HCPCS | Performed by: FAMILY MEDICINE

## 2020-08-11 RX ORDER — NARATRIPTAN 2.5 MG/1
TABLET ORAL
Qty: 9 TABLET | Refills: 6 | Status: SHIPPED | OUTPATIENT
Start: 2020-08-11 | End: 2021-08-31 | Stop reason: SDUPTHER

## 2020-08-11 RX ORDER — SUMATRIPTAN 100 MG/1
TABLET, FILM COATED ORAL
Qty: 9 TABLET | Refills: 6 | Status: SHIPPED | OUTPATIENT
Start: 2020-08-11 | End: 2021-08-31 | Stop reason: SDUPTHER

## 2020-08-11 ASSESSMENT — PATIENT HEALTH QUESTIONNAIRE - PHQ9
SUM OF ALL RESPONSES TO PHQ QUESTIONS 1-9: 0
2. FEELING DOWN, DEPRESSED OR HOPELESS: 0
1. LITTLE INTEREST OR PLEASURE IN DOING THINGS: 0
SUM OF ALL RESPONSES TO PHQ QUESTIONS 1-9: 0
SUM OF ALL RESPONSES TO PHQ9 QUESTIONS 1 & 2: 0

## 2020-08-11 NOTE — PROGRESS NOTES
2020    TELEHEALTH EVALUATION -- Audio/Visual (During HRPRI-93 public health emergency)    HPI:    Osmin Caba (:  1975) has requested an audio/video evaluation for the following concern(s):    16-year-old remains on dialysis 3 days a week. He has an appointment at [facility] transplant team in September. Hypertension  Compliant with medications. No adverse effects from medication. No lightheadedness, palpitations, or chest pain. Gastroesophageal Reflux Disease  Symptoms currently under control. Medication adequately controls his symptoms. No hematochezia or melena. Migraine Headaches  Headaches are currently stable. Satisfied with current medication without side effects. Anxiety  Compliant with medications. No adverse effects from medication. No panic or anxiety attacks since last visit. Symptoms are controlled. No excessive worry or insomnia. No issues with depression    Compliant with his benzodiazepine medication. He states he takes his medication as needed. He abstains from alcohol while taking his medication. He denies drowsiness and impairment on his medication. Review of Systems  Review of Systems   Constitutional: Negative for chills and fever. HENT: Negative for congestion. Respiratory: Negative for cough, chest tightness and shortness of breath. Cardiovascular: Negative for chest pain, palpitations and leg swelling. Gastrointestinal: Negative for abdominal pain, anal bleeding, constipation, diarrhea and nausea. Genitourinary: Negative for difficulty urinating. Psychiatric/Behavioral: Negative. Prior to Visit Medications    Medication Sig Taking? Authorizing Provider   SUMAtriptan (IMITREX) 100 MG tablet TAKE 1 TABLET AT ONSET OF MIGRAINE HEADACHE. MAY REPEAT IN 2 HOURS IF NEEDED. Yes Tracy Concepcion MD   naratriptan (AMERGE) 2.5 MG tablet TAKE AS DIRECTED.  Yes Tracy Concepcion MD   Ostomy Supplies K[employer] Ostomy supplies  [address] [facility] Erika Arroyo MD   levETIRAcetam (KEPPRA) 500 MG tablet TAKE 1 TABLET BY MOUTH TWICE DAILY  Sameer Jauregui MD   omeprazole (PRILOSEC) 20 MG delayed release capsule TAKE 1 CAPSULE BY MOUTH DAILY  Sameer Jauregui MD   cloNIDine (CATAPRES) 0.2 MG tablet TAKE AS DIRECTED AS NEEDED  Jose Angel Reddy MD   docusate sodium (COLACE) 100 MG capsule TAKE ONE CAPSULE BY MOUTH TWICE A DAY FOR 14 DAYS AS NEEDED FOR CONSTIPATION  Sameer Jauregui MD   cinacalcet (SENSIPAR) 30 MG tablet Take 30 mg by mouth daily  Historical Provider, MD   ALPRAZolam Daryel Ham) 1 MG tablet   Historical Provider, MD   docusate sodium (COLACE) 250 MG capsule TAKE ONE CAPSULE BY MOUTH TWICE A DAY FOR 14 DAYS AS NEEDED FOR CONSTIPATION  Historical Provider, MD   albuterol sulfate  (90 Base) MCG/ACT inhaler Inhale 2 puffs into the lungs  Historical Provider, MD   vitamin D (ERGOCALCIFEROL) 1.25 MG (96802 UT) CAPS capsule TAKE 1 CAPSULE WEEKLY  Historical Provider, MD   AURYXIA 1  MG(Fe) TABS TAKE 1 TABLET BY MOUTH THREE TIMES A DAY WITH MEALS. SWALLOW WHOLE, DO NOT CHEW OR CRUSH MEDICATION  Historical Provider, MD   oxybutynin (DITROPAN) 5 MG tablet Take 5 mg by mouth  Historical Provider, MD   omeprazole (PRILOSEC) 40 MG delayed release capsule Take 40 mg by mouth  Historical Provider, MD   ondansetron (ZOFRAN) 4 MG tablet TAKE 1 TABLET BY MOUTH DAILY AS NEEDED FOR NAUSEA OR VOMITING  Sameer Jauregui MD   folic acid (FOLVITE) 1 MG tablet Take 1 tablet by mouth daily  Meredith Tabor MD   amLODIPine (NORVASC) 10 MG tablet TAKE 1 TABLET DAILY  CALLY Mendoza   tamsulosin (FLOMAX) 0.4 MG capsule Take 1 capsule by mouth daily  Sameer Jauregui MD   hydrALAZINE (APRESOLINE) 50 MG tablet TAKE 1 TABLET BY MOUTH THREE TIMES A DAY  Sameer Jauregui MD   HYDROcodone-acetaminophen (NORCO)  MG per tablet Take one tablet every 3-4 hours PRN pain (month supply) (may fill 8/24/18).   Joe Cramer, CALLY - CNP       Social History     Tobacco Use    Smoking status: Never Smoker    Smokeless tobacco: Never Used   Substance Use Topics    Alcohol use: No    Drug use: No            PHYSICAL EXAMINATION:  [ INSTRUCTIONS:  \"[x]\" Indicates a positive item  \"[]\" Indicates a negative item  -- DELETE ALL ITEMS NOT EXAMINED]  Vital Signs: (As obtained by patient/caregiver or practitioner observation)    Blood pressure-  Heart rate-    Respiratory rate-    Temperature-  Pulse oximetry-     Constitutional: [x] Appears well-developed and well-nourished [x] No apparent distress      [] Abnormal-   Mental status  [x] Alert and awake  [] Oriented to person/place/time [x]Able to follow commands      Eyes:  EOM    [x]  Normal  [] Abnormal-  Sclera  [x]  Normal  [] Abnormal -         Discharge []  None visible  [] Abnormal -    HENT:   [x] Normocephalic, atraumatic. [] Abnormal   [] Mouth/Throat: Mucous membranes are moist.     External Ears [x] Normal  [] Abnormal-     Neck: [x] No visualized mass     Pulmonary/Chest: [x] Respiratory effort normal.  [x] No visualized signs of difficulty breathing or respiratory distress        [] Abnormal-      Musculoskeletal:   [] Normal gait with no signs of ataxia         [] Normal range of motion of neck        [] Abnormal-       Neurological:        [] No Facial Asymmetry (Cranial nerve 7 motor function) (limited exam to video visit)          [] No gaze palsy        [] Abnormal-         Skin:        [x] No significant exanthematous lesions or discoloration noted on facial skin         [] Abnormal-            Psychiatric:       [x] Normal Affect [x] No Hallucinations        [] Abnormal-     Other pertinent observable physical exam findings-     No results found for this or any previous visit (from the past 672 hour(s)). ASSESSMENT/PLAN:  1. ESRD (end stage renal disease) ([facility] [address]  Continue with recommendations per dialysis. He is awaiting evaluation for kidney transplant    2.  Gastroesophageal reflux disease without esophagitis  Overall symptoms are stable    3. Essential (primary) hypertension  Blood pressures have been stable as well continues on his current medications. States refills are doing well    4. Chronic migraine without aura without status migrainosus, not intractable  Since his last visit overall his migraines have been well controlled    5. Anxiety  He does take Xanax as needed. Overall he feels the Xanax manages his symptoms adequately. Return in about 3 months (around 11/11/2020) for Routine follow up - 15 minute visit. Alex Love is a 39 y.o. male being evaluated by a Virtual Visit (video visit) encounter to address concerns as mentioned above. A caregiver was present when appropriate. Due to this being a TeleHealth encounter ([facility]), evaluation of the following organ systems was limited: Vitals/Constitutional/EENT/Resp/CV/GI//MS/Neuro/Skin/Heme-Lymph-Imm. Pursuant to the emergency declaration under the [address] [place] [address] authority and the [employer] Act, this Virtual Visit was conducted with patient's (and/or legal guardian's) consent, to reduce the patient's risk of exposure to COVID-19 and provide necessary medical care. The patient (and/or legal guardian) has also been advised to contact this office for worsening conditions or problems, and seek emergency medical treatment and/or call 911 if deemed necessary. Patient identification was verified at the start of the visit: Yes    Total time spent on this encounter: Not billed by time    Services were provided through a video synchronous discussion virtually to substitute for in-person clinic visit. Patient and provider were located at their individual homes. --Maryann Rose MD on 8/11/2020 at 12:53 PM    An electronic signature was used to authenticate this note.

## 2020-08-24 RX ORDER — CLONIDINE HYDROCHLORIDE 0.2 MG/1
TABLET ORAL
Qty: 90 TABLET | Refills: 1 | Status: SHIPPED | OUTPATIENT
Start: 2020-08-24 | End: 2021-02-28

## 2020-08-24 NOTE — TELEPHONE ENCOUNTER
Carmel Hicks called to request a refill on his medication.       Last office visit : 8/11/2020   Next office visit : 11/19/2020     Requested Prescriptions     Signed Prescriptions Disp Refills    cloNIDine (CATAPRES) 0.2 MG tablet 90 tablet 1     Sig: TAKE AS DIRECTED AS NEEDED     Authorizing Provider: Elder Ngo     Ordering User: Dyana Maldonado

## 2020-09-27 RX ORDER — TAMSULOSIN HYDROCHLORIDE 0.4 MG/1
0.4 CAPSULE ORAL DAILY
Qty: 90 CAPSULE | Refills: 3 | Status: SHIPPED | OUTPATIENT
Start: 2020-09-27

## 2020-10-09 ENCOUNTER — TELEPHONE (OUTPATIENT)
Dept: GASTROENTEROLOGY | Age: 45
End: 2020-10-09

## 2020-10-19 RX ORDER — ALPRAZOLAM 1 MG/1
TABLET ORAL
Qty: 90 TABLET | Refills: 2 | Status: SHIPPED | OUTPATIENT
Start: 2020-10-19 | End: 2021-02-03 | Stop reason: SDUPTHER

## 2020-10-19 NOTE — TELEPHONE ENCOUNTER
Refilled medication and e-scribed to pharmacy. Confirm prescription was due. Make sure KERI and urine drug screen is up to date.

## 2020-10-19 NOTE — TELEPHONE ENCOUNTER
Lola Tai called to request a refill on his medication. Last office visit : 8/11/2020   Next office visit : 11/19/2020     Last UDS:   Amphetamines   Date Value Ref Range Status   09/20/2013 NEGATIVE  Final     Barbiturates   Date Value Ref Range Status   09/20/2013 NEGATIVE  Final     Benzodiazepines   Date Value Ref Range Status   09/20/2013 NEGATIVE  Final     Opiate Scrn, Ur   Date Value Ref Range Status   11/16/2015 See Final Results Iygjby=089 ng/mL Final     Comment:     Opiate test includes Codeine, Morphine, Hydromorphone, Hydrocodone. Last Bennetta Nhung: 9/2/20  Medication Contract: needs update at next visit   Last Fill: 9/2/20    Requested Prescriptions     Pending Prescriptions Disp Refills    ALPRAZolam (XANAX) 1 MG tablet [Pharmacy Med Name: ALPRAZOLAM 1MG TABLETS] 90 tablet 0     Sig: TAKE 1 TABLET BY MOUTH THREE TIMES DAILY         Please approve or refuse this medication.    Yamileth Mejia

## 2020-10-20 ENCOUNTER — TELEPHONE (OUTPATIENT)
Dept: VASCULAR SURGERY | Age: 45
End: 2020-10-20

## 2020-10-20 NOTE — TELEPHONE ENCOUNTER
Letter sent in mail to patient asking for them to contact our office to schedule their procedure (Fistulogram/BAM) with Dr. Silvano Paulino. Due to no return phone calls, we have sent a letter requesting for patient to call our office.

## 2020-10-28 RX ORDER — AMLODIPINE BESYLATE 10 MG/1
TABLET ORAL
Qty: 90 TABLET | Refills: 1 | Status: SHIPPED | OUTPATIENT
Start: 2020-10-28 | End: 2021-01-27 | Stop reason: SDUPTHER

## 2020-11-19 ENCOUNTER — VIRTUAL VISIT (OUTPATIENT)
Dept: FAMILY MEDICINE CLINIC | Age: 45
End: 2020-11-19
Payer: MEDICARE

## 2020-11-19 PROCEDURE — 99442 PR PHYS/QHP TELEPHONE EVALUATION 11-20 MIN: CPT | Performed by: FAMILY MEDICINE

## 2020-11-19 NOTE — PROGRESS NOTES
Carlos Carolina is a 39 y.o. male evaluated via telephone on 11/19/2020. Consent:  He and/or health care decision maker is aware that that he may receive a bill for this telephone service, depending on his insurance coverage, and has provided verbal consent to proceed: Yes      Documentation:  I communicated with the patient and/or health care decision maker about the following:  Details of this discussion including any medical advice provided:    S: Hypertension  Blood pressure medications were recently changed as follows: Losartan 50mg bid started 1 month ago  Amlodipine 10 mg. Started 1 month ago  Clonidine at night. Blood pressures have been much better controlled. Consistently less than 140/90. He continues to receive dialysis 3 days a week. Migraine Headaches  Headaches are currently stable. Satisfied with current medication without side effects. Gastroesophageal Reflux Disease  Symptoms currently under control. Medication adequately controls his symptoms. No hematochezia or melena. Review of Systems   Constitutional: Negative for chills and fever. HENT: Negative for congestion. Respiratory: Negative for cough, chest tightness and shortness of breath. Cardiovascular: Negative for chest pain, palpitations and leg swelling. Gastrointestinal: Negative for abdominal pain, anal bleeding, constipation, diarrhea and nausea. Genitourinary: Negative for difficulty urinating. Psychiatric/Behavioral: Negative. O: *  Recovery 10 Min HR 98 bpm   Other Result Information   This result has an attachment that is not available. Result Narrative   Patient underwent infusion of dobutamine to a peak dose of 30mcg/kg/min   achieving 87%MPHR with/without chest pain. Resting ECG with incomplete   RBBB and at peak stress there are no ischemic ST-T changes.  Resting wall   motion normal and at with infusion of dobutamine there is normal   augmentation of contractility with no evidence of ischemia. CONCLUSION:   Negative dobutamine stress echocardiogram.         Assessment/Plan  1. ESRD (end stage renal disease) ([facility] [address]  Continue with dialysis 3 days a week    2. Essential (primary) hypertension  BP Readings from Last 3 Encounters:   04/30/20 122/80   02/20/20 (!) 137/92   01/28/20 108/82     Blood pressures have been better controlled lately    3. Gastroesophageal reflux disease without esophagitis  Stable    4. Chronic migraine without aura without status migrainosus, not intractable  Stable      Return in about 3 months (around 2/19/2021) for AWV - 30 minute visit, Virtual.    I affirm this is a Patient Initiated Episode with an Established Patient who has not had a related appointment within my department in the past 7 days or scheduled within the next 24 hours.     Patient identification was verified at the start of the visit: Yes    Total Time: minutes: 11-20 minutes    Note: not billable if this call serves to triage the patient into an appointment for the relevant concern      Sandoval Golden     Pertinent History and Information      Past Medical History:   Diagnosis Date    Asthma     during winter months    Cancer [facility])     rectal    Colostomy care [facility])     Hemodialysis patient ([facility]     mon wed fri at [address] of blood transfusion     Hx of migraine headaches     Hypercholesterolemia 12/16/2019    Hypertension     Kidney stone     hx of    Palliative care patient 07/11/2019    Pericarditis     Rectal cancer ([facility]     Testalgia 2/1/2016      Past Surgical History:   Procedure Laterality Date    ANTERIOR CRUCIATE LIGAMENT REPAIR  2007    ACL and MCL repair    COLON SURGERY      colon resection with colostomy    COLONOSCOPY      DIALYSIS FISTULA CREATION Bilateral 1/21/2020    CREATION  LEFT  BRACHIAL CEPHALIC AV FISTULA performed [facility] [address] [facility]    ENDOSCOPY, COLON, DIAGNOSTIC      HC COLONOSCOPY BIOPSY/STOMA N/A 7/12/2019     Gurwinder-w/APC ablation-Inflammatory polyps inside or below the colostomy opening-Tubular AP (-) dysplasia    HERNIA REPAIR      As an infant    KIDNEY REMOVAL Left     cancer    UPPER GASTROINTESTINAL ENDOSCOPY N/A 7/12/2019    Dr Purdy-Gastric ulcers   Gardenia Haddad         Family History   Problem Relation Age of Onset    Heart Attack Mother     Cancer Mother         Liver and pancreatic    Diabetes Father        Social History     Tobacco Use    Smoking status: Never Smoker    Smokeless tobacco: Never Used   Substance Use Topics    Alcohol use: No     Current Outpatient Medications   Medication Sig Dispense Refill    amLODIPine (NORVASC) 10 MG tablet TAKE 1 TABLET DAILY 90 tablet 1    tamsulosin (FLOMAX) 0.4 MG capsule TAKE 1 CAPSULE BY MOUTH DAILY 90 capsule 3    cloNIDine (CATAPRES) 0.2 MG tablet TAKE AS DIRECTED AS NEEDED 90 tablet 1    SUMAtriptan (IMITREX) 100 MG tablet TAKE 1 TABLET AT ONSET OF MIGRAINE HEADACHE. MAY REPEAT IN 2 HOURS IF NEEDED. 9 tablet 6    naratriptan (AMERGE) 2.5 MG tablet TAKE AS DIRECTED. 9 tablet 6    Ostomy Supplies KIT Ostomy supplies 1 kit 5    levETIRAcetam (KEPPRA) 500 MG tablet TAKE 1 TABLET BY MOUTH TWICE DAILY 60 tablet 5    omeprazole (PRILOSEC) 20 MG delayed release capsule TAKE 1 CAPSULE BY MOUTH DAILY 90 capsule 1    docusate sodium (COLACE) 100 MG capsule TAKE ONE CAPSULE BY MOUTH TWICE A DAY FOR 14 DAYS AS NEEDED FOR CONSTIPATION 60 capsule 5    cinacalcet (SENSIPAR) 30 MG tablet Take 30 mg by mouth daily      docusate sodium (COLACE) 250 MG capsule TAKE ONE CAPSULE BY MOUTH TWICE A DAY FOR 14 DAYS AS NEEDED FOR CONSTIPATION      albuterol sulfate  (90 Base) MCG/ACT inhaler Inhale 2 puffs into the lungs      vitamin D (ERGOCALCIFEROL) 1.25 MG (16866 UT) CAPS capsule TAKE 1 CAPSULE WEEKLY  2    AURYXIA 1  MG(Fe) TABS TAKE 1 TABLET BY MOUTH THREE TIMES A DAY WITH MEALS.    SWALLOW WHOLE, DO NOT CHEW OR CRUSH MEDICATION 3    oxybutynin (DITROPAN) 5 MG tablet Take 5 mg by mouth      omeprazole (PRILOSEC) 40 MG delayed release capsule Take 40 mg by mouth      ondansetron (ZOFRAN) 4 MG tablet TAKE 1 TABLET BY MOUTH DAILY AS NEEDED FOR NAUSEA OR VOMITING 30 tablet 1    folic acid (FOLVITE) 1 MG tablet Take 1 tablet by mouth daily 30 tablet 2    hydrALAZINE (APRESOLINE) 50 MG tablet TAKE 1 TABLET BY MOUTH THREE TIMES A DAY 90 tablet 2    HYDROcodone-acetaminophen (NORCO)  MG per tablet Take one tablet every 3-4 hours PRN pain (month supply) (may fill 8/24/18). 180 tablet 0     No current facility-administered medications for this visit. Allergies   Allergen Reactions    Oxycodone-Acetaminophen      Give me migraines   Other reaction(s): Other (see comments)  Give me migraines   Give me migraines   Reaction: migraines  Give me migraines   Give me migraines   Reaction: migraines    Morphine And Related      Doesn't work     Morphine      Other reaction(s): Other (see comments)  Doesn't work   Doesn't work        No results found for this or any previous visit (from the past 36 hour(s)).

## 2021-01-08 RX ORDER — DOCUSATE SODIUM 100 MG/1
CAPSULE, LIQUID FILLED ORAL
Qty: 60 CAPSULE | Refills: 5 | Status: SHIPPED | OUTPATIENT
Start: 2021-01-08

## 2021-01-27 NOTE — TELEPHONE ENCOUNTER
Robertajose alfredo Mariposa called to request a refill on his medication.       Last office visit : 11/19/2020   Next office visit : 2/19/2021     Requested Prescriptions     Pending Prescriptions Disp Refills    amLODIPine (NORVASC) 10 MG tablet 90 tablet 1     Sig: TAKE 1 TABLET DAILY            Rosa Maldonado MA

## 2021-01-28 RX ORDER — AMLODIPINE BESYLATE 10 MG/1
TABLET ORAL
Qty: 90 TABLET | Refills: 1 | Status: SHIPPED | OUTPATIENT
Start: 2021-01-28 | End: 2021-11-29

## 2021-02-03 DIAGNOSIS — F41.9 ANXIETY: ICD-10-CM

## 2021-02-03 RX ORDER — ALPRAZOLAM 1 MG/1
TABLET ORAL
Qty: 90 TABLET | Refills: 2 | Status: SHIPPED | OUTPATIENT
Start: 2021-02-03 | End: 2021-05-06

## 2021-02-03 NOTE — TELEPHONE ENCOUNTER
Casey Hutchison called to request a refill on his medication. Last office visit : 11/19/2020   Next office visit : 2/19/2021     Last UDS:   Amphetamines   Date Value Ref Range Status   09/20/2013 NEGATIVE  Final     Barbiturates   Date Value Ref Range Status   09/20/2013 NEGATIVE  Final     Benzodiazepines   Date Value Ref Range Status   09/20/2013 NEGATIVE  Final     Opiate Scrn, Ur   Date Value Ref Range Status   11/16/2015 See Final Results Umarzh=692 ng/mL Final     Comment:     Opiate test includes Codeine, Morphine, Hydromorphone, Hydrocodone. Last Stephanie Ariasl: today  Medication Contract: NA  Last Fill: 12/20    Requested Prescriptions     Pending Prescriptions Disp Refills    ALPRAZolam (XANAX) 1 MG tablet 90 tablet 2     Sig: TAKE 1 TABLET BY MOUTH THREE TIMES DAILY         Please approve or refuse this medication.    Delta Gold MA

## 2021-02-19 ENCOUNTER — VIRTUAL VISIT (OUTPATIENT)
Dept: FAMILY MEDICINE CLINIC | Age: 46
End: 2021-02-19

## 2021-02-19 ENCOUNTER — OFFICE VISIT (OUTPATIENT)
Dept: FAMILY MEDICINE CLINIC | Age: 46
End: 2021-02-19
Payer: MEDICARE

## 2021-02-19 VITALS — HEIGHT: 75 IN | BODY MASS INDEX: 25.22 KG/M2 | WEIGHT: 202.82 LBS

## 2021-02-19 DIAGNOSIS — Z00.00 ANNUAL PHYSICAL EXAM: Primary | ICD-10-CM

## 2021-02-19 DIAGNOSIS — N40.0 BENIGN PROSTATIC HYPERPLASIA WITHOUT LOWER URINARY TRACT SYMPTOMS: ICD-10-CM

## 2021-02-19 DIAGNOSIS — I10 ESSENTIAL (PRIMARY) HYPERTENSION: Primary | ICD-10-CM

## 2021-02-19 DIAGNOSIS — F41.8 DEPRESSION WITH ANXIETY: ICD-10-CM

## 2021-02-19 DIAGNOSIS — K21.9 GASTROESOPHAGEAL REFLUX DISEASE WITHOUT ESOPHAGITIS: ICD-10-CM

## 2021-02-19 DIAGNOSIS — G43.709 CHRONIC MIGRAINE WITHOUT AURA WITHOUT STATUS MIGRAINOSUS, NOT INTRACTABLE: ICD-10-CM

## 2021-02-19 DIAGNOSIS — N18.6 ESRD (END STAGE RENAL DISEASE) (HCC): ICD-10-CM

## 2021-02-19 DIAGNOSIS — Z13.220 LIPID SCREENING: ICD-10-CM

## 2021-02-19 PROCEDURE — G8484 FLU IMMUNIZE NO ADMIN: HCPCS | Performed by: FAMILY MEDICINE

## 2021-02-19 PROCEDURE — G0439 PPPS, SUBSEQ VISIT: HCPCS | Performed by: FAMILY MEDICINE

## 2021-02-19 PROCEDURE — 99442 PR PHYS/QHP TELEPHONE EVALUATION 11-20 MIN: CPT | Performed by: FAMILY MEDICINE

## 2021-02-19 ASSESSMENT — PATIENT HEALTH QUESTIONNAIRE - PHQ9
SUM OF ALL RESPONSES TO PHQ9 QUESTIONS 1 & 2: 0
2. FEELING DOWN, DEPRESSED OR HOPELESS: 0
SUM OF ALL RESPONSES TO PHQ QUESTIONS 1-9: 0
SUM OF ALL RESPONSES TO PHQ QUESTIONS 1-9: 0

## 2021-02-19 ASSESSMENT — LIFESTYLE VARIABLES: HOW OFTEN DO YOU HAVE A DRINK CONTAINING ALCOHOL: 0

## 2021-02-22 NOTE — PROGRESS NOTES
Chikis Dean is a 39 y.o. male evaluated via telephone on 2/19/2021. Consent:  He and/or health care decision maker is aware that that he may receive a bill for this telephone service, depending on his insurance coverage, and has provided verbal consent to proceed: Yes      Documentation:  I communicated with the patient and/or health care decision maker about the following:  Details of this discussion including any medical advice provided:    S:   Continues to be followed at [facility] for kidney transplant.     Hypertension  Compliant with medications. No adverse effects from medication. No lightheadedness, palpitations, or chest pain.     Gastroesophageal Reflux Disease  Symptoms currently under control. Medication adequately controls his symptoms. No hematochezia or melena.      Migraine Headaches  Headaches are currently stable. Satisfied with current medication without side effects.         Benign Prostatic Hypertrophy  Tolerating medication without adverse effects. Symptoms of hesitancy, nocturia, and dribbling are adequately controlled. No hematuria or dysuria           O:    Assessment/Plan  1. Essential (primary) hypertension  Continue to have regular blood pressure checks at dialysis      BP Readings from Last 3 Encounters:   04/30/20 122/80   02/20/20 (!) 137/92   01/28/20 108/82      Blood pressures have been stable lately     2. ESRD (end stage renal disease) ([facility] [address]        Lab Results   Component Value Date     CREATININE 6.5 (H) 01/21/2020            Lab Results   Component Value Date     BUN 24 (H) 01/21/2020            Continues to be followed by nephrology with dialysis  Pursuing transplant through [facility]     3. Gastroesophageal reflux disease without esophagitis  Stable     4. Chronic migraine without aura without status migrainosus, not intractable  Stable     5. Benign prostatic hyperplasia without lower urinary tract symptoms  Stable     6.  Lipid screening  Stable - Lipid Panel; Future     7. Mood is stable. He continues to take Xanax as needed            Return in about 3 months (around 5/19/2021) for Routine follow up - 15 minute visit. I affirm this is a Patient Initiated Episode with an Established Patient who has not had a related appointment within my department in the past 7 days or scheduled within the next 24 hours.     Patient identification was verified at the start of the visit: Yes    Total Time: minutes: 11-20 minutes    Note: not billable if this call serves to triage the patient into an appointment for the relevant concern      Natali Rome     Pertinent History and Information      Past Medical History:   Diagnosis Date    Asthma     during winter months    Cancer [facility])     rectal    Colostomy care [facility])     Hemodialysis patient ([facility] [address]     mon wed fri at [address] of blood transfusion     Hx of migraine headaches     Hypercholesterolemia 12/16/2019    Hypertension     Kidney stone     hx of    Palliative care patient 07/11/2019    Pericarditis     Rectal cancer ([facility] [address]     Testalgia 2/1/2016      Past Surgical History:   Procedure Laterality Date    ANTERIOR CRUCIATE LIGAMENT REPAIR  2007    ACL and MCL repair    COLON SURGERY      colon resection with colostomy    COLONOSCOPY      DIALYSIS FISTULA CREATION Bilateral 1/21/2020    CREATION  LEFT  BRACHIAL CEPHALIC AV FISTULA performed by Buddy Schumacher MD at [facility] OR    ENDOSCOPY, COLON, DIAGNOSTIC      HC COLONOSCOPY BIOPSY/STOMA N/A 7/12/2019    Dr Purdy-w/APC ablation-Inflammatory polyps inside or below the colostomy opening-Tubular AP (-) dysplasia    HERNIA REPAIR      As an infant    KIDNEY REMOVAL Left     cancer    UPPER GASTROINTESTINAL ENDOSCOPY N/A 7/12/2019    Dr Purdy-Gastric ulcers   Alyson Ann         Family History   Problem Relation Age of Onset    Heart Attack Mother     Cancer Mother         Liver and pancreatic  HYDROcodone-acetaminophen (NORCO)  MG per tablet Take one tablet every 3-4 hours PRN pain (month supply) (may fill 8/24/18). 180 tablet 0     No current facility-administered medications for this visit. Allergies   Allergen Reactions    Oxycodone-Acetaminophen      Give me migraines   Other reaction(s): Other (see comments)  Give me migraines   Give me migraines   Reaction: migraines  Give me migraines   Give me migraines   Reaction: migraines    Morphine And Related      Doesn't work     Morphine      Other reaction(s): Other (see comments)  Doesn't work   Doesn't work        No results found for this or any previous visit (from the past 36 hour(s)).

## 2021-02-22 NOTE — PROGRESS NOTES
Medicare Annual Wellness Visit - Subsequent    Name: Libra Cm Date: 2021   MRN: [id number] Sex: Male   Age: 39 y.o. Ethnicity: Non-/Non    : 1975 Race: Royston Carrel is here for   Chief Complaint   Patient presents with   [facility]        Screenings for behavioral, psychosocial and functional/safety risks, and cognitive dysfunction are all negative except as indicated below. These results, as well as other patient data from the [address] form, are documented in Flowsheets linked to this Encounter. Allergies   Allergen Reactions    Oxycodone-Acetaminophen      Give me migraines   Other reaction(s): Other (see comments)  Give me migraines   Give me migraines   Reaction: migraines  Give me migraines   Give me migraines   Reaction: migraines    Morphine And Related      Doesn't work     Morphine      Other reaction(s): Other (see comments)  Doesn't work   Doesn't work        Prior to Visit Medications    Medication Sig Taking? Authorizing Provider   ALPRAZolam (XANAX) 1 MG tablet TAKE 1 TABLET BY MOUTH THREE TIMES DAILY Yes Penelope Barreto MD   amLODIPine (NORVASC) 10 MG tablet TAKE 1 TABLET DAILY Yes Penelope Barreto MD   docusate sodium (COLACE) 100 MG capsule TAKE ONE CAPSULE BY MOUTH TWICE A DAY AS NEEDED FOR CONSTIPATION Yes Penelope Barreto MD   tamsulosin (FLOMAX) 0.4 MG capsule TAKE 1 CAPSULE BY MOUTH DAILY Yes Penelope Barreto MD   cloNIDine (CATAPRES) 0.2 MG tablet TAKE AS DIRECTED AS NEEDED Yes Umesh Mirza MD   SUMAtriptan (IMITREX) 100 MG tablet TAKE 1 TABLET AT ONSET OF MIGRAINE HEADACHE. MAY REPEAT IN 2 HOURS IF NEEDED. Yes Penelope Barreto MD   naratriptan (AMERGE) 2.5 MG tablet TAKE AS DIRECTED.  Yes Penelope Barreto MD   Ostomy Supplies KIT Ostomy supplies Yes Penelope Barreto MD   levETIRAcetam (KEPPRA) 500 MG tablet TAKE 1 TABLET BY MOUTH TWICE DAILY Yes Penelope Barreto MD   omeprazole (PRILOSEC) 20 MG delayed release capsule TAKE 1 CAPSULE BY MOUTH DAILY Yes Gloria Hearn MD   cinacalcet (SENSIPAR) 30 MG tablet Take 30 mg by mouth daily Yes Historical Provider, MD   albuterol sulfate  (90 Base) MCG/ACT inhaler Inhale 2 puffs into the lungs Yes Historical Provider, MD   vitamin D (ERGOCALCIFEROL) 1.25 MG (21335 UT) CAPS capsule TAKE 1 CAPSULE WEEKLY Yes Historical Provider, MD   AURYXIA 1  MG(Fe) TABS TAKE 1 TABLET BY MOUTH THREE TIMES A DAY WITH MEALS. SWALLOW WHOLE, DO NOT CHEW OR CRUSH MEDICATION Yes Historical Provider, MD   oxybutynin (DITROPAN) 5 MG tablet Take 5 mg by mouth Yes Historical Provider, MD   omeprazole (PRILOSEC) 40 MG delayed release capsule Take 40 mg by mouth Yes Historical Provider, MD   ondansetron (ZOFRAN) 4 MG tablet TAKE 1 TABLET BY MOUTH DAILY AS NEEDED FOR NAUSEA OR VOMITING Yes Gloria Hearn MD   folic acid (FOLVITE) 1 MG tablet Take 1 tablet by mouth daily Yes Ericka Parra MD   hydrALAZINE (APRESOLINE) 50 MG tablet TAKE 1 TABLET BY MOUTH THREE TIMES A DAY Yes Gloria Hearn MD   HYDROcodone-acetaminophen (NORCO)  MG per tablet Take one tablet every 3-4 hours PRN pain (month supply) (may fill 8/24/18).  Yes CALLY Israel - CNP       Past Medical History:   Diagnosis Date    Asthma     during winter months    Cancer [facility])     rectal    Colostomy care [facility])     Hemodialysis patient ([facility]     mon wed fri at [address] of blood transfusion     Hx of migraine headaches     Hypercholesterolemia 12/16/2019    Hypertension     Kidney stone     hx of    Palliative care patient 07/11/2019    Pericarditis     Rectal cancer ([facility]     Testalgia 2/1/2016       Past Surgical History:   Procedure Laterality Date    ANTERIOR CRUCIATE LIGAMENT REPAIR  2007    ACL and MCL repair    COLON SURGERY      colon resection with colostomy    COLONOSCOPY      DIALYSIS FISTULA CREATION Bilateral 1/21/2020    CREATION  LEFT  BRACHIAL CEPHALIC AV FISTULA performed by Laron Gaston MD at [address] OR    ENDOSCOPY, COLON, DIAGNOSTIC      HC COLONOSCOPY BIOPSY/STOMA N/A 7/12/2019    Dr Purdy-w/APC ablation-Inflammatory polyps inside or below the colostomy opening-Tubular AP (-) dysplasia    HERNIA REPAIR      As an infant    KIDNEY REMOVAL Left     cancer    UPPER GASTROINTESTINAL ENDOSCOPY N/A 7/12/2019    Dr Purdy-Gastric ulcers   Eunice Herrera         Family History   Problem Relation Age of Onset    Heart Attack Mother     Cancer Mother         Liver and pancreatic    Diabetes Father        CareTeam (Including outside providers/suppliers regularly involved in providing care):   Patient Care Team:  Micheline Urena MD as PCP - General (Family Medicine)  Micheline Urena MD as PCP - [facility] Empaneled Provider  Laron Gaston MD as Consulting Physician (Vascular Surgery)    Wt Readings from Last 3 Encounters:   02/19/21 202 lb 13.2 oz (92 kg)   02/20/20 215 lb (97.5 kg)   01/28/20 222 lb (100.7 kg)     Vitals:    02/19/21 0813   Weight: 202 lb 13.2 oz (92 kg)   Height: 6' 3\" (1.905 m)           The following problems were reviewed today and where indicated follow up appointments were made and/or referrals ordered.     Risk Factor Screenings with Interventions     Fall Risk:  2 or more falls in past year?: no  Fall with injury in past year?: no  Fall Risk Interventions:    · Not indicated     Depression:  PHQ-2 Score: 0  Depression Interventions:  · Not indicated     Anxiety:     Anxiety Interventions:  · Not indicated     Cognitive:     Cognitive Impairment Interventions:  · Not indicated     Substance Abuse:  Social History     Socioeconomic History    Marital status:      Spouse name: Ana Paula Chamberlain Number of children: Not on file    Years of education: Not on file    Highest education level: Not on file   Occupational History    Not on file   Social Needs    Financial resource strain: Not on file    Food insecurity     Worry: Not on file     Inability: Not on file    Transportation needs     Medical: Not on file     Non-medical: Not on file   Tobacco Use    Smoking status: Never Smoker    Smokeless tobacco: Never Used   Substance and Sexual Activity    Alcohol use: No    Drug use: No    Sexual activity: Not on file   Lifestyle    Physical activity     Days per week: Not on file     Minutes per session: Not on file    Stress: Not on file   Relationships    Social connections     Talks on phone: Not on file     Gets together: Not on file     Attends Islam service: Not on file     Active member of club or organization: Not on file     Attends meetings of clubs or organizations: Not on file     Relationship status: Not on file    Intimate partner violence     Fear of current or ex partner: Not on file     Emotionally abused: Not on file     Physically abused: Not on file     Forced sexual activity: Not on file   Other Topics Concern    Not on file   Social History Narrative    Not on file     Audit Questionnaire: Screen for Alcohol Misuse  How often do you have a drink containing alcohol?: Never  Substance Abuse Interventions:  · Not indicated     Health Risk Assessment:     General  In general, how would you say your health is?: Good  In the past 7 days, have you experienced any of the following? New or Increased Pain, New or Increased Fatigue, Loneliness, Social Isolation, Stress or Anger?: None of These  Do you get the social and emotional support that you need?: Yes  Do you have a Living Will?: Yes  General Health Risk Interventions:  · Not indicated     Health Habits/Nutrition  Do you exercise for at least 20 minutes 2-3 times per week?: (!) No  Have you lost any weight without trying in the past 3 months?: (!) Yes  Do you eat only one meal per day?: No  Have you seen the dentist within the past year?: (!) No  Body mass index is 25.35 kg/m².   Health Habits/Nutrition Interventions:  Recommended at least 150 minutes of  DTaP/Tdap/Td vaccine (1 - Tdap) 08/11/1996    Annual Wellness Visit (AWV)  05/29/2019    Flu vaccine  Completed    Pneumococcal 0-64 years Vaccine  Completed    Hepatitis A vaccine  Aged Out    Hib vaccine  Aged Out    Meningococcal (ACWY) vaccine  Aged Out       Recommendations for [facility] Due: see orders.   Recommended screening schedule for the next 5-10 years is provided to the patient in written form: see Patient Instructions/AVS.

## 2021-02-28 RX ORDER — CLONIDINE HYDROCHLORIDE 0.2 MG/1
TABLET ORAL
Qty: 90 TABLET | Refills: 1 | Status: SHIPPED | OUTPATIENT
Start: 2021-02-28 | End: 2021-08-31 | Stop reason: SDUPTHER

## 2021-03-02 DIAGNOSIS — K21.9 GASTROESOPHAGEAL REFLUX DISEASE WITHOUT ESOPHAGITIS: ICD-10-CM

## 2021-03-02 RX ORDER — ONDANSETRON 4 MG/1
4 TABLET, FILM COATED ORAL DAILY PRN
Qty: 30 TABLET | Refills: 1 | Status: SHIPPED | OUTPATIENT
Start: 2021-03-02

## 2021-05-06 DIAGNOSIS — F41.9 ANXIETY: ICD-10-CM

## 2021-05-06 RX ORDER — ALPRAZOLAM 1 MG/1
TABLET ORAL
Qty: 90 TABLET | Refills: 2 | Status: SHIPPED | OUTPATIENT
Start: 2021-05-06 | End: 2021-06-06

## 2021-05-07 NOTE — MR AVS SNAPSHOT
"         LUMBAR STRAIN/SPASM:     *  based on your history and exam, No evidence for herniated disc, spinal stenosis, or vertebral fracture.    *  take over the counter Motrin/Ibuprofen/Advil or Aleve to help relieve pain and inflammation.  follow the directions on the bottle.  Be sure to take with a small meal to prevent stomach upset.      --Motrin 600 mg ( 3 x 200 mg tablets) three times per day every day for 5-7 days, then 2-3 timers per day as needed (take with food to avoid stomach side effects)     OR     --Aleve 2 tablets twice per day for 5-7 days every day, then twice per day as needed     *  do not go out of your way for activity, however, do not avoid basic activates and gentle activity.  Do not lay on the sofa, do not go on bed rest.        *  moist heat as needed like a micorwavable bean bag.  Apply the heat for 10-15 minutes a few times per day as needed.  I would avoid any sort of electrical heating pad out of fear of causing skin burns.  If you do use an electric heating pad, do not use a heating pad for longer than 15 minutes to lower the risk of burns.    *  avoid any lifting for the next 1 week, then a slow return to activity.  Remember to always use proper lifting technique, always bending at the knees rather than the waist.  Your thigh muscles are MUCH stronger than the smaller muscle of your lower back.    *  Physical therapy through [facility] [place] [facility] (third floor of the [facility] in [place] and many other locations throughout the [facility] and [facility]) if you do not get better.    *  expect your back to be more easily re-aggravated over the next few months.  the soft tissue and muscles are going to be more easily re-irritated over the next several weeks so be careful to return to physical action slowly.    Look for back execises on [employer].  (search \"low back pain rehabilitation exercises\")    You need to also include stretches for the hamstrings, and hip flexors. "     Consider over the counter Lidocaine patches on the affected area.  Place 1-2 Lidocaine patches direclty over the painful area for the most bothersome 12-14 hours of the day, then remove.  You should not wear these patches for more than 12-14 hours per day.   Insurances will almost always not cover lidocaine patches, so you need to get them from the pharmacist.     Examples of over the counter Lidocaine patches:                Cholesterol Screening 8/11/2017    Yearly Flu Vaccine (1) 9/1/2017            MyChart Signup           [employer] allows you to send messages to your doctor, view your test results, renew your prescriptions, schedule appointments, view visit notes, and more. How Do I Sign Up? 1. In your Internet browser, go to [email or web address]  2. Click on the Sign Up Now link in the Sign In box. You will see the New Member Sign Up page. 3. Enter your [employer] Access Code exactly as it appears below. You will not need to use this code after youve completed the sign-up process. If you do not sign up before the expiration date, you must request a new code. [employer] Access Code: [email or web address]  Expires: 12/1/2017 10:22 AM    4. Enter your Social Security Number ([id number]) and Date of Birth (mm/dd/yyyy) as indicated and click Submit. You will be taken to the next sign-up page. 5. Create a [employer] ID. This will be your [employer] login ID and cannot be changed, so think of one that is secure and easy to remember. 6. Create a [employer] password. You can change your password at any time. 7. Enter your Password Reset Question and Answer. This can be used at a later time if you forget your password. 8. Enter your e-mail address. You will receive e-mail notification when new information is available in [address]. 9. Click Sign Up. You can now view your medical record. Additional Information  If you have questions, please contact the physician practice where you receive care. Remember, [employer] is NOT to be used for urgent needs. For medical emergencies, dial 911. For questions regarding your [employer] account call [phone]. If you have a clinical question, please call your doctor's office.

## 2021-08-31 ENCOUNTER — OFFICE VISIT (OUTPATIENT)
Dept: FAMILY MEDICINE CLINIC | Age: 46
End: 2021-08-31
Payer: MEDICARE

## 2021-08-31 VITALS
TEMPERATURE: 97 F | HEIGHT: 75 IN | RESPIRATION RATE: 18 BRPM | SYSTOLIC BLOOD PRESSURE: 132 MMHG | OXYGEN SATURATION: 95 % | BODY MASS INDEX: 24.25 KG/M2 | HEART RATE: 144 BPM | WEIGHT: 195 LBS | DIASTOLIC BLOOD PRESSURE: 80 MMHG

## 2021-08-31 DIAGNOSIS — I10 ESSENTIAL HYPERTENSION: Primary | ICD-10-CM

## 2021-08-31 DIAGNOSIS — Z99.2 CKD (CHRONIC KIDNEY DISEASE) STAGE V REQUIRING CHRONIC DIALYSIS (HCC): ICD-10-CM

## 2021-08-31 DIAGNOSIS — G43.709 CHRONIC MIGRAINE WITHOUT AURA WITHOUT STATUS MIGRAINOSUS, NOT INTRACTABLE: ICD-10-CM

## 2021-08-31 DIAGNOSIS — F41.1 GENERALIZED ANXIETY DISORDER: ICD-10-CM

## 2021-08-31 DIAGNOSIS — N18.6 CKD (CHRONIC KIDNEY DISEASE) STAGE V REQUIRING CHRONIC DIALYSIS (HCC): ICD-10-CM

## 2021-08-31 PROCEDURE — G8420 CALC BMI NORM PARAMETERS: HCPCS | Performed by: NURSE PRACTITIONER

## 2021-08-31 PROCEDURE — G8427 DOCREV CUR MEDS BY ELIG CLIN: HCPCS | Performed by: NURSE PRACTITIONER

## 2021-08-31 PROCEDURE — 99214 OFFICE O/P EST MOD 30 MIN: CPT | Performed by: NURSE PRACTITIONER

## 2021-08-31 PROCEDURE — 1036F TOBACCO NON-USER: CPT | Performed by: NURSE PRACTITIONER

## 2021-08-31 RX ORDER — NARATRIPTAN 2.5 MG/1
TABLET ORAL
Qty: 9 TABLET | Refills: 3 | Status: SHIPPED | OUTPATIENT
Start: 2021-08-31

## 2021-08-31 RX ORDER — CLONIDINE HYDROCHLORIDE 0.2 MG/1
0.2 TABLET ORAL 2 TIMES DAILY PRN
Qty: 90 TABLET | Refills: 2 | Status: SHIPPED | OUTPATIENT
Start: 2021-08-31 | End: 2022-02-22 | Stop reason: DRUGHIGH

## 2021-08-31 RX ORDER — ALPRAZOLAM 1 MG/1
1 TABLET ORAL 3 TIMES DAILY PRN
Qty: 90 TABLET | Refills: 1 | Status: SHIPPED | OUTPATIENT
Start: 2021-08-31 | End: 2021-10-29 | Stop reason: SDUPTHER

## 2021-08-31 RX ORDER — ALPRAZOLAM 1 MG/1
TABLET ORAL
COMMUNITY
Start: 2021-07-05 | End: 2021-08-31 | Stop reason: SDUPTHER

## 2021-08-31 RX ORDER — ALPRAZOLAM 1 MG/1
1 TABLET ORAL
Status: CANCELLED | OUTPATIENT
Start: 2021-08-31

## 2021-08-31 RX ORDER — SUMATRIPTAN 100 MG/1
TABLET, FILM COATED ORAL
Qty: 9 TABLET | Refills: 3 | Status: SHIPPED | OUTPATIENT
Start: 2021-08-31

## 2021-08-31 ASSESSMENT — ENCOUNTER SYMPTOMS
COUGH: 0
DIARRHEA: 0
SHORTNESS OF BREATH: 0
ABDOMINAL PAIN: 0
NAUSEA: 0
WHEEZING: 0
SORE THROAT: 0
CHEST TIGHTNESS: 0

## 2021-08-31 NOTE — PROGRESS NOTES
HEADACHE. MAY REPEAT IN 2 HOURS IF NEEDED. 9 tablet 3    naratriptan (AMERGE) 2.5 MG tablet TAKE AS DIRECTED. 9 tablet 3    cloNIDine (CATAPRES) 0.2 MG tablet Take 1 tablet by mouth 2 times daily as needed (elevated blood pressure) 90 tablet 2    ALPRAZolam (XANAX) 1 MG tablet Take 1 tablet by mouth 3 times daily as needed for Sleep or Anxiety for up to 30 days. 90 tablet 1    ondansetron (ZOFRAN) 4 MG tablet TAKE 1 TABLET BY MOUTH DAILY AS NEEDED FOR NAUSEA OR VOMITING 30 tablet 1    amLODIPine (NORVASC) 10 MG tablet TAKE 1 TABLET DAILY 90 tablet 1    docusate sodium (COLACE) 100 MG capsule TAKE ONE CAPSULE BY MOUTH TWICE A DAY AS NEEDED FOR CONSTIPATION 60 capsule 5    tamsulosin (FLOMAX) 0.4 MG capsule TAKE 1 CAPSULE BY MOUTH DAILY 90 capsule 3    Ostomy Supplies KIT Ostomy supplies 1 kit 5    levETIRAcetam (KEPPRA) 500 MG tablet TAKE 1 TABLET BY MOUTH TWICE DAILY 60 tablet 5    omeprazole (PRILOSEC) 20 MG delayed release capsule TAKE 1 CAPSULE BY MOUTH DAILY 90 capsule 1    cinacalcet (SENSIPAR) 30 MG tablet Take 30 mg by mouth daily      albuterol sulfate  (90 Base) MCG/ACT inhaler Inhale 2 puffs into the lungs      AURYXIA 1  MG(Fe) TABS TAKE 1 TABLET BY MOUTH THREE TIMES A DAY WITH MEALS. SWALLOW WHOLE, DO NOT CHEW OR CRUSH MEDICATION  3    oxybutynin (DITROPAN) 5 MG tablet Take 5 mg by mouth      folic acid (FOLVITE) 1 MG tablet Take 1 tablet by mouth daily 30 tablet 2    hydrALAZINE (APRESOLINE) 50 MG tablet TAKE 1 TABLET BY MOUTH THREE TIMES A DAY 90 tablet 2    HYDROcodone-acetaminophen (NORCO)  MG per tablet Take one tablet every 3-4 hours PRN pain (month supply) (may fill 8/24/18). 180 tablet 0    vitamin D (ERGOCALCIFEROL) 1.25 MG (24842 UT) CAPS capsule TAKE 1 CAPSULE WEEKLY  2    omeprazole (PRILOSEC) 40 MG delayed release capsule Take 40 mg by mouth       No current facility-administered medications for this visit.        Allergies   Allergen Reactions    Oxycodone-Acetaminophen      Give me migraines   Other reaction(s): Other (see comments)  Give me migraines   Give me migraines   Reaction: migraines  Give me migraines   Give me migraines   Reaction: migraines    Morphine And Related      Doesn't work     Morphine      Other reaction(s): Other (see comments)  Doesn't work   Doesn't work        Past Surgical History:   Procedure Laterality Date    ANTERIOR CRUCIATE LIGAMENT REPAIR  2007    ACL and MCL repair    COLON SURGERY      colon resection with colostomy    COLONOSCOPY      DIALYSIS FISTULA CREATION Bilateral 1/21/2020    CREATION  LEFT  BRACHIAL CEPHALIC AV FISTULA performed [facility] Kendal Noble MD [facility] OR    ENDOSCOPY, COLON, DIAGNOSTIC      HC COLONOSCOPY BIOPSY/STOMA N/A 7/12/2019    Dr Purdy-w/APC ablation-Inflammatory polyps inside or below the colostomy opening-Tubular AP (-) dysplasia    HERNIA REPAIR      As an infant    KIDNEY REMOVAL Left     cancer    UPPER GASTROINTESTINAL ENDOSCOPY N/A 7/12/2019    Dr Purdy-Gastric ulcers   Pradip Moll History     Tobacco Use    Smoking status: Never Smoker    Smokeless tobacco: Never Used   Vaping Use    Vaping Use: Never used   Substance Use Topics    Alcohol use: No    Drug use: No       Family History   Problem Relation Age of Onset    Heart Attack Mother     Cancer Mother         Liver and pancreatic    Diabetes Father        /80   Pulse 144   Temp 97 °F (36.1 °C)   Resp 18   Ht 6' 3\" (1.905 m)   Wt 195 lb (88.5 kg)   SpO2 95%   BMI 24.37 kg/m²     Physical Exam  Vitals reviewed. Constitutional:       General: He is not in acute distress. Appearance: Normal appearance. He is well-developed. HENT:      Head: Normocephalic. Eyes:      Conjunctiva/sclera: Conjunctivae normal.      Pupils: Pupils are equal, round, and reactive to light. Neck:      Thyroid: No thyromegaly. Vascular: No carotid bruit or JVD.       Trachea: No tracheal deviation. Cardiovascular:      Rate and Rhythm: Normal rate and regular rhythm. Heart sounds: Normal heart sounds. No murmur heard. Pulmonary:      Effort: Pulmonary effort is normal. No respiratory distress. Breath sounds: Normal breath sounds. Musculoskeletal:         General: Normal range of motion. Cervical back: Normal range of motion and neck supple. Lymphadenopathy:      Cervical: No cervical adenopathy. Skin:     General: Skin is warm and dry. Findings: No rash. Neurological:      Mental Status: He is alert. Psychiatric:         Mood and Affect: Mood normal.         Behavior: Behavior normal.         Thought Content: Thought content normal.         ASSESSMENT/PLAN:  1. Essential hypertension  -Stable on clonidine only. He will continue to monitor and report any elevated readings. 2. Generalized anxiety disorder  -Stable, controlled with alprazolam as needed, tolerates well. Refill provided today. - ALPRAZolam (XANAX) 1 MG tablet; Take 1 tablet by mouth 3 times daily as needed for Sleep or Anxiety for up to 30 days. Dispense: 90 tablet; Refill: 1    3. CKD (chronic kidney disease) stage V requiring [facility]  -Continue with dialysis and recommendations per nephrology.  -Continue with routine follow-up with [facility] transplant team    4. Chronic migraine without aura without status migrainosus, not intractable  -Stable on triptan. Continue sparingly as needed. He tolerates well and interchanges Amerge and Imitrex. Return in about 3 months (around 11/30/2021) for follow up. Rodo Ruano was seen today for medication refill. Diagnoses and all orders for this visit:    Essential hypertension    Generalized anxiety disorder  -     ALPRAZolam (XANAX) 1 MG tablet; Take 1 tablet by mouth 3 times daily as needed for Sleep or Anxiety for up to 30 days.     CKD (chronic kidney disease) stage V requiring chronic dialysis (HCC)    Chronic migraine without aura without status migrainosus, not intractable    Other orders  -     SUMAtriptan (IMITREX) 100 MG tablet; TAKE 1 TABLET AT ONSET OF MIGRAINE HEADACHE. MAY REPEAT IN 2 HOURS IF NEEDED. -     naratriptan (AMERGE) 2.5 MG tablet; TAKE AS DIRECTED. -     cloNIDine (CATAPRES) 0.2 MG tablet; Take 1 tablet by mouth 2 times daily as needed (elevated blood pressure)      Medications Discontinued During This Encounter   Medication Reason    SUMAtriptan (IMITREX) 100 MG tablet REORDER    naratriptan (AMERGE) 2.5 MG tablet REORDER    cloNIDine (CATAPRES) 0.2 MG tablet REORDER    ALPRAZolam (XANAX) 1 MG tablet REORDER     There are no Patient Instructions on file for this visit. Patient voicesunderstanding and agrees to plans along with risks and benefits of plan. Counseling:  Rodo Romerogisela's case, medications and options were discussed in detail. Patient was instructed to call the office if he questionsregarding him treatment. Should him conditions worsen, he should return to office to be reassessed by CALLY Loyd. he Should to go the closest Emergency Department for any emergency. They verbalizedunderstanding the above instructions. Return in about 3 months (around 11/30/2021) for follow up.

## 2021-10-29 DIAGNOSIS — F41.1 GENERALIZED ANXIETY DISORDER: ICD-10-CM

## 2021-10-29 RX ORDER — ALPRAZOLAM 1 MG/1
1 TABLET ORAL 3 TIMES DAILY PRN
Qty: 90 TABLET | Refills: 1 | Status: SHIPPED | OUTPATIENT
Start: 2021-10-29 | End: 2022-01-12 | Stop reason: SDUPTHER

## 2021-11-29 RX ORDER — AMLODIPINE BESYLATE 10 MG/1
TABLET ORAL
Qty: 90 TABLET | Refills: 1 | Status: SHIPPED | OUTPATIENT
Start: 2021-11-29

## 2021-11-29 NOTE — TELEPHONE ENCOUNTER
Joaquin Alcazar called to request a refill on his medication.       Last office visit : 8/31/2021   Next office visit : 11/30/2021     Requested Prescriptions     Signed Prescriptions Disp Refills    amLODIPine (NORVASC) 10 MG tablet 90 tablet 1     Sig: TAKE 1 TABLET BY MOUTH EVERY DAY     Authorizing Provider: Abhishek Bower     Ordering User: John Chatman

## 2022-01-12 DIAGNOSIS — F41.1 GENERALIZED ANXIETY DISORDER: ICD-10-CM

## 2022-01-12 RX ORDER — ALPRAZOLAM 1 MG/1
1 TABLET ORAL 3 TIMES DAILY PRN
Qty: 90 TABLET | Refills: 1 | Status: SHIPPED | OUTPATIENT
Start: 2022-01-12 | End: 2022-04-29 | Stop reason: SDUPTHER

## 2022-01-12 NOTE — TELEPHONE ENCOUNTER
Abbott Shante called to request a refill on his medication. Last office visit : 8/31/2021   Next office visit : Visit date not found     Last UDS:   Amphetamines   Date Value Ref Range Status   09/20/2013 NEGATIVE  Final     Barbiturates   Date Value Ref Range Status   09/20/2013 NEGATIVE  Final     Benzodiazepines   Date Value Ref Range Status   09/20/2013 NEGATIVE  Final     Opiate Scrn, Ur   Date Value Ref Range Status   11/16/2015 See Final Results [id number]=021 ng/mL Final     Comment:     Opiate test includes Codeine, Morphine, Hydromorphone, Hydrocodone. Last Amy Reynaldo: 11/3/21  Medication Contract: 10/2/17   Last Fill: 10/29/21    Requested Prescriptions     Pending Prescriptions Disp Refills    ALPRAZolam (XANAX) 1 MG tablet 90 tablet 1     Sig: Take 1 tablet by mouth 3 times daily as needed for Sleep or Anxiety for up to 30 days. Please approve or refuse this medication.    Cheyenne Merritt

## 2022-02-22 ENCOUNTER — TELEMEDICINE (OUTPATIENT)
Dept: FAMILY MEDICINE CLINIC | Age: 47
End: 2022-02-22
Payer: MEDICARE

## 2022-02-22 DIAGNOSIS — I10 PRIMARY HYPERTENSION: ICD-10-CM

## 2022-02-22 DIAGNOSIS — F41.1 GENERALIZED ANXIETY DISORDER: ICD-10-CM

## 2022-02-22 DIAGNOSIS — N18.6 CKD (CHRONIC KIDNEY DISEASE) STAGE V REQUIRING CHRONIC DIALYSIS (HCC): Primary | ICD-10-CM

## 2022-02-22 DIAGNOSIS — Z99.2 CKD (CHRONIC KIDNEY DISEASE) STAGE V REQUIRING CHRONIC DIALYSIS (HCC): Primary | ICD-10-CM

## 2022-02-22 DIAGNOSIS — G43.709 CHRONIC MIGRAINE WITHOUT AURA WITHOUT STATUS MIGRAINOSUS, NOT INTRACTABLE: ICD-10-CM

## 2022-02-22 PROCEDURE — 99442 PR PHYS/QHP TELEPHONE EVALUATION 11-20 MIN: CPT | Performed by: NURSE PRACTITIONER

## 2022-02-22 RX ORDER — LOSARTAN POTASSIUM 100 MG/1
100 TABLET ORAL DAILY
COMMUNITY

## 2022-02-22 RX ORDER — CLONIDINE HYDROCHLORIDE 0.2 MG/1
0.2 TABLET ORAL NIGHTLY
Qty: 30 TABLET | Refills: 0 | Status: SHIPPED
Start: 2022-02-22 | End: 2022-09-22 | Stop reason: SDUPTHER

## 2022-02-22 RX ORDER — ALPRAZOLAM 1 MG/1
1 TABLET ORAL 2 TIMES DAILY PRN
Qty: 60 TABLET | Refills: 2 | Status: SHIPPED | OUTPATIENT
Start: 2022-02-22 | End: 2022-03-24

## 2022-02-22 ASSESSMENT — ENCOUNTER SYMPTOMS
COUGH: 0
SHORTNESS OF BREATH: 0
NAUSEA: 0
WHEEZING: 0
DIARRHEA: 0
SORE THROAT: 0
ABDOMINAL PAIN: 0
CHEST TIGHTNESS: 0

## 2022-02-22 NOTE — PROGRESS NOTES
2022    TELEHEALTH EVALUATION -- Audio/Visual (During BLGUX-81 public health emergency)    HPI:    Palmer Benítez (:  1975) has requested an audio/video evaluation for the following concern(s):    Getting dialysis 3 days a week  Still followed by [facility] transplant team  Also following [facility] in [place]    On amlodipine, clonidine nightly, losartan 100 mg     Migraines stable    No longer taking keppra for seizures    Taking xanax 2 mg twice daily, running out      Review of Systems   Constitutional: Negative for chills and fever. HENT: Negative for congestion, ear pain and sore throat. Respiratory: Negative for cough, chest tightness, shortness of breath and wheezing. Cardiovascular: Negative for chest pain. Gastrointestinal: Negative for abdominal pain, diarrhea and nausea. Musculoskeletal: Negative for arthralgias and myalgias. Skin: Negative for rash. Prior to Visit Medications    Medication Sig Taking? Authorizing Provider   ALPRAZolam Shade [place]ones) 1 MG tablet   Anisha Montgomery MD   amLODIPine (NORVASC) 10 MG tablet TAKE 1 TABLET BY MOUTH EVERY DAY  Anisha Montgomery MD   SUMAtriptan (IMITREX) 100 MG tablet TAKE 1 TABLET AT ONSET OF MIGRAINE HEADACHE. MAY REPEAT IN 2 HOURS IF NEEDED. CALLY Guardado   naratriptan (AMERGE) 2.5 MG tablet TAKE AS DIRECTED.   CALLY Guardado   cloNIDine (CATAPRES) 0.2 MG tablet Take 1 tablet by mouth 2 times daily as needed (elevated blood pressure)  CALLY Hendrix   ondansetron (ZOFRAN) 4 MG tablet TAKE 1 TABLET BY MOUTH DAILY AS NEEDED FOR NAUSEA OR VOMITING  Anisha Montgomery MD   docusate sodium (COLACE) 100 MG capsule TAKE ONE CAPSULE BY MOUTH TWICE A DAY AS NEEDED FOR CONSTIPATION  Anisha Montgomery MD   tamsulosin (FLOMAX) 0.4 MG capsule TAKE 1 CAPSULE BY MOUTH DAILY  Anisha Montgomery MD   Ostomy Supplies KIT Ostomy supplies  Anisha Montgomery MD   levETIRAcetam (KEPPRA) 500 MG tablet TAKE 1 TABLET BY Merry Zamora MD   omeprazole (PRILOSEC) 20 MG delayed release capsule TAKE 1 CAPSULE BY MOUTH DAILY  Jamel Schilder, MD   cinacalcet (SENSIPAR) 30 MG tablet Take 30 mg by mouth daily  Historical Provider, MD   albuterol sulfate  (90 Base) MCG/ACT inhaler Inhale 2 puffs into the lungs  Historical Provider, MD   vitamin D (ERGOCALCIFEROL) 1.25 MG (01943 UT) CAPS capsule TAKE 1 CAPSULE WEEKLY  Historical Provider, MD   AURYXIA 1  MG(Fe) TABS TAKE 1 TABLET BY MOUTH THREE TIMES A DAY WITH MEALS. SWALLOW WHOLE, DO NOT CHEW OR CRUSH MEDICATION  Historical Provider, MD   oxybutynin (DITROPAN) 5 MG tablet Take 5 mg by mouth  Historical Provider, MD   omeprazole (PRILOSEC) 40 MG delayed release capsule Take 40 mg by mouth  Historical Provider, MD   folic acid (FOLVITE) 1 MG tablet Take 1 tablet by mouth daily  Tika Arzola MD   hydrALAZINE (APRESOLINE) 50 MG tablet TAKE 1 TABLET BY MOUTH THREE TIMES A DAY  Jamel Schilder, MD   HYDROcodone-acetaminophen (NORCO)  MG per tablet Take one tablet every 3-4 hours PRN pain (month supply) (may fill 8/24/18).   CALLY Campos - CNP       Social History     Tobacco Use    Smoking status: Never Smoker    Smokeless tobacco: Never Used   Vaping Use    Vaping Use: Never used   Substance Use Topics    Alcohol use: No    Drug use: No        {F2 for Optional History SmartBlocks:32787}    PHYSICAL EXAMINATION:  [ INSTRUCTIONS:  \"[x]\" Indicates a positive item  \"[]\" Indicates a negative item  -- DELETE ALL ITEMS NOT EXAMINED]  Vital Signs: (As obtained by patient/caregiver or practitioner observation)    Blood pressure-  Heart rate-    Respiratory rate-    Temperature-  Pulse oximetry-     Constitutional: [] Appears well-developed and well-nourished [] No apparent distress      [] Abnormal-   Mental status  [] Alert and awake  [] Oriented to person/place/time []Able to follow commands      Eyes:  EOM    []  Normal  [] Abnormal-  Sclera  []  Normal  [] Abnormal -         Discharge []  None visible  [] Abnormal -    HENT:   [] Normocephalic, atraumatic. [] Abnormal   [] Mouth/Throat: Mucous membranes are moist.     External Ears [] Normal  [] Abnormal-     Neck: [] No visualized mass     Pulmonary/Chest: [] Respiratory effort normal.  [] No visualized signs of difficulty breathing or respiratory distress        [] Abnormal-      Musculoskeletal:   [] Normal gait with no signs of ataxia         [] Normal range of motion of neck        [] Abnormal-       Neurological:        [] No Facial Asymmetry (Cranial nerve 7 motor function) (limited exam to video visit)          [] No gaze palsy        [] Abnormal-         Skin:        [] No significant exanthematous lesions or discoloration noted on facial skin         [] Abnormal-            Psychiatric:       [] Normal Affect [] No Hallucinations        [] Abnormal-     Other pertinent observable physical exam findings-     ASSESSMENT/PLAN:  There are no diagnoses linked to this encounter. No follow-ups on file. Scar Frey, was evaluated through a synchronous (real-time) audio-video encounter. The patient (or guardian if applicable) is aware that this is a billable service, which includes applicable co-pays. This Virtual Visit was conducted with patient's (and/or legal guardian's) consent. The visit was conducted pursuant to the emergency declaration under the [address] [place] [address] authority and the [employer] General Act. Patient identification was verified, and a caregiver was present when appropriate. The patient was located at home in a state where the provider was licensed to provide care. Total time spent on this encounter: {Time Spent:89715472}    --CALLY Stone on 2/22/2022 at 4:00 PM    An electronic signature was used to authenticate this note.

## 2022-02-22 NOTE — PROGRESS NOTES
[address] [address]            Phone:  [phone]  Fax:  [phone]    Dayton Perkins is a 42 y.o. male evaluated via telephone on 2/22/2022. Consent:    He and/or health care decision maker is aware that that he may receive a bill for this telephone service, depending on his insurance coverage, and has provided verbal consent to proceed: Yes      HPI  Chief Complaint   Patient presents with    Medication Refill       I communicated with the patient and/or health care decision maker about:    He continues dialysis 3 days a week for CKD V. He is followed by the transplant team at [facility] and is establishing with the transplant team at [facility]. in [place] as well. BP is stable, controlled on current meds. He is taking losartan 100 mg, amlodipine 10 mg, clonidine 0.2 mg qhs. Migraines are stable since bp is better controlled. He continues to take either imitrex or amerge as needed, interchanges. His anxiety has been worse. He is prescribed alprazolam 1 mg bid prn but has been taking 2 tabs bid. He requests that dose be increased to 2 mg bid. He states he has tried SSRIs and other meds in the past with no improvement. Nephrology has refilled his stool softener and zofran. Review of Systems   Constitutional: Negative for chills and fever. HENT: Negative for congestion, ear pain and sore throat. Respiratory: Negative for cough, chest tightness, shortness of breath and wheezing. Cardiovascular: Negative for chest pain. Gastrointestinal: Negative for abdominal pain, diarrhea and nausea. Musculoskeletal: Negative for arthralgias and myalgias. Skin: Negative for rash. Psychiatric/Behavioral: The patient is nervous/anxious. Patient location: home    PLAN    Details of this discussion including any medical advice provided:     1.  CKD (chronic kidney disease) stage V requiring chronic dialysis ([facility] [address]  -Continue management per nephrology, dialysis  -Followed by transplant teams in [facility] [place]. 2. Chronic migraine without aura without status migrainosus, not intractable  -Stable, controlled. Continue prn meds for acute migraines. 3. Generalized anxiety disorder  -Discussed risk of tolerance, etc with increasing benzo dose. Advised that he continue 1 mg bid prn. Advised that I would discuss further with Dr. Leann Angulo.  -Discussed trial of SSRI or other but states he has failed treatment with many meds in the past.  - ALPRAZolam (XANAX) 1 MG tablet; Take 1 tablet by mouth 2 times daily as needed for Anxiety for up to 30 days. Dispense: 60 tablet; Refill: 2    4. Primary hypertension  -Stable, controlled per his report. Continue current medication. I affirm this is a Patient Initiated Episode with an Established Patient who has not had a related appointment within my department in the past 7 days or scheduled within the next 24 hours. Total Time: minutes: 11-20 minutes - 12 minutes      Note: not billable if this call serves to triage the patient into an appointment for the relevant concern      CALLY Gates         Pursuant to the emergency declaration under the [facility] waiver authority and the [employer] and [employer] Act, this Virtual  Visit was conducted, with patient's consent, to reduce the patient's risk of exposure to COVID-19 and provide continuity of care for an established patient.

## 2022-04-29 DIAGNOSIS — F41.1 GENERALIZED ANXIETY DISORDER: ICD-10-CM

## 2022-04-29 RX ORDER — ALPRAZOLAM 1 MG/1
1 TABLET ORAL 3 TIMES DAILY PRN
Qty: 90 TABLET | Refills: 1 | Status: SHIPPED | OUTPATIENT
Start: 2022-04-29 | End: 2022-06-21 | Stop reason: SDUPTHER

## 2022-04-29 NOTE — TELEPHONE ENCOUNTER
Brittney Mormon called to request a refill on his medication. Last office visit : 2/22/2022   Next office visit : 5/23/2022     Last UDS:   Amphetamines   Date Value Ref Range Status   09/20/2013 NEGATIVE  Final     Barbiturates   Date Value Ref Range Status   09/20/2013 NEGATIVE  Final     Benzodiazepines   Date Value Ref Range Status   09/20/2013 NEGATIVE  Final     Opiate Scrn, Ur   Date Value Ref Range Status   11/16/2015 See Final Results Jngtdi=335 ng/mL Final     Comment:     Opiate test includes Codeine, Morphine, Hydromorphone, Hydrocodone. Last Dortha Lacy: 2/22/22  Medication Contract: 10/2/17   Last Fill: 3/22/22    Requested Prescriptions     Pending Prescriptions Disp Refills    ALPRAZolam (XANAX) 1 MG tablet 90 tablet 1     Sig: Take 1 tablet by mouth 3 times daily as needed for Sleep or Anxiety for up to 30 days. Please approve or refuse this medication.    Janet Herring MA

## 2022-06-21 ENCOUNTER — OFFICE VISIT (OUTPATIENT)
Dept: FAMILY MEDICINE CLINIC | Age: 47
End: 2022-06-21
Payer: MEDICARE

## 2022-06-21 VITALS
DIASTOLIC BLOOD PRESSURE: 64 MMHG | BODY MASS INDEX: 25.12 KG/M2 | WEIGHT: 201 LBS | HEART RATE: 123 BPM | SYSTOLIC BLOOD PRESSURE: 112 MMHG | OXYGEN SATURATION: 98 % | TEMPERATURE: 99.2 F

## 2022-06-21 DIAGNOSIS — E78.00 HYPERCHOLESTEROLEMIA: ICD-10-CM

## 2022-06-21 DIAGNOSIS — F41.1 GENERALIZED ANXIETY DISORDER: ICD-10-CM

## 2022-06-21 DIAGNOSIS — I10 PRIMARY HYPERTENSION: Primary | ICD-10-CM

## 2022-06-21 DIAGNOSIS — Z13.220 LIPID SCREENING: ICD-10-CM

## 2022-06-21 DIAGNOSIS — C20 MALIGNANT NEOPLASM OF RECTUM (HCC): ICD-10-CM

## 2022-06-21 DIAGNOSIS — G43.709 CHRONIC MIGRAINE WITHOUT AURA WITHOUT STATUS MIGRAINOSUS, NOT INTRACTABLE: ICD-10-CM

## 2022-06-21 DIAGNOSIS — F51.04 CHRONIC INSOMNIA: ICD-10-CM

## 2022-06-21 DIAGNOSIS — K21.9 GASTROESOPHAGEAL REFLUX DISEASE WITHOUT ESOPHAGITIS: ICD-10-CM

## 2022-06-21 DIAGNOSIS — Z85.038 HX OF COLON CANCER, STAGE III: ICD-10-CM

## 2022-06-21 PROBLEM — I31.9 PERICARDITIS: Status: RESOLVED | Noted: 2019-12-16 | Resolved: 2022-06-21

## 2022-06-21 PROBLEM — R30.0 DYSURIA: Status: RESOLVED | Noted: 2019-12-16 | Resolved: 2022-06-21

## 2022-06-21 PROBLEM — R39.198 SLOWING OF URINARY STREAM: Status: RESOLVED | Noted: 2019-12-16 | Resolved: 2022-06-21

## 2022-06-21 PROBLEM — R63.4 WEIGHT DECREASING: Status: RESOLVED | Noted: 2019-12-16 | Resolved: 2022-06-21

## 2022-06-21 PROBLEM — N19 RENAL FAILURE: Status: RESOLVED | Noted: 2017-01-17 | Resolved: 2022-06-21

## 2022-06-21 PROBLEM — R42 DIZZY SPELLS: Status: RESOLVED | Noted: 2019-12-16 | Resolved: 2022-06-21

## 2022-06-21 PROBLEM — R53.83 FATIGUE: Status: RESOLVED | Noted: 2019-12-16 | Resolved: 2022-06-21

## 2022-06-21 PROBLEM — K92.2 GI (GASTROINTESTINAL BLEED): Status: RESOLVED | Noted: 2019-07-10 | Resolved: 2022-06-21

## 2022-06-21 PROBLEM — R35.1 NOCTURIA: Status: RESOLVED | Noted: 2019-12-16 | Resolved: 2022-06-21

## 2022-06-21 PROBLEM — M25.50 PAIN IN JOINT: Status: RESOLVED | Noted: 2019-12-16 | Resolved: 2022-06-21

## 2022-06-21 PROBLEM — N94.10 UNSPECIFIED DYSPAREUNIA: Status: RESOLVED | Noted: 2019-12-16 | Resolved: 2022-06-21

## 2022-06-21 PROBLEM — R51.9 HEADACHE: Status: RESOLVED | Noted: 2019-12-16 | Resolved: 2022-06-21

## 2022-06-21 PROBLEM — H53.8 HAZY VISION: Status: RESOLVED | Noted: 2019-12-16 | Resolved: 2022-06-21

## 2022-06-21 PROBLEM — K27.9 PEPTIC ULCER: Status: RESOLVED | Noted: 2019-07-31 | Resolved: 2022-06-21

## 2022-06-21 PROBLEM — R63.1 EXCESSIVE THIRST: Status: RESOLVED | Noted: 2019-12-16 | Resolved: 2022-06-21

## 2022-06-21 PROBLEM — L02.91 ABSCESS: Status: RESOLVED | Noted: 2019-12-16 | Resolved: 2022-06-21

## 2022-06-21 PROBLEM — R33.9 RETENTION OF URINE: Status: RESOLVED | Noted: 2019-12-16 | Resolved: 2022-06-21

## 2022-06-21 PROCEDURE — G8419 CALC BMI OUT NRM PARAM NOF/U: HCPCS | Performed by: FAMILY MEDICINE

## 2022-06-21 PROCEDURE — 1036F TOBACCO NON-USER: CPT | Performed by: FAMILY MEDICINE

## 2022-06-21 PROCEDURE — G8427 DOCREV CUR MEDS BY ELIG CLIN: HCPCS | Performed by: FAMILY MEDICINE

## 2022-06-21 PROCEDURE — 99214 OFFICE O/P EST MOD 30 MIN: CPT | Performed by: FAMILY MEDICINE

## 2022-06-21 RX ORDER — ALPRAZOLAM 1 MG/1
1 TABLET ORAL 3 TIMES DAILY PRN
Qty: 90 TABLET | Refills: 1 | Status: SHIPPED | OUTPATIENT
Start: 2022-06-21 | End: 2022-09-22 | Stop reason: SDUPTHER

## 2022-06-21 ASSESSMENT — PATIENT HEALTH QUESTIONNAIRE - PHQ9
SUM OF ALL RESPONSES TO PHQ QUESTIONS 1-9: 0
1. LITTLE INTEREST OR PLEASURE IN DOING THINGS: 0
SUM OF ALL RESPONSES TO PHQ9 QUESTIONS 1 & 2: 0
2. FEELING DOWN, DEPRESSED OR HOPELESS: 0
SUM OF ALL RESPONSES TO PHQ QUESTIONS 1-9: 0

## 2022-06-21 NOTE — PROGRESS NOTES
[facility] PHYSICIAN SERVICES  [facility] FAMILY MEDICINE  [address] [address]  Dept: [phone]  Dept Fax: [phone]  Loc: [phone]    Bill Holland is a 42 y.o. male who presents today for his medical conditions/complaints as noted below. Bill Holland is here for Follow-up        HPI:   CC: Here today to discuss the followin-year-old with a history of end-stage renal disease since 2018 currently on hemodialysis. Renal failure suspected secondary to radiation damage from history of colorectal cancer. A renal biopsy was not performed. He has had a left nephrectomy. He has scar tissue with ureteral stent that is replaced every few months managed by urology at [facility]. He was diagnosed with colon cancer in . Underwent chemo/radiation. He had surgical resection of the tumor with colostomy. Current complications are secondary to his radiation. He follows up with the transplant team at [facility] as well. Hypertension  Compliant with medications. No adverse effects from medication. No lightheadedness, palpitations, or chest pain. Anxiety  Compliant with medications. No adverse effects from medication. No panic or anxiety attacks since last visit. Symptoms are controlled. Continues to take Xanax 1 mg 3 times daily. Migraine Headaches  Headaches are currently stable. Satisfied with current medication without side effects. Gastroesophageal Reflux Disease  Symptoms currently under control. Medication adequately controls symptoms. No hematochezia or melena. HPI    Subjective:      Review of Systems  Review of Systems   Constitutional: Negative for chills and fever. HENT: Negative for congestion. Respiratory: Negative for cough, chest tightness and shortness of breath. Cardiovascular: Negative for chest pain, palpitations and leg swelling.    Gastrointestinal: Negative for abdominal pain, anal bleeding, constipation, diarrhea and nausea. Genitourinary: Negative for difficulty urinating. Psychiatric/Behavioral: Negative. SeeHPI for visit specific review of symptoms. All others negative      Objective:   /64   Pulse (!) 123   Temp 99.2 °F (37.3 °C)   Wt 201 lb (91.2 kg)   SpO2 98%   BMI 25.12 kg/m²   Physical Exam  Recheck heart rate 100 bpm  Physical Exam   Constitutional: He appears well-developed. Does not appear ill. Neck: Neck supple. No masses. Neck Symmetric. Normal tracheal position. No thyroid enlargement  Cardiovascular: Normal rate and regular rhythm. Exam reveals no friction rub. No murmur heard. Respiratory:  Effort normal and breath sounds normal. No respiratory distress. No wheezes. No rales. No use of accessory muscles or intercostal retractions. Neurological: alert. Psychiatric: normal mood and affect. His behavior is normal.     No results found for this or any previous visit (from the past 672 hour(s)). Assessment & Plan: The following diagnoses and conditions are stable with no further orders unless indicated:  1. Primary hypertension  BP Readings from Last 3 Encounters:   06/21/22 112/64   08/31/21 132/80   04/30/20 122/80     Blood pressure stable  - CBC with Auto Differential; Future  - Comprehensive Metabolic Panel; Future    2. Gastroesophageal reflux disease without esophagitis  Continue with omeprazole    3. Chronic insomnia  4. Generalized anxiety disorder  Satisfied with his current management. Discussed risk and benefits of taking benzodiazepines. Risks include addiction and tolerance. If develops impairment or over sedation with medication, discontinue and contact me. Do not take medication with alcohol. Advised to take sparingly. Advised to keep medication hidden and locked up as theft is high with these medications as well. - ALPRAZolam (XANAX) 1 MG tablet;  Take 1 tablet by mouth 3 times daily as needed for Sleep or Anxiety for up to 30 days. Dispense: 90 tablet; Refill: 1    5. Hx of colon cancer, stage III  Continues to be followed by oncology    6. Hypercholesterolemia  Suggested checking a lipid panel    7. Malignant neoplasm of rectum [facility])  Recommendations per oncology and gastroenterology    8. Chronic migraine without aura without status migrainosus, not intractable  Stable    9. Lipid screening    - Lipid Panel; Future            Return in about 3 months (around 9/21/2022) for Routine follow up - 15 minute visit. Discussed use, benefit, and side effects of prescribed medications. All patient questions answered. Pt voiced understanding. Reviewed health maintenance. Instructedto continue current medications, diet and exercise. Patient agreed with treatmentplan.  Follow up as directed.     _______________________________________________________________      Past Medical History:   Diagnosis Date    Asthma     during winter months    Cancer [facility])     rectal    [facility] [address]     Hemodialysis patient ([facility] [address]     mon wed fri at [address] of blood transfusion     Hx of migraine headaches     Hypercholesterolemia 12/16/2019    Hypertension     Kidney stone     hx of    Palliative care patient 07/11/2019    Pericarditis     Rectal cancer ([facility] [address]     Testalgia 2/1/2016      Past Surgical History:   Procedure Laterality Date    ANTERIOR CRUCIATE LIGAMENT REPAIR  2007    ACL and MCL repair    COLON SURGERY      colon resection with colostomy    COLONOSCOPY      DIALYSIS FISTULA CREATION Bilateral 1/21/2020    CREATION  LEFT  BRACHIAL CEPHALIC AV FISTULA performed by Gonzales Moran MD at [address] OR    ENDOSCOPY, COLON, DIAGNOSTIC      HC COLONOSCOPY BIOPSY/STOMA N/A 7/12/2019    Dr Divina Ramos ablation-Inflammatory polyps inside or below the colostomy opening-Tubular AP (-) dysplasia    HERNIA REPAIR      As an infant    NEPHRECTOMY Left     cancer    UPPER GASTROINTESTINAL Take 1 tablet by mouth daily 30 tablet 2    hydrALAZINE (APRESOLINE) 50 MG tablet TAKE 1 TABLET BY MOUTH THREE TIMES A DAY 90 tablet 2    HYDROcodone-acetaminophen (NORCO)  MG per tablet Take one tablet every 3-4 hours PRN pain (month supply) (may fill 8/24/18). 180 tablet 0    omeprazole (PRILOSEC) 40 MG delayed release capsule Take 40 mg by mouth (Patient not taking: Reported on 6/21/2022)       No current facility-administered medications for this visit. Allergies   Allergen Reactions    Oxycodone-Acetaminophen      Give me migraines   Other reaction(s): Other (see comments)  Give me migraines   Give me migraines   Reaction: migraines  Give me migraines   Give me migraines   Reaction: migraines    Morphine And Related      Doesn't work     Morphine      Other reaction(s): Other (see comments)  Doesn't work   Doesn't work        Health Maintenance   Topic Date Due    HIV screen  Never done    Hepatitis B vaccine (1 of 3 - Risk Recombivax 3-dose series) 08/11/1996    COVID-19 Vaccine (2 - Moderna 3-dose series) 12/08/2021    DTaP/Tdap/Td vaccine (1 - Tdap) 06/21/2023 (Originally 8/11/1996)    Depression Screen  06/21/2023    Lipids  02/12/2024    Pneumococcal 0-64 years Vaccine (4 - PPSV23 or PCV20) 08/11/2042    Flu vaccine  Completed    Hepatitis C screen  Completed    Hepatitis A vaccine  Aged Out    Hib vaccine  Aged Out    Meningococcal (ACWY) vaccine  Aged Out       _______________________________________________________________    Note dictated using Dragon Dictation software  Sometimes this dictation software makes erroneous transcriptions.

## 2022-06-21 NOTE — PATIENT INSTRUCTIONS
We are committed to providing you with the best care possible. In order to help us achieve these goals please remember to bring all medications, herbal products, and over the counter supplements with you to each visit. If your provider has ordered testing for you, please be sure to follow up with our office if you have not received results within 7 days after the testing took place. *If you receive a survey after visiting one of our offices, please take time to share your experience concerning your physician office visit. These surveys are confidential and no health information about you is shared. We are eager to improve for you and we are counting on your feedback to help make that happen.      _______________________________________________________________    Labs before next appointment:   Go to room 405 for labs prior to next visit.  Be sure to fast for at least 10 hours prior to laboratory appointment.  Would recommend getting labs about 1 week prior to the appointment time to ensure they are available at the time of the appointment.  No appointment is necessary for laboratory work as the orders have already been placed.      Lab opens at 6:30 am and closes at 4:30 pm.

## 2022-06-29 ENCOUNTER — TELEPHONE (OUTPATIENT)
Dept: FAMILY MEDICINE CLINIC | Age: 47
End: 2022-06-29

## 2022-06-29 NOTE — TELEPHONE ENCOUNTER
----- Message from Evelyn Álvarez MD sent at 6/21/2022  5:27 PM CDT -----  Regarding: shonda  Can get a Raquel Lopez report on him.   Thanks

## 2022-07-14 ENCOUNTER — HOSPITAL ENCOUNTER (OUTPATIENT)
Dept: INTERVENTIONAL RADIOLOGY/VASCULAR | Age: 47
Discharge: HOME | End: 2022-07-14
Payer: MEDICARE

## 2022-07-14 VITALS
RESPIRATION RATE: 15 BRPM | HEIGHT: 75 IN | BODY MASS INDEX: 25.86 KG/M2 | SYSTOLIC BLOOD PRESSURE: 128 MMHG | WEIGHT: 208 LBS | TEMPERATURE: 97.4 F | DIASTOLIC BLOOD PRESSURE: 79 MMHG | OXYGEN SATURATION: 97 % | HEART RATE: 103 BPM

## 2022-07-14 DIAGNOSIS — N18.6 ESRD (END STAGE RENAL DISEASE) (HCC): ICD-10-CM

## 2022-07-14 PROCEDURE — 6360000002 HC RX W HCPCS: Performed by: SURGERY

## 2022-07-14 PROCEDURE — 99153 MOD SED SAME PHYS/QHP EA: CPT

## 2022-07-14 PROCEDURE — 2580000003 HC RX 258: Performed by: SURGERY

## 2022-07-14 PROCEDURE — 36581 REPLACE TUNNELED CV CATH: CPT

## 2022-07-14 PROCEDURE — 6370000000 HC RX 637 (ALT 250 FOR IP): Performed by: SURGERY

## 2022-07-14 PROCEDURE — 2709999900 IR TUNNELED CVC PLACE WO SQ PORT/PUMP > 5 YEARS

## 2022-07-14 PROCEDURE — 99152 MOD SED SAME PHYS/QHP 5/>YRS: CPT

## 2022-07-14 PROCEDURE — 77001 FLUOROGUIDE FOR VEIN DEVICE: CPT

## 2022-07-14 RX ORDER — FENTANYL CITRATE 50 UG/ML
INJECTION, SOLUTION INTRAMUSCULAR; INTRAVENOUS
Status: COMPLETED | OUTPATIENT
Start: 2022-07-14 | End: 2022-07-14

## 2022-07-14 RX ORDER — ACETAMINOPHEN 325 MG/1
650 TABLET ORAL EVERY 4 HOURS PRN
Status: DISCONTINUED | OUTPATIENT
Start: 2022-07-14 | End: 2022-07-16 | Stop reason: HOSPADM

## 2022-07-14 RX ORDER — HYDROCODONE BITARTRATE AND ACETAMINOPHEN 5; 325 MG/1; MG/1
2 TABLET ORAL EVERY 4 HOURS PRN
Status: DISCONTINUED | OUTPATIENT
Start: 2022-07-14 | End: 2022-07-16 | Stop reason: HOSPADM

## 2022-07-14 RX ORDER — SODIUM CHLORIDE 9 MG/ML
INJECTION, SOLUTION INTRAVENOUS CONTINUOUS
Status: DISCONTINUED | OUTPATIENT
Start: 2022-07-14 | End: 2022-07-16 | Stop reason: HOSPADM

## 2022-07-14 RX ORDER — MIDAZOLAM HYDROCHLORIDE 1 MG/ML
INJECTION INTRAMUSCULAR; INTRAVENOUS
Status: COMPLETED | OUTPATIENT
Start: 2022-07-14 | End: 2022-07-14

## 2022-07-14 RX ORDER — HYDROCODONE BITARTRATE AND ACETAMINOPHEN 5; 325 MG/1; MG/1
1 TABLET ORAL EVERY 4 HOURS PRN
Status: DISCONTINUED | OUTPATIENT
Start: 2022-07-14 | End: 2022-07-16 | Stop reason: HOSPADM

## 2022-07-14 RX ORDER — ONDANSETRON 2 MG/ML
4 INJECTION INTRAMUSCULAR; INTRAVENOUS EVERY 8 HOURS PRN
Status: DISCONTINUED | OUTPATIENT
Start: 2022-07-14 | End: 2022-07-16 | Stop reason: HOSPADM

## 2022-07-14 RX ADMIN — MIDAZOLAM 1 MG: 1 INJECTION INTRAMUSCULAR; INTRAVENOUS at 14:39

## 2022-07-14 RX ADMIN — HYDROCODONE BITARTRATE AND ACETAMINOPHEN 2 TABLET: 5; 325 TABLET ORAL at 15:30

## 2022-07-14 RX ADMIN — FENTANYL CITRATE 50 MCG: 50 INJECTION INTRAMUSCULAR; INTRAVENOUS at 14:38

## 2022-07-14 RX ADMIN — FENTANYL CITRATE 50 MCG: 50 INJECTION INTRAMUSCULAR; INTRAVENOUS at 14:51

## 2022-07-14 RX ADMIN — SODIUM CHLORIDE: 9 INJECTION, SOLUTION INTRAVENOUS at 13:30

## 2022-07-14 RX ADMIN — MIDAZOLAM 1 MG: 1 INJECTION INTRAMUSCULAR; INTRAVENOUS at 14:40

## 2022-07-14 RX ADMIN — MIDAZOLAM 1 MG: 1 INJECTION INTRAMUSCULAR; INTRAVENOUS at 14:51

## 2022-07-14 RX ADMIN — FENTANYL CITRATE 50 MCG: 50 INJECTION INTRAMUSCULAR; INTRAVENOUS at 14:40

## 2022-07-14 RX ADMIN — CEFAZOLIN SODIUM 1000 MG: 1 INJECTION, POWDER, FOR SOLUTION INTRAMUSCULAR; INTRAVENOUS at 14:22

## 2022-07-14 NOTE — OP NOTE
Preoperative Diagnosis:    1. ESRD on hemodialysis  2. Non-functioning right internal jugular vein tunneled dialysis catheter     Postoperative Diagnosis:  Same    Operative Procedure:    1. Placement/exchange over a wire of right internal jugular vein tunneled dialysis catheter (Bard Glidepath 27 cm tip to cuff). Surgeon:  Lalitha Rae. Garrett Pleitez MD    Anesthesia:  Moderate Sedation plus 10 ml 2% lidocaine without epi    Estimated blood loss:  Less than 50 ml    Findings:    1. The new dialysis catheter tip is in the superior vena cava/right atrial junction. 2.  The new catheter is ready for hemodialysis use when needed. 3.  The old non-functioning catheter was completely removed including the cuff. There were no signs of infection. Procedure in Detail:    After the patient was consented, and given intravenous antibiotics, they were brought to angiography and placed on the table in the supine position. Moderate Sedation was administered and the patient's right neck and chest was prepped and draped in the usual sterile fashion. Skin and subcutaneous tissues over the non functioning catheter exit site were anesthetized with licocaine. A Glidewire advantage was placed through the old catheter and down into the inferior vena cava under fluoroscopic visualization. An incision was made around the exit site and then the cuff was dissected away from the surrounding subcutaneous tissue. The cup was very well incorporated. The traction was then used to remove the old catheter. There was quite a bit of scar tissue up around the clavicular area. Initially, I tried passing an aqua stream over the wire without success secondary to resistance at the clavicular area. Therefore, I utilized a 27 cm glidepath over the wire and it went easily. The wire and wire guide were removed and both ports and the catheter aspirated and flushed easily with heparinized saline Hep-Lock.     The exit site was secured around the

## 2022-08-03 ENCOUNTER — APPOINTMENT (OUTPATIENT)
Dept: CT IMAGING | Age: 47
DRG: 871 | End: 2022-08-03
Payer: MEDICARE

## 2022-08-03 ENCOUNTER — HOSPITAL ENCOUNTER (INPATIENT)
Age: 47
LOS: 1 days | Discharge: ANOTHER HOSPITAL | DRG: 871 | End: 2022-08-04
Attending: EMERGENCY MEDICINE | Admitting: INTERNAL MEDICINE
Payer: MEDICARE

## 2022-08-03 ENCOUNTER — APPOINTMENT (OUTPATIENT)
Dept: GENERAL RADIOLOGY | Age: 47
DRG: 871 | End: 2022-08-03
Payer: MEDICARE

## 2022-08-03 DIAGNOSIS — R41.0 DISORIENTATION: ICD-10-CM

## 2022-08-03 DIAGNOSIS — Z85.048 HISTORY OF COLORECTAL CANCER: ICD-10-CM

## 2022-08-03 DIAGNOSIS — Z92.21 HX ANTINEOPLASTIC CHEMOTHERAPY: ICD-10-CM

## 2022-08-03 DIAGNOSIS — F41.1 GENERALIZED ANXIETY DISORDER: ICD-10-CM

## 2022-08-03 DIAGNOSIS — N18.6 ESRD ON HEMODIALYSIS (HCC): ICD-10-CM

## 2022-08-03 DIAGNOSIS — J96.01 ACUTE RESPIRATORY FAILURE WITH HYPOXIA (HCC): Primary | ICD-10-CM

## 2022-08-03 DIAGNOSIS — Z99.2 ESRD ON HEMODIALYSIS (HCC): ICD-10-CM

## 2022-08-03 DIAGNOSIS — Z99.2 END-STAGE RENAL DISEASE ON HEMODIALYSIS (HCC): ICD-10-CM

## 2022-08-03 DIAGNOSIS — N18.6 END-STAGE RENAL DISEASE ON HEMODIALYSIS (HCC): ICD-10-CM

## 2022-08-03 DIAGNOSIS — Z85.038 HX OF COLON CANCER, STAGE III: ICD-10-CM

## 2022-08-03 DIAGNOSIS — D72.829 LEUKOCYTOSIS, UNSPECIFIED TYPE: ICD-10-CM

## 2022-08-03 DIAGNOSIS — R16.0 LIVER MASS: ICD-10-CM

## 2022-08-03 DIAGNOSIS — N13.9 OBSTRUCTIVE UROPATHY: ICD-10-CM

## 2022-08-03 PROBLEM — R09.02 HYPOXIA: Status: ACTIVE | Noted: 2022-08-03

## 2022-08-03 PROBLEM — E83.52 HYPERCALCEMIA: Status: ACTIVE | Noted: 2022-08-03

## 2022-08-03 PROBLEM — R41.82 AMS (ALTERED MENTAL STATUS): Status: ACTIVE | Noted: 2022-08-03

## 2022-08-03 LAB
ALBUMIN SERPL-MCNC: 2.2 G/DL (ref 3.5–5.2)
ALP BLD-CCNC: 155 U/L (ref 40–130)
ALPHA FETOPROTEIN: 1.3 NG/ML (ref 0–8.3)
ALPHA FETOPROTEIN: 1.5 NG/ML (ref 0–8.3)
ALT SERPL-CCNC: <5 U/L (ref 5–41)
AMMONIA: 22 UMOL/L (ref 16–60)
AMPHETAMINE SCREEN, URINE: NEGATIVE
ANION GAP SERPL CALCULATED.3IONS-SCNC: 12 MMOL/L (ref 7–19)
AST SERPL-CCNC: 15 U/L (ref 5–40)
BACTERIA: ABNORMAL /HPF
BARBITURATE SCREEN URINE: NEGATIVE
BASE EXCESS ARTERIAL: 12.1 MMOL/L (ref -2–2)
BASOPHILS ABSOLUTE: 0.1 K/UL (ref 0–0.2)
BASOPHILS RELATIVE PERCENT: 0.4 % (ref 0–1)
BENZODIAZEPINE SCREEN, URINE: NEGATIVE
BILIRUB SERPL-MCNC: 0.6 MG/DL (ref 0.2–1.2)
BILIRUBIN URINE: NEGATIVE
BLOOD, URINE: ABNORMAL
BUN BLDV-MCNC: 38 MG/DL (ref 6–20)
BUPRENORPHINE URINE: NEGATIVE
CALCIUM SERPL-MCNC: 11.6 MG/DL (ref 8.6–10)
CANNABINOID SCREEN URINE: NEGATIVE
CARBOXYHEMOGLOBIN ARTERIAL: 1 % (ref 0–5)
CEA: 2.3 NG/ML (ref 0–4.7)
CHLORIDE BLD-SCNC: 91 MMOL/L (ref 98–111)
CLARITY: ABNORMAL
CO2: 33 MMOL/L (ref 22–29)
COCAINE METABOLITE SCREEN URINE: NEGATIVE
COLOR: YELLOW
COMMENT UA: ABNORMAL
CREAT SERPL-MCNC: 8.5 MG/DL (ref 0.5–1.2)
D DIMER: 5.44 UG/ML FEU (ref 0–0.48)
EOSINOPHILS ABSOLUTE: 0.1 K/UL (ref 0–0.6)
EOSINOPHILS RELATIVE PERCENT: 0.4 % (ref 0–5)
ETHANOL: <10 MG/DL (ref 0–0.08)
FIO2: 21 %
GFR AFRICAN AMERICAN: 8
GFR NON-AFRICAN AMERICAN: 7
GLUCOSE BLD-MCNC: 118 MG/DL (ref 74–109)
GLUCOSE BLD-MCNC: 124 MG/DL (ref 70–99)
GLUCOSE URINE: NEGATIVE MG/DL
HCO3 ARTERIAL: 35.9 MMOL/L (ref 22–26)
HCT VFR BLD CALC: 26.8 % (ref 42–52)
HEMOGLOBIN, ART, EXTENDED: 7.4 G/DL (ref 14–18)
HEMOGLOBIN: 7.8 G/DL (ref 14–18)
IMMATURE GRANULOCYTES #: 0.5 K/UL
KETONES, URINE: NEGATIVE MG/DL
LACTIC ACID: 1.4 MMOL/L (ref 0.5–1.9)
LEUKOCYTE ESTERASE, URINE: ABNORMAL
LYMPHOCYTES ABSOLUTE: 0.5 K/UL (ref 1.1–4.5)
LYMPHOCYTES RELATIVE PERCENT: 2.3 % (ref 20–40)
Lab: ABNORMAL
MAGNESIUM: 2.1 MG/DL (ref 1.6–2.6)
MCH RBC QN AUTO: 30 PG (ref 27–31)
MCHC RBC AUTO-ENTMCNC: 29.1 G/DL (ref 33–37)
MCV RBC AUTO: 103.1 FL (ref 80–94)
METHADONE SCREEN, URINE: NEGATIVE
METHAMPHETAMINE, URINE: NEGATIVE
METHEMOGLOBIN ARTERIAL: 0.5 %
MONOCYTES ABSOLUTE: 1.1 K/UL (ref 0–0.9)
MONOCYTES RELATIVE PERCENT: 4.9 % (ref 0–10)
NEUTROPHILS ABSOLUTE: 19.4 K/UL (ref 1.5–7.5)
NEUTROPHILS RELATIVE PERCENT: 89.6 % (ref 50–65)
NITRITE, URINE: NEGATIVE
O2 CONTENT ARTERIAL: 9.2 ML/DL
O2 SAT, ARTERIAL: 88 %
O2 THERAPY: ABNORMAL
OPIATE SCREEN URINE: NEGATIVE
OXYCODONE URINE: POSITIVE
PARATHYROID HORMONE INTACT: 58.9 PG/ML (ref 15–65)
PCO2 ARTERIAL: 43 MMHG (ref 35–45)
PDW BLD-RTO: 15.4 % (ref 11.5–14.5)
PERFORMED ON: ABNORMAL
PH ARTERIAL: 7.53 (ref 7.35–7.45)
PH UA: 7.5 (ref 5–8)
PHENCYCLIDINE SCREEN URINE: NEGATIVE
PHOSPHORUS: 4.7 MG/DL (ref 2.5–4.5)
PLATELET # BLD: 366 K/UL (ref 130–400)
PMV BLD AUTO: 8.8 FL (ref 9.4–12.4)
PO2 ARTERIAL: 55 MMHG (ref 80–100)
POTASSIUM REFLEX MAGNESIUM: 3.5 MMOL/L (ref 3.5–5)
POTASSIUM, WHOLE BLOOD: 3.3
PRO-BNP: ABNORMAL PG/ML (ref 0–450)
PROPOXYPHENE SCREEN: NEGATIVE
PROTEIN UA: 300 MG/DL
RBC # BLD: 2.6 M/UL (ref 4.7–6.1)
REASON FOR REJECTION: NORMAL
REJECTED TEST: NORMAL
SAMPLE SOURCE: ABNORMAL
SARS-COV-2, NAAT: NOT DETECTED
SODIUM BLD-SCNC: 136 MMOL/L (ref 136–145)
SPECIFIC GRAVITY UA: 1.01 (ref 1–1.03)
SPONT RATE(BPM): 24
TOTAL PROTEIN: 6.2 G/DL (ref 6.6–8.7)
TRICYCLIC, URINE: NEGATIVE
TROPONIN: 0.06 NG/ML (ref 0–0.03)
UROBILINOGEN, URINE: 0.2 E.U./DL
VITAMIN D 25-HYDROXY: 66.2 NG/ML
WBC # BLD: 21.6 K/UL (ref 4.8–10.8)
WBC UA: ABNORMAL /HPF (ref 0–5)

## 2022-08-03 PROCEDURE — 80053 COMPREHEN METABOLIC PANEL: CPT

## 2022-08-03 PROCEDURE — 83880 ASSAY OF NATRIURETIC PEPTIDE: CPT

## 2022-08-03 PROCEDURE — 1210000000 HC MED SURG R&B

## 2022-08-03 PROCEDURE — 82947 ASSAY GLUCOSE BLOOD QUANT: CPT

## 2022-08-03 PROCEDURE — 82140 ASSAY OF AMMONIA: CPT

## 2022-08-03 PROCEDURE — 8010000000 HC HEMODIALYSIS ACUTE INPT

## 2022-08-03 PROCEDURE — 86753 PROTOZOA ANTIBODY NOS: CPT

## 2022-08-03 PROCEDURE — 82330 ASSAY OF CALCIUM: CPT

## 2022-08-03 PROCEDURE — 87040 BLOOD CULTURE FOR BACTERIA: CPT

## 2022-08-03 PROCEDURE — 87635 SARS-COV-2 COVID-19 AMP PRB: CPT

## 2022-08-03 PROCEDURE — 83735 ASSAY OF MAGNESIUM: CPT

## 2022-08-03 PROCEDURE — 5A1D70Z PERFORMANCE OF URINARY FILTRATION, INTERMITTENT, LESS THAN 6 HOURS PER DAY: ICD-10-PCS | Performed by: INTERNAL MEDICINE

## 2022-08-03 PROCEDURE — 36600 WITHDRAWAL OF ARTERIAL BLOOD: CPT

## 2022-08-03 PROCEDURE — 99285 EMERGENCY DEPT VISIT HI MDM: CPT

## 2022-08-03 PROCEDURE — 83605 ASSAY OF LACTIC ACID: CPT

## 2022-08-03 PROCEDURE — 84100 ASSAY OF PHOSPHORUS: CPT

## 2022-08-03 PROCEDURE — 96375 TX/PRO/DX INJ NEW DRUG ADDON: CPT

## 2022-08-03 PROCEDURE — 96365 THER/PROPH/DIAG IV INF INIT: CPT

## 2022-08-03 PROCEDURE — 70450 CT HEAD/BRAIN W/O DYE: CPT

## 2022-08-03 PROCEDURE — 71045 X-RAY EXAM CHEST 1 VIEW: CPT | Performed by: RADIOLOGY

## 2022-08-03 PROCEDURE — 70450 CT HEAD/BRAIN W/O DYE: CPT | Performed by: RADIOLOGY

## 2022-08-03 PROCEDURE — 6360000002 HC RX W HCPCS: Performed by: EMERGENCY MEDICINE

## 2022-08-03 PROCEDURE — 82306 VITAMIN D 25 HYDROXY: CPT

## 2022-08-03 PROCEDURE — 82105 ALPHA-FETOPROTEIN SERUM: CPT

## 2022-08-03 PROCEDURE — 83970 ASSAY OF PARATHORMONE: CPT

## 2022-08-03 PROCEDURE — 85025 COMPLETE CBC W/AUTO DIFF WBC: CPT

## 2022-08-03 PROCEDURE — 87186 SC STD MICRODIL/AGAR DIL: CPT

## 2022-08-03 PROCEDURE — 36415 COLL VENOUS BLD VENIPUNCTURE: CPT

## 2022-08-03 PROCEDURE — 74177 CT ABD & PELVIS W/CONTRAST: CPT

## 2022-08-03 PROCEDURE — 71275 CT ANGIOGRAPHY CHEST: CPT

## 2022-08-03 PROCEDURE — 80306 DRUG TEST PRSMV INSTRMNT: CPT

## 2022-08-03 PROCEDURE — 87086 URINE CULTURE/COLONY COUNT: CPT

## 2022-08-03 PROCEDURE — 85379 FIBRIN DEGRADATION QUANT: CPT

## 2022-08-03 PROCEDURE — 6360000004 HC RX CONTRAST MEDICATION: Performed by: EMERGENCY MEDICINE

## 2022-08-03 PROCEDURE — 84484 ASSAY OF TROPONIN QUANT: CPT

## 2022-08-03 PROCEDURE — 81001 URINALYSIS AUTO W/SCOPE: CPT

## 2022-08-03 PROCEDURE — 99221 1ST HOSP IP/OBS SF/LOW 40: CPT | Performed by: SPECIALIST

## 2022-08-03 PROCEDURE — 82077 ASSAY SPEC XCP UR&BREATH IA: CPT

## 2022-08-03 PROCEDURE — 71045 X-RAY EXAM CHEST 1 VIEW: CPT

## 2022-08-03 PROCEDURE — 82803 BLOOD GASES ANY COMBINATION: CPT

## 2022-08-03 PROCEDURE — 82378 CARCINOEMBRYONIC ANTIGEN: CPT

## 2022-08-03 RX ORDER — ONDANSETRON 2 MG/ML
4 INJECTION INTRAMUSCULAR; INTRAVENOUS EVERY 30 MIN PRN
Status: DISCONTINUED | OUTPATIENT
Start: 2022-08-03 | End: 2022-08-05 | Stop reason: HOSPADM

## 2022-08-03 RX ORDER — CINACALCET 30 MG/1
30 TABLET, FILM COATED ORAL DAILY
Status: DISCONTINUED | OUTPATIENT
Start: 2022-08-03 | End: 2022-08-05 | Stop reason: HOSPADM

## 2022-08-03 RX ORDER — HEPARIN SODIUM 1000 [USP'U]/ML
4000 INJECTION, SOLUTION INTRAVENOUS; SUBCUTANEOUS
Status: DISPENSED | OUTPATIENT
Start: 2022-08-03 | End: 2022-08-04

## 2022-08-03 RX ORDER — PANTOPRAZOLE SODIUM 40 MG/1
40 TABLET, DELAYED RELEASE ORAL
Status: DISCONTINUED | OUTPATIENT
Start: 2022-08-04 | End: 2022-08-05 | Stop reason: HOSPADM

## 2022-08-03 RX ORDER — LOSARTAN POTASSIUM 100 MG/1
100 TABLET ORAL DAILY
Status: DISCONTINUED | OUTPATIENT
Start: 2022-08-03 | End: 2022-08-05 | Stop reason: HOSPADM

## 2022-08-03 RX ORDER — OXYBUTYNIN CHLORIDE 5 MG/1
5 TABLET ORAL DAILY
Status: DISCONTINUED | OUTPATIENT
Start: 2022-08-04 | End: 2022-08-05 | Stop reason: HOSPADM

## 2022-08-03 RX ORDER — HYDROMORPHONE HYDROCHLORIDE 1 MG/ML
0.5 INJECTION, SOLUTION INTRAMUSCULAR; INTRAVENOUS; SUBCUTANEOUS
Status: COMPLETED | OUTPATIENT
Start: 2022-08-03 | End: 2022-08-04

## 2022-08-03 RX ORDER — CLONIDINE HYDROCHLORIDE 0.2 MG/1
0.2 TABLET ORAL NIGHTLY
Status: DISCONTINUED | OUTPATIENT
Start: 2022-08-03 | End: 2022-08-05 | Stop reason: HOSPADM

## 2022-08-03 RX ORDER — METRONIDAZOLE 500 MG/100ML
500 INJECTION, SOLUTION INTRAVENOUS EVERY 8 HOURS
Status: DISCONTINUED | OUTPATIENT
Start: 2022-08-03 | End: 2022-08-04

## 2022-08-03 RX ORDER — SODIUM CHLORIDE 0.9 % (FLUSH) 0.9 %
5-40 SYRINGE (ML) INJECTION EVERY 12 HOURS SCHEDULED
Status: DISCONTINUED | OUTPATIENT
Start: 2022-08-03 | End: 2022-08-05 | Stop reason: HOSPADM

## 2022-08-03 RX ORDER — AMLODIPINE BESYLATE 10 MG/1
10 TABLET ORAL DAILY
Status: DISCONTINUED | OUTPATIENT
Start: 2022-08-03 | End: 2022-08-05 | Stop reason: HOSPADM

## 2022-08-03 RX ORDER — ONDANSETRON 4 MG/1
4 TABLET, ORALLY DISINTEGRATING ORAL EVERY 8 HOURS PRN
Status: DISCONTINUED | OUTPATIENT
Start: 2022-08-03 | End: 2022-08-05 | Stop reason: HOSPADM

## 2022-08-03 RX ORDER — SODIUM CHLORIDE 0.9 % (FLUSH) 0.9 %
5-40 SYRINGE (ML) INJECTION PRN
Status: DISCONTINUED | OUTPATIENT
Start: 2022-08-03 | End: 2022-08-05 | Stop reason: HOSPADM

## 2022-08-03 RX ORDER — SODIUM CHLORIDE 9 MG/ML
INJECTION, SOLUTION INTRAVENOUS PRN
Status: DISCONTINUED | OUTPATIENT
Start: 2022-08-03 | End: 2022-08-05 | Stop reason: HOSPADM

## 2022-08-03 RX ORDER — HEPARIN SODIUM 5000 [USP'U]/ML
5000 INJECTION, SOLUTION INTRAVENOUS; SUBCUTANEOUS EVERY 8 HOURS SCHEDULED
Status: DISCONTINUED | OUTPATIENT
Start: 2022-08-03 | End: 2022-08-05 | Stop reason: HOSPADM

## 2022-08-03 RX ORDER — HYDRALAZINE HYDROCHLORIDE 50 MG/1
50 TABLET, FILM COATED ORAL EVERY 8 HOURS SCHEDULED
Status: DISCONTINUED | OUTPATIENT
Start: 2022-08-03 | End: 2022-08-05 | Stop reason: HOSPADM

## 2022-08-03 RX ORDER — TAMSULOSIN HYDROCHLORIDE 0.4 MG/1
0.4 CAPSULE ORAL DAILY
Status: DISCONTINUED | OUTPATIENT
Start: 2022-08-03 | End: 2022-08-05 | Stop reason: HOSPADM

## 2022-08-03 RX ORDER — ONDANSETRON 2 MG/ML
4 INJECTION INTRAMUSCULAR; INTRAVENOUS EVERY 6 HOURS PRN
Status: DISCONTINUED | OUTPATIENT
Start: 2022-08-03 | End: 2022-08-05 | Stop reason: HOSPADM

## 2022-08-03 RX ORDER — FOLIC ACID 1 MG/1
1 TABLET ORAL DAILY
Status: DISCONTINUED | OUTPATIENT
Start: 2022-08-03 | End: 2022-08-05 | Stop reason: HOSPADM

## 2022-08-03 RX ORDER — POLYETHYLENE GLYCOL 3350 17 G/17G
17 POWDER, FOR SOLUTION ORAL DAILY PRN
Status: DISCONTINUED | OUTPATIENT
Start: 2022-08-03 | End: 2022-08-05 | Stop reason: HOSPADM

## 2022-08-03 RX ADMIN — Medication 1000 MG: at 16:51

## 2022-08-03 RX ADMIN — HYDROMORPHONE HYDROCHLORIDE 0.5 MG: 1 INJECTION, SOLUTION INTRAMUSCULAR; INTRAVENOUS; SUBCUTANEOUS at 17:11

## 2022-08-03 RX ADMIN — ONDANSETRON 4 MG: 2 INJECTION INTRAMUSCULAR; INTRAVENOUS at 17:11

## 2022-08-03 RX ADMIN — IOPAMIDOL 75 ML: 755 INJECTION, SOLUTION INTRAVENOUS at 16:40

## 2022-08-03 ASSESSMENT — ENCOUNTER SYMPTOMS
SHORTNESS OF BREATH: 1
COUGH: 1

## 2022-08-03 ASSESSMENT — PAIN SCALES - GENERAL: PAINLEVEL_OUTOF10: 6

## 2022-08-03 NOTE — CARE COORDINATION
Patient Contact Information:    [facility] [address] [address] (home)   Telephone Information:   Mobile [phone]     Above information verified? [x]   Yes  []   No      Emergency Contacts:    Extended Emergency Contact Information  Primary Emergency Contact: Louie Miles  [facility] [address] [facility] [address] [facility] [address] Phone: [phone]  Mobile Phone: [phone]  Relation: Spouse  Hearing or visual needs: None  Other needs: None  Preferred language: English   needed? No      Have you been vaccinated for COVID-19 (SARS-CoV-2)? [x]   Yes  []   No                   If so, when? Which :         []   Pfizer-BioNTflaveit  [x]   Moderna  []   Katie Jernigan  []   Other:         Pharmacy:    "[facility] [address] [facility], [address] [place] [address] [address]  [address] [address]  Phone: [phone] Fax: [phone]    [facility] [address] [id number] [facility] [address] [phone] - F [phone]  [address] [place] [address] [address] [place] [address]  Phone: [phone] Fax: [phone]          Patient Deficits:    []   Yes   [x]   No    If yes:    []   Confusion/Memory  []   Visual  []   Motor/Sensory         []   Right arm         []   Right leg         []   Left arm         []   Left leg  []   Language/Speech         []   Aphasia         []   Dysarthria         []   Swallow         Geno Coma Scale  Eye Opening: Spontaneous  Best Verbal Response: Confused  Best Motor Response: Obeys commands  Geno Coma Scale Score: 14    Patient Deficit Notes:   SW met with pt and spouse at bedside. Spouse states pt attempted to drive to dialysis today which led him to the ED. Spouse states she saw pt driving poorly and forgot to go to dialysis. She states pt drove home. Spouse states pt was diagnosed with rectal and colon cancer at the age of 36. Pt has had many medical issues since.  Pt has been very weak at home and not able to complete ADLS independently. States they would like to speak to Palliative Care about goals and possibly hospice.

## 2022-08-03 NOTE — H&P
headaches     Hypercholesterolemia 12/16/2019    Hypertension     Kidney stone     hx of    Palliative care patient 07/11/2019    Pericarditis     Rectal cancer ([facility] [address]     Testalgia 2/1/2016       Past Surgical History:        Procedure Laterality Date    ANTERIOR CRUCIATE LIGAMENT REPAIR  2007    ACL and MCL repair    COLON SURGERY      colon resection with colostomy    COLONOSCOPY      DIALYSIS FISTULA CREATION Bilateral 01/21/2020    CREATION  LEFT  BRACHIAL CEPHALIC AV FISTULA performed by Shannon Waterman MD at [address] OR    ENDOSCOPY, COLON, DIAGNOSTIC      HC COLONOSCOPY BIOPSY/STOMA N/A 07/12/2019    Dr Purdy-w/APC ablation-Inflammatory polyps inside or below the colostomy opening-Tubular AP (-) dysplasia    HERNIA REPAIR      As an infant    KIDNEY REMOVAL Left     cancer    UPPER GASTROINTESTINAL ENDOSCOPY N/A 07/12/2019    Dr Purdy-Gastric ulcers    Debi Dalia Right 07/14/2022    SJS Placement/exchange over a wire of right internal jugular vein tunneled dialysis catheter (BARD Glidepath 27 cm tip to cuff)       Home Medications:  Prior to Admission medications    Medication Sig Start Date End Date Taking? Authorizing Provider   losartan (COZAAR) 100 MG tablet Take 100 mg by mouth daily    Historical Provider, MD   cloNIDine (CATAPRES) 0.2 MG tablet Take 1 tablet by mouth at bedtime 2/22/22   CALLY Wagner   amLODIPine (NORVASC) 10 MG tablet TAKE 1 TABLET BY MOUTH EVERY DAY 11/29/21   Lorena Pearce MD   SUMAtriptan (IMITREX) 100 MG tablet TAKE 1 TABLET AT ONSET OF MIGRAINE HEADACHE.  MAY REPEAT IN 2 HOURS IF NEEDED. 8/31/21   CALLY Moore   naratriptan (AMERGE) 2.5 MG tablet TAKE AS DIRECTED. 8/31/21   CALLY Moore   ondansetron (ZOFRAN) 4 MG tablet TAKE 1 TABLET BY MOUTH DAILY AS NEEDED FOR NAUSEA OR VOMITING 3/2/21   Lorena Pearce MD   docusate sodium (COLACE) 100 MG capsule TAKE ONE CAPSULE BY MOUTH TWICE A DAY AS NEEDED FOR CONSTIPATION 1/8/21   Nighat De Luna MD   tamsulosin [facility]) 0.4 MG capsule TAKE 1 CAPSULE BY MOUTH DAILY 9/27/20   Nighat De Luna MD   Ostomy Supplies KIT Ostomy supplies 7/17/20   Nighat De Luna MD   omeprazole (PRILOSEC) 20 MG delayed release capsule TAKE 1 CAPSULE BY MOUTH DAILY 4/23/20   Nighat De Luna MD   cinacalcet (SENSIPAR) 30 MG tablet Take 30 mg by mouth daily    Historical Provider, MD   albuterol sulfate  (90 Base) MCG/ACT inhaler Inhale 2 puffs into the lungs every 4 hours as needed     Historical Provider, MD   vitamin D (ERGOCALCIFEROL) 1.25 MG (65164 UT) CAPS capsule TAKE 1 CAPSULE WEEKLY 10/24/19   Historical Provider, MD   AURYXIA 1  MG(Fe) TABS TAKE 1 TABLET BY MOUTH THREE TIMES A DAY WITH MEALS. SWALLOW WHOLE, DO NOT CHEW OR CRUSH MEDICATION 10/1/19   Historical Provider, MD   oxybutynin (DITROPAN) 5 MG tablet Take 5 mg by mouth 1/10/19   Historical Provider, MD   omeprazole (PRILOSEC) 40 MG delayed release capsule Take 40 mg by mouth  6/7/16   Historical Provider, MD   folic acid (FOLVITE) 1 MG tablet Take 1 tablet by mouth daily 7/18/19   Vasquez Pandya MD   hydrALAZINE (APRESOLINE) 50 MG tablet TAKE 1 TABLET BY MOUTH THREE TIMES A DAY 4/8/19   Nighat De Luna MD   HYDROcodone-acetaminophen [facility])  MG per tablet Take one tablet every 3-4 hours PRN pain (month supply) (may fill 8/24/18). 8/24/18 6/21/22  CALLY Wisdom - CNP       Allergies:    Oxycodone-acetaminophen, Morphine and related, and Morphine    Social History:    The patient currently lives at home  Tobacco:   reports that he has never smoked. He has never used smokeless tobacco.  Alcohol:   reports no history of alcohol use.   Illicit Drugs: denies    Family History:      Problem Relation Age of Onset    Heart Attack Mother     Cancer Mother         Liver and pancreatic    Diabetes Father        Review of Systems:   Review of Systems   Constitutional:  Positive for activity change and appetite change. Respiratory:  Positive for cough and shortness of breath. Neurological:  Positive for weakness. Psychiatric/Behavioral:  Positive for confusion. All other systems reviewed and are negative. 14 point review of systems is negative except as specifically addressed above. Physical Examination:  /81   Pulse 82   Temp 97.2 °F (36.2 °C) (Temporal)   Resp 16   SpO2 98%   Physical Exam  Vitals reviewed. HENT:      Mouth/Throat:      Pharynx: Oropharynx is clear. Eyes:      Conjunctiva/sclera: Conjunctivae normal.   Cardiovascular:      Rate and Rhythm: Normal rate and regular rhythm. Abdominal:      Tenderness: There is no abdominal tenderness. Comments: Colostomy left sided abdomen    Musculoskeletal:      Cervical back: Neck supple. Right lower leg: No edema. Left lower leg: No edema. Skin:     General: Skin is warm and dry. Neurological:      Mental Status: He is alert and oriented to person, place, and time.         Diagnostic Data:  CBC:  Recent Labs     08/03/22  1315   WBC 21.6*   HGB 7.8*   HCT 26.8*        BMP:  Recent Labs     08/03/22  1354 08/03/22  1412     --    K 3.5 3.3   CL 91*  --    CO2 33*  --    BUN 38*  --    CREATININE 8.5*  --    CALCIUM 11.6*  --    PHOS 4.7*  --      Recent Labs     08/03/22  1354   AST 15   ALT <5*   BILITOT 0.6   ALKPHOS 155*       Cardiac Enzymes:   Recent Labs     08/03/22  1354   TROPONINI 0.06*     ABGs:  Lab Results   Component Value Date/Time    PHART 7.530 08/03/2022 02:12 PM    PO2ART 55.0 08/03/2022 02:12 PM    EWU6VQS 43.0 08/03/2022 02:12 PM     Urinalysis:  Lab Results   Component Value Date/Time    NITRU Negative 11/08/2019 08:11 AM    WBCUA 20 07/10/2019 12:06 PM    BACTERIA NONE 11/08/2019 08:11 AM    RBCUA 16-20 11/08/2019 08:11 AM    RBCUA 1 09/15/2015 05:05 AM    BLOODU MODERATE 11/08/2019 08:11 AM    SPECGRAV 1.011 11/08/2019 08:11 AM    GLUCOSEU Negative 11/08/2019 08:11 AM     A1C: No results for input(s): LABA1C in the last 72 hours. ABG:  Recent Labs     08/03/22  1412   PHART 7.530*   MGO2OHS 43.0   PO2ART 55.0*   JIU6KSL 35.9*   BEART 12.1*   HGBAE 7.4*   T3ONCRDC 88.0*   CARBOXHGBART 1.0       CT Head WO Contrast    Result Date: 8/3/2022  <Addendum Signed by Bobby Yin MD at 03-Aug-2022 04:32:07 PM Findings were communicated to Marysol Barker RN on 8/3/22 at 4:31pm, who confirms having received the report Marysol Barker RN takes responsibility of getting the report to the referring Dr. [facility] and confirms that he is aware of the findings. No further action required by the reading radiologist. If the referring clinician has any further questions, please call [facility] [phone], option 1. Stroke Documentation Completed. PP Addendum End> Exam: CT OF THE BRAIN WITHOUT CONTRAST Clinical data: Altered mental status. No focal neurologic deficit. Technique: Contiguous axial images are obtained from the skull base to vertex without intravenous contrast. Reformatted/MPR images were performed. Radiation dose: CTDIvol =60.42 mGy, DLP =1348.08 mGy x cm. Prior studies: No prior studies submitted. Findings:  Brain parenchyma is unremarkable. No evidence of acute cortical infarction, hemorrhage, mass or mass effect. No hydrocephalus or abnormal extra-axial fluid collections are present. The posterior fossa is unremarkable. The skull base and calvarium are intact. The included portions of the paranasal sinuses and mastoid air cells are clear. Unremarkable CT of the brain with no acute intracranial abnormality. Recommendation:  Consider MRI if clinical concern continues. All CT scans at this facility utilize dose modulation, iterative reconstruction, and/or weight based dosing when appropriate to reduce radiation dose to as low as reasonably achievable.           Amended by Bobby Yin MD at 03-Aug-2022 04:32:07 PM Electronically Signed by Bobby Yin MD at 03-Aug-2022 04:12:04 PM             CT ABDOMEN PELVIS W IV CONTRAST Additional Contrast? None    Result Date: 8/4/2022  EXAMINATION: CT of the abdomen and pelvis with IV contrast. COMPARISON: None. TECHNIQUE: Helical imaging of the CT abdomen and pelvis obtained with multiplanar reformats. IV contrast was administered. FINDINGS: LIVER: Large right hepatic hypodensity, measures up to 11 cm, may represent abscess also extending into the adjacent perirenal space. GALLBLADDER & BILIARY SYSTEM: Hydropic gallbladder containing calcified stone, without CT evidence of acute cholecystitis. SPLEEN: No splenomegaly. PANCREAS: Unremarkable for age. ADRENAL GLANDS: Unremarkable. KIDNEYS, URETERS, & BLADDER: Status post left nephrectomy. Right ureteral stent is in place, without hydronephrosis. No solid mass. No pathologic bladder wall thickening. BOWEL: No pathologic bowel dilatation or bowel wall thickening. Left colostomy changes with fat-containing parastomal hernia. Postoperative changes of the stomach. LYMPH NODES/MESENTERY: No pathologic lymphadenopathy. VASCULATURE: Calcific atherosclerosis of the aorta. PELVIC STRUCTURES: There is presacral soft tissue edema measures 4.5 cm in thickness, may represent postsurgical scarring. FREE FLUID/FREE AIR: None. BONES: No acute process. BODY WALL: Unremarkable. 1.Large right hepatic hypodensity, measures up to 11 cm, may represent abscess     also extending into the adjacent perirenal space. 2.Right ureteral stent is in place, without hydronephrosis. 3.Presacral soft tissue edema measures 4.5 cm in thickness, may represent     postsurgical scarring. Unable to rule out neoplastic process. 4.Hydropic gallbladder containing calcified stone, without CT evidence of     acute cholecystitis. 5.Left colostomy changes with fat-containing parastomal hernia. 6.Additional chronic findings, as described above.   7.Please refer to same day CTA chest report regarding intra-thoracic Hypercalcemia    Liver mass    Hypoxia    Hypertension    Hx of colon cancer, stage III    ESRD on hemodialysis ([facility] [address]  Resolved Problems:    * No resolved hospital problems. *     Active Problems:    AMS/Liver mass/?liver abscess/Hypoxia/Hx of colon cancer, stage III   -consult gi   -pharmacy to dose vanc   -pharmacy to dose zoysn   -monitor for increasing supplemental oxygen requirements    Pt currently requiring 3L of oxygen per nasal prongs   -incentive spirometry q2hrs wa   -echocardiogram   -US of liver   -trend troponin   -follow blood cultures      ESRD on hemodialysis/Hypercalcemia   -consult nephrology    -ionized calcium   -hemodialysis   -telemetry   -PTH    Hypertension  -monitor blood pressure  -continue antihypertensive meds  -avoid hypotension  Resolved Problems:    * No resolved hospital problems.  *  Signed:  CALLY Hernandez - CNP, 8/3/2022 6:47 PM

## 2022-08-03 NOTE — CARE COORDINATION
08/03/22 1727   Service Assessment   Patient Orientation Alert and Oriented   Cognition Alert   History Provided By Patient   Primary Caregiver Family   Accompanied By/Relationship Spouse   Support Systems Spouse/Significant Other;Family Members   Patient's Healthcare Decision Maker is: Legal Next of Donny Cross   PCP Verified by CM Yes   Last Visit to PCP Within last 3 months   Prior Functional Level Dressing; Shopping;Housework; Independent in ADLs/IADLs;Mobility; Toileting  (Spouse states mobility has started to decline)   Current Functional Level Assistance with the following:;Bathing;Dressing   Can patient return to prior living arrangement Unknown at present   Ability to make needs known: Good   Family able to assist with home care needs: Yes   Would you like for me to discuss the discharge plan with any other family members/significant others, and if so, who? Yes  (Spouse)   Financial Resources   (Denied needs)   [employer]   (Denied needs)   CM/SW Referral DME   Social/Functional History   Lives With Spouse   Type of Home House   Bathroom Toilet Standard   Bathroom Equipment   (Spouse states pt would benefit from shower chair)   [address]   (No DME pta)   Receives Help From Family   ADL Assistance Needs assistance   Ambulation Assistance Independent  (Starting to decline)   Active  Yes  (Spouse expressed concerns about this and states pt will more than likely no longer drive after incident today)   Mode of Transportation Car   Discharge Planning   Type of Residence [employer]   Living Arrangements Spouse/Significant Other   Current Services Prior To Admission None   Potential Assistance Needed Durable Medical Equipment;Home Care   DME Ordered?  Shower chair;Walker   Potential Assistance Purchasing Medications No   Type of Home Care Services Nursing Services;PT;OT

## 2022-08-03 NOTE — CONSULTS
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/2019    ESRD on hemodialysis ([facility] [address] 12/17/2019     Resolved Ambulatory Problems     Diagnosis Date Noted    Testalgia 02/01/2016    CKD (chronic kidney disease) stage 3, GFR 30-59 ml/min ([facility]) 01/10/2018    GI (gastrointestinal bleed) 07/10/2019    Hypokalemia     Patient requiring acute dialysis ([facility] [address]     Peptic ulcer 07/31/2019    Abscess 12/16/2019    Dysuria 12/16/2019    Hazy vision 12/16/2019    Dizzy spells 12/16/2019    Excessive thirst 12/16/2019    Fatigue 12/16/2019    Headache 12/16/2019    Nocturia 12/16/2019    Pain 12/16/2019    Pain in joint 12/16/2019    Pericarditis 12/16/2019    Renal failure 01/17/2017    Retention of urine 12/16/2019    Slowing of urinary stream 12/16/2019    Unspecified dyspareunia 12/16/2019    Weight decreasing 12/16/2019     Past Medical History:   Diagnosis Date    Asthma     Cancer ([facility] [address]     Colostomy care ([facility] [address]     Hemodialysis patient ([facility] [address]     History of blood transfusion     Hx of migraine headaches     Kidney stone     Rectal cancer ([facility] [address]          Review of Systems     Constitutional:  + weight loss, + fever/chills  Eyes:  No eye pain, no eye redness  Cardiovascular:  No chest pain, no worsening of edema  Respiratory:  No hemoptysis, no stidor  Gastrointestinal:  No blood in stool, no n/v, no diarrhea  Genitoruinary:  No hematuria, no difficulty with urination  Musculoskeletal:  No joint swelling, no redness  Integumentary:  No Rash, no itching  Neurological:  No focal weakness, No new sensory deficit  Psychiatric:  No depression, + confusion  Endocrine:  No polyuria, no polydipsia       Medications        Current Facility-Administered Medications:     HYDROmorphone HCl PF (DILAUDID) injection 0.5 mg, 0.5 mg, IntraVENous, Q15 Min PRN, Yamilka Pastrana MD, 0.5 mg at 08/03/22 1711    ondansetron (ZOFRAN) injection 4 mg, 4 mg, IntraVENous, Q30 Min PRN, Yamilka Pastrana MD, 4 mg at 08/03/22 1711    sodium chloride flush 0.9 % injection 5-40 mL, 5-40 mL, IntraVENous, 2 times per day, CALLY Olivier CNP    sodium chloride flush 0.9 % injection 5-40 mL, 5-40 mL, IntraVENous, PRN, CALLY Olivier CNP    0.9 % sodium chloride infusion, , IntraVENous, PRN, CALLY Olivier CNP    ondansetron (ZOFRAN-ODT) disintegrating tablet 4 mg, 4 mg, Oral, Q8H PRN **OR** ondansetron (ZOFRAN) injection 4 mg, 4 mg, IntraVENous, Q6H PRN, CALLY Olivier CNP    polyethylene glycol (GLYCOLAX) packet 17 g, 17 g, Oral, Daily PRN, CALLY Olivier CNP    heparin (porcine) injection 5,000 Units, 5,000 Units, SubCUTAneous, 3 times per day, CALLY Olivier CNP    piperacillin-tazobactam (ZOSYN) 2,250 mg in dextrose 5 % 50 mL IVPB ([id number]), 2,250 mg, IntraVENous, Q8H, Winnie Upton MD  No outpatient medications have been marked as taking for the 8/3/22 encounter [facility] Encounter). Allergies   Oxycodone-acetaminophen, Morphine and related, and Morphine    Family History       Family History   Problem Relation Age of Onset    Heart Attack Mother     Cancer Mother         Liver and pancreatic    Diabetes Father      Family history negative for kidney disease.      Social History      Social History     Socioeconomic History    Marital status:      Spouse name: Fuad Riddle    Number of children: None    Years of education: None    Highest education level: None   Tobacco Use    Smoking status: Never    Smokeless tobacco: Never   Vaping Use    Vaping Use: Never used   Substance and Sexual Activity    Alcohol use: No    Drug use: No       Physical Exam     Blood pressure 121/81, pulse 82, temperature 97.2 °F (36.2

## 2022-08-03 NOTE — CONSULTS
Pt Name: Joaquin Alcazar  MRN: [id number]  [id number]  YOB: 1975  Admit Date: 8/3/2022  1:01 PM  Date of evaluation: 8/3/2022  Primary Care Physician: Donna Jordan MD   [id number]       Requesting Provider: Hospitalist service. GI Consult      Assessment:    8/3/2022: CT abdomen pelvis with IV contrast: Liver enlarged right hepatic hypodensity up to 11 cm, may represent abscess. Also extends into the adjacent fatty renal space. Gallbladder and biliary system hydropic gallbladder containing calcified stone without any evidence of acute cholecystitis. Spleen and pancreas normal.  No SBO. Left colostomy changes with fat-containing parastomal hernia. Postoperative changes of the stomach. Presacral soft tissue edema up to 4.5 cm, mass present for surgical scarring. Patient by radiologist was large right hepatic hypodensity, measuring up to 11 cm may represent abscess. Also extending into the adjacent peritoneal space. Right ureteral stent in place without hydronephrosis. Presacral soft tissue edema measuring 4.5 cm in thickness may represent postsurgical scarring. Hydropic gallbladder containing calcified stone, without CT evidence of acute cholecystitis. Left colostomy changes with fat-containing parastomal hernia. Leukocytosis. WBC count 21.6. Macrocytic anemia. Hemoglobin 7.8. Hemoglobin in 2020 was 13.4 and in 2019 hemoglobin was 7.7. INR 0.95 in 2020.    7/12/2019: Surgical pathology:     Stomach, gastric ulcer biopsy: Benign gastric mucosa with mild chronic inflammation. No active ulceration. No intestinal metaplasia or dysplasia. Distal colonic biopsy: Tubular adenoma. No high-grade dysplasia. History of rectal cancer, distal colectomy and left-sided colostomy for rectal cancer in 2008. Mother had liver cancer. Acute hepatitis B serology negative in 2015. Impression:    11 cm liver abscess on CAT scan. This could be pyogenic liver abscess.   The primary source of infection could be right kidney or gallbladder. There is no diverticulitis or appendicitis in the abdomen. Amoebic liver abscess is less likely. Necrotic liver tumor or hepatoma is less likely. Patient's mother had liver cancer and pancreatic cancer. The CT scan done today does not show any pancreatic mass. Patient does not have underlying cirrhosis to predispose him to hepatoma. Metastatic tumor from previous active cancer would be less likely but possible. Change in mental status could be due to hypercalcemia, infection or due to combination thereof. Elevated alkaline phosphatase could be due to infection. Plan:    Continue Zosyn. Flagyl. When ID is able to see the patient, Flagyl can be discontinued depending upon ID evaluation. Amoebic serology  Fetoprotein. CEA. ID consultation  Surgery consultation  Oncology consultation. IR intervention may be needed. Consider transfer to a facility with higher level of care as we do not have IR available and we do not have neurosurgery available. Patient may be fed since patient not going to have EGD, colonoscopy or any GI procedures done by GI service. Discussed plan with patient and his wife. Reason / Chief complaint: 11 cm liver abscess on CT scan. History:    The patient is a 42 y.o. male. I was asked to see this patient for 11 cm liver abscess. Patient has history of rectal cancer. Last EGD and colonoscopy on 7/12/2019. Gastric ulcer biopsies were benign. Distal colonic biopsies showed tubular adenoma. CT scan abdomen shows gallstones. No CT evidence of acute cholecystitis. Patient is on Zosyn and subcutaneous heparin. 8/3/2022: Emergency department endorse: Altered mental status. Brought by wife. Said he missed dialysis and nobody knew where he was. Patient was alert and oriented x4 in triage. Since wife patient has been not her usual self about a month.   He was brought to the ER with change in mental status. CT scan of the abdomen showed 11 cm liver abscess. Patient denies right upper quadrant pain, nausea, vomiting, fever, chills. He denies any history of travel to third world countries. Last EGD: 7/12/2019. Dr. Rachid Guido: Two 1 cm superficial gastric ulcers in the body of the stomach. Esophagus and duodenum were normal.    Last Colonoscopy: 7/12/2019. Inflammatory polyps inside or below the colostomy opening. Colonoscopy was done through colostomy stoma to the cecum. Appendiceal orifice was identified. There were 8 mm polypoid folds interpreted as having endoscopic appearance of inflammatory fold rather than true adenomas. 8/3/2022: Sodium, potassium normal.  BUN 38, creatinine 8.5, GFR 7. Calcium 11.6 and elevated. 8/3/2022: proBNP 12,340. Troponin 0.06 and elevated. 8/3/2022: Albumin 2.2, alkaline phosphate 155, ALT less than 5, AST 15, bili 0.6. Amylase 141.    8/3/22: WBC 21.6, hemoglobin 7.8, , platelet count 028,427.    1/21/2020: Hemoglobin 13. 4. Hospital problems: Altered mental status, hypercalcemia, liver mass, hypoxemia, hypertension, history of colon cancers stage III, end-stage renal disease on dialysis. Nonhospital problems: Prostatic hyperplasia, GERD, migraine, anxiety, history of antineoplastic chemotherapy, palliative care patient, hydrocele, malignant neoplasm of rectum. Medical history: Asthma, rectal cancer, colostomy, hemodialysis patient, hypertension, kidney stone, pericarditis, testalgia. Past surgical history: Colon resection with colostomy 2008, colonoscopy, EGD and colonoscopy 7/12/2019, hernia repair. 3: Mother heart attack and liver cancer. Father had diabetes. Past Medical History: Rectal cancer, colostomy, hemodialysis patient, hypertension, kidney stone, palliative care patient.         Diagnosis Date    Asthma     during winter months    Cancer [facility])     rectal    Colostomy care [facility])     Hemodialysis patient ([facility] [address] mon wed fri at [place]    History of blood transfusion     Hx of migraine headaches     Hypercholesterolemia 12/16/2019    Hypertension     Kidney stone     hx of    Palliative care patient 07/11/2019    Pericarditis     Rectal cancer [facility])     Testalgia 2/1/2016       Past Surgical History: Colon resection with colostomy, dialysis fistula creation, endoscopy, diagnostic colonoscopy with biopsy/stoma 7/12/2019 Dr. Perfecto Butler the [facility] ablation-inflammatory polyps inside or below the colostomy. EGD at [facility] gastric ulcers 7/12/2019. Procedure Laterality Date    ANTERIOR CRUCIATE LIGAMENT REPAIR  2007    ACL and MCL repair    COLON SURGERY      colon resection with colostomy    COLONOSCOPY      DIALYSIS FISTULA CREATION Bilateral 01/21/2020    CREATION  LEFT  BRACHIAL CEPHALIC AV FISTULA performed by Catie Montgomery MD at [facility] OR    ENDOSCOPY, COLON, DIAGNOSTIC      HC COLONOSCOPY BIOPSY/STOMA N/A 07/12/2019    Dr Purdy-w/APC ablation-Inflammatory polyps inside or below the colostomy opening-Tubular AP (-) dysplasia    HERNIA REPAIR      As an infant    KIDNEY REMOVAL Left     cancer    UPPER GASTROINTESTINAL ENDOSCOPY N/A 07/12/2019    Dr Purdy-Gastric ulcers    [place] Stains Right 07/14/2022    SJS Placement/exchange over a wire of right internal jugular vein tunneled dialysis catheter (BARD Glidepath 27 cm tip to cuff)       Allergies:  Oxycodone-acetaminophen, Morphine and related, and Morphine    Home Meds: See below. Colace. Prilosec. Prior to Admission medications    Medication Sig Start Date End Date Taking?  Authorizing Provider   losartan (COZAAR) 100 MG tablet Take 100 mg by mouth daily    Historical Provider, MD   cloNIDine (CATAPRES) 0.2 MG tablet Take 1 tablet by mouth at bedtime 2/22/22   CALLY Hutchins   amLODIPine (NORVASC) 10 MG tablet TAKE 1 TABLET BY MOUTH EVERY DAY 11/29/21   Meryle Bott, MD   SUMAtriptan Abiel Dowd) 100 MG tablet TAKE 1 TABLET AT ONSET OF MIGRAINE HEADACHE. MAY REPEAT IN 2 HOURS IF NEEDED. 8/31/21   CALLY Decker   naratriptan (AMERGE) 2.5 MG tablet TAKE AS DIRECTED. 8/31/21   CALLY Decker   ondansetron (ZOFRAN) 4 MG tablet TAKE 1 TABLET BY MOUTH DAILY AS NEEDED FOR NAUSEA OR VOMITING 3/2/21   Brian Messer MD   docusate sodium (COLACE) 100 MG capsule TAKE ONE CAPSULE BY MOUTH TWICE A DAY AS NEEDED FOR CONSTIPATION 1/8/21   Brian Messer MD   tamsulosin (FLOMAX) 0.4 MG capsule TAKE 1 CAPSULE BY MOUTH DAILY 9/27/20   Brian Messer MD   Ostomy Supplies [facility] Ostomy supplies 7/17/20   Brian Messer MD   omeprazole (PRILOSEC) 20 MG delayed release capsule TAKE 1 CAPSULE BY MOUTH DAILY 4/23/20   Brian Messer MD   cinacalcet (SENSIPAR) 30 MG tablet Take 30 mg by mouth daily    Historical Provider, MD   albuterol sulfate  (90 Base) MCG/ACT inhaler Inhale 2 puffs into the lungs every 4 hours as needed     Historical Provider, MD   vitamin D (ERGOCALCIFEROL) 1.25 MG (17527 UT) CAPS capsule TAKE 1 CAPSULE WEEKLY 10/24/19   Historical Provider, MD   AURYXIA 1  MG(Fe) TABS TAKE 1 TABLET BY MOUTH THREE TIMES A DAY WITH MEALS. SWALLOW WHOLE, DO NOT CHEW OR CRUSH MEDICATION 10/1/19   Historical Provider, MD   oxybutynin (DITROPAN) 5 MG tablet Take 5 mg by mouth 1/10/19   Historical Provider, MD   omeprazole (PRILOSEC) 40 MG delayed release capsule Take 40 mg by mouth  6/7/16   Historical Provider, MD   folic acid (FOLVITE) 1 MG tablet Take 1 tablet by mouth daily 7/18/19   Deepak Bray MD   hydrALAZINE (APRESOLINE) 50 MG tablet TAKE 1 TABLET BY MOUTH THREE TIMES A DAY 4/8/19   Brian Messer MD   HYDROcodone-acetaminophen [facility])  MG per tablet Take one tablet every 3-4 hours PRN pain (month supply) (may fill 8/24/18).  8/24/18 6/21/22  Deanna Small, APRN - CNP        Current Meds: Zosyn, subcutaneous heparin.        sodium chloride flush  5-40 mL IntraVENous 2 times per day    heparin (porcine)  5,000 Units SubCUTAneous 3 times per day    piperacillin-tazobactam  2,250 mg IntraVENous Q8H        sodium chloride         PRN Meds:    HYDROmorphone, ondansetron, sodium chloride flush, sodium chloride, ondansetron **OR** ondansetron, polyethylene glycol    Social History: . Social History     Socioeconomic History    Marital status:      Spouse name: [facility]    Number of children: Not on file    Years of education: Not on file    Highest education level: Not on file   Occupational History    Not on file   Tobacco Use    Smoking status: Never    Smokeless tobacco: Never   Vaping Use    Vaping Use: Never used   Substance and Sexual Activity    Alcohol use: No    Drug use: No    Sexual activity: Not on file   Other Topics Concern    Not on file   Social History Narrative    Not on file     Social Determinants of Health     Financial Resource Strain: Not on file   Food Insecurity: Not on file   Transportation Needs: Not on file   Physical Activity: Not on file   Stress: Not on file   Social Connections: Not on file   Intimate Partner Violence: Not on file   Housing Stability: Not on file       Family History: Mother had liver and pancreatic cancer. Also see HPI. Family History   Problem Relation Age of Onset    Heart Attack Mother     Cancer Mother         Liver and pancreatic    Diabetes Father        ROS: Also see HPI. A complete pertinent review of all body system was done. Review of systems is as per HPI. Rest of the review of systems either negative, not pertinent or patient was unable to provide review of systems. Physical Exam:    VITALS:     Vitals:    08/03/22 1413 08/03/22 1503 08/03/22 1651 08/03/22 1828   BP:  123/75 119/75 121/81   Pulse:  94 87 82   Resp:  20 20 16   Temp:    97.2 °F (36.2 °C)   TempSrc:    Temporal   SpO2: (!) 89% 98% 95% 98%       General appearance: Moderately built and nourished.   Appears stated age.  No acute cardiopulmonary distress. Head: normal cephalic. Neck: Supple    Abdomen: Left-sided colostomy in place. Colostomy bag is brown stool. No blood. Abdomen soft. No right upper quadrant or epigastric tenderness or tenderness anywhere else. No gross organomegaly or masses. Extremities: No leg edema. Skin: No jaundice. Neurologic: Alert. Labs:     Recent Labs     08/03/22  1315   WBC 21.6*   RBC 2.60*   HGB 7.8*   HCT 26.8*   .1*   MCH 30.0   MCHC 29.1*        Recent Labs     08/03/22  1354 08/03/22  1412     --    K 3.5 3.3   ANIONGAP 12  --    CL 91*  --    CO2 33*  --    BUN 38*  --    CREATININE 8.5*  --    GLUCOSE 118*  --    CALCIUM 11.6*  --      Recent Labs     08/03/22  1354   MG 2.1   PHOS 4.7*     Recent Labs     08/03/22  1354   AST 15   ALT <5*   BILITOT 0.6   ALKPHOS 155*     HgBA1c:  No components found for: HGBA1C  FLP:    Lab Results   Component Value Date/Time    TRIG 273 02/12/2019 08:18 AM    HDL 29 02/12/2019 08:18 AM    LDLCALC 112 02/12/2019 08:18 AM     TSH:  No results found for: TSH  Troponin T:   Recent Labs     08/03/22  1354   TROPONINI 0.06*     INR: No results for input(s): INR in the last 72 hours. No results for input(s): LIPASE, AMYLASE in the last 72 hours. Radiology:    Reviewed results of the pertinent imaging studies. Disclaimer speech recognition software       (Please note that portions of this note were completed with a voice recognition program. Efforts were made to edit the dictations but occasionally words are mis-transcribed.)      Thank you for the consultation and allowing me to participate in this patient's care alongside with you!     Colonel Amada MD    8/3/2022

## 2022-08-03 NOTE — ED PROVIDER NOTES
[facility] EMERGENCY DEPT  eMERGENCY dEPARTMENT eNCOUnter      Pt Name: Omayra Navarro  MRN: [id number]  [place] 1975  Date of evaluation: 8/3/2022  Provider: Silviano Brown MD    CHIEF COMPLAINT       Chief Complaint   Patient presents with    Altered Mental Status     Brought by wife for AMS, says he missed dialysis and nobody knew where we was; pt a&o x4 in triage           HISTORY OF PRESENT ILLNESS   (Location/Symptom, Timing/Onset,Context/Setting, Quality, Duration, Modifying Factors, Severity)  Note limiting factors. Omayra Navarro is a 42 y.o. male who presents to the emergency department valuation of disorientation    61-year-old male presented with altered mental status and acute confusion. The wife noticed some mild symptoms for the past couple days but worse today. Patient drove himself to dialysis but ended up lost.  Dialysis notified that he was a no-show. He was found in his safe condition. He seems to be pretty alert right now. He has significant medical history besides dialysis. History of colorectal cancer. This been no reports of fever chills or other signs of infection. There is been no recent trauma. The history is provided by the patient and the spouse. NursingNotes were reviewed. REVIEW OF SYSTEMS    (2-9 systems for level 4, 10 or more for level 5)     Review of Systems   Constitutional:  Negative for chills and fever. HENT:  Negative for congestion, drooling, facial swelling, nosebleeds, sinus pressure, sore throat and voice change. Eyes:  Negative for discharge. Respiratory:  Negative for apnea, choking and shortness of breath. Cardiovascular:  Negative for chest pain and leg swelling. Gastrointestinal:  Negative for blood in stool, constipation, diarrhea and nausea. Genitourinary:  Negative for enuresis. Musculoskeletal:  Negative for joint swelling. Skin:  Negative for rash and wound. Neurological:  Negative for seizures and syncope. SWALLOW WHOLE, DO NOT CHEW OR CRUSH MEDICATION    CINACALCET (SENSIPAR) 30 MG TABLET    Take 30 mg by mouth daily    CLONIDINE (CATAPRES) 0.2 MG TABLET    Take 1 tablet by mouth at bedtime    DOCUSATE SODIUM (COLACE) 100 MG CAPSULE    TAKE ONE CAPSULE BY MOUTH TWICE A DAY AS NEEDED FOR CONSTIPATION    FOLIC ACID (FOLVITE) 1 MG TABLET    Take 1 tablet by mouth daily    HYDRALAZINE (APRESOLINE) 50 MG TABLET    TAKE 1 TABLET BY MOUTH THREE TIMES A DAY    HYDROCODONE-ACETAMINOPHEN (NORCO)  MG PER TABLET    Take one tablet every 3-4 hours PRN pain (month supply) (may fill 8/24/18). LOSARTAN (COZAAR) 100 MG TABLET    Take 100 mg by mouth daily    NARATRIPTAN (AMERGE) 2.5 MG TABLET    TAKE AS DIRECTED. OMEPRAZOLE (PRILOSEC) 20 MG DELAYED RELEASE CAPSULE    TAKE 1 CAPSULE BY MOUTH DAILY    OMEPRAZOLE (PRILOSEC) 40 MG DELAYED RELEASE CAPSULE    Take 40 mg by mouth     ONDANSETRON (ZOFRAN) 4 MG TABLET    TAKE 1 TABLET BY MOUTH DAILY AS NEEDED FOR NAUSEA OR VOMITING    OSTOMY SUPPLIES KIT    Ostomy supplies    OXYBUTYNIN (DITROPAN) 5 MG TABLET    Take 5 mg by mouth    SUMATRIPTAN (IMITREX) 100 MG TABLET    TAKE 1 TABLET AT ONSET OF MIGRAINE HEADACHE. MAY REPEAT IN 2 HOURS IF NEEDED.     TAMSULOSIN (FLOMAX) 0.4 MG CAPSULE    TAKE 1 CAPSULE BY MOUTH DAILY    VITAMIN D (ERGOCALCIFEROL) 1.25 MG (80113 UT) CAPS CAPSULE    TAKE 1 CAPSULE WEEKLY       ALLERGIES     Oxycodone-acetaminophen, Morphine and related, and Morphine    FAMILY HISTORY       Family History   Problem Relation Age of Onset    Heart Attack Mother     Cancer Mother         Liver and pancreatic    Diabetes Father           SOCIAL HISTORY       Social History     Socioeconomic History    Marital status:      Spouse name: Chidi Tafoya    Number of children: None    Years of education: None    Highest education level: None   Tobacco Use    Smoking status: Never    Smokeless tobacco: Never   Vaping Use    Vaping Use: Never used   Substance and Sexual Activity    Alcohol use: No    Drug use: No       SCREENINGS    [place] Coma Scale  Eye Opening: Spontaneous  Best Verbal Response: Confused  Best Motor Response: Obeys commands  [place] Coma Scale Score: 14        PHYSICAL EXAM    (up to 7 for level 4, 8 or more for level 5)     ED Triage Vitals [08/03/22 1248]   BP Temp Temp src Heart Rate Resp SpO2 Height Weight   127/84 99.7 °F (37.6 °C) -- (!) 129 18 93 % -- --       Physical Exam  Vitals and nursing note reviewed. Constitutional:       General: He is not in acute distress. Appearance: He is well-developed. HENT:      Head: Normocephalic and atraumatic. Right Ear: External ear normal.      Left Ear: External ear normal.   Eyes:      General: No scleral icterus. Conjunctiva/sclera: Conjunctivae normal.      Pupils: Pupils are equal, round, and reactive to light. Cardiovascular:      Rate and Rhythm: Normal rate and regular rhythm. Pulses: Normal pulses. Heart sounds: Normal heart sounds. No murmur heard. Pulmonary:      Effort: Pulmonary effort is normal. No respiratory distress. Breath sounds: Normal breath sounds. Abdominal:      General: Bowel sounds are normal.      Palpations: Abdomen is soft. Tenderness: There is abdominal tenderness (Mild epigastric). Musculoskeletal:         General: Normal range of motion. Cervical back: Normal range of motion and neck supple. Skin:     General: Skin is warm and dry. Coloration: Skin is not jaundiced or pale. Neurological:      General: No focal deficit present. Mental Status: He is alert and oriented to person, place, and time. Psychiatric:         Behavior: Behavior normal.       DIAGNOSTIC RESULTS     EKG: All EKG's are interpreted by the Emergency Department Physician who either signs or Co-signs this chart in the absence of a cardiologist.    Sinus tachycardia rate 127. WY interval 36. QTc 576. Multiple artifacts.   No ST elevation to indicate ischemia or MI.    RADIOLOGY:   Non-plain film images such as CT, Ultrasound and MRI are read by the radiologist. Plainradiographic images are visualized and preliminarily interpreted by the emergency physician with the below findings:    I have reviewed the CTA of the I do not see any large vessel occlusion. There is atelectasis and some small effusions bibasilar. However there was a lesion in the liver and I added a CT of the abdomen results still pending at the time of this dictation there is a large mass in the lobe of the liver. Distended gallbladder with gallstone. There is a stent in place in his solitary right kidney. I have reviewed the CT of the head images and results. His chest x-ray does not show any overt failure by my interpretation the official report is still pending it was completed at 1438. Interpretation per the Radiologist below, if available at the time of this note:    CT Head WO Contrast   Final Result   Unremarkable CT of the brain with no acute intracranial abnormality. Recommendation:  Consider MRI if clinical concern continues. All CT scans at this facility utilize dose modulation, iterative reconstruction, and/or weight based dosing when appropriate to reduce radiation dose to as low as reasonably achievable.                Amended by Freddy Enamorado MD at 03-Aug-2022 04:32:07 PM   Electronically Signed by Freddy Enamorado MD at 03-Aug-2022 04:12:04 PM               XR CHEST PORTABLE    (Results Pending)   CTA PULMONARY W CONTRAST    (Results Pending)   CT ABDOMEN PELVIS W IV CONTRAST Additional Contrast? None    (Results Pending)         ED BEDSIDE ULTRASOUND:   Performed by ED Physician - none    LABS:  Labs Reviewed   CBC WITH AUTO DIFFERENTIAL - Abnormal; Notable for the following components:       Result Value    WBC 21.6 (*)     RBC 2.60 (*)     Hemoglobin 7.8 (*)     Hematocrit 26.8 (*)     .1 (*)     MCHC 29.1 (*)     RDW 15.4 (*)     MPV 8.8 (*)     Neutrophils % 89.6 (*)     Lymphocytes % 2.3 (*)     Neutrophils Absolute 19.4 (*)     Lymphocytes Absolute 0.5 (*)     Monocytes Absolute 1.10 (*)     All other components within normal limits   BLOOD GAS, ARTERIAL - Abnormal; Notable for the following components:    pH, Arterial 7.530 (*)     pO2, Arterial 55.0 (*)     HCO3, Arterial 35.9 (*)     Base Excess, Arterial 12.1 (*)     Hemoglobin, Art, Extended 7.4 (*)     O2 Sat, Arterial 88.0 (*)     All other components within normal limits    Narrative:     [facility] tel. ,  Vero Griggs MD ER, 08/03/2022 14:13, by Renetta Wills   COMPREHENSIVE METABOLIC PANEL W/ REFLEX TO MG FOR LOW K - Abnormal; Notable for the following components:    Chloride 91 (*)     CO2 33 (*)     Glucose 118 (*)     BUN 38 (*)     Creatinine 8.5 (*)     GFR Non- 7 (*)     GFR  8 (*)     Calcium 11.6 (*)     Total Protein 6.2 (*)     Albumin 2.2 (*)     Alkaline Phosphatase 155 (*)     ALT <5 (*)     All other components within normal limits   TROPONIN - Abnormal; Notable for the following components:    Troponin 0.06 (*)     All other components within normal limits   BRAIN NATRIURETIC PEPTIDE - Abnormal; Notable for the following components:    Pro-BNP 12,340 (*)     All other components within normal limits   D-DIMER, QUANTITATIVE - Abnormal; Notable for the following components:    D-Dimer, Quant 5.44 (*)     All other components within normal limits   POCT GLUCOSE - Abnormal; Notable for the following components:    POC Glucose 124 (*)     All other components within normal limits   COVID-19, RAPID   CULTURE, BLOOD 1   CULTURE, BLOOD 2   AMMONIA   LACTIC ACID   SPECIMEN REJECTION   ETHANOL   MAGNESIUM   URINALYSIS WITH REFLEX TO CULTURE   DRUG SCRN, BUPRENORPHINE   CALCIUM, IONIZED   PTH, INTACT       All other labs were within normal range or not returned as of this dictation.     EMERGENCY DEPARTMENT COURSE and DIFFERENTIALDIAGNOSIS/MDM:   Vitals:    Vitals:    08/03/22 1248 08/03/22 1413 08/03/22 1503 08/03/22 1651   BP: 127/84  123/75 119/75   Pulse: (!) 129  94 87   Resp: 18  20 20   Temp: 99.7 °F (37.6 °C)      SpO2: 93% (!) 89% 98% 95%       MDM  Number of Diagnoses or Management Options  Acute respiratory failure with hypoxia (HCC)  Disorientation  End-stage renal disease on hemodialysis (HCC)  ESRD on hemodialysis (HCC)  Generalized anxiety disorder  History of colorectal cancer  Hx antineoplastic chemotherapy  Hx of colon cancer, stage III  Leukocytosis, unspecified type  Liver mass  Diagnosis management comments: Patient is hypoxic and still working on the reason why. His confusion could be from that. His corrected calcium was elevated at 14. Hospitalist concerned maybe to give the patient some fluid. But I spoke with Dr. Jo Ann Garland they are going to dialyze the patient and hopefully that we will help balance his abnormal labs. With the white count being elevated I was concerned of a bacteremia or blood infection and gave him vancomycin pending further test results. I discussed the mass with the patient and family. We are still waiting on official report. I have talked to the hospitalist service about going ahead and admitting since it may take some time to get the patient reports and it will not alter the admission. They are in agreement. I did order an ionized calcium. CONSULTS:  IP CONSULT TO NEPHROLOGY  IP CONSULT TO PALLIATIVE CARE    PROCEDURES:  Unless otherwise notedbelow, none     Procedures    FINAL IMPRESSION     1. Acute respiratory failure with hypoxia ([address]    2. Disorientation    3. Liver mass    4. End-stage renal disease on hemodialysis ([facility] [address]    5. History of colorectal cancer    6.  Leukocytosis, unspecified type          DISPOSITION/PLAN   DISPOSITION Decision To Admit 08/03/2022 05:01:25 PM      PATIENT REFERRED TO:  @FUP@    DISCHARGE MEDICATIONS:  New Prescriptions    No medications on file          (Please note that portions of this note were completed with a voice recognition program.  Efforts were made to edit the dictations butoccasionally words are mis-transcribed.)    Ashok Osorio MD (electronically signed)  AttendingEmergency Physician         Michael Adorno MD  08/03/22 4909       Michael Adorno MD  09/02/22 2245

## 2022-08-04 VITALS
HEART RATE: 95 BPM | TEMPERATURE: 97.3 F | SYSTOLIC BLOOD PRESSURE: 123 MMHG | RESPIRATION RATE: 16 BRPM | DIASTOLIC BLOOD PRESSURE: 80 MMHG | OXYGEN SATURATION: 92 % | WEIGHT: 195.7 LBS | BODY MASS INDEX: 24.33 KG/M2 | HEIGHT: 75 IN

## 2022-08-04 LAB
ABO/RH: NORMAL
ALBUMIN SERPL-MCNC: 1.9 G/DL (ref 3.5–5.2)
ALP BLD-CCNC: 142 U/L (ref 40–130)
ALT SERPL-CCNC: <5 U/L (ref 5–41)
ANION GAP SERPL CALCULATED.3IONS-SCNC: 13 MMOL/L (ref 7–19)
ANISOCYTOSIS: ABNORMAL
ANTIBODY SCREEN: NORMAL
AST SERPL-CCNC: 9 U/L (ref 5–40)
BASOPHILS ABSOLUTE: 0.1 K/UL (ref 0–0.2)
BASOPHILS RELATIVE PERCENT: 0.4 % (ref 0–1)
BILIRUB SERPL-MCNC: 0.4 MG/DL (ref 0.2–1.2)
BLOOD BANK DISPENSE STATUS: NORMAL
BLOOD BANK PRODUCT CODE: NORMAL
BPU ID: NORMAL
BUN BLDV-MCNC: 25 MG/DL (ref 6–20)
CALCIUM IONIZED: 1.37 MMOL/L (ref 1.12–1.32)
CALCIUM SERPL-MCNC: 10.2 MG/DL (ref 8.6–10)
CHLORIDE BLD-SCNC: 97 MMOL/L (ref 98–111)
CO2: 28 MMOL/L (ref 22–29)
CREAT SERPL-MCNC: 6 MG/DL (ref 0.5–1.2)
DESCRIPTION BLOOD BANK: NORMAL
EOSINOPHILS ABSOLUTE: 0.2 K/UL (ref 0–0.6)
EOSINOPHILS RELATIVE PERCENT: 1.5 % (ref 0–5)
GFR AFRICAN AMERICAN: 12
GFR NON-AFRICAN AMERICAN: 10
GLUCOSE BLD-MCNC: 112 MG/DL (ref 74–109)
HCT VFR BLD CALC: 23.3 % (ref 42–52)
HCT VFR BLD CALC: 25.3 % (ref 42–52)
HEMOGLOBIN: 6.7 G/DL (ref 14–18)
HEMOGLOBIN: 7.2 G/DL (ref 14–18)
HYPOCHROMIA: ABNORMAL
IMMATURE GRANULOCYTES #: 0.4 K/UL
IRON SATURATION: 20 % (ref 14–50)
IRON: 17 UG/DL (ref 59–158)
LV EF: 65 %
LVEF MODALITY: NORMAL
LYMPHOCYTES ABSOLUTE: 0.8 K/UL (ref 1.1–4.5)
LYMPHOCYTES RELATIVE PERCENT: 5.4 % (ref 20–40)
MACROCYTES: ABNORMAL
MCH RBC QN AUTO: 29.5 PG (ref 27–31)
MCHC RBC AUTO-ENTMCNC: 28.8 G/DL (ref 33–37)
MCV RBC AUTO: 102.6 FL (ref 80–94)
MONOCYTES ABSOLUTE: 0.8 K/UL (ref 0–0.9)
MONOCYTES RELATIVE PERCENT: 5.4 % (ref 0–10)
MRSA SCREEN RT-PCR: NOT DETECTED
NEUTROPHILS ABSOLUTE: 12.7 K/UL (ref 1.5–7.5)
NEUTROPHILS RELATIVE PERCENT: 85 % (ref 50–65)
PDW BLD-RTO: 15.4 % (ref 11.5–14.5)
PHOSPHORUS: 4 MG/DL (ref 2.5–4.5)
PLATELET # BLD: 336 K/UL (ref 130–400)
PMV BLD AUTO: 8.8 FL (ref 9.4–12.4)
POLYCHROMASIA: ABNORMAL
POTASSIUM REFLEX MAGNESIUM: 3.6 MMOL/L (ref 3.5–5)
RBC # BLD: 2.27 M/UL (ref 4.7–6.1)
SODIUM BLD-SCNC: 138 MMOL/L (ref 136–145)
TEAR DROP CELLS: ABNORMAL
TOTAL IRON BINDING CAPACITY: 87 UG/DL (ref 250–400)
TOTAL PROTEIN: 5.6 G/DL (ref 6.6–8.7)
VANCOMYCIN RANDOM: 42 UG/ML
WBC # BLD: 14.9 K/UL (ref 4.8–10.8)

## 2022-08-04 PROCEDURE — 85014 HEMATOCRIT: CPT

## 2022-08-04 PROCEDURE — 6370000000 HC RX 637 (ALT 250 FOR IP): Performed by: NURSE PRACTITIONER

## 2022-08-04 PROCEDURE — 83550 IRON BINDING TEST: CPT

## 2022-08-04 PROCEDURE — 36600 WITHDRAWAL OF ARTERIAL BLOOD: CPT

## 2022-08-04 PROCEDURE — 36430 TRANSFUSION BLD/BLD COMPNT: CPT

## 2022-08-04 PROCEDURE — 36415 COLL VENOUS BLD VENIPUNCTURE: CPT

## 2022-08-04 PROCEDURE — 84100 ASSAY OF PHOSPHORUS: CPT

## 2022-08-04 PROCEDURE — 80202 ASSAY OF VANCOMYCIN: CPT

## 2022-08-04 PROCEDURE — 6360000002 HC RX W HCPCS: Performed by: EMERGENCY MEDICINE

## 2022-08-04 PROCEDURE — 83540 ASSAY OF IRON: CPT

## 2022-08-04 PROCEDURE — 6360000002 HC RX W HCPCS: Performed by: NURSE PRACTITIONER

## 2022-08-04 PROCEDURE — 2500000003 HC RX 250 WO HCPCS: Performed by: SPECIALIST

## 2022-08-04 PROCEDURE — 2580000003 HC RX 258: Performed by: INTERNAL MEDICINE

## 2022-08-04 PROCEDURE — 6360000002 HC RX W HCPCS: Performed by: INTERNAL MEDICINE

## 2022-08-04 PROCEDURE — 86923 COMPATIBILITY TEST ELECTRIC: CPT

## 2022-08-04 PROCEDURE — 85018 HEMOGLOBIN: CPT

## 2022-08-04 PROCEDURE — 85025 COMPLETE CBC W/AUTO DIFF WBC: CPT

## 2022-08-04 PROCEDURE — 2580000003 HC RX 258: Performed by: NURSE PRACTITIONER

## 2022-08-04 PROCEDURE — 86900 BLOOD TYPING SEROLOGIC ABO: CPT

## 2022-08-04 PROCEDURE — 80053 COMPREHEN METABOLIC PANEL: CPT

## 2022-08-04 PROCEDURE — P9016 RBC LEUKOCYTES REDUCED: HCPCS

## 2022-08-04 PROCEDURE — 6360000004 HC RX CONTRAST MEDICATION: Performed by: INTERNAL MEDICINE

## 2022-08-04 PROCEDURE — 86850 RBC ANTIBODY SCREEN: CPT

## 2022-08-04 PROCEDURE — 82330 ASSAY OF CALCIUM: CPT

## 2022-08-04 PROCEDURE — C8929 TTE W OR WO FOL WCON,DOPPLER: HCPCS

## 2022-08-04 PROCEDURE — 87641 MR-STAPH DNA AMP PROBE: CPT

## 2022-08-04 PROCEDURE — 99223 1ST HOSP IP/OBS HIGH 75: CPT

## 2022-08-04 PROCEDURE — 86901 BLOOD TYPING SEROLOGIC RH(D): CPT

## 2022-08-04 RX ORDER — SODIUM CHLORIDE 9 MG/ML
INJECTION, SOLUTION INTRAVENOUS PRN
Status: DISCONTINUED | OUTPATIENT
Start: 2022-08-04 | End: 2022-08-05 | Stop reason: HOSPADM

## 2022-08-04 RX ORDER — HYDROMORPHONE HYDROCHLORIDE 1 MG/ML
0.25 INJECTION, SOLUTION INTRAMUSCULAR; INTRAVENOUS; SUBCUTANEOUS EVERY 4 HOURS PRN
Status: DISCONTINUED | OUTPATIENT
Start: 2022-08-04 | End: 2022-08-05 | Stop reason: HOSPADM

## 2022-08-04 RX ADMIN — PERFLUTREN 1.5 ML: 6.52 INJECTION, SUSPENSION INTRAVENOUS at 08:29

## 2022-08-04 RX ADMIN — SODIUM CHLORIDE, PRESERVATIVE FREE 10 ML: 5 INJECTION INTRAVENOUS at 21:33

## 2022-08-04 RX ADMIN — OXYBUTYNIN CHLORIDE 5 MG: 5 TABLET ORAL at 10:05

## 2022-08-04 RX ADMIN — HYDROMORPHONE HYDROCHLORIDE 0.25 MG: 1 INJECTION, SOLUTION INTRAMUSCULAR; INTRAVENOUS; SUBCUTANEOUS at 21:32

## 2022-08-04 RX ADMIN — HYDROMORPHONE HYDROCHLORIDE 0.25 MG: 1 INJECTION, SOLUTION INTRAMUSCULAR; INTRAVENOUS; SUBCUTANEOUS at 11:20

## 2022-08-04 RX ADMIN — SODIUM CHLORIDE, PRESERVATIVE FREE 5 ML: 5 INJECTION INTRAVENOUS at 00:59

## 2022-08-04 RX ADMIN — FOLIC ACID 1 MG: 1 TABLET ORAL at 00:39

## 2022-08-04 RX ADMIN — TAMSULOSIN HYDROCHLORIDE 0.4 MG: 0.4 CAPSULE ORAL at 01:06

## 2022-08-04 RX ADMIN — HYDRALAZINE HYDROCHLORIDE 50 MG: 50 TABLET, FILM COATED ORAL at 18:08

## 2022-08-04 RX ADMIN — EPOETIN ALFA-EPBX 10000 UNITS: 10000 INJECTION, SOLUTION INTRAVENOUS; SUBCUTANEOUS at 01:06

## 2022-08-04 RX ADMIN — PANTOPRAZOLE SODIUM 40 MG: 40 TABLET, DELAYED RELEASE ORAL at 05:58

## 2022-08-04 RX ADMIN — PIPERACILLIN AND TAZOBACTAM 4500 MG: 4; .5 INJECTION, POWDER, FOR SOLUTION INTRAVENOUS at 00:51

## 2022-08-04 RX ADMIN — VANCOMYCIN HYDROCHLORIDE 2000 MG: 500 INJECTION, POWDER, LYOPHILIZED, FOR SOLUTION INTRAVENOUS at 02:49

## 2022-08-04 RX ADMIN — HYDROMORPHONE HYDROCHLORIDE 0.25 MG: 1 INJECTION, SOLUTION INTRAMUSCULAR; INTRAVENOUS; SUBCUTANEOUS at 15:42

## 2022-08-04 RX ADMIN — HEPARIN SODIUM 5000 UNITS: 5000 INJECTION INTRAVENOUS; SUBCUTANEOUS at 01:06

## 2022-08-04 RX ADMIN — LOSARTAN POTASSIUM 100 MG: 100 TABLET, FILM COATED ORAL at 10:05

## 2022-08-04 RX ADMIN — HYDROMORPHONE HYDROCHLORIDE 0.5 MG: 1 INJECTION, SOLUTION INTRAMUSCULAR; INTRAVENOUS; SUBCUTANEOUS at 01:06

## 2022-08-04 RX ADMIN — CINACALCET HYDROCHLORIDE 30 MG: 30 TABLET, FILM COATED ORAL at 10:05

## 2022-08-04 RX ADMIN — PIPERACILLIN AND TAZOBACTAM 3375 MG: 3; .375 INJECTION, POWDER, FOR SOLUTION INTRAVENOUS at 11:20

## 2022-08-04 RX ADMIN — ONDANSETRON HYDROCHLORIDE 4 MG: 2 SOLUTION INTRAMUSCULAR; INTRAVENOUS at 21:45

## 2022-08-04 RX ADMIN — CINACALCET HYDROCHLORIDE 30 MG: 30 TABLET, FILM COATED ORAL at 00:39

## 2022-08-04 RX ADMIN — METRONIDAZOLE 500 MG: 500 INJECTION, SOLUTION INTRAVENOUS at 09:58

## 2022-08-04 RX ADMIN — FOLIC ACID 1 MG: 1 TABLET ORAL at 10:05

## 2022-08-04 RX ADMIN — HYDRALAZINE HYDROCHLORIDE 50 MG: 50 TABLET, FILM COATED ORAL at 10:05

## 2022-08-04 RX ADMIN — SODIUM CHLORIDE, PRESERVATIVE FREE 10 ML: 5 INJECTION INTRAVENOUS at 10:03

## 2022-08-04 RX ADMIN — AMLODIPINE BESYLATE 10 MG: 10 TABLET ORAL at 10:05

## 2022-08-04 RX ADMIN — TAMSULOSIN HYDROCHLORIDE 0.4 MG: 0.4 CAPSULE ORAL at 10:05

## 2022-08-04 RX ADMIN — METRONIDAZOLE 500 MG: 500 INJECTION, SOLUTION INTRAVENOUS at 01:37

## 2022-08-04 RX ADMIN — SODIUM CHLORIDE: 9 INJECTION, SOLUTION INTRAVENOUS at 00:47

## 2022-08-04 RX ADMIN — METRONIDAZOLE 500 MG: 500 INJECTION, SOLUTION INTRAVENOUS at 16:48

## 2022-08-04 ASSESSMENT — PAIN SCALES - GENERAL
PAINLEVEL_OUTOF10: 2
PAINLEVEL_OUTOF10: 7
PAINLEVEL_OUTOF10: 7
PAINLEVEL_OUTOF10: 8
PAINLEVEL_OUTOF10: 7

## 2022-08-04 ASSESSMENT — PAIN DESCRIPTION - ORIENTATION
ORIENTATION: RIGHT;UPPER
ORIENTATION: RIGHT

## 2022-08-04 ASSESSMENT — PAIN - FUNCTIONAL ASSESSMENT
PAIN_FUNCTIONAL_ASSESSMENT: ACTIVITIES ARE NOT PREVENTED
PAIN_FUNCTIONAL_ASSESSMENT: PREVENTS OR INTERFERES SOME ACTIVE ACTIVITIES AND ADLS
PAIN_FUNCTIONAL_ASSESSMENT: ACTIVITIES ARE NOT PREVENTED
PAIN_FUNCTIONAL_ASSESSMENT: PREVENTS OR INTERFERES SOME ACTIVE ACTIVITIES AND ADLS

## 2022-08-04 ASSESSMENT — PAIN DESCRIPTION - ONSET
ONSET: ON-GOING
ONSET: ON-GOING

## 2022-08-04 ASSESSMENT — PAIN DESCRIPTION - LOCATION
LOCATION: ABDOMEN

## 2022-08-04 ASSESSMENT — PAIN DESCRIPTION - DESCRIPTORS
DESCRIPTORS: ACHING

## 2022-08-04 ASSESSMENT — PAIN DESCRIPTION - PAIN TYPE
TYPE: ACUTE PAIN
TYPE: ACUTE PAIN

## 2022-08-04 ASSESSMENT — PAIN DESCRIPTION - FREQUENCY
FREQUENCY: INTERMITTENT
FREQUENCY: CONTINUOUS

## 2022-08-04 NOTE — PROGRESS NOTES
[address] [facility] Pharmacokinetic Monitoring Service - Vancomycin    [place] is a 42 y.o. male starting on vancomycin therapy for upper respiratory infection. Pharmacy consulted by Dr Sammie Mckeon for monitoring and adjustment. Target Concentration: Pre-Dialysis Concentration 21-24 mg/L  Additional Antimicrobials: flagyl, zosyn    Pertinent Laboratory Values: Wt Readings from Last 1 Encounters:   08/03/22 195 lb 11.2 oz (88.8 kg)     Temp Readings from Last 1 Encounters:   08/03/22 97.2 °F (36.2 °C) (Temporal)     Recent Labs     08/03/22  1315 08/03/22  1354   CREATININE  --  8.5*   WBC 21.6*  --      Procalcitonin:     Pertinent Cultures:  Culture Date Source Results   08/03/22 blood collected   MRSA Nasal Swab: not ordered. Order placed by pharmacy.     Plan:  Concentration-guided dosing due to renal impairment and intermittent hemodialysis   Start vancomycin 2000 mg x 1     Pharmacy will continue to monitor patient and adjust therapy as indicated    Thank you for the consult,  Horace Galvin, [facility]  8/3/2022 8:39 PM

## 2022-08-04 NOTE — FLOWSHEET NOTE
08/03/22 1911   Encounter Summary   Encounter Overview/Reason  Advance Care Planning   Service Provided For: Patient and family together  (with wife at bedside in ER-12)   Referral/Consult From: Nurse   Support System Spouse   Complexity of Encounter High   Begin Time 1400   End Time  1430   Total Time Calculated 30 min   Encounter    Type Initial Screen/Assessment   Spiritual/Emotional needs   Type Spiritual Distress   Assessment/Intervention/Outcome   Assessment Concerns with suffering;Coping; Sad   Intervention Active listening;Discussed belief system/Adventism practices/art; Explored/Affirmed feelings, thoughts, concerns   Outcome Coping     Called by ER nurse to assist with DNR paperwork. Wife at bedside tearful. Asked the patient to consider his decision again. Pt request more time to rethink about his decision. Asked this  to come back later this afternoon.   Electronically signed by Tejal Renner on 8/3/2022 at 7:16 PM

## 2022-08-04 NOTE — CONSULTS
Palliative Care Consult Note    8/4/2022 8:18 AM  Subjective:  Admit Date: 8/3/2022  PCP: eLvi Mascorro MD    Date of Service: 8/4/2022    Reason for Consultation:  Goals of Care, Code Status, Family Support     History Obtained From: EMR/Patient and their Family    History Of Present Illness: The patient is a 42 y.o. male with PMH colorectal cancer s/p colectomy and colostomy in 2008, ESRD on HD, right obstructive uropathy and has a chronic right ureteral stent, HTN, and asthma who presented to [facility] ED 8/3/2022 complaining of AMS. Pt became lost driving to routine dialysis treatment yesterday and issues with memory over the past couple of weeks per his wife. Family reports he has had cough and shortness of breath recently. He has had decreased po intake of food and fluid and has had right upper abdominal pain over past few days. He presented to ED hypoxic and requiring 3 L NC. Workup revealed Na 136, K+ 3.5, lk phos 155, ammonia 22, lactic acid 1.4, ABGs-pH 7.53, pCO2 43, pO2 55, HCO3 36. CT abd/pelvis-Large right hepatic hypodensity, measures up to 11 cm, may represent abscess also extending into the adjacent perirenal space. CTA pulmonary-Negative for pulmonary embolism. CXR-Mild cardiomegaly and vascular congestion with midlung and bilateral lower lobe infiltrates or edema. Small pleural effusion. CT of head-no acute intracranial abnormality. He was admitted under hospitalist services with consult orders for GI, ID, GS, and oncology. Concern for 11 cm liver abscess that is too  large to be drained by IR. Initiated on vanc and zosyn. Palliative care is consulted for goals of care.      Past Medical History:        Diagnosis Date    Asthma     during winter months    Cancer [facility])     rectal    Colostomy care [facility])     Hemodialysis patient ([address]     mon wed fri at [place]    History of blood transfusion     Hx of migraine headaches     Hypercholesterolemia 12/16/2019    Hypertension     Kidney stone     hx of    Palliative care patient 07/11/2019    Pericarditis     Rectal cancer ([address]     Testalgia 2/1/2016       Past Surgical History:        Procedure Laterality Date    ANTERIOR CRUCIATE LIGAMENT REPAIR  2007    ACL and MCL repair    COLON SURGERY      colon resection with colostomy    COLONOSCOPY      DIALYSIS FISTULA CREATION Bilateral 01/21/2020    CREATION  LEFT  BRACHIAL CEPHALIC AV FISTULA performed by Katie Javier MD at [address] OR    ENDOSCOPY, COLON, DIAGNOSTIC      HC COLONOSCOPY BIOPSY/STOMA N/A 07/12/2019    Dr Purdy-w/APC ablation-Inflammatory polyps inside or below the colostomy opening-Tubular AP (-) dysplasia    HERNIA REPAIR      As an infant    KIDNEY REMOVAL Left     cancer    UPPER GASTROINTESTINAL ENDOSCOPY N/A 07/12/2019    Dr Purdy-Gastric ulcers    Marielena Files Right 07/14/2022    SJS Placement/exchange over a wire of right internal jugular vein tunneled dialysis catheter (BARD Glidepath 27 cm tip to cuff)       Home Medications:  Prior to Admission medications    Medication Sig Start Date End Date Taking? Authorizing Provider   losartan (COZAAR) 100 MG tablet Take 100 mg by mouth daily    Historical Provider, MD   cloNIDine (CATAPRES) 0.2 MG tablet Take 1 tablet by mouth at bedtime 2/22/22   CALLY Peraza   amLODIPine (NORVASC) 10 MG tablet TAKE 1 TABLET BY MOUTH EVERY DAY 11/29/21   Shawn Macias MD   SUMAtriptan (IMITREX) 100 MG tablet TAKE 1 TABLET AT ONSET OF MIGRAINE HEADACHE.  MAY REPEAT IN 2 HOURS IF NEEDED. 8/31/21   CALLY Lombardo   naratriptan (AMERGE) 2.5 MG tablet TAKE AS DIRECTED. 8/31/21   CALLY Lombardo   ondansetron (ZOFRAN) 4 MG tablet TAKE 1 TABLET BY MOUTH DAILY AS NEEDED FOR NAUSEA OR VOMITING 3/2/21   Shawn Macias MD   docusate sodium (COLACE) 100 MG capsule TAKE ONE CAPSULE BY MOUTH TWICE A DAY AS NEEDED FOR CONSTIPATION 1/8/21   Shawn Macias MD   tamsulosin (FLOMAX) 0.4 MG capsule TAKE 1 CAPSULE BY MOUTH DAILY 9/27/20   Pop Lobo MD   Ostomy Supplies [facility] Ostomy supplies 7/17/20   Pop Lobo MD   omeprazole (PRILOSEC) 20 MG delayed release capsule TAKE 1 CAPSULE BY MOUTH DAILY 4/23/20   Pop Lobo MD   cinacalcet (SENSIPAR) 30 MG tablet Take 30 mg by mouth daily    Historical Provider, MD   albuterol sulfate  (90 Base) MCG/ACT inhaler Inhale 2 puffs into the lungs every 4 hours as needed     Historical Provider, MD   vitamin D (ERGOCALCIFEROL) 1.25 MG (74796 UT) CAPS capsule TAKE 1 CAPSULE WEEKLY 10/24/19   Historical Provider, MD   AURYXIA 1  MG(Fe) TABS TAKE 1 TABLET BY MOUTH THREE TIMES A DAY WITH MEALS. SWALLOW WHOLE, DO NOT CHEW OR CRUSH MEDICATION 10/1/19   Historical Provider, MD   oxybutynin (DITROPAN) 5 MG tablet Take 5 mg by mouth 1/10/19   Historical Provider, MD   omeprazole (PRILOSEC) 40 MG delayed release capsule Take 40 mg by mouth  6/7/16   Historical Provider, MD   folic acid (FOLVITE) 1 MG tablet Take 1 tablet by mouth daily 7/18/19   Gama Iraheta MD   hydrALAZINE (APRESOLINE) 50 MG tablet TAKE 1 TABLET BY MOUTH THREE TIMES A DAY  Patient not taking: Reported on 8/3/2022 4/8/19   Pop Lobo MD   HYDROcodone-acetaminophen [facility])  MG per tablet Take one tablet every 3-4 hours PRN pain (month supply) (may fill 8/24/18). 8/24/18 6/21/22  CALLY Phillips - CNP       Allergies:    Oxycodone-acetaminophen, Morphine and related, and Morphine    Social History:    The patient currently lives at home with wife  Tobacco:   reports that he has never smoked. He has never used smokeless tobacco.  Alcohol:   reports no history of alcohol use.   Illicit Drugs: Unknown    Family History:      Problem Relation Age of Onset    Heart Attack Mother     Cancer Mother         Liver and pancreatic    Diabetes Father        Review of Systems:   Constitutional / general:  Denies fever / chills / sweats / +activity change +appetite change +fatigue  Head:  Denies headache / neck stiffness / trauma / visual change  Eyes:  Denies blurry vision / acute visual change or loss / itching / redness  ENT: Denies sore throat / hoarseness / nasal drainage / ear pain  CV:  Denies chest pain / palpitations/ orthopnea   Respiratory:  Denies / sputum / hemoptysis / +cough +SOB  GI: Denies nausea / vomiting / abdominal pain / diarrhea / constipation  :  Denies dysuria / hesitancy / urgency / hematuria   Neuro: Denies paralysis / syncope / seizure / dysphagia / headache / paresthesias / +weakness  Musculoskeletal:  Denies muscle weakness /joint stiffness / pain  Vascular: Denies edema / claudication / varicosities  Heme / endocrine: Denies easy bruising / bleeding / excessive sweating / heat or cold intolerance  Psychiatric:  Denies depression / anxiety / insomnia / mood changes  Skin:  Denies new rashes / lesions / skin hair or nail changes    14 point review of systems is negative except as specifically addressed above. Physical Examination:  /69   Pulse (!) 101   Temp 98.2 °F (36.8 °C) (Temporal)   Resp 18   Ht 6' 3\" (1.905 m)   Wt 195 lb 11.2 oz (88.8 kg)   SpO2 91%   BMI 24.46 kg/m²     General appearance: 43 yo male, ill appearing, no acute distress  Head: Normocephalic, without obvious abnormality, atraumatic  Eyes: conjunctivae/corneas clear. PERRL, EOM's intact.    Ears: normal external ears and nose  Neck: no JVD, supple, symmetrical, trachea midline   Lungs: clear to auscultation bilaterally,no rales or wheezes   Heart: regular rate and rhythm, S1, S2 normal, no murmur  Abdomen:soft, TTP, rounded, normal bowel sounds, colostomy left abdomen with flatus  Extremities: BLE 1+ edema,  No erythema, no tenderness to palpation, fistula left arm  Skin: Warm, dry, pale  Neurologic: Alert and oriented X 3, generalized weakness and normal tone, no focal deficits  Psychiatric: Calm and cooperative    Diagnostic Data:  CBC:  Recent Labs 08/03/22  1315 08/04/22  0241 08/04/22  0654   WBC 21.6* 14.9*  --    HGB 7.8* 6.7* 7.2*   HCT 26.8* 23.3* 25.3*    336  --      BMP:  Recent Labs     08/03/22  1354 08/03/22  1412 08/04/22  0241     --  138   K 3.5 3.3 3.6   CL 91*  --  97*   CO2 33*  --  28   BUN 38*  --  25*   CREATININE 8.5*  --  6.0*   CALCIUM 11.6*  --  10.2*   PHOS 4.7*  --  4.0     Recent Labs     08/03/22  1354 08/04/22  0241   AST 15 9   ALT <5* <5*   BILITOT 0.6 0.4   ALKPHOS 155* 142*     CT Head WO Contrast  Result Date: 8/3/2022  Unremarkable CT of the brain with no acute intracranial abnormality. Recommendation:  Consider MRI if clinical concern continues. All CT scans at this facility utilize dose modulation, iterative reconstruction, and/or weight based dosing when appropriate to reduce radiation dose to as low as reasonably achievable. Amended by Yessica Duggan MD at 03-Aug-2022 04:32:07 PM Electronically Signed by Yessica Duggan MD at 03-Aug-2022 04:12:04 PM             CT ABDOMEN PELVIS W IV CONTRAST Additional Contrast? None  Result Date: 8/4/2022   1. Large right hepatic hypodensity, measures up to 11 cm, may represent abscess     also extending into the adjacent perirenal space. 2.Right ureteral stent is in place, without hydronephrosis. 3.Presacral soft tissue edema measures 4.5 cm in thickness, may represent     postsurgical scarring. Unable to rule out neoplastic process. 4.Hydropic gallbladder containing calcified stone, without CT evidence of     acute cholecystitis. 5.Left colostomy changes with fat-containing parastomal hernia. 6.Additional chronic findings, as described above. 7.Please refer to same day CTA chest report regarding intra-thoracic findings. XR CHEST PORTABLE  Result Date: 8/3/2022  Mild cardiomegaly and vascular congestion with midlung and bilateral lower lobe infiltrates or edema. Small pleural effusion. Recommendation: Follow up recommended.  Electronically Signed by Damaso Salmon JOSE JIN at 03-Aug-2022 06:27:08 PM             CTA PULMONARY W CONTRAST  Result Date: 8/4/2022  1. Negative for pulmonary embolism. 2.Small bilateral pleural effusions. 3.Bibasilar atelectasis. Unable to rule-out superimposed infection. 4.Aortic and coronary calcific atherosclerosis. 5.Degenerative spondylosis. 6.Please refer to same day CT abdomen and pelvis report regarding abdominal findings. Palliative Performance Scale:  [x] 60% Reduced ambulation  Significant disease: Can't do hobbies/housework  Occasional assistance  Normal/reduced intake  LOC full/confusion    Palliative Review of Advance Directives:     Surrogate Decision Maker: Jerome Dobson, wife/legal NOK    Durable Power of : No    Advanced Directives/Living Carlson:  No    Out of hospital medical orders in place to reflect resuscitation status (MOLST/POLST): No    Information Sharing:  Patient's awareness of illness:  [] Terminal [] Life-Threatening [x] Serious [] Non life-threatening [] Not serious   [] Not discussed    Family awareness of illness:   [] Terminal [] Life-Threatening [x] Serious [] Nonlife-threatening [] Not serious   [] Not discussed    Assessment/Plan:  Active Problems:    AMS (altered mental status)    Hypercalcemia    Liver mass    Hypoxia    Hypertension    Hx of colon cancer, stage III    Palliative care patient    ESRD on hemodialysis ([facility]  Resolved Problems:    * No resolved hospital problems. *       Visit Summary:  Chart reviewed, patient discussed with nursing staff. Reviewed health issues, work up and treatment plan as well as factors that lead to hospitalization. Mr. Mandy Hazel is seen at bedside this afternoon with his wife, Cornell Armendariz, and friends present. I introduced myself, explained the role of palliative care, and reason for consult. We discussed patient's current status and plan of care. He has been accepted for transfer to tertiary facility at [facility] for higher level of care due to liver abscess. Patient reports his symptoms are well controlled at this time and confusion seems to have improved per wife. They requested information about outpatient palliative care services and I provided education on SCOP and the ability for palliative NP's to follow patient once discharged from the hospital to assist with transition home. I left and informational pamphlet with his wife and they request referral order be placed. No additional needs at this time while awaiting transfer. They deny patient has any ACP/POA documents and have had multiple conversations regarding code status with medical staff. Patient is considering change to a DNR but would like additional time to further discuss with his family and wishes to remain a full code at this time. Emotional support and Opportunity for questions provided. Will continue to follow. Recommendations:     Palliative Care- GOC transfer to tertiary facility for higher level of care to continue workup/treatment of liver abscess. Leukocytosis and AMS 2/2 liver abscess- GI, ID, and GS consulted. Empric zosyn, flagyl, and vanc initiated. Follow cultures. US liver ordered. Plans to transfer to [place] today  Hypoxia- now tolerating room air. Elevated bnp, trend troponins. Echo 8/4/22 with EF 35% and diastolic dysfunction. IS encouraged  ESRD on HD- nephrology consulted, to resume routine dialysis next due on 8/5/22  Obstructive uropathy with ureter stent  Anemia of CKD- 1 unit PRBCs today, hgb 7.2. Retacrit three times weekly  History colon cancer s/p colectomy- noted    Thank you for consulting palliative care and allowing us to participate in the care of the patient.     CounselingTopics: Goals of care, Code Status, Disease process education, pt/family support    Time Spent Counseling > 50%:  YES                                   Total Time Spent with patient/family counseling, workup/treatment review, counseling and placement of orders/preparation of this note: 76 minutes    Electronically signed by CALLY Weldon CNP on 8/4/2022 at 8:18 AM    (Please note that portions of this note were completed with a voice recognition program.  Efforts were made to edit the dictations but occasionally words are mis-transcribed.)

## 2022-08-04 NOTE — FLOWSHEET NOTE
08/03/22 1917   Encounter Summary   Encounter Overview/Reason  Advance Care Planning   Service Provided For: Patient and family together  (with wife and father-in-law present in ER)   Complexity of Encounter High   Begin Time 1700   End Time  1730   Total Time Calculated 30 min   Encounter    Type Follow up   Spiritual/Emotional needs   Type Spiritual Distress   Assessment/Intervention/Outcome   Assessment Concerns with suffering;Coping; Sad   Intervention Active listening;Prayer (assurance of)/[place]   Outcome Expressed Gratitude     Return visit as pt requested. Pt report just had some tests done. The report was not good. At this time pt decided not to make any decision regarding DNR. Assure  support as needed. Family appreciate the visit.   Electronically signed by Mary Puri on 8/3/2022 at 7:21 PM

## 2022-08-04 NOTE — PROGRESS NOTES
[facility]    Progress Note    I spoke with Dr. Curtis Ruelas this morning about Mr. Yesenia Boyd. At that time I recommended transfer to a tertiary center. We do not have interventional radiology coverage nor do my partners or I undertake liver surgery. While this may be a primary pyogenic abscess, given lack of a clear infectious source in the abdomen or heart combined with his history of colorectal cancer I am concerned that he may have necrotic tumor causing the abscess. Transfer is being arranged and should be accomplished in the next few hours. Please call if we can be of help in the interim.

## 2022-08-04 NOTE — PROGRESS NOTES
Nephrology ([address] [facility]) Progress Note    Patient:  Sahara Hernandez  YOB: 1975  Date of Service: 8/4/2022  MRN: [id number]   Acct: [de-identified]   Primary Care Physician: Tonya Fuentes MD  Advance Directive: Full Code  Admit Date: 8/3/2022       Hospital Day: 1  Referring Provider: Char Donis MD    Patient Seen, Chart, Consults notes, Labs, Radiology studies reviewed. Subjective:    Patient is a 53-year-old a very pleasant man with history of colon cancer status post colectomy and colostomy. He also had right obstructive uropathy and has a chronic right ureteral stent. His course was complicated with end-stage renal disease. Patient has been on chronic maintenance hemodialysis at [facility] dialysis unit on Mondays Wednesdays and Fridays. He has a right IJ PermCath that he refused to lose although he has a left upper extremity arteriovenous fistula that he refused to be cannulated. According to the wife, patient has been somewhat sick over a month with episodes of confusion, disorientation, chills and low-grade fever. He was having decreased oral intake and has lost some weight. Patient was also complaining of some right upper quadrant abdominal pain. He tried to report to dialysis on 8/3 as scheduled but he got more confused and was subsequently brought by family to [facility]) ER. On initial assessment, patient is found to have an 11 cm hypodense lesion in the liver in favor of an abscess. There was no right hydronephrosis and right ureteral stent was still identified. Renal service was consulted to manage his ESRD. Patient denies any tenderness around his right upper chest dialysis catheter. He is oligoanuric. Patient is status postdialysis on August 3. Today, patient is awake with no significant abdominal pain. He continues to feel somewhat weak with lack of concentration.       Allergies:  Oxycodone-acetaminophen, Morphine and related, and Morphine    Medicines:  Current Facility-Administered Medications   Medication Dose Route Frequency Provider Last Rate Last Admin    0.9 % sodium chloride infusion   IntraVENous PRN Cal Han MD        perflutren lipid microspheres (DEFINITY) injection 1.65 mg  1.5 mL IntraVENous ONCE PRN Adam Snowden MD   1.5 mL at 08/04/22 0829    HYDROmorphone HCl PF (DILAUDID) injection 0.25 mg  0.25 mg IntraVENous Q4H PRN CALLY Dillon CNP   0.25 mg at 08/04/22 1120    ondansetron (ZOFRAN) injection 4 mg  4 mg IntraVENous Q30 Min PRN Pacheco Castillo MD   4 mg at 08/03/22 1711    sodium chloride flush 0.9 % injection 5-40 mL  5-40 mL IntraVENous 2 times per day CALYL Alanis - CNP   10 mL at 08/04/22 1003    sodium chloride flush 0.9 % injection 5-40 mL  5-40 mL IntraVENous PRN CALLY Alanis - CNP        0.9 % sodium chloride infusion   IntraVENous PRN CALLY Alanis - CNP 5 mL/hr at 08/04/22 0047 New Bag at 08/04/22 0047    ondansetron (ZOFRAN-ODT) disintegrating tablet 4 mg  4 mg Oral Q8H PRN CALLY Alanis CNP        Or    ondansetron (ZOFRAN) injection 4 mg  4 mg IntraVENous Q6H PRN Ad Harrison APRN - CNP        polyethylene glycol (GLYCOLAX) packet 17 g  17 g Oral Daily PRN Ad Harrison APRN - CNP        heparin (porcine) injection 5,000 Units  5,000 Units SubCUTAneous 3 times per day Ad Harrison APRN - CNP   5,000 Units at 08/04/22 0106    amLODIPine (NORVASC) tablet 10 mg  10 mg Oral Daily Ad Harrison APRN - CNP   10 mg at 08/04/22 1005    cinacalcet (SENSIPAR) tablet 30 mg  30 mg Oral Daily Ad Harrison APRN - CNP   30 mg at 08/04/22 1005    cloNIDine (CATAPRES) tablet 0.2 mg  0.2 mg Oral Nightly Ad Harrison APRN - CNP        folic acid (FOLVITE) tablet 1 mg  1 mg Oral Daily Ad Harrison APRN - CNP   1 mg at 08/04/22 1005    hydrALAZINE (APRESOLINE) tablet 50 mg  50 mg Oral 3 times per day Ad Harrison APRN - CNP   50 mg at 08/04/22 1005    losartan (COZAAR) tablet 100 mg  100 mg Oral Daily Tracy Soto, APRN - CNP   100 mg at 08/04/22 1005    pantoprazole (PROTONIX) tablet 40 mg  40 mg Oral QAM AC Tracy Soto, APRN - CNP   40 mg at 08/04/22 0558    oxybutynin (DITROPAN) tablet 5 mg  5 mg Oral Daily Tracy Soto, APRN - CNP   5 mg at 08/04/22 1005    tamsulosin (FLOMAX) capsule 0.4 mg  0.4 mg Oral Daily Tracy Soto, APRN - CNP   0.4 mg at 08/04/22 1005    epoetin aliza-epbx (RETACRIT) injection 10,000 Units  10,000 Units SubCUTAneous Once per day on Mon Wed Fri Jyoti Barrett MD   10,000 Units at 08/04/22 0106    piperacillin-tazobactam (ZOSYN) 3,375 mg in dextrose 5 % 50 mL IVPB ([id number])  3,375 mg IntraVENous Q12H Sagar De La Vega MD 12.5 mL/hr at 08/04/22 1120 3,375 mg at 08/04/22 1120    metronidazole (FLAGYL) 500 mg in 0.9% NaCl 100 mL IVPB premix  500 mg IntraVENous Q8H Price Vidal MD   Stopped at 08/04/22 1058    vancomycin (VANCOCIN) intermittent dosing (placeholder)   Other RX Danielito Meza MD           Past Medical History:  Past Medical History:   Diagnosis Date    Asthma     during winter months    Cancer [facility])     rectal    Colostomy care [facility])     [facility]     mon wed fri at [facility]    History of blood transfusion     Hx of migraine headaches     Hypercholesterolemia 12/16/2019    Hypertension     Kidney stone     hx of    Palliative care patient 07/11/2019    Pericarditis     Rectal cancer ([facility]     Testalgia 2/1/2016       Past Surgical History:  Past Surgical History:   Procedure Laterality Date    ANTERIOR CRUCIATE LIGAMENT REPAIR  2007    ACL and MCL repair    COLON SURGERY      colon resection with colostomy    COLONOSCOPY      DIALYSIS FISTULA CREATION Bilateral 01/21/2020    CREATION  LEFT  BRACHIAL CEPHALIC AV FISTULA performed by Katie Javier MD at [facility] [place], [place] - [facility] OR    ENDOSCOPY, COLON, DIAGNOSTIC      HC COLONOSCOPY BIOPSY/STOMA N/A 07/12/2019    Dr Purdy-w/APC ablation-Inflammatory polyps inside or below the 11.2 oz (88.8 kg), SpO2 91 %. No intake or output data in the 24 hours ending 08/04/22 1239  General: awake, alert  Chest:  clear to auscultation bilaterally without respiratory distress  CVS: regular rate and rhythm  Abdominal: soft, nontender, normal bowel sounds  Extremities: no cyanosis or edema  Skin: warm and dry without rash    Labs:  BMP:   Recent Labs     08/03/22  1354 08/03/22  1412 08/04/22  0241     --  138   K 3.5 3.3 3.6   CL 91*  --  97*   CO2 33*  --  28   PHOS 4.7*  --  4.0   BUN 38*  --  25*   CREATININE 8.5*  --  6.0*   CALCIUM 11.6*  --  10.2*     CBC:   Recent Labs     08/03/22  1315 08/04/22  0241 08/04/22  0654   WBC 21.6* 14.9*  --    HGB 7.8* 6.7* 7.2*   HCT 26.8* 23.3* 25.3*   .1* 102.6*  --     336  --      LIVER PROFILE:   Recent Labs     08/03/22  1354 08/04/22  0241   AST 15 9   ALT <5* <5*   BILITOT 0.6 0.4   ALKPHOS 155* 142*     PT/INR: No results for input(s): PROTIME, INR in the last 72 hours. APTT: No results for input(s): APTT in the last 72 hours. BNP:  No results for input(s): BNP in the last 72 hours. Ionized Calcium:No results for input(s): IONCA in the last 72 hours. Magnesium:  Recent Labs     08/03/22  1354   MG 2.1     Phosphorus:  Recent Labs     08/03/22  1354 08/04/22  0241   PHOS 4.7* 4.0     HgbA1C: No results for input(s): LABA1C in the last 72 hours. Hepatic:   Recent Labs     08/03/22  1354 08/04/22  0241   ALKPHOS 155* 142*   ALT <5* <5*   AST 15 9   PROT 6.2* 5.6*   BILITOT 0.6 0.4   LABALBU 2.2* 1.9*     Lactic Acid:   Recent Labs     08/03/22  1354   LACTA 1.4     Troponin: No results for input(s): CKTOTAL, CKMB, TROPONINT in the last 72 hours. ABGs: No results for input(s): PH, PCO2, PO2, HCO3, O2SAT in the last 72 hours. CRP:  No results for input(s): CRP in the last 72 hours. Sed Rate:  No results for input(s): SEDRATE in the last 72 hours. Cultures:   No results for input(s): CULTURE in the last 72 hours.     Radiology reports as per the Radiologist  Radiology: ECHO Complete 2D W Doppler W Color    Result Date: 8/4/2022  Transthoracic Echocardiography Report (TTE)  Demographics   Patient Name   [address]  Date of Study           08/04/2022   MRN            [id number]         Gender                  Male   Date of Birth  1975     Room Number             ETB-4489   Age            42 year(s)   Height:        75 inches      Referring Physician   Weight:        195 pounds     Sonographer   BSA:           2.17 m^2       Interpreting Physician  Maggie Durand MD   BMI:           24.37 kg/m^2  Procedure Type of Study   TTE procedure:ECHO NO CONTRAST WITH DOP/COLR. Conclusions   Summary  Normal left ventricular cavity with overall normal systolic function. EF  43%  Diastolic dysfunction  Morphologically normal valves  Pericardium, IVC, and ascending aorta normal  Bubble study negative   Signature   ----------------------------------------------------------------  Electronically signed by Maggie Durand MD(Interpreting  physician) on 08/04/2022 09:25 AM  ----------------------------------------------------------------   Findings   Mitral Valve  Structurally normal mitral valve with normal leaflet mobility. No evidence  of mitral valve stenosis or mild mitral regurgitation. Aortic Valve  Aortic valve appears to be tricuspid. Structurally normal aortic valve. No significant aortic regurgitation or stenosis is noted. Tricuspid Valve  Tricuspid valve is structurally normal.  No evidence of tricuspid regurgitation. Pulmonic Valve  The pulmonic valve was not well visualized. Left Atrium  Normal size left atrium. Left Ventricle  Normal left ventricular size with preserved LV function and an estimated  ejection fraction of approximately 55-60%. No evidence of left ventricular mass or thrombus noted. Diastolic  dysfunction   Right Atrium  Normal right atrial dimension with no evidence of thrombus or mass noted.    Right Ventricle  Normal right ventricular size with preserved RV function. Pericardial Effusion  No evidence of significant pericardial effusion is noted. Pleural Effusion  No evidence of pleural effusion. Miscellaneous  Aortic root is within normal limits. 2D Measurements and Calculations(cm)   LVIDd: 4.79 cm                      LVIDs: 3.25 cm  IVSd: 1.29 cm  LVPWd: 1.19 cm                      AO Root Dimension: 2.1 cm  Rt. Vent. Dimension: 3.26 cm        LA Dimension: 3.2 cm  % Ejection Fraction: 60.3 %         LA Area: 20.6 cm^2  LA Volume: 59.1 ml                  LV Systolic dimension: 1.28IM  LA Volume Index: 27 ml/m^2          LV PW diastolic: 0.54ZF  LV dimension: 4.79 cm               LVOT diameter: 2.1 cm                                      RV Diastolic dimension: 2.97FL  LVEDV: 163 ml                       LVEDVI: 75 ml/m^2  LVESV:65.3 ml                       LVESVI: 30 ml/m^2  Ascending Aorta: 3.3 cm  Doppler Measurements and Calculations   AV Peak Velocity:205 cm/s              MV Peak E-Wave: 75.2 cm/s  AV Mean Velocity:152 cm/s              MV Peak A-Wave: 110 cm/s  AV Peak Gradient: 16.81 mmHg           MV E/A Ratio: 0.68 %  AV Mean Gradient: 10 mmHg              MV Mean velocity: 80cm/s  AV Area (Continuity):2.44 cm^2         MV Peak Gradient: 2.26 mmHg  AV VTI:35.3 cm/s                       MV Mean gradient: 3 mmHg                                         MV P1/2t: 86 msec                                         MVA by PHT2.56 cm^2   MV E' septal velocity: 12.2cm/s  MV E' lateral velocity:12.2 cm/s      CT Head WO Contrast    Result Date: 8/3/2022  <Addendum Signed by Freddy Enamorado MD at 03-Aug-2022 04:32:07 PM Findings were communicated to Lizandro Lisa RN on 8/3/22 at 4:31pm, who confirms having received the report Liznadro Lisa RN takes responsibility of getting the report to the referring Dr. Benoit Backer and confirms that he is aware of the findings.  No further action required by the reading radiologist. If the referring clinician has any further questions, please call customer service [phone], option 1. Stroke Documentation Completed. PP Addendum End> Exam: CT OF THE BRAIN WITHOUT CONTRAST Clinical data: Altered mental status. No focal neurologic deficit. Technique: Contiguous axial images are obtained from the skull base to vertex without intravenous contrast. Reformatted/MPR images were performed. Radiation dose: CTDIvol =60.42 mGy, DLP =1348.08 mGy x cm. Prior studies: No prior studies submitted. Findings:  Brain parenchyma is unremarkable. No evidence of acute cortical infarction, hemorrhage, mass or mass effect. No hydrocephalus or abnormal extra-axial fluid collections are present. The posterior fossa is unremarkable. The skull base and calvarium are intact. The included portions of the paranasal sinuses and mastoid air cells are clear. Unremarkable CT of the brain with no acute intracranial abnormality. Recommendation:  Consider MRI if clinical concern continues. All CT scans at this facility utilize dose modulation, iterative reconstruction, and/or weight based dosing when appropriate to reduce radiation dose to as low as reasonably achievable. Amended by Freddy Enamorado MD at 03-Aug-2022 04:32:07 PM Electronically Signed by Freddy Enamorado MD at 03-Aug-2022 04:12:04 PM             CT ABDOMEN PELVIS W IV CONTRAST Additional Contrast? None    Result Date: 8/4/2022  EXAMINATION: CT of the abdomen and pelvis with IV contrast. COMPARISON: None. TECHNIQUE: Helical imaging of the CT abdomen and pelvis obtained with multiplanar reformats. IV contrast was administered. FINDINGS: LIVER: Large right hepatic hypodensity, measures up to 11 cm, may represent abscess also extending into the adjacent perirenal space. GALLBLADDER & BILIARY SYSTEM: Hydropic gallbladder containing calcified stone, without CT evidence of acute cholecystitis. SPLEEN: No splenomegaly. PANCREAS: Unremarkable for age. ADRENAL GLANDS: Unremarkable. KIDNEYS, URETERS, & BLADDER: Status post left nephrectomy. Right ureteral stent is in place, without hydronephrosis. No solid mass. No pathologic bladder wall thickening. BOWEL: No pathologic bowel dilatation or bowel wall thickening. Left colostomy changes with fat-containing parastomal hernia. Postoperative changes of the stomach. LYMPH NODES/MESENTERY: No pathologic lymphadenopathy. VASCULATURE: Calcific atherosclerosis of the aorta. PELVIC STRUCTURES: There is presacral soft tissue edema measures 4.5 cm in thickness, may represent postsurgical scarring. FREE FLUID/FREE AIR: None. BONES: No acute process. BODY WALL: Unremarkable. 1.Large right hepatic hypodensity, measures up to 11 cm, may represent abscess     also extending into the adjacent perirenal space. 2.Right ureteral stent is in place, without hydronephrosis. 3.Presacral soft tissue edema measures 4.5 cm in thickness, may represent     postsurgical scarring. Unable to rule out neoplastic process. 4.Hydropic gallbladder containing calcified stone, without CT evidence of     acute cholecystitis. 5.Left colostomy changes with fat-containing parastomal hernia. 6.Additional chronic findings, as described above. 7.Please refer to same day CTA chest report regarding intra-thoracic findings. XR CHEST PORTABLE    Result Date: 8/3/2022  NO PRIOR REPORT AVAILABLE Exam: X-RAY OF THE CHEST Clinical data: End-stage renal disease on haemodialysis. Hypoxia and confusion. Technique: Single view of the chest. Prior studies: Radiograph of the chest dated 01/13/2020. Findings: Mild cardiomegaly and vascular congestion. Midlung and bilateral lower lobe infiltrates or edema with small effusion. No pneumothorax. The regional bones appear intact. Mild cardiomegaly and vascular congestion with midlung and bilateral lower lobe infiltrates or edema. Small pleural effusion. Recommendation: Follow up recommended.  Electronically Signed by Melodie Haas MD at 03-Aug-2022 06:27:08 PM             CTA PULMONARY W CONTRAST    Result Date: 8/4/2022  EXAMINATION: CT Angiogram of the Chest COMPARISON: None. TECHNIQUE: CT of the chest was obtained. Coronal and sagittal reformats available. 3D MIPs obtained. FINDINGS: PULMONARY ARTERIES: Negative for pulmonary embolism. THORACIC AORTA: Negative for aortic aneurysm. Calcific atherosclerosis. HEART AND VESSELS: No cardiomegaly. Coronary calcific atherosclerosis. LUNGS AND LARGE AIRWAYS: Bibasilar atelectasis. Unable to rule-out superimposed infection. PLEURA: Small bilateral pleural effusions. No pneumothorax. MEDIASTINUM AND DAHLIA: No pathologic lymphadenopathy. AXILLA/SOFT TISSUES: Unremarkable. UPPER ABDOMEN: No acute process. BONES: No acute process. Multilevel spondylosis and degenerative disc disease with various degrees of uncovertebral/facet arthropathy, disc space narrowing and osteophytosis. 1.Negative for pulmonary embolism. 2.Small bilateral pleural effusions. 3.Bibasilar atelectasis. Unable to rule-out superimposed infection. 4.Aortic and coronary calcific atherosclerosis. 5.Degenerative spondylosis. 6.Please refer to same day CT abdomen and pelvis report regarding abdominal findings. Assessment   End stage renal disease  Hypertension  Obstructive uropathy  Altered mental status  Anemia of chronic kidney disease  Leukocytosis and sepsis  Liver lesion likely abscess  History of colon cancer and colectomy      Plan:  I appreciate GI and general surgery inputs. Apparently, patient may benefit from moving to a tertiary care hospital.  He is currently on intravenous antibiotics. Need to continue dialysis thrice weekly. He will be due for dialysis on August 5. For now there is no evidence for infection in his PermCath and it should still serve as an access for the patient. Monitor labs.

## 2022-08-04 NOTE — DISCHARGE SUMMARY
Patricia Peterson  :  1975  MRN:  [id number]    Admit date:  8/3/2022  Discharge date:  2022    Discharging Physician:  Dr. Andrae Besnon Directive: Full Code    Consults: ALEXANDRA SIMPSON     Primary Care Physician:  Gume Sterling MD    Discharge Diagnoses: Active Problems:    AMS (altered mental status)    Hypercalcemia    Liver mass    Hypoxia    Hypertension    Hx of colon cancer, stage III    Palliative care patient    ESRD on hemodialysis ([facility] [address]  Resolved Problems:    * No resolved hospital problems. *      Portions of this note have been copied forward, however, changed to reflect the most current clinical status of this patient. Hospital Course: The patient is a 72-year-old male with past medical history of colorectal cancer, ESRD on hemodialysis, hypertension, hyperlipidemia, and pericarditis who presented to [facility] ED with complaints of altered mental status. Wife and patient indicated patient has had issues with memory for approximately 3 weeks. Patient is on hemodialysis . Wife indicates patient had not missed any dialysis treatments until day of admission. Wife explained that dialysis clinic called as patient had not arrived to the clinic as he was supposed to. Patient reported having increasing cough and shortness of breath recently. Patient reports decreased intake of food and liquids. Patient reported upper abdominal pain for the past few days. ED work-up indicated the patient was hypoxic with SPO2 of 88% and required 3 L per nasal cannula of oxygen. CT abdomen pelvis indicated a large right hepatic hypodensity measuring 11 cm which may represent abscess also extending into the adjacent perirenal space. CTA chest was negative for PE.   Chest x-ray indicated mild cardiomegaly and vascular congestion with midlung and bilateral lobe infiltrates or edema. CT head with no acute intracranial abnormalities. Patient was admitted to the hospitalist service. GI consulted and recommended general surgery consult. Discussed with general surgery and GI and recommended transfer to tertiary center with IR availability. Nephrology consulted and patient was dialyzed on day of admission. Spoke with Dr. Vlad De Leon at [facility] and patient was accepted to the facility. Patient is medically stable for acute care transfer. Significant Diagnostic Studies:   ECHO Complete 2D W Doppler W Color    Result Date: 8/4/2022  Transthoracic Echocardiography Report (TTE)  Demographics   Patient Name   [address]  Date of Study           08/04/2022   MRN            [id number]         Gender                  Male   Date of Birth  1975     Room Number             M[facility]-0515   Age            42 year(s)   Height:        75 inches      Referring Physician   Weight:        195 pounds     Sonographer   BSA:           2.17 m^2       Interpreting Physician  Fabiola Watkins MD   BMI:           24.37 kg/m^2  Procedure Type of Study   TTE procedure:ECHO NO CONTRAST WITH DOP/COLR. Conclusions   Summary  Normal left ventricular cavity with overall normal systolic function. EF  44%  Diastolic dysfunction  Morphologically normal valves  Pericardium, IVC, and ascending aorta normal  Bubble study negative   Signature   ----------------------------------------------------------------  Electronically signed by Fabiola Watkins MD(Interpreting  physician) on 08/04/2022 09:25 AM  ----------------------------------------------------------------   Findings   Mitral Valve  Structurally normal mitral valve with normal leaflet mobility. No evidence  of mitral valve stenosis or mild mitral regurgitation. Aortic Valve  Aortic valve appears to be tricuspid. Structurally normal aortic valve.   No significant aortic regurgitation or stenosis is noted. Tricuspid Valve  Tricuspid valve is structurally normal.  No evidence of tricuspid regurgitation. Pulmonic Valve  The pulmonic valve was not well visualized. Left Atrium  Normal size left atrium. Left Ventricle  Normal left ventricular size with preserved LV function and an estimated  ejection fraction of approximately 55-60%. No evidence of left ventricular mass or thrombus noted. Diastolic  dysfunction   Right Atrium  Normal right atrial dimension with no evidence of thrombus or mass noted. Right Ventricle  Normal right ventricular size with preserved RV function. Pericardial Effusion  No evidence of significant pericardial effusion is noted. Pleural Effusion  No evidence of pleural effusion. Miscellaneous  Aortic root is within normal limits. 2D Measurements and Calculations(cm)   LVIDd: 4.79 cm                      LVIDs: 3.25 cm  IVSd: 1.29 cm  LVPWd: 1.19 cm                      AO Root Dimension: 2.1 cm  Rt. Vent.  Dimension: 3.26 cm        LA Dimension: 3.2 cm  % Ejection Fraction: 60.3 %         LA Area: 20.6 cm^2  LA Volume: 59.1 ml                  LV Systolic dimension: 0.96OW  LA Volume Index: 27 ml/m^2          LV PW diastolic: 4.19GH  LV dimension: 4.79 cm               LVOT diameter: 2.1 cm                                      RV Diastolic dimension: 3.21PT  LVEDV: 163 ml                       LVEDVI: 75 ml/m^2  LVESV:65.3 ml                       LVESVI: 30 ml/m^2  Ascending Aorta: 3.3 cm  Doppler Measurements and Calculations   AV Peak Velocity:205 cm/s              MV Peak E-Wave: 75.2 cm/s  AV Mean Velocity:152 cm/s              MV Peak A-Wave: 110 cm/s  AV Peak Gradient: 16.81 mmHg           MV E/A Ratio: 0.68 %  AV Mean Gradient: 10 mmHg              MV Mean velocity: 80cm/s  AV Area (Continuity):2.44 cm^2         MV Peak Gradient: 2.26 mmHg  AV VTI:35.3 cm/s                       MV Mean gradient: 3 mmHg MV P1/2t: 86 msec                                         MVA by PHT2.56 cm^2   MV E' septal velocity: 12.2cm/s  MV E' lateral velocity:12.2 cm/s      CT Head WO Contrast    Result Date: 8/3/2022  <Addendum Signed by Jacki Jacinto MD at 03-Aug-2022 04:32:07 PM Findings were communicated to Adi Riojas RN on 8/3/22 at 4:31pm, who confirms having received the report Adi Riojas RN takes responsibility of getting the report to the referring Dr. Kary Palm and confirms that he is aware of the findings. No further action required by the reading radiologist. If the referring clinician has any further questions, please call customer service [phone], option 1. Stroke Documentation Completed. PP Addendum End> Exam: CT OF THE BRAIN WITHOUT CONTRAST Clinical data: Altered mental status. No focal neurologic deficit. Technique: Contiguous axial images are obtained from the skull base to vertex without intravenous contrast. Reformatted/MPR images were performed. Radiation dose: CTDIvol =60.42 mGy, DLP =1348.08 mGy x cm. Prior studies: No prior studies submitted. Findings:  Brain parenchyma is unremarkable. No evidence of acute cortical infarction, hemorrhage, mass or mass effect. No hydrocephalus or abnormal extra-axial fluid collections are present. The posterior fossa is unremarkable. The skull base and calvarium are intact. The included portions of the paranasal sinuses and mastoid air cells are clear. Unremarkable CT of the brain with no acute intracranial abnormality. Recommendation:  Consider MRI if clinical concern continues. All CT scans at this facility utilize dose modulation, iterative reconstruction, and/or weight based dosing when appropriate to reduce radiation dose to as low as reasonably achievable.           Amended by Jacki Jacinto MD at 03-Aug-2022 04:32:07 PM Electronically Signed by Jacki Jacinto MD at 03-Aug-2022 04:12:04 PM             CT ABDOMEN PELVIS W IV CONTRAST Additional Contrast? None    Result Date: 8/4/2022  EXAMINATION: CT of the abdomen and pelvis with IV contrast. COMPARISON: None. TECHNIQUE: Helical imaging of the CT abdomen and pelvis obtained with multiplanar reformats. IV contrast was administered. FINDINGS: LIVER: Large right hepatic hypodensity, measures up to 11 cm, may represent abscess also extending into the adjacent perirenal space. GALLBLADDER & BILIARY SYSTEM: Hydropic gallbladder containing calcified stone, without CT evidence of acute cholecystitis. SPLEEN: No splenomegaly. PANCREAS: Unremarkable for age. ADRENAL GLANDS: Unremarkable. KIDNEYS, URETERS, & BLADDER: Status post left nephrectomy. Right ureteral stent is in place, without hydronephrosis. No solid mass. No pathologic bladder wall thickening. BOWEL: No pathologic bowel dilatation or bowel wall thickening. Left colostomy changes with fat-containing parastomal hernia. Postoperative changes of the stomach. LYMPH NODES/MESENTERY: No pathologic lymphadenopathy. VASCULATURE: Calcific atherosclerosis of the aorta. PELVIC STRUCTURES: There is presacral soft tissue edema measures 4.5 cm in thickness, may represent postsurgical scarring. FREE FLUID/FREE AIR: None. BONES: No acute process. BODY WALL: Unremarkable. 1.Large right hepatic hypodensity, measures up to 11 cm, may represent abscess     also extending into the adjacent perirenal space. 2.Right ureteral stent is in place, without hydronephrosis. 3.Presacral soft tissue edema measures 4.5 cm in thickness, may represent     postsurgical scarring. Unable to rule out neoplastic process. 4.Hydropic gallbladder containing calcified stone, without CT evidence of     acute cholecystitis. 5.Left colostomy changes with fat-containing parastomal hernia. 6.Additional chronic findings, as described above. 7.Please refer to same day CTA chest report regarding intra-thoracic findings.     XR CHEST PORTABLE    Result Date: 8/3/2022  NO PRIOR REPORT AVAILABLE Exam: X-RAY OF THE CHEST Clinical data: End-stage renal disease on haemodialysis. Hypoxia and confusion. Technique: Single view of the chest. Prior studies: Radiograph of the chest dated 01/13/2020. Findings: Mild cardiomegaly and vascular congestion. Midlung and bilateral lower lobe infiltrates or edema with small effusion. No pneumothorax. The regional bones appear intact. Mild cardiomegaly and vascular congestion with midlung and bilateral lower lobe infiltrates or edema. Small pleural effusion. Recommendation: Follow up recommended. Electronically Signed by Mark Lange MD at 03-Aug-2022 06:27:08 PM             CTA PULMONARY W CONTRAST    Result Date: 8/4/2022  EXAMINATION: CT Angiogram of the Chest COMPARISON: None. TECHNIQUE: CT of the chest was obtained. Coronal and sagittal reformats available. 3D MIPs obtained. FINDINGS: PULMONARY ARTERIES: Negative for pulmonary embolism. THORACIC AORTA: Negative for aortic aneurysm. Calcific atherosclerosis. HEART AND VESSELS: No cardiomegaly. Coronary calcific atherosclerosis. LUNGS AND LARGE AIRWAYS: Bibasilar atelectasis. Unable to rule-out superimposed infection. PLEURA: Small bilateral pleural effusions. No pneumothorax. MEDIASTINUM AND DAHLIA: No pathologic lymphadenopathy. AXILLA/SOFT TISSUES: Unremarkable. UPPER ABDOMEN: No acute process. BONES: No acute process. Multilevel spondylosis and degenerative disc disease with various degrees of uncovertebral/facet arthropathy, disc space narrowing and osteophytosis. 1.Negative for pulmonary embolism. 2.Small bilateral pleural effusions. 3.Bibasilar atelectasis. Unable to rule-out superimposed infection. 4.Aortic and coronary calcific atherosclerosis. 5.Degenerative spondylosis. 6.Please refer to same day CT abdomen and pelvis report regarding abdominal findings.       Pertinent Labs:   CBC:   Recent Labs     08/03/22  1315 08/04/22  0241 08/04/22  0654   WBC 21.6* 14.9*  --    HGB 7.8* 6.7* 7.2*    336  --      BMP: Recent Labs     08/03/22  1354 08/03/22  1412 08/04/22  0241     --  138   K 3.5 3.3 3.6   CL 91*  --  97*   CO2 33*  --  28   BUN 38*  --  25*   CREATININE 8.5*  --  6.0*   GLUCOSE 118*  --  112*     INR: No results for input(s): INR in the last 72 hours. Physical Exam:  Vital Signs: /77   Pulse 93   Temp 98.6 °F (37 °C) (Temporal)   Resp 14   Ht 6' 3\" (1.905 m)   Wt 195 lb 11.2 oz (88.8 kg)   SpO2 91%   BMI 24.46 kg/m²   Physical Exam  Constitutional:       Appearance: He is obese. He is ill-appearing. Cardiovascular:      Rate and Rhythm: Normal rate and regular rhythm. Pulses: Normal pulses. Heart sounds: Normal heart sounds. Arteriovenous access: Left arteriovenous access is present. Pulmonary:      Effort: Pulmonary effort is normal.      Breath sounds: Normal breath sounds. Abdominal:      General: Bowel sounds are normal. There is no distension. Palpations: Abdomen is soft. Tenderness: There is abdominal tenderness. There is no guarding. Musculoskeletal:         General: Normal range of motion. Right lower leg: Edema present. Left lower leg: Edema present. Skin:     General: Skin is warm and dry. Capillary Refill: Capillary refill takes less than 2 seconds. Neurological:      General: No focal deficit present. Mental Status: He is alert and oriented to person, place, and time.        Discharge Medications:         Medication List        START taking these medications      epoetin aliza-epbx 13509 UNIT/ML Soln injection  Commonly known as: RETACRIT  Inject 1 mL into the skin three times a week  Start taking on: August 5, 2022            CONTINUE taking these medications      albuterol sulfate  (90 Base) MCG/ACT inhaler  Commonly known as: PROVENTIL;VENTOLIN;PROAIR     amLODIPine 10 MG tablet  Commonly known as: NORVASC  TAKE 1 TABLET BY MOUTH EVERY DAY     Auryxia 1  MG(Fe) Tabs tablet  Generic drug: ferric citrate cinacalcet 30 MG tablet  Commonly known as: SENSIPAR     cloNIDine 0.2 MG tablet  Commonly known as: CATAPRES  Take 1 tablet by mouth at bedtime     docusate sodium 100 MG capsule  Commonly known as: COLACE  TAKE ONE CAPSULE BY MOUTH TWICE A DAY AS NEEDED FOR CONSTIPATION     folic acid 1 MG tablet  Commonly known as: FOLVITE  Take 1 tablet by mouth daily     HYDROcodone-acetaminophen  MG per tablet  Commonly known as: Norco  Take one tablet every 3-4 hours PRN pain (month supply) (may fill 8/24/18). losartan 100 MG tablet  Commonly known as: COZAAR     naratriptan 2.5 MG tablet  Commonly known as: AMERGE  TAKE AS DIRECTED. * omeprazole 40 MG delayed release capsule  Commonly known as: PRILOSEC     * omeprazole 20 MG delayed release capsule  Commonly known as: PRILOSEC  TAKE 1 CAPSULE BY MOUTH DAILY     ondansetron 4 MG tablet  Commonly known as: ZOFRAN  TAKE 1 TABLET BY MOUTH DAILY AS NEEDED FOR NAUSEA OR VOMITING     Ostomy Supplies Kit  Ostomy supplies     oxybutynin 5 MG tablet  Commonly known as: DITROPAN     SUMAtriptan 100 MG tablet  Commonly known as: IMITREX  TAKE 1 TABLET AT ONSET OF MIGRAINE HEADACHE. MAY REPEAT IN 2 HOURS IF NEEDED.     tamsulosin 0.4 MG capsule  Commonly known as: FLOMAX  TAKE 1 CAPSULE BY MOUTH DAILY     vitamin D 1.25 MG (27750 UT) Caps capsule  Commonly known as: ERGOCALCIFEROL           * This list has 2 medication(s) that are the same as other medications prescribed for you. Read the directions carefully, and ask your doctor or other care provider to review them with you.                 STOP taking these medications      hydrALAZINE 50 MG tablet  Commonly known as: APRESOLINE               Where to Get Your Medications        These medications were sent to [address]  [address] [place] [address], [address] [facility]      Phone: [phone]   epoetin aliza-epbx 31710 UNIT/ML Soln injection Discharge Instructions: Follow up with Mohamud Mathis MD in 3-5 days after discharge from acute care facility. Take medications as directed. Resume activity as tolerated. Diet: ADULT DIET; Regular; Low Sodium (2 gm)     Disposition: Patient is Prashanth Weeks will be discharged to Transfer to [address] [place] [address].    Time spent on discharge 33 minutes spent in assessing patient, reviewing medications, discussion with nursing, confirming safe discharge plan and preparation of discharge summary.     Signed:  CALLY Andre CNP, 8/4/2022 2:16 PM

## 2022-08-04 NOTE — DISCHARGE INSTR - DIET
Good nutrition is important when healing from an illness, injury, or surgery. Follow any nutrition recommendations given to you during your hospital stay. If you were given an oral nutrition supplement while in the hospital, continue to take this supplement at home. You can take it with meals, in-between meals, and/or before bedtime. These supplements can be purchased at most local grocery stores, pharmacies, and chain [employer]stores. If you have any questions about your diet or nutrition, call the hospital and ask for the dietitian.     Regular low sodium diet

## 2022-08-04 NOTE — PLAN OF CARE
Patient is off the floor. Apparently went for echo. I talked with Dr. Yony Lemos, surgery Dr. Emily Fox and IR. This abscess is too large to be drained by IR or by surgery or discussion with surgery and IR. Impression:    11 Centimeter liver abscess. This likely due to the abscess rather than amoebic abscess. It is most likely an abscess rather than necrotic tumor. It cannot be drained here by IR followed by surgery. Plan    Discussed with Dr. Emily Fox. Suggest to transfer the patient to a tertiary care center for higher level of care.

## 2022-08-04 NOTE — PROGRESS NOTES
Physician Progress Note      PATIENTBenedict Prior  CSN #:                  [id number]  :                       1975  ADMIT DATE:       8/3/2022 1:01 PM  [address] [place] DATE:  RESPONDING  PROVIDER #:        Juan Knowles MD          QUERY TEXT:    Pt admitted with AMS and missed dialysis and ESRD. Noted documentation of   Sepsis by Jorge Duarte on 22 consult note. If possible, please document   in progress notes and discharge summary:    The medical record reflects the following:  Risk Factors: Liver Abscess,  Colon Cancer  Clinical Indicators: WBC: 21.6, HR: 129 on arrival with T: 99.7 on arrival,   US: WBC: TNTC with 1+bacteria, and negative nitrates, NO CRP, NO Procal,  Treatment: Vancomycin, cultures, Flagyl, ZOsyn. Thank you in advance,    Kathleen Gillette, RN-BSN, [facility]  Clinical   [facility], [address] [email or web address] [email or web address]  Options provided:  -- Sepsis confirmed present on admission  -- Sepsis ruled out  -- Defer to Dr. Mehul Duarte consultant documentation regarding Sepsis  -- Other - I will add my own diagnosis  -- Disagree - Not applicable / Not valid  -- Disagree - Clinically unable to determine / Unknown  -- Refer to Clinical Documentation Reviewer    PROVIDER RESPONSE TEXT:    The diagnosis of Sepsis was confirmed as present on admission. Query created by: Leonie Villegas on 2022 11:06 AM      QUERY TEXT:    Patient admitted with AMS, missed dialysis and ESRD. Documentation reflects   acute resp. failure with hypoxia noted by the ER provider at the time of   admission. If possible, please document in the progress notes and discharge   summary if Acute Resp. Failure with hypoxia was:     The medical record reflects the following:  Risk Factors: ESRD missed dialysis  Clinical Indicators: ABG: ph 7.530, PO2: 55.0, CR: 8.5 with GFR: 8, TROP:   0.06, BNP: 12,340,  Dr. Ellis Armando notes \"concern for fluid overload and documents, with facial edema although he has no major respiratory distress. HR: 129 and SPO2: 93% and as low as 89%, and placed on 3 LPM of O2  Treatment: BNP, CXR, Oxygen    Thank you in advance,    Deonte Montelongo, RN-BSN, [facility]  Clinical   [facility]  [place], [email or web address]  Options provided:  -- Acute Resp. Failure with hypoxia confirmed after study  -- Acute Resp. Failure with hypoxia treated and resolved  -- Acute Resp. Failure with hypoxia  ruled out after study  -- Other - I will add my own diagnosis  -- Disagree - Not applicable / Not valid  -- Disagree - Clinically unable to determine / Unknown  -- Refer to Clinical Documentation Reviewer    PROVIDER RESPONSE TEXT:    Acute Resp. Failure with hypoxia ruled out after study.     Query created by: Evonne Osborn on 8/4/2022 11:24 AM      Electronically signed by:  Yifan Buck MD 8/4/2022 12:16 PM

## 2022-08-04 NOTE — CONSULTS
INFECTIOUS DISEASES CONSULT NOTE    Patient:  Richmond Weeks 42 y.o. male  ROOM # [unfilled]  YOB: 1975  MRN: [id number]  [facility]:  [id number]  Admit date: 8/3/2022   Admitting Physician: Karine Carreno MD  Primary Care Physician: Palomo Epstein MD  REFERRING PROVIDER: No ref. provider found    Reason for Consultation: Possible blood infection/bacteremia    History of Present Illness/Chief Complaint: 28-year-old man. He indicates he has not felt well for a couple of weeks. He has felt weak. He indicates he has had confusion. He has been forgetful. He indicates he had a fullness in the right abdominal area. He indicates he feels a little bit better since he has been in the hospital.  He has not had fever. He does not report any chills. He reports no cough or dyspnea. Indicates no change in ostomy output. He has not had rash or skin itching. Patient was seen this afternoon. Note initiated near the time I saw him. Note completed this evening.     Current Scheduled Medications:    [START ON 8/5/2022] vancomycin  1,250 mg IntraVENous Once    sodium chloride flush  5-40 mL IntraVENous 2 times per day    heparin (porcine)  5,000 Units SubCUTAneous 3 times per day    amLODIPine  10 mg Oral Daily    cinacalcet  30 mg Oral Daily    cloNIDine  0.2 mg Oral Nightly    folic acid  1 mg Oral Daily    hydrALAZINE  50 mg Oral 3 times per day    losartan  100 mg Oral Daily    pantoprazole  40 mg Oral QAM AC    oxybutynin  5 mg Oral Daily    tamsulosin  0.4 mg Oral Daily    epoetin aliza-epbx  10,000 Units SubCUTAneous Once per day on Mon Wed Fri    piperacillin-tazobactam  3,375 mg IntraVENous Q12H    metroNIDAZOLE  500 mg IntraVENous Q8H    vancomycin (VANCOCIN) intermittent dosing (placeholder)   Other RX Placeholder     Current PRN Medications:  sodium chloride, perflutren lipid microspheres, HYDROmorphone, ondansetron, sodium chloride flush, sodium chloride, ondansetron **OR** ondansetron, polyethylene glycol    Allergies: Allergies   Allergen Reactions    Oxycodone-Acetaminophen      Give me migraines   Other reaction(s): Other (see comments)  Give me migraines   Give me migraines   Reaction: migraines  Give me migraines   Give me migraines   Reaction: migraines    Morphine And Related      Doesn't work     Morphine      Other reaction(s): Other (see comments)  Doesn't work   Doesn't work      Past Medical History: Asthma. Colorectal cancer. Migraine headache. Hypercholesterolemia. Hypertension. Nephrolithiasis. Pericarditis. Past Surgical History: Anterior cruciate ligament repair  Colon surgery with colostomy creation  Dialysis fistula creation  Hernia repair  Left nephrectomy  Ureteral stent placement    Social History: No tobacco use, alcohol use, or illicit drug use. Family History: Heart disease. Liver and pancreatic cancer. Diabetes. Exposure History: No close contacts of been ill    Review of Systems: See HPI    Vital Signs:  /67   Pulse 94   Temp 98.2 °F (36.8 °C) (Temporal)   Resp 16   Ht 6' 3\" (1.905 m)   Wt 195 lb 11.2 oz (88.8 kg)   SpO2 91%   BMI 24.46 kg/m²  Temp (24hrs), Av.9 °F (36.6 °C), Min:97.2 °F (36.2 °C), Max:98.6 °F (37 °C)    Physical Exam:   Vital signs reviewed. Lungs clear without crackles  Heart regular rhythm without murmur. Abdomen is soft. Ostomy in place. Ostomy pink and functioning.   Did not seem to have any severe abdominal tenderness rebound  Extremities without significant edema  Skin without rash    Lab Results:  CBC:   Recent Labs     22  1315 22  0241 22  0654   WBC 21.6* 14.9*  --    HGB 7.8* 6.7* 7.2*   HCT 26.8* 23.3* 25.3*    336  --    LYMPHOPCT 2.3* 5.4*  --    MONOPCT 4.9 5.4  --      CMP:   Recent Labs     22  1354 22  1412 22  0241     --  138   K 3.5 3.3 3.6   CL 91*  --  97*   CO2 33*  --  28   BUN 38*  --  25*   CREATININE 8.5*  --  6.0*   CALCIUM 11.6*  --  10.2*   BILITOT 0.6  --  0.4   ALKPHOS 155*  --  142*   ALT <5*  --  <5*   AST 15  --  9   GLUCOSE 118*  --  112*     Culture:   Blood cultures August 3, 2022:  1 set no growth  1 set no growth  Urine culture August 3, 2022-growth additional incubation required    Radiology:   CT scan of the abdomen and pelvis done August 3, 2022:  Impression    1. Large right hepatic hypodensity, measures up to 11 cm, may represent abscess       also extending into the adjacent perirenal space. 2.Right ureteral stent is in place, without hydronephrosis. 3.Presacral soft tissue edema measures 4.5 cm in thickness, may represent       postsurgical scarring. Unable to rule out neoplastic process. 4.Hydropic gallbladder containing calcified stone, without CT evidence of       acute cholecystitis. 5.Left colostomy changes with fat-containing parastomal hernia. 6.Additional chronic findings, as described above. 7.Please refer to same day CTA chest report regarding intra-thoracic findings. CT angiogram of the chest done August 3, 2022:  Impression   1. Negative for pulmonary embolism. 2.Small bilateral pleural effusions. 3.Bibasilar atelectasis. Unable to rule-out superimposed infection. 4.Aortic and coronary calcific atherosclerosis. 5.Degenerative spondylosis. 6.Please refer to same day CT abdomen and pelvis report regarding abdominal   findings. Additional Studies Reviewed:     Impression:   1. Liver lesion-possible abscess. Possible tumor. 2.  He has had some weakness and confusion. UTI possible. Liver lesion/abscess possible. He has had some hypercalcemia which may be contributing.   3.  History colorectal cancer    Recommendations:    Await blood cultures  Continue vancomycin and Zosyn  Feel Flagyl can be discontinued  Await urine culture results  Feel he will need biopsy or possible percutaneous drainage of liver  Per chart review possible transfer to tertiary care  Will continue to follow    Charlette Metzger MD  08/04/22  3:52 PM

## 2022-08-05 ENCOUNTER — INPATIENT HOSPITAL (OUTPATIENT)
Dept: URBAN - METROPOLITAN AREA MEDICAL CENTER 11 | Facility: MEDICAL CENTER | Age: 47
End: 2022-08-05
Payer: MEDICARE

## 2022-08-05 DIAGNOSIS — K75.0 ABSCESS OF LIVER: ICD-10-CM

## 2022-08-05 DIAGNOSIS — R93.5 ABNORMAL FINDINGS ON DIAGNOSTIC IMAGING OF OTHER ABDOMINAL R: ICD-10-CM

## 2022-08-05 DIAGNOSIS — Z85.048 PERSONAL HISTORY OF OTHER MALIGNANT NEOPLASM OF RECTUM, RECT: ICD-10-CM

## 2022-08-05 DIAGNOSIS — N18.6 END STAGE RENAL DISEASE: ICD-10-CM

## 2022-08-05 DIAGNOSIS — Z80.0 FAMILY HISTORY OF MALIGNANT NEOPLASM OF DIGESTIVE ORGANS: ICD-10-CM

## 2022-08-05 DIAGNOSIS — Z99.2 DEPENDENCE ON RENAL DIALYSIS: ICD-10-CM

## 2022-08-05 PROCEDURE — 99222 1ST HOSP IP/OBS MODERATE 55: CPT | Performed by: SPECIALIST

## 2022-08-06 ENCOUNTER — INPATIENT HOSPITAL (OUTPATIENT)
Dept: URBAN - METROPOLITAN AREA MEDICAL CENTER 11 | Facility: MEDICAL CENTER | Age: 47
End: 2022-08-06
Payer: MEDICARE

## 2022-08-06 DIAGNOSIS — Z99.2 DEPENDENCE ON RENAL DIALYSIS: ICD-10-CM

## 2022-08-06 DIAGNOSIS — R93.5 ABNORMAL FINDINGS ON DIAGNOSTIC IMAGING OF OTHER ABDOMINAL R: ICD-10-CM

## 2022-08-06 DIAGNOSIS — N18.6 END STAGE RENAL DISEASE: ICD-10-CM

## 2022-08-06 DIAGNOSIS — Z80.0 FAMILY HISTORY OF MALIGNANT NEOPLASM OF DIGESTIVE ORGANS: ICD-10-CM

## 2022-08-06 DIAGNOSIS — K75.0 ABSCESS OF LIVER: ICD-10-CM

## 2022-08-06 DIAGNOSIS — Z85.048 PERSONAL HISTORY OF OTHER MALIGNANT NEOPLASM OF RECTUM, RECT: ICD-10-CM

## 2022-08-06 LAB — E HISTOLYTICA ANTIBODY: 3 U

## 2022-08-06 PROCEDURE — 99232 SBSQ HOSP IP/OBS MODERATE 35: CPT | Performed by: SPECIALIST

## 2022-08-07 ENCOUNTER — INPATIENT HOSPITAL (OUTPATIENT)
Dept: URBAN - METROPOLITAN AREA MEDICAL CENTER 11 | Facility: MEDICAL CENTER | Age: 47
End: 2022-08-07
Payer: MEDICARE

## 2022-08-07 DIAGNOSIS — Z80.0 FAMILY HISTORY OF MALIGNANT NEOPLASM OF DIGESTIVE ORGANS: ICD-10-CM

## 2022-08-07 DIAGNOSIS — Z85.048 PERSONAL HISTORY OF OTHER MALIGNANT NEOPLASM OF RECTUM, RECT: ICD-10-CM

## 2022-08-07 DIAGNOSIS — K75.0 ABSCESS OF LIVER: ICD-10-CM

## 2022-08-07 DIAGNOSIS — Z99.2 DEPENDENCE ON RENAL DIALYSIS: ICD-10-CM

## 2022-08-07 DIAGNOSIS — R93.5 ABNORMAL FINDINGS ON DIAGNOSTIC IMAGING OF OTHER ABDOMINAL R: ICD-10-CM

## 2022-08-07 DIAGNOSIS — N18.6 END STAGE RENAL DISEASE: ICD-10-CM

## 2022-08-07 LAB
ORGANISM: ABNORMAL
URINE CULTURE, ROUTINE: ABNORMAL

## 2022-08-07 PROCEDURE — 99232 SBSQ HOSP IP/OBS MODERATE 35: CPT | Performed by: SPECIALIST

## 2022-08-08 ENCOUNTER — INPATIENT HOSPITAL (OUTPATIENT)
Dept: URBAN - METROPOLITAN AREA MEDICAL CENTER 11 | Facility: MEDICAL CENTER | Age: 47
End: 2022-08-08
Payer: MEDICARE

## 2022-08-08 DIAGNOSIS — Z80.0 FAMILY HISTORY OF MALIGNANT NEOPLASM OF DIGESTIVE ORGANS: ICD-10-CM

## 2022-08-08 DIAGNOSIS — Z85.048 PERSONAL HISTORY OF OTHER MALIGNANT NEOPLASM OF RECTUM, RECT: ICD-10-CM

## 2022-08-08 DIAGNOSIS — R93.5 ABNORMAL FINDINGS ON DIAGNOSTIC IMAGING OF OTHER ABDOMINAL R: ICD-10-CM

## 2022-08-08 DIAGNOSIS — Z99.2 DEPENDENCE ON RENAL DIALYSIS: ICD-10-CM

## 2022-08-08 DIAGNOSIS — N18.6 END STAGE RENAL DISEASE: ICD-10-CM

## 2022-08-08 DIAGNOSIS — K75.0 ABSCESS OF LIVER: ICD-10-CM

## 2022-08-08 LAB
BLOOD CULTURE, ROUTINE: NORMAL
CULTURE, BLOOD 2: NORMAL

## 2022-08-08 PROCEDURE — 99231 SBSQ HOSP IP/OBS SF/LOW 25: CPT | Performed by: INTERNAL MEDICINE

## 2022-08-10 ENCOUNTER — HOME HEALTH ADMISSION (OUTPATIENT)
Dept: HOME HEALTH SERVICES | Facility: HOME HEALTHCARE | Age: 47
End: 2022-08-10

## 2022-08-10 ENCOUNTER — TELEPHONE (OUTPATIENT)
Dept: INTERNAL MEDICINE | Age: 47
End: 2022-08-10

## 2022-08-10 ENCOUNTER — TELEPHONE (OUTPATIENT)
Dept: INTERNAL MEDICINE CLINIC | Age: 47
End: 2022-08-10

## 2022-08-10 NOTE — TELEPHONE ENCOUNTER
[facility] has referral from [facility] in [place]  pt being dc there tomorrow      Will Dr Dick Munoz follow for [place] ?

## 2022-08-10 NOTE — TELEPHONE ENCOUNTER
LM for pt or wife to call us back to see if the pt is still in [facility]. I have tried to pull the records on care everywhere but was unable.

## 2022-08-12 ENCOUNTER — TELEPHONE (OUTPATIENT)
Dept: INTERNAL MEDICINE | Age: 47
End: 2022-08-12

## 2022-08-12 NOTE — TELEPHONE ENCOUNTER
[facility] Transitions Initial Follow Up Call    Outreach made within 2 business days of discharge: Yes    Patient: Alcira Madison  Patient : 1975 MRN: [id number]    Reason for Admission: Altered mental Status    Discharge Date: 2022      Discharge Diagnoses: Active Problems:    AMS (altered mental status)    Hypercalcemia    Liver mass    Hypoxia    Hypertension    Hx of colon cancer, stage III    Palliative care patient    ESRD on hemodialysis ([facility] [address]  Resolved Problems:    * No resolved hospital problems     Spoke with: Attempted to make contact with patient/caregiver for an initial transitions of care follow up call post discharge without success. I will reach out again at a later time. Any previously scheduled hospital follow up appointments noted below.         Discharge department/facility: [facility]    Scheduled appointment with PCP within 7-14 days    Follow Up  Future Appointments   Date Time Provider Praful Cabral   2022 11:15 AM MD QUINCY Rm [id number]       Tori Vu, [address] [facility] [place]

## 2022-08-15 ENCOUNTER — TELEPHONE (OUTPATIENT)
Dept: INTERNAL MEDICINE | Age: 47
End: 2022-08-15

## 2022-08-15 NOTE — TELEPHONE ENCOUNTER
[facility]ri[facility] Transitions Initial Follow Up Call    Outreach made within 2 business days of discharge: Yes    Patient: Lexus Vyas   Patient : 1975 MRN: [id number]    Reason for Admission: Altered mental status    Discharge Date: 2022      Discharge Diagnoses: Active Problems:    AMS (altered mental status)    Hypercalcemia    Liver mass    Hypoxia    Hypertension    Hx of colon cancer, stage III    Palliative care patient    ESRD on hemodialysis ([facility] [address]  Resolved Problems:    * No resolved hospital problems              Spoke with: Left 2nd message for pt to call us back to get scheduled for a follow up and to see if he needs anything.     Discharge department/facility: [facility]    Scheduled appointment with PCP within 7-14 days    Follow Up  Future Appointments   Date Time Provider Praful Cabral   2022 11:15 AM MD QUINCY Sierra [facility], [place]
Epigastric hernia

## 2022-08-23 ENCOUNTER — TELEPHONE (OUTPATIENT)
Dept: PALLATIVE CARE | Age: 47
End: 2022-08-23

## 2022-08-23 NOTE — TELEPHONE ENCOUNTER
I left a voicemail for the patient to call [facility]. I would like to discuss the referral that we received. I will call back at a later date. I can be reached at [phone].

## 2022-08-25 ENCOUNTER — TELEPHONE (OUTPATIENT)
Dept: PALLATIVE CARE | Age: 47
End: 2022-08-25

## 2022-09-02 ASSESSMENT — ENCOUNTER SYMPTOMS
NAUSEA: 0
EYE DISCHARGE: 0
CONSTIPATION: 0
FACIAL SWELLING: 0
APNEA: 0
SHORTNESS OF BREATH: 0
VOICE CHANGE: 0
BLOOD IN STOOL: 0
SINUS PRESSURE: 0
SORE THROAT: 0
CHOKING: 0
DIARRHEA: 0

## 2022-09-06 ENCOUNTER — TELEPHONE (OUTPATIENT)
Dept: PALLATIVE CARE | Age: 47
End: 2022-09-06

## 2022-09-22 ENCOUNTER — TELEMEDICINE (OUTPATIENT)
Dept: FAMILY MEDICINE CLINIC | Age: 47
End: 2022-09-22
Payer: MEDICARE

## 2022-09-22 DIAGNOSIS — G43.709 CHRONIC MIGRAINE WITHOUT AURA WITHOUT STATUS MIGRAINOSUS, NOT INTRACTABLE: ICD-10-CM

## 2022-09-22 DIAGNOSIS — I10 PRIMARY HYPERTENSION: ICD-10-CM

## 2022-09-22 DIAGNOSIS — E55.9 AVITAMINOSIS D: ICD-10-CM

## 2022-09-22 DIAGNOSIS — K75.0 LIVER ABSCESS: Primary | ICD-10-CM

## 2022-09-22 DIAGNOSIS — N40.1 BENIGN NON-NODULAR PROSTATIC HYPERPLASIA WITH LOWER URINARY TRACT SYMPTOMS: ICD-10-CM

## 2022-09-22 DIAGNOSIS — Z99.2 ESRD ON HEMODIALYSIS (HCC): ICD-10-CM

## 2022-09-22 DIAGNOSIS — F41.1 GENERALIZED ANXIETY DISORDER: ICD-10-CM

## 2022-09-22 DIAGNOSIS — N18.6 ESRD ON HEMODIALYSIS (HCC): ICD-10-CM

## 2022-09-22 PROBLEM — R41.82 AMS (ALTERED MENTAL STATUS): Status: RESOLVED | Noted: 2022-08-03 | Resolved: 2022-09-22

## 2022-09-22 PROCEDURE — G8428 CUR MEDS NOT DOCUMENT: HCPCS | Performed by: FAMILY MEDICINE

## 2022-09-22 PROCEDURE — 99214 OFFICE O/P EST MOD 30 MIN: CPT | Performed by: FAMILY MEDICINE

## 2022-09-22 RX ORDER — NARATRIPTAN 2.5 MG/1
TABLET ORAL
Qty: 9 TABLET | Refills: 6 | Status: SHIPPED | OUTPATIENT
Start: 2022-09-22

## 2022-09-22 RX ORDER — ALPRAZOLAM 1 MG/1
1 TABLET ORAL 3 TIMES DAILY PRN
Qty: 90 TABLET | Refills: 2 | Status: SHIPPED | OUTPATIENT
Start: 2022-09-22 | End: 2022-10-22

## 2022-09-22 RX ORDER — SUMATRIPTAN 100 MG/1
TABLET, FILM COATED ORAL
Qty: 9 TABLET | Refills: 6 | Status: SHIPPED | OUTPATIENT
Start: 2022-09-22

## 2022-09-22 RX ORDER — ERGOCALCIFEROL 1.25 MG/1
50000 CAPSULE ORAL WEEKLY
Qty: 12 CAPSULE | Refills: 3 | Status: SHIPPED | OUTPATIENT
Start: 2022-09-22

## 2022-09-22 RX ORDER — CLONIDINE HYDROCHLORIDE 0.2 MG/1
0.2 TABLET ORAL 3 TIMES DAILY PRN
Qty: 90 TABLET | Refills: 11 | Status: SHIPPED | OUTPATIENT
Start: 2022-09-22

## 2022-09-22 NOTE — PROGRESS NOTES
2022    TELEHEALTH EVALUATION -- Audio/Visual (During ANJNL-20 public health emergency)    HPI:    Luis Antonio Hinds (:  1975) has requested an audio/video evaluation for the following concern(s):    He was hospitalized from August 3 August 4 for altered mental status  He has end-stage renal disease on dialysis on . He had not missed any dialysis treatments. He was having cough and shortness of breath recently. In the emergency department, his oxygen was 88% he required 3 L of nasal cannula oxygen  CT of abdomen and pelvis showed a large right hepatic hypodensity measuring 11 cm which represented an abscess extending into the adjacent pararenal space. CT angiogram of chest was negative for PE.  GI was consulted and they recommended general surgery consult. He received dialysis and was transferred to [facility]  I did not receive the records regarding this hospitalization. He states he had drainage of the abscess  PICC line was placed and he was to complete continual course of Zosyn at home. He states his PICC line is being scheduled to be removed on . He states he is under the care of an infectious disease specialist but cannot recall the name. He denies any nausea vomiting fever chills. He tells me he is following up with infectious disease  He states he has home health service coming up managing the PICC line and the IV antibiotics. He is requesting refills of the following medications:  Clonidine  Xanax  Vitamin D      Review of Systems    Prior to Visit Medications    Medication Sig Taking? Authorizing Provider   cloNIDine (CATAPRES) 0.2 MG tablet Take 1 tablet by mouth 3 times daily as needed (bp greather than 180/100) Yes Jocelin Redman MD   ALPRAZolam Alverta Elisa) 1 MG tablet Take 1 tablet by mouth 3 times daily as needed for Sleep or Anxiety for up to 30 days.  Yes Jocelin Redman MD   vitamin D (ERGOCALCIFEROL) 1.25 MG (00328 UT) CAPS capsule Take 1 capsule by mouth once a week Yes Jerman Ramos MD   SUMAtriptan (IMITREX) 100 MG tablet TAKE 1 TABLET AT ONSET OF MIGRAINE HEADACHE. MAY REPEAT IN 2 HOURS IF NEEDED. Yes Jerman Ramos MD   naratriptan (AMERGE) 2.5 MG tablet TAKE AS DIRECTED. Yes Jerman Ramos MD   losartan (COZAAR) 100 MG tablet Take 100 mg by mouth daily  Historical Provider, MD   amLODIPine (NORVASC) 10 MG tablet TAKE 1 TABLET BY MOUTH EVERY DAY  Jerman Ramos MD   SUMAtriptan (IMITREX) 100 MG tablet TAKE 1 TABLET AT ONSET OF MIGRAINE HEADACHE. MAY REPEAT IN 2 HOURS IF NEEDED. CALLY Benavides   naratriptan (AMERGE) 2.5 MG tablet TAKE AS DIRECTED. CALLY Carnes   ondansetron (ZOFRAN) 4 MG tablet TAKE 1 TABLET BY MOUTH DAILY AS NEEDED FOR NAUSEA OR VOMITING  Jerman Ramos MD   docusate sodium (COLACE) 100 MG capsule TAKE ONE CAPSULE BY MOUTH TWICE A DAY AS NEEDED FOR CONSTIPATION  Jerman Ramos MD   tamsulosin (FLOMAX) 0.4 MG capsule TAKE 1 CAPSULE BY MOUTH DAILY  Jerman Ramos MD   Ostomy Supplies KIT Ostomy supplies  Jerman Ramos MD   omeprazole (PRILOSEC) 20 MG delayed release capsule TAKE 1 CAPSULE BY MOUTH DAILY  Jerman Ramos MD   cinacalcet (SENSIPAR) 30 MG tablet Take 30 mg by mouth daily  Historical Provider, MD   albuterol sulfate  (90 Base) MCG/ACT inhaler Inhale 2 puffs into the lungs every 4 hours as needed   Historical Provider, MD   AURYXIA 1  MG(Fe) TABS TAKE 1 TABLET BY MOUTH THREE TIMES A DAY WITH MEALS.    SWALLOW WHOLE, DO NOT CHEW OR CRUSH MEDICATION  Historical Provider, MD   oxybutynin (DITROPAN) 5 MG tablet Take 5 mg by mouth  Historical Provider, MD   omeprazole (PRILOSEC) 40 MG delayed release capsule Take 40 mg by mouth   Historical Provider, MD   folic acid (FOLVITE) 1 MG tablet Take 1 tablet by mouth daily  Laurita Clark MD   hydrALAZINE (APRESOLINE) 50 MG tablet TAKE 1 TABLET BY MOUTH THREE TIMES A DAY  Patient not taking: Reported on 8/3/2022  Charmaine Chadwick MD   HYDROcodone-acetaminophen [facility])  MG per tablet Take one tablet every 3-4 hours PRN pain (month supply) (may fill 8/24/18). CALLY Pearl - CNP       Social History     Tobacco Use    Smoking status: Never    Smokeless tobacco: Never   Vaping Use    Vaping Use: Never used   Substance Use Topics    Alcohol use: No    Drug use: No            PHYSICAL EXAMINATION:  [ INSTRUCTIONS:  \"[x]\" Indicates a positive item  \"[]\" Indicates a negative item  -- DELETE ALL ITEMS NOT EXAMINED]  Vital Signs: (As obtained by patient/caregiver or practitioner observation)    Blood pressure-  Heart rate-    Respiratory rate-    Temperature-  Pulse oximetry-     Constitutional: [x] Appears well-developed and well-nourished [x] No apparent distress      [] Abnormal-   Mental status  [x] Alert and awake  [] Oriented to person/place/time [x]Able to follow commands      Eyes:  EOM    [x]  Normal  [] Abnormal-  Sclera  [x]  Normal  [] Abnormal -         Discharge []  None visible  [] Abnormal -    HENT:   [x] Normocephalic, atraumatic.   [] Abnormal   [] Mouth/Throat: Mucous membranes are moist.     External Ears [x] Normal  [] Abnormal-     Neck: [x] No visualized mass     Pulmonary/Chest: [x] Respiratory effort normal.  [x] No visualized signs of difficulty breathing or respiratory distress        [] Abnormal-      Musculoskeletal:   [] Normal gait with no signs of ataxia         [] Normal range of motion of neck        [] Abnormal-       Neurological:        [] No Facial Asymmetry (Cranial nerve 7 motor function) (limited exam to video visit)          [] No gaze palsy        [] Abnormal-         Skin:        [x] No significant exanthematous lesions or discoloration noted on facial skin         [] Abnormal-            Psychiatric:       [x] Normal Affect [x] No Hallucinations        [] Abnormal-     Other pertinent observable physical exam findings-     No results found for this or any previous visit (from the past 672 hour(s)). ASSESSMENT/PLAN:  1. Liver abscess  Advised him to provide me with the names and contact information regarding the specialist managing his IV antibiotics  [facility]    2. Generalized anxiety disorder    - ALPRAZolam (XANAX) 1 MG tablet; Take 1 tablet by mouth 3 times daily as needed for Sleep or Anxiety for up to 30 days. Dispense: 90 tablet; Refill: 2    3. Avitaminosis D    - vitamin D (ERGOCALCIFEROL) 1.25 MG (18248 UT) CAPS capsule; Take 1 capsule by mouth once a week  Dispense: 12 capsule; Refill: 3    4. Primary hypertension    - cloNIDine (CATAPRES) 0.2 MG tablet; Take 1 tablet by mouth 3 times daily as needed (bp greather than 180/100)  Dispense: 90 tablet; Refill: 11    5. Chronic migraine without aura without status migrainosus, not intractable    - SUMAtriptan (IMITREX) 100 MG tablet; TAKE 1 TABLET AT ONSET OF MIGRAINE HEADACHE. MAY REPEAT IN 2 HOURS IF NEEDED. Dispense: 9 tablet; Refill: 6  - naratriptan (AMERGE) 2.5 MG tablet; TAKE AS DIRECTED. Dispense: 9 tablet; Refill: 6    6. Benign non-nodular prostatic hyperplasia with lower urinary tract symptoms  Stable    7. ESRD on hemodialysis [facility])  Continue with recommendations per nephrology    Return in about 3 months (around 12/22/2022) for Routine follow up - 20 minutes. Miles Braun is a 41 y.o. male being evaluated by a Virtual Visit (video visit) encounter to address concerns as mentioned above. A caregiver was present when appropriate. Due to this being a TeleHealth encounter (During ZZMGA-57 public health emergency), evaluation of the following organ systems was limited: Vitals/Constitutional/EENT/Resp/CV/GI//MS/Neuro/Skin/Heme-Lymph-Imm.   Pursuant to the emergency declaration under the [address] [facility] waiver authority and the [employer] and [employer] Act, this Virtual Visit was conducted with patient's (and/or legal guardian's) consent, to reduce the patient's risk of exposure to COVID-19 and provide necessary medical care. The patient (and/or legal guardian) has also been advised to contact this office for worsening conditions or problems, and seek emergency medical treatment and/or call 911 if deemed necessary. Patient identification was verified at the start of the visit: Yes    Total time spent on this encounter: Not billed by time    Services were provided through a video synchronous discussion virtually to substitute for in-person clinic visit. Patient and provider were located at their individual homes. --Svetlana Woodall MD on 9/28/2022 at 11:15 AM    An electronic signature was used to authenticate this note.

## 2022-09-23 ENCOUNTER — TELEPHONE (OUTPATIENT)
Dept: FAMILY MEDICINE CLINIC | Age: 47
End: 2022-09-23

## 2022-09-23 NOTE — TELEPHONE ENCOUNTER
I called the patient to ask him the dates of service at [facility] so a fax can be sent for the records.

## 2022-09-23 NOTE — TELEPHONE ENCOUNTER
----- Message from Tonya Fuentes MD sent at 9/22/2022 11:50 AM CDT -----  Regarding: Records  He had a prolonged hospital course recently  Can you contact the following hospital to get discharge summary.   Thanks      [facility]  Address: [address], [address] [place] [address]  Phone: [phone]

## 2022-09-26 ENCOUNTER — TELEPHONE (OUTPATIENT)
Dept: FAMILY MEDICINE CLINIC | Age: 47
End: 2022-09-26

## 2022-09-26 NOTE — TELEPHONE ENCOUNTER
----- Message from Tania Padilla MD sent at 9/22/2022 11:50 AM CDT -----  Regarding: Records  He had a prolonged hospital course recently  Can you contact the following hospital to get discharge summary.   Thanks      [facility]  Address: [address], [address] [facility]  Phone: [phone]

## 2022-11-27 DIAGNOSIS — N40.1 BENIGN NON-NODULAR PROSTATIC HYPERPLASIA WITH LOWER URINARY TRACT SYMPTOMS: ICD-10-CM

## 2022-11-28 NOTE — TELEPHONE ENCOUNTER
Rachelle Delgado called to request a refill on his medication.       Last office visit : 9/22/2022   Next office visit : 12/22/2022     Requested Prescriptions     Pending Prescriptions Disp Refills    tamsulosin (FLOMAX) 0.4 MG capsule 90 capsule 3     Sig: Take 1 capsule by mouth daily    amLODIPine (NORVASC) 10 MG tablet 90 tablet 1     Sig: TAKE 1 TABLET BY MOUTH EVERY DAY            GARIMA Mendez

## 2022-12-02 RX ORDER — TAMSULOSIN HYDROCHLORIDE 0.4 MG/1
0.4 CAPSULE ORAL DAILY
Qty: 90 CAPSULE | Refills: 3 | Status: SHIPPED | OUTPATIENT
Start: 2022-12-02

## 2022-12-02 RX ORDER — AMLODIPINE BESYLATE 10 MG/1
TABLET ORAL
Qty: 90 TABLET | Refills: 1 | Status: SHIPPED | OUTPATIENT
Start: 2022-12-02

## 2022-12-22 ENCOUNTER — TELEMEDICINE (OUTPATIENT)
Dept: PRIMARY CARE CLINIC | Age: 47
End: 2022-12-22

## 2022-12-22 DIAGNOSIS — N40.1 BENIGN NON-NODULAR PROSTATIC HYPERPLASIA WITH LOWER URINARY TRACT SYMPTOMS: Primary | ICD-10-CM

## 2022-12-22 DIAGNOSIS — Z99.2 ESRD ON HEMODIALYSIS (HCC): ICD-10-CM

## 2022-12-22 DIAGNOSIS — R53.83 OTHER FATIGUE: ICD-10-CM

## 2022-12-22 DIAGNOSIS — G43.709 CHRONIC MIGRAINE WITHOUT AURA WITHOUT STATUS MIGRAINOSUS, NOT INTRACTABLE: ICD-10-CM

## 2022-12-22 DIAGNOSIS — N18.6 ESRD ON HEMODIALYSIS (HCC): ICD-10-CM

## 2022-12-22 DIAGNOSIS — F41.1 GENERALIZED ANXIETY DISORDER: ICD-10-CM

## 2022-12-22 DIAGNOSIS — D64.9 ANEMIA, UNSPECIFIED TYPE: ICD-10-CM

## 2022-12-22 DIAGNOSIS — R68.89 OTHER GENERAL SYMPTOMS AND SIGNS: ICD-10-CM

## 2022-12-22 DIAGNOSIS — R16.0 LIVER MASS: ICD-10-CM

## 2022-12-22 DIAGNOSIS — F51.04 CHRONIC INSOMNIA: ICD-10-CM

## 2022-12-22 RX ORDER — ALPRAZOLAM 1 MG/1
1 TABLET ORAL 3 TIMES DAILY PRN
Qty: 90 TABLET | Refills: 3 | Status: SHIPPED | OUTPATIENT
Start: 2022-12-22 | End: 2023-01-21

## 2022-12-22 NOTE — PROGRESS NOTES
2022    TELEHEALTH EVALUATION -- Audio/Visual (During [facility]-38 public health emergency)    HPI:    Rodrigo Lambert (:  1975) has requested an audio/video evaluation for the following concern(s):    He continues to have issues with migraines.  -He alternates between naratriptan and sumatriptan. -Naratriptan is his preferred medicine but is more expensive with his insurance.  -Primarily takes Imitrex first but it causes drowsiness. Continues to take Flomax. BPH symptoms are stable. He continues to take Xanax 1 mg 3 times daily as needed for anxiety and insomnia. Overall he is satisfied with the medication. He is no longer taking amlodipine for blood pressure. On chronic dialysis 3 days a week and has his blood pressure checked regularly. He was experiencing hypotension with the amlodipine. Now he only takes clonidine 0.2 mg at night. Continues to monitor his blood pressure. He had a CT scan of the abdomen pelvis in August which showed the following:    Impression    1. Large right hepatic hypodensity, measures up to 11 cm, may represent abscess       also extending into the adjacent perirenal space. 2.Right ureteral stent is in place, without hydronephrosis. 3.Presacral soft tissue edema measures 4.5 cm in thickness, may represent       postsurgical scarring. Unable to rule out neoplastic process. 4.Hydropic gallbladder containing calcified stone, without CT evidence of       acute cholecystitis. 5.Left colostomy changes with fat-containing parastomal hernia. Review of Systems    Prior to Visit Medications    Medication Sig Taking? Authorizing Provider   ALPRAZolam Norrine May) 1 MG tablet Take 1 tablet by mouth 3 times daily as needed for Sleep or Anxiety for up to 30 days.  Yes Marlan Councilman, MD   tamsulosin (FLOMAX) 0.4 MG capsule Take 1 capsule by mouth daily  Marlan Councilman, MD   amLODIPine (NORVASC) 10 MG tablet TAKE 1 TABLET BY MOUTH EVERY DAY  Marlan Councilman, MD   cloNIDine (CATAPRES) 0.2 MG tablet Take 1 tablet by mouth 3 times daily as needed (bp greather than 180/100)  Vy Gonsalves MD   vitamin D (ERGOCALCIFEROL) 1.25 MG (04502 UT) CAPS capsule Take 1 capsule by mouth once a week  Vy Gonsalves MD   SUMAtriptan (IMITREX) 100 MG tablet TAKE 1 TABLET AT ONSET OF MIGRAINE HEADACHE. MAY REPEAT IN 2 HOURS IF NEEDED. Vy Gonsalves MD   naratriptan (AMERGE) 2.5 MG tablet TAKE AS DIRECTED. Vy Gonsalves MD   losartan (COZAAR) 100 MG tablet Take 100 mg by mouth daily  Historical Provider, MD   SUMAtriptan (IMITREX) 100 MG tablet TAKE 1 TABLET AT ONSET OF MIGRAINE HEADACHE. MAY REPEAT IN 2 HOURS IF NEEDED. Long Dial APRN   naratriptan (AMERGE) 2.5 MG tablet TAKE AS DIRECTED. Blendabrahan Dial APRN   ondansetron (ZOFRAN) 4 MG tablet TAKE 1 TABLET BY MOUTH DAILY AS NEEDED FOR NAUSEA OR VOMITING  Vy Gonsalves MD   docusate sodium (COLACE) 100 MG capsule TAKE ONE CAPSULE BY MOUTH TWICE A DAY AS NEEDED FOR CONSTIPATION  Vy Gonsalves MD   Ostomy Supplies KIT Ostomy supplies  Vy Gonsalves MD   omeprazole (PRILOSEC) 20 MG delayed release capsule TAKE 1 CAPSULE BY MOUTH DAILY  Vy Gonsalves MD   cinacalcet (SENSIPAR) 30 MG tablet Take 30 mg by mouth daily  Historical Provider, MD   albuterol sulfate  (90 Base) MCG/ACT inhaler Inhale 2 puffs into the lungs every 4 hours as needed   Historical Provider, MD   AURYXIA 1  MG(Fe) TABS TAKE 1 TABLET BY MOUTH THREE TIMES A DAY WITH MEALS.    SWALLOW WHOLE, DO NOT CHEW OR CRUSH MEDICATION  Historical Provider, MD   oxybutynin (DITROPAN) 5 MG tablet Take 5 mg by mouth  Historical Provider, MD   omeprazole (PRILOSEC) 40 MG delayed release capsule Take 40 mg by mouth   Historical Provider, MD   folic acid (FOLVITE) 1 MG tablet Take 1 tablet by mouth daily  Shilpi Nava MD   hydrALAZINE (APRESOLINE) 50 MG tablet TAKE 1 TABLET BY MOUTH THREE TIMES A DAY  Patient not taking: Reported on 8/3/2022  Fabien Benavides MD       Social History     Tobacco Use    Smoking status: Never    Smokeless tobacco: Never   Vaping Use    Vaping Use: Never used   Substance Use Topics    Alcohol use: No    Drug use: No            PHYSICAL EXAMINATION:  [ INSTRUCTIONS:  \"[x]\" Indicates a positive item  \"[]\" Indicates a negative item  -- DELETE ALL ITEMS NOT EXAMINED]  Vital Signs: (As obtained by patient/caregiver or practitioner observation)    Blood pressure-  Heart rate-    Respiratory rate-    Temperature-  Pulse oximetry-     Constitutional: [x] Appears well-developed and well-nourished [x] No apparent distress      [] Abnormal-   Mental status  [x] Alert and awake  [] Oriented to person/place/time [x]Able to follow commands      Eyes:  EOM    [x]  Normal  [] Abnormal-  Sclera  [x]  Normal  [] Abnormal -         Discharge []  None visible  [] Abnormal -    HENT:   [x] Normocephalic, atraumatic. [] Abnormal   [] Mouth/Throat: Mucous membranes are moist.     External Ears [x] Normal  [] Abnormal-     Neck: [x] No visualized mass     Pulmonary/Chest: [x] Respiratory effort normal.  [x] No visualized signs of difficulty breathing or respiratory distress        [] Abnormal-      Musculoskeletal:   [] Normal gait with no signs of ataxia         [] Normal range of motion of neck        [] Abnormal-       Neurological:        [] No Facial Asymmetry (Cranial nerve 7 motor function) (limited exam to video visit)          [] No gaze palsy        [] Abnormal-         Skin:        [x] No significant exanthematous lesions or discoloration noted on facial skin         [] Abnormal-            Psychiatric:       [x] Normal Affect [x] No Hallucinations        [] Abnormal-     Other pertinent observable physical exam findings-     No results found for this or any previous visit (from the past 672 hour(s)). ASSESSMENT/PLAN:  1.  Generalized anxiety disorder  Refilled medication  - ALPRAZolam (XANAX) 1 MG tablet; Take 1 tablet by mouth 3 times daily as needed for Sleep or Anxiety for up to 30 days. Dispense: 90 tablet; Refill: 3    2. Benign non-nodular prostatic hyperplasia with lower urinary tract symptoms  Continue with Flomax    3. Chronic insomnia  Xanax as needed    4. Chronic migraine without aura without status migrainosus, not intractable  Alternate between Amerge and Imitrex    5. ESRD on hemodialysis [facility])  Continue with recommendations per nephrology    6. Liver mass  Follow-up with CT scan of abdomen  - CT ABDOMEN WO CONTRAST Additional Contrast? None; Future    7. Anemia, unspecified type  Check the following lab studies  - CBC with Auto Differential; Future  - Ferritin; Future  - Folate; Future  - Vitamin B12; Future  - Total Iron Binding Capacity; Future  - Iron; Future    8. Other fatigue    - CBC with Auto Differential; Future  - T4, Free; Future  - TSH; Future  - Comprehensive Metabolic Panel; Future  - Testosterone; Future    9. Other general symptoms and signs     - Folate; Future    No follow-ups on file. Karen Winkler is a 41 y.o. male being evaluated by a Virtual Visit (video visit) encounter to address concerns as mentioned above. A caregiver was present when appropriate. Due to this being a TeleHealth encounter (During [facility] public health emergency), evaluation of the following organ systems was limited: Vitals/Constitutional/EENT/Resp/CV/GI//MS/Neuro/Skin/Heme-Lymph-Imm. Pursuant to the emergency declaration under the [facility], [address] [place] [address] authority and the [employer] Act, this Virtual Visit was conducted with patient's (and/or legal guardian's) consent, to reduce the patient's risk of exposure to COVID-19 and provide necessary medical care.   The patient (and/or legal guardian) has also been advised to contact this office for worsening conditions or problems, and seek emergency medical treatment and/or call 911 if deemed necessary. Patient identification was verified at the start of the visit: Yes    Total time spent on this encounter: Not billed by time    Services were provided through a video synchronous discussion virtually to substitute for in-person clinic visit. Patient and provider were located at their individual homes. --Elvira Williamson MD on 12/22/2022 at 4:28 PM    An electronic signature was used to authenticate this note.

## 2023-03-10 RX ORDER — OXYBUTYNIN CHLORIDE 5 MG/1
5 TABLET ORAL DAILY
Qty: 90 TABLET | Refills: 3 | Status: SHIPPED | OUTPATIENT
Start: 2023-03-10

## 2023-03-10 RX ORDER — DOCUSATE SODIUM 100 MG/1
100 CAPSULE, LIQUID FILLED ORAL 2 TIMES DAILY PRN
Qty: 60 CAPSULE | Refills: 5 | Status: SHIPPED | OUTPATIENT
Start: 2023-03-10

## 2023-03-10 NOTE — TELEPHONE ENCOUNTER
Omayra Navarro called to request a refill on his medication.       Last office visit : 12/22/2022   Next office visit : 3/23/2023     Requested Prescriptions     Pending Prescriptions Disp Refills    oxybutynin (DITROPAN) 5 MG tablet 90 tablet      Sig: Take 1 tablet by mouth            Kali Olmos

## 2023-03-23 ENCOUNTER — OFFICE VISIT (OUTPATIENT)
Dept: PRIMARY CARE CLINIC | Age: 48
End: 2023-03-23
Payer: MEDICARE

## 2023-03-23 ENCOUNTER — HOSPITAL ENCOUNTER (OUTPATIENT)
Dept: GENERAL RADIOLOGY | Age: 48
Discharge: HOME | End: 2023-03-23
Payer: MEDICARE

## 2023-03-23 VITALS
WEIGHT: 172 LBS | HEART RATE: 71 BPM | DIASTOLIC BLOOD PRESSURE: 80 MMHG | OXYGEN SATURATION: 93 % | TEMPERATURE: 96.8 F | BODY MASS INDEX: 21.5 KG/M2 | SYSTOLIC BLOOD PRESSURE: 117 MMHG

## 2023-03-23 DIAGNOSIS — D64.9 ANEMIA, UNSPECIFIED TYPE: ICD-10-CM

## 2023-03-23 DIAGNOSIS — N18.6 ESRD ON HEMODIALYSIS (HCC): ICD-10-CM

## 2023-03-23 DIAGNOSIS — Z99.2 ESRD ON HEMODIALYSIS (HCC): ICD-10-CM

## 2023-03-23 DIAGNOSIS — R79.9 ABNORMAL BLOOD CHEMISTRY: ICD-10-CM

## 2023-03-23 DIAGNOSIS — R06.00 DYSPNEA, UNSPECIFIED TYPE: ICD-10-CM

## 2023-03-23 DIAGNOSIS — Z13.220 LIPID SCREENING: ICD-10-CM

## 2023-03-23 DIAGNOSIS — G43.709 CHRONIC MIGRAINE WITHOUT AURA WITHOUT STATUS MIGRAINOSUS, NOT INTRACTABLE: ICD-10-CM

## 2023-03-23 DIAGNOSIS — R53.83 OTHER FATIGUE: ICD-10-CM

## 2023-03-23 DIAGNOSIS — F41.1 GENERALIZED ANXIETY DISORDER: Primary | ICD-10-CM

## 2023-03-23 DIAGNOSIS — R40.0 DAYTIME SOMNOLENCE: ICD-10-CM

## 2023-03-23 DIAGNOSIS — G47.10 HYPERSOMNIA, UNSPECIFIED: ICD-10-CM

## 2023-03-23 DIAGNOSIS — R16.0 LIVER MASS: ICD-10-CM

## 2023-03-23 DIAGNOSIS — N40.1 BENIGN NON-NODULAR PROSTATIC HYPERPLASIA WITH LOWER URINARY TRACT SYMPTOMS: ICD-10-CM

## 2023-03-23 DIAGNOSIS — F51.04 CHRONIC INSOMNIA: ICD-10-CM

## 2023-03-23 LAB
25(OH)D3 SERPL-MCNC: 50.2 NG/ML
ALBUMIN SERPL-MCNC: 3.4 G/DL (ref 3.5–5.2)
ALP SERPL-CCNC: 122 U/L (ref 40–130)
ALT SERPL-CCNC: 13 U/L (ref 5–41)
ANION GAP SERPL CALCULATED.3IONS-SCNC: 17 MMOL/L (ref 7–19)
AST SERPL-CCNC: 11 U/L (ref 5–40)
BASOPHILS # BLD: 0.1 K/UL (ref 0–0.2)
BASOPHILS NFR BLD: 1.3 % (ref 0–1)
BILIRUB SERPL-MCNC: <0.2 MG/DL (ref 0.2–1.2)
BNP BLD-MCNC: 4643 PG/ML (ref 0–450)
BUN SERPL-MCNC: 13 MG/DL (ref 6–20)
CALCIUM SERPL-MCNC: 8.7 MG/DL (ref 8.6–10)
CHLORIDE SERPL-SCNC: 97 MMOL/L (ref 98–111)
CHOLEST SERPL-MCNC: 127 MG/DL (ref 160–199)
CO2 SERPL-SCNC: 29 MMOL/L (ref 22–29)
CREAT SERPL-MCNC: 5.7 MG/DL (ref 0.5–1.2)
EOSINOPHIL # BLD: 0.3 K/UL (ref 0–0.6)
EOSINOPHIL NFR BLD: 4.7 % (ref 0–5)
ERYTHROCYTE [DISTWIDTH] IN BLOOD BY AUTOMATED COUNT: 14 % (ref 11.5–14.5)
FERRITIN SERPL-MCNC: 1397 NG/ML (ref 30–400)
FOLATE SERPL-MCNC: 8.6 NG/ML (ref 4.5–32.2)
GLUCOSE SERPL-MCNC: 78 MG/DL (ref 74–109)
HCT VFR BLD AUTO: 35.4 % (ref 42–52)
HDLC SERPL-MCNC: 43 MG/DL (ref 55–121)
HGB BLD-MCNC: 10.8 G/DL (ref 14–18)
IMM GRANULOCYTES # BLD: 0.1 K/UL
IRON SERPL-MCNC: 48 UG/DL (ref 59–158)
LDLC SERPL CALC-MCNC: 69 MG/DL
LYMPHOCYTES # BLD: 1.1 K/UL (ref 1.1–4.5)
LYMPHOCYTES NFR BLD: 20.3 % (ref 20–40)
MAGNESIUM SERPL-MCNC: 1.8 MG/DL (ref 1.6–2.6)
MCH RBC QN AUTO: 31.5 PG (ref 27–31)
MCHC RBC AUTO-ENTMCNC: 30.5 G/DL (ref 33–37)
MCV RBC AUTO: 103.2 FL (ref 80–94)
MONOCYTES # BLD: 0.5 K/UL (ref 0–0.9)
MONOCYTES NFR BLD: 9.6 % (ref 0–10)
NEUTROPHILS # BLD: 3.4 K/UL (ref 1.5–7.5)
NEUTS SEG NFR BLD: 63.2 % (ref 50–65)
PLATELET # BLD AUTO: 159 K/UL (ref 130–400)
PMV BLD AUTO: 9.5 FL (ref 9.4–12.4)
POTASSIUM SERPL-SCNC: 3.6 MMOL/L (ref 3.5–5)
PROT SERPL-MCNC: 5.9 G/DL (ref 6.6–8.7)
RBC # BLD AUTO: 3.43 M/UL (ref 4.7–6.1)
SODIUM SERPL-SCNC: 143 MMOL/L (ref 136–145)
T4 FREE SERPL-MCNC: 0.88 NG/DL (ref 0.93–1.7)
TRIGL SERPL-MCNC: 73 MG/DL (ref 0–149)
TSH SERPL DL<=0.005 MIU/L-ACNC: 2.26 UIU/ML (ref 0.27–4.2)
VIT B12 SERPL-MCNC: 631 PG/ML (ref 211–946)
WBC # BLD AUTO: 5.3 K/UL (ref 4.8–10.8)

## 2023-03-23 PROCEDURE — 99214 OFFICE O/P EST MOD 30 MIN: CPT | Performed by: FAMILY MEDICINE

## 2023-03-23 PROCEDURE — 71046 X-RAY EXAM CHEST 2 VIEWS: CPT

## 2023-03-23 PROCEDURE — 1036F TOBACCO NON-USER: CPT | Performed by: FAMILY MEDICINE

## 2023-03-23 PROCEDURE — 3078F DIAST BP <80 MM HG: CPT | Performed by: FAMILY MEDICINE

## 2023-03-23 PROCEDURE — G8484 FLU IMMUNIZE NO ADMIN: HCPCS | Performed by: FAMILY MEDICINE

## 2023-03-23 PROCEDURE — G8427 DOCREV CUR MEDS BY ELIG CLIN: HCPCS | Performed by: FAMILY MEDICINE

## 2023-03-23 PROCEDURE — G8420 CALC BMI NORM PARAMETERS: HCPCS | Performed by: FAMILY MEDICINE

## 2023-03-23 PROCEDURE — 3074F SYST BP LT 130 MM HG: CPT | Performed by: FAMILY MEDICINE

## 2023-03-23 RX ORDER — ALPRAZOLAM 1 MG/1
1 TABLET ORAL 3 TIMES DAILY PRN
Qty: 90 TABLET | Refills: 3 | Status: SHIPPED | OUTPATIENT
Start: 2023-03-23 | End: 2023-04-22

## 2023-03-23 SDOH — ECONOMIC STABILITY: INCOME INSECURITY: HOW HARD IS IT FOR YOU TO PAY FOR THE VERY BASICS LIKE FOOD, HOUSING, MEDICAL CARE, AND HEATING?: SOMEWHAT HARD

## 2023-03-23 SDOH — ECONOMIC STABILITY: FOOD INSECURITY: WITHIN THE PAST 12 MONTHS, YOU WORRIED THAT YOUR FOOD WOULD RUN OUT BEFORE YOU GOT MONEY TO BUY MORE.: NEVER TRUE

## 2023-03-23 SDOH — ECONOMIC STABILITY: FOOD INSECURITY: WITHIN THE PAST 12 MONTHS, THE FOOD YOU BOUGHT JUST DIDN'T LAST AND YOU DIDN'T HAVE MONEY TO GET MORE.: NEVER TRUE

## 2023-03-23 SDOH — ECONOMIC STABILITY: HOUSING INSECURITY
IN THE LAST 12 MONTHS, WAS THERE A TIME WHEN YOU DID NOT HAVE A STEADY PLACE TO SLEEP OR SLEPT IN A SHELTER (INCLUDING NOW)?: NO

## 2023-03-23 ASSESSMENT — PATIENT HEALTH QUESTIONNAIRE - PHQ9
SUM OF ALL RESPONSES TO PHQ QUESTIONS 1-9: 0
SUM OF ALL RESPONSES TO PHQ QUESTIONS 1-9: 0
1. LITTLE INTEREST OR PLEASURE IN DOING THINGS: 0
SUM OF ALL RESPONSES TO PHQ QUESTIONS 1-9: 0
SUM OF ALL RESPONSES TO PHQ9 QUESTIONS 1 & 2: 0
SUM OF ALL RESPONSES TO PHQ QUESTIONS 1-9: 0
2. FEELING DOWN, DEPRESSED OR HOPELESS: 0

## 2023-03-23 NOTE — PATIENT INSTRUCTIONS
Patient Instructions    Get laboratory work today. Get chest x-ray today. I am going to place an order for a sleep study. I have placed an order for a CT scan of your abdomen and pelvis. -If radiology has not contacted you in the next week, contact my office.     For coughing and shortness of breath:  - I am going to get a chest x-ray today as well.  -I have sent a prescription for albuterol: 2 puffs every 6 hours as needed

## 2023-03-23 NOTE — PROGRESS NOTES
Benign non-nodular prostatic hyperplasia with lower urinary tract symptoms  Stable    3. Chronic insomnia  Stable    4. Chronic migraine without aura without status migrainosus, not intractable  Stable    5. ESRD on hemodialysis ([facility] [address]  -Continue with n recommendations per nephrology  - CBC with Auto Differential; Future  - Comprehensive Metabolic Panel; Future  - Vitamin D 25 Hydroxy; Future    6. Liver mass    - CT ABDOMEN PELVIS WO CONTRAST Additional Contrast? None; Future    7. Dyspnea, unspecified type  -2D echocardiogram in August 2022 showed ejection fraction of 56%, diastolic dysfunction. Bubble study negative  - Brain Natriuretic Peptide; Future  - XR CHEST (2 VW); Future  - Obtain PFTs  8. Anemia, unspecified type    - CBC with Auto Differential; Future  - Iron; Future  - Ferritin; Future  - Folate; Future  - Vitamin B12; Future    9. Other fatigue    - TSH; Future  - T4, Free; Future  - Magnesium; Future  - Home Sleep Study; Future    10. Lipid screening    - Lipid Panel; Future    11. Abnormal blood chemistry    - Iron; Future  - Ferritin; Future  - Folate; Future    12. Daytime somnolence    - Home Sleep Study; Future    13. Hypersomnia, unspecified     - Home Sleep Study; Future    Patient Instructions     Patient Instructions    Get laboratory work today. Get chest x-ray today. I am going to place an order for a sleep study. I have placed an order for a CT scan of your abdomen and pelvis. -If radiology has not contacted you in the next week, contact my office.     For coughing and shortness of breath:  - I am going to get a chest x-ray today as well.  -I have sent a prescription for albuterol: 2 puffs every 6 hours as needed            Orders Placed This Encounter   Procedures    CT ABDOMEN PELVIS WO CONTRAST Additional Contrast? None     Standing Status:   Future     Standing Expiration Date:   3/23/2024     Order Specific Question:   Additional Contrast?     Answer:   None    XR CHEST (2

## 2023-03-25 ASSESSMENT — ENCOUNTER SYMPTOMS
COUGH: 0
CONSTIPATION: 0
SHORTNESS OF BREATH: 1
NAUSEA: 0
CHEST TIGHTNESS: 0
ABDOMINAL PAIN: 0
ANAL BLEEDING: 0
DIARRHEA: 0

## 2023-05-26 DIAGNOSIS — K21.9 GASTROESOPHAGEAL REFLUX DISEASE WITHOUT ESOPHAGITIS: ICD-10-CM

## 2023-05-26 RX ORDER — ONDANSETRON 4 MG/1
4 TABLET, FILM COATED ORAL DAILY PRN
Qty: 30 TABLET | Refills: 0 | Status: SHIPPED | OUTPATIENT
Start: 2023-05-26

## 2023-05-26 NOTE — TELEPHONE ENCOUNTER
Marcela Horacio called to request a refill on his medication.       Last office visit : 3/23/2023   Next office visit : 6/27/2023     Requested Prescriptions     Pending Prescriptions Disp Refills    ondansetron (ZOFRAN) 4 MG tablet 30 tablet 0     Sig: Take 1 tablet by mouth daily as needed for Nausea or Vomiting            Mulu Gibbons LPN

## 2023-06-06 RX ORDER — AMLODIPINE BESYLATE 10 MG/1
TABLET ORAL
Qty: 90 TABLET | Refills: 1 | Status: SHIPPED | OUTPATIENT
Start: 2023-06-06

## 2023-06-27 ENCOUNTER — OFFICE VISIT (OUTPATIENT)
Dept: PRIMARY CARE CLINIC | Age: 48
End: 2023-06-27
Payer: MEDICARE

## 2023-06-27 VITALS
SYSTOLIC BLOOD PRESSURE: 130 MMHG | BODY MASS INDEX: 22.5 KG/M2 | HEART RATE: 97 BPM | WEIGHT: 180 LBS | OXYGEN SATURATION: 97 % | DIASTOLIC BLOOD PRESSURE: 85 MMHG | TEMPERATURE: 97 F

## 2023-06-27 DIAGNOSIS — F41.1 GENERALIZED ANXIETY DISORDER: Primary | ICD-10-CM

## 2023-06-27 DIAGNOSIS — N18.6 ESRD ON HEMODIALYSIS (HCC): ICD-10-CM

## 2023-06-27 DIAGNOSIS — I10 PRIMARY HYPERTENSION: ICD-10-CM

## 2023-06-27 DIAGNOSIS — R16.0 LIVER MASS: ICD-10-CM

## 2023-06-27 DIAGNOSIS — F51.04 CHRONIC INSOMNIA: ICD-10-CM

## 2023-06-27 DIAGNOSIS — N40.1 BENIGN NON-NODULAR PROSTATIC HYPERPLASIA WITH LOWER URINARY TRACT SYMPTOMS: ICD-10-CM

## 2023-06-27 DIAGNOSIS — G43.709 CHRONIC MIGRAINE WITHOUT AURA WITHOUT STATUS MIGRAINOSUS, NOT INTRACTABLE: ICD-10-CM

## 2023-06-27 DIAGNOSIS — Z99.2 ESRD ON HEMODIALYSIS (HCC): ICD-10-CM

## 2023-06-27 DIAGNOSIS — K21.9 GASTROESOPHAGEAL REFLUX DISEASE WITHOUT ESOPHAGITIS: ICD-10-CM

## 2023-06-27 PROCEDURE — 3079F DIAST BP 80-89 MM HG: CPT | Performed by: FAMILY MEDICINE

## 2023-06-27 PROCEDURE — 99215 OFFICE O/P EST HI 40 MIN: CPT | Performed by: FAMILY MEDICINE

## 2023-06-27 PROCEDURE — G8420 CALC BMI NORM PARAMETERS: HCPCS | Performed by: FAMILY MEDICINE

## 2023-06-27 PROCEDURE — 3075F SYST BP GE 130 - 139MM HG: CPT | Performed by: FAMILY MEDICINE

## 2023-06-27 PROCEDURE — G8427 DOCREV CUR MEDS BY ELIG CLIN: HCPCS | Performed by: FAMILY MEDICINE

## 2023-06-27 PROCEDURE — 1036F TOBACCO NON-USER: CPT | Performed by: FAMILY MEDICINE

## 2023-06-27 RX ORDER — SUMATRIPTAN 100 MG/1
100 TABLET, FILM COATED ORAL
Qty: 18 TABLET | Refills: 5 | Status: SHIPPED | OUTPATIENT
Start: 2023-06-27 | End: 2023-06-27

## 2023-06-27 RX ORDER — OXYCODONE AND ACETAMINOPHEN 10; 325 MG/1; MG/1
1 TABLET ORAL EVERY 4 HOURS PRN
COMMUNITY

## 2023-06-27 ASSESSMENT — ENCOUNTER SYMPTOMS
CHEST TIGHTNESS: 0
COUGH: 0
ABDOMINAL PAIN: 0
DIARRHEA: 0
BACK PAIN: 1
CONSTIPATION: 0
ANAL BLEEDING: 0
NAUSEA: 0
SHORTNESS OF BREATH: 0

## 2023-07-14 ENCOUNTER — HOSPITAL ENCOUNTER (OUTPATIENT)
Dept: CT IMAGING | Age: 48
Discharge: HOME | End: 2023-07-14
Payer: MEDICARE

## 2023-07-14 DIAGNOSIS — R16.0 LIVER MASS: ICD-10-CM

## 2023-07-14 PROCEDURE — 74176 CT ABD & PELVIS W/O CONTRAST: CPT

## 2023-08-31 ENCOUNTER — PATIENT MESSAGE (OUTPATIENT)
Dept: PRIMARY CARE CLINIC | Age: 48
End: 2023-08-31

## 2023-08-31 DIAGNOSIS — F41.1 GENERALIZED ANXIETY DISORDER: ICD-10-CM

## 2023-08-31 RX ORDER — ALPRAZOLAM 1 MG/1
1 TABLET ORAL 3 TIMES DAILY PRN
Qty: 90 TABLET | Refills: 0 | Status: SHIPPED | OUTPATIENT
Start: 2023-08-31 | End: 2023-09-30

## 2023-09-26 ENCOUNTER — PATIENT MESSAGE (OUTPATIENT)
Dept: PRIMARY CARE CLINIC | Age: 48
End: 2023-09-26

## 2023-09-26 DIAGNOSIS — F41.1 GENERALIZED ANXIETY DISORDER: ICD-10-CM

## 2023-09-26 NOTE — TELEPHONE ENCOUNTER
Reassure him to reschedule my appointment. There is no reason to put himself out like that.   I can see him within the next month

## 2023-09-28 RX ORDER — ALPRAZOLAM 1 MG/1
1 TABLET ORAL 3 TIMES DAILY PRN
Qty: 90 TABLET | Refills: 0 | Status: SHIPPED | OUTPATIENT
Start: 2023-09-28 | End: 2023-10-28

## 2023-09-28 NOTE — TELEPHONE ENCOUNTER
Trino Swartz called to request a refill on his medication. Last office visit : 6/27/2023   Next office visit : 10/3/2023     Last UDS:   Amphetamines   Date Value Ref Range Status   09/20/2013 NEGATIVE  Final     Amphetamine Screen, Urine   Date Value Ref Range Status   08/03/2022 Negative Negative <500 ng/mL Final     Barbiturates   Date Value Ref Range Status   09/20/2013 NEGATIVE  Final     Benzodiazepines   Date Value Ref Range Status   09/20/2013 NEGATIVE  Final     Benzodiazepine Screen, Urine   Date Value Ref Range Status   08/03/2022 Negative Negative <150 ng/mL Final     Buprenorphine Urine   Date Value Ref Range Status   08/03/2022 Negative Negative <5 ng/mL Final     Cocaine Metabolite Screen, Urine   Date Value Ref Range Status   08/03/2022 Negative Negative <150 ng/mL Final     Methamphetamine, Urine   Date Value Ref Range Status   08/03/2022 Negative Negative <500 ng/mL Final     Opiate Scrn, Ur   Date Value Ref Range Status   08/03/2022 Negative Negative < 300 ng/mL Final     PCP Screen, Urine   Date Value Ref Range Status   08/03/2022 Negative Negative <25 ng/mL Final       Last Geeta Link: today  Medication Contract: next ov   Last Fill: 08/31/23    Requested Prescriptions     Pending Prescriptions Disp Refills    ALPRAZolam (XANAX) 1 MG tablet 90 tablet 0     Sig: Take 1 tablet by mouth 3 times daily as needed for Sleep or Anxiety for up to 30 days. Please approve or refuse this medication.    Leonel Brothers LPN

## 2023-10-03 ENCOUNTER — HOSPITAL ENCOUNTER (INPATIENT)
Age: 48
LOS: 7 days | Discharge: HOME HEALTH | End: 2023-10-10
Attending: INTERNAL MEDICINE | Admitting: INTERNAL MEDICINE
Payer: MEDICARE

## 2023-10-03 ENCOUNTER — APPOINTMENT (OUTPATIENT)
Dept: GENERAL RADIOLOGY | Age: 48
End: 2023-10-03
Payer: MEDICARE

## 2023-10-03 ENCOUNTER — ANESTHESIA EVENT (OUTPATIENT)
Dept: OPERATING ROOM | Age: 48
End: 2023-10-03
Payer: MEDICARE

## 2023-10-03 ENCOUNTER — OFFICE VISIT (OUTPATIENT)
Dept: PRIMARY CARE CLINIC | Age: 48
End: 2023-10-03
Payer: MEDICARE

## 2023-10-03 ENCOUNTER — APPOINTMENT (OUTPATIENT)
Dept: CT IMAGING | Age: 48
End: 2023-10-03
Payer: MEDICARE

## 2023-10-03 ENCOUNTER — ANESTHESIA (OUTPATIENT)
Dept: OPERATING ROOM | Age: 48
End: 2023-10-03
Payer: MEDICARE

## 2023-10-03 VITALS
SYSTOLIC BLOOD PRESSURE: 115 MMHG | HEART RATE: 109 BPM | HEIGHT: 74 IN | WEIGHT: 179 LBS | DIASTOLIC BLOOD PRESSURE: 70 MMHG | TEMPERATURE: 96.9 F | OXYGEN SATURATION: 95 % | BODY MASS INDEX: 22.97 KG/M2

## 2023-10-03 DIAGNOSIS — F41.1 GENERALIZED ANXIETY DISORDER: ICD-10-CM

## 2023-10-03 DIAGNOSIS — N49.2 SCROTAL ABSCESS: ICD-10-CM

## 2023-10-03 DIAGNOSIS — N49.2 SCROTAL ABSCESS: Primary | ICD-10-CM

## 2023-10-03 DIAGNOSIS — N18.6 ESRD ON HEMODIALYSIS (HCC): Primary | ICD-10-CM

## 2023-10-03 DIAGNOSIS — Z99.2 ESRD ON HEMODIALYSIS (HCC): Primary | ICD-10-CM

## 2023-10-03 DIAGNOSIS — I10 PRIMARY HYPERTENSION: ICD-10-CM

## 2023-10-03 PROBLEM — Z96.0 URETERAL STENT RETAINED: Status: ACTIVE | Noted: 2023-10-03

## 2023-10-03 LAB
ALBUMIN SERPL-MCNC: 3.2 G/DL (ref 3.5–5.2)
ALP SERPL-CCNC: 243 U/L (ref 40–130)
ALT SERPL-CCNC: <5 U/L (ref 5–41)
ANION GAP SERPL CALCULATED.3IONS-SCNC: 17 MMOL/L (ref 7–19)
AST SERPL-CCNC: 10 U/L (ref 5–40)
BASOPHILS # BLD: 0.1 K/UL (ref 0–0.2)
BASOPHILS NFR BLD: 0.5 % (ref 0–1)
BILIRUB SERPL-MCNC: 0.3 MG/DL (ref 0.2–1.2)
BUN SERPL-MCNC: 32 MG/DL (ref 6–20)
CALCIUM SERPL-MCNC: 10.5 MG/DL (ref 8.6–10)
CHLORIDE SERPL-SCNC: 92 MMOL/L (ref 98–111)
CO2 SERPL-SCNC: 30 MMOL/L (ref 22–29)
CREAT SERPL-MCNC: 7 MG/DL (ref 0.5–1.2)
CRP SERPL HS-MCNC: 43.72 MG/DL (ref 0–0.5)
EOSINOPHIL # BLD: 0.2 K/UL (ref 0–0.6)
EOSINOPHIL NFR BLD: 2 % (ref 0–5)
ERYTHROCYTE [DISTWIDTH] IN BLOOD BY AUTOMATED COUNT: 15.8 % (ref 11.5–14.5)
ERYTHROCYTE [SEDIMENTATION RATE] IN BLOOD BY WESTERGREN METHOD: 140 MM/HR (ref 0–10)
GLUCOSE SERPL-MCNC: 110 MG/DL (ref 74–109)
HCT VFR BLD AUTO: 28.1 % (ref 42–52)
HGB BLD-MCNC: 8.2 G/DL (ref 14–18)
IMM GRANULOCYTES # BLD: 0.2 K/UL
LACTATE BLDV-SCNC: 1 MMOL/L (ref 0.5–1.9)
LACTATE BLDV-SCNC: 3.6 MMOL/L (ref 0.5–1.9)
LYMPHOCYTES # BLD: 0.7 K/UL (ref 1.1–4.5)
LYMPHOCYTES NFR BLD: 7.6 % (ref 20–40)
MCH RBC QN AUTO: 30 PG (ref 27–31)
MCHC RBC AUTO-ENTMCNC: 29.2 G/DL (ref 33–37)
MCV RBC AUTO: 102.9 FL (ref 80–94)
MONOCYTES # BLD: 0.4 K/UL (ref 0–0.9)
MONOCYTES NFR BLD: 4.5 % (ref 0–10)
NEUTROPHILS # BLD: 8.2 K/UL (ref 1.5–7.5)
NEUTS SEG NFR BLD: 83.3 % (ref 50–65)
PLATELET # BLD AUTO: 277 K/UL (ref 130–400)
PMV BLD AUTO: 9.4 FL (ref 9.4–12.4)
POTASSIUM SERPL-SCNC: 3.7 MMOL/L (ref 3.5–5)
PROT SERPL-MCNC: 7.8 G/DL (ref 6.6–8.7)
RBC # BLD AUTO: 2.73 M/UL (ref 4.7–6.1)
SARS-COV-2 RDRP RESP QL NAA+PROBE: NOT DETECTED
SODIUM SERPL-SCNC: 139 MMOL/L (ref 136–145)
WBC # BLD AUTO: 9.8 K/UL (ref 4.8–10.8)

## 2023-10-03 PROCEDURE — 7100000000 HC PACU RECOVERY - FIRST 15 MIN: Performed by: UROLOGY

## 2023-10-03 PROCEDURE — G8484 FLU IMMUNIZE NO ADMIN: HCPCS | Performed by: FAMILY MEDICINE

## 2023-10-03 PROCEDURE — 3700000001 HC ADD 15 MINUTES (ANESTHESIA): Performed by: UROLOGY

## 2023-10-03 PROCEDURE — 80053 COMPREHEN METABOLIC PANEL: CPT

## 2023-10-03 PROCEDURE — G8427 DOCREV CUR MEDS BY ELIG CLIN: HCPCS | Performed by: FAMILY MEDICINE

## 2023-10-03 PROCEDURE — 99285 EMERGENCY DEPT VISIT HI MDM: CPT

## 2023-10-03 PROCEDURE — 87075 CULTR BACTERIA EXCEPT BLOOD: CPT

## 2023-10-03 PROCEDURE — A4217 STERILE WATER/SALINE, 500 ML: HCPCS | Performed by: UROLOGY

## 2023-10-03 PROCEDURE — 87040 BLOOD CULTURE FOR BACTERIA: CPT

## 2023-10-03 PROCEDURE — 94760 N-INVAS EAR/PLS OXIMETRY 1: CPT

## 2023-10-03 PROCEDURE — 85025 COMPLETE CBC W/AUTO DIFF WBC: CPT

## 2023-10-03 PROCEDURE — 87186 SC STD MICRODIL/AGAR DIL: CPT

## 2023-10-03 PROCEDURE — 2580000003 HC RX 258: Performed by: ANESTHESIOLOGY

## 2023-10-03 PROCEDURE — 83605 ASSAY OF LACTIC ACID: CPT

## 2023-10-03 PROCEDURE — 99213 OFFICE O/P EST LOW 20 MIN: CPT | Performed by: FAMILY MEDICINE

## 2023-10-03 PROCEDURE — 74177 CT ABD & PELVIS W/CONTRAST: CPT

## 2023-10-03 PROCEDURE — 3078F DIAST BP <80 MM HG: CPT | Performed by: FAMILY MEDICINE

## 2023-10-03 PROCEDURE — 99222 1ST HOSP IP/OBS MODERATE 55: CPT | Performed by: UROLOGY

## 2023-10-03 PROCEDURE — 3074F SYST BP LT 130 MM HG: CPT | Performed by: FAMILY MEDICINE

## 2023-10-03 PROCEDURE — 6360000002 HC RX W HCPCS

## 2023-10-03 PROCEDURE — 2709999900 HC NON-CHARGEABLE SUPPLY: Performed by: UROLOGY

## 2023-10-03 PROCEDURE — 96375 TX/PRO/DX INJ NEW DRUG ADDON: CPT

## 2023-10-03 PROCEDURE — 6360000002 HC RX W HCPCS: Performed by: UROLOGY

## 2023-10-03 PROCEDURE — 85652 RBC SED RATE AUTOMATED: CPT

## 2023-10-03 PROCEDURE — 6370000000 HC RX 637 (ALT 250 FOR IP)

## 2023-10-03 PROCEDURE — 88302 TISSUE EXAM BY PATHOLOGIST: CPT

## 2023-10-03 PROCEDURE — 87102 FUNGUS ISOLATION CULTURE: CPT

## 2023-10-03 PROCEDURE — 86140 C-REACTIVE PROTEIN: CPT

## 2023-10-03 PROCEDURE — 6360000002 HC RX W HCPCS: Performed by: NURSE ANESTHETIST, CERTIFIED REGISTERED

## 2023-10-03 PROCEDURE — 3700000000 HC ANESTHESIA ATTENDED CARE: Performed by: UROLOGY

## 2023-10-03 PROCEDURE — 0V950ZZ DRAINAGE OF SCROTUM, OPEN APPROACH: ICD-10-PCS | Performed by: UROLOGY

## 2023-10-03 PROCEDURE — 2580000003 HC RX 258: Performed by: NURSE PRACTITIONER

## 2023-10-03 PROCEDURE — 2500000003 HC RX 250 WO HCPCS: Performed by: UROLOGY

## 2023-10-03 PROCEDURE — 6360000002 HC RX W HCPCS: Performed by: ANESTHESIOLOGY

## 2023-10-03 PROCEDURE — 87635 SARS-COV-2 COVID-19 AMP PRB: CPT

## 2023-10-03 PROCEDURE — 7100000001 HC PACU RECOVERY - ADDTL 15 MIN: Performed by: UROLOGY

## 2023-10-03 PROCEDURE — 6360000004 HC RX CONTRAST MEDICATION: Performed by: NURSE PRACTITIONER

## 2023-10-03 PROCEDURE — 6360000002 HC RX W HCPCS: Performed by: NURSE PRACTITIONER

## 2023-10-03 PROCEDURE — 96365 THER/PROPH/DIAG IV INF INIT: CPT

## 2023-10-03 PROCEDURE — G8420 CALC BMI NORM PARAMETERS: HCPCS | Performed by: FAMILY MEDICINE

## 2023-10-03 PROCEDURE — 0V95XZZ DRAINAGE OF SCROTUM, EXTERNAL APPROACH: ICD-10-PCS | Performed by: UROLOGY

## 2023-10-03 PROCEDURE — 87070 CULTURE OTHR SPECIMN AEROBIC: CPT

## 2023-10-03 PROCEDURE — 1036F TOBACCO NON-USER: CPT | Performed by: FAMILY MEDICINE

## 2023-10-03 PROCEDURE — 3600000004 HC SURGERY LEVEL 4 BASE: Performed by: UROLOGY

## 2023-10-03 PROCEDURE — 2580000003 HC RX 258: Performed by: UROLOGY

## 2023-10-03 PROCEDURE — 96367 TX/PROPH/DG ADDL SEQ IV INF: CPT

## 2023-10-03 PROCEDURE — 1210000000 HC MED SURG R&B

## 2023-10-03 PROCEDURE — 2580000003 HC RX 258

## 2023-10-03 PROCEDURE — 3600000014 HC SURGERY LEVEL 4 ADDTL 15MIN: Performed by: UROLOGY

## 2023-10-03 PROCEDURE — 36415 COLL VENOUS BLD VENIPUNCTURE: CPT

## 2023-10-03 PROCEDURE — 2500000003 HC RX 250 WO HCPCS: Performed by: ANESTHESIOLOGY

## 2023-10-03 PROCEDURE — 87205 SMEAR GRAM STAIN: CPT

## 2023-10-03 PROCEDURE — 71045 X-RAY EXAM CHEST 1 VIEW: CPT

## 2023-10-03 RX ORDER — SODIUM CHLORIDE 0.9 % (FLUSH) 0.9 %
5-40 SYRINGE (ML) INJECTION PRN
Status: DISCONTINUED | OUTPATIENT
Start: 2023-10-03 | End: 2023-10-10 | Stop reason: HOSPADM

## 2023-10-03 RX ORDER — PROPOFOL 10 MG/ML
INJECTION, EMULSION INTRAVENOUS PRN
Status: DISCONTINUED | OUTPATIENT
Start: 2023-10-03 | End: 2023-10-03 | Stop reason: SDUPTHER

## 2023-10-03 RX ORDER — CLONIDINE HYDROCHLORIDE 0.1 MG/1
0.2 TABLET ORAL 3 TIMES DAILY PRN
Status: DISCONTINUED | OUTPATIENT
Start: 2023-10-03 | End: 2023-10-10 | Stop reason: HOSPADM

## 2023-10-03 RX ORDER — SODIUM CHLORIDE, SODIUM LACTATE, POTASSIUM CHLORIDE, CALCIUM CHLORIDE 600; 310; 30; 20 MG/100ML; MG/100ML; MG/100ML; MG/100ML
INJECTION, SOLUTION INTRAVENOUS CONTINUOUS
Status: CANCELLED | OUTPATIENT
Start: 2023-10-03

## 2023-10-03 RX ORDER — SODIUM CHLORIDE 0.9 % (FLUSH) 0.9 %
5-40 SYRINGE (ML) INJECTION EVERY 12 HOURS SCHEDULED
Status: CANCELLED | OUTPATIENT
Start: 2023-10-03

## 2023-10-03 RX ORDER — FENTANYL CITRATE 50 UG/ML
INJECTION, SOLUTION INTRAMUSCULAR; INTRAVENOUS PRN
Status: DISCONTINUED | OUTPATIENT
Start: 2023-10-03 | End: 2023-10-03 | Stop reason: SDUPTHER

## 2023-10-03 RX ORDER — DOCUSATE SODIUM 100 MG/1
100 CAPSULE, LIQUID FILLED ORAL 2 TIMES DAILY PRN
Qty: 60 CAPSULE | Refills: 5 | Status: ON HOLD | OUTPATIENT
Start: 2023-10-03

## 2023-10-03 RX ORDER — CINACALCET 30 MG/1
30 TABLET, FILM COATED ORAL DAILY
Status: DISCONTINUED | OUTPATIENT
Start: 2023-10-04 | End: 2023-10-10 | Stop reason: HOSPADM

## 2023-10-03 RX ORDER — PANTOPRAZOLE SODIUM 40 MG/1
40 TABLET, DELAYED RELEASE ORAL
Status: DISCONTINUED | OUTPATIENT
Start: 2023-10-04 | End: 2023-10-10 | Stop reason: HOSPADM

## 2023-10-03 RX ORDER — DOCUSATE SODIUM 100 MG/1
100 CAPSULE, LIQUID FILLED ORAL 2 TIMES DAILY PRN
Status: DISCONTINUED | OUTPATIENT
Start: 2023-10-03 | End: 2023-10-10 | Stop reason: HOSPADM

## 2023-10-03 RX ORDER — SODIUM CHLORIDE 0.9 % (FLUSH) 0.9 %
5-40 SYRINGE (ML) INJECTION PRN
Status: CANCELLED | OUTPATIENT
Start: 2023-10-03

## 2023-10-03 RX ORDER — SODIUM CHLORIDE 0.9 % (FLUSH) 0.9 %
5-40 SYRINGE (ML) INJECTION PRN
Status: DISCONTINUED | OUTPATIENT
Start: 2023-10-03 | End: 2023-10-03 | Stop reason: HOSPADM

## 2023-10-03 RX ORDER — SODIUM CHLORIDE 9 MG/ML
INJECTION, SOLUTION INTRAVENOUS CONTINUOUS PRN
Status: DISCONTINUED | OUTPATIENT
Start: 2023-10-03 | End: 2023-10-03 | Stop reason: SDUPTHER

## 2023-10-03 RX ORDER — FAMOTIDINE 10 MG/ML
INJECTION, SOLUTION INTRAVENOUS PRN
Status: DISCONTINUED | OUTPATIENT
Start: 2023-10-03 | End: 2023-10-03 | Stop reason: SDUPTHER

## 2023-10-03 RX ORDER — FOLIC ACID 1 MG/1
1 TABLET ORAL DAILY
Status: DISCONTINUED | OUTPATIENT
Start: 2023-10-04 | End: 2023-10-10 | Stop reason: HOSPADM

## 2023-10-03 RX ORDER — SEVELAMER CARBONATE 800 MG/1
800 TABLET, FILM COATED ORAL
Status: DISCONTINUED | OUTPATIENT
Start: 2023-10-04 | End: 2023-10-10 | Stop reason: HOSPADM

## 2023-10-03 RX ORDER — SODIUM CHLORIDE 0.9 % (FLUSH) 0.9 %
5-40 SYRINGE (ML) INJECTION EVERY 12 HOURS SCHEDULED
Status: DISCONTINUED | OUTPATIENT
Start: 2023-10-03 | End: 2023-10-03 | Stop reason: HOSPADM

## 2023-10-03 RX ORDER — HYDROMORPHONE HYDROCHLORIDE 1 MG/ML
0.25 INJECTION, SOLUTION INTRAMUSCULAR; INTRAVENOUS; SUBCUTANEOUS EVERY 4 HOURS PRN
Status: DISCONTINUED | OUTPATIENT
Start: 2023-10-03 | End: 2023-10-04

## 2023-10-03 RX ORDER — ALPRAZOLAM 1 MG/1
1 TABLET ORAL 3 TIMES DAILY PRN
Qty: 90 TABLET | Refills: 0 | Status: ON HOLD | OUTPATIENT
Start: 2023-10-03 | End: 2023-11-02

## 2023-10-03 RX ORDER — AMLODIPINE BESYLATE 10 MG/1
10 TABLET ORAL DAILY
Status: DISCONTINUED | OUTPATIENT
Start: 2023-10-04 | End: 2023-10-10 | Stop reason: HOSPADM

## 2023-10-03 RX ORDER — SODIUM CHLORIDE 0.9 % (FLUSH) 0.9 %
5-40 SYRINGE (ML) INJECTION EVERY 12 HOURS SCHEDULED
Status: DISCONTINUED | OUTPATIENT
Start: 2023-10-03 | End: 2023-10-10 | Stop reason: HOSPADM

## 2023-10-03 RX ORDER — ONDANSETRON 2 MG/ML
4 INJECTION INTRAMUSCULAR; INTRAVENOUS ONCE
Status: COMPLETED | OUTPATIENT
Start: 2023-10-03 | End: 2023-10-03

## 2023-10-03 RX ORDER — LOSARTAN POTASSIUM 100 MG/1
100 TABLET ORAL DAILY
Status: DISCONTINUED | OUTPATIENT
Start: 2023-10-04 | End: 2023-10-10 | Stop reason: HOSPADM

## 2023-10-03 RX ORDER — HYDROMORPHONE HYDROCHLORIDE 1 MG/ML
0.5 INJECTION, SOLUTION INTRAMUSCULAR; INTRAVENOUS; SUBCUTANEOUS EVERY 5 MIN PRN
Status: COMPLETED | OUTPATIENT
Start: 2023-10-03 | End: 2023-10-03

## 2023-10-03 RX ORDER — 0.9 % SODIUM CHLORIDE 0.9 %
1000 INTRAVENOUS SOLUTION INTRAVENOUS ONCE
Status: COMPLETED | OUTPATIENT
Start: 2023-10-03 | End: 2023-10-03

## 2023-10-03 RX ORDER — FENTANYL CITRATE 50 UG/ML
50 INJECTION, SOLUTION INTRAMUSCULAR; INTRAVENOUS ONCE
Status: COMPLETED | OUTPATIENT
Start: 2023-10-03 | End: 2023-10-03

## 2023-10-03 RX ORDER — OXYCODONE AND ACETAMINOPHEN 10; 325 MG/1; MG/1
1 TABLET ORAL EVERY 4 HOURS PRN
Status: DISCONTINUED | OUTPATIENT
Start: 2023-10-03 | End: 2023-10-10 | Stop reason: HOSPADM

## 2023-10-03 RX ORDER — BUDESONIDE AND FORMOTEROL FUMARATE DIHYDRATE 160; 4.5 UG/1; UG/1
2 AEROSOL RESPIRATORY (INHALATION)
Status: DISCONTINUED | OUTPATIENT
Start: 2023-10-03 | End: 2023-10-10 | Stop reason: HOSPADM

## 2023-10-03 RX ORDER — SODIUM CHLORIDE 9 MG/ML
INJECTION, SOLUTION INTRAVENOUS PRN
Status: DISCONTINUED | OUTPATIENT
Start: 2023-10-03 | End: 2023-10-10 | Stop reason: HOSPADM

## 2023-10-03 RX ORDER — ONDANSETRON 4 MG/1
4 TABLET, ORALLY DISINTEGRATING ORAL EVERY 8 HOURS PRN
Status: DISCONTINUED | OUTPATIENT
Start: 2023-10-03 | End: 2023-10-10 | Stop reason: HOSPADM

## 2023-10-03 RX ORDER — SODIUM CHLORIDE 9 MG/ML
INJECTION, SOLUTION INTRAVENOUS PRN
Status: DISCONTINUED | OUTPATIENT
Start: 2023-10-03 | End: 2023-10-03 | Stop reason: HOSPADM

## 2023-10-03 RX ORDER — ALPRAZOLAM 0.5 MG/1
0.5 TABLET ORAL 3 TIMES DAILY PRN
Status: DISCONTINUED | OUTPATIENT
Start: 2023-10-03 | End: 2023-10-10 | Stop reason: HOSPADM

## 2023-10-03 RX ORDER — LIDOCAINE HYDROCHLORIDE 10 MG/ML
INJECTION, SOLUTION INFILTRATION; PERINEURAL PRN
Status: DISCONTINUED | OUTPATIENT
Start: 2023-10-03 | End: 2023-10-03 | Stop reason: SDUPTHER

## 2023-10-03 RX ORDER — POLYETHYLENE GLYCOL 3350 17 G/17G
17 POWDER, FOR SOLUTION ORAL DAILY PRN
Status: DISCONTINUED | OUTPATIENT
Start: 2023-10-03 | End: 2023-10-10 | Stop reason: HOSPADM

## 2023-10-03 RX ORDER — ONDANSETRON 2 MG/ML
4 INJECTION INTRAMUSCULAR; INTRAVENOUS EVERY 6 HOURS PRN
Status: DISCONTINUED | OUTPATIENT
Start: 2023-10-03 | End: 2023-10-10 | Stop reason: HOSPADM

## 2023-10-03 RX ORDER — TAMSULOSIN HYDROCHLORIDE 0.4 MG/1
0.4 CAPSULE ORAL DAILY
Status: DISCONTINUED | OUTPATIENT
Start: 2023-10-04 | End: 2023-10-10 | Stop reason: HOSPADM

## 2023-10-03 RX ORDER — ONDANSETRON 2 MG/ML
4 INJECTION INTRAMUSCULAR; INTRAVENOUS
Status: DISCONTINUED | OUTPATIENT
Start: 2023-10-03 | End: 2023-10-03 | Stop reason: HOSPADM

## 2023-10-03 RX ORDER — MIDAZOLAM HYDROCHLORIDE 1 MG/ML
INJECTION INTRAMUSCULAR; INTRAVENOUS PRN
Status: DISCONTINUED | OUTPATIENT
Start: 2023-10-03 | End: 2023-10-03 | Stop reason: SDUPTHER

## 2023-10-03 RX ORDER — OXYBUTYNIN CHLORIDE 5 MG/1
5 TABLET ORAL DAILY
Status: DISCONTINUED | OUTPATIENT
Start: 2023-10-04 | End: 2023-10-10 | Stop reason: HOSPADM

## 2023-10-03 RX ORDER — HYDROMORPHONE HYDROCHLORIDE 1 MG/ML
0.25 INJECTION, SOLUTION INTRAMUSCULAR; INTRAVENOUS; SUBCUTANEOUS EVERY 5 MIN PRN
Status: DISCONTINUED | OUTPATIENT
Start: 2023-10-03 | End: 2023-10-03 | Stop reason: HOSPADM

## 2023-10-03 RX ORDER — ONDANSETRON 2 MG/ML
INJECTION INTRAMUSCULAR; INTRAVENOUS PRN
Status: DISCONTINUED | OUTPATIENT
Start: 2023-10-03 | End: 2023-10-03 | Stop reason: SDUPTHER

## 2023-10-03 RX ORDER — SODIUM CHLORIDE 9 MG/ML
INJECTION, SOLUTION INTRAVENOUS PRN
Status: CANCELLED | OUTPATIENT
Start: 2023-10-03

## 2023-10-03 RX ADMIN — FENTANYL CITRATE 50 MCG: 50 INJECTION, SOLUTION INTRAMUSCULAR; INTRAVENOUS at 16:34

## 2023-10-03 RX ADMIN — Medication 10 ML: at 23:24

## 2023-10-03 RX ADMIN — FENTANYL CITRATE 50 MCG: 0.05 INJECTION, SOLUTION INTRAMUSCULAR; INTRAVENOUS at 20:03

## 2023-10-03 RX ADMIN — HYDROMORPHONE HYDROCHLORIDE 0.5 MG: 1 INJECTION, SOLUTION INTRAMUSCULAR; INTRAVENOUS; SUBCUTANEOUS at 21:22

## 2023-10-03 RX ADMIN — IOPAMIDOL 70 ML: 755 INJECTION, SOLUTION INTRAVENOUS at 16:33

## 2023-10-03 RX ADMIN — FAMOTIDINE 20 MG: 10 INJECTION, SOLUTION INTRAVENOUS at 19:07

## 2023-10-03 RX ADMIN — PIPERACILLIN AND TAZOBACTAM 2250 MG: 3; .375 INJECTION, POWDER, LYOPHILIZED, FOR SOLUTION INTRAVENOUS at 23:18

## 2023-10-03 RX ADMIN — LIDOCAINE HYDROCHLORIDE 50 MG: 10 INJECTION, SOLUTION INFILTRATION; PERINEURAL at 19:29

## 2023-10-03 RX ADMIN — VANCOMYCIN HYDROCHLORIDE 2000 MG: 10 INJECTION, POWDER, LYOPHILIZED, FOR SOLUTION INTRAVENOUS at 19:23

## 2023-10-03 RX ADMIN — ONDANSETRON 4 MG: 2 INJECTION INTRAMUSCULAR; INTRAVENOUS at 16:34

## 2023-10-03 RX ADMIN — HYDROMORPHONE HYDROCHLORIDE 0.5 MG: 1 INJECTION, SOLUTION INTRAMUSCULAR; INTRAVENOUS; SUBCUTANEOUS at 21:07

## 2023-10-03 RX ADMIN — VANCOMYCIN HYDROCHLORIDE 2000 MG: 10 INJECTION, POWDER, LYOPHILIZED, FOR SOLUTION INTRAVENOUS at 18:21

## 2023-10-03 RX ADMIN — FENTANYL CITRATE 50 MCG: 0.05 INJECTION, SOLUTION INTRAMUSCULAR; INTRAVENOUS at 20:21

## 2023-10-03 RX ADMIN — ONDANSETRON 4 MG: 2 INJECTION INTRAMUSCULAR; INTRAVENOUS at 20:33

## 2023-10-03 RX ADMIN — SODIUM CHLORIDE 1000 ML: 9 INJECTION, SOLUTION INTRAVENOUS at 16:31

## 2023-10-03 RX ADMIN — OXYCODONE AND ACETAMINOPHEN 1 TABLET: 10; 325 TABLET ORAL at 23:24

## 2023-10-03 RX ADMIN — ALPRAZOLAM 0.5 MG: 0.5 TABLET ORAL at 23:18

## 2023-10-03 RX ADMIN — MIDAZOLAM 2 MG: 1 INJECTION INTRAMUSCULAR; INTRAVENOUS at 19:23

## 2023-10-03 RX ADMIN — HYDROMORPHONE HYDROCHLORIDE 0.5 MG: 1 INJECTION, SOLUTION INTRAMUSCULAR; INTRAVENOUS; SUBCUTANEOUS at 21:16

## 2023-10-03 RX ADMIN — FENTANYL CITRATE 100 MCG: 0.05 INJECTION, SOLUTION INTRAMUSCULAR; INTRAVENOUS at 19:29

## 2023-10-03 RX ADMIN — HYDROMORPHONE HYDROCHLORIDE 0.5 MG: 1 INJECTION, SOLUTION INTRAMUSCULAR; INTRAVENOUS; SUBCUTANEOUS at 21:31

## 2023-10-03 RX ADMIN — PIPERACILLIN AND TAZOBACTAM 3375 MG: 3; .375 INJECTION, POWDER, LYOPHILIZED, FOR SOLUTION INTRAVENOUS at 17:16

## 2023-10-03 RX ADMIN — PROPOFOL 120 MG: 10 INJECTION, EMULSION INTRAVENOUS at 19:29

## 2023-10-03 RX ADMIN — SODIUM CHLORIDE: 9 INJECTION, SOLUTION INTRAVENOUS at 19:23

## 2023-10-03 ASSESSMENT — ENCOUNTER SYMPTOMS
WHEEZING: 0
ABDOMINAL PAIN: 0
COUGH: 0
SHORTNESS OF BREATH: 0
VOMITING: 0
EYES NEGATIVE: 1
CONSTIPATION: 0
RESPIRATORY NEGATIVE: 1
COLOR CHANGE: 0
CHEST TIGHTNESS: 0
NAUSEA: 0
GASTROINTESTINAL NEGATIVE: 1
ABDOMINAL DISTENTION: 0

## 2023-10-03 ASSESSMENT — PAIN DESCRIPTION - ORIENTATION
ORIENTATION: LOWER
ORIENTATION: OTHER (COMMENT)
ORIENTATION: LOWER
ORIENTATION: LOWER

## 2023-10-03 ASSESSMENT — PAIN DESCRIPTION - LOCATION
LOCATION: SCROTUM

## 2023-10-03 ASSESSMENT — PAIN DESCRIPTION - DESCRIPTORS
DESCRIPTORS: BURNING;TEARING
DESCRIPTORS: DISCOMFORT;BURNING;SHARP

## 2023-10-03 ASSESSMENT — PAIN SCALES - GENERAL
PAINLEVEL_OUTOF10: 10
PAINLEVEL_OUTOF10: 9

## 2023-10-03 ASSESSMENT — LIFESTYLE VARIABLES: SMOKING_STATUS: 0

## 2023-10-03 NOTE — H&P
[place] - History & Physical      PCP: Marielle Adrian MD    Date of Admission: 10/3/2023    Date of Service: 10/3/2023    Chief Complaint:  scrotal wound     History Of Present Illness: The patient is a 57 y.o. male with HTN, pericarditis, rectal cancer, colon cancer s/p colostomy, liver mass, s/p left nephrectomy, ESRD on dialysis, urethral stricture s/p ureteral stents, seizures, complaining of scrotal wound. Patient initially presented to PCP office today due to increased testicular pain, swelling, and purulent drainage to left side of scrotum. Endorses subjective fever and chills. After PCP reviewed patient complaints instructed patient to [facility] ER for further evaluation. Patient denies abdominal pain, shortness of breath, chest pain. Work-up in ER CT abdomen pelvis thick-walled left scrotal collection with peripheral calcification and some specks of central gas, sed rate 140, CRP 43.7, lactic acid 3.6. Patient be admitted to the hospital service with consult to urology and nephrology.   Past Medical History:        Diagnosis Date    Asthma     during winter months    Cancer [facility])     rectal    Colostomy care [facility])     Hemodialysis patient ([address] )     mon wed fri at [facility]    History of blood transfusion     Hx of migraine headaches     Hypercholesterolemia 12/16/2019    Hypertension     Kidney stone     hx of    Palliative care patient 07/11/2019    Pericarditis     Rectal cancer ([address] [facility])     Testalgia 2/1/2016       Past Surgical History:        Procedure Laterality Date    ANTERIOR CRUCIATE LIGAMENT REPAIR  2007    ACL and MCL repair    COLON SURGERY      colon resection with colostomy    COLONOSCOPY      DIALYSIS FISTULA CREATION Bilateral 01/21/2020    CREATION  LEFT  BRACHIAL CEPHALIC AV FISTULA performed by Kay Paige MD at [address] OR    ENDOSCOPY, COLON, DIAGNOSTIC      HC COLONOSCOPY BIOPSY/STOMA N/A 07/12/2019    Dr Purdy-w/APC ablation-Inflammatory polyps .   ______________________________________ Electronically signed by: Joleen Brown D.O. Date:     10/03/2023 Time:    17:49     CT ABDOMEN PELVIS W IV CONTRAST Additional Contrast? None    Result Date: 10/3/2023  EXAM:  CT OF THE ABDOMEN AND PELVIS WITH CONTRAST  History:  Left scrotal abscess  Technique:  5 mm CT of the abdomen and pelvis following intravenous contrast  FINDINGS:  The lung bases are clear. No significant liver abnormality. The adrenals, pancreas and spleen are unremarkable. The stomach and hiatus are unremarkable. Cholelithiasis without pericholecystic inflammation. Atrophied right kidney with ureteral stent in place. Nonobstructing 6 mm calculus of the right kidney. Absent left kidney. Atherosclerotic calcification of the aorta without aneurysm. The appendix is not seen. Normal caliber bowel loops appear left lower quadrant ostomy site. Distal colonic resection. Presacral soft tissue density stable from 07/14/2023. No pelvic fat inflammation. Pelvic ring is intact. No acute findings of the skeleton. Thick-walled scrotum with skin ulceration. Thick-walled partially calcified left scrotal collection 5.5 x 6.7 cm previously 7.3 x 7.3 cm 07/14/2023. A few gas bubbles within the collection. Impression: 1. No bowel or urinary obstruction 2. Cholelithiasis without CT evidence of cholecystitis 3. Right ureteral stent without hydronephrosis. Right renal atrophy and nonobstructing nephrolithiasis. 4.  Absent left kidney 5. Left lower quadrant ostomy without complication 6. Distal colonic resection. Stable prevertebral soft tissue. 7.  Thick-walled left scrotal collection with peripheral calcification and some specks of central gas. Correlate for infectious versus sterile collection.   .  All CT scans are performed using dose optimization techniques as appropriate to the performed exam and include at least one of the following: Automated exposure control, adjustment of

## 2023-10-03 NOTE — ED NOTES
Surgery at bedside, taking patient with consent forms to surgery     Genaro Sharma RN  10/03/23 9750

## 2023-10-03 NOTE — ED NOTES
Called Dr. Tony Ramos office for Garrett Stein. Left message with answering service.      Connor Bell  10/03/23 4839

## 2023-10-03 NOTE — ANESTHESIA PRE PROCEDURE
10/03/2023 04:12 PM    HGB 8.2 10/03/2023 04:12 PM    HCT 28.1 10/03/2023 04:12 PM    .9 10/03/2023 04:12 PM    RDW 15.8 10/03/2023 04:12 PM     10/03/2023 04:12 PM       CMP:   Lab Results   Component Value Date/Time     10/03/2023 04:12 PM    K 3.7 10/03/2023 04:12 PM    CL 92 10/03/2023 04:12 PM    CO2 30 10/03/2023 04:12 PM    BUN 32 10/03/2023 04:12 PM    CREATININE 7.0 10/03/2023 04:12 PM    GFRAA 12 08/04/2022 02:41 AM    LABGLOM 9 10/03/2023 04:12 PM    GLUCOSE 110 10/03/2023 04:12 PM    PROT 7.8 10/03/2023 04:12 PM    CALCIUM 10.5 10/03/2023 04:12 PM    BILITOT 0.3 10/03/2023 04:12 PM    ALKPHOS 243 10/03/2023 04:12 PM    AST 10 10/03/2023 04:12 PM    ALT <5 10/03/2023 04:12 PM       POC Tests: No results for input(s): \"POCGLU\", \"POCNA\", \"POCK\", \"POCCL\", \"POCBUN\", \"POCHEMO\", \"POCHCT\" in the last 72 hours.     Coags:   Lab Results   Component Value Date/Time    PROTIME 12.1 01/21/2020 08:29 AM    INR 0.95 01/21/2020 08:29 AM    APTT 28.6 01/21/2020 08:29 AM       HCG (If Applicable): No results found for: \"PREGTESTUR\", \"PREGSERUM\", \"HCG\", \"HCGQUANT\"     ABGs:   Lab Results   Component Value Date/Time    PHART 7.530 08/03/2022 02:12 PM    PO2ART 55.0 08/03/2022 02:12 PM    PVF3SVX 43.0 08/03/2022 02:12 PM    SUH3VJB 35.9 08/03/2022 02:12 PM    BEART 12.1 08/03/2022 02:12 PM    R2ECVNGI 88.0 08/03/2022 02:12 PM        Type & Screen (If Applicable):  No results found for: \"LABABO\", \"LABRH\"    Drug/Infectious Status (If Applicable):  No results found for: \"HIV\", \"HEPCAB\"    COVID-19 Screening (If Applicable):   Lab Results   Component Value Date/Time    COVID19 Not Detected 10/03/2023 06:02 PM           Anesthesia Evaluation  Patient summary reviewed no history of anesthetic complications:   Airway: Mallampati: I  TM distance: >3 FB   Neck ROM: full  Mouth opening: > = 3 FB   Dental:    (+) poor dentition      Pulmonary:normal exam  breath sounds clear to auscultation  (+) asthma:     (-)

## 2023-10-03 NOTE — ED NOTES
Per lab, original wound culture order was discontinued but new order so that wound culture ran for aerobic and anaerobic culture.       Tariq Guerra RN  10/03/23 [id number]

## 2023-10-04 PROBLEM — Z86.010 HISTORY OF COLON POLYPS: Status: ACTIVE | Noted: 2023-10-04

## 2023-10-04 PROBLEM — D64.9 ACUTE ON CHRONIC ANEMIA: Status: ACTIVE | Noted: 2023-10-04

## 2023-10-04 PROBLEM — Z86.0100 HISTORY OF COLON POLYPS: Status: ACTIVE | Noted: 2023-10-04

## 2023-10-04 PROBLEM — Z87.11 HISTORY OF STOMACH ULCERS: Status: ACTIVE | Noted: 2023-10-04

## 2023-10-04 PROBLEM — Z85.048 HISTORY OF RECTAL CANCER: Status: ACTIVE | Noted: 2023-10-04

## 2023-10-04 PROBLEM — D53.9 MACROCYTIC ANEMIA: Status: ACTIVE | Noted: 2023-10-04

## 2023-10-04 PROBLEM — R74.8 ELEVATED ALKALINE PHOSPHATASE LEVEL: Status: ACTIVE | Noted: 2023-10-04

## 2023-10-04 LAB
ABO/RH: NORMAL
ALBUMIN SERPL-MCNC: 2.4 G/DL (ref 3.5–5.2)
ALP SERPL-CCNC: 168 U/L (ref 40–130)
ALT SERPL-CCNC: <5 U/L (ref 5–41)
ANION GAP SERPL CALCULATED.3IONS-SCNC: 14 MMOL/L (ref 7–19)
ANTIBODY SCREEN: NORMAL
AST SERPL-CCNC: 6 U/L (ref 5–40)
BASOPHILS # BLD: 0 K/UL (ref 0–0.2)
BASOPHILS NFR BLD: 0.6 % (ref 0–1)
BILIRUB SERPL-MCNC: <0.2 MG/DL (ref 0.2–1.2)
BLOOD BANK DISPENSE STATUS: NORMAL
BLOOD BANK PRODUCT CODE: NORMAL
BPU ID: NORMAL
BUN SERPL-MCNC: 34 MG/DL (ref 6–20)
CALCIUM SERPL-MCNC: 9.4 MG/DL (ref 8.6–10)
CHLORIDE SERPL-SCNC: 97 MMOL/L (ref 98–111)
CO2 SERPL-SCNC: 27 MMOL/L (ref 22–29)
CREAT SERPL-MCNC: 7.9 MG/DL (ref 0.5–1.2)
DESCRIPTION BLOOD BANK: NORMAL
EOSINOPHIL # BLD: 0.2 K/UL (ref 0–0.6)
EOSINOPHIL NFR BLD: 2.2 % (ref 0–5)
ERYTHROCYTE [DISTWIDTH] IN BLOOD BY AUTOMATED COUNT: 16 % (ref 11.5–14.5)
GLUCOSE SERPL-MCNC: 86 MG/DL (ref 74–109)
HCT VFR BLD AUTO: 21.7 % (ref 42–52)
HCT VFR BLD AUTO: 28 % (ref 42–52)
HEMOCCULT SP1 STL QL: ABNORMAL
HGB BLD-MCNC: 6.4 G/DL (ref 14–18)
HGB BLD-MCNC: 8.7 G/DL (ref 14–18)
IMM GRANULOCYTES # BLD: 0.2 K/UL
KOH PREP SPEC: NORMAL
LYMPHOCYTES # BLD: 0.5 K/UL (ref 1.1–4.5)
LYMPHOCYTES NFR BLD: 7 % (ref 20–40)
MCH RBC QN AUTO: 29.9 PG (ref 27–31)
MCHC RBC AUTO-ENTMCNC: 29.5 G/DL (ref 33–37)
MCV RBC AUTO: 101.4 FL (ref 80–94)
MONOCYTES # BLD: 0.4 K/UL (ref 0–0.9)
MONOCYTES NFR BLD: 5.5 % (ref 0–10)
NEUTROPHILS # BLD: 5.7 K/UL (ref 1.5–7.5)
NEUTS SEG NFR BLD: 82.1 % (ref 50–65)
PLATELET # BLD AUTO: 205 K/UL (ref 130–400)
PMV BLD AUTO: 9.2 FL (ref 9.4–12.4)
POTASSIUM SERPL-SCNC: 4.1 MMOL/L (ref 3.5–5)
PROT SERPL-MCNC: 5.7 G/DL (ref 6.6–8.7)
RBC # BLD AUTO: 2.14 M/UL (ref 4.7–6.1)
SODIUM SERPL-SCNC: 138 MMOL/L (ref 136–145)
WBC # BLD AUTO: 6.9 K/UL (ref 4.8–10.8)

## 2023-10-04 PROCEDURE — 1210000000 HC MED SURG R&B

## 2023-10-04 PROCEDURE — 2580000003 HC RX 258: Performed by: UROLOGY

## 2023-10-04 PROCEDURE — 82270 OCCULT BLOOD FECES: CPT

## 2023-10-04 PROCEDURE — 85025 COMPLETE CBC W/AUTO DIFF WBC: CPT

## 2023-10-04 PROCEDURE — 86901 BLOOD TYPING SEROLOGIC RH(D): CPT

## 2023-10-04 PROCEDURE — 94640 AIRWAY INHALATION TREATMENT: CPT

## 2023-10-04 PROCEDURE — 85014 HEMATOCRIT: CPT

## 2023-10-04 PROCEDURE — 85018 HEMOGLOBIN: CPT

## 2023-10-04 PROCEDURE — 6360000002 HC RX W HCPCS

## 2023-10-04 PROCEDURE — 2580000003 HC RX 258

## 2023-10-04 PROCEDURE — 36415 COLL VENOUS BLD VENIPUNCTURE: CPT

## 2023-10-04 PROCEDURE — 6370000000 HC RX 637 (ALT 250 FOR IP)

## 2023-10-04 PROCEDURE — 86923 COMPATIBILITY TEST ELECTRIC: CPT

## 2023-10-04 PROCEDURE — 99222 1ST HOSP IP/OBS MODERATE 55: CPT | Performed by: SPECIALIST

## 2023-10-04 PROCEDURE — 86850 RBC ANTIBODY SCREEN: CPT

## 2023-10-04 PROCEDURE — 86900 BLOOD TYPING SEROLOGIC ABO: CPT

## 2023-10-04 PROCEDURE — 6360000002 HC RX W HCPCS: Performed by: INTERNAL MEDICINE

## 2023-10-04 PROCEDURE — 6370000000 HC RX 637 (ALT 250 FOR IP): Performed by: UROLOGY

## 2023-10-04 PROCEDURE — 8010000000 HC HEMODIALYSIS ACUTE INPT

## 2023-10-04 PROCEDURE — 36430 TRANSFUSION BLD/BLD COMPNT: CPT

## 2023-10-04 PROCEDURE — 94760 N-INVAS EAR/PLS OXIMETRY 1: CPT

## 2023-10-04 PROCEDURE — P9016 RBC LEUKOCYTES REDUCED: HCPCS

## 2023-10-04 PROCEDURE — 80053 COMPREHEN METABOLIC PANEL: CPT

## 2023-10-04 PROCEDURE — 6370000000 HC RX 637 (ALT 250 FOR IP): Performed by: HOSPITALIST

## 2023-10-04 PROCEDURE — 99024 POSTOP FOLLOW-UP VISIT: CPT | Performed by: UROLOGY

## 2023-10-04 PROCEDURE — 6360000002 HC RX W HCPCS: Performed by: UROLOGY

## 2023-10-04 RX ORDER — HYDROMORPHONE HYDROCHLORIDE 1 MG/ML
1 INJECTION, SOLUTION INTRAMUSCULAR; INTRAVENOUS; SUBCUTANEOUS EVERY 4 HOURS PRN
Status: DISCONTINUED | OUTPATIENT
Start: 2023-10-04 | End: 2023-10-06

## 2023-10-04 RX ORDER — DIPHENHYDRAMINE HYDROCHLORIDE 50 MG/ML
25 INJECTION INTRAMUSCULAR; INTRAVENOUS
Status: COMPLETED | OUTPATIENT
Start: 2023-10-04 | End: 2023-10-04

## 2023-10-04 RX ORDER — HEPARIN SODIUM 1000 [USP'U]/ML
3600 INJECTION, SOLUTION INTRAVENOUS; SUBCUTANEOUS PRN
Status: DISCONTINUED | OUTPATIENT
Start: 2023-10-04 | End: 2023-10-10 | Stop reason: HOSPADM

## 2023-10-04 RX ORDER — FENTANYL CITRATE 50 UG/ML
50 INJECTION, SOLUTION INTRAMUSCULAR; INTRAVENOUS ONCE
Status: COMPLETED | OUTPATIENT
Start: 2023-10-04 | End: 2023-10-04

## 2023-10-04 RX ORDER — MECOBALAMIN 5000 MCG
5 TABLET,DISINTEGRATING ORAL NIGHTLY
Status: DISCONTINUED | OUTPATIENT
Start: 2023-10-04 | End: 2023-10-10 | Stop reason: HOSPADM

## 2023-10-04 RX ORDER — LORAZEPAM 1 MG/1
1 TABLET ORAL NIGHTLY PRN
Status: DISCONTINUED | OUTPATIENT
Start: 2023-10-04 | End: 2023-10-10 | Stop reason: HOSPADM

## 2023-10-04 RX ORDER — SODIUM HYPOCHLORITE 1.25 MG/ML
SOLUTION TOPICAL 3 TIMES DAILY
Status: DISCONTINUED | OUTPATIENT
Start: 2023-10-04 | End: 2023-10-04 | Stop reason: SDUPTHER

## 2023-10-04 RX ORDER — SODIUM HYPOCHLORITE 1.25 MG/ML
SOLUTION TOPICAL 3 TIMES DAILY
Status: DISCONTINUED | OUTPATIENT
Start: 2023-10-04 | End: 2023-10-10 | Stop reason: ALTCHOICE

## 2023-10-04 RX ORDER — SODIUM CHLORIDE 9 MG/ML
INJECTION, SOLUTION INTRAVENOUS PRN
Status: DISCONTINUED | OUTPATIENT
Start: 2023-10-04 | End: 2023-10-07 | Stop reason: ALTCHOICE

## 2023-10-04 RX ADMIN — Medication 2 PUFF: at 10:37

## 2023-10-04 RX ADMIN — Medication 10 ML: at 07:39

## 2023-10-04 RX ADMIN — PIPERACILLIN AND TAZOBACTAM 3375 MG: 3; .375 INJECTION, POWDER, LYOPHILIZED, FOR SOLUTION INTRAVENOUS at 14:35

## 2023-10-04 RX ADMIN — VANCOMYCIN HYDROCHLORIDE 1000 MG: 10 INJECTION, POWDER, LYOPHILIZED, FOR SOLUTION INTRAVENOUS at 14:51

## 2023-10-04 RX ADMIN — Medication: at 15:30

## 2023-10-04 RX ADMIN — OXYCODONE AND ACETAMINOPHEN 1 TABLET: 10; 325 TABLET ORAL at 10:12

## 2023-10-04 RX ADMIN — CINACALCET HYDROCHLORIDE 30 MG: 30 TABLET, FILM COATED ORAL at 07:38

## 2023-10-04 RX ADMIN — SEVELAMER CARBONATE 800 MG: 800 TABLET, FILM COATED ORAL at 07:38

## 2023-10-04 RX ADMIN — DIPHENHYDRAMINE HYDROCHLORIDE 25 MG: 50 INJECTION INTRAMUSCULAR; INTRAVENOUS at 10:59

## 2023-10-04 RX ADMIN — OXYBUTYNIN CHLORIDE 5 MG: 5 TABLET ORAL at 07:38

## 2023-10-04 RX ADMIN — PIPERACILLIN AND TAZOBACTAM 3375 MG: 3; .375 INJECTION, POWDER, LYOPHILIZED, FOR SOLUTION INTRAVENOUS at 23:41

## 2023-10-04 RX ADMIN — Medication: at 21:01

## 2023-10-04 RX ADMIN — FOLIC ACID 1 MG: 1 TABLET ORAL at 07:38

## 2023-10-04 RX ADMIN — OXYCODONE AND ACETAMINOPHEN 1 TABLET: 10; 325 TABLET ORAL at 03:43

## 2023-10-04 RX ADMIN — HYDROMORPHONE HYDROCHLORIDE 0.25 MG: 1 INJECTION, SOLUTION INTRAMUSCULAR; INTRAVENOUS; SUBCUTANEOUS at 00:11

## 2023-10-04 RX ADMIN — HYDROMORPHONE HYDROCHLORIDE 1 MG: 1 INJECTION, SOLUTION INTRAMUSCULAR; INTRAVENOUS; SUBCUTANEOUS at 20:07

## 2023-10-04 RX ADMIN — OXYCODONE AND ACETAMINOPHEN 1 TABLET: 10; 325 TABLET ORAL at 17:38

## 2023-10-04 RX ADMIN — Medication 5 MG: at 21:59

## 2023-10-04 RX ADMIN — HYDROMORPHONE HYDROCHLORIDE 1 MG: 1 INJECTION, SOLUTION INTRAMUSCULAR; INTRAVENOUS; SUBCUTANEOUS at 07:32

## 2023-10-04 RX ADMIN — Medication 10 ML: at 21:01

## 2023-10-04 RX ADMIN — TAMSULOSIN HYDROCHLORIDE 0.4 MG: 0.4 CAPSULE ORAL at 07:39

## 2023-10-04 RX ADMIN — Medication 2 PUFF: at 19:17

## 2023-10-04 RX ADMIN — OXYCODONE AND ACETAMINOPHEN 1 TABLET: 10; 325 TABLET ORAL at 23:41

## 2023-10-04 RX ADMIN — HEPARIN SODIUM 3600 UNITS: 1000 INJECTION INTRAVENOUS; SUBCUTANEOUS at 14:08

## 2023-10-04 RX ADMIN — ALPRAZOLAM 0.5 MG: 0.5 TABLET ORAL at 19:57

## 2023-10-04 RX ADMIN — PANTOPRAZOLE SODIUM 40 MG: 40 TABLET, DELAYED RELEASE ORAL at 05:21

## 2023-10-04 RX ADMIN — HYDROMORPHONE HYDROCHLORIDE 1 MG: 1 INJECTION, SOLUTION INTRAMUSCULAR; INTRAVENOUS; SUBCUTANEOUS at 14:32

## 2023-10-04 RX ADMIN — LORAZEPAM 1 MG: 1 TABLET ORAL at 21:59

## 2023-10-04 RX ADMIN — Medication: at 08:16

## 2023-10-04 RX ADMIN — FENTANYL CITRATE 50 MCG: 50 INJECTION, SOLUTION INTRAMUSCULAR; INTRAVENOUS at 20:59

## 2023-10-04 RX ADMIN — HYDROMORPHONE HYDROCHLORIDE 0.25 MG: 1 INJECTION, SOLUTION INTRAMUSCULAR; INTRAVENOUS; SUBCUTANEOUS at 05:21

## 2023-10-04 RX ADMIN — SEVELAMER CARBONATE 800 MG: 800 TABLET, FILM COATED ORAL at 17:38

## 2023-10-04 ASSESSMENT — PAIN - FUNCTIONAL ASSESSMENT
PAIN_FUNCTIONAL_ASSESSMENT: PREVENTS OR INTERFERES SOME ACTIVE ACTIVITIES AND ADLS

## 2023-10-04 ASSESSMENT — PAIN DESCRIPTION - ORIENTATION
ORIENTATION: LEFT
ORIENTATION: LEFT
ORIENTATION: MID
ORIENTATION: LEFT;RIGHT
ORIENTATION: MID
ORIENTATION: MID
ORIENTATION: OTHER (COMMENT)
ORIENTATION: LEFT

## 2023-10-04 ASSESSMENT — PAIN SCALES - GENERAL
PAINLEVEL_OUTOF10: 6
PAINLEVEL_OUTOF10: 9
PAINLEVEL_OUTOF10: 8
PAINLEVEL_OUTOF10: 7
PAINLEVEL_OUTOF10: 9
PAINLEVEL_OUTOF10: 6
PAINLEVEL_OUTOF10: 10
PAINLEVEL_OUTOF10: 10
PAINLEVEL_OUTOF10: 9
PAINLEVEL_OUTOF10: 6

## 2023-10-04 ASSESSMENT — PAIN DESCRIPTION - DESCRIPTORS
DESCRIPTORS: ACHING;DISCOMFORT
DESCRIPTORS: ACHING;SORE;STABBING
DESCRIPTORS: ACHING
DESCRIPTORS: ACHING
DESCRIPTORS: ACHING;DISCOMFORT;BURNING
DESCRIPTORS: BURNING;DISCOMFORT;SHARP
DESCRIPTORS: ACHING;DISCOMFORT
DESCRIPTORS: ACHING;TENDER
DESCRIPTORS: ACHING;DISCOMFORT
DESCRIPTORS: ACHING;DISCOMFORT;BURNING

## 2023-10-04 ASSESSMENT — PAIN DESCRIPTION - LOCATION
LOCATION: SCROTUM
LOCATION: GROIN
LOCATION: SCROTUM
LOCATION: GROIN;SCROTUM

## 2023-10-04 ASSESSMENT — PAIN DESCRIPTION - FREQUENCY: FREQUENCY: CONTINUOUS

## 2023-10-04 ASSESSMENT — PAIN DESCRIPTION - ONSET: ONSET: ON-GOING

## 2023-10-04 ASSESSMENT — PAIN DESCRIPTION - PAIN TYPE: TYPE: ACUTE PAIN

## 2023-10-04 NOTE — OP NOTE
Brief Operative Note      Patient: Epi Samano  YOB: 1975  MRN: [id number]    Date of Procedure: 10/3/2023    Pre-Op Diagnosis Codes:     * Scrotal abscess [N49.2]    Post-Op Diagnosis: Same       Procedure(s):  SCROTAL ABSCESS INCISION AND DRAINAGE    Surgeon(s):  Kenan Gonzáles MD    Assistant:   * No surgical staff found *    Anesthesia: General    Estimated Blood Loss (mL): 393    Complications: None    Specimens:   ID Type Source Tests Collected by Time Destination   1 : 1. CULTURE SWAB SCROTAL ABSCESS Tissue Scrotum CULTURE, ANAEROBIC, CULTURE, FUNGUS, GRAM STAIN, CULTURE, SURGICAL, CULTURE WITH SMEAR, ACID FAST Michelle Valerio MD 10/3/2023 2022    A : A. NECROTIC HYDROCELE [facility] Tissue Scrotum SURGICAL PATHOLOGY Kenan Gonzáles MD 10/3/2023 2032        Implants:  * No implants in log *      Drains:   Colostomy LLQ Descending/sigmoid (Active)   Stomal Appliance 1 piece 10/03/23 2059   Stool Appearance Loose 10/03/23 2059   Stool Color Brown 10/03/23 2059   Stool Amount Large 10/03/23 2059       Findings: Thickened hydrocele with old hematoma. Appears to have had been infected and ruptured to the scrotal wall. No signs of ascending fasciitis or necrosis though part of the hydrocele sac was debrided. Wound culture sent  Betadine soaked Kerlix packing placed. Plan is to begin dressing changes with Dakin's tomorrow. Detailed Description of Procedure:   See dictated report: [id number]    Disposition to PACU then admit to floor.      Electronically signed by Madelin Lopez MD on 10/3/2023 at 9:12 PM

## 2023-10-04 NOTE — PROGRESS NOTES
Palliative Care/Spiritual Care: Met with pt to initiate palliative care. Pt says, \"I have an infection near my testicles, it is an abscess. \" Pt is hurting saying they just changed out the dressing. Pt says he previously had colo-rectal cancer, and has a colostomy. He also has dialysis three times a week. Pt has other diagnoses in past medical history. Pt is known to palliative care. Advance Directives: Pt says he has a LW and his wife takes care of those things. This  requested a copy of pt's LW. Pt says his wife Parminder Palafox is his primary decision maker. He says he has children, no names listed in pt's chart. Pt is a full code. He wants CPR and Ventilator, but he says if he is on a ventilator and he worsens, he does not want to remain on the ventilator. Pt has an ACP note with no changes. SEE ACP NOTE. Pain/other symptoms: Pt is having pain and his nurse came in during the visit and informed him when his next pain medication is due. Social/Spiritual: Pt says he attended [employer]. Pt/family discussion r/t goals:  Pt lives at home with his wife. He drives, and performs daily living skills to care for himself at home. Pt ambulates without assistance. Pt says he also cares for his colostomy, saying it is his job. His goal is to return home with his family. Pt says he has one child who graduated from high school and has another that he wants to see graduate. Provided spiritual care with sustaining presence, nurtured hope, and prayer. Pt expressed gratitude for spiritual care.      Electronically signed by Ryan Ayala on 10/4/2023 at 10:22 AM

## 2023-10-04 NOTE — PROGRESS NOTES
Primary nurse just completed dressing change and patient is not able to tolerate evaluation with dressing change at this time. Reviewed photo in EMR. It is possible to place wound vac, but the concern will be pain control with continuous pressure to testicles/scrotum.      Electronically signed by Brooklyn Sorto RN, [address] [place] [address] on 10/4/2023 at 3:41 PM

## 2023-10-04 NOTE — CONSULTS
Consult Note              Today's date  10/4/2023      Hospital day # : 1      Patient Kristel Stewart     YOB: 1975     Age:48 y.o. Inpatient consult to GI  Consult performed by: Alcira Feng MD  Consult ordered by: Teagan Quintero MD            Consult reason:    \"GI bleed\". Patient's hemoglobin fell from 8.2 yesterday to 6.4 today without overt upper or lower GI bleed. Patient had sternal abscess drained yesterday. History of chronic anemia for years. Usually hemoglobin 7. At times hemoglobin has been normal.  Now anemia is macrocytic. 10/4/2023: Hemoglobin 8.7    10/4/2023: Hemoglobin 6.4, , platelet count 852,910, WBC 6.9    10/3/2023: Hemoglobin 8.2, .9.    7/7/2015: Hemoglobin 11    9/14/2015: Hemoglobin 9.7    5/17/2019: Hemoglobin 10    7/10/2019: Hemoglobin 7.7    1/21/2020: Hemoglobin 13.4, MCV 99, platelets 561.    0/6/9628: WBC 21.6, hemoglobin 7.8, .    8/4/2022: Hemoglobin 7.2    8/4/22: Hemoglobin 6.7.    7/10/2019: Hemoglobin 6.5.    3/23/2023: Hemoglobin 10.8.    10/3/2023: Hemoglobin 8.3    10/4/23: Hemoglobin 6.4    10//23: Hemoglobin 8.7.    1/23/2014: Hemoglobin 14.6, MCV 93    History of rectal carcinoma. Status post colostomy. Last EGD and colonoscopy in 2019 by Dr. El Hernandez. He had a small adenomatous polyp inside the colostomy margin. Chief Complaint       Chief Complaint   Patient presents with    Abscess     Left scrotum, states that he has swelling from time to time but noticed area to it a couple of days ago             History Obtained From       patient, electronic medical record    History of Present Illness     The patient is a 53-year-old gentleman. GI was consulted for \"GI bleed\". Patient has history of rectal cancer. He is status post colostomy. Has history of 2 superficial gastric ulcers by EGD in July 2019. Patient has a history of rectal cancer diagnosed in 2008.   He is status post section and BRACHIAL CEPHALIC AV FISTULA performed by Karoline Wise MD at [address] [facility], COLON, DIAGNOSTIC      HC COLONOSCOPY BIOPSY/STOMA N/A 07/12/2019    Dr Purdy-w/APC ablation-Inflammatory polyps inside or below the colostomy opening-Tubular AP (-) dysplasia    HERNIA REPAIR      As an infant    KIDNEY REMOVAL Left     cancer    SCROTAL SURGERY N/A 10/3/2023    SCROTAL ABSCESS INCISION AND DRAINAGE performed by Jin Sandy MD at [address] [place] [address] [address] N/A 07/12/2019    Dr Purdy-Gastric [place] Right 07/14/2022    SJS Placement/exchange over a wire of right internal jugular vein tunneled dialysis catheter (BARD Glidepath 27 cm tip to cuff)        Medications       Prior to Admission medications    Medication Sig Start Date End Date Taking? Authorizing Provider   docusate sodium (COLACE) 100 MG capsule Take 1 capsule by mouth 2 times daily as needed for Constipation 10/3/23   Ashley Garcia MD   ALPRAZolam Latoya Sine) 1 MG tablet Take 1 tablet by mouth 3 times daily as needed for Sleep or Anxiety for up to 30 days. 10/3/23 11/2/23  Ashley Garcia MD   oxyCODONE-acetaminophen (PERCOCET)  MG per tablet Take 1 tablet by mouth every 4 hours as needed for Pain.     Provider, MD Krystina   SUMAtriptan (IMITREX) 100 MG tablet Take 1 tablet by mouth once as needed for Migraine 6/27/23 10/3/23  Ashley Garcia MD   amLODIPine (NORVASC) 10 MG tablet TAKE 1 TABLET BY MOUTH EVERY DAY 6/6/23   Ashley Garcia MD   ondansetron [facility]) 4 MG tablet Take 1 tablet by mouth daily as needed for Nausea or Vomiting 5/26/23   Ashley Garcia MD   fluticasone-salmeterol ([facility]) 261-29 MCG/ACT inhaler Inhale 2 puffs into the lungs 2 times daily 3/19/23   Ashley Garcia MD   oxybutynin (DITROPAN) 5 MG tablet Take 1 tablet by mouth daily 3/10/23   Ashley Garcia MD   tamsulosin (FLOMAX) 0.4 MG capsule Take 1 capsule by mouth

## 2023-10-04 NOTE — PROGRESS NOTES
Pharmacy Adjustment per [facility] protocol    Jessica Ward is a 57 y.o. male. Pharmacy has adjusted medications per [facility] protocol. Recent Labs     10/03/23  1612   BUN 32*       Recent Labs     10/03/23  1612   CREATININE 7.0*       Estimated Creatinine Clearance: 15 mL/min (A) (based on SCr of 7 mg/dL (H)).     Height:   Ht Readings from Last 1 Encounters:   10/03/23 6' 2\" (1.88 m)     Weight:  Wt Readings from Last 1 Encounters:   10/03/23 179 lb (81.2 kg)         Plan: Adjust the following medications based on [facility] protocol:           Changed Zosyn 2.25gm Q8hr to 3.375gm Q12hr (Dialysis Patient)    Electronically signed by Benito Contreras [facility] on 10/3/2023 at 10:17 PM

## 2023-10-04 NOTE — PROGRESS NOTES
Message sent to nocturnist regarding patient's hgb of 6.4. Awaiting response back.     Electronically signed by Simon Hernandez RN on 10/4/2023 at 4:55 AM'

## 2023-10-04 NOTE — PROGRESS NOTES
[facility] Hospital[facility] Progress Note    Patient:  Samira Olson  YOB: 1975  Date of Service: 10/4/2023  MRN: [id number]   Acct: [de-identified]   Primary Care Physician: Lisandra Vora MD  Advance Directive: Full Code  Admit Date: 10/3/2023       Hospital Day: 1        CHIEF COMPLAINT:     Chief Complaint   Patient presents with    Abscess     Left scrotum, states that he has swelling from time to time but noticed area to it a couple of days ago        10/4/2023 1:07 PM  Subjective / Interval History:   10/04/2023  Patient seen and examined. Doing well. No new complaints. No acute changes or acute overnight event reported. Laying comfortably in bed no acute distress. Denies any acute complaints or distress at this time. Status post I&D and irrigation of scrotal abscess yesterday by urology. Review of Systems:   Review of Systems  ROS: 14 point review of systems is negative except as specifically addressed above. ADULT DIET; Regular; Low Potassium (Less than 3000 mg/day);  Low Phosphorus (Less than 1000 mg)    Intake/Output Summary (Last 24 hours) at 10/4/2023 1307  Last data filed at 10/4/2023 1047  Gross per 24 hour   Intake 1590 ml   Output --   Net 1590 ml       Medications:   sodium chloride      sodium chloride       Current Facility-Administered Medications   Medication Dose Route Frequency Provider Last Rate Last Admin    piperacillin-tazobactam (ZOSYN) 3,375 mg in sodium chloride 0.9 % 50 mL IVPB ([id number])  3,375 mg IntraVENous Q12H CALLY Lieberman - CNP        0.9 % sodium chloride infusion   IntraVENous PRN Barbara Schultz MD        HYDROmorphone HCl PF (DILAUDID) injection 1 mg  1 mg IntraVENous Q4H PRN Joselin Dominguez MD   1 mg at 10/04/23 0732    sodium hypochlorite (DAKINS) 0.125 % external solution   Irrigation TID Joselin Dominguez MD   Given at 10/04/23 0816    heparin (porcine) injection 3,600 Units  3,600 Units Intercatheter PRN Ashwini Armas MD chest pain. Work-up in ER CT abdomen pelvis thick-walled left scrotal collection with peripheral calcification and some specks of central gas, sed rate 140, CRP 43.7, lactic acid 3.6. Patient be admitted to the hospital service with consult to urology and nephrology. \"      Sepsis  Scrotal abscess  Urology on board-appreciate conditions  Status post I&D and irrigation of scrotal abscess  Initial Lactic acid level of 3.6 mg/dL(10/03/2023)  Lactic acid trended to 1.0 (10/03/2023), with IVF  Blood culture  Continue empiric antibiotics: Vanc & Pip/Tazo       ESRD, on scheduled HD MWF   Nephrology consulted on admission  Continue scheduled HD as per nephrology rec     Anemia  ? GI Bleed  Hgb dropped to 6.4 (10/04/2023) from 8.2 on admission (10/03/2023)Stool Occult blood  Transfuse  1 Units of PRBC (10/04/2023)  Monitor  Transfusion Goal: Hgb < 7  Pantoprazole 40 g IV BID  GI Consult   Continue management as per GI Recommendation      Continue management of other chronic medical conditions - see above and orders. Advance Directive: Full Code    ADULT DIET; Regular; Low Potassium (Less than 3000 mg/day); Low Phosphorus (Less than 1000 mg)         Consults Made:   PHARMACY TO DOSE VANCOMYCIN  IP CONSULT TO UROLOGY  IP CONSULT TO NEPHROLOGY  PALLIATIVE CARE EVAL  IP CONSULT TO GI      DVT prophylaxis: SCDs       Current medications reviewed  Lab work reviewed  Radiology  films reviewed  Much appreciated treatment recommendations from suspecialities reviewed  Discussed treatment plan with the nurse and addressed all questions/concerns  Discussed with Patient at the bedside in detail . .. he understands and agree with the management plan.       Discharge planning: tbd    Multiple complex medical problems  Morbidity mortality high risk  Medical decision making High complexity     Electronically signed by   Teagan Quintero MD,   Internal Medicine Hospitalist   10/4/2023 1:07 PM

## 2023-10-04 NOTE — PROGRESS NOTES
Patient has a low grade temp of 100.5. This was taken as a set of vitals pre-blood transfusion. Elevated temp does not correspond with blood transfusion.     Electronically signed by Vivian Weir RN on 10/4/2023 at 6:16 AM

## 2023-10-04 NOTE — OP NOTE
[facility] [address], [address] [place] [address]                                OPERATIVE REPORT    PATIENT NAME: Candia Crigler                      :        1975  MED REC NO:   [id number]                              ROOM:       [facility]  ACCOUNT NO:   [de-identified]                           ADMIT DATE: 10/03/2023  PROVIDER:     Fabienne Brito MD    DATE OF PROCEDURE:  10/03/2023    TITLE OF OPERATION:  Incision, drainage and irrigation of scrotal  abscess. PREOPERATIVE DIAGNOSIS:  Scrotal abscess. POSTOPERATIVE DIAGNOSIS:  Scrotal abscess. ANESTHETIC:  General anesthetic. ATTENDING SURGEON:  Dr. Fabienne Brito. ESTIMATED BLOOD LOSS:  150 mL. HISTORY:  The patient presents with a 2 to 3-week history of swelling of  his left hemiscrotum that has progressively gotten worse over the last  few days. Few days ago, he felt subjective fevers. He began having  spontaneous purulent drainage from the left scrotum last night and saw  his PCP and then presented to the emergency department where on  examination he was found to have a scrotal abscess. He does have a  history of having a prior chronic hydrocele on that side. Therefore, he  now presents to undergo incision and drainage and irrigation of scrotal  abscess. Risks and complications of the procedure were discussed with  him including the risk of pain, bleeding, potential loss of testicle,  need for dressing changes and need for an open wound. He is agreeable  to proceed. DESCRIPTION OF PROCEDURE:  The patient was brought to the operating  room. He underwent general anesthetic. He was placed in the lithotomy  position. His genitalia was prepped and draped in routine sterile  fashion. He had already received Zosyn and vancomycin.   On examination  of the anterior left hemiscrotum, there were small, little openings in  the skin, was thin here, where there was purulent drainage that was  tinged with old bloody purplish material.  It appears that this had  drained and there was a space exit from the skin and scrotal wall down  to what I would say the hydrocele sac. I used a 15 blade to incise  through the skin and indeed there was a space there and then I could see  an opening in this very thickened hydrocele sac. In the opening was an  old hematoma and there appeared to be purulent drainage as well. Basically staying anterior, I opened the hydrocele sac longitudinally as  well as extending the scrotal incision longitudinally and I was able  then to evacuate this old purulent-looking hematoma. The inner surface  of the hydrocele sac was black and felt somewhat calcified and  thickened, but it was not necrotic. The testicle could be seen in the  posterior aspect of the sac, but it was very abnormal with thickened  tunica over it. At this point then, I elected to go ahead and try to  dissect between the scrotal wall and hydrocele sac to make sure there  was not fasciitis and did not have to open up this fascia, and I did  this superiorly on the medial and lateral aspect and then up until I  could get to the level of the cord. There was no _____ abscess, the  tissue appeared to look viable though it did look a little dusky and  thickened and indurated, but it appeared to be not necrotic. We then  extended the incision to the inferior aspect of the scrotum again  opening the hydrocele sac as well. The hydrocele sac was pretty  adherent to the scrotal wall, inferolateral and inferior part of the  scrotum. So, therefore, I did not try to dissect this plane and try to  mobilize the hydrocele sac. It was very thickened. However, the  hydrocele sac had ruptured, I sort of debrided these edges of the sac  back to what appeared to be viable-appearing tissue. I then copiously  irrigated the wound with antibiotic-containing irrigation solution. Hemostasis was obtained with the cautery.   I did send a wound culture at  the very start of the procedure, swabbing the purulent material.   Pulsavac, 3 liters of antibiotics, was then used to irrigate the wound  with vancomycin, polymyxin and gentamicin. This cleaned up very nicely. Again, I felt hemostasis was obtained. There was a little nuisance  bleeding at the most inferior aspect of the scrotal skin when I did  oversew this with a 3-0 chromic. I then used [employer] and  I basically packed the Kerlix between the scrotal wall and the hydrocele  sac, both medial and lateral and superior where this was opened and  then, I used a separate Kerlix soaked that was placed actually within  the cavity of the hydrocele itself and then another Kerlix on top of  this to cover the skin edges. Fluffs, ABD, and scrotal support all  placed. The procedure was then concluded. He was awakened from his  anesthetic and taken to the recovery room in stable condition.         Sarah Alfaro MD    D: 10/03/2023 22:23:40      T: 10/03/2023 22:27:52     [id number]  Job#: [id number]     Doc#: [id number]    CC:

## 2023-10-04 NOTE — PROGRESS NOTES
4 Eyes Skin Assessment     NAME:  Jacinto Edouard  YOB: 1975  MEDICAL RECORD NUMBER:  [id number]    The patient is being assessed for  Admission    I agree that at least one RN has performed a thorough Head to Toe Skin Assessment on the patient. ALL assessment sites listed below have been assessed. Areas assessed by both nurses:    Head, Face, Ears, Shoulders, Back, Chest, Arms, Elbows, Hands, Sacrum. Buttock, Coccyx, Ischium, and Legs. Feet and Heels        Does the Patient have a Wound? Yes wound(s) were present on assessment.  LDA wound assessment was Initiated and completed by RN    Surgical scrotal wound       Meng Prevention initiated by RN: No  Wound Care Orders initiated by RN: No    Pressure Injury (Stage 3,4, Unstageable, DTI, NWPT, and Complex wounds) if present, place Wound referral order by RN under : No    New Ostomies, if present place, Ostomy referral order under : No     Has an established colostomy      Nurse 1 eSignature: Electronically signed by Vivian Weir RN on 10/4/23 at 12:30 AM CDT    **SHARE this note so that the co-signing nurse can place an eSignature**    Nurse 2 eSignature: Electronically signed by Tono Hooker RN on 10/4/23 at 12:40 AM CDT

## 2023-10-04 NOTE — PROGRESS NOTES
Per the microbiology department they \"cannot run the [employer] on the scrotal abscess swab because it was sent in an eSwab container\".     Electronically signed by Maureen Malik RN on 10/4/2023 at 7:16 AM

## 2023-10-05 ENCOUNTER — OUTSIDE FACILITY SERVICE (OUTPATIENT)
Age: 48
End: 2023-10-05
Payer: MEDICARE

## 2023-10-05 LAB
ALBUMIN SERPL-MCNC: 2.5 G/DL (ref 3.5–5.2)
ALP SERPL-CCNC: 318 U/L (ref 40–130)
ALT SERPL-CCNC: 6 U/L (ref 5–41)
ANION GAP SERPL CALCULATED.3IONS-SCNC: 15 MMOL/L (ref 7–19)
AST SERPL-CCNC: 13 U/L (ref 5–40)
BASOPHILS # BLD: 0.1 K/UL (ref 0–0.2)
BASOPHILS NFR BLD: 1 % (ref 0–1)
BILIRUB DIRECT SERPL-MCNC: 0.1 MG/DL (ref 0–0.3)
BILIRUB INDIRECT SERPL-MCNC: 0.2 MG/DL (ref 0.1–1)
BILIRUB SERPL-MCNC: 0.3 MG/DL (ref 0.2–1.2)
BUN SERPL-MCNC: 19 MG/DL (ref 6–20)
CALCIUM SERPL-MCNC: 9.1 MG/DL (ref 8.6–10)
CHLORIDE SERPL-SCNC: 97 MMOL/L (ref 98–111)
CO2 SERPL-SCNC: 25 MMOL/L (ref 22–29)
CREAT SERPL-MCNC: 5.3 MG/DL (ref 0.5–1.2)
EOSINOPHIL # BLD: 0.2 K/UL (ref 0–0.6)
EOSINOPHIL NFR BLD: 3 % (ref 0–5)
ERYTHROCYTE [DISTWIDTH] IN BLOOD BY AUTOMATED COUNT: 18.1 % (ref 11.5–14.5)
GAMMA GLUTAMYL TRANSFERASE: 149 U/L (ref 7–54)
GLUCOSE SERPL-MCNC: 104 MG/DL (ref 74–109)
HCT VFR BLD AUTO: 23.8 % (ref 42–52)
HCT VFR BLD AUTO: 23.9 % (ref 42–52)
HGB BLD-MCNC: 7.1 G/DL (ref 14–18)
HGB BLD-MCNC: 7.3 G/DL (ref 14–18)
IMM GRANULOCYTES # BLD: 0.3 K/UL
INR PPP: 1.49 (ref 0.88–1.18)
LYMPHOCYTES # BLD: 0.5 K/UL (ref 1.1–4.5)
LYMPHOCYTES NFR BLD: 6.5 % (ref 20–40)
MCH RBC QN AUTO: 30.2 PG (ref 27–31)
MCHC RBC AUTO-ENTMCNC: 29.8 G/DL (ref 33–37)
MCV RBC AUTO: 101.3 FL (ref 80–94)
MONOCYTES # BLD: 0.6 K/UL (ref 0–0.9)
MONOCYTES NFR BLD: 8 % (ref 0–10)
NEUTROPHILS # BLD: 5.4 K/UL (ref 1.5–7.5)
NEUTS SEG NFR BLD: 77.1 % (ref 50–65)
PLATELET # BLD AUTO: 232 K/UL (ref 130–400)
PMV BLD AUTO: 9.4 FL (ref 9.4–12.4)
POTASSIUM SERPL-SCNC: 3.8 MMOL/L (ref 3.5–5)
PROT SERPL-MCNC: 6.2 G/DL (ref 6.6–8.7)
PROTHROMBIN TIME: 17.6 SEC (ref 12–14.6)
RBC # BLD AUTO: 2.35 M/UL (ref 4.7–6.1)
SODIUM SERPL-SCNC: 137 MMOL/L (ref 136–145)
WBC # BLD AUTO: 7 K/UL (ref 4.8–10.8)

## 2023-10-05 PROCEDURE — 85014 HEMATOCRIT: CPT

## 2023-10-05 PROCEDURE — C9113 INJ PANTOPRAZOLE SODIUM, VIA: HCPCS | Performed by: INTERNAL MEDICINE

## 2023-10-05 PROCEDURE — 85610 PROTHROMBIN TIME: CPT

## 2023-10-05 PROCEDURE — 94760 N-INVAS EAR/PLS OXIMETRY 1: CPT

## 2023-10-05 PROCEDURE — 85025 COMPLETE CBC W/AUTO DIFF WBC: CPT

## 2023-10-05 PROCEDURE — 80053 COMPREHEN METABOLIC PANEL: CPT

## 2023-10-05 PROCEDURE — 36415 COLL VENOUS BLD VENIPUNCTURE: CPT

## 2023-10-05 PROCEDURE — 6370000000 HC RX 637 (ALT 250 FOR IP): Performed by: HOSPITALIST

## 2023-10-05 PROCEDURE — 85018 HEMOGLOBIN: CPT

## 2023-10-05 PROCEDURE — 2580000003 HC RX 258

## 2023-10-05 PROCEDURE — 82248 BILIRUBIN DIRECT: CPT

## 2023-10-05 PROCEDURE — 6360000002 HC RX W HCPCS: Performed by: INTERNAL MEDICINE

## 2023-10-05 PROCEDURE — 94640 AIRWAY INHALATION TREATMENT: CPT

## 2023-10-05 PROCEDURE — 99232 SBSQ HOSP IP/OBS MODERATE 35: CPT | Performed by: SPECIALIST

## 2023-10-05 PROCEDURE — 6370000000 HC RX 637 (ALT 250 FOR IP)

## 2023-10-05 PROCEDURE — 1210000000 HC MED SURG R&B

## 2023-10-05 PROCEDURE — 82977 ASSAY OF GGT: CPT

## 2023-10-05 PROCEDURE — 6360000002 HC RX W HCPCS: Performed by: UROLOGY

## 2023-10-05 PROCEDURE — 99024 POSTOP FOLLOW-UP VISIT: CPT | Performed by: NURSE PRACTITIONER

## 2023-10-05 PROCEDURE — 6360000002 HC RX W HCPCS: Performed by: NURSE PRACTITIONER

## 2023-10-05 PROCEDURE — 2580000003 HC RX 258: Performed by: INTERNAL MEDICINE

## 2023-10-05 PROCEDURE — 6360000002 HC RX W HCPCS

## 2023-10-05 RX ORDER — FENTANYL CITRATE 50 UG/ML
50 INJECTION, SOLUTION INTRAMUSCULAR; INTRAVENOUS 3 TIMES DAILY PRN
Status: DISCONTINUED | OUTPATIENT
Start: 2023-10-05 | End: 2023-10-10 | Stop reason: HOSPADM

## 2023-10-05 RX ORDER — FENTANYL CITRATE 50 UG/ML
50 INJECTION, SOLUTION INTRAMUSCULAR; INTRAVENOUS ONCE
Status: COMPLETED | OUTPATIENT
Start: 2023-10-05 | End: 2023-10-05

## 2023-10-05 RX ADMIN — Medication: at 14:51

## 2023-10-05 RX ADMIN — HYDROMORPHONE HYDROCHLORIDE 1 MG: 1 INJECTION, SOLUTION INTRAMUSCULAR; INTRAVENOUS; SUBCUTANEOUS at 01:08

## 2023-10-05 RX ADMIN — HYDROMORPHONE HYDROCHLORIDE 1 MG: 1 INJECTION, SOLUTION INTRAMUSCULAR; INTRAVENOUS; SUBCUTANEOUS at 17:25

## 2023-10-05 RX ADMIN — SEVELAMER CARBONATE 800 MG: 800 TABLET, FILM COATED ORAL at 12:31

## 2023-10-05 RX ADMIN — HYDROMORPHONE HYDROCHLORIDE 1 MG: 1 INJECTION, SOLUTION INTRAMUSCULAR; INTRAVENOUS; SUBCUTANEOUS at 08:12

## 2023-10-05 RX ADMIN — Medication 5 MG: at 21:35

## 2023-10-05 RX ADMIN — FENTANYL CITRATE 50 MCG: 0.05 INJECTION, SOLUTION INTRAMUSCULAR; INTRAVENOUS at 14:17

## 2023-10-05 RX ADMIN — TAMSULOSIN HYDROCHLORIDE 0.4 MG: 0.4 CAPSULE ORAL at 08:10

## 2023-10-05 RX ADMIN — SEVELAMER CARBONATE 800 MG: 800 TABLET, FILM COATED ORAL at 08:10

## 2023-10-05 RX ADMIN — FENTANYL CITRATE 50 MCG: 0.05 INJECTION, SOLUTION INTRAMUSCULAR; INTRAVENOUS at 21:19

## 2023-10-05 RX ADMIN — CINACALCET HYDROCHLORIDE 30 MG: 30 TABLET, FILM COATED ORAL at 08:10

## 2023-10-05 RX ADMIN — Medication: at 08:15

## 2023-10-05 RX ADMIN — FENTANYL CITRATE 50 MCG: 50 INJECTION, SOLUTION INTRAMUSCULAR; INTRAVENOUS at 07:35

## 2023-10-05 RX ADMIN — OXYBUTYNIN CHLORIDE 5 MG: 5 TABLET ORAL at 08:10

## 2023-10-05 RX ADMIN — Medication 20 ML: at 21:30

## 2023-10-05 RX ADMIN — FENTANYL CITRATE 50 MCG: 0.05 INJECTION, SOLUTION INTRAMUSCULAR; INTRAVENOUS at 14:53

## 2023-10-05 RX ADMIN — FENTANYL CITRATE 50 MCG: 0.05 INJECTION, SOLUTION INTRAMUSCULAR; INTRAVENOUS at 20:35

## 2023-10-05 RX ADMIN — OXYCODONE AND ACETAMINOPHEN 1 TABLET: 10; 325 TABLET ORAL at 05:32

## 2023-10-05 RX ADMIN — SEVELAMER CARBONATE 800 MG: 800 TABLET, FILM COATED ORAL at 17:20

## 2023-10-05 RX ADMIN — EPOETIN ALFA-EPBX 10000 UNITS: 10000 INJECTION, SOLUTION INTRAVENOUS; SUBCUTANEOUS at 12:33

## 2023-10-05 RX ADMIN — SODIUM CHLORIDE, PRESERVATIVE FREE 40 MG: 5 INJECTION INTRAVENOUS at 08:10

## 2023-10-05 RX ADMIN — HYDROMORPHONE HYDROCHLORIDE 1 MG: 1 INJECTION, SOLUTION INTRAMUSCULAR; INTRAVENOUS; SUBCUTANEOUS at 04:15

## 2023-10-05 RX ADMIN — Medication 2 PUFF: at 19:13

## 2023-10-05 RX ADMIN — Medication: at 21:31

## 2023-10-05 RX ADMIN — FOLIC ACID 1 MG: 1 TABLET ORAL at 08:10

## 2023-10-05 RX ADMIN — HYDROMORPHONE HYDROCHLORIDE 1 MG: 1 INJECTION, SOLUTION INTRAMUSCULAR; INTRAVENOUS; SUBCUTANEOUS at 12:33

## 2023-10-05 RX ADMIN — OXYCODONE AND ACETAMINOPHEN 1 TABLET: 10; 325 TABLET ORAL at 09:45

## 2023-10-05 RX ADMIN — Medication 2 PUFF: at 06:55

## 2023-10-05 RX ADMIN — Medication 10 ML: at 10:07

## 2023-10-05 RX ADMIN — OXYCODONE AND ACETAMINOPHEN 1 TABLET: 10; 325 TABLET ORAL at 19:32

## 2023-10-05 RX ADMIN — SODIUM CHLORIDE, PRESERVATIVE FREE 40 MG: 5 INJECTION INTRAVENOUS at 19:53

## 2023-10-05 RX ADMIN — PIPERACILLIN AND TAZOBACTAM 3375 MG: 3; .375 INJECTION, POWDER, LYOPHILIZED, FOR SOLUTION INTRAVENOUS at 12:37

## 2023-10-05 ASSESSMENT — PAIN DESCRIPTION - LOCATION
LOCATION: SCROTUM

## 2023-10-05 ASSESSMENT — PAIN DESCRIPTION - DESCRIPTORS
DESCRIPTORS: ACHING;THROBBING
DESCRIPTORS: DISCOMFORT;BURNING;SHARP
DESCRIPTORS: ACHING
DESCRIPTORS: ACHING;DISCOMFORT
DESCRIPTORS: ACHING;THROBBING;STABBING
DESCRIPTORS: ACHING;DISCOMFORT;BURNING;THROBBING
DESCRIPTORS: ACHING
DESCRIPTORS: SORE;THROBBING
DESCRIPTORS: ACHING
DESCRIPTORS: CRUSHING;DISCOMFORT

## 2023-10-05 ASSESSMENT — PAIN - FUNCTIONAL ASSESSMENT
PAIN_FUNCTIONAL_ASSESSMENT: PREVENTS OR INTERFERES WITH ALL ACTIVE AND SOME PASSIVE ACTIVITIES
PAIN_FUNCTIONAL_ASSESSMENT: PREVENTS OR INTERFERES SOME ACTIVE ACTIVITIES AND ADLS

## 2023-10-05 ASSESSMENT — PAIN SCALES - GENERAL
PAINLEVEL_OUTOF10: 8
PAINLEVEL_OUTOF10: 10
PAINLEVEL_OUTOF10: 8
PAINLEVEL_OUTOF10: 9
PAINLEVEL_OUTOF10: 9
PAINLEVEL_OUTOF10: 10
PAINLEVEL_OUTOF10: 10
PAINLEVEL_OUTOF10: 9
PAINLEVEL_OUTOF10: 9
PAINLEVEL_OUTOF10: 10
PAINLEVEL_OUTOF10: 10
PAINLEVEL_OUTOF10: 9

## 2023-10-05 ASSESSMENT — PAIN SCALES - WONG BAKER
WONGBAKER_NUMERICALRESPONSE: 8
WONGBAKER_NUMERICALRESPONSE: 8

## 2023-10-05 ASSESSMENT — PAIN DESCRIPTION - ORIENTATION
ORIENTATION: LEFT
ORIENTATION: LOWER

## 2023-10-05 NOTE — PROGRESS NOTES
Renal Progress Note    Thank you to requesting provider: Dr Fatmata Willams, for asking us to see Candia Crigler    Reason for consultation:  ESRD    Chief Complaint:  scrotal edema and drainage. History of Presenting Illness      Patient is a 62-year-old pleasant man with a past medical history of colorectal cancer. This was managed with chemoradiation and he is status post colostomy. His left kidney went malfunctioning and he is status post left nephrectomy. He developed CKD and later went into end-stage renal disease. He continues to have some urine output. Due to pelvic radiation, he has ureteral strictures and he has a right ureteral stent. He follows in [place]. In August 2022, he was managed with liver abscess. Patient has a left upper extremity AV fistula but he refuses to use it. He continues to receive dialysis through a right IJ PermCath. Over the last 2 weeks, he was complaining of worsening swelling of the left scrotum along with tenderness. He then suddenly noticed purulent drainage. He was subsequently admitted with scrotal abscess. Urology was consulted and the abscess was drained. He continues to have wound dressing changes. He was complaining of pain. He gets dialysis on Monday Wednesday and Fridays at [facility]. Otherwise he offers no other complaints. His Hgb has dropped to 6.4 and he received 1 unit of PRBCs. Renal service was consulted to manage his ESRD. Patient is status post dialysis in October 4. Today, he has been complaining of severe pain in his scrotal area.       Past Medical/Surgical History      Active Ambulatory Problems     Diagnosis Date Noted    Benign non-nodular prostatic hyperplasia with lower urinary tract symptoms 10/02/2017    Gastroesophageal reflux disease without esophagitis 10/02/2017    Chronic insomnia 10/02/2017    Anxiety disorder 10/02/2017    Hypertension 10/02/2017    Chronic migraine without aura without status migrainosus, not intractable weakness, No new sensory deficit  Psychiatric:  No depression, no confusion  Endocrine:  No polyuria, no polydipsia       Medications        Current Facility-Administered Medications:     fentaNYL (SUBLIMAZE) injection 50 mcg, 50 mcg, IntraVENous, TID PRN, Shameka Zelaya APRN - CNP    fentaNYL (SUBLIMAZE) injection 50 mcg, 50 mcg, IntraVENous, TID PRN, Shameka Zelaya APRN - CNP    piperacillin-tazobactam (ZOSYN) 3,375 mg in sodium chloride 0.9 % 50 mL IVPB ([id number]), 3,375 mg, IntraVENous, Q12H, CALLY Mauro CNP, Stopped at 10/05/23 0341    0.9 % sodium chloride infusion, , IntraVENous, PRN, Kati Abdi MD    HYDROmorphone HCl PF (DILAUDID) injection 1 mg, 1 mg, IntraVENous, Q4H PRN, Curtis Olson MD, 1 mg at 10/05/23 8615    sodium hypochlorite (DAKINS) 0.125 % external solution, , Irrigation, TID, Curtis Olson MD, Given at 10/05/23 0815    heparin (porcine) injection 3,600 Units, 3,600 Units, Intercatheter, PRN, Cornelia Clarke MD, 3,600 Units at 10/04/23 1408    pantoprazole (PROTONIX) 40 mg in sodium chloride (PF) 0.9 % 10 mL injection, 40 mg, IntraVENous, Daily, Smita, Casper Wasserman MD, 40 mg at 10/05/23 0810    melatonin disintegrating tablet 5 mg, 5 mg, Oral, Nightly, Alexander Cavazos MD, 5 mg at 10/04/23 2159    LORazepam (ATIVAN) tablet 1 mg, 1 mg, Oral, Nightly PRN, Jessica Birch MD, 1 mg at 10/04/23 2159    vancomycin (VANCOCIN) intermittent dosing (placeholder), , Other, RX Placeholder, Curtis Olson MD    ALPRAZolam [place]) tablet 0.5 mg, 0.5 mg, Oral, TID PRN, CALLY Mauro CNP, 0.5 mg at 10/04/23 1957    [Held by provider] amLODIPine (NORVASC) tablet 10 mg, 10 mg, Oral, Daily, CALLY Mauro CNP    sevelamer (RENVELA) tablet 800 mg, 800 mg, Oral, TID WC, CALLY Mauro CNP, 800 mg at 10/05/23 0810    cinacalcet (SENSIPAR) tablet 30 mg, 30 mg, Oral, Daily, CALLY Mauro CNP, 30 mg at 10/05/23 9382

## 2023-10-05 NOTE — PLAN OF CARE
Problem: Discharge Planning  Goal: Discharge to home or other facility with appropriate resources  10/5/2023 1031 by Emily Esquivel RN  Outcome: Progressing  Flowsheets (Taken 10/5/2023 0810)  Discharge to home or other facility with appropriate resources: Identify barriers to discharge with patient and caregiver  10/5/2023 0032 by Coreen Banks RN  Outcome: Progressing     Problem: Pain  Goal: Verbalizes/displays adequate comfort level or baseline comfort level  10/5/2023 1031 by Emliy Esquivel RN  Outcome: Progressing  10/5/2023 0032 by Coreen Banks RN  Outcome: Progressing     Problem: Safety - Adult  Goal: Free from fall injury  10/5/2023 1031 by Emily Esquivel RN  Outcome: Progressing  10/5/2023 0032 by Coreen Banks RN  Outcome: Progressing

## 2023-10-05 NOTE — PROGRESS NOTES
______________________________________ Electronically signed by: Niels Castellano D.O. Date:     10/03/2023 Time:    17:49     CT ABDOMEN PELVIS W IV CONTRAST Additional Contrast? None    Result Date: 10/3/2023  EXAM:  CT OF THE ABDOMEN AND PELVIS WITH CONTRAST  History:  Left scrotal abscess  Technique:  5 mm CT of the abdomen and pelvis following intravenous contrast  FINDINGS:  The lung bases are clear. No significant liver abnormality. The adrenals, pancreas and spleen are unremarkable. The stomach and hiatus are unremarkable. Cholelithiasis without pericholecystic inflammation. Atrophied right kidney with ureteral stent in place. Nonobstructing 6 mm calculus of the right kidney. Absent left kidney. Atherosclerotic calcification of the aorta without aneurysm. The appendix is not seen. Normal caliber bowel loops appear left lower quadrant ostomy site. Distal colonic resection. Presacral soft tissue density stable from 07/14/2023. No pelvic fat inflammation. Pelvic ring is intact. No acute findings of the skeleton. Thick-walled scrotum with skin ulceration. Thick-walled partially calcified left scrotal collection 5.5 x 6.7 cm previously 7.3 x 7.3 cm 07/14/2023. A few gas bubbles within the collection. Impression: 1. No bowel or urinary obstruction 2. Cholelithiasis without CT evidence of cholecystitis 3. Right ureteral stent without hydronephrosis. Right renal atrophy and nonobstructing nephrolithiasis. 4.  Absent left kidney 5. Left lower quadrant ostomy without complication 6. Distal colonic resection. Stable prevertebral soft tissue. 7.  Thick-walled left scrotal collection with peripheral calcification and some specks of central gas. Correlate for infectious versus sterile collection.   .  All CT scans are performed using dose optimization techniques as appropriate to the performed exam and include at least one of the following: Automated exposure control, adjustment of the mA and/or kV according to size, and the use of iterative reconstruction technique. ______________________________________ Electronically signed by: Alcon Montiel M.D. Date:     10/03/2023 Time:    17:07       Electronically signed by Fernanda Carranza MD on 10/5/23 at 3:41 PM CDT        Assessment            Hospital Problems             Last Modified POA    * (Principal) Scrotal abscess 10/3/2023 Yes    ESRD on hemodialysis ([address]) 10/3/2023 Yes    Ureteral stent retained 10/3/2023 Yes    Macrocytic anemia 10/4/2023 Yes    Acute on chronic anemia 10/4/2023 Yes    History of rectal cancer 10/4/2023 Yes    History of colon polyps 10/4/2023 Yes    History of stomach ulcers 10/4/2023 Yes    Elevated alkaline phosphatase level 10/4/2023 Yes       Chronic anemia. No upper or lower GI bleed. Anemia multifactorial including due to infection, chronic renal failure. Elevated alkaline phosphatase-multifactorial including infection, medications. No biliary ductal dilation. No pancreatic mass. LFTs were normal in March, 2023. Today alkaline phosphatase 318 and going up. Other LFTs normal.  Alkaline phosphatase was noted to be 243 on 10/3/2023. All LFTs were normal in March 2023. This indicates that most likely elevated because of alkaline phosphatase infection. I do not think patient has CBD stones since no biliary ductal dilation and bilirubin is normal.  Another reason for elevated alkaline phosphatase could be congestive cardiomyopathy. History of liver abscess. No residual abscess in the liver. History of rectal carcinoma. Status post colon resection left colostomy. No recurrence seen in liver or locally. CEA was normal last year. Cholelithiasis. No acute cholecystitis. No biliary ductal dilation. Plan:        Monitor LFTs. Monitor pro time. Continue antibiotics. Discussed EGD and colonoscopy again. Patient understands but does not wish to go through this test at this time.   It should be noted that at this time he is not having acute upper or lower GI bleed. Discussed  plan with patient and his nurse.

## 2023-10-05 NOTE — PROGRESS NOTES
[facility]    Patient:  Samira Olson  YOB: 1975  Date of Service: 10/5/2023  MRN: [id number]   Acct: [de-identified]   Primary Care Physician: Lisandra Vora MD  Advance Directive: Full Code  Admit Date: 10/3/2023       Hospital Day: 2        CHIEF COMPLAINT:     Chief Complaint   Patient presents with    Abscess     Left scrotum, states that he has swelling from time to time but noticed area to it a couple of days ago        10/5/2023 7:32 AM  Subjective / Interval History:   10/05/2023  No acute changes or acute overnight events reported. Patient seen and examined. Doing well. No new complaints. Lying comfortably in bed in no acute distress. Scrotal pain intermittently controlled denies any acute complaints or distress at this time. 10/04/2023  Patient seen and examined. Doing well. No new complaints. No acute changes or acute overnight event reported. Laying comfortably in bed no acute distress. Denies any acute complaints or distress at this time. Status post I&D and irrigation of scrotal abscess yesterday by urology. Review of Systems:   Review of Systems  ROS: 14 point review of systems is negative except as specifically addressed above. ADULT DIET; Regular; Low Potassium (Less than 3000 mg/day);  Low Phosphorus (Less than 1000 mg)    Intake/Output Summary (Last 24 hours) at 10/5/2023 0732  Last data filed at 10/4/2023 1529  Gross per 24 hour   Intake 540 ml   Output 3500 ml   Net -2960 ml         Medications:   sodium chloride      sodium chloride       Current Facility-Administered Medications   Medication Dose Route Frequency Provider Last Rate Last Admin    fentaNYL (SUBLIMAZE) injection 50 mcg  50 mcg IntraVENous Once Joselin Dominguez MD        piperacillin-tazobactam (ZOSYN) 3,375 mg in sodium chloride 0.9 % 50 mL IVPB ([id number])  3,375 mg IntraVENous Q12H CALLY Lieberman - CNP   Stopped at 10/05/23 0341    0.9 % sodium chloride stomach ulcers 10/04/2023    Elevated alkaline phosphatase level 10/04/2023    Scrotal abscess 10/03/2023    Ureteral stent retained 10/03/2023    ESRD on hemodialysis ([address]) 12/17/2019    Hydrocele 12/16/2019    Hypercholesterolemia 12/16/2019    Malignant neoplasm of rectum ([address]) 12/16/2019    Numbness of foot 12/16/2019    Stricture of ureter 12/16/2019     Overview:   R solitary kidney. Managed with perc neph tube at present (stents occluded on two separate occasions)      Palliative care patient 07/11/2019    Hx of colon cancer, stage III 07/18/2018    Hx antineoplastic chemotherapy 07/18/2018    Chronic migraine without aura without status migrainosus, not intractable 01/10/2018    Generalized anxiety disorder 01/10/2018    Benign non-nodular prostatic hyperplasia with lower urinary tract symptoms 10/02/2017    Gastroesophageal reflux disease without esophagitis 10/02/2017    Chronic insomnia 10/02/2017    Anxiety disorder 10/02/2017    Hypertension 10/02/2017     Overview:   : [  ]      Obstructive uropathy 01/16/2017       Hospital Problems             Last Modified POA    * (Principal) Scrotal abscess 10/3/2023 Yes    ESRD on hemodialysis ([address]) 10/3/2023 Yes    Ureteral stent retained 10/3/2023 Yes    Macrocytic anemia 10/4/2023 Yes    Acute on chronic anemia 10/4/2023 Yes    History of rectal cancer 10/4/2023 Yes    History of colon polyps 10/4/2023 Yes    History of stomach ulcers 10/4/2023 Yes    Elevated alkaline phosphatase level 10/4/2023 Yes     Principal Problem:    Scrotal abscess  Active Problems:    ESRD on hemodialysis ([address])    Ureteral stent retained    Macrocytic anemia    Acute on chronic anemia    History of rectal cancer    History of colon polyps    History of stomach ulcers    Elevated alkaline phosphatase level  Resolved Problems:    * No resolved hospital problems.  *          Brief History/Summary:   As per Initial admission HPI 10/3/2023, quoted below;  Mr Mojgan Moore, a \"57 y.o. male with

## 2023-10-05 NOTE — PLAN OF CARE
Problem: Discharge Planning  Goal: Discharge to home or other facility with appropriate resources  10/5/2023 0032 by Terri Rojas RN  Outcome: Progressing  10/4/2023 1053 by Dhiraj Haider RN  Outcome: Progressing  Flowsheets (Taken 10/4/2023 6150)  Discharge to home or other facility with appropriate resources:   Identify barriers to discharge with patient and caregiver   Arrange for needed discharge resources and transportation as appropriate   Identify discharge learning needs (meds, wound care, etc)   Arrange for interpreters to assist at discharge as needed   Refer to discharge planning if patient needs post-hospital services based on physician order or complex needs related to functional status, cognitive ability or social support system     Problem: Pain  Goal: Verbalizes/displays adequate comfort level or baseline comfort level  10/5/2023 0032 by Terri Rojas RN  Outcome: Progressing  10/4/2023 1053 by Dhiraj Haider RN  Outcome: Progressing     Problem: Safety - Adult  Goal: Free from fall injury  10/5/2023 0032 by Terri Rojas RN  Outcome: Progressing  10/4/2023 1053 by Dhiraj Haider RN  Outcome: Progressing   Electronically signed by Terri Rojas RN on 10/5/2023 at 12:32 AM

## 2023-10-06 ENCOUNTER — OUTSIDE FACILITY SERVICE (OUTPATIENT)
Age: 48
End: 2023-10-06
Payer: MEDICARE

## 2023-10-06 LAB
BASOPHILS # BLD: 0.1 K/UL (ref 0–0.2)
BASOPHILS NFR BLD: 0.6 % (ref 0–1)
EOSINOPHIL # BLD: 0.3 K/UL (ref 0–0.6)
EOSINOPHIL NFR BLD: 3.4 % (ref 0–5)
ERYTHROCYTE [DISTWIDTH] IN BLOOD BY AUTOMATED COUNT: 17.2 % (ref 11.5–14.5)
HCT VFR BLD AUTO: 23.6 % (ref 42–52)
HGB BLD-MCNC: 7.1 G/DL (ref 14–18)
IMM GRANULOCYTES # BLD: 0.7 K/UL
LYMPHOCYTES # BLD: 0.6 K/UL (ref 1.1–4.5)
LYMPHOCYTES NFR BLD: 7.9 % (ref 20–40)
MCH RBC QN AUTO: 29.8 PG (ref 27–31)
MCHC RBC AUTO-ENTMCNC: 30.1 G/DL (ref 33–37)
MCV RBC AUTO: 99.2 FL (ref 80–94)
MONOCYTES # BLD: 0.7 K/UL (ref 0–0.9)
MONOCYTES NFR BLD: 8.4 % (ref 0–10)
NEUTROPHILS # BLD: 5.5 K/UL (ref 1.5–7.5)
NEUTS SEG NFR BLD: 71 % (ref 50–65)
PLATELET # BLD AUTO: 247 K/UL (ref 130–400)
PMV BLD AUTO: 8.8 FL (ref 9.4–12.4)
RBC # BLD AUTO: 2.38 M/UL (ref 4.7–6.1)
VANCOMYCIN SERPL-MCNC: 23.9 UG/ML
WBC # BLD AUTO: 7.7 K/UL (ref 4.8–10.8)

## 2023-10-06 PROCEDURE — 2580000003 HC RX 258

## 2023-10-06 PROCEDURE — 6360000002 HC RX W HCPCS: Performed by: INTERNAL MEDICINE

## 2023-10-06 PROCEDURE — 94640 AIRWAY INHALATION TREATMENT: CPT

## 2023-10-06 PROCEDURE — C9113 INJ PANTOPRAZOLE SODIUM, VIA: HCPCS | Performed by: INTERNAL MEDICINE

## 2023-10-06 PROCEDURE — 8010000000 HC HEMODIALYSIS ACUTE INPT

## 2023-10-06 PROCEDURE — 5A1D70Z PERFORMANCE OF URINARY FILTRATION, INTERMITTENT, LESS THAN 6 HOURS PER DAY: ICD-10-PCS | Performed by: INTERNAL MEDICINE

## 2023-10-06 PROCEDURE — 36415 COLL VENOUS BLD VENIPUNCTURE: CPT

## 2023-10-06 PROCEDURE — 6360000002 HC RX W HCPCS

## 2023-10-06 PROCEDURE — 80202 ASSAY OF VANCOMYCIN: CPT

## 2023-10-06 PROCEDURE — 6370000000 HC RX 637 (ALT 250 FOR IP)

## 2023-10-06 PROCEDURE — 99024 POSTOP FOLLOW-UP VISIT: CPT | Performed by: NURSE PRACTITIONER

## 2023-10-06 PROCEDURE — 2580000003 HC RX 258: Performed by: INTERNAL MEDICINE

## 2023-10-06 PROCEDURE — 6360000002 HC RX W HCPCS: Performed by: UROLOGY

## 2023-10-06 PROCEDURE — 6360000002 HC RX W HCPCS: Performed by: NURSE PRACTITIONER

## 2023-10-06 PROCEDURE — 6370000000 HC RX 637 (ALT 250 FOR IP): Performed by: HOSPITALIST

## 2023-10-06 PROCEDURE — 94760 N-INVAS EAR/PLS OXIMETRY 1: CPT

## 2023-10-06 PROCEDURE — 1210000000 HC MED SURG R&B

## 2023-10-06 PROCEDURE — 85025 COMPLETE CBC W/AUTO DIFF WBC: CPT

## 2023-10-06 RX ORDER — DIPHENHYDRAMINE HYDROCHLORIDE 50 MG/ML
25 INJECTION INTRAMUSCULAR; INTRAVENOUS
Status: COMPLETED | OUTPATIENT
Start: 2023-10-06 | End: 2023-10-06

## 2023-10-06 RX ORDER — HEPARIN SODIUM 1000 [USP'U]/ML
4000 INJECTION, SOLUTION INTRAVENOUS; SUBCUTANEOUS
Status: COMPLETED | OUTPATIENT
Start: 2023-10-06 | End: 2023-10-06

## 2023-10-06 RX ORDER — HYDROMORPHONE HYDROCHLORIDE 1 MG/ML
0.5 INJECTION, SOLUTION INTRAMUSCULAR; INTRAVENOUS; SUBCUTANEOUS EVERY 6 HOURS PRN
Status: DISCONTINUED | OUTPATIENT
Start: 2023-10-06 | End: 2023-10-07

## 2023-10-06 RX ADMIN — Medication 2 PUFF: at 07:03

## 2023-10-06 RX ADMIN — HEPARIN SODIUM 4000 UNITS: 1000 INJECTION INTRAVENOUS; SUBCUTANEOUS at 12:20

## 2023-10-06 RX ADMIN — HYDROMORPHONE HYDROCHLORIDE 0.5 MG: 1 INJECTION, SOLUTION INTRAMUSCULAR; INTRAVENOUS; SUBCUTANEOUS at 18:35

## 2023-10-06 RX ADMIN — FENTANYL CITRATE 50 MCG: 0.05 INJECTION, SOLUTION INTRAMUSCULAR; INTRAVENOUS at 14:17

## 2023-10-06 RX ADMIN — FOLIC ACID 1 MG: 1 TABLET ORAL at 12:37

## 2023-10-06 RX ADMIN — FENTANYL CITRATE 50 MCG: 0.05 INJECTION, SOLUTION INTRAMUSCULAR; INTRAVENOUS at 13:49

## 2023-10-06 RX ADMIN — FENTANYL CITRATE 50 MCG: 0.05 INJECTION, SOLUTION INTRAMUSCULAR; INTRAVENOUS at 07:59

## 2023-10-06 RX ADMIN — FENTANYL CITRATE 50 MCG: 0.05 INJECTION, SOLUTION INTRAMUSCULAR; INTRAVENOUS at 07:23

## 2023-10-06 RX ADMIN — SEVELAMER CARBONATE 800 MG: 800 TABLET, FILM COATED ORAL at 12:37

## 2023-10-06 RX ADMIN — OXYCODONE AND ACETAMINOPHEN 1 TABLET: 10; 325 TABLET ORAL at 15:24

## 2023-10-06 RX ADMIN — PIPERACILLIN AND TAZOBACTAM 3375 MG: 3; .375 INJECTION, POWDER, LYOPHILIZED, FOR SOLUTION INTRAVENOUS at 00:56

## 2023-10-06 RX ADMIN — Medication 10 ML: at 19:56

## 2023-10-06 RX ADMIN — SEVELAMER CARBONATE 800 MG: 800 TABLET, FILM COATED ORAL at 17:21

## 2023-10-06 RX ADMIN — PIPERACILLIN AND TAZOBACTAM 3375 MG: 3; .375 INJECTION, POWDER, LYOPHILIZED, FOR SOLUTION INTRAVENOUS at 12:41

## 2023-10-06 RX ADMIN — SODIUM CHLORIDE, PRESERVATIVE FREE 40 MG: 5 INJECTION INTRAVENOUS at 12:37

## 2023-10-06 RX ADMIN — LORAZEPAM 1 MG: 1 TABLET ORAL at 19:55

## 2023-10-06 RX ADMIN — Medication 2 PUFF: at 19:00

## 2023-10-06 RX ADMIN — OXYCODONE AND ACETAMINOPHEN 1 TABLET: 10; 325 TABLET ORAL at 19:55

## 2023-10-06 RX ADMIN — OXYBUTYNIN CHLORIDE 5 MG: 5 TABLET ORAL at 12:37

## 2023-10-06 RX ADMIN — HYDROMORPHONE HYDROCHLORIDE 1 MG: 1 INJECTION, SOLUTION INTRAMUSCULAR; INTRAVENOUS; SUBCUTANEOUS at 01:07

## 2023-10-06 RX ADMIN — Medication 10 ML: at 13:49

## 2023-10-06 RX ADMIN — SODIUM CHLORIDE, PRESERVATIVE FREE 40 MG: 5 INJECTION INTRAVENOUS at 19:53

## 2023-10-06 RX ADMIN — EPOETIN ALFA-EPBX 10000 UNITS: 10000 INJECTION, SOLUTION INTRAVENOUS; SUBCUTANEOUS at 17:21

## 2023-10-06 RX ADMIN — TAMSULOSIN HYDROCHLORIDE 0.4 MG: 0.4 CAPSULE ORAL at 12:37

## 2023-10-06 RX ADMIN — CINACALCET HYDROCHLORIDE 30 MG: 30 TABLET, FILM COATED ORAL at 12:37

## 2023-10-06 RX ADMIN — DIPHENHYDRAMINE HYDROCHLORIDE 25 MG: 50 INJECTION INTRAMUSCULAR; INTRAVENOUS at 09:00

## 2023-10-06 RX ADMIN — Medication 5 MG: at 19:55

## 2023-10-06 RX ADMIN — HYDROMORPHONE HYDROCHLORIDE 0.5 MG: 1 INJECTION, SOLUTION INTRAMUSCULAR; INTRAVENOUS; SUBCUTANEOUS at 12:35

## 2023-10-06 ASSESSMENT — PAIN DESCRIPTION - LOCATION
LOCATION: SCROTUM

## 2023-10-06 ASSESSMENT — PAIN DESCRIPTION - DESCRIPTORS
DESCRIPTORS: THROBBING;ACHING
DESCRIPTORS: ACHING
DESCRIPTORS: SORE;THROBBING
DESCRIPTORS: ACHING

## 2023-10-06 ASSESSMENT — PAIN SCALES - GENERAL
PAINLEVEL_OUTOF10: 8
PAINLEVEL_OUTOF10: 10
PAINLEVEL_OUTOF10: 9
PAINLEVEL_OUTOF10: 10
PAINLEVEL_OUTOF10: 9
PAINLEVEL_OUTOF10: 9
PAINLEVEL_OUTOF10: 8
PAINLEVEL_OUTOF10: 10
PAINLEVEL_OUTOF10: 7

## 2023-10-06 ASSESSMENT — PAIN DESCRIPTION - ORIENTATION
ORIENTATION: LEFT
ORIENTATION: LEFT

## 2023-10-06 ASSESSMENT — PAIN - FUNCTIONAL ASSESSMENT
PAIN_FUNCTIONAL_ASSESSMENT: PREVENTS OR INTERFERES SOME ACTIVE ACTIVITIES AND ADLS
PAIN_FUNCTIONAL_ASSESSMENT: PREVENTS OR INTERFERES SOME ACTIVE ACTIVITIES AND ADLS

## 2023-10-06 ASSESSMENT — PAIN DESCRIPTION - PAIN TYPE: TYPE: ACUTE PAIN

## 2023-10-06 NOTE — PLAN OF CARE
Problem: Discharge Planning  Goal: Discharge to home or other facility with appropriate resources  Outcome: Progressing  Flowsheets (Taken 10/5/2023 2040)  Discharge to home or other facility with appropriate resources: Identify barriers to discharge with patient and caregiver     Problem: Pain  Goal: Verbalizes/displays adequate comfort level or baseline comfort level  Outcome: Progressing     Problem: Safety - Adult  Goal: Free from fall injury  Outcome: Progressing  Flowsheets (Taken 10/6/2023 0221)  Free From Fall Injury: Instruct family/caregiver on patient safety

## 2023-10-06 NOTE — PROGRESS NOTES
[facility] Progress Note    Patient:  Eulogio Gonzáles  YOB: 1975  Date of Service: 10/6/2023  MRN: [id number]   Acct: [de-identified]   Primary Care Physician: Shiva Olsen MD  Advance Directive: Full Code  Admit Date: 10/3/2023       Hospital Day: 3        CHIEF COMPLAINT:     Chief Complaint   Patient presents with    Abscess     Left scrotum, states that he has swelling from time to time but noticed area to it a couple of days ago        10/6/2023 9:50 AM  Subjective / Interval History:   10/06/2023  Patient seen and examined. Doing well. No new complaints. No acute changes or acute overnight event noted. Patient endorses intermittent acute pain during dressing changes of scrotum. Denies any acute complaints or distress at this time. 10/05/2023  No acute changes or acute overnight events reported. Patient seen and examined. Doing well. No new complaints. Lying comfortably in bed in no acute distress. Scrotal pain intermittently controlled denies any acute complaints or distress at this time. 10/04/2023  Patient seen and examined. Doing well. No new complaints. No acute changes or acute overnight event reported. Laying comfortably in bed no acute distress. Denies any acute complaints or distress at this time. Status post I&D and irrigation of scrotal abscess yesterday by urology. Review of Systems:   Review of Systems  ROS: 14 point review of systems is negative except as specifically addressed above. ADULT DIET; Regular; Low Potassium (Less than 3000 mg/day);  Low Phosphorus (Less than 1000 mg)    Intake/Output Summary (Last 24 hours) at 10/6/2023 0950  Last data filed at 10/6/2023 0973  Gross per 24 hour   Intake 300 ml   Output --   Net 300 ml         Medications:   sodium chloride      sodium chloride       Current Facility-Administered Medications   Medication Dose Route Frequency Provider Last Rate Last Admin    fentaNYL (SUBLIMAZE) injection 50 Oral BID PRN [place], APRN - CNP        folic acid (FOLVITE) tablet 1 mg  1 mg Oral Daily [place], APRN - CNP   1 mg at 10/05/23 0810    [Held by provider] losartan (COZAAR) tablet 100 mg  100 mg Oral Daily [place], APRN - CNP        [Held by provider] pantoprazole (PROTONIX) tablet 40 mg  40 mg Oral QAM AC [place], APRN - CNP   40 mg at 10/04/23 0521    oxybutynin (DITROPAN) tablet 5 mg  5 mg Oral Daily [place], APRN - CNP   5 mg at 10/05/23 0810    oxyCODONE-acetaminophen (PERCOCET)  MG per tablet 1 tablet  1 tablet Oral Q4H PRN [place], APRN - CNP   1 tablet at 10/05/23 1932    tamsulosin (FLOMAX) capsule 0.4 mg  0.4 mg Oral Daily [place], APRN - CNP   0.4 mg at 10/05/23 0810    sodium chloride flush 0.9 % injection 5-40 mL  5-40 mL IntraVENous 2 times per day [place], APRN - CNP   20 mL at 10/05/23 2130    sodium chloride flush 0.9 % injection 5-40 mL  5-40 mL IntraVENous PRN [place], APRN - CNP        0.9 % sodium chloride infusion   IntraVENous PRN [place], APRN - CNP        ondansetron (ZOFRAN-ODT) disintegrating tablet 4 mg  4 mg Oral Q8H PRN [place], APRN - CNP        Or    ondansetron [facility]) injection 4 mg  4 mg IntraVENous Q6H PRN [place], APRN - CNP        polyethylene glycol (GLYCOLAX) packet 17 g  17 g Oral Daily PRN [place], APRN - CNP        budesonide-formoterol (SYMBICORT) 160-4.5 MCG/ACT inhaler 2 puff  2 puff Inhalation BID RT [place], APRN - CNP   2 puff at 10/06/23 0703         sodium chloride      sodium chloride        epoetin aliza-epbx  10,000 Units SubCUTAneous Once per day on Mon Wed Fri    pantoprazole (PROTONIX) 40 mg in sodium chloride (PF) 0.9 % 10 mL injection  40 mg IntraVENous Q12H    piperacillin-tazobactam  3,375 mg IntraVENous Q12H    sodium hypochlorite   Irrigation TID    melatonin  5 mg Oral Nightly    [Held by provider] amLODIPine  10 mg Oral Daily

## 2023-10-06 NOTE — PROGRESS NOTES
[facility]  Nurse  Consult Note       NAME:  Mildred Warren [place] RECORD NUMBER:  [id number]  AGE: 57 y.o.    GENDER: male  : 1975  TODAY'S DATE:  10/6/2023    Subjective   Reason for Wound Nurse Evaluation and Assessment: wound vac placement to scrotum wound      eMseret Burr is a 57 y.o. male referred by:   [x] Physician  [] Nursing  [] Other:     Wound Identification:  Wound Type:  surgical s/p I&D  Contributing Factors: edema    Wound History: Patient had I&D of scrotal abscess 10/3/23  Current Wound Care Treatment:  NPWt    Patient Goal of Care:  [x] Wound Healing  [] Odor Control  [] Palliative Care  [x] Pain Control   [] Other:         PAST MEDICAL HISTORY        Diagnosis Date    Asthma     during winter months    Cancer [facility])     rectal    Colostomy care [facility])     Hemodialysis patient ([address] [facility])     mon  at [facility]    History of blood transfusion     Hx of migraine headaches     Hypercholesterolemia 2019    Hypertension     Kidney stone     hx of    Palliative care patient 2019    Pericarditis     Rectal cancer ([address] [facility])     Testalgia 2016       PAST SURGICAL HISTORY    Past Surgical History:   Procedure Laterality Date    ANTERIOR CRUCIATE LIGAMENT REPAIR      ACL and MCL repair    COLON SURGERY      colon resection with colostomy    COLONOSCOPY      DIALYSIS FISTULA CREATION Bilateral 2020    CREATION  LEFT  BRACHIAL CEPHALIC AV FISTULA performed by Karal Chaudhary MD at [facility] OR    ENDOSCOPY, COLON, DIAGNOSTIC      HC COLONOSCOPY BIOPSY/STOMA N/A 2019    Dr Purdy-w/[facility] ablation-Inflammatory polyps inside or below the colostomy opening-Tubular AP (-) dysplasia    HERNIA REPAIR      As an infant    KIDNEY REMOVAL Left     cancer    SCROTAL SURGERY N/A 10/3/2023    SCROTAL ABSCESS INCISION AND DRAINAGE performed by Nicole Davidson MD at [address] [place] [address] N/A 2019    Dr Purdy-[facility] Bhupinder Diaz

## 2023-10-06 NOTE — CARE COORDINATION
Pt plans to d/c home with spouse and family; will have home wound vac; needs HH; plans to have family/friends get him to/from HD.   Electronically signed by YU Underwood on 10/6/2023 at 6:37 PM

## 2023-10-06 NOTE — PLAN OF CARE
Problem: Discharge Planning  Goal: Discharge to home or other facility with appropriate resources  10/6/2023 1253 by Tory Barron RN  Outcome: Progressing  Flowsheets (Taken 10/6/2023 0730)  Discharge to home or other facility with appropriate resources: Identify barriers to discharge with patient and caregiver  10/6/2023 0222 by Amna Finnegan LPN  Outcome: Carleen Delgadillo (Taken 10/5/2023 2040)  Discharge to home or other facility with appropriate resources: Identify barriers to discharge with patient and caregiver     Problem: Pain  Goal: Verbalizes/displays adequate comfort level or baseline comfort level  10/6/2023 1253 by Tory Barron RN  Outcome: Progressing  10/6/2023 0222 by Amna Finnegan LPN  Outcome: Progressing     Problem: Safety - Adult  Goal: Free from fall injury  10/6/2023 1253 by Tory Barron RN  Outcome: Progressing  10/6/2023 0222 by Amna Finnegan LPN  Outcome: Progressing  Flowsheets (Taken 10/6/2023 0221)  Free From Fall Injury: Instruct family/caregiver on patient safety     Problem: Nutrition Deficit:  Goal: Optimize nutritional status  Outcome: Progressing

## 2023-10-06 NOTE — PROGRESS NOTES
Comprehensive Nutrition Assessment    Type and Reason for Visit:  Initial, RD Nutrition Re-Screen/LOS    Nutrition Recommendations/Plan:   Start double protein portions d/t increased needs for wound healing     Malnutrition Assessment:  Malnutrition Status: At risk for malnutrition (Comment) (10/06/23 5887)    Context:  Acute Illness     Findings of the 6 clinical characteristics of malnutrition:  Energy Intake:  No significant decrease in energy intake  Weight Loss:  No significant weight loss     Body Fat Loss:  No significant body fat loss     Muscle Mass Loss:  No significant muscle mass loss    Fluid Accumulation:  Mild Extremities   Strength:  Not Performed    Nutrition Assessment:    Following patient for LOS x 3 days. Pt receiving Renal diet. PO intake is good with intake ranging % with majority of meals at %. Aware to have wound vac placed. Will start double protein portions with meals to meet increased needs. Nutrition Related Findings:    dialysis Wound Type: Surgical Incision (placing wound vac)       Current Nutrition Intake & Therapies:    Average Meal Intake: 51-75%, %  Average Supplements Intake: None Ordered  ADULT DIET; Regular; Low Potassium (Less than 3000 mg/day); Low Phosphorus (Less than 1000 mg)    Anthropometric Measures:  Height: 6' 2\" (188 cm)  Ideal Body Weight (IBW): 190 lbs (86 kg)    Admission Body Weight: 179 lb (81.2 kg) (stated)  Current Body Weight: 184 lb 3 oz (83.5 kg), 96.9 % IBW. Current BMI (kg/m2): 23.6  Usual Body Weight: 172 lb (78 kg) (3/2023)  % Weight Change (Calculated): 7.1  BMI Categories: Normal Weight (BMI 18.5-24. 9)    Estimated Daily Nutrient Needs:  Energy Requirements Based On: Kcal/kg  Weight Used for Energy Requirements: Current  Energy (kcal/day): 7458-0316 kcals (25-30 kcals/kg)  Weight Used for Protein Requirements: Current  Protein (g/day): 83-167g  Method Used for Fluid Requirements: 1 ml/kcal  Fluid (ml/day): 3633-8914

## 2023-10-06 NOTE — PROGRESS NOTES
Infectious Diseases Progress Note    Patient:  Brooke Rendon  YOB: 1975  MRN: [id number]   Admit date: 10/3/2023   Admitting Physician: Anuradha Walker MD  Primary Care Physician: Candy Vogel MD    Chief Complaint/Interval History: He was seen in dialysis today. He gets a little bit tired after dialysis. Tolerated the treatment. No fevers or chills. He indicates there are plans for wound VAC placement on his scrotal wound today. In/Out    Intake/Output Summary (Last 24 hours) at 10/6/2023 0904  Last data filed at 10/5/2023 1751  Gross per 24 hour   Intake 300 ml   Output --   Net 300 ml     Allergies: Allergies   Allergen Reactions    Oxycodone-Acetaminophen      Give me migraines   Other reaction(s): Other (see comments)  Give me migraines   Give me migraines   Reaction: migraines  Give me migraines   Give me migraines   Reaction: migraines    Morphine And Related      Doesn't work     Morphine      Other reaction(s):  Other (see comments)  Doesn't work   Doesn't work      Current Meds: diphenhydrAMINE (BENADRYL) injection 25 mg, Once in dialysis  fentaNYL (SUBLIMAZE) injection 50 mcg, TID PRN  fentaNYL (SUBLIMAZE) injection 50 mcg, TID PRN  epoetin aliza-epbx (RETACRIT) injection 10,000 Units, Once per day on Mon Wed Fri  pantoprazole (PROTONIX) 40 mg in sodium chloride (PF) 0.9 % 10 mL injection, Q12H  piperacillin-tazobactam (ZOSYN) 3,375 mg in sodium chloride 0.9 % 50 mL IVPB ([id number]), Q12H  0.9 % sodium chloride infusion, PRN  HYDROmorphone HCl PF (DILAUDID) injection 1 mg, Q4H PRN  sodium hypochlorite (DAKINS) 0.125 % external solution, TID  heparin (porcine) injection 3,600 Units, PRN  melatonin disintegrating tablet 5 mg, Nightly  LORazepam (ATIVAN) tablet 1 mg, Nightly PRN  ALPRAZolam (XANAX) tablet 0.5 mg, TID PRN  [Held by provider] amLODIPine (NORVASC) tablet 10 mg, Daily  sevelamer (RENVELA) tablet 800 mg, TID WC  cinacalcet (SENSIPAR) tablet 30 mg, Daily  cloNIDine

## 2023-10-06 NOTE — PLAN OF CARE
Nutrition Problem #1: Increased nutrient needs  Intervention: Food and/or Nutrient Delivery: Modify Current Diet  Nutritional

## 2023-10-06 NOTE — PROGRESS NOTES
Renal Dialysis Note    Thank you to requesting provider: Dr Arpita Shipman, for asking us to see Trino Swartz    Reason for consultation:  ESRD    Chief Complaint:  scrotal edema and drainage. History of Presenting Illness      Patient is a 61-year-old pleasant man with a past medical history of colorectal cancer. This was managed with chemoradiation and he is status post colostomy. His left kidney went malfunctioning and he is status post left nephrectomy. He developed CKD and later went into end-stage renal disease. He continues to have some urine output. Due to pelvic radiation, he has ureteral strictures and he has a right ureteral stent. He follows in [place]. In August 2022, he was managed with liver abscess. Patient has a left upper extremity AV fistula but he refuses to use it. He continues to receive dialysis through a right IJ PermCath. Over the last 2 weeks, he was complaining of worsening swelling of the left scrotum along with tenderness. He then suddenly noticed purulent drainage. He was subsequently admitted with scrotal abscess. Urology was consulted and the abscess was drained. He continues to have wound dressing changes. He was complaining of pain. He gets dialysis on Monday Wednesday and Fridays at [facility]. Otherwise he offers no other complaints. His Hgb has dropped to 6.4 and he received 1 unit of PRBCs. Renal service was consulted to manage his ESRD. Patient is status post dialysis on October 4. Today, he was seen and examined during dialysis. His scrotal pain has improved. He is also awaiting the placement of his VAC wound dressing.     Dialysis   Pt was seen on RRT  Modality: Hemodialysis  Access: Catheter  Location: right upper  QB: 450  QD: 700  UF: 2 liters    Past Medical/Surgical History      Active Ambulatory Problems     Diagnosis Date Noted    Benign non-nodular prostatic hyperplasia with lower urinary tract symptoms 10/02/2017    Gastroesophageal reflux disease without esophagitis 10/02/2017    Chronic insomnia 10/02/2017    Anxiety disorder 10/02/2017    Hypertension 10/02/2017    Chronic migraine without aura without status migrainosus, not intractable 01/10/2018    Generalized anxiety disorder 01/10/2018    Hx of colon cancer, stage III 07/18/2018    Hx antineoplastic chemotherapy 07/18/2018    Palliative care patient 07/11/2019    Hydrocele 12/16/2019    Hypercholesterolemia 12/16/2019    Malignant neoplasm of rectum ([address]) 12/16/2019    Numbness of foot 12/16/2019    Obstructive uropathy 01/16/2017    Stricture of ureter 12/16/2019    ESRD on hemodialysis ([address]) 12/17/2019    Hypercalcemia 08/03/2022    Liver mass 08/03/2022    Hypoxia 08/03/2022     Resolved Ambulatory Problems     Diagnosis Date Noted    Testalgia 02/01/2016    CKD (chronic kidney disease) stage 3, GFR 30-59 ml/min ([facility]) 01/10/2018    GI (gastrointestinal bleed) 07/10/2019    Hypokalemia     Patient requiring acute dialysis ([address])     Peptic ulcer 07/31/2019    Abscess 12/16/2019    Dysuria 12/16/2019    Hazy vision 12/16/2019    Dizzy spells 12/16/2019    Excessive thirst 12/16/2019    Fatigue 12/16/2019    Headache 12/16/2019    Nocturia 12/16/2019    Pain 12/16/2019    Pain in joint 12/16/2019    Pericarditis 12/16/2019    Renal failure 01/17/2017    Retention of urine 12/16/2019    Slowing of urinary stream 12/16/2019    Unspecified dyspareunia 12/16/2019    Weight decreasing 12/16/2019    AMS (altered mental status) 08/03/2022     Past Medical History:   Diagnosis Date    Asthma     Cancer ([address])     Colostomy care ([address])     Hemodialysis patient ([address])     History of blood transfusion     Hx of migraine headaches     Kidney stone     Rectal cancer ([address])          Review of Systems     Constitutional:  No weight loss, + fever/chills  Eyes:  No eye pain, no eye redness  Cardiovascular:  No chest pain, no worsening of edema  Respiratory:  No hemoptysis, no stidor  Gastrointestinal:  No blood in and judgement, good recall    Data     Recent Labs     10/04/23  0323 10/04/23  0959 10/05/23  0237 10/05/23  1134 10/06/23  0502   WBC 6.9  --  7.0  --  7.7   HGB 6.4*   < > 7.1* 7.3* 7.1*   HCT 21.7*   < > 23.8* 23.9* 23.6*   .4*  --  101.3*  --  99.2*     --  232  --  247    < > = values in this interval not displayed. Recent Labs     10/03/23  1612 10/04/23  0323 10/05/23  0237    138 137   K 3.7 4.1 3.8   CL 92* 97* 97*   CO2 30* 27 25   GLUCOSE 110* 86 104   BUN 32* 34* 19   CREATININE 7.0* 7.9* 5.3*   LABGLOM 9* 8* 13*         Assessment:  End-stage renal disease  History of colorectal cancer status post colostomy  Anemia of chronic kidney disease-with superimposed blood loss  Left scrotal edema status post drainage      Plan:  Patient will receive dialysis as above. Continue wound care. Follow-up labs and transfuse as needed. Continue antibiotics and follow-up cultures. Retacrit offered for anemia management.

## 2023-10-06 NOTE — CARE COORDINATION
Identified Issues/Barriers to RETURNING to current housing: none  Potential Assistance needed at discharge: [facility],[address] [facility] (will have wound vac at d/c)            Potential DME: Wound Vac  Patient expects to discharge to: [address] [place] [address] for transportation at discharge: Family    Financial    Payor: Camryn Pacer / Plan: Johnna Newcastle / Product Type: *No Product type* /     Does insurance require precert for SNF: Yes    Potential assistance Purchasing Medications: No  Meds-to-Beds request: Yes      Zeinab Lopez [address], [address] [address] 806 [facility]  [facility]  [address]  Phone: [phone] Fax: 1200 [facility], [address] [phone] - f [phone]  [address] [address]  Phone: [phone] Fax: [phone] [address] [phone] [facility] [phone]  [address] [place] [address]  Phone: [phone] Fax: [phone]      Notes:    Factors facilitating achievement of predicted outcomes: Family support, Friend support, Motivated, Cooperative, Pleasant, Sense of humor, and Good insight into deficits    Barriers to discharge: Pain, Medical complications, and Wound Care    Additional Case Management Notes: SW visited with Pt re: d/c planning; Pt lives at home with wife and their 2 sons; he attends HD at [facility]; he noted that he is agreeable to [facility] for help with his wound vac; wound nurse will make appts with [facility]; he noted he will have a friend or family member help him get to HD until he can drive again;      The Plan for Transition of Care is related to the following treatment goals of Scrotal abscess [N49.2]  ESRD on hemodialysis ([address]) [N18.6, I84.3]    IF APPLICABLE: The Patient and/or patient representative Fariba Caballero and his family were provided with a choice of provider and agrees with the discharge plan. Freedom of choice list with basic dialogue that supports the patient's individualized plan of care/goals and shares the quality data associated with the providers was provided to:  (N/A)   Patient Representative Name:       The Patient and/or Patient Representative Agree with the Discharge Plan?       Rosy Carranza, [facility]  Case Management Department  Ph: [phone] Fax: [phone]  Electronically signed by YU Cam on 10/9/2023 at 9:53 AM

## 2023-10-07 LAB
BACTERIA SPEC AEROBE CULT: ABNORMAL
BACTERIA SPEC AEROBE CULT: ABNORMAL
BACTERIA SPEC ANAEROBE CULT: ABNORMAL
BACTERIA SPEC ANAEROBE CULT: ABNORMAL
BACTERIA SPEC ANAEROBE+AEROBE CULT: ABNORMAL
BACTERIA SPEC ANAEROBE+AEROBE CULT: ABNORMAL
GRAM STN SPEC: ABNORMAL
GRAM STN SPEC: ABNORMAL
ORGANISM: ABNORMAL

## 2023-10-07 PROCEDURE — 2580000003 HC RX 258

## 2023-10-07 PROCEDURE — 94760 N-INVAS EAR/PLS OXIMETRY 1: CPT

## 2023-10-07 PROCEDURE — C9113 INJ PANTOPRAZOLE SODIUM, VIA: HCPCS | Performed by: INTERNAL MEDICINE

## 2023-10-07 PROCEDURE — 6360000002 HC RX W HCPCS: Performed by: NURSE PRACTITIONER

## 2023-10-07 PROCEDURE — 1210000000 HC MED SURG R&B

## 2023-10-07 PROCEDURE — 6360000002 HC RX W HCPCS: Performed by: INTERNAL MEDICINE

## 2023-10-07 PROCEDURE — 2580000003 HC RX 258: Performed by: INTERNAL MEDICINE

## 2023-10-07 PROCEDURE — 6370000000 HC RX 637 (ALT 250 FOR IP): Performed by: HOSPITALIST

## 2023-10-07 PROCEDURE — 99232 SBSQ HOSP IP/OBS MODERATE 35: CPT | Performed by: NURSE PRACTITIONER

## 2023-10-07 PROCEDURE — 6370000000 HC RX 637 (ALT 250 FOR IP)

## 2023-10-07 PROCEDURE — 94640 AIRWAY INHALATION TREATMENT: CPT

## 2023-10-07 PROCEDURE — 6360000002 HC RX W HCPCS

## 2023-10-07 RX ORDER — HYDROMORPHONE HYDROCHLORIDE 1 MG/ML
0.5 INJECTION, SOLUTION INTRAMUSCULAR; INTRAVENOUS; SUBCUTANEOUS EVERY 4 HOURS PRN
Status: DISCONTINUED | OUTPATIENT
Start: 2023-10-07 | End: 2023-10-10 | Stop reason: HOSPADM

## 2023-10-07 RX ADMIN — Medication 2 PUFF: at 11:39

## 2023-10-07 RX ADMIN — PIPERACILLIN AND TAZOBACTAM 3375 MG: 3; .375 INJECTION, POWDER, LYOPHILIZED, FOR SOLUTION INTRAVENOUS at 12:27

## 2023-10-07 RX ADMIN — OXYBUTYNIN CHLORIDE 5 MG: 5 TABLET ORAL at 08:12

## 2023-10-07 RX ADMIN — HYDROMORPHONE HYDROCHLORIDE 0.5 MG: 1 INJECTION, SOLUTION INTRAMUSCULAR; INTRAVENOUS; SUBCUTANEOUS at 15:26

## 2023-10-07 RX ADMIN — TAMSULOSIN HYDROCHLORIDE 0.4 MG: 0.4 CAPSULE ORAL at 08:12

## 2023-10-07 RX ADMIN — Medication 10 ML: at 08:13

## 2023-10-07 RX ADMIN — Medication 2 PUFF: at 17:16

## 2023-10-07 RX ADMIN — OXYCODONE AND ACETAMINOPHEN 1 TABLET: 10; 325 TABLET ORAL at 22:46

## 2023-10-07 RX ADMIN — HYDROMORPHONE HYDROCHLORIDE 0.5 MG: 1 INJECTION, SOLUTION INTRAMUSCULAR; INTRAVENOUS; SUBCUTANEOUS at 19:52

## 2023-10-07 RX ADMIN — PIPERACILLIN AND TAZOBACTAM 3375 MG: 3; .375 INJECTION, POWDER, LYOPHILIZED, FOR SOLUTION INTRAVENOUS at 01:15

## 2023-10-07 RX ADMIN — OXYCODONE AND ACETAMINOPHEN 1 TABLET: 10; 325 TABLET ORAL at 05:41

## 2023-10-07 RX ADMIN — OXYCODONE AND ACETAMINOPHEN 1 TABLET: 10; 325 TABLET ORAL at 18:07

## 2023-10-07 RX ADMIN — OXYCODONE AND ACETAMINOPHEN 1 TABLET: 10; 325 TABLET ORAL at 12:23

## 2023-10-07 RX ADMIN — ALPRAZOLAM 0.5 MG: 0.5 TABLET ORAL at 18:07

## 2023-10-07 RX ADMIN — Medication 5 MG: at 20:02

## 2023-10-07 RX ADMIN — SEVELAMER CARBONATE 800 MG: 800 TABLET, FILM COATED ORAL at 08:12

## 2023-10-07 RX ADMIN — HYDROMORPHONE HYDROCHLORIDE 0.5 MG: 1 INJECTION, SOLUTION INTRAMUSCULAR; INTRAVENOUS; SUBCUTANEOUS at 08:12

## 2023-10-07 RX ADMIN — SODIUM CHLORIDE, PRESERVATIVE FREE 40 MG: 5 INJECTION INTRAVENOUS at 19:51

## 2023-10-07 RX ADMIN — SEVELAMER CARBONATE 800 MG: 800 TABLET, FILM COATED ORAL at 15:26

## 2023-10-07 RX ADMIN — CINACALCET HYDROCHLORIDE 30 MG: 30 TABLET, FILM COATED ORAL at 08:12

## 2023-10-07 RX ADMIN — LORAZEPAM 1 MG: 1 TABLET ORAL at 20:02

## 2023-10-07 RX ADMIN — OXYCODONE AND ACETAMINOPHEN 1 TABLET: 10; 325 TABLET ORAL at 01:15

## 2023-10-07 RX ADMIN — SODIUM CHLORIDE, PRESERVATIVE FREE 40 MG: 5 INJECTION INTRAVENOUS at 08:12

## 2023-10-07 RX ADMIN — Medication 10 ML: at 19:55

## 2023-10-07 RX ADMIN — ALPRAZOLAM 0.5 MG: 0.5 TABLET ORAL at 01:15

## 2023-10-07 RX ADMIN — FOLIC ACID 1 MG: 1 TABLET ORAL at 08:12

## 2023-10-07 RX ADMIN — SEVELAMER CARBONATE 800 MG: 800 TABLET, FILM COATED ORAL at 12:23

## 2023-10-07 ASSESSMENT — PAIN DESCRIPTION - ORIENTATION
ORIENTATION: RIGHT;LEFT
ORIENTATION: INNER
ORIENTATION: RIGHT;LEFT

## 2023-10-07 ASSESSMENT — PAIN DESCRIPTION - DESCRIPTORS
DESCRIPTORS: THROBBING
DESCRIPTORS: THROBBING
DESCRIPTORS: ACHING;THROBBING;SHARP
DESCRIPTORS: THROBBING
DESCRIPTORS: THROBBING;SHARP
DESCRIPTORS: THROBBING
DESCRIPTORS: ACHING;DISCOMFORT
DESCRIPTORS: ACHING;THROBBING

## 2023-10-07 ASSESSMENT — PAIN SCALES - GENERAL
PAINLEVEL_OUTOF10: 7
PAINLEVEL_OUTOF10: 8
PAINLEVEL_OUTOF10: 8
PAINLEVEL_OUTOF10: 7
PAINLEVEL_OUTOF10: 9
PAINLEVEL_OUTOF10: 6
PAINLEVEL_OUTOF10: 9
PAINLEVEL_OUTOF10: 8

## 2023-10-07 ASSESSMENT — PAIN DESCRIPTION - LOCATION
LOCATION: SCROTUM
LOCATION: SACRUM;BACK
LOCATION: SCROTUM
LOCATION: SCROTUM

## 2023-10-07 ASSESSMENT — PAIN - FUNCTIONAL ASSESSMENT
PAIN_FUNCTIONAL_ASSESSMENT: ACTIVITIES ARE NOT PREVENTED
PAIN_FUNCTIONAL_ASSESSMENT: PREVENTS OR INTERFERES SOME ACTIVE ACTIVITIES AND ADLS

## 2023-10-07 NOTE — PROGRESS NOTES
I could not see the patient today because GI service very busy. Patient has macrocytic anemia. Hemoglobin stable at 7.1. Last EGD and colonoscopy in 2019 by Dr. Tj Lockwood. Patient alkaline phosphatase is 318 and most likely due to infection. GGT is elevated to 149. AST, ALT and bilirubin are normal.    History of liver abscess. No recurrence. History of rectal carcinoma, status post colostomy. Plan:    Follow LFTs    Patient does not wish to have repeat EGD and colonoscopy. I have signed off. Please call GI on call if you need any further assistance in the management of this patient. My GI call ends at 7 AM tomorrow. After that I will not be able to follow-up on this patient. Dr. Ileana Segura will be on-call for GI starting tomorrow. Locum tenens possibilities    To whom it may concern. I am a locum tenens physician. Therefore,  I do not have an office practice. I will not be able to follow-up on any patient as an outpatient basis. I will not be able to follow-up on the results of pending labs, pathology reports,  imaging studies. I will not be able to or do any outpatient procedures. I will not be able to provide continuity of care of any kind to any patient . I  will not be able to keep in touch with any patient by any means such as phone, text or by e-mail because of the nature of my responsibilities as a locum tenens physician. When the patient is discharged, my patient physician relationship is over. The patient will need to follow-up with other physicians in the hospital when I am not on-call. After discharge, patient will need to follow-up with their primary care doctor, gastroenterologist or liver doctor for continuity of care, check on pending labs, check on pending pathology and check on results of the pending imaging studies-such as x-rays, ultrasound, CAT scan, MRI, HIDA scan, gastric emptying scan or any other studies.     For ER physicians,

## 2023-10-07 NOTE — PLAN OF CARE
Problem: Discharge Planning  Goal: Discharge to home or other facility with appropriate resources  10/7/2023 0208 by Amna Finnegan LPN  Outcome: Carleen Delgadillo (Taken 10/6/2023 2016)  Discharge to home or other facility with appropriate resources: Identify barriers to discharge with patient and caregiver  10/6/2023 1253 by Tory Barron RN  Outcome: Progressing  Flowsheets (Taken 10/6/2023 0730)  Discharge to home or other facility with appropriate resources: Identify barriers to discharge with patient and caregiver     Problem: Pain  Goal: Verbalizes/displays adequate comfort level or baseline comfort level  10/7/2023 0208 by Martine Barrett LPN  Outcome: Progressing  10/6/2023 1253 by Tory Barron RN  Outcome: Progressing     Problem: Safety - Adult  Goal: Free from fall injury  10/7/2023 0208 by Amna Finnegan LPN  Outcome: Progressing  Flowsheets (Taken 10/7/2023 0207)  Free From Fall Injury: Instruct family/caregiver on patient safety  10/6/2023 1253 by Tory Barron RN  Outcome: Progressing     Problem: Nutrition Deficit:  Goal: Optimize nutritional status  10/7/2023 0208 by Amna Finnegan LPN  Outcome: Progressing  10/6/2023 1253 by Tory Barron RN  Outcome: Progressing

## 2023-10-07 NOTE — PROGRESS NOTES
Renal Progress Note    Thank you to requesting provider: Dr Kayla Miller, for asking us to see Epi Samano    Reason for consultation:  ESRD    Chief Complaint:  scrotal edema and drainage. History of Presenting Illness      Patient is a 30-year-old pleasant man with a past medical history of colorectal cancer. This was managed with chemoradiation and he is status post colostomy. His left kidney went malfunctioning and he is status post left nephrectomy. He developed CKD and later went into end-stage renal disease. He continues to have some urine output. Due to pelvic radiation, he has ureteral strictures and he has a right ureteral stent. He follows in [place]. In August 2022, he was managed with liver abscess. Patient has a left upper extremity AV fistula but he refuses to use it. He continues to receive dialysis through a right IJ PermCath. Over the last 2 weeks, he was complaining of worsening swelling of the left scrotum along with tenderness. He then suddenly noticed purulent drainage. He was subsequently admitted with scrotal abscess. Urology was consulted and the abscess was drained. He continues to have wound dressing changes. He was complaining of pain. He gets dialysis on Monday Wednesday and Fridays at [facility]. Otherwise he offers no other complaints. His Hgb has dropped to 6.4 and he received 1 unit of PRBCs. Renal service was consulted to manage his ESRD. Patient is status post dialysis on October 6. His scrotal pain has improved. He now has a wound VAC dressing.         Past Medical/Surgical History      Active Ambulatory Problems     Diagnosis Date Noted    Benign non-nodular prostatic hyperplasia with lower urinary tract symptoms 10/02/2017    Gastroesophageal reflux disease without esophagitis 10/02/2017    Chronic insomnia 10/02/2017    Anxiety disorder 10/02/2017    Hypertension 10/02/2017    Chronic migraine without aura without status migrainosus, not intractable 2365    cinacalcet (SENSIPAR) tablet 30 mg, 30 mg, Oral, Daily, Barriga Stakes, APRN - CNP, 30 mg at 10/07/23 7010    cloNIDine (CATAPRES) tablet 0.2 mg, 0.2 mg, Oral, TID PRN, Barriga Stakes, APRN - CNP    docusate sodium (COLACE) capsule 100 mg, 100 mg, Oral, BID PRN, Barriga Stakes, APRN - CNP    folic acid (FOLVITE) tablet 1 mg, 1 mg, Oral, Daily, Barriga Stakes, APRN - CNP, 1 mg at 10/07/23 6262    [Held by provider] losartan (COZAAR) tablet 100 mg, 100 mg, Oral, Daily, Barriga Stakes, APRN - CNP    [Held by provider] pantoprazole (PROTONIX) tablet 40 mg, 40 mg, Oral, QAM AC, Barriga Stakes, APRN - CNP, 40 mg at 10/04/23 0521    oxybutynin (DITROPAN) tablet 5 mg, 5 mg, Oral, Daily, Barriga Stakes, APRN - CNP, 5 mg at 10/07/23 2177    oxyCODONE-acetaminophen (PERCOCET)  MG per tablet 1 tablet, 1 tablet, Oral, Q4H PRN, Barriga Stakes, APRN - CNP, 1 tablet at 10/07/23 0541    tamsulosin (FLOMAX) capsule 0.4 mg, 0.4 mg, Oral, Daily, Barriga Stakes, APRN - CNP, 0.4 mg at 10/07/23 9998    sodium chloride flush 0.9 % injection 5-40 mL, 5-40 mL, IntraVENous, 2 times per day, Barriga Stakes, APRN - CNP, 10 mL at 10/07/23 0813    sodium chloride flush 0.9 % injection 5-40 mL, 5-40 mL, IntraVENous, PRN, Barriga Stakes, APRN - CNP    0.9 % sodium chloride infusion, , IntraVENous, PRN, Barriga Stakes, APRN - CNP    ondansetron (ZOFRAN-ODT) disintegrating tablet 4 mg, 4 mg, Oral, Q8H PRN **OR** ondansetron (ZOFRAN) injection 4 mg, 4 mg, IntraVENous, Q6H PRN, Barriga Stakes, APRN - CNP    polyethylene glycol (GLYCOLAX) packet 17 g, 17 g, Oral, Daily PRN, Barriga Stakes, APRN - CNP    budesonide-formoterol (SYMBICORT) 160-4.5 MCG/ACT inhaler 2 puff, 2 puff, Inhalation, BID RT, Barriga Stakes, APRN - CNP, 2 puff at 10/06/23 1900  No outpatient medications have been marked as taking for the 10/3/23 encounter [facility] Encounter).        Allergies   Oxycodone-acetaminophen, Morphine and related, and 99.2*     --  247       Recent Labs     10/05/23  0237      K 3.8   CL 97*   CO2 25   GLUCOSE 104   BUN 19   CREATININE 5.3*   LABGLOM 13*         Assessment:  End-stage renal disease  History of colorectal cancer status post colostomy  Anemia of chronic kidney disease-with superimposed blood loss  Left scrotal edema status post drainage      Plan:  Patient will receive dialysis next on 10/8. Continue wound care. Follow-up labs and transfuse as needed. Continue antibiotics and follow-up cultures. Retacrit offered for anemia management.

## 2023-10-07 NOTE — PROGRESS NOTES
58 yo male had chemoradiation for rectal carcinoma then left sided colostomy 7 years ago. EGD and colonoscopy 2019 were negative. There is intermittent mild prolapse of distal colon into his bag which he decompresses manually. He has no upper or lower GI complaints to suggest GI bleeding. Abdomen soft, stool light brown in colostomy bag, current anemia is attributed to ESRD and possible operative blood loss during I&D of scrotal abscess, will defer endoscopy unless repeated need for transfusion, arrange for follow-up in GI clinic 1 month post discharge.

## 2023-10-07 NOTE — PROGRESS NOTES
of colon polyps    History of stomach ulcers    Elevated alkaline phosphatase level  Resolved Problems:    * No resolved hospital problems. *          Brief History/Summary:   As per Initial admission HPI 10/3/2023, quoted below;  Mr Yissel Goss, a \"57 y.o. male with HTN, pericarditis, rectal cancer, colon cancer s/p colostomy, liver mass, s/p left nephrectomy, ESRD on dialysis, urethral stricture s/p ureteral stents, seizures, complaining of scrotal wound. Patient initially presented to PCP office due to increased testicular pain, swelling, and purulent drainage to left side of scrotum. Endorses subjective fever and chills. After PCP reviewed patient complaints instructed patient to [facility] ER for further evaluation. Patient denies abdominal pain, shortness of breath, chest pain. Work-up in ER CT abdomen pelvis thick-walled left scrotal collection with peripheral calcification and some specks of central gas, sed rate 140, CRP 43.7, lactic acid 3.6. Patient be admitted to the hospital service with consult to urology and nephrology. \"      Sepsis  Scrotal abscess  Urology on board-appreciate recommendatios  Status post I&D and irrigation of scrotal abscess  Initial Lactic acid level of 3.6 mg/dL(10/03/2023)  Lactic acid trended to 1.0 (10/03/2023), with IVF  Blood culture NGTD  Empiric antibiotics: Vanc & Pip/Tazo initiated on admission  ID Consulted for Abx guidance  Vanc discontinued  Continuing Pip/Tazo        ESRD, on scheduled HD MWF   Nephrology consulted on admission  Continue scheduled HD as per nephrology rec     Anemia  ?  GI Bleed  Hgb dropped to 6.4 (10/04/2023) from 8.2 on admission (10/03/2023)  Stool Occult blood Positive (10/04/2023)  S/p Transfusion of  1 Units of PRBC (10/04/2023)  Post transfusion H&H: 8.7 (10/04/2023)  Hgb dropped to 7.1 (10/05/2023)  Monitor H&H  Transfusion Goal: Hgb < 7  Pantoprazole 40 g IV BID  GI Consulted   Continue management as per GI Recommendation      Continue management of other chronic medical conditions - see above and orders. Advance Directive: Full Code    ADULT DIET; Regular; Low Potassium (Less than 3000 mg/day); Low Phosphorus (Less than 1000 mg)         Consults Made:   PHARMACY TO DOSE VANCOMYCIN  IP CONSULT TO UROLOGY  IP CONSULT TO NEPHROLOGY  PALLIATIVE CARE EVAL  IP CONSULT TO GI  IP CONSULT TO INFECTIOUS DISEASES      DVT prophylaxis: SCDs       Current medications reviewed  Lab work reviewed  Radiology  films reviewed  Much appreciated treatment recommendations from suspecialities reviewed  Discussed treatment plan with the nurse and addressed all questions/concerns  Discussed with Patient at the bedside in detail . .. he understands and agree with the management plan.       Discharge planning: tbd    Multiple complex medical problems  Morbidity mortality high risk  Medical decision making High complexity     Electronically signed by   Jesus Gerber MD,   Internal Medicine Hospitalist   10/7/2023 10:45 AM

## 2023-10-08 ENCOUNTER — OUTSIDE FACILITY SERVICE (OUTPATIENT)
Age: 48
End: 2023-10-08
Payer: MEDICARE

## 2023-10-08 LAB
ANION GAP SERPL CALCULATED.3IONS-SCNC: 18 MMOL/L (ref 7–19)
BACTERIA BLD CULT ORG #2: NORMAL
BACTERIA BLD CULT: NORMAL
BASOPHILS # BLD: 0.1 K/UL (ref 0–0.2)
BASOPHILS NFR BLD: 0.8 % (ref 0–1)
BUN SERPL-MCNC: 37 MG/DL (ref 6–20)
CALCIUM SERPL-MCNC: 9.4 MG/DL (ref 8.6–10)
CHLORIDE SERPL-SCNC: 93 MMOL/L (ref 98–111)
CO2 SERPL-SCNC: 22 MMOL/L (ref 22–29)
CREAT SERPL-MCNC: 8.7 MG/DL (ref 0.5–1.2)
EOSINOPHIL # BLD: 0.4 K/UL (ref 0–0.6)
EOSINOPHIL NFR BLD: 3.5 % (ref 0–5)
ERYTHROCYTE [DISTWIDTH] IN BLOOD BY AUTOMATED COUNT: 17.2 % (ref 11.5–14.5)
GLUCOSE SERPL-MCNC: 88 MG/DL (ref 74–109)
HCT VFR BLD AUTO: 26.5 % (ref 42–52)
HGB BLD-MCNC: 8 G/DL (ref 14–18)
IMM GRANULOCYTES # BLD: 1 K/UL
LYMPHOCYTES # BLD: 0.8 K/UL (ref 1.1–4.5)
LYMPHOCYTES NFR BLD: 7.2 % (ref 20–40)
MCH RBC QN AUTO: 30.5 PG (ref 27–31)
MCHC RBC AUTO-ENTMCNC: 30.2 G/DL (ref 33–37)
MCV RBC AUTO: 101.1 FL (ref 80–94)
MONOCYTES # BLD: 0.5 K/UL (ref 0–0.9)
MONOCYTES NFR BLD: 5 % (ref 0–10)
NEUTROPHILS # BLD: 7.8 K/UL (ref 1.5–7.5)
NEUTS SEG NFR BLD: 74.3 % (ref 50–65)
PLATELET # BLD AUTO: 328 K/UL (ref 130–400)
PMV BLD AUTO: 8.6 FL (ref 9.4–12.4)
POTASSIUM SERPL-SCNC: 4.3 MMOL/L (ref 3.5–5)
RBC # BLD AUTO: 2.62 M/UL (ref 4.7–6.1)
SODIUM SERPL-SCNC: 133 MMOL/L (ref 136–145)
WBC # BLD AUTO: 10.5 K/UL (ref 4.8–10.8)

## 2023-10-08 PROCEDURE — 6360000002 HC RX W HCPCS: Performed by: INTERNAL MEDICINE

## 2023-10-08 PROCEDURE — 36415 COLL VENOUS BLD VENIPUNCTURE: CPT

## 2023-10-08 PROCEDURE — 6360000002 HC RX W HCPCS: Performed by: NURSE PRACTITIONER

## 2023-10-08 PROCEDURE — 1210000000 HC MED SURG R&B

## 2023-10-08 PROCEDURE — 2580000003 HC RX 258: Performed by: INTERNAL MEDICINE

## 2023-10-08 PROCEDURE — 94760 N-INVAS EAR/PLS OXIMETRY 1: CPT

## 2023-10-08 PROCEDURE — 94640 AIRWAY INHALATION TREATMENT: CPT

## 2023-10-08 PROCEDURE — 6360000002 HC RX W HCPCS

## 2023-10-08 PROCEDURE — 80048 BASIC METABOLIC PNL TOTAL CA: CPT

## 2023-10-08 PROCEDURE — 85025 COMPLETE CBC W/AUTO DIFF WBC: CPT

## 2023-10-08 PROCEDURE — 6370000000 HC RX 637 (ALT 250 FOR IP)

## 2023-10-08 PROCEDURE — 2580000003 HC RX 258

## 2023-10-08 PROCEDURE — 6370000000 HC RX 637 (ALT 250 FOR IP): Performed by: HOSPITALIST

## 2023-10-08 PROCEDURE — C9113 INJ PANTOPRAZOLE SODIUM, VIA: HCPCS | Performed by: INTERNAL MEDICINE

## 2023-10-08 PROCEDURE — 99231 SBSQ HOSP IP/OBS SF/LOW 25: CPT | Performed by: NURSE PRACTITIONER

## 2023-10-08 RX ADMIN — Medication 5 MG: at 19:59

## 2023-10-08 RX ADMIN — Medication 10 ML: at 20:00

## 2023-10-08 RX ADMIN — HYDROMORPHONE HYDROCHLORIDE 0.5 MG: 1 INJECTION, SOLUTION INTRAMUSCULAR; INTRAVENOUS; SUBCUTANEOUS at 13:07

## 2023-10-08 RX ADMIN — PIPERACILLIN AND TAZOBACTAM 3375 MG: 3; .375 INJECTION, POWDER, LYOPHILIZED, FOR SOLUTION INTRAVENOUS at 00:50

## 2023-10-08 RX ADMIN — SODIUM CHLORIDE, PRESERVATIVE FREE 40 MG: 5 INJECTION INTRAVENOUS at 09:07

## 2023-10-08 RX ADMIN — PIPERACILLIN AND TAZOBACTAM 3375 MG: 3; .375 INJECTION, POWDER, LYOPHILIZED, FOR SOLUTION INTRAVENOUS at 13:06

## 2023-10-08 RX ADMIN — SEVELAMER CARBONATE 800 MG: 800 TABLET, FILM COATED ORAL at 16:54

## 2023-10-08 RX ADMIN — OXYCODONE AND ACETAMINOPHEN 1 TABLET: 10; 325 TABLET ORAL at 05:06

## 2023-10-08 RX ADMIN — HYDROMORPHONE HYDROCHLORIDE 0.5 MG: 1 INJECTION, SOLUTION INTRAMUSCULAR; INTRAVENOUS; SUBCUTANEOUS at 09:07

## 2023-10-08 RX ADMIN — SODIUM CHLORIDE, PRESERVATIVE FREE 40 MG: 5 INJECTION INTRAVENOUS at 19:38

## 2023-10-08 RX ADMIN — SEVELAMER CARBONATE 800 MG: 800 TABLET, FILM COATED ORAL at 11:44

## 2023-10-08 RX ADMIN — CINACALCET HYDROCHLORIDE 30 MG: 30 TABLET, FILM COATED ORAL at 09:07

## 2023-10-08 RX ADMIN — TAMSULOSIN HYDROCHLORIDE 0.4 MG: 0.4 CAPSULE ORAL at 09:07

## 2023-10-08 RX ADMIN — OXYBUTYNIN CHLORIDE 5 MG: 5 TABLET ORAL at 09:07

## 2023-10-08 RX ADMIN — OXYCODONE AND ACETAMINOPHEN 1 TABLET: 10; 325 TABLET ORAL at 11:44

## 2023-10-08 RX ADMIN — LORAZEPAM 1 MG: 1 TABLET ORAL at 20:00

## 2023-10-08 RX ADMIN — HYDROMORPHONE HYDROCHLORIDE 0.5 MG: 1 INJECTION, SOLUTION INTRAMUSCULAR; INTRAVENOUS; SUBCUTANEOUS at 00:56

## 2023-10-08 RX ADMIN — FOLIC ACID 1 MG: 1 TABLET ORAL at 09:07

## 2023-10-08 RX ADMIN — Medication 2 PUFF: at 18:30

## 2023-10-08 RX ADMIN — Medication 10 ML: at 09:09

## 2023-10-08 RX ADMIN — OXYCODONE AND ACETAMINOPHEN 1 TABLET: 10; 325 TABLET ORAL at 16:54

## 2023-10-08 RX ADMIN — Medication 2 PUFF: at 06:23

## 2023-10-08 RX ADMIN — HYDROMORPHONE HYDROCHLORIDE 0.5 MG: 1 INJECTION, SOLUTION INTRAMUSCULAR; INTRAVENOUS; SUBCUTANEOUS at 18:41

## 2023-10-08 RX ADMIN — SEVELAMER CARBONATE 800 MG: 800 TABLET, FILM COATED ORAL at 09:07

## 2023-10-08 ASSESSMENT — PAIN DESCRIPTION - DESCRIPTORS
DESCRIPTORS: ACHING
DESCRIPTORS: ACHING;DISCOMFORT
DESCRIPTORS: ACHING
DESCRIPTORS: ACHING
DESCRIPTORS: THROBBING;STABBING

## 2023-10-08 ASSESSMENT — PAIN DESCRIPTION - LOCATION
LOCATION: SCROTUM

## 2023-10-08 ASSESSMENT — PAIN DESCRIPTION - PAIN TYPE
TYPE: ACUTE PAIN

## 2023-10-08 ASSESSMENT — PAIN SCALES - GENERAL
PAINLEVEL_OUTOF10: 9
PAINLEVEL_OUTOF10: 8
PAINLEVEL_OUTOF10: 6
PAINLEVEL_OUTOF10: 6
PAINLEVEL_OUTOF10: 7
PAINLEVEL_OUTOF10: 9

## 2023-10-08 ASSESSMENT — PAIN DESCRIPTION - ORIENTATION: ORIENTATION: INNER

## 2023-10-08 ASSESSMENT — PAIN - FUNCTIONAL ASSESSMENT
PAIN_FUNCTIONAL_ASSESSMENT: PREVENTS OR INTERFERES SOME ACTIVE ACTIVITIES AND ADLS

## 2023-10-08 NOTE — PLAN OF CARE
Problem: Discharge Planning  Goal: Discharge to home or other facility with appropriate resources  Outcome: Progressing  Flowsheets  Taken 10/8/2023 0912 by Vishnu Tadeo RN  Discharge to home or other facility with appropriate resources: Identify barriers to discharge with patient and caregiver  Taken 10/7/2023 2032 by Tg Rebollar LPN  Discharge to home or other facility with appropriate resources: Identify barriers to discharge with patient and caregiver     Problem: Pain  Goal: Verbalizes/displays adequate comfort level or baseline comfort level  Outcome: Progressing     Problem: Safety - Adult  Goal: Free from fall injury  Outcome: Progressing  Flowsheets (Taken 10/8/2023 0154 by Tg Rebollar LPN)  Free From Fall Injury: Instruct family/caregiver on patient safety     Problem: Nutrition Deficit:  Goal: Optimize nutritional status  Outcome: Progressing

## 2023-10-08 NOTE — PROGRESS NOTES
[facility] Progress Note    Patient:  Samira Olson  YOB: 1975  Date of Service: 10/8/2023  MRN: [id number]   Acct: [de-identified]   Primary Care Physician: Lisandra Vora MD  Advance Directive: Full Code  Admit Date: 10/3/2023       Hospital Day: 5        CHIEF COMPLAINT:     Chief Complaint   Patient presents with    Abscess     Left scrotum, states that he has swelling from time to time but noticed area to it a couple of days ago        10/8/2023 8:42 AM  Subjective / Interval History:   10/08/2023  Patient seen and examined. Doing well. No new complaints. No acute changes or acute overnight event reported. Lying comfortably in bed in no acute distress. Scrotal pain fairly controlled. Pain intermittently worsens with movement. Scrotal wound VAC in place. 10/07/2023  No acute changes or acute overnight event noted. Patient doing well. Scrotal pain controlled. Wound VAC in place. Clement Simpson comfortably in bed in no apparent acute distress. Denies any acute complaints or distress at this time. 10/06/2023  Patient seen and examined. Doing well. No new complaints. No acute changes or acute overnight event noted. Patient endorses intermittent acute pain during dressing changes of scrotum. Denies any acute complaints or distress at this time. 10/05/2023  No acute changes or acute overnight events reported. Patient seen and examined. Doing well. No new complaints. Lying comfortably in bed in no acute distress. Scrotal pain intermittently controlled denies any acute complaints or distress at this time. 10/04/2023  Patient seen and examined. Doing well. No new complaints. No acute changes or acute overnight event reported. Laying comfortably in bed no acute distress. Denies any acute complaints or distress at this time. Status post I&D and irrigation of scrotal abscess yesterday by urology.       Review of Systems:   Review of Systems  ROS: 14 point review amLODIPine (NORVASC) tablet 10 mg  10 mg Oral Daily Alejo Record, APRN - CNP        sevelamer (RENVELA) tablet 800 mg  800 mg Oral TID WC Alejo Record, APRN - CNP   800 mg at 10/07/23 1526    cinacalcet (SENSIPAR) tablet 30 mg  30 mg Oral Daily Alejo Record, APRN - CNP   30 mg at 10/07/23 9154    cloNIDine (CATAPRES) tablet 0.2 mg  0.2 mg Oral TID PRN Alejo Record, APRN - CNP        docusate sodium (COLACE) capsule 100 mg  100 mg Oral BID PRN Alejo Record, APRN - CNP        folic acid (FOLVITE) tablet 1 mg  1 mg Oral Daily Alejo Record, APRN - CNP   1 mg at 10/07/23 9035    [Held by provider] losartan (COZAAR) tablet 100 mg  100 mg Oral Daily Aleoj Record, APRN - CNP        [Held by provider] pantoprazole (PROTONIX) tablet 40 mg  40 mg Oral QAM AC Alejo Record, APRN - CNP   40 mg at 10/04/23 0521    oxybutynin (DITROPAN) tablet 5 mg  5 mg Oral Daily Alejo Record, APRN - CNP   5 mg at 10/07/23 0812    oxyCODONE-acetaminophen (PERCOCET)  MG per tablet 1 tablet  1 tablet Oral Q4H PRN Alejo Record, APRN - CNP   1 tablet at 10/08/23 0506    tamsulosin (FLOMAX) capsule 0.4 mg  0.4 mg Oral Daily Alejo Record, APRN - CNP   0.4 mg at 10/07/23 9680    sodium chloride flush 0.9 % injection 5-40 mL  5-40 mL IntraVENous 2 times per day Alejo Record, APRN - CNP   10 mL at 10/07/23 1955    sodium chloride flush 0.9 % injection 5-40 mL  5-40 mL IntraVENous PRN Alejo Record, APRN - CNP        0.9 % sodium chloride infusion   IntraVENous PRN Alejo Record, APRN - CNP        ondansetron (ZOFRAN-ODT) disintegrating tablet 4 mg  4 mg Oral Q8H PRN Alejo Record, APRN - CNP        Or    ondansetron [facility]) injection 4 mg  4 mg IntraVENous Q6H PRN Alejo Record, APRN - CNP        polyethylene glycol (GLYCOLAX) packet 17 g  17 g Oral Daily PRN Alejo Record, APRN - CNP        budesonide-formoterol (SYMBICORT) 160-4.5 MCG/ACT inhaler 2 puff  2 puff Inhalation BID RT Alejo Record, APRN

## 2023-10-08 NOTE — PROGRESS NOTES
MD notified of patient's BP of 163/110, but order parameters were not met to give any ordered PRN BP medication. Xanax and Percocet given with decrease in BP to 148/99.

## 2023-10-08 NOTE — PROGRESS NOTES
Infectious Diseases Progress Note    Patient:  Karine Wood  YOB: 1975  MRN: [id number]   Admit date: 10/3/2023   Admitting Physician: Kayode Blank MD  Primary Care Physician: Kimberly Dunlap MD    Chief Complaint/Interval History: He is without new symptoms. He is comfortable at present. He had wound VAC placed Friday. It was uncomfortable, but pain medicine did help him. He is getting wound VAC change on Monday. No diarrhea on his antibiotic treatment. In/Out    Intake/Output Summary (Last 24 hours) at 10/8/2023 1612  Last data filed at 10/8/2023 0907  Gross per 24 hour   Intake 480 ml   Output --   Net 480 ml     Allergies: Allergies   Allergen Reactions    Oxycodone-Acetaminophen      Give me migraines   Other reaction(s): Other (see comments)  Give me migraines   Give me migraines   Reaction: migraines  Give me migraines   Give me migraines   Reaction: migraines    Morphine And Related      Doesn't work     Morphine      Other reaction(s):  Other (see comments)  Doesn't work   Doesn't work      Current Meds: HYDROmorphone HCl PF (DILAUDID) injection 0.5 mg, Q4H PRN  fentaNYL (SUBLIMAZE) injection 50 mcg, TID PRN  fentaNYL (SUBLIMAZE) injection 50 mcg, TID PRN  epoetin aliza-epbx (RETACRIT) injection 10,000 Units, Once per day on Mon Wed Fri  pantoprazole (PROTONIX) 40 mg in sodium chloride (PF) 0.9 % 10 mL injection, Q12H  piperacillin-tazobactam (ZOSYN) 3,375 mg in sodium chloride 0.9 % 50 mL IVPB ([id number]), Q12H  sodium hypochlorite (DAKINS) 0.125 % external solution, TID  heparin (porcine) injection 3,600 Units, PRN  melatonin disintegrating tablet 5 mg, Nightly  LORazepam (ATIVAN) tablet 1 mg, Nightly PRN  ALPRAZolam (XANAX) tablet 0.5 mg, TID PRN  [Held by provider] amLODIPine (NORVASC) tablet 10 mg, Daily  sevelamer (RENVELA) tablet 800 mg, TID WC  cinacalcet (SENSIPAR) tablet 30 mg, Daily  cloNIDine (CATAPRES) tablet 0.2 mg, TID PRN  docusate sodium (COLACE) capsule 100

## 2023-10-09 ENCOUNTER — OUTSIDE FACILITY SERVICE (OUTPATIENT)
Age: 48
End: 2023-10-09
Payer: MEDICARE

## 2023-10-09 LAB
ANION GAP SERPL CALCULATED.3IONS-SCNC: 20 MMOL/L (ref 7–19)
BASOPHILS # BLD: 0.1 K/UL (ref 0–0.2)
BASOPHILS NFR BLD: 0.9 % (ref 0–1)
BUN SERPL-MCNC: 45 MG/DL (ref 6–20)
CALCIUM SERPL-MCNC: 8.7 MG/DL (ref 8.6–10)
CHLORIDE SERPL-SCNC: 98 MMOL/L (ref 98–111)
CO2 SERPL-SCNC: 20 MMOL/L (ref 22–29)
CREAT SERPL-MCNC: 10.4 MG/DL (ref 0.5–1.2)
EOSINOPHIL # BLD: 0.3 K/UL (ref 0–0.6)
EOSINOPHIL NFR BLD: 3.5 % (ref 0–5)
ERYTHROCYTE [DISTWIDTH] IN BLOOD BY AUTOMATED COUNT: 17.1 % (ref 11.5–14.5)
GLUCOSE SERPL-MCNC: 97 MG/DL (ref 74–109)
HCT VFR BLD AUTO: 24.2 % (ref 42–52)
HGB BLD-MCNC: 7.2 G/DL (ref 14–18)
IMM GRANULOCYTES # BLD: 0.8 K/UL
LYMPHOCYTES # BLD: 0.8 K/UL (ref 1.1–4.5)
LYMPHOCYTES NFR BLD: 8.7 % (ref 20–40)
MCH RBC QN AUTO: 29.6 PG (ref 27–31)
MCHC RBC AUTO-ENTMCNC: 29.8 G/DL (ref 33–37)
MCV RBC AUTO: 99.6 FL (ref 80–94)
MONOCYTES # BLD: 0.5 K/UL (ref 0–0.9)
MONOCYTES NFR BLD: 5.1 % (ref 0–10)
NEUTROPHILS # BLD: 6.7 K/UL (ref 1.5–7.5)
NEUTS SEG NFR BLD: 73.5 % (ref 50–65)
PLATELET # BLD AUTO: 265 K/UL (ref 130–400)
PMV BLD AUTO: 8.5 FL (ref 9.4–12.4)
POTASSIUM SERPL-SCNC: 4.6 MMOL/L (ref 3.5–5)
RBC # BLD AUTO: 2.43 M/UL (ref 4.7–6.1)
SODIUM SERPL-SCNC: 138 MMOL/L (ref 136–145)
WBC # BLD AUTO: 9.1 K/UL (ref 4.8–10.8)

## 2023-10-09 PROCEDURE — C9113 INJ PANTOPRAZOLE SODIUM, VIA: HCPCS | Performed by: INTERNAL MEDICINE

## 2023-10-09 PROCEDURE — 6360000002 HC RX W HCPCS: Performed by: NURSE PRACTITIONER

## 2023-10-09 PROCEDURE — 6360000002 HC RX W HCPCS: Performed by: INTERNAL MEDICINE

## 2023-10-09 PROCEDURE — 6360000002 HC RX W HCPCS

## 2023-10-09 PROCEDURE — 2580000003 HC RX 258: Performed by: INTERNAL MEDICINE

## 2023-10-09 PROCEDURE — 6370000000 HC RX 637 (ALT 250 FOR IP)

## 2023-10-09 PROCEDURE — 85025 COMPLETE CBC W/AUTO DIFF WBC: CPT

## 2023-10-09 PROCEDURE — 94640 AIRWAY INHALATION TREATMENT: CPT

## 2023-10-09 PROCEDURE — 1210000000 HC MED SURG R&B

## 2023-10-09 PROCEDURE — 94760 N-INVAS EAR/PLS OXIMETRY 1: CPT

## 2023-10-09 PROCEDURE — 80048 BASIC METABOLIC PNL TOTAL CA: CPT

## 2023-10-09 PROCEDURE — 2580000003 HC RX 258

## 2023-10-09 PROCEDURE — 36415 COLL VENOUS BLD VENIPUNCTURE: CPT

## 2023-10-09 PROCEDURE — 99232 SBSQ HOSP IP/OBS MODERATE 35: CPT | Performed by: NURSE PRACTITIONER

## 2023-10-09 PROCEDURE — 8010000000 HC HEMODIALYSIS ACUTE INPT

## 2023-10-09 PROCEDURE — 6370000000 HC RX 637 (ALT 250 FOR IP): Performed by: HOSPITALIST

## 2023-10-09 PROCEDURE — 97608 NEG PRS WND THER NDME>50SQCM: CPT

## 2023-10-09 RX ORDER — DIPHENHYDRAMINE HYDROCHLORIDE 50 MG/ML
25 INJECTION INTRAMUSCULAR; INTRAVENOUS
Status: COMPLETED | OUTPATIENT
Start: 2023-10-09 | End: 2023-10-09

## 2023-10-09 RX ORDER — HEPARIN SODIUM 1000 [USP'U]/ML
4000 INJECTION, SOLUTION INTRAVENOUS; SUBCUTANEOUS
Status: COMPLETED | OUTPATIENT
Start: 2023-10-09 | End: 2023-10-09

## 2023-10-09 RX ADMIN — Medication 10 ML: at 19:54

## 2023-10-09 RX ADMIN — SODIUM CHLORIDE, PRESERVATIVE FREE 40 MG: 5 INJECTION INTRAVENOUS at 19:54

## 2023-10-09 RX ADMIN — OXYCODONE AND ACETAMINOPHEN 1 TABLET: 10; 325 TABLET ORAL at 00:56

## 2023-10-09 RX ADMIN — FENTANYL CITRATE 50 MCG: 0.05 INJECTION, SOLUTION INTRAMUSCULAR; INTRAVENOUS at 08:18

## 2023-10-09 RX ADMIN — PIPERACILLIN AND TAZOBACTAM 3375 MG: 3; .375 INJECTION, POWDER, LYOPHILIZED, FOR SOLUTION INTRAVENOUS at 15:58

## 2023-10-09 RX ADMIN — HYDROMORPHONE HYDROCHLORIDE 0.5 MG: 1 INJECTION, SOLUTION INTRAMUSCULAR; INTRAVENOUS; SUBCUTANEOUS at 19:55

## 2023-10-09 RX ADMIN — ALPRAZOLAM 0.5 MG: 0.5 TABLET ORAL at 00:57

## 2023-10-09 RX ADMIN — EPOETIN ALFA-EPBX 10000 UNITS: 10000 INJECTION, SOLUTION INTRAVENOUS; SUBCUTANEOUS at 16:56

## 2023-10-09 RX ADMIN — DIPHENHYDRAMINE HYDROCHLORIDE 25 MG: 50 INJECTION INTRAMUSCULAR; INTRAVENOUS at 11:39

## 2023-10-09 RX ADMIN — SEVELAMER CARBONATE 800 MG: 800 TABLET, FILM COATED ORAL at 16:56

## 2023-10-09 RX ADMIN — Medication 2 PUFF: at 07:18

## 2023-10-09 RX ADMIN — HEPARIN SODIUM 4000 UNITS: 1000 INJECTION INTRAVENOUS; SUBCUTANEOUS at 11:40

## 2023-10-09 RX ADMIN — Medication 2 PUFF: at 19:25

## 2023-10-09 RX ADMIN — FENTANYL CITRATE 50 MCG: 0.05 INJECTION, SOLUTION INTRAMUSCULAR; INTRAVENOUS at 07:20

## 2023-10-09 RX ADMIN — PIPERACILLIN AND TAZOBACTAM 3375 MG: 3; .375 INJECTION, POWDER, LYOPHILIZED, FOR SOLUTION INTRAVENOUS at 01:00

## 2023-10-09 RX ADMIN — SODIUM CHLORIDE, PRESERVATIVE FREE 40 MG: 5 INJECTION INTRAVENOUS at 08:18

## 2023-10-09 RX ADMIN — Medication 5 MG: at 19:54

## 2023-10-09 RX ADMIN — HYDROMORPHONE HYDROCHLORIDE 0.5 MG: 1 INJECTION, SOLUTION INTRAMUSCULAR; INTRAVENOUS; SUBCUTANEOUS at 16:04

## 2023-10-09 RX ADMIN — Medication 5 ML: at 07:15

## 2023-10-09 ASSESSMENT — ENCOUNTER SYMPTOMS
NAUSEA: 0
ABDOMINAL PAIN: 0
VOMITING: 0
BACK PAIN: 0
ABDOMINAL DISTENTION: 0

## 2023-10-09 ASSESSMENT — PAIN DESCRIPTION - DESCRIPTORS
DESCRIPTORS: THROBBING
DESCRIPTORS: THROBBING
DESCRIPTORS: ACHING
DESCRIPTORS: THROBBING;STABBING
DESCRIPTORS: THROBBING

## 2023-10-09 ASSESSMENT — PAIN SCALES - GENERAL
PAINLEVEL_OUTOF10: 6
PAINLEVEL_OUTOF10: 10
PAINLEVEL_OUTOF10: 7
PAINLEVEL_OUTOF10: 7
PAINLEVEL_OUTOF10: 0
PAINLEVEL_OUTOF10: 8
PAINLEVEL_OUTOF10: 3

## 2023-10-09 ASSESSMENT — PAIN - FUNCTIONAL ASSESSMENT
PAIN_FUNCTIONAL_ASSESSMENT: ACTIVITIES ARE NOT PREVENTED

## 2023-10-09 ASSESSMENT — PAIN DESCRIPTION - ORIENTATION
ORIENTATION: MID

## 2023-10-09 ASSESSMENT — PAIN DESCRIPTION - LOCATION
LOCATION: GROIN
LOCATION: SCROTUM
LOCATION: GROIN
LOCATION: GROIN
LOCATION: SCROTUM

## 2023-10-09 NOTE — PROGRESS NOTES
[facility] Progress Note    Patient:  Kristin Gomez  YOB: 1975  Date of Service: 10/9/2023  MRN: [id number]   Acct: [de-identified]   Primary Care Physician: Yumiko Pérez MD  Advance Directive: Full Code  Admit Date: 10/3/2023       Hospital Day: 6        CHIEF COMPLAINT:     Chief Complaint   Patient presents with    Abscess     Left scrotum, states that he has swelling from time to time but noticed area to it a couple of days ago        10/9/2023 11:03 AM  Subjective / Interval History:   10/09/2023  No acute changes or acute overnight event reported. Patient seen and examined this AM.  Doing well. No new complaints. Pain well controlled. Laying comfortably in bed in no apparent acute distress. 10/08/2023  Patient seen and examined. Doing well. No new complaints. No acute changes or acute overnight event reported. Lying comfortably in bed in no acute distress. Scrotal pain fairly controlled. Pain intermittently worsens with movement. Scrotal wound VAC in place. 10/07/2023  No acute changes or acute overnight event noted. Patient doing well. Scrotal pain controlled. Wound VAC in place. Bernell Comber comfortably in bed in no apparent acute distress. Denies any acute complaints or distress at this time. 10/06/2023  Patient seen and examined. Doing well. No new complaints. No acute changes or acute overnight event noted. Patient endorses intermittent acute pain during dressing changes of scrotum. Denies any acute complaints or distress at this time. 10/05/2023  No acute changes or acute overnight events reported. Patient seen and examined. Doing well. No new complaints. Lying comfortably in bed in no acute distress. Scrotal pain intermittently controlled denies any acute complaints or distress at this time. 10/04/2023  Patient seen and examined. Doing well. No new complaints. No acute changes or acute overnight event reported.   Laying comfortably in 45*   CREATININE 8.7* 10.4*   LABGLOM 7* 6*   CALCIUM 9.4 8.7           CRP:    No results for input(s): \"CRP\" in the last 72 hours. Sed Rate:    No results for input(s): \"SEDRATE\" in the last 72 hours. HgBA1c:  No components found for: \"HGBA1C\"  FLP:    Lab Results   Component Value Date/Time    TRIG 73 03/23/2023 10:41 AM    HDL 43 03/23/2023 10:41 AM    LDLCALC 69 03/23/2023 10:41 AM     TSH:    Lab Results   Component Value Date/Time    TSH 2.260 03/23/2023 10:41 AM     Troponin T: No results for input(s): \"TROPONINI\" in the last 72 hours. Pro-BNP: No results for input(s): \"BNP\" in the last 72 hours. INR: No results for input(s): \"INR\" in the last 72 hours. ABGs:   Lab Results   Component Value Date/Time    PHART 7.530 08/03/2022 02:12 PM    PO2ART 55.0 08/03/2022 02:12 PM    HJF4SPG 43.0 08/03/2022 02:12 PM     UA:No results for input(s): \"NITRITE\", \"COLORU\", \"PHUR\", \"LABCAST\", \"WBCUA\", \"RBCUA\", \"MUCUS\", \"TRICHOMONAS\", \"YEAST\", \"BACTERIA\", \"CLARITYU\", \"SPECGRAV\", \"LEUKOCYTESUR\", \"UROBILINOGEN\", \"BILIRUBINUR\", \"BLOODU\", \"GLUCOSEU\", \"AMORPHOUS\" in the last 72 hours. Invalid input(s): \"KETONESU\"      Culture Results:    Recent Labs     10/03/23  1922   CXSURG Heavy growth  Refer to (Left scrotum abscess culture collected [email or web address]) for  sensitivity results    One colony  No further workup           Blood Culture Recent:   Recent Labs     10/03/23  1615   BC No growth after 5 days of incubation. No results for input(s): \"BC\", \"BLOODCULT2\", \"ORG\" in the last 72 hours.       Culture, Anaerobic and Aerobic  Order: [id number]  Status: Preliminary result     Visible to patient: No (not released)     Next appt: None     Specimen Information: Abscess   0 Result Notes      Component    Gram Stain Result  Abnormal   Rare Gram negative rods   Rare Gram positive cocci  in pairs   Few WBC's (Polymorphonuclear)     Anaerobic Culture No anaerobes to date,will hold 4 days P    Organism Escherichia oropharyngeal erythema. Eyes:      General: No scleral icterus. Right eye: No discharge. Left eye: No discharge. Extraocular Movements: Extraocular movements intact. Conjunctiva/sclera: Conjunctivae normal.      Pupils: Pupils are equal, round, and reactive to light. Cardiovascular:      Rate and Rhythm: Normal rate and regular rhythm. Pulses: Normal pulses. Heart sounds: Normal heart sounds. No murmur heard. No friction rub. No gallop. Pulmonary:      Effort: Pulmonary effort is normal. No respiratory distress. Breath sounds: Normal breath sounds. No stridor. No wheezing, rhonchi or rales. Chest:      Chest wall: No tenderness. Abdominal:      General: Bowel sounds are normal. There is no distension. Palpations: Abdomen is soft. Tenderness: There is no abdominal tenderness. There is no guarding or rebound. Genitourinary:     Comments: Scrotal wound VAC in place  Musculoskeletal:         General: No swelling, tenderness, deformity or signs of injury. Normal range of motion. Cervical back: Normal range of motion and neck supple. No rigidity. No muscular tenderness. Right lower leg: No edema. Left lower leg: No edema. Skin:     General: Skin is warm and dry. Capillary Refill: Capillary refill takes less than 2 seconds. Coloration: Skin is not jaundiced or pale. Findings: No bruising, erythema, lesion or rash. Neurological:      General: No focal deficit present. Mental Status: He is alert and oriented to person, place, and time. Cranial Nerves: No cranial nerve deficit. Sensory: No sensory deficit. Motor: No weakness. Coordination: Coordination normal.   Psychiatric:         Mood and Affect: Mood normal.         Behavior: Behavior normal.         Thought Content:  Thought content normal.         Judgment: Judgment normal.         Assessment/plan:     Patient Active Problem List    Diagnosis Date

## 2023-10-09 NOTE — PLAN OF CARE
Problem: Discharge Planning  Goal: Discharge to home or other facility with appropriate resources  Outcome: Progressing  Flowsheets (Taken 10/8/2023 2033)  Discharge to home or other facility with appropriate resources: Identify barriers to discharge with patient and caregiver     Problem: Pain  Goal: Verbalizes/displays adequate comfort level or baseline comfort level  Outcome: Progressing     Problem: Safety - Adult  Goal: Free from fall injury  Outcome: Progressing  Flowsheets (Taken 10/9/2023 0030)  Free From Fall Injury: Instruct family/caregiver on patient safety     Problem: Nutrition Deficit:  Goal: Optimize nutritional status  Outcome: Progressing

## 2023-10-09 NOTE — PROGRESS NOTES
Infectious Diseases Progress Note    Patient:  Trino Swartz  YOB: 1975  MRN: [id number]   Admit date: 10/3/2023   Admitting Physician: Lulú Aleman MD  Primary Care Physician: Jacqlyn Gosselin, MD    Chief Complaint/Interval History: He feels okay. Wound VAC change went better than on Friday. He has no diarrhea. No fevers or chills. Wife at bedside. In/Out    Intake/Output Summary (Last 24 hours) at 10/9/2023 1721  Last data filed at 10/9/2023 0551  Gross per 24 hour   Intake 196.45 ml   Output --   Net 196.45 ml     Allergies: Allergies   Allergen Reactions    Oxycodone-Acetaminophen      Give me migraines   Other reaction(s): Other (see comments)  Give me migraines   Give me migraines   Reaction: migraines  Give me migraines   Give me migraines   Reaction: migraines    Morphine And Related      Doesn't work     Morphine      Other reaction(s):  Other (see comments)  Doesn't work   Doesn't work      Current Meds: HYDROmorphone HCl PF (DILAUDID) injection 0.5 mg, Q4H PRN  fentaNYL (SUBLIMAZE) injection 50 mcg, TID PRN  fentaNYL (SUBLIMAZE) injection 50 mcg, TID PRN  epoetin aliza-epbx (RETACRIT) injection 10,000 Units, Once per day on Mon Wed Fri  pantoprazole (PROTONIX) 40 mg in sodium chloride (PF) 0.9 % 10 mL injection, Q12H  piperacillin-tazobactam (ZOSYN) 3,375 mg in sodium chloride 0.9 % 50 mL IVPB ([id number]), Q12H  sodium hypochlorite (DAKINS) 0.125 % external solution, TID  heparin (porcine) injection 3,600 Units, PRN  melatonin disintegrating tablet 5 mg, Nightly  LORazepam (ATIVAN) tablet 1 mg, Nightly PRN  ALPRAZolam (XANAX) tablet 0.5 mg, TID PRN  [Held by provider] amLODIPine (NORVASC) tablet 10 mg, Daily  sevelamer (RENVELA) tablet 800 mg, TID WC  cinacalcet (SENSIPAR) tablet 30 mg, Daily  cloNIDine (CATAPRES) tablet 0.2 mg, TID PRN  docusate sodium (COLACE) capsule 586 mg, BID PRN  folic acid (FOLVITE) tablet 1 mg, Daily  [Held by provider] losartan (COZAAR) tablet 100 mg,

## 2023-10-09 NOTE — PROGRESS NOTES
Nephrology ([facility]) Progress Note    Patient:  Kristin Gomez  YOB: 1975  Date of Service: 10/9/2023  MRN: [id number]   Acct: [de-identified]   Primary Care Physician: Yumiko Pérez MD  Advance Directive: Full Code  Admit Date: 10/3/2023       Hospital Day: 6  Referring Provider: Gabriel Camargo MD    Patient independently seen and examined, Chart, Consults, Notes, Operative notes, Labs, Cardiology, and Radiology studies reviewed as available. Chief complaint: Scrotal swelling/ESRD. Subjective:  Kristin Gomez is a 57 y.o. male for whom we were consulted for evaluation and treatment of end-stage renal disease on maintenance dialysis. Patient goes to [facility] clinic on Monday Wednesday Friday. Patient also has history of colorectal cancer, colectomy/colostomy, history of left nephrectomy and history of pelvic radiation. He follows [facility] for his oncology care. Presented to [facility] with painful swelling of left scrotum. He was found to have infected hydrocele/left scrotal hematoma. Patient recently underwent drainage of infected hydrocele by urology. He is receiving IV antibiotics. Patient is also followed by infectious disease. Currently he has back applied to the scrotum. He has receiving packed RBCs for correction of anemia related to surgery    This morning he is scheduled to have hemodialysis. He denies any shortness of breath or pain.     Allergies:  Oxycodone-acetaminophen, Morphine and related, and Morphine    Medicines:  Current Facility-Administered Medications   Medication Dose Route Frequency Provider Last Rate Last Admin    HYDROmorphone HCl PF (DILAUDID) injection 0.5 mg  0.5 mg IntraVENous Q4H PRN Shameka Zelaya APRN - CNP   0.5 mg at 10/08/23 1841    fentaNYL (SUBLIMAZE) injection 50 mcg  50 mcg IntraVENous TID PRN Shameka Zelaya APRN - CNP   50 mcg at 10/09/23 0720    fentaNYL (SUBLIMAZE) ______________________________________ Electronically signed by: Jayson Lawson D.O. Date:     10/03/2023 Time:    17:49     CT ABDOMEN PELVIS W IV CONTRAST Additional Contrast? None    Result Date: 10/3/2023  EXAM:  CT OF THE ABDOMEN AND PELVIS WITH CONTRAST  History:  Left scrotal abscess  Technique:  5 mm CT of the abdomen and pelvis following intravenous contrast  FINDINGS:  The lung bases are clear. No significant liver abnormality. The adrenals, pancreas and spleen are unremarkable. The stomach and hiatus are unremarkable. Cholelithiasis without pericholecystic inflammation. Atrophied right kidney with ureteral stent in place. Nonobstructing 6 mm calculus of the right kidney. Absent left kidney. Atherosclerotic calcification of the aorta without aneurysm. The appendix is not seen. Normal caliber bowel loops appear left lower quadrant ostomy site. Distal colonic resection. Presacral soft tissue density stable from 07/14/2023. No pelvic fat inflammation. Pelvic ring is intact. No acute findings of the skeleton. Thick-walled scrotum with skin ulceration. Thick-walled partially calcified left scrotal collection 5.5 x 6.7 cm previously 7.3 x 7.3 cm 07/14/2023. A few gas bubbles within the collection. Impression: 1. No bowel or urinary obstruction 2. Cholelithiasis without CT evidence of cholecystitis 3. Right ureteral stent without hydronephrosis. Right renal atrophy and nonobstructing nephrolithiasis. 4.  Absent left kidney 5. Left lower quadrant ostomy without complication 6. Distal colonic resection. Stable prevertebral soft tissue. 7.  Thick-walled left scrotal collection with peripheral calcification and some specks of central gas. Correlate for infectious versus sterile collection.   .  All CT scans are performed using dose optimization techniques as appropriate to the performed exam and include at least one of the following: Automated exposure control, adjustment of the mA and/or kV according to size, and the use of iterative reconstruction technique. ______________________________________ Electronically signed by: Scot Landry M.D. Date:     10/03/2023 Time:    17:07        Assessment   1. End-stage renal disease on maintenance dialysis. 2.  Hypertensive nephrosclerosis/left nephrectomy. 3.  Infected hydrocele/drainage/E. coli. 4.  Previous history of radiation to pelvis. 5.  Anemia of chronic kidney disease. 6.  Secondary hyperparathyroidism. Plan:  1. Continue IV antibiotics. 2.  Routine hemodialysis treatment today. 3.  Plan was discussed with the patient.     Behzad Farias MD  10/09/23  11:08 AM

## 2023-10-09 NOTE — DISCHARGE INSTRUCTIONS
Negative Pressure Wound Therapy:  Wound Location: Scrotum  Last Changed: 10/9/23  Clean: Wash at least 4 inches of surrounding skin with soap and water. Flush wound with NS. Pat dry. Periwound: Apply Skin Prep to periwound skin and under all drape. Recommend using Dermatac drape for it's flexibility on scrotal skin. If skin becomes red due to tape irritation, apply light dusting of antifungal powder to periwound skin, then cover with skin prep to form crusting. May repeat steps until proper crusting is made to protect skin. Dressing Application: DO NOT PUT FOAM ON GOOD SKIN. Place a contact layer (adaptic, UrgoTul) over exposed testicle in central wound base. Use white foam in the right and left undermined areas. Place black foam into central wound space making sure space is filled and white foam is touching black foam.   NPWT Settings: Set wound vac to 125 mmHg. Continuous. Change Frequency: Change wound vac dressing 3 times per week (MWF or TTS). Reapply vac dressing if NPWT system stops working or dressing begins to leak or cannot be reinforced. Alternative dressing: If unable to maintain NPWT, remove vac dressing. Wash with soap and water. Apply 1/4 strength Dakin's moistened gauze to wound bed. Cover with dry dressing. Secure with medipore tape. Change twice daily.

## 2023-10-10 ENCOUNTER — TELEPHONE (OUTPATIENT)
Dept: INTERNAL MEDICINE CLINIC | Age: 48
End: 2023-10-10

## 2023-10-10 ENCOUNTER — TELEPHONE (OUTPATIENT)
Dept: UROLOGY | Age: 48
End: 2023-10-10

## 2023-10-10 VITALS
TEMPERATURE: 98.1 F | WEIGHT: 184.8 LBS | SYSTOLIC BLOOD PRESSURE: 141 MMHG | HEART RATE: 80 BPM | RESPIRATION RATE: 18 BRPM | DIASTOLIC BLOOD PRESSURE: 86 MMHG | BODY MASS INDEX: 23.72 KG/M2 | OXYGEN SATURATION: 99 % | HEIGHT: 74 IN

## 2023-10-10 LAB
ALBUMIN SERPL-MCNC: 2.9 G/DL (ref 3.5–5.2)
ALP SERPL-CCNC: 323 U/L (ref 40–130)
ALT SERPL-CCNC: 13 U/L (ref 5–41)
ANION GAP SERPL CALCULATED.3IONS-SCNC: 16 MMOL/L (ref 7–19)
AST SERPL-CCNC: 17 U/L (ref 5–40)
BASOPHILS # BLD: 0 K/UL (ref 0–0.2)
BASOPHILS NFR BLD: 0 % (ref 0–1)
BILIRUB SERPL-MCNC: <0.2 MG/DL (ref 0.2–1.2)
BUN SERPL-MCNC: 27 MG/DL (ref 6–20)
CALCIUM SERPL-MCNC: 9.3 MG/DL (ref 8.6–10)
CHLORIDE SERPL-SCNC: 100 MMOL/L (ref 98–111)
CO2 SERPL-SCNC: 22 MMOL/L (ref 22–29)
CREAT SERPL-MCNC: 7.5 MG/DL (ref 0.5–1.2)
EOSINOPHIL # BLD: 0 K/UL (ref 0–0.6)
EOSINOPHIL NFR BLD: 0 % (ref 0–5)
ERYTHROCYTE [DISTWIDTH] IN BLOOD BY AUTOMATED COUNT: 17.2 % (ref 11.5–14.5)
GLUCOSE SERPL-MCNC: 88 MG/DL (ref 74–109)
HCT VFR BLD AUTO: 25.7 % (ref 42–52)
HGB BLD-MCNC: 7.4 G/DL (ref 14–18)
IMM GRANULOCYTES # BLD: 0.7 K/UL
LYMPHOCYTES # BLD: 0 K/UL (ref 1.1–4.5)
LYMPHOCYTES NFR BLD: 0 % (ref 20–40)
MACROCYTES BLD QL SMEAR: ABNORMAL
MCH RBC QN AUTO: 29.6 PG (ref 27–31)
MCHC RBC AUTO-ENTMCNC: 28.8 G/DL (ref 33–37)
MCV RBC AUTO: 102.8 FL (ref 80–94)
MONOCYTES # BLD: 0 K/UL (ref 0–0.9)
MONOCYTES NFR BLD: 0 % (ref 0–10)
MYELOCYTES NFR BLD MANUAL: 0 %
NEUTROPHILS # BLD: 0 K/UL (ref 1.5–7.5)
NEUTS SEG NFR BLD: 0 % (ref 50–65)
PLATELET # BLD AUTO: 327 K/UL (ref 130–400)
PLATELET SLIDE REVIEW: ADEQUATE
PMV BLD AUTO: 8.7 FL (ref 9.4–12.4)
POLYCHROMASIA BLD QL SMEAR: ABNORMAL
POTASSIUM SERPL-SCNC: 4.6 MMOL/L (ref 3.5–5)
PROT SERPL-MCNC: 5.7 G/DL (ref 6.6–8.7)
RBC # BLD AUTO: 2.5 M/UL (ref 4.7–6.1)
SODIUM SERPL-SCNC: 138 MMOL/L (ref 136–145)
WBC # BLD AUTO: 8.3 K/UL (ref 4.8–10.8)

## 2023-10-10 PROCEDURE — 36415 COLL VENOUS BLD VENIPUNCTURE: CPT

## 2023-10-10 PROCEDURE — 80053 COMPREHEN METABOLIC PANEL: CPT

## 2023-10-10 PROCEDURE — 99232 SBSQ HOSP IP/OBS MODERATE 35: CPT | Performed by: NURSE PRACTITIONER

## 2023-10-10 PROCEDURE — 2580000003 HC RX 258: Performed by: INTERNAL MEDICINE

## 2023-10-10 PROCEDURE — 2580000003 HC RX 258

## 2023-10-10 PROCEDURE — 6360000002 HC RX W HCPCS: Performed by: NURSE PRACTITIONER

## 2023-10-10 PROCEDURE — 85025 COMPLETE CBC W/AUTO DIFF WBC: CPT

## 2023-10-10 PROCEDURE — C9113 INJ PANTOPRAZOLE SODIUM, VIA: HCPCS | Performed by: INTERNAL MEDICINE

## 2023-10-10 PROCEDURE — 6370000000 HC RX 637 (ALT 250 FOR IP): Performed by: INTERNAL MEDICINE

## 2023-10-10 PROCEDURE — 94760 N-INVAS EAR/PLS OXIMETRY 1: CPT

## 2023-10-10 PROCEDURE — 6370000000 HC RX 637 (ALT 250 FOR IP)

## 2023-10-10 PROCEDURE — 6360000002 HC RX W HCPCS

## 2023-10-10 PROCEDURE — 6360000002 HC RX W HCPCS: Performed by: INTERNAL MEDICINE

## 2023-10-10 PROCEDURE — 94640 AIRWAY INHALATION TREATMENT: CPT

## 2023-10-10 RX ORDER — METRONIDAZOLE 500 MG/1
500 TABLET ORAL EVERY 8 HOURS SCHEDULED
Qty: 20 TABLET | Refills: 0 | Status: SHIPPED | OUTPATIENT
Start: 2023-10-10 | End: 2023-10-17

## 2023-10-10 RX ORDER — METRONIDAZOLE 500 MG/1
500 TABLET ORAL EVERY 8 HOURS SCHEDULED
Status: DISCONTINUED | OUTPATIENT
Start: 2023-10-10 | End: 2023-10-10 | Stop reason: HOSPADM

## 2023-10-10 RX ORDER — CEFDINIR 300 MG/1
300 CAPSULE ORAL DAILY
Status: DISCONTINUED | OUTPATIENT
Start: 2023-10-10 | End: 2023-10-10 | Stop reason: HOSPADM

## 2023-10-10 RX ORDER — CEFDINIR 300 MG/1
300 CAPSULE ORAL DAILY
Qty: 6 CAPSULE | Refills: 0 | Status: SHIPPED | OUTPATIENT
Start: 2023-10-11 | End: 2023-10-17

## 2023-10-10 RX ADMIN — TAMSULOSIN HYDROCHLORIDE 0.4 MG: 0.4 CAPSULE ORAL at 10:01

## 2023-10-10 RX ADMIN — CEFDINIR 300 MG: 300 CAPSULE ORAL at 10:09

## 2023-10-10 RX ADMIN — Medication 10 ML: at 10:03

## 2023-10-10 RX ADMIN — SEVELAMER CARBONATE 800 MG: 800 TABLET, FILM COATED ORAL at 13:00

## 2023-10-10 RX ADMIN — METRONIDAZOLE 500 MG: 500 TABLET ORAL at 10:09

## 2023-10-10 RX ADMIN — PIPERACILLIN AND TAZOBACTAM 3375 MG: 3; .375 INJECTION, POWDER, LYOPHILIZED, FOR SOLUTION INTRAVENOUS at 02:45

## 2023-10-10 RX ADMIN — METRONIDAZOLE 500 MG: 500 TABLET ORAL at 15:45

## 2023-10-10 RX ADMIN — SEVELAMER CARBONATE 800 MG: 800 TABLET, FILM COATED ORAL at 15:46

## 2023-10-10 RX ADMIN — Medication 2 PUFF: at 06:56

## 2023-10-10 RX ADMIN — FOLIC ACID 1 MG: 1 TABLET ORAL at 10:01

## 2023-10-10 RX ADMIN — HYDROMORPHONE HYDROCHLORIDE 0.5 MG: 1 INJECTION, SOLUTION INTRAMUSCULAR; INTRAVENOUS; SUBCUTANEOUS at 17:05

## 2023-10-10 RX ADMIN — OXYCODONE AND ACETAMINOPHEN 1 TABLET: 10; 325 TABLET ORAL at 07:24

## 2023-10-10 RX ADMIN — HYDROMORPHONE HYDROCHLORIDE 0.5 MG: 1 INJECTION, SOLUTION INTRAMUSCULAR; INTRAVENOUS; SUBCUTANEOUS at 10:01

## 2023-10-10 RX ADMIN — SEVELAMER CARBONATE 800 MG: 800 TABLET, FILM COATED ORAL at 10:01

## 2023-10-10 RX ADMIN — OXYCODONE AND ACETAMINOPHEN 1 TABLET: 10; 325 TABLET ORAL at 02:40

## 2023-10-10 RX ADMIN — SODIUM CHLORIDE, PRESERVATIVE FREE 40 MG: 5 INJECTION INTRAVENOUS at 10:01

## 2023-10-10 RX ADMIN — OXYBUTYNIN CHLORIDE 5 MG: 5 TABLET ORAL at 10:01

## 2023-10-10 RX ADMIN — CINACALCET HYDROCHLORIDE 30 MG: 30 TABLET, FILM COATED ORAL at 10:01

## 2023-10-10 ASSESSMENT — PAIN SCALES - GENERAL
PAINLEVEL_OUTOF10: 6
PAINLEVEL_OUTOF10: 7
PAINLEVEL_OUTOF10: 3

## 2023-10-10 ASSESSMENT — PAIN DESCRIPTION - LOCATION
LOCATION: SCROTUM

## 2023-10-10 ASSESSMENT — PAIN DESCRIPTION - DESCRIPTORS
DESCRIPTORS: ACHING;THROBBING
DESCRIPTORS: ACHING
DESCRIPTORS: THROBBING
DESCRIPTORS: THROBBING

## 2023-10-10 ASSESSMENT — PAIN - FUNCTIONAL ASSESSMENT
PAIN_FUNCTIONAL_ASSESSMENT: PREVENTS OR INTERFERES WITH ALL ACTIVE AND SOME PASSIVE ACTIVITIES
PAIN_FUNCTIONAL_ASSESSMENT: ACTIVITIES ARE NOT PREVENTED
PAIN_FUNCTIONAL_ASSESSMENT: ACTIVITIES ARE NOT PREVENTED

## 2023-10-10 NOTE — CARE COORDINATION
Spoke with patient regarding MD orders for [facility] [place] 6100 services. Patient agreeable and has chosen [facility]. Referral Faxed. [phone] [place] [address] [phone]. [phone]. Please notify [phone] when patient discharges and fax DC Summary,  DC med list and any new [address] [place] [address]100 orders. The Patient and/or patient representative was provided with a choice of provider and agrees   with the discharge plan. [x] Yes [] No    Freedom of choice list was provided with basic dialogue that supports the patient's individualized plan of care/goals, treatment preferences and shares the quality data associated with the providers.  [x] Yes [] No  Electronically signed by Nicole Quarles on 10/10/2023 at 3:40 PM

## 2023-10-10 NOTE — PLAN OF CARE
Problem: Discharge Planning  Goal: Discharge to home or other facility with appropriate resources  10/10/2023 1610 by Harinder Ibrahim RN  Outcome: Completed  10/10/2023 1431 by Harinder Ibrahim RN  Outcome: Progressing     Problem: Pain  Goal: Verbalizes/displays adequate comfort level or baseline comfort level  10/10/2023 1610 by Harinder Ibrahim RN  Outcome: Completed  10/10/2023 1431 by Harinder Ibrahim RN  Outcome: Progressing     Problem: Safety - Adult  Goal: Free from fall injury  10/10/2023 1610 by Harinder Ibrahim RN  Outcome: Completed  10/10/2023 1431 by Harinder Ibrahim RN  Outcome: Progressing     Problem: Nutrition Deficit:  Goal: Optimize nutritional status  10/10/2023 1610 by Harinder Ibrahim RN  Outcome: Completed  10/10/2023 1431 by Harinder Ibrahim RN  Outcome: Progressing     Problem: Skin/Tissue Integrity - Adult  Goal: Skin integrity remains intact  10/10/2023 1610 by Harinder Ibrahim RN  Outcome: Completed  10/10/2023 1431 by Harinder Ibrahim RN  Outcome: Progressing  Goal: Incisions, wounds, or drain sites healing without S/S of infection  10/10/2023 1610 by Harinder Ibrahim RN  Outcome: Completed  10/10/2023 1431 by Harinder Ibrahim RN  Outcome: Progressing  Goal: Oral mucous membranes remain intact  10/10/2023 1610 by Harinder Ibrahim RN  Outcome: Completed  10/10/2023 1431 by Harinder Ibrahim RN  Outcome: Progressing     Problem: Musculoskeletal - Adult  Goal: Return mobility to safest level of function  10/10/2023 1610 by Harinder Ibrahim RN  Outcome: Completed  10/10/2023 1431 by Harinder Ibrahim RN  Outcome: Progressing  Goal: Maintain proper alignment of affected body part  10/10/2023 1610 by Harinder Ibrahim RN  Outcome: Completed  10/10/2023 1431 by Harinder Ibrahim RN  Outcome: Progressing  Goal: Return ADL status to a safe level of function  10/10/2023 1610 by Harinder Ibrahim RN  Outcome: Completed  10/10/2023 1431 by Harinder Ibrahim RN  Outcome: Progressing     Problem:

## 2023-10-10 NOTE — CARE COORDINATION
Per RN wound vac working now and Pt will be d/c'd; SW went to [facility] [employer] [facility] for [facility] signing; Pt already gone     Electronically signed by YU Zelaya on 10/10/2023 at 6:18 PM

## 2023-10-10 NOTE — TELEPHONE ENCOUNTER
[address] has referral from [facility]     Will Dr Fabi May follow pt for [facility] ?

## 2023-10-10 NOTE — TELEPHONE ENCOUNTER
Yoko Garrison with [facility] called to schedule a  2-3 wk follow up with PA on a day that Sujatha Christy is there . Please call Yoko Garrison at [phone] to schedule    Please be advised that the best time to call him to accommodate their needs is Anytime. Thank you.

## 2023-10-10 NOTE — PROGRESS NOTES
Wound vac giving an error message stating that there was a blockage with the machine. After troubleshooting, wound vac started working without any error messages before discharging.

## 2023-10-10 NOTE — PROGRESS NOTES
CLINICAL PHARMACY NOTE: MEDS TO BEDS    Total # of Prescriptions Filled: 2   The following medications were delivered to the patient:  Current Discharge Medication List        START taking these medications    Details   cefdinir (OMNICEF) 300 MG capsule Take 1 capsule by mouth daily for 6 doses  Qty: 6 capsule, Refills: 0      metroNIDAZOLE (FLAGYL) 500 MG tablet Take 1 tablet by mouth every 8 hours for 20 doses  Qty: 20 tablet, Refills: 0               Additional Documentation:    Delivered RX's to patient bedside. Paid with card.

## 2023-10-10 NOTE — PLAN OF CARE
Problem: Discharge Planning  Goal: Discharge to home or other facility with appropriate resources  Outcome: Progressing  Flowsheets (Taken 10/9/2023 1925)  Discharge to home or other facility with appropriate resources:   Identify barriers to discharge with patient and caregiver   Arrange for needed discharge resources and transportation as appropriate     Problem: Pain  Goal: Verbalizes/displays adequate comfort level or baseline comfort level  Outcome: Progressing     Problem: Safety - Adult  Goal: Free from fall injury  Outcome: Progressing  Flowsheets (Taken 10/10/2023 0047)  Free From Fall Injury: Instruct family/caregiver on patient safety     Problem: Nutrition Deficit:  Goal: Optimize nutritional status  Outcome: Progressing     Problem: Skin/Tissue Integrity - Adult  Goal: Skin integrity remains intact  Outcome: Progressing  Flowsheets  Taken 10/10/2023 0047  Skin Integrity Remains Intact: Monitor for areas of redness and/or skin breakdown  Taken 10/9/2023 1925  Skin Integrity Remains Intact: Monitor for areas of redness and/or skin breakdown  Goal: Incisions, wounds, or drain sites healing without S/S of infection  Outcome: Progressing  Flowsheets  Taken 10/10/2023 0047  Incisions, Wounds, or Drain Sites Healing Without Sign and Symptoms of Infection: ADMISSION and DAILY: Assess and document risk factors for pressure ulcer development  Taken 10/9/2023 1925  Incisions, Wounds, or Drain Sites Healing Without Sign and Symptoms of Infection: ADMISSION and DAILY: Assess and document risk factors for pressure ulcer development  Goal: Oral mucous membranes remain intact  Outcome: Progressing  Flowsheets  Taken 10/10/2023 0047  Oral Mucous Membranes Remain Intact: Assess oral mucosa and hygiene practices  Taken 10/9/2023 1925  Oral Mucous Membranes Remain Intact: Assess oral mucosa and hygiene practices     Problem: Musculoskeletal - Adult  Goal: Return mobility to safest level of function  Outcome: discharge  Outcome: Progressing  Flowsheets (Taken 10/9/2023 1925)  Absence of infection at discharge:   Assess and monitor for signs and symptoms of infection   Monitor lab/diagnostic results  Goal: Absence of infection during hospitalization  Outcome: Progressing  Flowsheets (Taken 10/9/2023 1925)  Absence of infection during hospitalization: Assess and monitor for signs and symptoms of infection  Goal: Absence of fever/infection during anticipated neutropenic period  Outcome: Progressing  Flowsheets (Taken 10/9/2023 1925)  Absence of fever/infection during anticipated neutropenic period: Monitor white blood cell count     Problem: Metabolic/Fluid and Electrolytes - Adult  Goal: Electrolytes maintained within normal limits  Outcome: Progressing  Flowsheets (Taken 10/9/2023 1925)  Electrolytes maintained within normal limits:   Monitor labs and assess patient for signs and symptoms of electrolyte imbalances   Administer electrolyte replacement as ordered  Goal: Hemodynamic stability and optimal renal function maintained  Outcome: Progressing  Flowsheets (Taken 10/9/2023 1925)  Hemodynamic stability and optimal renal function maintained:   Monitor labs and assess for signs and symptoms of volume excess or deficit   Monitor intake, output and patient weight  Goal: Glucose maintained within prescribed range  Outcome: Progressing  Flowsheets (Taken 10/9/2023 1925)  Glucose maintained within prescribed range:   Monitor blood glucose as ordered   Assess for signs and symptoms of hyperglycemia and hypoglycemia     Problem: Hematologic - Adult  Goal: Maintains hematologic stability  Outcome: Progressing  Flowsheets (Taken 10/9/2023 1925)  Maintains hematologic stability: Assess for signs and symptoms of bleeding or hemorrhage     Problem: Anxiety  Goal: Will report anxiety at manageable levels  Description: INTERVENTIONS:  1. Administer medication as ordered  2. Teach and rehearse alternative coping skills  3.  Provide

## 2023-10-10 NOTE — PROGRESS NOTES
[facility] Wound  Nurse  Home NPWT Discharge Note       NAME:  Mildred Luna RECORD NUMBER:  [id number]  AGE: 57 y.o. GENDER: male  : 1975  ROOM: [address] [place] [address]-   TODAY'S DATE:  10/10/2023    Patient discharged with home NPWT. Home unit delivered to patient. Proof of delivery signed by patient or agent and faxed. Serial number verified with patient and/or agent: yes  Unit S/N: [id number]    Education provided to patient and/or agent:    [x] VAC therapy unit is rental and must be returned when therapy is complete. [x] Return shipping is prepaid by . [x] [employer] will call within 10 days with important information regarding VAC therapy. [x] How to charge unit. [x] How to change canister. [x] How to connect/disconnect tubing from dressing to canister. [x] How to clamp/unclamp tubing. [x] Responding to alarms. Who to call for assistance with alarms. When to change to alternative dressing. [x] How to return unit when therapy is complete.     Level of patient/agent understanding able to:     [x] Indicates understanding       [] Needs reinforcement  [] Unsuccessful      [x] Verbal Understanding  [x] Returned demonstration       [] No evidence of learning  [] Refused teaching         [] N/A    Electronically signed by   Garret Aburto RN 10/10/2023

## 2023-10-10 NOTE — PROGRESS NOTES
Nephrology ([facility]) Progress Note    Patient:  Tristin Samson  YOB: 1975  Date of Service: 10/10/2023  MRN: [id number]   Acct: [de-identified]   Primary Care Physician: Jesus Esposito MD  Advance Directive: Full Code  Admit Date: 10/3/2023       Hospital Day: 7  Referring Provider: Fritz Beverly MD    Patient independently seen and examined, Chart, Consults, Notes, Operative notes, Labs, Cardiology, and Radiology studies reviewed as available. Chief complaint: Scrotal swelling/ESRD. Subjective:  Tristin Samson is a 57 y.o. male for whom we were consulted for evaluation and treatment of end-stage renal disease on maintenance dialysis. Patient goes to [facility] clinic on Monday Wednesday Friday. Patient also has history of colorectal cancer, colectomy/colostomy, history of left nephrectomy and history of pelvic radiation. He follows [facility] for his oncology care. Presented to [facility] with painful swelling of left scrotum. He was found to have infected hydrocele/left scrotal hematoma. Patient recently underwent drainage of infected hydrocele by urology. He is receiving IV antibiotics. Patient is also followed by infectious disease. Currently he has back applied to the scrotum. He has receiving packed RBCs for correction of anemia related to surgery    Patient is feeling much better today. He denies any shortness of breath.     Allergies:  Oxycodone-acetaminophen, Morphine and related, and Morphine    Medicines:  Current Facility-Administered Medications   Medication Dose Route Frequency Provider Last Rate Last Admin    cefdinir (OMNICEF) capsule 300 mg  300 mg Oral Daily Fe Rouse MD   300 mg at 10/10/23 1009    metroNIDAZOLE (FLAGYL) tablet 500 mg  500 mg Oral 3 times per day Fe Rouse MD   500 mg at 10/10/23 1009    HYDROmorphone HCl PF (DILAUDID) injection 0.5 mg  0.5 mg IntraVENous Q4H PRN Garcia

## 2023-10-10 NOTE — PROGRESS NOTES
Home wound vac approved and ready for release to patient at discharge.     Electronically signed by Brooklyn Sorto RN, [address] [place] [address] on 10/10/2023 at 7:04 AM

## 2023-10-10 NOTE — CARE COORDINATION
SW contacted hospital liaison with [facility], to notify her that Pt wishes to change his HD clinic from [facility] to [facility] since it is much closer to his home and easier for his wife to take him to HD while he is not driving; Shalonda Hodge has contacted [facility] to speak with him about availability.     Electronically signed by YU Leon on 10/10/2023 at 1:35 PM

## 2023-10-11 ENCOUNTER — TELEPHONE (OUTPATIENT)
Dept: PRIMARY CARE CLINIC | Age: 48
End: 2023-10-11

## 2023-10-11 NOTE — PROGRESS NOTES
Physician Progress Note      Daniel Andre  CSN #:                  [id number]  :                       1975  ADMIT DATE:       10/3/2023 3:37 PM  [facility] DATE:        10/10/2023 6:17 PM  RESPONDING  PROVIDER #: Deepti Garcia MD MPH          QUERY TEXT:    Pt admitted with scrotal abscess and has anemia documented. If possible,   please document in progress notes and discharge summary further specificity   regarding the acuity and type of anemia:    The medical record reflects the following:  Risk Factors: CKD, Sepsis, ESRD  Clinical Indicators: Stool occult blood positive 10/4/23, hgb 6.4, 8.7, 7.1,   7.2  Treatment: PRBC 1 unit 10/4, GI consult,    Thank you  Nilda Sousa RN, BSN, [facility]  [phone]  Options provided:  -- Anemia due to acute blood loss  -- Anemia due to chronic blood loss  -- Anemia due to acute on chronic blood loss  -- Anemia due to iron deficiency  -- Anemia due to postoperative blood loss  -- Anemia due to CKD  -- Dilutional anemia  -- Other - I will add my own diagnosis  -- Disagree - Not applicable / Not valid  -- Disagree - Clinically unable to determine / Unknown  -- Refer to Clinical Documentation Reviewer    PROVIDER RESPONSE TEXT:    This patient has acute blood loss anemia.     Query created by: Nilda Sousa on 10/9/2023 2:20 PM      Electronically signed by:  Deepti Garcia MD MPH 10/11/2023 3:39 PM

## 2023-10-12 ENCOUNTER — TELEPHONE (OUTPATIENT)
Dept: PRIMARY CARE CLINIC | Age: 48
End: 2023-10-12

## 2023-10-12 NOTE — TELEPHONE ENCOUNTER
Care Transitions Initial Follow Up Call    Outreach made within 2 business days of discharge: Yes    Patient: Hyun Thompson  Patient : 1975   MRN: [id number]    Reason for Admission: increased testicular pain, swelling, and purulent drainage to left side of scrotum. Discharge Date: 10/10/23     Discharge Diagnoses:  Principal Problem:    Scrotal abscess  Active Problems:    ESRD on hemodialysis ([address])    Ureteral stent retained    Macrocytic anemia    Acute on chronic anemia    History of rectal cancer    History of colon polyps    History of stomach ulcers    Elevated alkaline phosphatase level  Resolved Problems:    * No resolved hospital problems. *                 Spoke with: Attempted to make contact with patient for an transitions of care follow up call post discharge without success.     [facility]        Scheduled appointment with PCP within 7-14 days    Follow Up  Future Appointments   Date Time Provider [address]   10/17/2023  8:00 AM Chriss Mcelroy MD [facility]   10/17/2023 10:00 AM Draek Sharma APRN Dinah CHAO [facility]   10/30/2023 11:00 AM CALLY Izaguirre CNP [facility]   2023 10:30 AM Janel Martinez APRN - NP [facility]       Segundo Corbin MA

## 2023-10-13 ENCOUNTER — TELEPHONE (OUTPATIENT)
Dept: INTERNAL MEDICINE CLINIC | Age: 48
End: 2023-10-13

## 2023-10-13 NOTE — TELEPHONE ENCOUNTER
[facility] reporting pt declined OT eval    Nursing will be seeing this pt     [facility]

## 2023-10-17 ENCOUNTER — OFFICE VISIT (OUTPATIENT)
Dept: PRIMARY CARE CLINIC | Age: 48
End: 2023-10-17

## 2023-10-17 ENCOUNTER — HOSPITAL ENCOUNTER (OUTPATIENT)
Dept: WOUND CARE | Age: 48
Discharge: HOME | End: 2023-10-17
Payer: MEDICARE

## 2023-10-17 VITALS
SYSTOLIC BLOOD PRESSURE: 154 MMHG | HEART RATE: 75 BPM | OXYGEN SATURATION: 95 % | DIASTOLIC BLOOD PRESSURE: 98 MMHG | BODY MASS INDEX: 23.5 KG/M2 | WEIGHT: 183 LBS | TEMPERATURE: 98.1 F

## 2023-10-17 VITALS
SYSTOLIC BLOOD PRESSURE: 145 MMHG | BODY MASS INDEX: 23.49 KG/M2 | TEMPERATURE: 96.7 F | HEART RATE: 98 BPM | DIASTOLIC BLOOD PRESSURE: 99 MMHG | HEIGHT: 74 IN | RESPIRATION RATE: 18 BRPM | WEIGHT: 183 LBS

## 2023-10-17 DIAGNOSIS — R74.8 ELEVATED ALKALINE PHOSPHATASE LEVEL: ICD-10-CM

## 2023-10-17 DIAGNOSIS — F41.1 GENERALIZED ANXIETY DISORDER: ICD-10-CM

## 2023-10-17 DIAGNOSIS — N18.6 ESRD ON HEMODIALYSIS (HCC): ICD-10-CM

## 2023-10-17 DIAGNOSIS — N49.2 SCROTAL ABSCESS: ICD-10-CM

## 2023-10-17 DIAGNOSIS — K21.9 GASTROESOPHAGEAL REFLUX DISEASE WITHOUT ESOPHAGITIS: ICD-10-CM

## 2023-10-17 DIAGNOSIS — N13.5 STRICTURE OF URETER: ICD-10-CM

## 2023-10-17 DIAGNOSIS — L98.492 GROIN INCISION ULCER, WITH FAT LAYER EXPOSED (HCC): Primary | ICD-10-CM

## 2023-10-17 DIAGNOSIS — D53.9 MACROCYTIC ANEMIA: ICD-10-CM

## 2023-10-17 DIAGNOSIS — Z99.2 ESRD ON HEMODIALYSIS (HCC): ICD-10-CM

## 2023-10-17 DIAGNOSIS — K80.20 CALCULUS OF GALLBLADDER WITHOUT CHOLECYSTITIS WITHOUT OBSTRUCTION: ICD-10-CM

## 2023-10-17 DIAGNOSIS — N13.9 OBSTRUCTIVE UROPATHY: ICD-10-CM

## 2023-10-17 DIAGNOSIS — D64.9 ANEMIA, UNSPECIFIED TYPE: ICD-10-CM

## 2023-10-17 DIAGNOSIS — I10 PRIMARY HYPERTENSION: ICD-10-CM

## 2023-10-17 DIAGNOSIS — N49.2 SCROTAL ABSCESS: Primary | ICD-10-CM

## 2023-10-17 DIAGNOSIS — Z96.0 URETERAL STENT RETAINED: ICD-10-CM

## 2023-10-17 PROBLEM — E83.52 HYPERCALCEMIA: Status: RESOLVED | Noted: 2022-08-03 | Resolved: 2023-10-17

## 2023-10-17 LAB
FUNGUS SPEC CULT: NORMAL
KOH PREP SPEC: NORMAL

## 2023-10-17 PROCEDURE — 97597 DBRDMT OPN WND 1ST 20 CM/<: CPT | Performed by: NURSE PRACTITIONER

## 2023-10-17 PROCEDURE — 97598 DBRDMT OPN WND ADDL 20CM/<: CPT

## 2023-10-17 PROCEDURE — 97598 DBRDMT OPN WND ADDL 20CM/<: CPT | Performed by: NURSE PRACTITIONER

## 2023-10-17 PROCEDURE — 97597 DBRDMT OPN WND 1ST 20 CM/<: CPT

## 2023-10-17 PROCEDURE — 99215 OFFICE O/P EST HI 40 MIN: CPT | Performed by: NURSE PRACTITIONER

## 2023-10-17 PROCEDURE — 99213 OFFICE O/P EST LOW 20 MIN: CPT

## 2023-10-17 RX ORDER — GENTAMICIN SULFATE 1 MG/G
OINTMENT TOPICAL ONCE
OUTPATIENT
Start: 2023-10-17 | End: 2023-10-17

## 2023-10-17 RX ORDER — BETAMETHASONE DIPROPIONATE 0.05 %
OINTMENT (GRAM) TOPICAL ONCE
OUTPATIENT
Start: 2023-10-17 | End: 2023-10-17

## 2023-10-17 RX ORDER — ALPRAZOLAM 1 MG/1
1 TABLET ORAL 3 TIMES DAILY PRN
Qty: 90 TABLET | Refills: 2 | Status: SHIPPED | OUTPATIENT
Start: 2023-10-17 | End: 2023-11-16

## 2023-10-17 RX ORDER — IBUPROFEN 200 MG
TABLET ORAL ONCE
OUTPATIENT
Start: 2023-10-17 | End: 2023-10-17

## 2023-10-17 RX ORDER — DOCUSATE SODIUM 100 MG/1
100 CAPSULE, LIQUID FILLED ORAL 3 TIMES DAILY PRN
Qty: 90 CAPSULE | Refills: 5 | Status: SHIPPED | OUTPATIENT
Start: 2023-10-17 | End: 2023-10-17

## 2023-10-17 RX ORDER — GINSENG 100 MG
CAPSULE ORAL ONCE
OUTPATIENT
Start: 2023-10-17 | End: 2023-10-17

## 2023-10-17 RX ORDER — LIDOCAINE HYDROCHLORIDE 20 MG/ML
JELLY TOPICAL ONCE
OUTPATIENT
Start: 2023-10-17 | End: 2023-10-17

## 2023-10-17 RX ORDER — LIDOCAINE HYDROCHLORIDE 40 MG/ML
SOLUTION TOPICAL ONCE
OUTPATIENT
Start: 2023-10-17 | End: 2023-10-17

## 2023-10-17 RX ORDER — LIDOCAINE 40 MG/G
CREAM TOPICAL ONCE
OUTPATIENT
Start: 2023-10-17 | End: 2023-10-17

## 2023-10-17 RX ORDER — SODIUM HYPOCHLORITE 1.25 MG/ML
SOLUTION TOPICAL
Qty: 1000 ML | Refills: 5 | Status: SHIPPED | OUTPATIENT
Start: 2023-10-17

## 2023-10-17 RX ORDER — SODIUM CHLOR/HYPOCHLOROUS ACID 0.033 %
SOLUTION, IRRIGATION IRRIGATION ONCE
OUTPATIENT
Start: 2023-10-17 | End: 2023-10-17

## 2023-10-17 RX ORDER — LIDOCAINE 50 MG/G
OINTMENT TOPICAL ONCE
OUTPATIENT
Start: 2023-10-17 | End: 2023-10-17

## 2023-10-17 RX ORDER — TRIAMCINOLONE ACETONIDE 1 MG/G
OINTMENT TOPICAL ONCE
OUTPATIENT
Start: 2023-10-17 | End: 2023-10-17

## 2023-10-17 RX ORDER — LIDOCAINE HYDROCHLORIDE 20 MG/ML
JELLY TOPICAL PRN
Status: DISCONTINUED | OUTPATIENT
Start: 2023-10-17 | End: 2023-10-19 | Stop reason: HOSPADM

## 2023-10-17 RX ORDER — BACITRACIN ZINC AND POLYMYXIN B SULFATE 500; 1000 [USP'U]/G; [USP'U]/G
OINTMENT TOPICAL ONCE
OUTPATIENT
Start: 2023-10-17 | End: 2023-10-17

## 2023-10-17 RX ORDER — CLOBETASOL PROPIONATE 0.5 MG/G
OINTMENT TOPICAL ONCE
OUTPATIENT
Start: 2023-10-17 | End: 2023-10-17

## 2023-10-17 ASSESSMENT — ENCOUNTER SYMPTOMS
COUGH: 0
DIARRHEA: 0
ABDOMINAL PAIN: 0
CONSTIPATION: 0
NAUSEA: 0
ANAL BLEEDING: 0
CHEST TIGHTNESS: 0
SHORTNESS OF BREATH: 0

## 2023-10-17 ASSESSMENT — VISUAL ACUITY: OU: 1

## 2023-10-17 NOTE — DISCHARGE INSTRUCTIONS
[address] and Hyperbaric Oxygen Therapy   Physician Orders and Discharge Instructions  18[address] [facility],[address], [address]  Telephone: [phone]    NAME:  Meseret Burr  YOB: 1975  MEDICAL RECORD NUMBER:  [id number]  DATE:  10/17/2023    Discharge condition: Stable    Discharge to: Home    Left via:Private automobile    Accompanied by:  self    ECF/HHA: [facility]- may discontinue services  Return wound vac    Dressing Orders:  Groin Wound:Wash with soap and water. Tuck collagen into the wound bed, then apply 1/4 STR Dakins moistened gauze to wound bed. Cover with dry gauze. Secure with medipore tape. Change twice daily . Treatment Orders:  Avoid pressure to the wound  Protein rich diet unless directed to avoid from renal Dr. Manisha Griggs unless directed to avoid from renal     [facility] follow up visit ____2 weeks with Delmi Mosqueda _________________________  (Please note your next appointment above and if you are unable to keep, kindly give a 24 hour notice. Thank you.)          If you experience any of the following, please call the [facility] [employer] during business hours:    * Increase in Pain  * Temperature over 101  * Increase in drainage from your wound  * Drainage with a foul odor  * Bleeding  * Increase in swelling  * Need for compression bandage changes due to slippage, breakthrough drainage. If you need medical attention outside of the business hours of the [address] [place] [address] please contact your PCP or go to the nearest emergency room.

## 2023-10-17 NOTE — PROGRESS NOTES
200 [facility] PRIMARY CARE  1830 [facility],[address] [address]  [address] [address]  Dept: [phone]  Dept Fax: [phone]  Loc: [phone]    Artem Mena is a 57 y.o. male who presents today for his medical conditions/complaints asnoted below. Artem Mena is here for Follow-Up from [address] [place] [address] date:  10/3/2023                      Discharge date:  10/10/2023       Admitting Physician:  Munir Amato MD     Advance Directive: Full Code     Consults Made:   PHARMACY TO DOSE VANCOMYCIN  IP CONSULT TO UROLOGY  IP CONSULT TO NEPHROLOGY  PALLIATIVE CARE EVAL  IP CONSULT TO GI  IP CONSULT TO INFECTIOUS DISEASES  IP CONSULT TO SOCIAL WORK        Primary Care Physician:  Romualdo Schlatter, MD     Discharge Diagnoses:  Principal Problem:    Scrotal abscess  Active Problems:    ESRD on hemodialysis ([address])    Ureteral stent retained    Macrocytic anemia    Acute on chronic anemia    History of rectal cancer    History of colon polyps    History of stomach ulcers    Elevated alkaline phosphatase level  Resolved Problems:    * No resolved hospital problems. *        Inpatient course: Discharge summary reviewed- see chart for full details. 82-year-old admitted for the above dates for scrotal abscess.  -He presented to my office on the listed date with a draining abscess of the scrotum.  -Initial work-up in the ER:  - -CT abdomen and pelvis: Thick walled left scrotal collection with peripheral calcification and some specks of central gas. [facility] Risk Score (risk of hospital readmission):Readmission Risk Score: 18.9    Active Problems:    * No active hospital problems.  *      Care management risk score Rising risk (score 2-5) and Complex Care (Scores >=6): No Risk Score On File     Non face to face  following discharge, date last encounter closed (first attempt may havebeen earlier): 10/12/2023    Call initiated 2

## 2023-10-17 NOTE — PATIENT INSTRUCTIONS
[address] and Hyperbaric Oxygen Therapy   Physician Orders and Discharge Instructions  1830 [facility],[address] [place] [address] Blvd, [address]  Telephone: [phone]    NAME:  Davie Naik  YOB: 1975  MEDICAL RECORD NUMBER:  [id number]  DATE:  10/17/2023    Discharge condition: Stable    Discharge to: Home    Left via:Private automobile    Accompanied by:  self    ECF/HHA: [facility]- may discontinue services  Return wound vac    Dressing Orders:  Groin Wound:Wash with soap and water. Tuck collagen into the wound bed, then apply 1/4 STR Dakins moistened gauze to wound bed. Cover with dry gauze. Secure with medipore tape. Change twice daily . Treatment Orders:  Avoid pressure to the wound  Protein rich diet unless directed to avoid from renal Dr. Dede Pérez unless directed to avoid [facility]    [facility] _[facility] with Jacklyn Aguirre _________________________  (Please note your next appointment above and if you are unable to keep, kindly give a 24 hour notice. Thank you.)          If you experience any of the following, please call the [facility] during business hours:    * Increase in Pain  * Temperature over 101  * Increase in drainage from your wound  * Drainage with a foul odor  * Bleeding  * Increase in swelling  * Need for compression bandage changes due to slippage, breakthrough drainage. If you need medical attention outside of the business hours of the [address] [place], [place] [address] please contact your PCP or go to the nearest emergency room.

## 2023-10-17 NOTE — PROGRESS NOTES
epidermis and dermis. Devitalized Tissue Debrided:  fibrin, biofilm, slough, and exudate    Pre Debridement Measurements:  Are located in the Wound/Ulcer Documentation Flow Sheet    Wound/Ulcer #: 1    Post Debridement Measurements:  Wound/Ulcer Descriptions are Pre Debridement except measurements:          Percent of Wound(s)/Ulcer(s) Debrided: 100%    Total Surface Area Debrided:  22.5 sq cm     Diabetic/Pressure/Non Pressure Ulcers only:  Ulcer: N/A     Estimated Blood Loss:  Minimal    Hemostasis Achieved:  by pressure    Procedural Pain:  0  / 10     Post Procedural Pain:  0 / 10     Response to treatment:  Well tolerated by patient. Assessment    1. Scrotal abscess    2. Groin incision ulcer, with fat layer exposed ([address])    3. ESRD on hemodialysis ([address])          Plan    Discontinue wound vac    Plan for wound - Dress per physician order  Treatment:     Compression : No   Offloading : No   Dressing : 1/4 dakins moistened gauze    Discussed importance of wound care, nutrition, and plan of care. Patient understanding and questions answered. I spent a total of  60 minutes face to face with the patient. Over 95% of that time was spent on counseling and care coordination. Patient was told that if symptoms worsen or new symptoms develop they are to go to the emergency department immediately. Patient was educated on diagnosis and treatment plan. All of patient's questions were answered, and the patient understands the discharge plan. Discussed appropriate home care of this wound. Wound redressed. Patient instructions were given.   Recommend no smoking  Offloading instructions given      [address] and Hyperbaric Oxygen Therapy   Physician Orders and Discharge Instructions  [address]  Telephone: [phone]    NAME:  Artem Mena  YOB: 1975  MEDICAL RECORD NUMBER:

## 2023-10-19 NOTE — CONSULTS
Inpatient consult to Urology  Consult performed by: Allan Snider MD  Consult ordered by: CALLY Jeronimo  Reason for consult: Scrotal abscess,  chronic indwelling right ureteral stent Adult

## 2023-10-20 NOTE — PROGRESS NOTES
Physician Progress Note      Augustina Swartz  CSN #:                  [id number]  :                       1975  ADMIT DATE:       10/3/2023 3:37 PM  [facility] DATE:        10/10/2023 6:17 PM  RESPONDING  PROVIDER #: Rufina Bean MD MPH          QUERY TEXT:    Pt admitted with scrotal abscess. Pt noted to have Sepsis in the discharge   summary. If possible, please document in progress notes and discharge summary   the present on admission status of Sepsis:    The medical record reflects the following:  Risk Factors:  ESRD, Ureteral stent  Clinical Indicators: Thickened hydrocele with old hematoma. Appears to have   had been infected and ruptured to the scrotal wall. No signs of ascending   fasciitis or necrosis though part of the hydrocele sac was debrided. Pulse   103, 94, Lactic acid 3.6, CRP 42.72, abscess, creatinine 10.4. Treatment: SCROTAL ABSCESS INCISION AND DRAINAGE, urology consult with   surgery. Zopsyn 3,375 mg IVPB, Vancomycin 1,000 mg IVPB,  Omnicef 300 mg po   daily.     Thank you  Nikole Trinh RN, BSN, [facility]  [phone]  Options provided:  -- Yes, Sepsis was present at the time of the order to admit to the hospital  -- No, Sepsis was not present on admission and developed during the inpatient   stay  -- Other - I will add my own diagnosis  -- Disagree - Not applicable / Not valid  -- Disagree - Clinically unable to determine / Unknown  -- Refer to Clinical Documentation Reviewer    PROVIDER RESPONSE TEXT:    Yes, Sepsis was present at the time of the order to admit to the hospital.    Query created by: Nikole Trinh on 10/16/2023 12:00 PM      Electronically signed by:  Rufina Bean MD MPH 10/20/2023 12:32 PM

## 2023-10-24 LAB
FUNGUS SPEC CULT: NORMAL
KOH PREP SPEC: NORMAL

## 2023-10-30 NOTE — PATIENT INSTRUCTIONS
[address] and Hyperbaric Oxygen Therapy   Physician Orders and Discharge Instructions  1830 [facility],[address] [place] [address] Blvd, [address]  Telephone: [phone]    NAME:  Elizabet Epstein  YOB: 1975  MEDICAL RECORD NUMBER:  [id number]  DATE:  11/2/23    Discharge condition: Stable    Discharge to: Home    Left via:Private automobile    Accompanied by:  self    ECF/HHA:   Return wound vac to     Dressing Orders:  Groin Wound:Wash with soap and water. Tuck collagen into the wound bed, then apply 1/4 STR Dakins moistened gauze to wound bed. Cover with dry gauze. Secure with medipore tape. Change twice daily . Treatment Orders:  Avoid pressure to the wound  Protein rich diet unless directed to avoid from renal Dr. Fredi Fritz unless directed to avoid from renal     [facility] follow up visit ____2 weeks with Mercy Segundo _________________________  (Please note your next appointment above and if you are unable to keep, kindly give a 24 hour notice. Thank you.)          If you experience any of the following, please call the [facility] during business hours:    * Increase in Pain  * Temperature over 101  * Increase in drainage from your wound  * Drainage with a foul odor  * Bleeding  * Increase in swelling  * Need for compression bandage changes due to slippage, breakthrough drainage. If you need medical attention outside of the business hours of the [facility] [employer] please contact your PCP or go to the nearest emergency room.

## 2023-10-31 LAB
FUNGUS SPEC CULT: NORMAL
KOH PREP SPEC: NORMAL

## 2023-11-02 ENCOUNTER — HOSPITAL ENCOUNTER (OUTPATIENT)
Dept: WOUND CARE | Age: 48
Discharge: HOME | End: 2023-11-02
Payer: MEDICARE

## 2023-11-02 VITALS
HEART RATE: 77 BPM | TEMPERATURE: 98 F | RESPIRATION RATE: 18 BRPM | WEIGHT: 183 LBS | HEIGHT: 74 IN | BODY MASS INDEX: 23.49 KG/M2

## 2023-11-02 DIAGNOSIS — L98.492 GROIN INCISION ULCER, WITH FAT LAYER EXPOSED (HCC): Primary | ICD-10-CM

## 2023-11-02 PROCEDURE — 97597 DBRDMT OPN WND 1ST 20 CM/<: CPT

## 2023-11-02 PROCEDURE — 97597 DBRDMT OPN WND 1ST 20 CM/<: CPT | Performed by: NURSE PRACTITIONER

## 2023-11-02 RX ORDER — GENTAMICIN SULFATE 1 MG/G
OINTMENT TOPICAL ONCE
OUTPATIENT
Start: 2023-11-02 | End: 2023-11-02

## 2023-11-02 RX ORDER — LIDOCAINE HYDROCHLORIDE 40 MG/ML
SOLUTION TOPICAL ONCE
OUTPATIENT
Start: 2023-11-02 | End: 2023-11-02

## 2023-11-02 RX ORDER — LIDOCAINE HYDROCHLORIDE 20 MG/ML
JELLY TOPICAL ONCE
OUTPATIENT
Start: 2023-11-02 | End: 2023-11-02

## 2023-11-02 RX ORDER — SODIUM CHLOR/HYPOCHLOROUS ACID 0.033 %
SOLUTION, IRRIGATION IRRIGATION ONCE
OUTPATIENT
Start: 2023-11-02 | End: 2023-11-02

## 2023-11-02 RX ORDER — GINSENG 100 MG
CAPSULE ORAL ONCE
OUTPATIENT
Start: 2023-11-02 | End: 2023-11-02

## 2023-11-02 RX ORDER — LIDOCAINE 40 MG/G
CREAM TOPICAL ONCE
OUTPATIENT
Start: 2023-11-02 | End: 2023-11-02

## 2023-11-02 RX ORDER — LIDOCAINE HYDROCHLORIDE 20 MG/ML
JELLY TOPICAL ONCE
Status: COMPLETED | OUTPATIENT
Start: 2023-11-02 | End: 2023-11-02

## 2023-11-02 RX ORDER — TRIAMCINOLONE ACETONIDE 1 MG/G
OINTMENT TOPICAL ONCE
OUTPATIENT
Start: 2023-11-02 | End: 2023-11-02

## 2023-11-02 RX ORDER — BACITRACIN ZINC AND POLYMYXIN B SULFATE 500; 1000 [USP'U]/G; [USP'U]/G
OINTMENT TOPICAL ONCE
OUTPATIENT
Start: 2023-11-02 | End: 2023-11-02

## 2023-11-02 RX ORDER — CLOBETASOL PROPIONATE 0.5 MG/G
OINTMENT TOPICAL ONCE
OUTPATIENT
Start: 2023-11-02 | End: 2023-11-02

## 2023-11-02 RX ORDER — SODIUM HYPOCHLORITE 1.25 MG/ML
SOLUTION TOPICAL
Qty: 1000 ML | Refills: 5 | Status: SHIPPED | OUTPATIENT
Start: 2023-11-02

## 2023-11-02 RX ORDER — BETAMETHASONE DIPROPIONATE 0.05 %
OINTMENT (GRAM) TOPICAL ONCE
OUTPATIENT
Start: 2023-11-02 | End: 2023-11-02

## 2023-11-02 RX ORDER — IBUPROFEN 200 MG
TABLET ORAL ONCE
OUTPATIENT
Start: 2023-11-02 | End: 2023-11-02

## 2023-11-02 RX ORDER — LIDOCAINE 50 MG/G
OINTMENT TOPICAL ONCE
OUTPATIENT
Start: 2023-11-02 | End: 2023-11-02

## 2023-11-02 RX ADMIN — LIDOCAINE HYDROCHLORIDE: 20 JELLY TOPICAL at 08:38

## 2023-11-02 NOTE — HOME CARE
Assessment Edematous 10/17/23 0932   Odor None 10/17/23 0932   Number of days: 27       Wound 10/03/23 Scrotum wound 1-scrotum (Active)   Wound Image   11/02/23 0828   Wound Etiology Surgical 11/02/23 0828   Dressing Status Old drainage noted 11/02/23 0828   Wound Cleansed Soap and water 11/02/23 0828   Dressing/Treatment Moist to dry 10/17/23 1011   Dressing Change Due 10/09/23 10/07/23 2032   Wound Length (cm) 5.2 cm 11/02/23 0828   Wound Width (cm) 1.5 cm 11/02/23 0828   Wound Depth (cm) 2.6 cm 11/02/23 0828   Wound Surface Area (cm^2) 7.8 cm^2 11/02/23 0828   Change in Wound Size % (l*w) 89.39 11/02/23 0828   Wound Volume (cm^3) 20.28 cm^3 11/02/23 0828   Wound Healing % 94 11/02/23 0828   Post-Procedure Length (cm) 5.2 cm 11/02/23 0839   Post-Procedure Width (cm) 1.5 cm 11/02/23 0839   Post-Procedure Depth (cm) 2.6 cm 11/02/23 0839   Post-Procedure Surface Area (cm^2) 7.8 cm^2 11/02/23 0839   Post-Procedure Volume (cm^3) 20.28 cm^3 11/02/23 0839   Undermining Starts ___ O'Clock 7 10/06/23 1359   Undermining Ends___ O'Clock 12 10/06/23 1359   Undermining Maxium Distance (cm) 4.5 10/06/23 1359   Wound Assessment Granulation tissue 11/02/23 0828   Drainage Amount Moderate (25-50%) 11/02/23 0828   Drainage Description Serosanguinous 11/02/23 0828   Odor None 11/02/23 0828   Lucie-wound Assessment Intact 11/02/23 0828   Margins Defined edges 11/02/23 0828   Wound Thickness Description not for Pressure Injury Full thickness 11/02/23 0828   Number of days: 29          Supplies Requested :      WOUND #: 1   PRIMARY DRESSING:  Collagen with silver   Cover and Secure with: 4X4 gauze pad     FREQUENCY OF DRESSING CHANGES:  Daily         ADDITIONAL ITEMS:  [] Gloves Small  [x] Gloves Medium [] Gloves Large [] Gloves XLarge  [] Tape 1\" [x] Tape 2\" PAPER TAPE [] Tape 3\"  [] Medipore Tape  [x] Saline  [] Vashe  [] Skin Prep   [] Adhesive Remover   [x] Cotton Tip Applicators   [] Other:    Patient Wound(s) Debrided: [x] Yes if

## 2023-11-02 NOTE — PROGRESS NOTES
Amount Small 10/09/23 1925   Drainage Description Serosanguinous 10/09/23 1925   Dressing Change Due 10/11/23 10/09/23 1925   Wound Assessment Granulation tissue;Slough 10/17/23 0932   Lucie-wound Assessment Edematous 10/17/23 0932   Odor None 10/17/23 0932   Number of days: 27       Wound 10/03/23 Scrotum wound 1-scrotum (Active)   Wound Image   11/02/23 0828   Wound Etiology Surgical 11/02/23 0828   Dressing Status Old drainage noted 11/02/23 0828   Wound Cleansed Soap and water 11/02/23 0828   Dressing/Treatment Moist to dry 10/17/23 1011   Dressing Change Due 10/09/23 10/07/23 2032   Wound Length (cm) 5.2 cm 11/02/23 0828   Wound Width (cm) 1.5 cm 11/02/23 0828   Wound Depth (cm) 2.6 cm 11/02/23 0828   Wound Surface Area (cm^2) 7.8 cm^2 11/02/23 0828   Change in Wound Size % (l*w) 89.39 11/02/23 0828   Wound Volume (cm^3) 20.28 cm^3 11/02/23 0828   Wound Healing % 94 11/02/23 0828   Post-Procedure Length (cm) 5.2 cm 11/02/23 0839   Post-Procedure Width (cm) 1.5 cm 11/02/23 0839   Post-Procedure Depth (cm) 2.6 cm 11/02/23 0839   Post-Procedure Surface Area (cm^2) 7.8 cm^2 11/02/23 0839   Post-Procedure Volume (cm^3) 20.28 cm^3 11/02/23 0839   Undermining Starts ___ O'Clock 7 10/06/23 1359   Undermining Ends___ O'Clock 12 10/06/23 1359   Undermining Maxium Distance (cm) 4.5 10/06/23 1359   Wound Assessment Granulation tissue 11/02/23 0828   Drainage Amount Moderate (25-50%) 11/02/23 0828   Drainage Description Serosanguinous 11/02/23 0828   Odor None 11/02/23 0828   Lucie-wound Assessment Intact 11/02/23 0828   Margins Defined edges 11/02/23 0828   Wound Thickness Description not for Pressure Injury Full thickness 11/02/23 0828   Number of days: 29           Procedure Note  Indications:  Based on my examination of this patient's wound(s)/ulcer(s) today, debridement is required to promote healing and evaluate the wound base.     Performed by: CALLY Weaver CNP    Consent obtained:  Yes    Time out taken:

## 2023-11-07 LAB
FUNGUS SPEC CULT: NORMAL
KOH PREP SPEC: NORMAL

## 2023-11-16 ENCOUNTER — PATIENT MESSAGE (OUTPATIENT)
Dept: PRIMARY CARE CLINIC | Age: 48
End: 2023-11-16

## 2023-11-16 DIAGNOSIS — K21.9 GASTROESOPHAGEAL REFLUX DISEASE WITHOUT ESOPHAGITIS: ICD-10-CM

## 2023-11-16 RX ORDER — ONDANSETRON 4 MG/1
4 TABLET, FILM COATED ORAL DAILY PRN
Qty: 30 TABLET | Refills: 1 | Status: SHIPPED | OUTPATIENT
Start: 2023-11-16 | End: 2023-11-16 | Stop reason: SDUPTHER

## 2023-11-16 RX ORDER — ONDANSETRON 4 MG/1
4 TABLET, FILM COATED ORAL DAILY PRN
Qty: 30 TABLET | Refills: 1 | Status: SHIPPED | OUTPATIENT
Start: 2023-11-16

## 2023-11-16 NOTE — TELEPHONE ENCOUNTER
From: Elizabet Epstein  To: Dr. Richy Navarro: 11/16/2023 11:47 AM CST  Subject: Shefali Major Dr. Need my puke pills refilled in bad way. Been puking since Sunday night. Thank you.

## 2023-11-16 NOTE — TELEPHONE ENCOUNTER
Pb Wheeler called requesting a refill of the below medication which has been pended for you:     Requested Prescriptions     Pending Prescriptions Disp Refills    ondansetron (ZOFRAN) 4 MG tablet 30 tablet 1     Sig: Take 1 tablet by mouth daily as needed for Nausea or Vomiting       Last Appointment Date: 10/17/2023  Next Appointment Date: 1/19/2024    Allergies   Allergen Reactions    Oxycodone-Acetaminophen      Give me migraines   Other reaction(s): Other (see comments)  Give me migraines   Give me migraines   Reaction: migraines  Give me migraines   Give me migraines   Reaction: migraines    Morphine And Related      Doesn't work     Morphine      Other reaction(s):  Other (see comments)  Doesn't work   Doesn't work

## 2023-11-22 ENCOUNTER — HOSPITAL ENCOUNTER (OUTPATIENT)
Dept: WOUND CARE | Age: 48
Discharge: HOME | End: 2023-11-22
Payer: MEDICARE

## 2023-11-22 VITALS
DIASTOLIC BLOOD PRESSURE: 124 MMHG | BODY MASS INDEX: 23.49 KG/M2 | RESPIRATION RATE: 18 BRPM | SYSTOLIC BLOOD PRESSURE: 180 MMHG | HEIGHT: 74 IN | TEMPERATURE: 97.1 F | WEIGHT: 183.04 LBS | HEART RATE: 120 BPM

## 2023-11-22 DIAGNOSIS — L98.492 GROIN INCISION ULCER, WITH FAT LAYER EXPOSED (HCC): Primary | ICD-10-CM

## 2023-11-22 DIAGNOSIS — N18.6 ESRD ON HEMODIALYSIS (HCC): ICD-10-CM

## 2023-11-22 DIAGNOSIS — Z99.2 ESRD ON HEMODIALYSIS (HCC): ICD-10-CM

## 2023-11-22 PROCEDURE — 97597 DBRDMT OPN WND 1ST 20 CM/<: CPT

## 2023-11-22 PROCEDURE — 97597 DBRDMT OPN WND 1ST 20 CM/<: CPT | Performed by: NURSE PRACTITIONER

## 2023-11-22 RX ORDER — LIDOCAINE HYDROCHLORIDE 20 MG/ML
JELLY TOPICAL PRN
Status: DISCONTINUED | OUTPATIENT
Start: 2023-11-22 | End: 2023-11-24 | Stop reason: HOSPADM

## 2023-11-22 NOTE — HOME CARE
[address] [facility] [place] [address] [facility] f: [phone] [phone] p: [phone] Amy@[employer] family [employer]      Ordering Center:     [address] [place] [address] [phone]  WOUND CARE Dept: [address] [place] [address] [phone]    Patient Information:      Christianosukh Arabella  [address] [facility] [address] [address]   : 1975  AGE: 57 y.o. GENDER: male   EPISODE DATE: 2023    Insurance:      PRIMARY INSURANCE:  Plan: Evy Stains MEDICARE  Coverage: [employer] MEDICARE  Effective Date: 2021  Group Number: [unfilled]  Subscriber Number: [id number] - (Medicare Managed)    Payer/Plan Subscr  Sex Relation Sub. Ins. ID Effective Group Num   1.  [employer] [address] [place] [address] 1975 Male Self [id number]                                   [address] [address]       Patient Wound Information:      Problem List Items Addressed This Visit          Genitourinary    ESRD on hemodialysis ([address])       Other    * (Principal) Groin incision ulcer, with fat layer exposed ([address]) - Primary       WOUNDS REQUIRING DRESSING SUPPLIES:     Wound 10/03/23 Scrotum wound 1-scrotum (Active)   Wound Image   23 1515   Wound Etiology Surgical 23 1515   Dressing Status Old drainage noted 23 1515   Wound Cleansed Soap and water 23 1515   Dressing/Treatment Moist to dry 23 1532   Wound Length (cm) 1.8 cm 23 1515   Wound Width (cm) 0.7 cm 23 1515   Wound Depth (cm) 2.4 cm 23 1515   Wound Surface Area (cm^2) 1.26 cm^2 23 1515   Change in Wound Size % (l*w) 98.29 23 1515   Wound Volume (cm^3) 3.024 cm^3 23 1515   Wound Healing % 99 11/22/23 1515   Post-Procedure Length (cm) 1.8 cm 23   Post-Procedure Width (cm) 0.7 cm 23   Post-Procedure Depth (cm) 2.4 cm 23   Post-Procedure Surface Area

## 2023-11-22 NOTE — PROGRESS NOTES
[address]   Progress Note and Procedure Note      3200 [facility] RECORD NUMBER:  [id number]  AGE: 57 y.o. GENDER: male  : 1975  EPISODE DATE:  2023    Subjective:     Chief Complaint   Patient presents with    Wound Check     Scrotal wound      HISTORY of PRESENT ILLNESS HPI     Herbert Carter is a 57 y.o. male who presents today for wound/ulcer evaluation.    History of Wound Context: scrotal wound follow up/eval and treat    Ulcer Identification:  Ulcer Type:  surgical  Contributing Factors: malnutrition and ESRD    Wound: Surgical incision        PAST MEDICAL HISTORY        Diagnosis Date    Asthma     during winter months    Cancer [facility])     rectal    Colostomy care [facility])     Hemodialysis patient ([address] [facility])      at [place]    History of blood transfusion     Hx of migraine headaches     Hypercholesterolemia 2019    Hypertension     Kidney stone     hx of    Palliative care patient 2019    Pericarditis     Rectal cancer ([address] [facility])     Testalgia 2016       PAST SURGICAL HISTORY    Past Surgical History:   Procedure Laterality Date    ANTERIOR CRUCIATE LIGAMENT REPAIR      ACL and MCL repair    COLON SURGERY      colon resection with colostomy    COLONOSCOPY      DIALYSIS FISTULA CREATION Bilateral 2020    CREATION  LEFT  BRACHIAL CEPHALIC AV FISTULA performed by Shannon Montejo MD at [facility] OR    ENDOSCOPY, COLON, DIAGNOSTIC      HC COLONOSCOPY BIOPSY/STOMA N/A 2019    Dr Purdy-w/APC ablation-Inflammatory polyps inside or below the colostomy opening-Tubular AP (-) dysplasia    HERNIA REPAIR      As an infant    KIDNEY REMOVAL Left     cancer    SCROTAL SURGERY N/A 10/3/2023    SCROTAL ABSCESS INCISION AND DRAINAGE performed by Chelsea Silver MD at [address] [place] [address] 107 N/A 2019    Dr Purdy-Gastric Jaycee Iam Right 2022    [facility]

## 2023-11-22 NOTE — DISCHARGE INSTRUCTIONS
[address] and Hyperbaric Oxygen Therapy   Physician Orders and Discharge Instructions  1830 [facility],[address] [place] [address] Blvd, [address]  Telephone: [phone]    NAME:  Bertin Gonzalez  YOB: 1975  MEDICAL RECORD NUMBER:  [id number]  DATE:  11/22/2023    Discharge condition: Stable    Discharge to: Home    Left via:Private automobile    Accompanied by:  self    ECF: [facility]    Dressing Orders:   Groin Wound:Wash with soap and water. Tuck collagen into the wound bed, then apply 1/4 STR Dakins moistened gauze to wound bed. Cover with dry gauze. Secure with medipore tape. Change twice daily . Treatment Orders:   Avoid pressure to the wound   Protein rich diet unless directed to avoid from renal Dr. Rodrigo Arevalo unless directed to avoid from renal      [address] [facility] follow up visit _____2 weeks with Devika________________________  (Please note your next appointment above and if you are unable to keep, kindly give a 24 hour notice. Thank you.)          If you experience any of the following, please call the [employer] during business hours:    * Increase in Pain  * Temperature over 101  * Increase in drainage from your wound  * Drainage with a foul odor  * Bleeding  * Increase in swelling  * Need for compression bandage changes due to slippage, breakthrough drainage. If you need medical attention outside of the business hours of the [facility] [place] please contact your PCP or go to the nearest emergency room.

## 2023-11-22 NOTE — PATIENT INSTRUCTIONS
[address] and Hyperbaric Oxygen Therapy   Physician Orders and Discharge Instructions  1830 [facility],[address] [place] [address] Blvd, [address]  Telephone: [phone]    NAME:  Jacinto Edouard  YOB: 1975  MEDICAL RECORD NUMBER:  [id number]  DATE:  11/22/2023    Discharge condition: Stable    Discharge to: Home    Left via:Private automobile    Accompanied by:  self    ECF: [facility]    Dressing Orders:   Groin Wound:Wash with soap and water. Tuck collagen into the wound bed, then apply 1/4 STR Dakins moistened gauze to wound bed. Cover with dry gauze. Secure with medipore tape. Change twice daily . Treatment Orders:   Avoid pressure to the wound   Protein rich diet unless directed to avoid from renal Dr. Martha Eugene unless directed to avoid from renal      401 [facility] follow up visit _____2 weeks with Devika________________________  (Please note your next appointment above and if you are unable to keep, kindly give a 24 hour notice. Thank you.)          If you experience any of the following, please call the [address] [place] [address] during business hours:    * Increase in Pain  * Temperature over 101  * Increase in drainage from your wound  * Drainage with a foul odor  * Bleeding  * Increase in swelling  * Need for compression bandage changes due to slippage, breakthrough drainage. If you need medical attention outside of the business hours of the [address] [place] [address] please contact your PCP or go to the nearest emergency room.

## 2023-12-07 ENCOUNTER — HOSPITAL ENCOUNTER (OUTPATIENT)
Dept: WOUND CARE | Age: 48
Discharge: HOME | End: 2023-12-07
Payer: MEDICARE

## 2023-12-07 VITALS
RESPIRATION RATE: 18 BRPM | SYSTOLIC BLOOD PRESSURE: 148 MMHG | HEART RATE: 91 BPM | BODY MASS INDEX: 23.49 KG/M2 | TEMPERATURE: 98.2 F | WEIGHT: 183 LBS | DIASTOLIC BLOOD PRESSURE: 116 MMHG | HEIGHT: 74 IN

## 2023-12-07 DIAGNOSIS — L98.492 GROIN INCISION ULCER, WITH FAT LAYER EXPOSED (HCC): Primary | ICD-10-CM

## 2023-12-07 PROCEDURE — 97597 DBRDMT OPN WND 1ST 20 CM/<: CPT

## 2023-12-07 RX ORDER — TRIAMCINOLONE ACETONIDE 1 MG/G
OINTMENT TOPICAL ONCE
OUTPATIENT
Start: 2023-12-07 | End: 2023-12-07

## 2023-12-07 RX ORDER — CLOBETASOL PROPIONATE 0.5 MG/G
OINTMENT TOPICAL ONCE
OUTPATIENT
Start: 2023-12-07 | End: 2023-12-07

## 2023-12-07 RX ORDER — SODIUM CHLOR/HYPOCHLOROUS ACID 0.033 %
SOLUTION, IRRIGATION IRRIGATION ONCE
OUTPATIENT
Start: 2023-12-07 | End: 2023-12-07

## 2023-12-07 RX ORDER — LIDOCAINE HYDROCHLORIDE 20 MG/ML
JELLY TOPICAL ONCE
OUTPATIENT
Start: 2023-12-07 | End: 2023-12-07

## 2023-12-07 RX ORDER — IBUPROFEN 200 MG
TABLET ORAL ONCE
OUTPATIENT
Start: 2023-12-07 | End: 2023-12-07

## 2023-12-07 RX ORDER — LIDOCAINE 50 MG/G
OINTMENT TOPICAL ONCE
OUTPATIENT
Start: 2023-12-07 | End: 2023-12-07

## 2023-12-07 RX ORDER — LIDOCAINE 40 MG/G
CREAM TOPICAL ONCE
OUTPATIENT
Start: 2023-12-07 | End: 2023-12-07

## 2023-12-07 RX ORDER — BETAMETHASONE DIPROPIONATE 0.05 %
OINTMENT (GRAM) TOPICAL ONCE
OUTPATIENT
Start: 2023-12-07 | End: 2023-12-07

## 2023-12-07 RX ORDER — LIDOCAINE HYDROCHLORIDE 20 MG/ML
JELLY TOPICAL ONCE
Status: COMPLETED | OUTPATIENT
Start: 2023-12-07 | End: 2023-12-07

## 2023-12-07 RX ORDER — GINSENG 100 MG
CAPSULE ORAL ONCE
OUTPATIENT
Start: 2023-12-07 | End: 2023-12-07

## 2023-12-07 RX ORDER — BACITRACIN ZINC AND POLYMYXIN B SULFATE 500; 1000 [USP'U]/G; [USP'U]/G
OINTMENT TOPICAL ONCE
OUTPATIENT
Start: 2023-12-07 | End: 2023-12-07

## 2023-12-07 RX ORDER — GENTAMICIN SULFATE 1 MG/G
OINTMENT TOPICAL ONCE
OUTPATIENT
Start: 2023-12-07 | End: 2023-12-07

## 2023-12-07 RX ORDER — LIDOCAINE HYDROCHLORIDE 40 MG/ML
SOLUTION TOPICAL ONCE
OUTPATIENT
Start: 2023-12-07 | End: 2023-12-07

## 2023-12-07 RX ADMIN — LIDOCAINE HYDROCHLORIDE: 20 JELLY TOPICAL at 09:14

## 2023-12-07 ASSESSMENT — PAIN DESCRIPTION - FREQUENCY: FREQUENCY: INTERMITTENT

## 2023-12-07 ASSESSMENT — PAIN DESCRIPTION - ONSET: ONSET: ON-GOING

## 2023-12-07 ASSESSMENT — PAIN SCALES - GENERAL: PAINLEVEL_OUTOF10: 3

## 2023-12-07 ASSESSMENT — PAIN DESCRIPTION - PAIN TYPE: TYPE: ACUTE PAIN

## 2023-12-07 ASSESSMENT — PAIN DESCRIPTION - DESCRIPTORS: DESCRIPTORS: ACHING;DISCOMFORT

## 2023-12-07 ASSESSMENT — PAIN - FUNCTIONAL ASSESSMENT: PAIN_FUNCTIONAL_ASSESSMENT: ACTIVITIES ARE NOT PREVENTED

## 2023-12-07 ASSESSMENT — PAIN DESCRIPTION - LOCATION: LOCATION: SCROTUM

## 2023-12-07 NOTE — PROGRESS NOTES
[address]   Progress Note and Procedure Note      3200 [facility] RECORD NUMBER:  [id number]  AGE: 57 y.o. GENDER: male  : 1975  EPISODE DATE:  2023    Subjective:     Chief Complaint   Patient presents with    Wound Check     Pt presents for recheck of scrotal wound      HISTORY of PRESENT ILLNESS HPI     Brittny Petty is a 57 y.o. male who presents today for wound/ulcer evaluation.    History of Wound Context: scrotal wound follow up/eval and treat    Ulcer Identification:  Ulcer Type:  surgical  Contributing Factors:  ESRD    Wound: Surgical incision        PAST MEDICAL HISTORY        Diagnosis Date    Asthma     during winter months    Cancer [facility])     rectal    Colostomy care [facility])     Hemodialysis patient ([address] [facility])      at [place]    History of blood transfusion     Hx of migraine headaches     Hypercholesterolemia 2019    Hypertension     Kidney stone     hx of    Palliative care patient 2019    Pericarditis     Rectal cancer ([address] [facility])     Testalgia 2016       PAST SURGICAL HISTORY    Past Surgical History:   Procedure Laterality Date    ANTERIOR CRUCIATE LIGAMENT REPAIR      ACL and MCL repair    COLON SURGERY      colon resection with colostomy    COLONOSCOPY      DIALYSIS FISTULA CREATION Bilateral 2020    CREATION  LEFT  BRACHIAL CEPHALIC AV FISTULA performed by Alma Auguste MD at [facility] OR    ENDOSCOPY, COLON, DIAGNOSTIC      HC COLONOSCOPY BIOPSY/STOMA N/A 2019    Dr Purdy-w/APC ablation-Inflammatory polyps inside or below the colostomy opening-Tubular AP (-) dysplasia    HERNIA REPAIR      As an infant    KIDNEY REMOVAL Left     cancer    SCROTAL SURGERY N/A 10/3/2023    SCROTAL ABSCESS INCISION AND DRAINAGE performed by Amado Ram MD at [address] [place] [address] 107 N/A 2019    Dr Purdy-Gastric Gayla Gideon Right 2022    S

## 2023-12-07 NOTE — PLAN OF CARE
Problem: Pain  Goal: Verbalizes/displays adequate comfort level or baseline comfort level  Outcome: Progressing     Problem: Wound:  Goal: Will show signs of wound healing; wound closure and no evidence of infection  Description: Will show signs of wound healing; wound closure and no evidence of infection  Outcome: Progressing     Problem: Weight control:  Goal: Ability to maintain an optimal weight for height and age will be supported  Description: Ability to maintain an optimal weight for height and age will be supported  Outcome: Progressing     Problem: Falls - Risk of:  Goal: Will remain free from falls  Description: Will remain free from falls  Outcome: Progressing

## 2023-12-07 NOTE — PATIENT INSTRUCTIONS
[address] and Hyperbaric Oxygen Therapy   Physician Orders and Discharge Instructions  1830 [facility],[address] [place] [address] Blvd, [address]  Telephone: [phone]    NAME:  Ashlee Magaña  YOB: 1975  MEDICAL RECORD NUMBER:  [id number]  DATE:  12/7/23    Discharge condition: Stable    Discharge to: Home    Left via:Private automobile    Accompanied by:  self    ECF: [facility]    Dressing Orders:   Groin Wound:Wash with soap and water. Tuck collagen into the wound bed, then apply 1/4 STR Dakins moistened gauze to wound bed. Cover with dry gauze. Secure with medipore tape. Change twice daily . Treatment Orders:   Avoid pressure to the wound   Protein rich diet unless directed to avoid from renal Dr. Lisa Leone unless directed to avoid from renal      401 [facility] follow up visit _____3 weeks with Devika________________________  (Please note your next appointment above and if you are unable to keep, kindly give a 24 hour notice. Thank you.)          If you experience any of the following, please call the [employer] [place] during business hours:    * Increase in Pain  * Temperature over 101  * Increase in drainage from your wound  * Drainage with a foul odor  * Bleeding  * Increase in swelling  * Need for compression bandage changes due to slippage, breakthrough drainage. If you need medical attention outside of the business hours of the [address] [place] [address] please contact your PCP or go to the nearest emergency room.

## 2023-12-11 DIAGNOSIS — N40.1 BENIGN NON-NODULAR PROSTATIC HYPERPLASIA WITH LOWER URINARY TRACT SYMPTOMS: ICD-10-CM

## 2023-12-11 RX ORDER — TAMSULOSIN HYDROCHLORIDE 0.4 MG/1
CAPSULE ORAL DAILY
Qty: 90 CAPSULE | Refills: 1 | Status: ON HOLD | OUTPATIENT
Start: 2023-12-11

## 2023-12-11 RX ORDER — AMLODIPINE BESYLATE 10 MG/1
TABLET ORAL
Qty: 90 TABLET | Refills: 1 | Status: ON HOLD | OUTPATIENT
Start: 2023-12-11

## 2023-12-14 DIAGNOSIS — G43.709 CHRONIC MIGRAINE WITHOUT AURA WITHOUT STATUS MIGRAINOSUS, NOT INTRACTABLE: ICD-10-CM

## 2023-12-14 RX ORDER — SUMATRIPTAN 100 MG/1
TABLET, FILM COATED ORAL
Qty: 9 TABLET | Refills: 2 | Status: ON HOLD | OUTPATIENT
Start: 2023-12-14

## 2023-12-17 ENCOUNTER — HOSPITAL ENCOUNTER (INPATIENT)
Age: 48
LOS: 6 days | Discharge: ANOTHER HOSPITAL | DRG: 091 | End: 2023-12-23
Attending: EMERGENCY MEDICINE | Admitting: INTERNAL MEDICINE
Payer: MEDICARE

## 2023-12-17 ENCOUNTER — APPOINTMENT (OUTPATIENT)
Dept: CT IMAGING | Age: 48
DRG: 091 | End: 2023-12-17
Payer: MEDICARE

## 2023-12-17 ENCOUNTER — APPOINTMENT (OUTPATIENT)
Dept: GENERAL RADIOLOGY | Age: 48
DRG: 091 | End: 2023-12-17
Payer: MEDICARE

## 2023-12-17 DIAGNOSIS — R29.90 STROKE-LIKE SYMPTOM: ICD-10-CM

## 2023-12-17 DIAGNOSIS — Z99.2 ESRD ON DIALYSIS (HCC): ICD-10-CM

## 2023-12-17 DIAGNOSIS — R41.82 ALTERED MENTAL STATUS, UNSPECIFIED ALTERED MENTAL STATUS TYPE: Primary | ICD-10-CM

## 2023-12-17 DIAGNOSIS — N18.6 ESRD ON DIALYSIS (HCC): ICD-10-CM

## 2023-12-17 DIAGNOSIS — R56.9 SEIZURE (HCC): ICD-10-CM

## 2023-12-17 PROBLEM — E87.5 HYPERKALEMIA: Status: ACTIVE | Noted: 2023-12-17

## 2023-12-17 LAB
ALBUMIN SERPL-MCNC: 2.8 G/DL (ref 3.5–5.2)
ALP SERPL-CCNC: 181 U/L (ref 40–130)
ALT SERPL-CCNC: 11 U/L (ref 5–41)
ANION GAP SERPL CALCULATED.3IONS-SCNC: 20 MMOL/L (ref 7–19)
ANION GAP SERPL CALCULATED.3IONS-SCNC: 23 MMOL/L (ref 7–19)
AST SERPL-CCNC: 12 U/L (ref 5–40)
BASOPHILS # BLD: 0 K/UL (ref 0–0.2)
BASOPHILS NFR BLD: 0.4 % (ref 0–1)
BILIRUB SERPL-MCNC: 0.4 MG/DL (ref 0.2–1.2)
BUN SERPL-MCNC: 34 MG/DL (ref 6–20)
BUN SERPL-MCNC: 66 MG/DL (ref 6–20)
CALCIUM SERPL-MCNC: 8.4 MG/DL (ref 8.6–10)
CALCIUM SERPL-MCNC: 9 MG/DL (ref 8.6–10)
CHLORIDE SERPL-SCNC: 94 MMOL/L (ref 98–111)
CHLORIDE SERPL-SCNC: 94 MMOL/L (ref 98–111)
CO2 SERPL-SCNC: 15 MMOL/L (ref 22–29)
CO2 SERPL-SCNC: 25 MMOL/L (ref 22–29)
CREAT SERPL-MCNC: 12.9 MG/DL (ref 0.5–1.2)
CREAT SERPL-MCNC: 7 MG/DL (ref 0.5–1.2)
EOSINOPHIL # BLD: 0 K/UL (ref 0–0.6)
EOSINOPHIL NFR BLD: 0.1 % (ref 0–5)
ERYTHROCYTE [DISTWIDTH] IN BLOOD BY AUTOMATED COUNT: 16.6 % (ref 11.5–14.5)
GLUCOSE SERPL-MCNC: 122 MG/DL (ref 74–109)
GLUCOSE SERPL-MCNC: 123 MG/DL (ref 74–109)
HCT VFR BLD AUTO: 43.4 % (ref 42–52)
HGB BLD-MCNC: 13.9 G/DL (ref 14–18)
IMM GRANULOCYTES # BLD: 0.1 K/UL
INR PPP: 1.37 (ref 0.88–1.18)
LYMPHOCYTES # BLD: 0.6 K/UL (ref 1.1–4.5)
LYMPHOCYTES NFR BLD: 5.7 % (ref 20–40)
MCH RBC QN AUTO: 29.3 PG (ref 27–31)
MCHC RBC AUTO-ENTMCNC: 32 G/DL (ref 33–37)
MCV RBC AUTO: 91.4 FL (ref 80–94)
MONOCYTES # BLD: 0.3 K/UL (ref 0–0.9)
MONOCYTES NFR BLD: 2.9 % (ref 0–10)
NEUTROPHILS # BLD: 9.4 K/UL (ref 1.5–7.5)
NEUTS SEG NFR BLD: 90.1 % (ref 50–65)
PLATELET # BLD AUTO: 137 K/UL (ref 130–400)
PMV BLD AUTO: 9.9 FL (ref 9.4–12.4)
POTASSIUM SERPL-SCNC: 4.4 MMOL/L (ref 3.5–5)
POTASSIUM SERPL-SCNC: 6.4 MMOL/L (ref 3.5–5)
POTASSIUM SERPL-SCNC: 6.7 MMOL/L (ref 3.5–5)
PROT SERPL-MCNC: 7 G/DL (ref 6.6–8.7)
PROTHROMBIN TIME: 16.5 SEC (ref 12–14.6)
RBC # BLD AUTO: 4.75 M/UL (ref 4.7–6.1)
REASON FOR REJECTION: NORMAL
REJECTED TEST: NORMAL
SARS-COV-2 RDRP RESP QL NAA+PROBE: NOT DETECTED
SODIUM SERPL-SCNC: 132 MMOL/L (ref 136–145)
SODIUM SERPL-SCNC: 139 MMOL/L (ref 136–145)
TROPONIN, HIGH SENSITIVITY: 75 NG/L (ref 0–22)
WBC # BLD AUTO: 10.4 K/UL (ref 4.8–10.8)

## 2023-12-17 PROCEDURE — 85610 PROTHROMBIN TIME: CPT

## 2023-12-17 PROCEDURE — 84484 ASSAY OF TROPONIN QUANT: CPT

## 2023-12-17 PROCEDURE — 93005 ELECTROCARDIOGRAM TRACING: CPT | Performed by: EMERGENCY MEDICINE

## 2023-12-17 PROCEDURE — 6360000002 HC RX W HCPCS

## 2023-12-17 PROCEDURE — 6360000004 HC RX CONTRAST MEDICATION: Performed by: EMERGENCY MEDICINE

## 2023-12-17 PROCEDURE — 99285 EMERGENCY DEPT VISIT HI MDM: CPT

## 2023-12-17 PROCEDURE — 70498 CT ANGIOGRAPHY NECK: CPT

## 2023-12-17 PROCEDURE — 2500000003 HC RX 250 WO HCPCS: Performed by: INTERNAL MEDICINE

## 2023-12-17 PROCEDURE — 2000000000 HC ICU R&B

## 2023-12-17 PROCEDURE — 2580000003 HC RX 258: Performed by: EMERGENCY MEDICINE

## 2023-12-17 PROCEDURE — 71045 X-RAY EXAM CHEST 1 VIEW: CPT

## 2023-12-17 PROCEDURE — 6360000002 HC RX W HCPCS: Performed by: EMERGENCY MEDICINE

## 2023-12-17 PROCEDURE — 87635 SARS-COV-2 COVID-19 AMP PRB: CPT

## 2023-12-17 PROCEDURE — 2500000003 HC RX 250 WO HCPCS

## 2023-12-17 PROCEDURE — 84132 ASSAY OF SERUM POTASSIUM: CPT

## 2023-12-17 PROCEDURE — 70450 CT HEAD/BRAIN W/O DYE: CPT

## 2023-12-17 PROCEDURE — 85025 COMPLETE CBC W/AUTO DIFF WBC: CPT

## 2023-12-17 PROCEDURE — 2500000003 HC RX 250 WO HCPCS: Performed by: EMERGENCY MEDICINE

## 2023-12-17 PROCEDURE — 6360000002 HC RX W HCPCS: Performed by: INTERNAL MEDICINE

## 2023-12-17 PROCEDURE — 80053 COMPREHEN METABOLIC PANEL: CPT

## 2023-12-17 PROCEDURE — 5A1D70Z PERFORMANCE OF URINARY FILTRATION, INTERMITTENT, LESS THAN 6 HOURS PER DAY: ICD-10-PCS | Performed by: INTERNAL MEDICINE

## 2023-12-17 PROCEDURE — 36415 COLL VENOUS BLD VENIPUNCTURE: CPT

## 2023-12-17 PROCEDURE — 96375 TX/PRO/DX INJ NEW DRUG ADDON: CPT

## 2023-12-17 PROCEDURE — 8010000000 HC HEMODIALYSIS ACUTE INPT

## 2023-12-17 PROCEDURE — 96374 THER/PROPH/DIAG INJ IV PUSH: CPT

## 2023-12-17 RX ORDER — LORAZEPAM 2 MG/ML
2 INJECTION INTRAMUSCULAR ONCE
Status: COMPLETED | OUTPATIENT
Start: 2023-12-17 | End: 2023-12-17

## 2023-12-17 RX ORDER — CALCIUM GLUCONATE 94 MG/ML
2000 INJECTION, SOLUTION INTRAVENOUS ONCE
Status: COMPLETED | OUTPATIENT
Start: 2023-12-17 | End: 2023-12-17

## 2023-12-17 RX ORDER — METOPROLOL TARTRATE 1 MG/ML
5 INJECTION, SOLUTION INTRAVENOUS ONCE
Status: COMPLETED | OUTPATIENT
Start: 2023-12-17 | End: 2023-12-17

## 2023-12-17 RX ORDER — LORAZEPAM 2 MG/ML
INJECTION INTRAMUSCULAR
Status: COMPLETED
Start: 2023-12-17 | End: 2023-12-17

## 2023-12-17 RX ORDER — LEVETIRACETAM 500 MG/5ML
INJECTION, SOLUTION, CONCENTRATE INTRAVENOUS
Status: COMPLETED
Start: 2023-12-17 | End: 2023-12-17

## 2023-12-17 RX ORDER — HEPARIN SODIUM 1000 [USP'U]/ML
4000 INJECTION, SOLUTION INTRAVENOUS; SUBCUTANEOUS
Status: COMPLETED | OUTPATIENT
Start: 2023-12-17 | End: 2023-12-17

## 2023-12-17 RX ORDER — DEXMEDETOMIDINE HYDROCHLORIDE 4 UG/ML
.1-1.5 INJECTION, SOLUTION INTRAVENOUS CONTINUOUS
Status: DISCONTINUED | OUTPATIENT
Start: 2023-12-17 | End: 2023-12-21

## 2023-12-17 RX ORDER — LORAZEPAM 2 MG/ML
1 INJECTION INTRAMUSCULAR ONCE
Status: COMPLETED | OUTPATIENT
Start: 2023-12-17 | End: 2023-12-17

## 2023-12-17 RX ORDER — LEVETIRACETAM 500 MG/5ML
1000 INJECTION, SOLUTION, CONCENTRATE INTRAVENOUS ONCE
Status: COMPLETED | OUTPATIENT
Start: 2023-12-17 | End: 2023-12-17

## 2023-12-17 RX ORDER — DEXMEDETOMIDINE HYDROCHLORIDE 4 UG/ML
INJECTION, SOLUTION INTRAVENOUS
Status: COMPLETED
Start: 2023-12-17 | End: 2023-12-17

## 2023-12-17 RX ADMIN — LORAZEPAM 1 MG: 2 INJECTION INTRAMUSCULAR; INTRAVENOUS at 18:20

## 2023-12-17 RX ADMIN — LEVETIRACETAM 1000 MG: 500 INJECTION, SOLUTION, CONCENTRATE INTRAVENOUS at 15:21

## 2023-12-17 RX ADMIN — HEPARIN SODIUM 4000 UNITS: 1000 INJECTION INTRAVENOUS; SUBCUTANEOUS at 23:55

## 2023-12-17 RX ADMIN — LORAZEPAM 2 MG: 2 INJECTION INTRAMUSCULAR at 15:21

## 2023-12-17 RX ADMIN — CALCIUM GLUCONATE 2000 MG: 98 INJECTION, SOLUTION INTRAVENOUS at 15:40

## 2023-12-17 RX ADMIN — LORAZEPAM 2 MG: 2 INJECTION INTRAMUSCULAR; INTRAVENOUS at 15:21

## 2023-12-17 RX ADMIN — DEXMEDETOMIDINE HYDROCHLORIDE 1.5 MCG/KG/HR: 400 INJECTION, SOLUTION INTRAVENOUS at 22:00

## 2023-12-17 RX ADMIN — IOPAMIDOL 70 ML: 755 INJECTION, SOLUTION INTRAVENOUS at 15:12

## 2023-12-17 RX ADMIN — SODIUM BICARBONATE 50 MEQ: 84 INJECTION INTRAVENOUS at 15:42

## 2023-12-17 RX ADMIN — SODIUM CHLORIDE 5 MG/HR: 9 INJECTION, SOLUTION INTRAVENOUS at 16:39

## 2023-12-17 RX ADMIN — DEXMEDETOMIDINE HYDROCHLORIDE 1.5 MCG/KG/HR: 400 INJECTION, SOLUTION INTRAVENOUS at 18:55

## 2023-12-17 RX ADMIN — METOPROLOL TARTRATE 5 MG: 1 INJECTION, SOLUTION INTRAVENOUS at 16:10

## 2023-12-17 RX ADMIN — SODIUM CHLORIDE 10 MG/HR: 9 INJECTION, SOLUTION INTRAVENOUS at 22:00

## 2023-12-17 NOTE — H&P
[facility]    Patient: eMseret Burr   : 1975   MRN: [id number]  Code Status: Full Code  PCP: Zaira Urbina MD  Date of Service: 2023    Chief Complaint:   Altered mental status    History of Present Illness:   53-year-old male with past medical history as listed below presented to ER with altered mental status. History provided by the patient's wife at bedside. She states she found him slumped to the right earlier this afternoon. Patient has not been dialyzed fully in a week. Seizure-like activity. Uncontrolled blood pressures. No further history provided. Admitted to critical care unit. Review of Systems:   Review of systems not obtained due to patient factors.     Past Medical History:     Past Medical History:   Diagnosis Date    Asthma     during winter months    Cancer [facility])     rectal    Colostomy care [facility])     Hemodialysis patient ([facility])      at [facility]    History of blood transfusion     Hx of migraine headaches     Hypercholesterolemia 2019    Hypertension     Kidney stone     hx of    Palliative care patient 2019    Pericarditis     Rectal cancer ([facility])     Testalgia 2016         Past Surgical History:     Past Surgical History:   Procedure Laterality Date    ANTERIOR CRUCIATE LIGAMENT REPAIR      ACL and MCL repair    COLON SURGERY      colon resection with colostomy    COLONOSCOPY      DIALYSIS FISTULA CREATION Bilateral 2020    CREATION  LEFT  BRACHIAL CEPHALIC AV FISTULA performed by Karla Chaudhary MD at [address] [place] [address], [place], DIAGNOSTIC      HC COLONOSCOPY BIOPSY/STOMA N/A 2019    Dr Purdy-w/APC ablation-Inflammatory polyps inside or below the colostomy opening-Tubular AP (-) dysplasia    HERNIA REPAIR      As an infant    KIDNEY REMOVAL Left     cancer    SCROTAL SURGERY N/A 10/3/2023    SCROTAL ABSCESS INCISION AND DRAINAGE performed by Nicole Davidson MD at [address] [place] [address]

## 2023-12-17 NOTE — ED NOTES
Pt received a half hd treatment on Wednesday and then missed Friday      Ella Ballard [place]  12/17/23 1529

## 2023-12-17 NOTE — ED NOTES
To ct with primary rn at [address] [place] [address], [address] , [place]  12/17/23 5068

## 2023-12-17 NOTE — ED NOTES
[address] [facility] Dr. Mayur Mora called Code Stroke     [phone] Notified [address] Announced over [place]     1504 Notified Dr. Carlotta Zayas, Neurology     [phone] Notified Stroke Coordinator     Patient arrived [facility]. \"Normal sometime this morning. \"      Heriberto Frankel  12/17/23 0272

## 2023-12-17 NOTE — ED NOTES
Called Dr. Spence Freeze office. Left message with answering service for Dr. Diann Marion. Miles Hutchins is on call. Cornelius Cisneros  12/17/23 6803

## 2023-12-17 NOTE — ED NOTES
Pt began seizing turned to the left and tonic clonic motion dr Vargas Russo aware and verbal order for 2mg ativan and 1gram of keppra given with good response.  Pt postictal at this time     Vesna Laurent RN  12/17/23 3600

## 2023-12-18 ENCOUNTER — APPOINTMENT (OUTPATIENT)
Dept: MRI IMAGING | Age: 48
DRG: 091 | End: 2023-12-18
Payer: MEDICARE

## 2023-12-18 PROBLEM — N18.6 ESRD ON DIALYSIS (HCC): Status: ACTIVE | Noted: 2023-12-18

## 2023-12-18 PROBLEM — R29.90 STROKE-LIKE SYMPTOM: Status: ACTIVE | Noted: 2023-12-18

## 2023-12-18 PROBLEM — R56.9 SEIZURE (HCC): Status: ACTIVE | Noted: 2023-12-18

## 2023-12-18 PROBLEM — S06.5XAA SUBDURAL HEMATOMA (HCC): Status: ACTIVE | Noted: 2023-12-18

## 2023-12-18 PROBLEM — Z99.2 ESRD ON DIALYSIS (HCC): Status: ACTIVE | Noted: 2023-12-18

## 2023-12-18 PROBLEM — R41.82 ALTERED MENTAL STATUS: Status: ACTIVE | Noted: 2023-12-18

## 2023-12-18 LAB
ALBUMIN SERPL-MCNC: 3.2 G/DL (ref 3.5–5.2)
ALP SERPL-CCNC: 154 U/L (ref 40–130)
ALT SERPL-CCNC: 9 U/L (ref 5–41)
ANION GAP SERPL CALCULATED.3IONS-SCNC: 18 MMOL/L (ref 7–19)
AST SERPL-CCNC: 11 U/L (ref 5–40)
BASOPHILS # BLD: 0.1 K/UL (ref 0–0.2)
BASOPHILS NFR BLD: 0.6 % (ref 0–1)
BILIRUB SERPL-MCNC: 0.3 MG/DL (ref 0.2–1.2)
BUN SERPL-MCNC: 42 MG/DL (ref 6–20)
CALCIUM SERPL-MCNC: 8.5 MG/DL (ref 8.6–10)
CHLORIDE SERPL-SCNC: 97 MMOL/L (ref 98–111)
CHOLEST SERPL-MCNC: 165 MG/DL (ref 160–199)
CO2 SERPL-SCNC: 23 MMOL/L (ref 22–29)
CREAT SERPL-MCNC: 9.1 MG/DL (ref 0.5–1.2)
EOSINOPHIL # BLD: 0 K/UL (ref 0–0.6)
EOSINOPHIL NFR BLD: 0.1 % (ref 0–5)
ERYTHROCYTE [DISTWIDTH] IN BLOOD BY AUTOMATED COUNT: 15.6 % (ref 11.5–14.5)
GLUCOSE SERPL-MCNC: 116 MG/DL (ref 74–109)
HCT VFR BLD AUTO: 35.5 % (ref 42–52)
HDLC SERPL-MCNC: 36 MG/DL (ref 55–121)
HGB BLD-MCNC: 11.4 G/DL (ref 14–18)
IMM GRANULOCYTES # BLD: 0.1 K/UL
LDLC SERPL CALC-MCNC: 95 MG/DL
LYMPHOCYTES # BLD: 0.5 K/UL (ref 1.1–4.5)
LYMPHOCYTES NFR BLD: 6.2 % (ref 20–40)
MAGNESIUM SERPL-MCNC: 1.8 MG/DL (ref 1.6–2.6)
MCH RBC QN AUTO: 28.6 PG (ref 27–31)
MCHC RBC AUTO-ENTMCNC: 32.1 G/DL (ref 33–37)
MCV RBC AUTO: 89 FL (ref 80–94)
MONOCYTES # BLD: 0.6 K/UL (ref 0–0.9)
MONOCYTES NFR BLD: 6.4 % (ref 0–10)
NEUTROPHILS # BLD: 7.4 K/UL (ref 1.5–7.5)
NEUTS SEG NFR BLD: 86.1 % (ref 50–65)
PLATELET # BLD AUTO: 114 K/UL (ref 130–400)
PMV BLD AUTO: 10.3 FL (ref 9.4–12.4)
POTASSIUM SERPL-SCNC: 5.4 MMOL/L (ref 3.5–5)
PROT SERPL-MCNC: 6.6 G/DL (ref 6.6–8.7)
RBC # BLD AUTO: 3.99 M/UL (ref 4.7–6.1)
SODIUM SERPL-SCNC: 138 MMOL/L (ref 136–145)
TRIGL SERPL-MCNC: 171 MG/DL (ref 0–149)
WBC # BLD AUTO: 8.6 K/UL (ref 4.8–10.8)

## 2023-12-18 PROCEDURE — 6360000002 HC RX W HCPCS: Performed by: INTERNAL MEDICINE

## 2023-12-18 PROCEDURE — 95816 EEG AWAKE AND DROWSY: CPT | Performed by: PSYCHIATRY & NEUROLOGY

## 2023-12-18 PROCEDURE — 2580000003 HC RX 258: Performed by: EMERGENCY MEDICINE

## 2023-12-18 PROCEDURE — 2000000000 HC ICU R&B

## 2023-12-18 PROCEDURE — 80053 COMPREHEN METABOLIC PANEL: CPT

## 2023-12-18 PROCEDURE — 6360000002 HC RX W HCPCS: Performed by: PSYCHIATRY & NEUROLOGY

## 2023-12-18 PROCEDURE — 2500000003 HC RX 250 WO HCPCS: Performed by: EMERGENCY MEDICINE

## 2023-12-18 PROCEDURE — 94760 N-INVAS EAR/PLS OXIMETRY 1: CPT

## 2023-12-18 PROCEDURE — 85025 COMPLETE CBC W/AUTO DIFF WBC: CPT

## 2023-12-18 PROCEDURE — 95816 EEG AWAKE AND DROWSY: CPT

## 2023-12-18 PROCEDURE — 8010000000 HC HEMODIALYSIS ACUTE INPT

## 2023-12-18 PROCEDURE — 99223 1ST HOSP IP/OBS HIGH 75: CPT | Performed by: NEUROLOGICAL SURGERY

## 2023-12-18 PROCEDURE — 6370000000 HC RX 637 (ALT 250 FOR IP): Performed by: INTERNAL MEDICINE

## 2023-12-18 PROCEDURE — 2500000003 HC RX 250 WO HCPCS: Performed by: INTERNAL MEDICINE

## 2023-12-18 PROCEDURE — 36415 COLL VENOUS BLD VENIPUNCTURE: CPT

## 2023-12-18 PROCEDURE — 99223 1ST HOSP IP/OBS HIGH 75: CPT | Performed by: PSYCHIATRY & NEUROLOGY

## 2023-12-18 PROCEDURE — 94640 AIRWAY INHALATION TREATMENT: CPT

## 2023-12-18 PROCEDURE — 80061 LIPID PANEL: CPT

## 2023-12-18 PROCEDURE — 83735 ASSAY OF MAGNESIUM: CPT

## 2023-12-18 PROCEDURE — 70551 MRI BRAIN STEM W/O DYE: CPT

## 2023-12-18 RX ORDER — OXYBUTYNIN CHLORIDE 5 MG/1
5 TABLET ORAL DAILY
Status: DISCONTINUED | OUTPATIENT
Start: 2023-12-18 | End: 2023-12-24 | Stop reason: HOSPADM

## 2023-12-18 RX ORDER — FOLIC ACID 1 MG/1
1 TABLET ORAL DAILY
Status: DISCONTINUED | OUTPATIENT
Start: 2023-12-18 | End: 2023-12-24 | Stop reason: HOSPADM

## 2023-12-18 RX ORDER — LEVETIRACETAM 500 MG/5ML
500 INJECTION, SOLUTION, CONCENTRATE INTRAVENOUS DAILY
Status: DISCONTINUED | OUTPATIENT
Start: 2023-12-18 | End: 2023-12-20

## 2023-12-18 RX ORDER — HYDROMORPHONE HYDROCHLORIDE 1 MG/ML
0.5 INJECTION, SOLUTION INTRAMUSCULAR; INTRAVENOUS; SUBCUTANEOUS ONCE
Status: COMPLETED | OUTPATIENT
Start: 2023-12-18 | End: 2023-12-18

## 2023-12-18 RX ORDER — ALBUTEROL SULFATE 90 UG/1
2 AEROSOL, METERED RESPIRATORY (INHALATION) EVERY 4 HOURS PRN
Status: DISCONTINUED | OUTPATIENT
Start: 2023-12-18 | End: 2023-12-24 | Stop reason: HOSPADM

## 2023-12-18 RX ORDER — BUDESONIDE AND FORMOTEROL FUMARATE DIHYDRATE 160; 4.5 UG/1; UG/1
2 AEROSOL RESPIRATORY (INHALATION)
Status: DISCONTINUED | OUTPATIENT
Start: 2023-12-18 | End: 2023-12-24 | Stop reason: HOSPADM

## 2023-12-18 RX ORDER — PANTOPRAZOLE SODIUM 40 MG/1
40 TABLET, DELAYED RELEASE ORAL
Status: DISCONTINUED | OUTPATIENT
Start: 2023-12-18 | End: 2023-12-24 | Stop reason: HOSPADM

## 2023-12-18 RX ORDER — HEPARIN SODIUM 5000 [USP'U]/ML
5000 INJECTION, SOLUTION INTRAVENOUS; SUBCUTANEOUS EVERY 8 HOURS SCHEDULED
Status: DISCONTINUED | OUTPATIENT
Start: 2023-12-18 | End: 2023-12-18

## 2023-12-18 RX ORDER — ONDANSETRON 2 MG/ML
4 INJECTION INTRAMUSCULAR; INTRAVENOUS EVERY 6 HOURS PRN
Status: DISCONTINUED | OUTPATIENT
Start: 2023-12-18 | End: 2023-12-24 | Stop reason: HOSPADM

## 2023-12-18 RX ORDER — FLUTICASONE PROPIONATE AND SALMETEROL XINAFOATE 115; 21 UG/1; UG/1
2 AEROSOL, METERED RESPIRATORY (INHALATION) 2 TIMES DAILY
Status: DISCONTINUED | OUTPATIENT
Start: 2023-12-18 | End: 2023-12-18 | Stop reason: CLARIF

## 2023-12-18 RX ORDER — HYDRALAZINE HYDROCHLORIDE 20 MG/ML
10 INJECTION INTRAMUSCULAR; INTRAVENOUS EVERY 6 HOURS PRN
Status: DISCONTINUED | OUTPATIENT
Start: 2023-12-18 | End: 2023-12-24 | Stop reason: HOSPADM

## 2023-12-18 RX ORDER — CLONIDINE HYDROCHLORIDE 0.1 MG/1
0.2 TABLET ORAL 3 TIMES DAILY PRN
Status: DISCONTINUED | OUTPATIENT
Start: 2023-12-18 | End: 2023-12-24 | Stop reason: HOSPADM

## 2023-12-18 RX ORDER — LORAZEPAM 2 MG/ML
2 INJECTION INTRAMUSCULAR
Status: DISCONTINUED | OUTPATIENT
Start: 2023-12-18 | End: 2023-12-24 | Stop reason: HOSPADM

## 2023-12-18 RX ORDER — ONDANSETRON 4 MG/1
4 TABLET, ORALLY DISINTEGRATING ORAL EVERY 8 HOURS PRN
Status: DISCONTINUED | OUTPATIENT
Start: 2023-12-18 | End: 2023-12-24 | Stop reason: HOSPADM

## 2023-12-18 RX ORDER — LOSARTAN POTASSIUM 100 MG/1
100 TABLET ORAL DAILY
Status: DISCONTINUED | OUTPATIENT
Start: 2023-12-18 | End: 2023-12-19

## 2023-12-18 RX ORDER — AMLODIPINE BESYLATE 10 MG/1
10 TABLET ORAL DAILY
Status: DISCONTINUED | OUTPATIENT
Start: 2023-12-18 | End: 2023-12-20

## 2023-12-18 RX ORDER — LORAZEPAM 2 MG/ML
2 INJECTION INTRAMUSCULAR ONCE
Status: COMPLETED | OUTPATIENT
Start: 2023-12-18 | End: 2023-12-18

## 2023-12-18 RX ORDER — SODIUM HYPOCHLORITE 1.25 MG/ML
SOLUTION TOPICAL 2 TIMES DAILY
Status: DISCONTINUED | OUTPATIENT
Start: 2023-12-18 | End: 2023-12-24 | Stop reason: HOSPADM

## 2023-12-18 RX ORDER — CINACALCET 30 MG/1
30 TABLET, FILM COATED ORAL DAILY
Status: DISCONTINUED | OUTPATIENT
Start: 2023-12-18 | End: 2023-12-24 | Stop reason: HOSPADM

## 2023-12-18 RX ORDER — TAMSULOSIN HYDROCHLORIDE 0.4 MG/1
0.4 CAPSULE ORAL DAILY
Status: DISCONTINUED | OUTPATIENT
Start: 2023-12-18 | End: 2023-12-24 | Stop reason: HOSPADM

## 2023-12-18 RX ORDER — HYDRALAZINE HYDROCHLORIDE 50 MG/1
50 TABLET, FILM COATED ORAL 3 TIMES DAILY
Status: DISCONTINUED | OUTPATIENT
Start: 2023-12-18 | End: 2023-12-19

## 2023-12-18 RX ADMIN — DEXMEDETOMIDINE HYDROCHLORIDE 1.4 MCG/KG/HR: 400 INJECTION, SOLUTION INTRAVENOUS at 07:07

## 2023-12-18 RX ADMIN — LORAZEPAM 2 MG: 2 INJECTION INTRAMUSCULAR; INTRAVENOUS at 12:19

## 2023-12-18 RX ADMIN — SODIUM CHLORIDE 10 MG/HR: 9 INJECTION, SOLUTION INTRAVENOUS at 15:55

## 2023-12-18 RX ADMIN — SODIUM CHLORIDE 10 MG/HR: 9 INJECTION, SOLUTION INTRAVENOUS at 00:00

## 2023-12-18 RX ADMIN — BUDESONIDE AND FORMOTEROL FUMARATE DIHYDRATE 2 PUFF: 160; 4.5 AEROSOL RESPIRATORY (INHALATION) at 06:04

## 2023-12-18 RX ADMIN — SODIUM CHLORIDE 10 MG/HR: 9 INJECTION, SOLUTION INTRAVENOUS at 04:27

## 2023-12-18 RX ADMIN — SODIUM CHLORIDE 10 MG/HR: 9 INJECTION, SOLUTION INTRAVENOUS at 23:42

## 2023-12-18 RX ADMIN — LORAZEPAM 2 MG: 2 INJECTION INTRAMUSCULAR; INTRAVENOUS at 17:36

## 2023-12-18 RX ADMIN — SODIUM CHLORIDE 10 MG/HR: 9 INJECTION, SOLUTION INTRAVENOUS at 18:39

## 2023-12-18 RX ADMIN — DEXMEDETOMIDINE HYDROCHLORIDE 1.5 MCG/KG/HR: 400 INJECTION, SOLUTION INTRAVENOUS at 01:21

## 2023-12-18 RX ADMIN — DEXMEDETOMIDINE HYDROCHLORIDE 1.5 MCG/KG/HR: 400 INJECTION, SOLUTION INTRAVENOUS at 13:25

## 2023-12-18 RX ADMIN — DEXMEDETOMIDINE HYDROCHLORIDE 1 MCG/KG/HR: 400 INJECTION, SOLUTION INTRAVENOUS at 04:27

## 2023-12-18 RX ADMIN — HYDROMORPHONE HYDROCHLORIDE 0.5 MG: 1 INJECTION, SOLUTION INTRAMUSCULAR; INTRAVENOUS; SUBCUTANEOUS at 16:13

## 2023-12-18 RX ADMIN — DEXMEDETOMIDINE HYDROCHLORIDE 1.5 MCG/KG/HR: 400 INJECTION, SOLUTION INTRAVENOUS at 13:32

## 2023-12-18 RX ADMIN — LEVETIRACETAM 500 MG: 100 INJECTION, SOLUTION, CONCENTRATE INTRAVENOUS at 10:22

## 2023-12-18 RX ADMIN — DEXMEDETOMIDINE HYDROCHLORIDE 1.5 MCG/KG/HR: 400 INJECTION, SOLUTION INTRAVENOUS at 16:16

## 2023-12-18 RX ADMIN — DEXMEDETOMIDINE HYDROCHLORIDE 1.5 MCG/KG/HR: 400 INJECTION, SOLUTION INTRAVENOUS at 21:58

## 2023-12-18 RX ADMIN — SODIUM CHLORIDE 10 MG/HR: 9 INJECTION, SOLUTION INTRAVENOUS at 02:42

## 2023-12-18 RX ADMIN — Medication: at 20:26

## 2023-12-18 RX ADMIN — SODIUM CHLORIDE 10 MG/HR: 9 INJECTION, SOLUTION INTRAVENOUS at 15:22

## 2023-12-18 RX ADMIN — Medication: at 08:03

## 2023-12-18 RX ADMIN — DEXMEDETOMIDINE HYDROCHLORIDE 1.5 MCG/KG/HR: 400 INJECTION, SOLUTION INTRAVENOUS at 10:54

## 2023-12-18 RX ADMIN — SODIUM CHLORIDE 15 MG/HR: 9 INJECTION, SOLUTION INTRAVENOUS at 07:08

## 2023-12-18 RX ADMIN — SODIUM CHLORIDE 13 MG/HR: 9 INJECTION, SOLUTION INTRAVENOUS at 10:56

## 2023-12-18 RX ADMIN — SODIUM CHLORIDE 10 MG/HR: 9 INJECTION, SOLUTION INTRAVENOUS at 20:45

## 2023-12-18 RX ADMIN — SODIUM CHLORIDE 10 MG/HR: 9 INJECTION, SOLUTION INTRAVENOUS at 13:32

## 2023-12-18 ASSESSMENT — PAIN SCALES - GENERAL: PAINLEVEL_OUTOF10: 10

## 2023-12-18 NOTE — PROGRESS NOTES
Called [facility], requested return call to follow up on nephrology consult and inquire if plans to dialyze tonight. Awaiting return call.     Electronically signed by Merari Lorenz RN on 12/17/2023 at 6:23 PM

## 2023-12-18 NOTE — PROGRESS NOTES
[facility] due to unable to take PO.   Await maria elena bassett MD.    Electronically signed by João Rich RN on 12/17/2023 at 6:21 PM

## 2023-12-18 NOTE — CONSULTS
1296 [facility] Neurology Consult  ? ? Patient: Ryan Mason  MR#: [id number]  Account Number: [de-identified]   Room: 0142/142-01   YOB: 1975  Date of Progress Note: 12/18/2023  Time of Note 8:10 AM  Attending Physician: Vianca Jimenez MD  Consulting Physician: Helen Mccann M.D.  ?  ? CHIEF COMPLAINT: Right-sided weakness  ? HISTORY OF PRESENT ILLNESS:   This 57 y.o. male who presents with right-sided weakness to the ED on 12/17. He was outside of the window for TNK due to last known well time. CTA shows distal left M1 stenosis at 60 to 70%. CT scan of the brain appeared to show some edema in the region of the janet with bilateral subdural hygromas. Patient reportedly had a seizure in the ED. Case discussed with ED physician. He was given a gram of Keppra. He is a dialysis patient but has not had a full session of dialysis in over a week. He also has a history of colon cancer with colostomy. REVIEW OF SYSTEMS:  Unable to obtain due to nonverbal status  ?   Past Medical History:      Diagnosis Date    Asthma     during winter months    Cancer [facility])     rectal    Colostomy care [facility])     Hemodialysis patient ([address] [facility])     mon wed fri at [place]    History of blood transfusion     Hx of migraine headaches     Hypercholesterolemia 12/16/2019    Hypertension     Kidney stone     hx of    Palliative care patient 07/11/2019    Pericarditis     Rectal cancer ([address] [facility])     Testalgia 2/1/2016     Past Surgical History:      Procedure Laterality Date    ANTERIOR CRUCIATE LIGAMENT REPAIR  2007    ACL and MCL repair    COLON SURGERY      colon resection with colostomy    COLONOSCOPY      DIALYSIS FISTULA CREATION Bilateral 01/21/2020    CREATION  LEFT  BRACHIAL CEPHALIC AV FISTULA performed by Ty Melgar MD at [facility] OR    ENDOSCOPY, COLON, DIAGNOSTIC      HC COLONOSCOPY BIOPSY/STOMA N/A 07/12/2019    Dr Purdy-w/APC ablation-Inflammatory polyps inside or below the colostomy opening-Tubular AP (-) dysplasia

## 2023-12-18 NOTE — CONSULTS
[facility]        REASON FOR CONSULTATION:  Altered mental status, abnormal imaging      HISTORY OF PRESENT ILLNESS:      The patient is a 57 y.o. male who presented to the [facility] ED with altered mental status and concern for stroke. He has a complicated medical history including ESRD on dialysis and colon cancer with a colostomy in place. No family is at bedside so history is obtained from the medical record and nurses. Apparently he had missed several days of dialysis and his wife found him yesterday unresponsive with right sided weakness and he was brought to the ED. After arrival, he had a seizure. He was also significantly hypertensive with an SBP of ~260. A CT of his head was obtained at admission that revealed bilateral subdural hygromas vs chronic subdural hematoma. This has been confirmed on subsequent MRI. He was admitted due to his need for emergent dialysis, altered mentation and seizures. There is no documented history of trauma. He is currently in ICU and receiving hemodialysis. He will arouse to voice but is nonverbal and not following any commands. He appears to be paretic in the RUE.       MEDICAL HISTORY:             Past Medical History:   Diagnosis Date    Asthma     during winter months    Cancer [facility])     rectal    Colostomy care [facility])     Hemodialysis patient ([address] [facility] )     mon wed fri at [facility]    History of blood transfusion     Hx of migraine headaches     Hypercholesterolemia 12/16/2019    Hypertension     Kidney stone     hx of    Palliative care patient 07/11/2019    Pericarditis     Rectal cancer ([address] [facility] )     Testalgia 2/1/2016       Past Surgical History:   Procedure Laterality Date    ANTERIOR CRUCIATE LIGAMENT REPAIR  2007    ACL and MCL repair    COLON SURGERY      colon resection with colostomy    COLONOSCOPY      DIALYSIS FISTULA CREATION Bilateral 01/21/2020    CREATION  LEFT  BRACHIAL CEPHALIC AV FISTULA performed by Cait Vicente MD at [address] [place] [address]

## 2023-12-18 NOTE — CONSULTS
Palliative Care:     Pt is known to Palliative Care. Review of notes and report from nursing. Pt is unable to participate in visit. Currently on room air,  precedex and cardene gtts. Will reach out to spouse. 56 y/o male who presented with possible stroke. Pt found by family slumped over with right sided weakness. Pt has hx of colon cancer with colostomy and on hemodialysis. Noted he has not dialyzed fully in a week. He had seizure activity in the ED. Neurology on board. Plans today for EEG and MRI. Nephrology consulted. Call placed to Pt's wife, Mercedes Lopez to re-introduce palliative care and provide support. She shares review of his history and his health journey. She states she is hopeful for some meaningful recovery, but does not want him to suffer or have any pain. She asks about options of care. Offered information for out pt Palliative Care as well as hospice services. She is appreciative of call and support. We will follow POC and continue to review goals. Past Medical History:        Past Medical History:   Diagnosis Date    Asthma     during winter months    Cancer [facility])     rectal    Colostomy care [facility])     Hemodialysis patient ([address] [facility])     mon wed fri at [place]    History of blood transfusion     Hx of migraine headaches     Hypercholesterolemia 12/16/2019    Hypertension     Kidney stone     hx of    Palliative care patient 07/11/2019    Pericarditis     Rectal cancer ([address] [facility])     Testalgia 2/1/2016       Advance Directives:     Full Code  ACP note reviewed and no changes at this time. Psychological/Spiritual:    Wife reports support from family members and their [employer] community. Plan:  continue current medical treatments and work up. Recommendations from medical team.        Palliative Care team will continue to follow and support, with ongoing review of pt's goals of care.                 Electronically signed by Hallie Bang

## 2023-12-18 NOTE — CONSULTS
Nephrology ([facility]) Progress Note    Patient:  Boni Miles  YOB: 1975  Date of Service: 12/18/2023  MRN: [id number]   Acct: [id number]   Primary Care Physician: Andreas Manzo MD  Advance Directive: Full Code  Admit Date: 12/17/2023       Hospital Day: 1  Referring Provider: Lg Munoz MD    Patient independently seen and examined, Chart, Consults, Notes, Operative notes, Labs, Cardiology, and Radiology studies reviewed as available.    Subjective:  Boni Miles is a 48 y.o. male for whom we were consulted for evaluation and treatment of end-stage renal disease with hyperkalemia.  Patient known to service from as he was on dialysis.  He was unable to participate in the initial history and physical examination due to altered mental status but wife was present at the bedside assisting history as well as chart review.  ICU nurse present at the bedside as well.  Patient had prior history of migraines and had missed about half a treatment on Wednesday and all of his treatment on Friday due to feeling poorly.  He was ultimately brought in by his wife due to developing right-sided weakness.  He was agitated and required Precedex infusion.  Dr. Simeon was on-call last night and ordered an emergent treatment for correction of hyperkalemia which initially corrected but began to reaccumulate up to the 5.4 level today.  Plans included MRI today.  Required Cardene infusion for blood pressure control.    Allergies:  Oxycodone-acetaminophen, Morphine and related, and Morphine    Medicines:  Current Facility-Administered Medications   Medication Dose Route Frequency Provider Last Rate Last Admin    albuterol sulfate HFA (PROVENTIL;VENTOLIN;PROAIR) 108 (90 Base) MCG/ACT inhaler 2 puff  2 puff Inhalation Q4H PRN Lg Munoz MD        amLODIPine (NORVASC) tablet 10 mg  10 mg Oral Daily Lg Munoz MD        cinacalcet (SENSIPAR) tablet 30 mg  30 mg Oral Daily Lg Munoz  MD        cloNIDine (CATAPRES) tablet 0.2 mg  0.2 mg Oral TID PRN Elinor Akins MD        folic acid (FOLVITE) tablet 1 mg  1 mg Oral Daily Elinor Akins MD        hydrALAZINE (APRESOLINE) tablet 50 mg  50 mg Oral TID Elinor Akins MD        [Held by provider] losartan (COZAAR) tablet 100 mg  100 mg Oral Daily Elinor Akins MD        pantoprazole (PROTONIX) tablet 40 mg  40 mg Oral QAM AC Elinor Akins MD        oxybutynin (DITROPAN) tablet 5 mg  5 mg Oral Daily Elinor Akins MD        sodium hypochlorite (DAKINS) 0.125 % external solution   Irrigation BID Elinor Akins MD   Given at 12/18/23 0803    tamsulosin (FLOMAX) capsule 0.4 mg  0.4 mg Oral Daily Elinor Akins MD        ondansetron (ZOFRAN-ODT) disintegrating tablet 4 mg  4 mg Oral Q8H PRN Elinor Akins MD        Or    ondansetron [facility]) injection 4 mg  4 mg IntraVENous Q6H PRN Elinor Akins MD        LORazepam (ATIVAN) injection 2 mg  2 mg IntraVENous Q15 Min PRN Elinor Akins MD        budesonide-formoterol [facility]) 160-4.5 MCG/ACT inhaler 2 puff  2 puff Inhalation BID RT Elinor Akins MD   2 puff at 12/18/23 0604    hydrALAZINE (APRESOLINE) injection 10 mg  10 mg IntraVENous Q6H PRN Elinor Akins MD        levETIRAcetam (KEPPRA) injection 500 mg  500 mg IntraVENous Daily Litzy Mckeon MD   500 mg at 12/18/23 1022    LORazepam (ATIVAN) injection 2 mg  2 mg IntraVENous Once Elinor Akins MD        niCARdipine (CARDENE) 25 mg in sodium chloride 0.9 % 250 mL infusion ([id number])  2.5-15 mg/hr IntraVENous Continuous Rachelle Thomas  mL/hr at 12/18/23 1056 13 mg/hr at 12/18/23 1056    sodium zirconium cyclosilicate (LOKELMA) oral suspension 10 g  10 g Oral TID Elinor Akins MD        [facility]) 400 mcg in sodium chloride 0.9 % 100 mL infusion  0.1-1.5 mcg/kg/hr IntraVENous Continuous Elinor Akins MD 31.1 mL/hr at 12/18/23 1054 1.5 mcg/kg/hr at 12/18/23 1054       Past Medical History:  Past Medical vertebral artery appears patent. The mid and distal vertebral arteries appear to  be patent. The left subclavian artery appears patent. The brachiocephalic artery, right subclavian artery appear patent. Upper lungs are clear. CT angiogram of the head: The cavernous carotid artery appear patent. The supraclinoid ICA appear patent. The right M1 segment is patent. The proximal left M1 segment appears patent. There is approximately 60-70% long segment stenosis seen within the distal M1 segment of the left middle cerebral artery. There is attenuation of the size of the proximal M2 branches of the left middle cerebral artery compared to the M2 branches of the right middle cerebral artery. .  The distal branches of the middle cerebral arteries otherwise  appear adequately patent. The left A1 segment is dominant. The A2 segments and distal branches of the anterior cerebral arteries appear patent. The anterior communicating artery is normal.  The left vertebral artery is dominant. The distal vertebral arteries appear patent. The basilar artery appears patent. There is a normal appearance of the posterior cerebral arteries. There is a predominately hypodense subdural extra-axial collection  seen over the left cerebral convexity. The findings are better seen on the CT scan of the head without contrast.      There is approximately 60-70% stenosis seen within the distal M1 segment of the left middle cerebral artery. There is no other intracranial stenosis or signs of acute vascular occlusion. There is no intracranial aneurysm or vascular malformation. No appreciable stenosis seen within the proximal internal carotid arteries. The vertebral arteries and subclavian arteries appear patent.   All CT scans are performed using dose optimization techniques as appropriate to the performed exam and include at least one of the following: Automated exposure control, adjustment of the mA and/or kV according to size, and

## 2023-12-19 ENCOUNTER — APPOINTMENT (OUTPATIENT)
Dept: CT IMAGING | Age: 48
DRG: 091 | End: 2023-12-19
Payer: MEDICARE

## 2023-12-19 LAB
25(OH)D3 SERPL-MCNC: 22.3 NG/ML
ANION GAP SERPL CALCULATED.3IONS-SCNC: 14 MMOL/L (ref 7–19)
BASOPHILS # BLD: 0.1 K/UL (ref 0–0.2)
BASOPHILS NFR BLD: 1 % (ref 0–1)
BUN SERPL-MCNC: 23 MG/DL (ref 6–20)
CALCIUM SERPL-MCNC: 8.6 MG/DL (ref 8.6–10)
CHLORIDE SERPL-SCNC: 101 MMOL/L (ref 98–111)
CO2 SERPL-SCNC: 24 MMOL/L (ref 22–29)
CREAT SERPL-MCNC: 6.3 MG/DL (ref 0.5–1.2)
EKG P AXIS: 42 DEGREES
EKG P-R INTERVAL: 170 MS
EKG Q-T INTERVAL: 362 MS
EKG QRS DURATION: 94 MS
EKG QTC CALCULATION (BAZETT): 432 MS
EKG T AXIS: 4 DEGREES
EOSINOPHIL # BLD: 0.1 K/UL (ref 0–0.6)
EOSINOPHIL NFR BLD: 1.6 % (ref 0–5)
ERYTHROCYTE [DISTWIDTH] IN BLOOD BY AUTOMATED COUNT: 15.5 % (ref 11.5–14.5)
GLUCOSE SERPL-MCNC: 90 MG/DL (ref 74–109)
HCT VFR BLD AUTO: 32 % (ref 42–52)
HGB BLD-MCNC: 10.2 G/DL (ref 14–18)
IMM GRANULOCYTES # BLD: 0 K/UL
LYMPHOCYTES # BLD: 0.9 K/UL (ref 1.1–4.5)
LYMPHOCYTES NFR BLD: 17.9 % (ref 20–40)
MCH RBC QN AUTO: 28.4 PG (ref 27–31)
MCHC RBC AUTO-ENTMCNC: 31.9 G/DL (ref 33–37)
MCV RBC AUTO: 89.1 FL (ref 80–94)
MONOCYTES # BLD: 0.5 K/UL (ref 0–0.9)
MONOCYTES NFR BLD: 9.3 % (ref 0–10)
NEUTROPHILS # BLD: 3.4 K/UL (ref 1.5–7.5)
NEUTS SEG NFR BLD: 69.8 % (ref 50–65)
PLATELET # BLD AUTO: 86 K/UL (ref 130–400)
PMV BLD AUTO: 10.4 FL (ref 9.4–12.4)
POTASSIUM SERPL-SCNC: 4.7 MMOL/L (ref 3.5–5)
PTH-INTACT SERPL-MCNC: 497.4 PG/ML (ref 15–65)
RBC # BLD AUTO: 3.59 M/UL (ref 4.7–6.1)
SODIUM SERPL-SCNC: 139 MMOL/L (ref 136–145)
WBC # BLD AUTO: 4.9 K/UL (ref 4.8–10.8)

## 2023-12-19 PROCEDURE — 6370000000 HC RX 637 (ALT 250 FOR IP): Performed by: INTERNAL MEDICINE

## 2023-12-19 PROCEDURE — 83970 ASSAY OF PARATHORMONE: CPT

## 2023-12-19 PROCEDURE — 6360000002 HC RX W HCPCS: Performed by: INTERNAL MEDICINE

## 2023-12-19 PROCEDURE — 93010 ELECTROCARDIOGRAM REPORT: CPT | Performed by: INTERNAL MEDICINE

## 2023-12-19 PROCEDURE — 92522 EVALUATE SPEECH PRODUCTION: CPT

## 2023-12-19 PROCEDURE — 80048 BASIC METABOLIC PNL TOTAL CA: CPT

## 2023-12-19 PROCEDURE — 70450 CT HEAD/BRAIN W/O DYE: CPT

## 2023-12-19 PROCEDURE — 94640 AIRWAY INHALATION TREATMENT: CPT

## 2023-12-19 PROCEDURE — 2000000000 HC ICU R&B

## 2023-12-19 PROCEDURE — 94760 N-INVAS EAR/PLS OXIMETRY 1: CPT

## 2023-12-19 PROCEDURE — 82306 VITAMIN D 25 HYDROXY: CPT

## 2023-12-19 PROCEDURE — 2500000003 HC RX 250 WO HCPCS: Performed by: INTERNAL MEDICINE

## 2023-12-19 PROCEDURE — 85025 COMPLETE CBC W/AUTO DIFF WBC: CPT

## 2023-12-19 PROCEDURE — 99291 CRITICAL CARE FIRST HOUR: CPT | Performed by: NEUROLOGICAL SURGERY

## 2023-12-19 PROCEDURE — 2580000003 HC RX 258: Performed by: EMERGENCY MEDICINE

## 2023-12-19 PROCEDURE — 92610 EVALUATE SWALLOWING FUNCTION: CPT

## 2023-12-19 PROCEDURE — 6360000002 HC RX W HCPCS: Performed by: PSYCHIATRY & NEUROLOGY

## 2023-12-19 PROCEDURE — 99233 SBSQ HOSP IP/OBS HIGH 50: CPT | Performed by: PSYCHIATRY & NEUROLOGY

## 2023-12-19 PROCEDURE — 36415 COLL VENOUS BLD VENIPUNCTURE: CPT

## 2023-12-19 PROCEDURE — 2500000003 HC RX 250 WO HCPCS: Performed by: EMERGENCY MEDICINE

## 2023-12-19 RX ORDER — HYDRALAZINE HYDROCHLORIDE 50 MG/1
100 TABLET, FILM COATED ORAL 3 TIMES DAILY
Status: DISCONTINUED | OUTPATIENT
Start: 2023-12-19 | End: 2023-12-24 | Stop reason: HOSPADM

## 2023-12-19 RX ORDER — METOPROLOL SUCCINATE 50 MG/1
50 TABLET, EXTENDED RELEASE ORAL DAILY
Status: DISCONTINUED | OUTPATIENT
Start: 2023-12-19 | End: 2023-12-20

## 2023-12-19 RX ORDER — LOSARTAN POTASSIUM 100 MG/1
100 TABLET ORAL DAILY
Status: DISCONTINUED | OUTPATIENT
Start: 2023-12-19 | End: 2023-12-20

## 2023-12-19 RX ADMIN — SODIUM CHLORIDE 7.5 MG/HR: 9 INJECTION, SOLUTION INTRAVENOUS at 04:43

## 2023-12-19 RX ADMIN — OXYBUTYNIN CHLORIDE 5 MG: 5 TABLET ORAL at 08:15

## 2023-12-19 RX ADMIN — BUDESONIDE AND FORMOTEROL FUMARATE DIHYDRATE 2 PUFF: 160; 4.5 AEROSOL RESPIRATORY (INHALATION) at 18:16

## 2023-12-19 RX ADMIN — HYDRALAZINE HYDROCHLORIDE 100 MG: 50 TABLET, FILM COATED ORAL at 20:04

## 2023-12-19 RX ADMIN — CINACALCET HYDROCHLORIDE 30 MG: 30 TABLET, FILM COATED ORAL at 08:14

## 2023-12-19 RX ADMIN — HYDRALAZINE HYDROCHLORIDE 10 MG: 20 INJECTION INTRAMUSCULAR; INTRAVENOUS at 18:09

## 2023-12-19 RX ADMIN — DEXMEDETOMIDINE HYDROCHLORIDE 1 MCG/KG/HR: 400 INJECTION, SOLUTION INTRAVENOUS at 19:25

## 2023-12-19 RX ADMIN — Medication: at 12:47

## 2023-12-19 RX ADMIN — CLONIDINE HYDROCHLORIDE 0.2 MG: 0.1 TABLET ORAL at 14:26

## 2023-12-19 RX ADMIN — HYDRALAZINE HYDROCHLORIDE 100 MG: 50 TABLET, FILM COATED ORAL at 16:12

## 2023-12-19 RX ADMIN — HYDRALAZINE HYDROCHLORIDE 10 MG: 20 INJECTION INTRAMUSCULAR; INTRAVENOUS at 10:10

## 2023-12-19 RX ADMIN — SODIUM CHLORIDE 10 MG/HR: 9 INJECTION, SOLUTION INTRAVENOUS at 08:06

## 2023-12-19 RX ADMIN — Medication: at 20:52

## 2023-12-19 RX ADMIN — LEVETIRACETAM 500 MG: 100 INJECTION, SOLUTION, CONCENTRATE INTRAVENOUS at 10:02

## 2023-12-19 RX ADMIN — LOSARTAN POTASSIUM 100 MG: 100 TABLET, FILM COATED ORAL at 12:44

## 2023-12-19 RX ADMIN — DEXMEDETOMIDINE HYDROCHLORIDE 1.5 MCG/KG/HR: 400 INJECTION, SOLUTION INTRAVENOUS at 01:40

## 2023-12-19 RX ADMIN — CLONIDINE HYDROCHLORIDE 0.2 MG: 0.1 TABLET ORAL at 21:19

## 2023-12-19 RX ADMIN — HYDRALAZINE HYDROCHLORIDE 50 MG: 50 TABLET, FILM COATED ORAL at 08:14

## 2023-12-19 RX ADMIN — SODIUM CHLORIDE 10 MG/HR: 9 INJECTION, SOLUTION INTRAVENOUS at 02:47

## 2023-12-19 RX ADMIN — SODIUM ZIRCONIUM CYCLOSILICATE 10 G: 10 POWDER, FOR SUSPENSION ORAL at 08:15

## 2023-12-19 RX ADMIN — METOPROLOL SUCCINATE 50 MG: 50 TABLET, EXTENDED RELEASE ORAL at 16:12

## 2023-12-19 RX ADMIN — BUDESONIDE AND FORMOTEROL FUMARATE DIHYDRATE 2 PUFF: 160; 4.5 AEROSOL RESPIRATORY (INHALATION) at 06:11

## 2023-12-19 RX ADMIN — AMLODIPINE BESYLATE 10 MG: 10 TABLET ORAL at 08:14

## 2023-12-19 RX ADMIN — DEXMEDETOMIDINE HYDROCHLORIDE 1.2 MCG/KG/HR: 400 INJECTION, SOLUTION INTRAVENOUS at 23:59

## 2023-12-19 RX ADMIN — HYDRALAZINE HYDROCHLORIDE 50 MG: 50 TABLET, FILM COATED ORAL at 12:44

## 2023-12-19 RX ADMIN — DEXMEDETOMIDINE HYDROCHLORIDE 1 MCG/KG/HR: 400 INJECTION, SOLUTION INTRAVENOUS at 10:10

## 2023-12-19 RX ADMIN — DEXMEDETOMIDINE HYDROCHLORIDE 1.5 MCG/KG/HR: 400 INJECTION, SOLUTION INTRAVENOUS at 04:42

## 2023-12-19 RX ADMIN — FOLIC ACID 1 MG: 1 TABLET ORAL at 08:14

## 2023-12-19 RX ADMIN — TAMSULOSIN HYDROCHLORIDE 0.4 MG: 0.4 CAPSULE ORAL at 08:15

## 2023-12-19 RX ADMIN — CLONIDINE HYDROCHLORIDE 0.2 MG: 0.1 TABLET ORAL at 08:25

## 2023-12-19 RX ADMIN — DEXMEDETOMIDINE HYDROCHLORIDE 1 MCG/KG/HR: 400 INJECTION, SOLUTION INTRAVENOUS at 14:37

## 2023-12-19 ASSESSMENT — PAIN SCALES - GENERAL
PAINLEVEL_OUTOF10: 0

## 2023-12-19 NOTE — PROGRESS NOTES
Facility/Department: [facility] ICU   CLINICAL BEDSIDE SWALLOW EVALUATION  SPEECH PRODUCTION EVALUATION     NAME: Davie Naik  : 1975  MRN: [id number]    ADMISSION DATE: 2023  ADMITTING DIAGNOSIS: has Benign non-nodular prostatic hyperplasia with lower urinary tract symptoms; Gastroesophageal reflux disease without esophagitis; Chronic insomnia; Anxiety disorder; Hypertension; Chronic migraine without aura without status migrainosus, not intractable; Generalized anxiety disorder; Hx of colon cancer, stage III; Hx antineoplastic chemotherapy; Palliative care patient; Hydrocele; Hypercholesterolemia; Malignant neoplasm of rectum ([address]); Numbness of foot; Obstructive uropathy; Stricture of ureter; ESRD on hemodialysis ([address]); Liver mass; Hypoxia; Scrotal abscess; Ureteral stent retained; Macrocytic anemia; Acute on chronic anemia; History of rectal cancer; History of colon polyps; History of stomach ulcers; Elevated alkaline phosphatase level; Groin incision ulcer, with fat layer exposed ([address]); Hyperkalemia; Seizure ([address]); Subdural hematoma ([address]); Stroke-like symptom; ESRD on dialysis [facility]); and Altered mental status on their problem list.    Date of Eval: 2023  Evaluating Therapist: ISABELLA Razo    Current Diet level:  NPO    Pain:  Pain Assessment  Pain Assessment: None - Denies Pain  Pain Level: 0    Reason for Referral  Davie Naik was referred for a bedside swallow evaluation to assess the efficiency of his swallow function, identify signs and symptoms of aspiration and make recommendations regarding safe dietary consistencies, effective compensatory strategies, and safe eating environment. Impression  Assessed patient's swallowing function. Patient exhibited slow oral prep of more solid consistencies, inconsistently fast oral transit and suspected swallow delay with thin liquids, and sluggish, inconsistently mildly decreased laryngeal elevation for swallow airway protection.  No outward S/S nectar thick liquid trials, presented via straw by SLP, primarily measured 1-2 seconds in length.     Pharyngeal Phase  Suspected Swallow Delay: Regular solid;Thin - straw (Suspect secondary to oral transit times.)  Laryngeal Elevation: (Patient exhibited sluggish, inconsistently mildly decreased laryngeal elevation for swallow airway protection.)  Wet Vocal Quality: Thin - straw   Pharyngeal Phase - Comment: No outward S/S penetration/aspiration was noted with any ice chip trial, puree consistency trial, regular solid consistency trial, or nectar thick liquid trial presented during evaluation this date. While no outward coughs/throat clears were observed, mild  wet vocal quality was noted with thin H2O trials.     At this time, would trial easy to chew consistency with mildly thick/nectar thick liquids. Recommend meds whole in pudding/applesauce. If patient receives mouth care prior to intake, okay for ice chips IN BETWEEN MEALS for comfort. Will continue to follow.    Electronically signed by ISABELLA Williamson on 12/19/2023 at 12:26 PM

## 2023-12-19 NOTE — PROGRESS NOTES
Nephrology ([facility]) Progress Note    Patient:  Gerard Marquis  YOB: 1975  Date of Service: 12/19/2023  MRN: [id number]   Acct: [de-identified]   Primary Care Physician: Parth Christina MD  Advance Directive: DNR  Admit Date: 12/17/2023       Hospital Day: 2  Referring Provider: Winston Carreon MD    Patient independently seen and examined, Chart, Consults, Notes, Operative notes, Labs, Cardiology, and Radiology studies reviewed as available. Subjective:  Gerard Marquis is a 57 y.o. male for whom we were consulted for evaluation and treatment of end-stage renal disease with hyperkalemia. Patient known to service from as he was on dialysis. He was unable to participate in the initial history and physical examination due to altered mental status but wife was present at the bedside assisting history as well as chart review. ICU nurse present at the bedside as well. Patient had prior history of migraines and had missed about half a treatment on Wednesday and all of his treatment on Friday due to feeling poorly. He was ultimately brought in by his wife due to developing right-sided weakness. He was agitated and required Precedex infusion. Dr. Leonard Bhatia was on-call last night and ordered an emergent treatment for correction of hyperkalemia which initially corrected but began to reaccumulate up to the 5.4 level today. Plans included MRI today. Required Cardene infusion for blood pressure control. Today, no overnight events. More alert at times per nursing but remains unable to participate history and physical examination this morning due to Precedex. Tolerated dialysis without incident yesterday.     Allergies:  Oxycodone-acetaminophen, Morphine and related, and Morphine    Medicines:  Current Facility-Administered Medications   Medication Dose Route Frequency Provider Last Rate Last Admin    losartan (COZAAR) tablet 100 mg  100 mg Oral Daily Eduar Lucio MD Diabetes Father        Social History  Social History     Socioeconomic History    Marital status:      Spouse name: [address] [place] [address]    Number of children: Not on file    Years of education: Not on file    Highest education level: Not on file   Occupational History    Not on file   Tobacco Use    Smoking status: Never    Smokeless tobacco: Never   Vaping Use    Vaping Use: Never used   Substance and Sexual Activity    Alcohol use: No    Drug use: No    Sexual activity: Not on file   Other Topics Concern    Not on file   Social History Narrative    Not on file     Social Determinants of Health     Financial Resource Strain: Medium Risk (3/23/2023)    Overall Financial Resource Strain (CARDIA)     Difficulty of Paying Living Expenses: Somewhat hard   Food Insecurity: Not on file (3/23/2023)   Transportation Needs: Unknown (3/23/2023)    PRAPARE - Transportation     Lack of Transportation (Medical): Not on file     Lack of Transportation (Non-Medical):  No   Physical Activity: Not on file   Stress: Not on file   Social Connections: Not on file   Intimate Partner Violence: Not on file   Housing Stability: Unknown (3/23/2023)    Housing Stability Vital Sign     Unable to Pay for Housing in the Last Year: Not on file     Number of Places Lived in the Last Year: Not on file     Unstable Housing in the Last Year: No         Review of Systems:  History obtained from unobtainable from patient due to mental status          Objective:  Patient Vitals for the past 24 hrs:   BP Temp Temp src Pulse Resp SpO2 Weight   12/19/23 1030 (!) 157/90 -- -- 80 16 98 % --   12/19/23 1010 (!) 161/93 -- -- 90 15 98 % --   12/19/23 1000 (!) 174/86 -- -- 91 16 99 % --   12/19/23 0915 (!) 161/88 -- -- 82 15 100 % --   12/19/23 0900 (!) 182/93 -- -- 98 20 96 % --   12/19/23 0825 (!) 194/95 -- -- -- -- -- --   12/19/23 0815 (!) 194/95 -- -- (!) 115 18 100 % --   12/19/23 0814 (!) 161/91 -- -- -- -- -- --   12/19/23 0610 -- -- -- 91 14 98 % --

## 2023-12-19 NOTE — PROGRESS NOTES
Hospitalist Progress Note  [facility]     Patient: Tristin Samson  : 1975  MRN: [id number]  Code Status: DNR    Hospital Day: 2   Date of Service: 2023    Subjective:   Patient seen and examined. Mental status starting to improve. No current complaints.     Past Medical History:   Diagnosis Date    Asthma     during winter months    Cancer [facility])     rectal    Colostomy care [facility])     Hemodialysis patient ([address] [facility]    History of blood transfusion     Hx of migraine headaches     Hypercholesterolemia 2019    Hypertension     Kidney stone     hx of    Palliative care patient 2019    Pericarditis     Rectal cancer ([address] [facility])     Testalgia 2016       Past Surgical History:   Procedure Laterality Date    ANTERIOR CRUCIATE LIGAMENT REPAIR      ACL and MCL repair    COLON SURGERY      colon resection with colostomy    COLONOSCOPY      DIALYSIS FISTULA CREATION Bilateral 2020    CREATION  LEFT  BRACHIAL CEPHALIC AV FISTULA performed by Michelle Monge MD at [facility] [place], DIAGNOSTIC      HC COLONOSCOPY BIOPSY/STOMA N/A 2019    Dr Purdy-w/APC ablation-Inflammatory polyps inside or below the colostomy opening-Tubular AP (-) dysplasia    HERNIA REPAIR      As an infant    KIDNEY REMOVAL Left     cancer    SCROTAL SURGERY N/A 10/3/2023    SCROTAL ABSCESS INCISION AND DRAINAGE performed by Erika Jacinto MD at [facility] N/A 2019    Dr Purdy-Gastric ulcers    Orpah Goldberg Right 2022    SJS Placement/exchange over a wire of right internal jugular vein tunneled dialysis catheter (BARD Glidepath 27 cm tip to cuff)    XR MIDLINE EQUAL OR GREATER THAN 5 YEARS  2014    XR MIDLINE EQUAL OR GREATER THAN 5 YEARS       Family History   Problem Relation Age of Onset    Heart Attack Mother     Cancer Mother         Liver and pancreatic    Diabetes Father Pontine and bilateral occipital hypodensities are again seen and improved compared to the prior exam.  The calvarium is intact. The visualized paranasal sinuses are unopacified. Decreased size of the left cerebral convexity subdural hematoma measuring 5 mm, previously measuring 8 mm. Resolution of the right cerebral convexity subdural hematoma. Approximately 3 mm right-to-left midline shift, not significantly changed. Improved pontine and bicerebral occipital hypodensities which could represent improved PRES. All CT scans are performed using dose optimization techniques as appropriate to the performed exam and include at least one of the following: Automated exposure control, adjustment of the mA and/or kV according to size, and the use of iterative reconstruction technique. ______________________________________ Electronically signed by: Mauro Skelton M.D. Date:     12/19/2023 Time:    07:58     MRI BRAIN WO CONTRAST    Result Date: 12/18/2023  EXAM:  BRAIN MRI WITHOUT CONTRAST  HISTORY:  Rule out stroke. TECHNIQUE:  Multiplanar and multisequence MRI of the brain without the administration of intravenous contrast.  COMPARISON:  Brain CT dated 12/17/2023. FINDINGS:  Patchy and curvilinear foci of restricted diffusion are noted in bilateral occipital lobes, likely representing posterior reversible encephalopathy syndrome (PRES). Additional patchy T2/FLAIR hyperintensities are noted in the janet which appears edematous, which could also be seen in PRES. Again visualized are bilateral subacute to chronic subdural hematomas, measuring up to 3 mm in thickness on the right and 7 mm on the left. There is no definite acute ischemic infarct. The ventricles and sulci demonstrate a normal pattern. Few foci of T2/FLAIR hyperintense signal are present in the subcortical and periventricular white matter, most commonly seen in chronic white matter microvascular ischemic changes. The basilar cisterns are patent. results called to Dr. Dunbar approximately 3:30 p.m.  All CT scans are performed using dose optimization techniques as appropriate to the performed exam and include at least one of the following: Automated exposure control, adjustment of the mA and/or kV according to size, and the use of iterative reconstruction technique.  ______________________________________ Electronically signed by: NEENA LOZADA M.D. Date:     12/17/2023 Time:    15:19      Assessment and Plan:   48-year-old male with history of ESRD on HD not fully dialyzed in a week admitted to critical care unit for acute toxic metabolic encephalopathy, hypertensive emergency, seizure-like activity, and hyperkalemia.    Mental status starting to improve  Neurology following  MRI concerning for pres  Trial off Cardene gtt  Oral agents on board  Ensure adequate cerebral perfusion pressure  Neurosurgery following  Ativan prn  AEDs per neurology  Renal following  Hyperkalemia resolved with treatment plan  Continues to protect airway  History of colon cancer s/p colostomy  SCDs for DVT prophylaxis  Discussed treatment plan with patient/wife/RN    Total critical care time: 52 minutes    The above note was generated using voice recognition software. Inadvertent typographical errors in transcription may have occurred.    Lg Munoz MD   12/19/2023 8:36 PM

## 2023-12-19 NOTE — PROGRESS NOTES
[facility] Neurosurgery Critical Care Note        CHIEF COMPLAINT:  Altered mental status      INTERVAL UPDATE:  Patient markedly improved this AM.  He will arouse from sleep and is alert and oriented.  He will follow commands symmetrically x4.  He denies any headaches, nausea or vomiting.      HPI:  The patient is a 48 y.o. male who presented to the [facility] ED with altered mental status and concern for stroke.  He has a complicated medical history including ESRD on dialysis and colon cancer with a colostomy in place.  No family is at bedside so history is obtained from the medical record and nurses.  Apparently he had missed several days of dialysis and his wife found him yesterday unresponsive with right sided weakness and he was brought to the ED.  After arrival, he had a seizure.  He was also significantly hypertensive with an SBP of ~260.  A CT of his head was obtained at admission that revealed bilateral subdural hygromas vs chronic subdural hematoma.  This has been confirmed on subsequent MRI.  He was admitted due to his need for emergent dialysis, altered mentation and seizures.  There is no documented history of trauma.  He is currently in ICU and receiving hemodialysis.  He will arouse to voice but is nonverbal and not following any commands.  He appears to be paretic in the RUE.         PHYSICAL EXAM:    Vitals:    12/19/23 0825   BP: (!) 194/95   Pulse:    Resp:    Temp:    SpO2:        Constitutional: Appears well-developed and well-nourished.   Eyes - conjunctiva normal.  Pupils react to light  Ear, nose,throat -Normal pinna and tragus, No scars, or lesions over external nose or ears, no obvious atrophy of tongue  Neck- symmetric, trachea midline, no jugular vein distension, no thyromegaly  Respiration- chest wall symmetric, normal effort without use of accessory muscles  Musculoskeletal - no significant wasting of muscles noted, no bony deformities, symmetric bulk  Extremities- no clubbing, cyanosis or  following: Automated exposure control, adjustment of the mA and/or kV according to size, and the use of iterative reconstruction technique. ______________________________________ Electronically signed by: Andre Small M.D. Date:     12/19/2023 Time:    07:58     MRI BRAIN WO CONTRAST    Result Date: 12/18/2023   1. Patchy and curvilinear foci of restricted diffusion in bilateral occipital lobes, likely representing posterior reversible encephalopathy syndrome (PRES). Additional patchy T2/FLAIR hyperintensities in the janet which appears edematous, which could also be seen in PRES. 2. Redemonstration of bilateral subacute to chronic subdural hematomas, measuring up to 3 mm in thickness on the right and 7 mm on the left. 3. Minimal chronic white matter microvascular ischemic changes. 4. Small right mastoid effusion. ______________________________________ Electronically signed by: Roberto Nieto M.D. Date:     12/18/2023 Time:    12:10         IMAGING:    Repeat heat CT reviewed and compared to prior studies. There has been interval improvement in the bilateral subdural collections. The right sided subdural collection is no longer visible and the left has decreased to 5mm from 8mm. There is no midline shift. ASSESSMENT AND PLAN:  This is a 57 y.o. male with a history of ESRD on dialysis as well as colon cancer s/p colostomy who is admitted to the intensive care unit after presenting with profoundly altered mental status and concern for stroke. He had a seizure after arrival to the ED. He was also severely hypertensive in the ED with SBP > 230mmHg. His neurologic status has improved dramatically overnight after dialysis. His repeat CT this AM appears improved. Neuro:  Imaging and exam improved. No plans for surgical intervention. Continue to monitor neurologic exam.  Avoid anticoagulant and antiplatelet medications. AEDs per neurology. Repeat CT for any neurologic change.   CV:  Maintain SBP <160 mmHg.  Patient remains intermittently tachycardic, defer to medical team  Pulmonary:  No active issues  GI:  No active issues   /Renal:  Continue HD per nephrology team  Endocrine:  Defer to medical team  FEN:  OK to advance diet from neurosurgical perspective. Other:  Will continue to follow along. I attest that I spend >35 minutes engaged in critical care of this patient. This includes review of EMR data, imaging, bedside evaluation, patient/family counseling and coordination of care with nursing, providers and consultants.       Marko White MD

## 2023-12-19 NOTE — PROCEDURES
[facility] [address], [address] [place] [address]                          ELECTROENCEPHALOGRAM REPORT    PATIENT NAME: Ryan Mason                      :        1975  MED REC NO:   [id number]                              ROOM:       [facility]  ACCOUNT NO:   [de-identified]                           ADMIT DATE: 2023  PROVIDER:     Baldomero Farfan MD    DATE OF EE2023    INDICATION FOR TEST:  New-onset seizure. DESCRIPTION:  The waking background consists of a well-formed posterior  dominant symmetric 9 to 10 Hz activity. During drowsiness, background  frequency decreased by 2 to 3 Hz. Stage II sleep is not seen. No  epileptiform activity nor any focal or lateralizing slowing was noted. IMPRESSION:  Normal awake and drowsy-appearing EEG. Correlate  clinically.         Baldomero Farfan MD    D: 2023 11:58:48      T: 2023 13:18:21     [id number]  Job#: [id number]     Doc#: [id number]    CC:

## 2023-12-20 ENCOUNTER — APPOINTMENT (OUTPATIENT)
Dept: CT IMAGING | Age: 48
DRG: 091 | End: 2023-12-20
Payer: MEDICARE

## 2023-12-20 LAB
ANION GAP SERPL CALCULATED.3IONS-SCNC: 21 MMOL/L (ref 7–19)
BASOPHILS # BLD: 0.2 K/UL (ref 0–0.2)
BASOPHILS NFR BLD: 2.1 % (ref 0–1)
BUN SERPL-MCNC: 37 MG/DL (ref 6–20)
CALCIUM SERPL-MCNC: 7.8 MG/DL (ref 8.6–10)
CHLORIDE SERPL-SCNC: 103 MMOL/L (ref 98–111)
CO2 SERPL-SCNC: 18 MMOL/L (ref 22–29)
CREAT SERPL-MCNC: 8.2 MG/DL (ref 0.5–1.2)
EOSINOPHIL # BLD: 0.1 K/UL (ref 0–0.6)
EOSINOPHIL NFR BLD: 1.1 % (ref 0–5)
ERYTHROCYTE [DISTWIDTH] IN BLOOD BY AUTOMATED COUNT: 16.2 % (ref 11.5–14.5)
GLUCOSE SERPL-MCNC: 88 MG/DL (ref 74–109)
HCT VFR BLD AUTO: 30.1 % (ref 42–52)
HGB BLD-MCNC: 10 G/DL (ref 14–18)
IMM GRANULOCYTES # BLD: 0.4 K/UL
LYMPHOCYTES # BLD: 0.8 K/UL (ref 1.1–4.5)
LYMPHOCYTES NFR BLD: 10 % (ref 20–40)
MCH RBC QN AUTO: 29 PG (ref 27–31)
MCHC RBC AUTO-ENTMCNC: 33.2 G/DL (ref 33–37)
MCV RBC AUTO: 87.2 FL (ref 80–94)
MONOCYTES # BLD: 0.6 K/UL (ref 0–0.9)
MONOCYTES NFR BLD: 8 % (ref 0–10)
NEUTROPHILS # BLD: 5.9 K/UL (ref 1.5–7.5)
NEUTS SEG NFR BLD: 74.1 % (ref 50–65)
PLATELET # BLD AUTO: 106 K/UL (ref 130–400)
PLATELET SLIDE REVIEW: ABNORMAL
PMV BLD AUTO: 9.8 FL (ref 9.4–12.4)
POTASSIUM SERPL-SCNC: 5.5 MMOL/L (ref 3.5–5)
RBC # BLD AUTO: 3.45 M/UL (ref 4.7–6.1)
SODIUM SERPL-SCNC: 142 MMOL/L (ref 136–145)
WBC # BLD AUTO: 7.9 K/UL (ref 4.8–10.8)

## 2023-12-20 PROCEDURE — 2500000003 HC RX 250 WO HCPCS: Performed by: INTERNAL MEDICINE

## 2023-12-20 PROCEDURE — 80048 BASIC METABOLIC PNL TOTAL CA: CPT

## 2023-12-20 PROCEDURE — 2000000000 HC ICU R&B

## 2023-12-20 PROCEDURE — 6360000002 HC RX W HCPCS: Performed by: INTERNAL MEDICINE

## 2023-12-20 PROCEDURE — 87086 URINE CULTURE/COLONY COUNT: CPT

## 2023-12-20 PROCEDURE — 94640 AIRWAY INHALATION TREATMENT: CPT

## 2023-12-20 PROCEDURE — 87186 SC STD MICRODIL/AGAR DIL: CPT

## 2023-12-20 PROCEDURE — 6360000002 HC RX W HCPCS: Performed by: HOSPITALIST

## 2023-12-20 PROCEDURE — 70450 CT HEAD/BRAIN W/O DYE: CPT

## 2023-12-20 PROCEDURE — 6360000002 HC RX W HCPCS: Performed by: NEUROLOGICAL SURGERY

## 2023-12-20 PROCEDURE — 36415 COLL VENOUS BLD VENIPUNCTURE: CPT

## 2023-12-20 PROCEDURE — 6370000000 HC RX 637 (ALT 250 FOR IP): Performed by: INTERNAL MEDICINE

## 2023-12-20 PROCEDURE — 99291 CRITICAL CARE FIRST HOUR: CPT | Performed by: NEUROLOGICAL SURGERY

## 2023-12-20 PROCEDURE — 99233 SBSQ HOSP IP/OBS HIGH 50: CPT | Performed by: PSYCHIATRY & NEUROLOGY

## 2023-12-20 PROCEDURE — 6360000002 HC RX W HCPCS: Performed by: PSYCHIATRY & NEUROLOGY

## 2023-12-20 PROCEDURE — 94760 N-INVAS EAR/PLS OXIMETRY 1: CPT

## 2023-12-20 PROCEDURE — 85025 COMPLETE CBC W/AUTO DIFF WBC: CPT

## 2023-12-20 PROCEDURE — 8010000000 HC HEMODIALYSIS ACUTE INPT

## 2023-12-20 RX ORDER — DOXAZOSIN MESYLATE 1 MG/1
1 TABLET ORAL EVERY EVENING
Status: DISCONTINUED | OUTPATIENT
Start: 2023-12-20 | End: 2023-12-24 | Stop reason: HOSPADM

## 2023-12-20 RX ORDER — HYDRALAZINE HYDROCHLORIDE 20 MG/ML
20 INJECTION INTRAMUSCULAR; INTRAVENOUS ONCE
Status: COMPLETED | OUTPATIENT
Start: 2023-12-20 | End: 2023-12-20

## 2023-12-20 RX ORDER — METOPROLOL SUCCINATE 50 MG/1
50 TABLET, EXTENDED RELEASE ORAL 2 TIMES DAILY
Status: DISCONTINUED | OUTPATIENT
Start: 2023-12-20 | End: 2023-12-24 | Stop reason: HOSPADM

## 2023-12-20 RX ORDER — LEVETIRACETAM 500 MG/5ML
750 INJECTION, SOLUTION, CONCENTRATE INTRAVENOUS DAILY
Status: DISCONTINUED | OUTPATIENT
Start: 2023-12-20 | End: 2023-12-23

## 2023-12-20 RX ORDER — NIFEDIPINE 30 MG/1
30 TABLET, EXTENDED RELEASE ORAL 2 TIMES DAILY
Status: DISCONTINUED | OUTPATIENT
Start: 2023-12-20 | End: 2023-12-22

## 2023-12-20 RX ORDER — DEXAMETHASONE SODIUM PHOSPHATE 10 MG/ML
4 INJECTION, SOLUTION INTRAMUSCULAR; INTRAVENOUS EVERY 6 HOURS
Status: DISCONTINUED | OUTPATIENT
Start: 2023-12-20 | End: 2023-12-24 | Stop reason: HOSPADM

## 2023-12-20 RX ORDER — LOSARTAN POTASSIUM 50 MG/1
50 TABLET ORAL 2 TIMES DAILY
Status: DISCONTINUED | OUTPATIENT
Start: 2023-12-20 | End: 2023-12-24 | Stop reason: HOSPADM

## 2023-12-20 RX ADMIN — HYDRALAZINE HYDROCHLORIDE 20 MG: 20 INJECTION INTRAMUSCULAR; INTRAVENOUS at 06:12

## 2023-12-20 RX ADMIN — HYDRALAZINE HYDROCHLORIDE 10 MG: 20 INJECTION INTRAMUSCULAR; INTRAVENOUS at 12:28

## 2023-12-20 RX ADMIN — DEXMEDETOMIDINE HYDROCHLORIDE 0.6 MCG/KG/HR: 400 INJECTION, SOLUTION INTRAVENOUS at 20:16

## 2023-12-20 RX ADMIN — DEXMEDETOMIDINE HYDROCHLORIDE 1 MCG/KG/HR: 400 INJECTION, SOLUTION INTRAVENOUS at 08:10

## 2023-12-20 RX ADMIN — FOLIC ACID 1 MG: 1 TABLET ORAL at 07:15

## 2023-12-20 RX ADMIN — DOXAZOSIN 1 MG: 1 TABLET ORAL at 18:03

## 2023-12-20 RX ADMIN — BUDESONIDE AND FORMOTEROL FUMARATE DIHYDRATE 2 PUFF: 160; 4.5 AEROSOL RESPIRATORY (INHALATION) at 18:21

## 2023-12-20 RX ADMIN — DEXAMETHASONE SODIUM PHOSPHATE 4 MG: 10 INJECTION, SOLUTION INTRAMUSCULAR; INTRAVENOUS at 14:36

## 2023-12-20 RX ADMIN — DEXMEDETOMIDINE HYDROCHLORIDE 0.8 MCG/KG/HR: 400 INJECTION, SOLUTION INTRAVENOUS at 13:57

## 2023-12-20 RX ADMIN — LEVETIRACETAM 750 MG: 500 INJECTION, SOLUTION, CONCENTRATE INTRAVENOUS at 14:35

## 2023-12-20 RX ADMIN — DEXMEDETOMIDINE HYDROCHLORIDE 1.4 MCG/KG/HR: 400 INJECTION, SOLUTION INTRAVENOUS at 04:15

## 2023-12-20 RX ADMIN — HYDRALAZINE HYDROCHLORIDE 10 MG: 20 INJECTION INTRAMUSCULAR; INTRAVENOUS at 00:40

## 2023-12-20 RX ADMIN — LOSARTAN POTASSIUM 50 MG: 50 TABLET, FILM COATED ORAL at 19:56

## 2023-12-20 RX ADMIN — LOSARTAN POTASSIUM 100 MG: 100 TABLET, FILM COATED ORAL at 07:19

## 2023-12-20 RX ADMIN — OXYBUTYNIN CHLORIDE 5 MG: 5 TABLET ORAL at 07:15

## 2023-12-20 RX ADMIN — HYDRALAZINE HYDROCHLORIDE 100 MG: 50 TABLET, FILM COATED ORAL at 19:56

## 2023-12-20 RX ADMIN — Medication: at 10:00

## 2023-12-20 RX ADMIN — METOPROLOL SUCCINATE 50 MG: 50 TABLET, EXTENDED RELEASE ORAL at 07:19

## 2023-12-20 RX ADMIN — Medication: at 19:56

## 2023-12-20 RX ADMIN — AMLODIPINE BESYLATE 10 MG: 10 TABLET ORAL at 07:14

## 2023-12-20 RX ADMIN — NIFEDIPINE 30 MG: 30 TABLET, EXTENDED RELEASE ORAL at 22:03

## 2023-12-20 RX ADMIN — DEXAMETHASONE SODIUM PHOSPHATE 4 MG: 10 INJECTION, SOLUTION INTRAMUSCULAR; INTRAVENOUS at 08:53

## 2023-12-20 RX ADMIN — TAMSULOSIN HYDROCHLORIDE 0.4 MG: 0.4 CAPSULE ORAL at 07:15

## 2023-12-20 RX ADMIN — HYDRALAZINE HYDROCHLORIDE 100 MG: 50 TABLET, FILM COATED ORAL at 07:19

## 2023-12-20 RX ADMIN — METOPROLOL SUCCINATE 50 MG: 50 TABLET, EXTENDED RELEASE ORAL at 22:03

## 2023-12-20 RX ADMIN — DEXAMETHASONE SODIUM PHOSPHATE 4 MG: 10 INJECTION, SOLUTION INTRAMUSCULAR; INTRAVENOUS at 19:56

## 2023-12-20 RX ADMIN — HYDRALAZINE HYDROCHLORIDE 100 MG: 50 TABLET, FILM COATED ORAL at 14:36

## 2023-12-20 RX ADMIN — PANTOPRAZOLE SODIUM 40 MG: 40 TABLET, DELAYED RELEASE ORAL at 06:38

## 2023-12-20 RX ADMIN — CINACALCET HYDROCHLORIDE 30 MG: 30 TABLET, FILM COATED ORAL at 07:14

## 2023-12-20 RX ADMIN — BUDESONIDE AND FORMOTEROL FUMARATE DIHYDRATE 2 PUFF: 160; 4.5 AEROSOL RESPIRATORY (INHALATION) at 06:28

## 2023-12-20 RX ADMIN — CLONIDINE HYDROCHLORIDE 0.2 MG: 0.1 TABLET ORAL at 10:46

## 2023-12-20 ASSESSMENT — PAIN SCALES - GENERAL
PAINLEVEL_OUTOF10: 0

## 2023-12-20 NOTE — PROGRESS NOTES
Patient's spouse, Mercedes Espinoza, asked for the code status to be changed. After discussion with Rudi Humphrey. and this nurse status was revised to be full code. Dr Chidi Wilhelm notified.

## 2023-12-20 NOTE — PROGRESS NOTES
Hospitalist Progress Note    Patient:  Jose Alberto Da Silva  YOB: 1975  Date of Service: 12/20/2023  MRN: [id number]   Acct: [de-identified]   Primary Care Physician: Jeronimo Stephens MD  Advance Directive: Full Code  Admit Date: 12/17/2023       Hospital Day: 3  Referring Provider: Johanna Colby DO    Patient Seen, Chart, Consults, Notes, Labs, Radiology studies reviewed. Subjective:  Jose Alberto Da Silva is a 57 y.o. male  whom we are following for end-stage renal disease, altered mental status, chronic subdural hematomas, hyperkalemia. Nursing notes a decrease in his mental status compared to yesterday. He had a very good day yesterday. Repeat CT scan shows slight enlargement of one of his subdural hematomas. Hemodynamics are stable. Otherwise no new complaints. He received hemodialysis this morning. His blood pressure remains elevated.     Allergies:  Oxycodone-acetaminophen, Morphine and related, and Morphine    Medicines:  Current Facility-Administered Medications   Medication Dose Route Frequency Provider Last Rate Last Admin    dexAMETHasone (PF) (DECADRON) injection 4 mg  4 mg IntraVENous Q6H Vernon MARIN MD   4 mg at 12/20/23 0853    losartan (COZAAR) tablet 100 mg  100 mg Oral Daily Sultana Larios MD   100 mg at 12/20/23 0719    hydrALAZINE (APRESOLINE) tablet 100 mg  100 mg Oral TID Maggie Cruz MD   100 mg at 12/20/23 0719    metoprolol succinate (TOPROL XL) extended release tablet 50 mg  50 mg Oral Daily Maggie Cruz MD   50 mg at 12/20/23 0719    albuterol sulfate HFA (PROVENTIL;VENTOLIN;PROAIR) 108 (90 Base) MCG/ACT inhaler 2 puff  2 puff Inhalation Q4H PRN Maggie Cruz MD        amLODIPine (NORVASC) tablet 10 mg  10 mg Oral Daily Maggie Cruz MD   10 mg at 12/20/23 1104    cinacalcet (SENSIPAR) tablet 30 mg  30 mg Oral Daily Maggie Cruz MD   30 mg at 12/20/23 6453    cloNIDine (CATAPRES) tablet 0.2 mg  0.2 mg Oral TID PRN Maggie Cruz MD   0.2 mg at

## 2023-12-20 NOTE — PROGRESS NOTES
Nephrology ([facility]) Progress Note    Patient:  Ledy Garcia  YOB: 1975  Date of Service: 12/20/2023  MRN: [id number]   Acct: [de-identified]   Primary Care Physician: Virgil Griffin MD  Advance Directive: Full Code  Admit Date: 12/17/2023       Hospital Day: 3  Referring Provider: Yuli Simmons DO    Patient independently seen and examined, Chart, Consults, Notes, Operative notes, Labs, Cardiology, and Radiology studies reviewed as available. Subjective:  Ledy Garcia is a 57 y.o. male for whom we were consulted for evaluation and treatment of end-stage renal disease with hyperkalemia. Patient known to service from as he was on dialysis. He was unable to participate in the initial history and physical examination due to altered mental status but wife was present at the bedside assisting history as well as chart review. ICU nurse present at the bedside as well. Patient had prior history of migraines and had missed about half a treatment on Wednesday and all of his treatment on Friday due to feeling poorly. He was ultimately brought in by his wife due to developing right-sided weakness. He was agitated and required Precedex infusion. Dr. Deepak Hernandez was on-call last night and ordered an emergent treatment for correction of hyperkalemia which initially corrected but began to reaccumulate up to the 5.4 level today. Plans included MRI today. Required Cardene infusion for blood pressure control. Today, no overnight events. Mental status continues to wax and wane. Increased subdural hematoma noted on CT.       Dialysis   Pt was seen on renal replacement therapy and I have personally seen and evaluated the patient and directed the therapy  Modality: Hemodialysis  Access: Catheter  Location: right upper  QB: 400  QD: 800  UF: 860      Allergies:  Oxycodone-acetaminophen, Morphine and related, and Morphine    Medicines:  Current Facility-Administered Medications subclavian arteries appear patent. All CT scans are performed using dose optimization techniques as appropriate to the performed exam and include at least one of the following: Automated exposure control, adjustment of the mA and/or kV according to size, and the use of iterative reconstruction technique. ______________________________________ Electronically signed by: Kaila Paul M.D. Date:     12/17/2023 Time:    15:22     CT HEAD WO CONTRAST    Result Date: 12/17/2023  EXAM:  CT SCAN HEAD WITHOUT CONTRAST. HISTORY:  Code stroke cerebrovascular accident right-sided weakness altered mental status  COMPARISON:  None. TECHNIQUE:  Axial scans acquired 5 mm slice thicknesses. FINDINGS:  Bilateral subdural hygromas are present. There is an 8 mm left subdural hygroma. There is a 4 mm right subdural hygroma. Hemorrhage: None. Cerebral Parenchyma: Third ventricle is midline. Lateral ventricles are symmetric. Cerebellum: C posterior fossa there is edema in the janet. There is effacement of the fourth ventricle. Ine. .  Masses/Mass Effect: There is mass effect in the posterior fossa with effacement of the fourth ventricle. Vasculature: Normal.  Osseus Structures: Internal auditory canals are symmetric. Mastoid air cells and paranasal sinuses are pneumatized. Orbits are intact. Calvarium is intact. .  Soft Tissues: Normal.      There appears to be edema in the region of the janet and effacement of the fourth ventricle. 2.  8 mm left subdural hygroma. 3.  4 mm right subdural hygroma. These results called to Dr. Zane Mcginnis approximately 3:30 p.m. All CT scans are performed using dose optimization techniques as appropriate to the performed exam and include at least one of the following: Automated exposure control, adjustment of the mA and/or kV according to size, and the use of iterative reconstruction technique. ______________________________________ Electronically signed by: Nancy Orozco M.D.  Date:     12/17/2023 Time:    15:19        Assessment   End-stage renal disease  Anemia and chronic kidney disease  Altered mental status  Hypertension  Hyperkalemia  PRES    Plan:  Discussed with patient as able, nursing, neurosurgery, Dr. Chawla  Workup reviewed today  Monitor labs  Dialysis emergently on 12/17 and Monday Wednesday Friday per routine scheduling  Adjust oral bp meds as tolerated      Shane Cortez MD  12/20/23  12:48 PM

## 2023-12-21 ENCOUNTER — APPOINTMENT (OUTPATIENT)
Dept: CT IMAGING | Age: 48
DRG: 091 | End: 2023-12-21
Payer: MEDICARE

## 2023-12-21 LAB
ANION GAP SERPL CALCULATED.3IONS-SCNC: 19 MMOL/L (ref 7–19)
BUN SERPL-MCNC: 28 MG/DL (ref 6–20)
CALCIUM SERPL-MCNC: 8 MG/DL (ref 8.6–10)
CHLORIDE SERPL-SCNC: 99 MMOL/L (ref 98–111)
CO2 SERPL-SCNC: 21 MMOL/L (ref 22–29)
CREAT SERPL-MCNC: 5.7 MG/DL (ref 0.5–1.2)
GLUCOSE SERPL-MCNC: 118 MG/DL (ref 74–109)
POTASSIUM SERPL-SCNC: 4.6 MMOL/L (ref 3.5–5)
SODIUM SERPL-SCNC: 139 MMOL/L (ref 136–145)

## 2023-12-21 PROCEDURE — 6370000000 HC RX 637 (ALT 250 FOR IP): Performed by: INTERNAL MEDICINE

## 2023-12-21 PROCEDURE — 80048 BASIC METABOLIC PNL TOTAL CA: CPT

## 2023-12-21 PROCEDURE — 99232 SBSQ HOSP IP/OBS MODERATE 35: CPT | Performed by: PSYCHIATRY & NEUROLOGY

## 2023-12-21 PROCEDURE — 94760 N-INVAS EAR/PLS OXIMETRY 1: CPT

## 2023-12-21 PROCEDURE — 36415 COLL VENOUS BLD VENIPUNCTURE: CPT

## 2023-12-21 PROCEDURE — 92526 ORAL FUNCTION THERAPY: CPT

## 2023-12-21 PROCEDURE — 6360000002 HC RX W HCPCS: Performed by: INTERNAL MEDICINE

## 2023-12-21 PROCEDURE — 1210000000 HC MED SURG R&B

## 2023-12-21 PROCEDURE — 92523 SPEECH SOUND LANG COMPREHEN: CPT

## 2023-12-21 PROCEDURE — 70450 CT HEAD/BRAIN W/O DYE: CPT

## 2023-12-21 PROCEDURE — 6360000002 HC RX W HCPCS: Performed by: NEUROLOGICAL SURGERY

## 2023-12-21 PROCEDURE — 94640 AIRWAY INHALATION TREATMENT: CPT

## 2023-12-21 PROCEDURE — 2500000003 HC RX 250 WO HCPCS: Performed by: INTERNAL MEDICINE

## 2023-12-21 PROCEDURE — 99291 CRITICAL CARE FIRST HOUR: CPT | Performed by: NEUROLOGICAL SURGERY

## 2023-12-21 RX ORDER — OXYCODONE HYDROCHLORIDE AND ACETAMINOPHEN 5; 325 MG/1; MG/1
1 TABLET ORAL ONCE
Status: COMPLETED | OUTPATIENT
Start: 2023-12-21 | End: 2023-12-21

## 2023-12-21 RX ORDER — OXYCODONE HYDROCHLORIDE AND ACETAMINOPHEN 5; 325 MG/1; MG/1
1 TABLET ORAL EVERY 4 HOURS PRN
Status: DISCONTINUED | OUTPATIENT
Start: 2023-12-21 | End: 2023-12-24 | Stop reason: HOSPADM

## 2023-12-21 RX ORDER — ALPRAZOLAM 0.5 MG/1
1 TABLET ORAL 4 TIMES DAILY PRN
Status: DISCONTINUED | OUTPATIENT
Start: 2023-12-21 | End: 2023-12-24 | Stop reason: HOSPADM

## 2023-12-21 RX ADMIN — LOSARTAN POTASSIUM 50 MG: 50 TABLET, FILM COATED ORAL at 07:37

## 2023-12-21 RX ADMIN — HYDRALAZINE HYDROCHLORIDE 10 MG: 20 INJECTION INTRAMUSCULAR; INTRAVENOUS at 10:57

## 2023-12-21 RX ADMIN — OXYCODONE AND ACETAMINOPHEN 1 TABLET: 5; 325 TABLET ORAL at 21:18

## 2023-12-21 RX ADMIN — CINACALCET HYDROCHLORIDE 30 MG: 30 TABLET, FILM COATED ORAL at 07:32

## 2023-12-21 RX ADMIN — OXYCODONE AND ACETAMINOPHEN 1 TABLET: 5; 325 TABLET ORAL at 13:36

## 2023-12-21 RX ADMIN — DEXAMETHASONE SODIUM PHOSPHATE 4 MG: 10 INJECTION, SOLUTION INTRAMUSCULAR; INTRAVENOUS at 21:15

## 2023-12-21 RX ADMIN — NIFEDIPINE 30 MG: 30 TABLET, EXTENDED RELEASE ORAL at 07:38

## 2023-12-21 RX ADMIN — METOPROLOL SUCCINATE 50 MG: 50 TABLET, EXTENDED RELEASE ORAL at 07:37

## 2023-12-21 RX ADMIN — METOPROLOL SUCCINATE 50 MG: 50 TABLET, EXTENDED RELEASE ORAL at 21:15

## 2023-12-21 RX ADMIN — OXYBUTYNIN CHLORIDE 5 MG: 5 TABLET ORAL at 07:32

## 2023-12-21 RX ADMIN — DOXAZOSIN 1 MG: 1 TABLET ORAL at 17:04

## 2023-12-21 RX ADMIN — BUDESONIDE AND FORMOTEROL FUMARATE DIHYDRATE 2 PUFF: 160; 4.5 AEROSOL RESPIRATORY (INHALATION) at 06:00

## 2023-12-21 RX ADMIN — DEXMEDETOMIDINE HYDROCHLORIDE 0.8 MCG/KG/HR: 400 INJECTION, SOLUTION INTRAVENOUS at 03:35

## 2023-12-21 RX ADMIN — CLONIDINE HYDROCHLORIDE 0.2 MG: 0.1 TABLET ORAL at 00:18

## 2023-12-21 RX ADMIN — FOLIC ACID 1 MG: 1 TABLET ORAL at 07:32

## 2023-12-21 RX ADMIN — HYDRALAZINE HYDROCHLORIDE 100 MG: 50 TABLET, FILM COATED ORAL at 07:32

## 2023-12-21 RX ADMIN — DEXAMETHASONE SODIUM PHOSPHATE 4 MG: 10 INJECTION, SOLUTION INTRAMUSCULAR; INTRAVENOUS at 13:37

## 2023-12-21 RX ADMIN — OXYCODONE AND ACETAMINOPHEN 1 TABLET: 5; 325 TABLET ORAL at 12:52

## 2023-12-21 RX ADMIN — Medication: at 23:04

## 2023-12-21 RX ADMIN — BUDESONIDE AND FORMOTEROL FUMARATE DIHYDRATE 2 PUFF: 160; 4.5 AEROSOL RESPIRATORY (INHALATION) at 19:35

## 2023-12-21 RX ADMIN — NIFEDIPINE 30 MG: 30 TABLET, EXTENDED RELEASE ORAL at 21:15

## 2023-12-21 RX ADMIN — LOSARTAN POTASSIUM 50 MG: 50 TABLET, FILM COATED ORAL at 21:15

## 2023-12-21 RX ADMIN — ALPRAZOLAM 1 MG: 1 TABLET ORAL at 12:02

## 2023-12-21 RX ADMIN — PANTOPRAZOLE SODIUM 40 MG: 40 TABLET, DELAYED RELEASE ORAL at 07:32

## 2023-12-21 RX ADMIN — Medication: at 11:02

## 2023-12-21 RX ADMIN — DEXAMETHASONE SODIUM PHOSPHATE 4 MG: 10 INJECTION, SOLUTION INTRAMUSCULAR; INTRAVENOUS at 07:39

## 2023-12-21 RX ADMIN — TAMSULOSIN HYDROCHLORIDE 0.4 MG: 0.4 CAPSULE ORAL at 07:32

## 2023-12-21 RX ADMIN — LEVETIRACETAM 750 MG: 500 INJECTION, SOLUTION, CONCENTRATE INTRAVENOUS at 13:37

## 2023-12-21 RX ADMIN — DEXAMETHASONE SODIUM PHOSPHATE 4 MG: 10 INJECTION, SOLUTION INTRAMUSCULAR; INTRAVENOUS at 03:46

## 2023-12-21 RX ADMIN — HYDRALAZINE HYDROCHLORIDE 100 MG: 50 TABLET, FILM COATED ORAL at 21:15

## 2023-12-21 RX ADMIN — HYDRALAZINE HYDROCHLORIDE 100 MG: 50 TABLET, FILM COATED ORAL at 13:36

## 2023-12-21 RX ADMIN — ALPRAZOLAM 1 MG: 1 TABLET ORAL at 21:18

## 2023-12-21 ASSESSMENT — PAIN DESCRIPTION - ORIENTATION
ORIENTATION: MID
ORIENTATION: MID

## 2023-12-21 ASSESSMENT — PAIN DESCRIPTION - LOCATION
LOCATION: HEAD

## 2023-12-21 ASSESSMENT — PAIN - FUNCTIONAL ASSESSMENT
PAIN_FUNCTIONAL_ASSESSMENT: ACTIVITIES ARE NOT PREVENTED
PAIN_FUNCTIONAL_ASSESSMENT: PREVENTS OR INTERFERES SOME ACTIVE ACTIVITIES AND ADLS
PAIN_FUNCTIONAL_ASSESSMENT: PREVENTS OR INTERFERES SOME ACTIVE ACTIVITIES AND ADLS

## 2023-12-21 ASSESSMENT — PAIN DESCRIPTION - DESCRIPTORS
DESCRIPTORS: ACHING
DESCRIPTORS: ACHING;SHOOTING
DESCRIPTORS: ACHING

## 2023-12-21 ASSESSMENT — PAIN SCALES - GENERAL
PAINLEVEL_OUTOF10: 8
PAINLEVEL_OUTOF10: 0
PAINLEVEL_OUTOF10: 4
PAINLEVEL_OUTOF10: 5

## 2023-12-21 NOTE — PROGRESS NOTES
[facility] Neurosurgery Critical Care Note        CHIEF COMPLAINT:  Altered mental status      INTERVAL UPDATE:  Patient seen and examined.  His mental status has improved from yesterday.  He will arouse easily and is oriented to self and hospital.  He will follow commands.  He is still somewhat delayed and perseverative in his speech.  He denies any headaches, nausea or vomiting.      HPI:  The patient is a 48 y.o. male who presented to the [facility] ED with altered mental status and concern for stroke.  He has a complicated medical history including ESRD on dialysis and colon cancer with a colostomy in place.  No family is at bedside so history is obtained from the medical record and nurses.  Apparently he had missed several days of dialysis and his wife found him yesterday unresponsive with right sided weakness and he was brought to the ED.  After arrival, he had a seizure.  He was also significantly hypertensive with an SBP of ~260.  A CT of his head was obtained at admission that revealed bilateral subdural hygromas vs chronic subdural hematoma.  This has been confirmed on subsequent MRI.  He was admitted due to his need for emergent dialysis, altered mentation and seizures.  There is no documented history of trauma.  He is currently in ICU and receiving hemodialysis.  He will arouse to voice but is nonverbal and not following any commands.  He appears to be paretic in the RUE.         PHYSICAL EXAM:    Vitals:    12/21/23 0737   BP:    Pulse: 59   Resp:    Temp:    SpO2:        Constitutional: Appears well-developed and well-nourished.   Eyes - conjunctiva normal.  Pupils react to light  Ear, nose,throat -Normal pinna and tragus, No scars, or lesions over external nose or ears, no obvious atrophy of tongue  Neck- symmetric, trachea midline, no jugular vein distension, no thyromegaly  Respiration- chest wall symmetric, normal effort without use of accessory muscles  Musculoskeletal - no significant wasting of muscles  following: Automated exposure control, adjustment of the mA and/or kV according to size, and the use of iterative reconstruction technique. ______________________________________ Electronically signed by: Mauro Skelton M.D. Date:     12/19/2023 Time:    07:58     MRI BRAIN WO CONTRAST    Result Date: 12/18/2023   1. Patchy and curvilinear foci of restricted diffusion in bilateral occipital lobes, likely representing posterior reversible encephalopathy syndrome (PRES). Additional patchy T2/FLAIR hyperintensities in the janet which appears edematous, which could also be seen in PRES. 2. Redemonstration of bilateral subacute to chronic subdural hematomas, measuring up to 3 mm in thickness on the right and 7 mm on the left. 3. Minimal chronic white matter microvascular ischemic changes. 4. Small right mastoid effusion. ______________________________________ Electronically signed by: Paul Hylton M.D. Date:     12/18/2023 Time:    12:10         IMAGING:    CT head reviewed and compared to prior. There is a stable left frotal/temporal/parietal chronic subdural hematoma measuring ~9mm in thickness. There is mild left to right midline shift of ~3 mm. No evidence of acute hemorrhage          ASSESSMENT AND PLAN:  This is a 57 y.o. male with a history of ESRD on dialysis as well as colon cancer s/p colostomy who is admitted to the intensive care unit after presenting with profoundly altered mental status and concern for stroke. He had a seizure after arrival to the ED. He was also severely hypertensive in the ED with SBP > 230mmHg. Imaging at admission revealed what appear to be chronic bilateral subdural hematomas. The collection on the right has resolved. The collection on the left persists at ~1cm in maximal thickness. His neurologic has waxed and waned since admission, but he has improved in the past 24h. He has been started on steroids in an attempt to treat his subdural non-operatively.     Neuro:  Continue

## 2023-12-21 NOTE — PROGRESS NOTES
Hospitalist Progress Note    Patient:  Boni Miels  YOB: 1975  Date of Service: 12/21/2023  MRN: [id number]   Acct: [id number]   Primary Care Physician: Andreas Manzo MD  Advance Directive: Full Code  Admit Date: 12/17/2023       Hospital Day: 4  Referring Provider: German Chawla DO    Patient Seen, Chart, Consults, Notes, Labs, Radiology studies reviewed.    Subjective:  Boni Miles is a 48 y.o. male  whom we are following for end-stage renal disease, altered mental status, chronic subdural hematomas, hyperkalemia.  He denies any complaints today.  He seems to be more alert.  Hemodynamics are stable.  I feel he is stable for transfer out of ICU.    Allergies:  Morphine and related and Morphine    Medicines:  Current Facility-Administered Medications   Medication Dose Route Frequency Provider Last Rate Last Admin    oxyCODONE-acetaminophen (PERCOCET) 5-325 MG per tablet 1 tablet  1 tablet Oral Q4H PRN German Chawla DO        ALPRAZolam (XANAX) tablet 1 mg  1 mg Oral 4x Daily PRN German Chawla DO        dexAMETHasone (PF) (DECADRON) injection 4 mg  4 mg IntraVENous Q6H Juan Oliveira MD   4 mg at 12/21/23 0739    levETIRAcetam (KEPPRA) injection 750 mg  750 mg IntraVENous Daily Kartik Gutierres MD   750 mg at 12/20/23 1435    losartan (COZAAR) tablet 50 mg  50 mg Oral BID German Chawla DO   50 mg at 12/21/23 0737    metoprolol succinate (TOPROL XL) extended release tablet 50 mg  50 mg Oral BID German Chawla DO   50 mg at 12/21/23 0737    NIFEdipine (PROCARDIA XL) extended release tablet 30 mg  30 mg Oral BID German Chawla DO   30 mg at 12/21/23 0738    doxazosin (CARDURA) tablet 1 mg  1 mg Oral QPM German Chawla DO   1 mg at 12/20/23 1803    hydrALAZINE (APRESOLINE) tablet 100 mg  100 mg Oral TID Lg Munoz MD   100 mg at 12/21/23 0732    albuterol sulfate HFA (PROVENTIL;VENTOLIN;PROAIR) 108 (90 Base) MCG/ACT inhaler 2 puff  2 puff  Inhalation Q4H PRN Kai Girard MD        cinacalcet [facility]) tablet 30 mg  30 mg Oral Daily Kai Girard MD   30 mg at 12/21/23 0732    cloNIDine (CATAPRES) tablet 0.2 mg  0.2 mg Oral TID PRN Kai Girard MD   0.2 mg at 20/12/45 8519    folic acid (FOLVITE) tablet 1 mg  1 mg Oral Daily Kai Girard MD   1 mg at 12/21/23 0732    pantoprazole (PROTONIX) tablet 40 mg  40 mg Oral QAM AC Kai Girard MD   40 mg at 12/21/23 0732    oxybutynin (DITROPAN) tablet 5 mg  5 mg Oral Daily Kai Girard MD   5 mg at 12/21/23 0732    sodium hypochlorite (DAKINS) 0.125 % external solution   Irrigation BID Kai Girard MD   Given at 12/21/23 1102    tamsulosin (FLOMAX) capsule 0.4 mg  0.4 mg Oral Daily Kai Girard MD   0.4 mg at 12/21/23 0732    ondansetron (ZOFRAN-ODT) disintegrating tablet 4 mg  4 mg Oral Q8H PRN Kai Girard MD        Or    [facility]) injection 4 mg  4 mg IntraVENous Q6H PRN Kai Girard MD        LORazepam (ATIVAN) injection 2 mg  2 mg IntraVENous Q15 Min PRN Kai Girard MD   2 mg at 12/18/23 1736    budesonide-formoterol (SYMBICORT) 160-4.5 MCG/ACT inhaler 2 puff  2 puff Inhalation BID RT Kai Girard MD   2 puff at 12/21/23 0600    hydrALAZINE (APRESOLINE) injection 10 mg  10 mg IntraVENous Q6H PRN Kai Girard MD   10 mg at 12/21/23 1057    niCARdipine (CARDENE) 25 mg in sodium chloride 0.9 % 250 mL infusion ([id number])  2.5-15 mg/hr IntraVENous Continuous Risa Haywood MD   Stopped at 12/19/23 0815    dexmedetomidine (PRECEDEX) 400 mcg in sodium chloride 0.9 % 100 mL infusion  0.1-1.5 mcg/kg/hr IntraVENous Continuous Kai Girard MD   Stopped at 12/21/23 2404       Past Medical History:  Past Medical History:   Diagnosis Date    Asthma     during winter months    Cancer [facility])     rectal    Colostomy care [facility])     Hemodialysis patient ([address] [facility])     mon wed fri at [place]    History of blood transfusion     Hx of migraine headaches     Hypercholesterolemia stenosis. The mid and distal left internal carotid artery appear patent. The right common carotid artery appears to be patent. There is a small amount of calcified plaque seen within the proximal right internal carotid artery not causing stenosis. The mid and distal right internal carotid artery appear patent. The left vertebral artery is dominant. There is a small amount of calcified plaque seen within the proximal left vertebral artery not causing stenosis. The proximal right vertebral artery appears patent. The mid and distal vertebral arteries appear to  be patent. The left subclavian artery appears patent. The brachiocephalic artery, right subclavian artery appear patent. Upper lungs are clear. CT angiogram of the head: The cavernous carotid artery appear patent. The supraclinoid ICA appear patent. The right M1 segment is patent. The proximal left M1 segment appears patent. There is approximately 60-70% long segment stenosis seen within the distal M1 segment of the left middle cerebral artery. There is attenuation of the size of the proximal M2 branches of the left middle cerebral artery compared to the M2 branches of the right middle cerebral artery. .  The distal branches of the middle cerebral arteries otherwise  appear adequately patent. The left A1 segment is dominant. The A2 segments and distal branches of the anterior cerebral arteries appear patent. The anterior communicating artery is normal.  The left vertebral artery is dominant. The distal vertebral arteries appear patent. The basilar artery appears patent. There is a normal appearance of the posterior cerebral arteries. There is a predominately hypodense subdural extra-axial collection  seen over the left cerebral convexity. The findings are better seen on the CT scan of the head without contrast.      There is approximately 60-70% stenosis seen within the distal M1 segment of the left middle cerebral artery.   There is no

## 2023-12-21 NOTE — PROGRESS NOTES
Nephrology ([facility]) Progress Note    Patient:  Brenden Bell  YOB: 1975  Date of Service: 12/21/2023  MRN: [id number]   Acct: [de-identified]   Primary Care Physician: Ren Palafox MD  Advance Directive: Full Code  Admit Date: 12/17/2023       Hospital Day: 4  Referring Provider: Rishi Pond DO    Patient independently seen and examined, Chart, Consults, Notes, Operative notes, Labs, Cardiology, and Radiology studies reviewed as available. Subjective:  Brenden Bell is a 57 y.o. male for whom we were consulted for evaluation and treatment of end-stage renal disease with hyperkalemia. Patient known to service from as he was on dialysis. He was unable to participate in the initial history and physical examination due to altered mental status but wife was present at the bedside assisting history as well as chart review. ICU nurse present at the bedside as well. Patient had prior history of migraines and had missed about half a treatment on Wednesday and all of his treatment on Friday due to feeling poorly. He was ultimately brought in by his wife due to developing right-sided weakness. He was agitated and required Precedex infusion. Dr. Kashif Duvall was on-call last night and ordered an emergent treatment for correction of hyperkalemia which initially corrected but began to reaccumulate up to the 5.4 level today. Plans included MRI today. Required Cardene infusion for blood pressure control. Today, no overnight events. Mental status continues to wax and wane but was better. Increased subdural hematoma noted on CT prior, stable today.             Allergies:  Morphine and related and Morphine    Medicines:  Current Facility-Administered Medications   Medication Dose Route Frequency Provider Last Rate Last Admin    oxyCODONE-acetaminophen (PERCOCET) 5-325 MG per tablet 1 tablet  1 tablet Oral Q4H PRN Rishi Pond DO   1 tablet at 12/21/23 8972 ALPRAZolam Marcial Greg) tablet 1 mg  1 mg Oral 4x Daily PRN Tawanna Slaughter, DO   1 mg at 12/21/23 1202    dexAMETHasone (PF) (DECADRON) injection 4 mg  4 mg IntraVENous Q6H Tawanna Slaughter, DO   4 mg at 12/21/23 1337    levETIRAcetam (KEPPRA) injection 750 mg  750 mg IntraVENous Daily Tawanna Slaughter, DO   750 mg at 12/21/23 1337    losartan (COZAAR) tablet 50 mg  50 mg Oral BID Tawanna Slaughter, DO   50 mg at 12/21/23 6706    metoprolol succinate (TOPROL XL) extended release tablet 50 mg  50 mg Oral BID Tawanna Slaughter, DO   50 mg at 12/21/23 0737    NIFEdipine (PROCARDIA XL) extended release tablet 30 mg  30 mg Oral BID Tawanna Slaughter, DO   30 mg at 12/21/23 8903    doxazosin (CARDURA) tablet 1 mg  1 mg Oral QPM Tawanna Slaughter, DO   1 mg at 12/20/23 1803    hydrALAZINE (APRESOLINE) tablet 100 mg  100 mg Oral TID Tawanna Slaughter, DO   100 mg at 12/21/23 1336    albuterol sulfate HFA (PROVENTIL;VENTOLIN;PROAIR) 108 (90 Base) MCG/ACT inhaler 2 puff  2 puff Inhalation Q4H PRN Tawanna Slaughter, DO        cinacalcet (SENSIPAR) tablet 30 mg  30 mg Oral Daily Tawanna Slaughter, DO   30 mg at 12/21/23 5274    cloNIDine (CATAPRES) tablet 0.2 mg  0.2 mg Oral TID PRN Tawanna Slaughter, DO   0.2 mg at 44/81/85 4401    folic acid (FOLVITE) tablet 1 mg  1 mg Oral Daily Tawanna Slaughter, DO   1 mg at 12/21/23 0732    pantoprazole (PROTONIX) tablet 40 mg  40 mg Oral QAM AC Tawanna Slaughter, DO   40 mg at 12/21/23 0732    oxybutynin (DITROPAN) tablet 5 mg  5 mg Oral Daily Tawanna Slaughter, DO   5 mg at 12/21/23 0732    sodium hypochlorite (DAKINS) 0.125 % external solution   Irrigation BID Tawanna Slaughter, DO   Given at 12/21/23 1102    tamsulosin (FLOMAX) capsule 0.4 mg  0.4 mg Oral Daily Janet White, DO   0.4 mg at 12/21/23 0732    ondansetron (ZOFRAN-ODT) disintegrating tablet 4 mg  4 mg Oral Q8H PRN Janet White, DO        Or    ondansetron

## 2023-12-21 NOTE — PROGRESS NOTES
Facility/Department: [facility] ICU  Initial Speech/Language/Cognitive Assessment  Swallow Therapy     NAME: Dayana Burger  : 1975   MRN: [id number]  ADMISSION DATE: 2023  ADMITTING DIAGNOSIS: has Benign non-nodular prostatic hyperplasia with lower urinary tract symptoms; Gastroesophageal reflux disease without esophagitis; Chronic insomnia; Anxiety disorder; Hypertension; Chronic migraine without aura without status migrainosus, not intractable; Generalized anxiety disorder; Hx of colon cancer, stage III; Hx antineoplastic chemotherapy; Palliative care patient; Hydrocele; Hypercholesterolemia; Malignant neoplasm of rectum ([address]); Numbness of foot; Obstructive uropathy; Stricture of ureter; ESRD on hemodialysis ([address]); Liver mass; Hypoxia; Scrotal abscess; Ureteral stent retained; Macrocytic anemia; Acute on chronic anemia; History of rectal cancer; History of colon polyps; History of stomach ulcers; Elevated alkaline phosphatase level; Groin incision ulcer, with fat layer exposed ([address]); Hyperkalemia; Seizure ([address]); Subdural hematoma ([address]); Stroke-like symptom; ESRD on dialysis [facility]); and Altered mental status on their problem list.    Date of Eval/Treat: 2023   Evaluating Therapist: ISABELLA Altamirano    Pain:  Pain Assessment: None - Denies Pain  Pain Level: 0    Assessment:  Completed assessment. Patient exhibited decreased volume of speech, slow labial movements, and slow, decreased lingual movements during verbalizations. Patient also exhibited decreased select and sustained attention, slow processing, delayed and decreased auditory comprehension, impaired verbalizations with moderate perseveration and moderate instances where patient stated, \"I didn't know,\" decreased immediate/short-term memory, and decreased reasoning/problem solving. Also re-evaluated patient's swallowing function.  Patient exhibited decreased oral prep of more solid consistencies, fast oral transit and suspected swallow delay with

## 2023-12-21 NOTE — CARE COORDINATION
Attempted to do assessment on pt but pt is unable to have meaningful conversation although he is more awake and alert than he has been. This CM did call to speak with pt's spouse but no answer and pt is being transferred out of ICU to the floor with orders for PT/OT. Will need to have those evals done for discharge planning. There is a consult for Acute Rehab. Discharge plan will be determined after PT/OT evals completed and spouse available to complete assessment.  Electronically signed by Fadi Allen RN on 12/21/2023 at 10:30 AM

## 2023-12-21 NOTE — PROGRESS NOTES
Occupational Therapy     Per PT family declined due to patient just falling asleep, will attempt again at a later date.     [facility]

## 2023-12-21 NOTE — CARE COORDINATION
[facility] [address] [facility]  Notification of Admission Decision      [] Patient has been accepted for admit to [facility] on :       Please write discharge orders and summary prior to discharge. [] Patient acceptance to Rehab pending the following :    [x] Eval in progress : await PT/OT evals    [] Patient determined to be ineligible for services at [facility] because : We recommend you consider        Thank you for your referral, we appreciate you. If you have any questions, please feel   free to contact me at [phone].   Electronically Signed by Blank Mcintyre, Admissions Coordinator 12/21/2023 1:38 PM

## 2023-12-21 NOTE — PROGRESS NOTES
Ashlee Magaña transferred to 321 from 142 via bed. Reason for transfer: lower level of care   Explained reason for transfer to Patient, Family. Expressed wife's concern for transfer to Dr. Barbara Cantrell. Patient is anxious and having headache--Xanax and percocet ordered. Xanax given prior to transfer. Belongings:  toiletries  with patient at bedside-wife has other belongings. Soft chart transferred with patient: Yes. Telemetry box number N/A transferred with patient: N/A. Report given to: Emily Gaytan , via telephone.       Electronically signed by Vega Dumont RN on 12/21/2023 at 12:05 PM 24 Hour(s) 6 Minute(s)

## 2023-12-21 NOTE — PROGRESS NOTES
Physical Therapy  Attempted PT eval, family declined due to pt having just fallen asleep and was experiencing a head ache.   Electronically signed by Alpesh Qureshi PT on 12/21/2023 at 2:32 PM

## 2023-12-22 ENCOUNTER — APPOINTMENT (OUTPATIENT)
Dept: CT IMAGING | Age: 48
DRG: 091 | End: 2023-12-22
Payer: MEDICARE

## 2023-12-22 LAB
ALBUMIN SERPL-MCNC: 3.4 G/DL (ref 3.5–5.2)
ALP SERPL-CCNC: 145 U/L (ref 40–130)
ALT SERPL-CCNC: 14 U/L (ref 5–41)
ANION GAP SERPL CALCULATED.3IONS-SCNC: 13 MMOL/L (ref 7–19)
ANION GAP SERPL CALCULATED.3IONS-SCNC: 17 MMOL/L (ref 7–19)
APTT PPP: 25.5 SEC (ref 26–36.2)
AST SERPL-CCNC: 17 U/L (ref 5–40)
BASOPHILS # BLD: 0 K/UL (ref 0–0.2)
BASOPHILS NFR BLD: 0.2 % (ref 0–1)
BILIRUB SERPL-MCNC: 0.4 MG/DL (ref 0.2–1.2)
BUN SERPL-MCNC: 21 MG/DL (ref 6–20)
BUN SERPL-MCNC: 50 MG/DL (ref 6–20)
CALCIUM SERPL-MCNC: 7.8 MG/DL (ref 8.6–10)
CALCIUM SERPL-MCNC: 8.2 MG/DL (ref 8.6–10)
CHLORIDE SERPL-SCNC: 93 MMOL/L (ref 98–111)
CHLORIDE SERPL-SCNC: 99 MMOL/L (ref 98–111)
CO2 SERPL-SCNC: 22 MMOL/L (ref 22–29)
CO2 SERPL-SCNC: 26 MMOL/L (ref 22–29)
CREAT SERPL-MCNC: 4.2 MG/DL (ref 0.5–1.2)
CREAT SERPL-MCNC: 7.5 MG/DL (ref 0.5–1.2)
EOSINOPHIL # BLD: 0 K/UL (ref 0–0.6)
EOSINOPHIL NFR BLD: 0 % (ref 0–5)
ERYTHROCYTE [DISTWIDTH] IN BLOOD BY AUTOMATED COUNT: 16.4 % (ref 11.5–14.5)
GLUCOSE BLD-MCNC: 117 MG/DL (ref 70–99)
GLUCOSE SERPL-MCNC: 127 MG/DL (ref 74–109)
GLUCOSE SERPL-MCNC: 91 MG/DL (ref 74–109)
HCT VFR BLD AUTO: 31.5 % (ref 42–52)
HGB BLD-MCNC: 10.2 G/DL (ref 14–18)
IMM GRANULOCYTES # BLD: 0.2 K/UL
INR PPP: 1.05 (ref 0.88–1.18)
LYMPHOCYTES # BLD: 0.5 K/UL (ref 1.1–4.5)
LYMPHOCYTES NFR BLD: 4.9 % (ref 20–40)
MCH RBC QN AUTO: 28.3 PG (ref 27–31)
MCHC RBC AUTO-ENTMCNC: 32.4 G/DL (ref 33–37)
MCV RBC AUTO: 87.3 FL (ref 80–94)
MONOCYTES # BLD: 0.6 K/UL (ref 0–0.9)
MONOCYTES NFR BLD: 5.7 % (ref 0–10)
NEUTROPHILS # BLD: 9.2 K/UL (ref 1.5–7.5)
NEUTS SEG NFR BLD: 87.8 % (ref 50–65)
PERFORMED ON: ABNORMAL
PLATELET # BLD AUTO: 141 K/UL (ref 130–400)
PMV BLD AUTO: 9.8 FL (ref 9.4–12.4)
POTASSIUM SERPL-SCNC: 3.7 MMOL/L (ref 3.5–5)
POTASSIUM SERPL-SCNC: 4.4 MMOL/L (ref 3.5–5)
PROT SERPL-MCNC: 6.4 G/DL (ref 6.6–8.7)
PROTHROMBIN TIME: 13.4 SEC (ref 12–14.6)
RBC # BLD AUTO: 3.61 M/UL (ref 4.7–6.1)
SODIUM SERPL-SCNC: 132 MMOL/L (ref 136–145)
SODIUM SERPL-SCNC: 138 MMOL/L (ref 136–145)
WBC # BLD AUTO: 10.5 K/UL (ref 4.8–10.8)

## 2023-12-22 PROCEDURE — 6370000000 HC RX 637 (ALT 250 FOR IP): Performed by: INTERNAL MEDICINE

## 2023-12-22 PROCEDURE — 99232 SBSQ HOSP IP/OBS MODERATE 35: CPT | Performed by: PSYCHIATRY & NEUROLOGY

## 2023-12-22 PROCEDURE — 2580000003 HC RX 258: Performed by: INTERNAL MEDICINE

## 2023-12-22 PROCEDURE — 70450 CT HEAD/BRAIN W/O DYE: CPT

## 2023-12-22 PROCEDURE — 94760 N-INVAS EAR/PLS OXIMETRY 1: CPT

## 2023-12-22 PROCEDURE — 6360000002 HC RX W HCPCS: Performed by: INTERNAL MEDICINE

## 2023-12-22 PROCEDURE — 2000000000 HC ICU R&B

## 2023-12-22 PROCEDURE — 94640 AIRWAY INHALATION TREATMENT: CPT

## 2023-12-22 PROCEDURE — 85610 PROTHROMBIN TIME: CPT

## 2023-12-22 PROCEDURE — 8010000000 HC HEMODIALYSIS ACUTE INPT

## 2023-12-22 PROCEDURE — 36415 COLL VENOUS BLD VENIPUNCTURE: CPT

## 2023-12-22 PROCEDURE — 80053 COMPREHEN METABOLIC PANEL: CPT

## 2023-12-22 PROCEDURE — 2500000003 HC RX 250 WO HCPCS: Performed by: INTERNAL MEDICINE

## 2023-12-22 PROCEDURE — 82962 GLUCOSE BLOOD TEST: CPT

## 2023-12-22 PROCEDURE — 85730 THROMBOPLASTIN TIME PARTIAL: CPT

## 2023-12-22 PROCEDURE — 85025 COMPLETE CBC W/AUTO DIFF WBC: CPT

## 2023-12-22 RX ORDER — LABETALOL HYDROCHLORIDE 5 MG/ML
10 INJECTION, SOLUTION INTRAVENOUS EVERY 4 HOURS PRN
Status: DISCONTINUED | OUTPATIENT
Start: 2023-12-22 | End: 2023-12-24 | Stop reason: HOSPADM

## 2023-12-22 RX ADMIN — LOSARTAN POTASSIUM 50 MG: 50 TABLET, FILM COATED ORAL at 21:13

## 2023-12-22 RX ADMIN — NIFEDIPINE 30 MG: 30 TABLET, EXTENDED RELEASE ORAL at 08:45

## 2023-12-22 RX ADMIN — DOXAZOSIN 1 MG: 1 TABLET ORAL at 17:44

## 2023-12-22 RX ADMIN — ALPRAZOLAM 1 MG: 1 TABLET ORAL at 21:24

## 2023-12-22 RX ADMIN — CINACALCET HYDROCHLORIDE 30 MG: 30 TABLET, FILM COATED ORAL at 08:43

## 2023-12-22 RX ADMIN — LOSARTAN POTASSIUM 50 MG: 50 TABLET, FILM COATED ORAL at 09:44

## 2023-12-22 RX ADMIN — Medication: at 21:26

## 2023-12-22 RX ADMIN — HYDRALAZINE HYDROCHLORIDE 100 MG: 50 TABLET, FILM COATED ORAL at 13:40

## 2023-12-22 RX ADMIN — LEVETIRACETAM 750 MG: 500 INJECTION, SOLUTION, CONCENTRATE INTRAVENOUS at 13:40

## 2023-12-22 RX ADMIN — PANTOPRAZOLE SODIUM 40 MG: 40 TABLET, DELAYED RELEASE ORAL at 06:15

## 2023-12-22 RX ADMIN — FOLIC ACID 1 MG: 1 TABLET ORAL at 08:43

## 2023-12-22 RX ADMIN — DEXAMETHASONE SODIUM PHOSPHATE 4 MG: 10 INJECTION, SOLUTION INTRAMUSCULAR; INTRAVENOUS at 03:43

## 2023-12-22 RX ADMIN — HYDRALAZINE HYDROCHLORIDE 100 MG: 50 TABLET, FILM COATED ORAL at 21:13

## 2023-12-22 RX ADMIN — SODIUM CHLORIDE 5 MG/HR: 9 INJECTION, SOLUTION INTRAVENOUS at 19:40

## 2023-12-22 RX ADMIN — HYDRALAZINE HYDROCHLORIDE 10 MG: 20 INJECTION INTRAMUSCULAR; INTRAVENOUS at 16:07

## 2023-12-22 RX ADMIN — HYDRALAZINE HYDROCHLORIDE 100 MG: 50 TABLET, FILM COATED ORAL at 09:44

## 2023-12-22 RX ADMIN — OXYBUTYNIN CHLORIDE 5 MG: 5 TABLET ORAL at 08:43

## 2023-12-22 RX ADMIN — METOPROLOL SUCCINATE 50 MG: 50 TABLET, EXTENDED RELEASE ORAL at 09:44

## 2023-12-22 RX ADMIN — TAMSULOSIN HYDROCHLORIDE 0.4 MG: 0.4 CAPSULE ORAL at 08:43

## 2023-12-22 RX ADMIN — DEXAMETHASONE SODIUM PHOSPHATE 4 MG: 10 INJECTION, SOLUTION INTRAMUSCULAR; INTRAVENOUS at 21:13

## 2023-12-22 RX ADMIN — DEXAMETHASONE SODIUM PHOSPHATE 4 MG: 10 INJECTION, SOLUTION INTRAMUSCULAR; INTRAVENOUS at 13:40

## 2023-12-22 RX ADMIN — BUDESONIDE AND FORMOTEROL FUMARATE DIHYDRATE 2 PUFF: 160; 4.5 AEROSOL RESPIRATORY (INHALATION) at 19:41

## 2023-12-22 RX ADMIN — METOPROLOL SUCCINATE 50 MG: 50 TABLET, EXTENDED RELEASE ORAL at 21:13

## 2023-12-22 RX ADMIN — SODIUM CHLORIDE 5 MG/HR: 9 INJECTION, SOLUTION INTRAVENOUS at 17:32

## 2023-12-22 NOTE — CONSULTS
Hyun Thompson received from 3rd floor to room # 142 . Mental Status: Patient is  intermittently alert; able to follow commands and answer simple questions . Vitals:    12/22/23 1727   BP: (!) 186/90   Pulse: 86   Resp: 11   Temp:    SpO2: 97%     Placed on cardiac monitor: Yes. Bedside monitor. Belongings:  inhalers, dressing change supplies  with patient at bedside . Family at bedside Yes. Oriented Patient and Family to room. Call light within reach. Yes. Transfer was: Well tolerated by patient. .    Electronically signed by Osbaldo Oro RN on 12/22/2023 at 5:33 PM

## 2023-12-22 NOTE — PROGRESS NOTES
Hospitalist Progress Note    Patient:  Hyun Thompson  YOB: 1975  Date of Service: 12/22/2023  MRN: [id number]   Acct: [de-identified]   Primary Care Physician: Chriss Mcelroy MD  Advance Directive: Full Code  Admit Date: 12/17/2023       Hospital Day: 5  Referring Provider: Sahra Coleman DO    Patient Seen, Chart, Consults, Notes, Labs, Radiology studies reviewed. Subjective:  Hyun Thompson is a 57 y.o. male  whom we are following for end-stage renal disease, altered mental status, chronic subdural hematomas, hyperkalemia. He was seen on dialysis. Post dialysis, he had some altered mental status. Repeat CT of his head showed some increased midline shift of his brain. He has been transferred back to ICU for additional blood pressure support and monitoring.     Allergies:  Morphine and related and Morphine    Medicines:  Current Facility-Administered Medications   Medication Dose Route Frequency Provider Last Rate Last Admin    oxyCODONE-acetaminophen (PERCOCET) 5-325 MG per tablet 1 tablet  1 tablet Oral Q4H PRN Eveleth Jared, DO   1 tablet at 12/21/23 2118    ALPRAZolam Tim [employer]age) tablet 1 mg  1 mg Oral 4x Daily PRN Eveleth Jared, DO   1 mg at 12/21/23 2118    dexAMETHasone (PF) (DECADRON) injection 4 mg  4 mg IntraVENous Q6H Eveleth Jared, DO   4 mg at 12/22/23 1340    levETIRAcetam (KEPPRA) injection 750 mg  750 mg IntraVENous Daily Eveleth Jared, DO   750 mg at 12/22/23 1340    losartan (COZAAR) tablet 50 mg  50 mg Oral BID Eveleth Jared, DO   50 mg at 12/22/23 0944    metoprolol succinate (TOPROL XL) extended release tablet 50 mg  50 mg Oral BID Eveleth Jared, DO   50 mg at 12/22/23 0944    NIFEdipine (PROCARDIA XL) extended release tablet 30 mg  30 mg Oral BID Eveleth Jared, DO   30 mg at 12/22/23 0845    doxazosin (CARDURA) tablet 1 mg  1 mg Oral QPM Eveleth Jared, DO   1 mg at 12/21/23 1704    hydrALAZINE (APRESOLINE) tablet 100 mg

## 2023-12-22 NOTE — DIALYSIS
Vital signs before treatment: 160/86 60 18 97.6    Vital signs at end of treatment: 163/107 81 20 97.0    Amount of fluid removed: 3000ml    Patient's tolerance of treatment: Tolerated well, remains hypertensive even with BP meds     Orientation level: Oriented to person    Level of consciousness: Responds to voice        Report given to [facility]

## 2023-12-22 NOTE — PROGRESS NOTES
Comprehensive Nutrition Assessment    Type and Reason for Visit:  Initial, RD Nutrition Re-Screen/LOS    Nutrition Recommendations/Plan:   Follow for advancing diet, po intake, labs     Malnutrition Assessment:  Malnutrition Status: At risk for malnutrition (Comment) (12/22/23 1211)    Context:  Acute Illness     Findings of the 6 clinical characteristics of malnutrition:  Energy Intake:  Mild decrease in energy intake (Comment)  Weight Loss:  No significant weight loss     Body Fat Loss:  No significant body fat loss     Muscle Mass Loss:  No significant muscle mass loss    Fluid Accumulation:  Mild Extremities   Strength:  Not Performed    Nutrition Assessment:    Following patient for LOS x 5 days. Receiving an Easy to chew mildly thick liquid diet. PO intake is fairly good with intake usually ranging %. Current weight is stable to weight in October. Nutrition Related Findings:    dialysis Wound Type: Surgical Incision       Current Nutrition Intake & Therapies:    Average Meal Intake: 51-75%, %  Average Supplements Intake: None Ordered  ADULT DIET; Easy to Chew; Mildly Thick (Nectar)    Anthropometric Measures:  Height: 181.6 cm (5' 11.5\")  Ideal Body Weight (IBW): 175 lbs (80 kg)    Admission Body Weight: 83.5 kg (184 lb 1.6 oz) (bed)  Current Body Weight: 82.3 kg (181 lb 7 oz), 103.7 % IBW. Weight Source: Standing Scale  Current BMI (kg/m2): 25  Usual Body Weight: 83 kg (182 lb 15.7 oz) (10/17/2023)  % Weight Change (Calculated): -0.8  Weight Adjustment For: No Adjustment  BMI Categories: Overweight (BMI 25.0-29. 9)    Estimated Daily Nutrient Needs:  Energy Requirements Based On: Kcal/kg  Weight Used for Energy Requirements: Current  Energy (kcal/day): 1995-6012 kcals (25-30 kcals/kg)  Weight Used for Protein Requirements: Current  Protein (g/day): 82-123g  Method Used for Fluid Requirements: Standard Renal  Fluid (ml/day):      Nutrition Diagnosis:   Inadequate oral intake related to acute injury/trauma, increase demand for energy/nutrients, swallowing difficulty as evidenced by wounds, dialysis    Nutrition Interventions:   Food and/or Nutrient Delivery: Continue Current Diet  Nutrition Education/Counseling: No recommendation at this time  Coordination of Nutrition Care: Continue to monitor while inpatient       Goals:     Goals: Meet at least 75% of estimated needs, PO intake 50% or greater       Nutrition Monitoring and Evaluation:   Behavioral-Environmental Outcomes: None Identified  Food/Nutrient Intake Outcomes: Diet Advancement/Tolerance, Food and Nutrient Intake  Physical Signs/Symptoms Outcomes: Biochemical Data, Weight, Skin, Nutrition Focused Physical Findings, Fluid Status or Edema    Discharge Planning:     Too soon to determine     Dede Phillips MS, RD, LD  Contact: [phone]

## 2023-12-22 NOTE — SIGNIFICANT EVENT
Pt demonstrating worsening R-sided facial droop and decline in mental status. Pt is difficult to arouse and demonstrating worsening aphasia compared to morning assessment. POCT 117, VSS, pt minimally responsive to tactile stimuli. CODE STROKE called at 1312. RT Naman Cedillo, and Dr. Chencho Chambers arrived to bedside. Dr. Khang Hannah notified of changes and ordered STAT head CT. Pt taken to CT by this RN and SETH Edmond at 13:25. Call received from radiologist at 14:22 with results for 6.6mm left to right midline shift, previously measuring 5.8mm. No change in measurement of subdural hematoma. Dr. Hermes Howell and Dr. Khang Hannah notified. Orders given to transfer to ICU for closer monitoring and better BP control per Dr. Khang Hannah.      Electronically signed by Michelle Grande RN on 12/22/2023 at 2:59 PM

## 2023-12-22 NOTE — PLAN OF CARE
Problem: Discharge Planning  Goal: Discharge to home or other facility with appropriate resources  Outcome: Progressing     Problem: Pain  Goal: Verbalizes/displays adequate comfort level or baseline comfort level  Outcome: Progressing     Problem: Safety - Adult  Goal: Free from fall injury  Outcome: Progressing     Problem: Skin/Tissue Integrity  Goal: Absence of new skin breakdown  Description: 1. Monitor for areas of redness and/or skin breakdown  2. Assess vascular access sites hourly  3. Every 4-6 hours minimum:  Change oxygen saturation probe site  4. Every 4-6 hours:  If on nasal continuous positive airway pressure, respiratory therapy assess nares and determine need for appliance change or resting period.   Outcome: Progressing     Problem: ABCDS Injury Assessment  Goal: Absence of physical injury  Outcome: Progressing     Problem: Risk for Elopement  Goal: Patient will not exit the unit/facility without proper excort  Outcome: Progressing

## 2023-12-22 NOTE — DISCHARGE INSTRUCTIONS
[address] and Hyperbaric Oxygen Therapy   Physician Orders and Discharge Instructions  [address] [facility],[address] [place] [address] Blvd, [address]  Telephone: [phone]    NAME:  Jessica Andre  YOB: 1975  MEDICAL RECORD NUMBER:  [id number]  DATE:  12/22/2023    Discharge condition: {STABLE/UNSTABLE:441304034}    Discharge to: {CHP Wound Discharge To:27394}    Left via:{Left QVS:47519}    Accompanied by:  {:[id number]}    ECF: [facility]     Dressing Orders:   Groin Wound:Wash with soap and water. Tuck collagen into the wound bed, then apply 1/4 STR Dakins moistened gauze to wound bed. Cover with dry gauze. Secure with medipore tape. Change twice daily . Treatment Orders:   Avoid pressure to the wound   Protein rich diet unless directed to avoid from renal Dr. Reena Joyce unless directed to avoid from renal      [facility] follow up visit _____________________________  (Please note your next appointment above and if you are unable to keep, kindly give a 24 hour notice. Thank you.)          If you experience any of the following, please call the [address] [place] [address] during business hours:    * Increase in Pain  * Temperature over 101  * Increase in drainage from your wound  * Drainage with a foul odor  * Bleeding  * Increase in swelling  * Need for compression bandage changes due to slippage, breakthrough drainage. If you need medical attention outside of the business hours of the [address] [place] [address] please contact your PCP or go to the nearest emergency room.

## 2023-12-23 ENCOUNTER — APPOINTMENT (OUTPATIENT)
Dept: CT IMAGING | Age: 48
DRG: 091 | End: 2023-12-23
Payer: MEDICARE

## 2023-12-23 VITALS
WEIGHT: 187.9 LBS | SYSTOLIC BLOOD PRESSURE: 145 MMHG | HEART RATE: 83 BPM | TEMPERATURE: 98.4 F | DIASTOLIC BLOOD PRESSURE: 89 MMHG | RESPIRATION RATE: 18 BRPM | OXYGEN SATURATION: 97 % | HEIGHT: 71 IN | BODY MASS INDEX: 26.31 KG/M2

## 2023-12-23 PROBLEM — S06.5XAA SUBDURAL HEMATOMA (HCC): Status: RESOLVED | Noted: 2023-12-18 | Resolved: 2023-12-23

## 2023-12-23 PROBLEM — Z99.2 ESRD ON DIALYSIS (HCC): Status: RESOLVED | Noted: 2023-12-18 | Resolved: 2023-12-23

## 2023-12-23 PROBLEM — R29.90 STROKE-LIKE SYMPTOM: Status: RESOLVED | Noted: 2023-12-18 | Resolved: 2023-12-23

## 2023-12-23 PROBLEM — N18.6 ESRD ON DIALYSIS (HCC): Status: RESOLVED | Noted: 2023-12-18 | Resolved: 2023-12-23

## 2023-12-23 PROBLEM — R56.9 SEIZURE (HCC): Status: RESOLVED | Noted: 2023-12-18 | Resolved: 2023-12-23

## 2023-12-23 PROBLEM — R41.82 ALTERED MENTAL STATUS: Status: RESOLVED | Noted: 2023-12-18 | Resolved: 2023-12-23

## 2023-12-23 PROBLEM — E87.5 HYPERKALEMIA: Status: RESOLVED | Noted: 2023-12-17 | Resolved: 2023-12-23

## 2023-12-23 LAB
ANION GAP SERPL CALCULATED.3IONS-SCNC: 14 MMOL/L (ref 7–19)
ANION GAP SERPL CALCULATED.3IONS-SCNC: 14 MMOL/L (ref 7–19)
ANION GAP SERPL CALCULATED.3IONS-SCNC: 15 MMOL/L (ref 7–19)
ANION GAP SERPL CALCULATED.3IONS-SCNC: 15 MMOL/L (ref 7–19)
BACTERIA UR CULT: ABNORMAL
BUN SERPL-MCNC: 29 MG/DL (ref 6–20)
BUN SERPL-MCNC: 36 MG/DL (ref 6–20)
BUN SERPL-MCNC: 39 MG/DL (ref 6–20)
BUN SERPL-MCNC: 42 MG/DL (ref 6–20)
CALCIUM SERPL-MCNC: 7.4 MG/DL (ref 8.6–10)
CALCIUM SERPL-MCNC: 7.4 MG/DL (ref 8.6–10)
CALCIUM SERPL-MCNC: 7.7 MG/DL (ref 8.6–10)
CALCIUM SERPL-MCNC: 7.9 MG/DL (ref 8.6–10)
CHLORIDE SERPL-SCNC: 92 MMOL/L (ref 98–111)
CHLORIDE SERPL-SCNC: 93 MMOL/L (ref 98–111)
CHLORIDE SERPL-SCNC: 95 MMOL/L (ref 98–111)
CHLORIDE SERPL-SCNC: 95 MMOL/L (ref 98–111)
CO2 SERPL-SCNC: 22 MMOL/L (ref 22–29)
CO2 SERPL-SCNC: 23 MMOL/L (ref 22–29)
CO2 SERPL-SCNC: 24 MMOL/L (ref 22–29)
CO2 SERPL-SCNC: 26 MMOL/L (ref 22–29)
CREAT SERPL-MCNC: 5 MG/DL (ref 0.5–1.2)
CREAT SERPL-MCNC: 5.1 MG/DL (ref 0.5–1.2)
CREAT SERPL-MCNC: 6.1 MG/DL (ref 0.5–1.2)
CREAT SERPL-MCNC: 6.4 MG/DL (ref 0.5–1.2)
GLUCOSE SERPL-MCNC: 105 MG/DL (ref 74–109)
GLUCOSE SERPL-MCNC: 108 MG/DL (ref 74–109)
GLUCOSE SERPL-MCNC: 130 MG/DL (ref 74–109)
GLUCOSE SERPL-MCNC: 153 MG/DL (ref 74–109)
ORGANISM: ABNORMAL
POTASSIUM SERPL-SCNC: 4.4 MMOL/L (ref 3.5–5)
POTASSIUM SERPL-SCNC: 4.6 MMOL/L (ref 3.5–5)
POTASSIUM SERPL-SCNC: 4.6 MMOL/L (ref 3.5–5)
POTASSIUM SERPL-SCNC: 4.8 MMOL/L (ref 3.5–5)
SODIUM SERPL-SCNC: 131 MMOL/L (ref 136–145)
SODIUM SERPL-SCNC: 132 MMOL/L (ref 136–145)
SODIUM SERPL-SCNC: 132 MMOL/L (ref 136–145)
SODIUM SERPL-SCNC: 133 MMOL/L (ref 136–145)

## 2023-12-23 PROCEDURE — 80048 BASIC METABOLIC PNL TOTAL CA: CPT

## 2023-12-23 PROCEDURE — 94640 AIRWAY INHALATION TREATMENT: CPT

## 2023-12-23 PROCEDURE — 99233 SBSQ HOSP IP/OBS HIGH 50: CPT | Performed by: PSYCHIATRY & NEUROLOGY

## 2023-12-23 PROCEDURE — 6360000002 HC RX W HCPCS: Performed by: INTERNAL MEDICINE

## 2023-12-23 PROCEDURE — 70450 CT HEAD/BRAIN W/O DYE: CPT

## 2023-12-23 PROCEDURE — 6360000002 HC RX W HCPCS: Performed by: PSYCHIATRY & NEUROLOGY

## 2023-12-23 PROCEDURE — 6370000000 HC RX 637 (ALT 250 FOR IP): Performed by: INTERNAL MEDICINE

## 2023-12-23 PROCEDURE — 2580000003 HC RX 258: Performed by: INTERNAL MEDICINE

## 2023-12-23 PROCEDURE — 2580000003 HC RX 258: Performed by: NEUROLOGICAL SURGERY

## 2023-12-23 PROCEDURE — 2500000003 HC RX 250 WO HCPCS: Performed by: INTERNAL MEDICINE

## 2023-12-23 PROCEDURE — 94760 N-INVAS EAR/PLS OXIMETRY 1: CPT

## 2023-12-23 PROCEDURE — 36415 COLL VENOUS BLD VENIPUNCTURE: CPT

## 2023-12-23 RX ORDER — 3% SODIUM CHLORIDE 3 G/100ML
25 INJECTION, SOLUTION INTRAVENOUS CONTINUOUS
Status: DISCONTINUED | OUTPATIENT
Start: 2023-12-23 | End: 2023-12-24 | Stop reason: HOSPADM

## 2023-12-23 RX ORDER — LEVETIRACETAM 500 MG/5ML
500 INJECTION, SOLUTION, CONCENTRATE INTRAVENOUS
Status: DISCONTINUED | OUTPATIENT
Start: 2023-12-25 | End: 2023-12-24 | Stop reason: HOSPADM

## 2023-12-23 RX ORDER — LEVETIRACETAM 500 MG/5ML
750 INJECTION, SOLUTION, CONCENTRATE INTRAVENOUS DAILY
Status: DISCONTINUED | OUTPATIENT
Start: 2023-12-23 | End: 2023-12-24 | Stop reason: HOSPADM

## 2023-12-23 RX ADMIN — DOXAZOSIN 1 MG: 1 TABLET ORAL at 17:57

## 2023-12-23 RX ADMIN — HYDRALAZINE HYDROCHLORIDE 10 MG: 20 INJECTION INTRAMUSCULAR; INTRAVENOUS at 05:24

## 2023-12-23 RX ADMIN — Medication: at 20:19

## 2023-12-23 RX ADMIN — LABETALOL HYDROCHLORIDE 10 MG: 5 INJECTION, SOLUTION INTRAVENOUS at 13:02

## 2023-12-23 RX ADMIN — HYDRALAZINE HYDROCHLORIDE 10 MG: 20 INJECTION INTRAMUSCULAR; INTRAVENOUS at 11:50

## 2023-12-23 RX ADMIN — DEXAMETHASONE SODIUM PHOSPHATE 4 MG: 10 INJECTION, SOLUTION INTRAMUSCULAR; INTRAVENOUS at 15:48

## 2023-12-23 RX ADMIN — LOSARTAN POTASSIUM 50 MG: 50 TABLET, FILM COATED ORAL at 19:48

## 2023-12-23 RX ADMIN — MEROPENEM 1000 MG: 1 INJECTION, POWDER, FOR SOLUTION INTRAVENOUS at 11:14

## 2023-12-23 RX ADMIN — DEXAMETHASONE SODIUM PHOSPHATE 4 MG: 10 INJECTION, SOLUTION INTRAMUSCULAR; INTRAVENOUS at 19:48

## 2023-12-23 RX ADMIN — ALPRAZOLAM 1 MG: 1 TABLET ORAL at 19:59

## 2023-12-23 RX ADMIN — BUDESONIDE AND FORMOTEROL FUMARATE DIHYDRATE 2 PUFF: 160; 4.5 AEROSOL RESPIRATORY (INHALATION) at 10:12

## 2023-12-23 RX ADMIN — DEXAMETHASONE SODIUM PHOSPHATE 4 MG: 10 INJECTION, SOLUTION INTRAMUSCULAR; INTRAVENOUS at 08:52

## 2023-12-23 RX ADMIN — HYDRALAZINE HYDROCHLORIDE 100 MG: 50 TABLET, FILM COATED ORAL at 08:55

## 2023-12-23 RX ADMIN — LEVETIRACETAM 750 MG: 100 INJECTION INTRAVENOUS at 14:27

## 2023-12-23 RX ADMIN — DEXAMETHASONE SODIUM PHOSPHATE 4 MG: 10 INJECTION, SOLUTION INTRAMUSCULAR; INTRAVENOUS at 03:21

## 2023-12-23 RX ADMIN — OXYCODONE AND ACETAMINOPHEN 1 TABLET: 5; 325 TABLET ORAL at 11:16

## 2023-12-23 RX ADMIN — METOPROLOL SUCCINATE 50 MG: 50 TABLET, EXTENDED RELEASE ORAL at 19:48

## 2023-12-23 RX ADMIN — SODIUM CHLORIDE 25 ML/HR: 3 INJECTION, SOLUTION INTRAVENOUS at 12:24

## 2023-12-23 RX ADMIN — Medication: at 13:50

## 2023-12-23 RX ADMIN — SODIUM CHLORIDE 5 MG/HR: 9 INJECTION, SOLUTION INTRAVENOUS at 02:30

## 2023-12-23 RX ADMIN — HYDRALAZINE HYDROCHLORIDE 100 MG: 50 TABLET, FILM COATED ORAL at 14:06

## 2023-12-23 RX ADMIN — LOSARTAN POTASSIUM 50 MG: 50 TABLET, FILM COATED ORAL at 08:55

## 2023-12-23 RX ADMIN — METOPROLOL SUCCINATE 50 MG: 50 TABLET, EXTENDED RELEASE ORAL at 08:55

## 2023-12-23 RX ADMIN — SODIUM CHLORIDE 5 MG/HR: 9 INJECTION, SOLUTION INTRAVENOUS at 13:32

## 2023-12-23 RX ADMIN — HYDRALAZINE HYDROCHLORIDE 100 MG: 50 TABLET, FILM COATED ORAL at 19:48

## 2023-12-23 RX ADMIN — TAMSULOSIN HYDROCHLORIDE 0.4 MG: 0.4 CAPSULE ORAL at 09:15

## 2023-12-23 RX ADMIN — OXYCODONE AND ACETAMINOPHEN 1 TABLET: 5; 325 TABLET ORAL at 07:30

## 2023-12-23 RX ADMIN — CINACALCET HYDROCHLORIDE 30 MG: 30 TABLET, FILM COATED ORAL at 09:16

## 2023-12-23 RX ADMIN — OXYBUTYNIN CHLORIDE 5 MG: 5 TABLET ORAL at 09:16

## 2023-12-23 RX ADMIN — PANTOPRAZOLE SODIUM 40 MG: 40 TABLET, DELAYED RELEASE ORAL at 07:30

## 2023-12-23 RX ADMIN — FOLIC ACID 1 MG: 1 TABLET ORAL at 09:16

## 2023-12-23 RX ADMIN — LABETALOL HYDROCHLORIDE 10 MG: 5 INJECTION, SOLUTION INTRAVENOUS at 07:21

## 2023-12-23 ASSESSMENT — PAIN SCALES - GENERAL: PAINLEVEL_OUTOF10: 5

## 2023-12-23 ASSESSMENT — PAIN DESCRIPTION - LOCATION
LOCATION: HEAD
LOCATION: BACK

## 2023-12-23 ASSESSMENT — PAIN DESCRIPTION - ORIENTATION: ORIENTATION: MID;INNER

## 2023-12-23 NOTE — PROGRESS NOTES
Bertin Gonzalez transferred to [facility] from [facility] via stretcher. Reason for transfer: transfer to higher level   Explained reason for transfer to Patient and Family. Belongings:  cellphone  with patient at bedside . Soft chart transferred with patient: Yes. Telemetry box number N/A transferred with patient: placed on transport cardiac monitor.   Report given to: Young Snow, in neuro ICU via telephone      Electronically signed by Isaac Portillo RN on 12/23/2023 at 2:49 PM

## 2023-12-23 NOTE — PROGRESS NOTES
file   Social Connections: Not on file   Intimate Partner Violence: Not on file   Housing Stability: High Risk (12/21/2023)    Housing Stability Vital Sign     Unable to Pay for Housing in the Last Year: No     Number of Places Lived in the Last Year: 1     Unstable Housing in the Last Year: Yes         Review of Systems:  History obtained from unobtainable from patient due to mental status          Objective:  Patient Vitals for the past 24 hrs:   BP Temp Temp src Pulse Resp SpO2 Height Weight   12/22/23 2113 (!) 163/87 -- -- 85 -- -- -- --   12/22/23 2050 (!) 166/86 -- -- -- -- -- -- --   12/22/23 2000 (!) 144/102 98.4 °F (36.9 °C) Temporal 96 19 95 % -- --   12/22/23 1937 (!) 146/82 -- -- -- -- -- -- --   12/22/23 1900 (!) 150/86 -- -- 97 20 97 % -- --   12/22/23 1845 (!) 159/85 -- -- 93 20 97 % -- --   12/22/23 1830 (!) 155/89 -- -- 90 18 96 % -- --   12/22/23 1815 (!) 165/87 -- -- 92 18 96 % -- --   12/22/23 1803 (!) 172/90 -- -- 83 21 95 % -- --   12/22/23 1745 (!) 187/92 -- -- 84 13 97 % -- --   12/22/23 1727 (!) 186/90 98.2 °F (36.8 °C) Temporal 86 11 97 % -- --   12/22/23 1637 (!) 174/100 -- -- -- -- -- -- --   12/22/23 1554 (!) 178/102 -- -- -- -- -- -- --   12/22/23 1314 (!) 177/88 97.4 °F (36.3 °C) Axillary 67 19 97 % -- --   12/22/23 1224 (!) 179/99 97.5 °F (36.4 °C) Axillary 81 18 96 % -- --   12/22/23 1203 -- -- -- -- -- -- 1.816 m (5' 11.5\") --   12/22/23 0945 (!) 201/92 -- -- -- -- -- -- --   12/22/23 0612 (!) 153/90 97.8 °F (36.6 °C) Oral 86 16 96 % -- --   12/22/23 0347 -- -- -- -- -- -- -- 82.3 kg (181 lb 6.4 oz)       Intake/Output Summary (Last 24 hours) at 12/22/2023 2236  Last data filed at 12/22/2023 1830  Gross per 24 hour   Intake 96.56 ml   Output --   Net 96.56 ml     General: Confused and unable to participate history and physical examination due to altered mental status  Chest:  clear to auscultation bilaterally  CVS: regular rate and rhythm  Abdominal: soft, nontender, normal bowel  sounds  Extremities: no cyanosis or edema  Skin: warm and dry without rash    Labs:  BMP:   Recent Labs     12/21/23  0135 12/22/23  0239 12/22/23  1458    138 132*   K 4.6 4.4 3.7   CL 99 99 93*   CO2 21* 22 26   BUN 28* 50* 21*   CREATININE 5.7* 7.5* 4.2*   CALCIUM 8.0* 7.8* 8.2*     CBC:   Recent Labs     12/20/23  0123 12/22/23  1458   WBC 7.9 10.5   HGB 10.0* 10.2*   HCT 30.1* 31.5*   MCV 87.2 87.3   * 141     LIVER PROFILE:   Recent Labs     12/22/23  1458   AST 17   ALT 14   BILITOT 0.4   ALKPHOS 145*       PT/INR:   Recent Labs     12/22/23  1458   PROTIME 13.4   INR 1.05       APTT:   Recent Labs     12/22/23  1458   APTT 25.5*     BNP:  No results for input(s): \"BNP\" in the last 72 hours. Ionized Calcium:No results for input(s): \"IONCA\" in the last 72 hours. Magnesium:  No results for input(s): \"MG\" in the last 72 hours. Phosphorus:No results for input(s): \"PHOS\" in the last 72 hours. HgbA1C: No results for input(s): \"LABA1C\" in the last 72 hours. Hepatic:   Recent Labs     12/22/23  1458   ALKPHOS 145*   ALT 14   AST 17   PROT 6.4*   BILITOT 0.4   LABALBU 3.4*       Lactic Acid: No results for input(s): \"LACTA\" in the last 72 hours. Troponin: No results for input(s): \"CKTOTAL\", \"CKMB\", \"TROPONINT\" in the last 72 hours. ABGs:   Lab Results   Component Value Date/Time    PHART 7.530 08/03/2022 02:12 PM    PO2ART 55.0 08/03/2022 02:12 PM    WXW0TZJ 43.0 08/03/2022 02:12 PM     CRP:  No results for input(s): \"CRP\" in the last 72 hours. Sed Rate:  No results for input(s): \"SEDRATE\" in the last 72 hours. Culture Results:   Blood Culture Recent: No results for input(s): \"BC\" in the last 720 hours. Urine Culture Recent :   Recent Labs     12/20/23  1821   LABURIN >100,000 CFU/ml*  Heavy growth  Sensitivity to follow    Heavy growth  Sensitivity to follow    Heavy growth  No further workup  Streptococcus are frequently susceptible to Penicillin.   Some isolates may require combined of iterative reconstruction technique. ______________________________________ Electronically signed by: Bryan Hawley M.D. Date:     12/17/2023 Time:    15:22     CT HEAD WO CONTRAST    Result Date: 12/17/2023  EXAM:  CT SCAN HEAD WITHOUT CONTRAST. HISTORY:  Code stroke cerebrovascular accident right-sided weakness altered mental status  COMPARISON:  None. TECHNIQUE:  Axial scans acquired 5 mm slice thicknesses. FINDINGS:  Bilateral subdural hygromas are present. There is an 8 mm left subdural hygroma. There is a 4 mm right subdural hygroma. Hemorrhage: None. Cerebral Parenchyma: Third ventricle is midline. Lateral ventricles are symmetric. Cerebellum: C posterior fossa there is edema in the janet. There is effacement of the fourth ventricle. Ine. .  Masses/Mass Effect: There is mass effect in the posterior fossa with effacement of the fourth ventricle. Vasculature: Normal.  Osseus Structures: Internal auditory canals are symmetric. Mastoid air cells and paranasal sinuses are pneumatized. Orbits are intact. Calvarium is intact. .  Soft Tissues: Normal.      There appears to be edema in the region of the janet and effacement of the fourth ventricle. 2.  8 mm left subdural hygroma. 3.  4 mm right subdural hygroma. These results called to Dr. Mayur Mora approximately 3:30 p.m. All CT scans are performed using dose optimization techniques as appropriate to the performed exam and include at least one of the following: Automated exposure control, adjustment of the mA and/or kV according to size, and the use of iterative reconstruction technique. ______________________________________ Electronically signed by: Colleen Gray M.D.  Date:     12/17/2023 Time:    15:19        Assessment   End-stage renal disease  Anemia and chronic kidney disease  Altered mental status  Hypertension  Hyperkalemia  PRES    Plan:  Discussed with patient as able, nursing, Dr. Jose Alberto Larios  Workup reviewed today  Monitor labs  Dialysis

## 2023-12-23 NOTE — PROGRESS NOTES
Nephrology ([facility]) Progress Note    Patient:  Samira Reddy  YOB: 1975  Date of Service: 12/23/2023  MRN: [id number]   Acct: [de-identified]   Primary Care Physician: Queenie Grimes MD  Advance Directive: Full Code  Admit Date: 12/17/2023       Hospital Day: 6  Referring Provider: Inderjit Samson DO    Patient independently seen and examined, Chart, Consults, Notes, Operative notes, Labs, Cardiology, and Radiology studies reviewed as available. Subjective:  Samira Reddy is a 57 y.o. male for whom we were consulted for evaluation and treatment of end-stage renal disease with hyperkalemia. Patient known to service from as he was on dialysis. He was unable to participate in the initial history and physical examination due to altered mental status but wife was present at the bedside assisting history as well as chart review. ICU nurse present at the bedside as well. Patient had prior history of migraines and had missed about half a treatment on Wednesday and all of his treatment on Friday due to feeling poorly. He was ultimately brought in by his wife due to developing right-sided weakness. He was agitated and required Precedex infusion. Dr. Marcus Isabel was on-call last night and ordered an emergent treatment for correction of hyperkalemia which initially corrected but began to reaccumulate up to the 5.4 level today. Plans included MRI today. Required Cardene infusion for blood pressure control. Today, no overnight events. Mental status continues to wax and wane but was currently at the bedside seen this week. He had another CT demonstrating slightly increased midline shift. Family notes plans for possible transfer to [facility] for further evaluation and neurosurgical treatment. Plans for hypertonic saline administration beginning as well.             Allergies:  Morphine and related and Morphine    Medicines:  Current Facility-Administered Medications will need adjusted dialysate sodium as per neurosurgery recommendations to attempt to target serum sodium level approximate 140  Adjust oral bp meds as tolerated  Okay for transfer when okay with others, reviewed home dialysis options with him as well    Carlos Khan MD  12/23/23  11:25 AM

## 2023-12-23 NOTE — PLAN OF CARE
Problem: Discharge Planning  Goal: Discharge to home or other facility with appropriate resources  Outcome: Progressing     Problem: Pain  Goal: Verbalizes/displays adequate comfort level or baseline comfort level  Outcome: Progressing     Problem: Safety - Adult  Goal: Free from fall injury  Outcome: Progressing     Problem: Skin/Tissue Integrity  Goal: Absence of new skin breakdown  Outcome: Progressing     Problem: ABCDS Injury Assessment  Goal: Absence of physical injury  Outcome: Progressing     Problem: Risk for Elopement  Goal: Patient will not exit the unit/facility without proper excort  Outcome: Progressing     Problem: Nutrition Deficit:  Goal: Optimize nutritional status  Outcome: Progressing

## 2023-12-23 NOTE — PROGRESS NOTES
[facility] Neurosurgery Critical Care Note        CHIEF COMPLAINT:  Altered mental status      INTERVAL UPDATE:  Patient seen and examined. He was transferred back to the ICU yesterday due to a decline in mental status after dialysis. His BP was poorly controlled and his sodium dropped from 138 to 132 after dialysis. A repeat CT showed a stable left-sided subdural collection with increased mass effect and midline shift from 5 to 7mm. This AM, he is sleeping in the ICU. He is easily aroused and oriented to self and hospital.  He denies headaches, nausea or vomiting. He does appear slightly somnolent. He will briskly follow commands without focal weakness. HPI:  The patient is a 57 y.o. male who presented to the [facility] ED with altered mental status and concern for stroke. He has a complicated medical history including ESRD on dialysis and colon cancer with a colostomy in place. No family is at bedside so history is obtained from the medical record and nurses. Apparently he had missed several days of dialysis and his wife found him yesterday unresponsive with right sided weakness and he was brought to the ED. After arrival, he had a seizure. He was also significantly hypertensive with an SBP of ~260. A CT of his head was obtained at admission that revealed bilateral subdural hygromas vs chronic subdural hematoma. This has been confirmed on subsequent MRI. He was admitted due to his need for emergent dialysis, altered mentation and seizures. There is no documented history of trauma. He is currently in ICU and receiving hemodialysis. He will arouse to voice but is nonverbal and not following any commands. He appears to be paretic in the RUE. PHYSICAL EXAM:    Vitals:    12/23/23 0930   BP: (!) 176/99   Pulse: 89   Resp: 13   Temp:    SpO2: 96%       Constitutional: Appears well-developed and well-nourished.    Eyes - conjunctiva normal.  Pupils react to light  Ear, nose,throat -Normal pinna and collection on the right has resolved. The collection on the left persists at ~1cm in maximal thickness. His neurologic exam has waxed and waned since admission, with a significant decline yesterday after dialysis. It appears his neurologic changes correlate best with fluid shifts that occur around the time of dialysis, likely secondary third spacing in the intracranial space and osmotic shifts. Neuro:  Patient continues to wax and wane neurologically with worsening imaging. I have discussed his case with Dr. Madie Frzaier, neurosurgeon at the [facility], who feels he is a candidate for middle meningeal artery embolization to address the hypervascular membranes and third spacing associated with his SDH. He may also be a candidate for bedside subdural drainage. He has agreed that transfer to a higher level of care is warranted due to his complex case and the ability to offer interventions that are not possible at [facility]. I have contacted the transfer center to initiate the transfer and updated the patient and his wife at bedside. For now, continue to monitor his neurologic exam.  Avoid anticoagulant and antiplatelet medications. AEDs per neurology. Continue steroids. Will add hypertonic saline to attempt to elevate sodium to a goal of 140 mEq/L. CV:  Maintain SBP <160 mmHg. Pulmonary:  No active issues  GI:  No active issues   /Renal:  Continue HD per nephrology team, recommend adjustment of dialysate to maintain serum sodium >140 mEq/L  Endocrine:  Defer to medical team  FEN:  OK to advance diet from neurosurgical perspective. Other:  Transfer to tertiary care center. I attest that I spent >35 minutes engaged in critical care of this patient. This includes review of EMR data, imaging, bedside evaluation, patient/family counseling and coordination of care with nursing, providers and consultants.       Jenni Fuller MD

## 2023-12-23 NOTE — PROGRESS NOTES
Spoke with EMS captain, Lianne Orellana, regarding transporting pt for transfer to [address] [place] [address] in [place]. Pt unable to transport by air due to weather- declined by several other air services as well as [facility]. [facility] able to transport pt tonight at shift change. Pt and Pt's wife made aware and updated as needed.    Spoke with [place] at [facility] transfer center and provided updated departure time to be this evening at change of shift; ETA to other hospital.     Electronically signed by Williams Angelo RN on 12/23/2023 at 5:01 PM

## 2023-12-23 NOTE — PROGRESS NOTES
the janet which appears   edematous, which could also be seen in PRES. Again visualized are bilateral subacute to chronic subdural hematomas, measuring up to 3 mm in thickness on the right and 7 mm on the left. There is no definite acute ischemic infarct. The ventricles and sulci demonstrate a normal pattern. Few foci of T2/FLAIR hyperintense signal are present in the subcortical and periventricular white matter, most commonly seen in chronic white matter microvascular ischemic changes. The basilar cisterns are patent. The posterior fossa structures are within normal limits. The paranasal sinuses are well aerated. There is small right mastoid effusion. The orbits are within normal limits. No calvarial abnormality is identified. IMPRESSION:     1. Patchy and curvilinear foci of restricted diffusion in bilateral occipital lobes, likely representing posterior reversible encephalopathy syndrome (PRES). Additional patchy T2/FLAIR hyperintensities in the janet which appears edematous, which could   also be seen in PRES. 2. Redemonstration of bilateral subacute to chronic subdural hematomas, measuring up to 3 mm in thickness on the right and 7 mm on the left. 3. Minimal chronic white matter microvascular ischemic changes. 4. Small right mastoid effusion. EXAM:  CT HEAD WITHOUT CONTRAST. HISTORY:  Mental status changes. COMPARISON:  12/19/2023, 12/18/2023, 12/17/2023. TECHNIQUE:  Multiple axial images of the brain were obtained from the skull base through the vertex without intravenous contrast.  Multiplanar reformats were provided. FINDINGS:  There is redemonstration of holohemispheric left subdural mixed density collection which measures up to 1 cm maximum dimension on coronal image 23, which has increased body 0.5 cm since the prior study.   Mass effect on the left cerebral   hemisphere with rightward midline shift approximately 0.5 cm on axial image 20, previously 0.3 cm. Subtle persistent low density in the occipital lobes bilaterally axial image 17. Low density the janet, example axial image 14. These findings are   stable. There is no new area of parenchymal low density. No new area of intracranial hemorrhage. No raimundo hydrocephalus. Basilar cisterns are partially effaced, increased from prior. No acute calvarial abnormality identified. Paranasal sinuses and   mastoid air cells are clear. --------------------------------  IMPRESSION:  1. Increase in size of left subdural hematoma now measuring up to 1 cm, compared to 0.5 cm on most recent. Increased rightward midline shift of 0.5 cm, compared to 0.3 cm previously. Increased effacement of the basilar cisterns. 2.  Persistent low density in both occipital lobes and the janet. ---------------------------   RECORD REVIEW: Previous medical records, medications were reviewed at today's visit    IMPRESSION:   65-year-old man with end-stage renal disease on dialysis and with a history of colon cancer/colostomy who presents with right-sided weakness and seizure. MRI of the brain shows brightness on restricted diffusion that is not consistent with PRES. Findings could be related to seizure. He says he had 1 remotely and had a witnessed 1 in the ED. EEG unremarkable. Continue IV Keppra 750 mg daily. And will add a supplement following dialysis. Ren Number Ren Number Symptoms have waxed and waned at times. Follow-up CT scan shows a left subdural hygroma and left hemisphere edema with a midline shift. CTA noting 70 to 80% of left M1 stenosis. It is unclear if his neurological symptoms are related to poor vascular supply versus irritation from subdural/unwitnessed seizure. Unable to provide antiplatelet or anticoagulants due to subdural.  Has 4 organisms growing out of his urine. Is on antibiotics. .  Neurosurgery following.   Extended discussion with them as well as wife and nurse at bedside he looks stable at this time.  Neurosurgery will attempt to get his fluid status better controlled.  Avoid hypotension    More than 35 minutes were spent reviewing the patient's records, films, examination and and counseling and coordination of care.  More than 50% spent on the latter

## 2023-12-23 NOTE — DISCHARGE SUMMARY
Discharge Summary    Hyun Thompson  :  1975  MRN:  [id number]    Admit date:  2023  Discharge date: 2023     Admitting Physician:  Elinor Akins MD    Advance Directive: Full Code    Consults: nephrology, neurology, and neurosurgery    Primary Care Physician:  Chriss Mcelroy MD    Discharge Diagnoses:  Principal Problem:    Hyperkalemia  Active Problems:    Seizure ([address])    Subdural hematoma (HCC)    Stroke-like symptom    ESRD on dialysis [facility])    Altered mental status  Resolved Problems:    * No resolved hospital problems. *      Significant Diagnostic Studies:   MRI BRAIN WO CONTRAST    Result Date: 2023  EXAM:  BRAIN MRI WITHOUT CONTRAST  HISTORY:  Rule out stroke. TECHNIQUE:  Multiplanar and multisequence MRI of the brain without the administration of intravenous contrast.  COMPARISON:  Brain CT dated 2023. FINDINGS:  Patchy and curvilinear foci of restricted diffusion are noted in bilateral occipital lobes, likely representing posterior reversible encephalopathy syndrome (PRES). Additional patchy T2/FLAIR hyperintensities are noted in the janet which appears edematous, which could also be seen in PRES. Again visualized are bilateral subacute to chronic subdural hematomas, measuring up to 3 mm in thickness on the right and 7 mm on the left. There is no definite acute ischemic infarct. The ventricles and sulci demonstrate a normal pattern. Few foci of T2/FLAIR hyperintense signal are present in the subcortical and periventricular white matter, most commonly seen in chronic white matter microvascular ischemic changes. The basilar cisterns are patent. The posterior fossa structures are within normal limits. The paranasal sinuses are well aerated. There is small right mastoid effusion. The orbits are within normal limits. No calvarial abnormality is identified.        1. Patchy and curvilinear foci of restricted diffusion in bilateral occipital lobes, likely representing daily     Percocet  MG per tablet  Generic drug: oxyCODONE-acetaminophen     sodium hypochlorite 0.125 % Soln external solution  Commonly known as: DAKINS  Apply topically as directed twice daily. SUMAtriptan 100 MG tablet  Commonly known as: IMITREX  TAKE 1 TABLET BY MOUTH AT ONSET OF MIGRAINE HEADACHE. MAY REPEAT IN 2 HOURS AS NEEDED     tamsulosin 0.4 MG capsule  Commonly known as: FLOMAX  TAKE 1 CAPSULE BY MOUTH EVERY DAY              Discharge Instructions: Follow up with Parth Christina MD in 3-5 days. Take medications as directed. Resume activity as tolerated. Diet: ADULT DIET; Easy to Chew; Mildly Thick (Nectar)     Disposition: Patient is medically stable and will be transferred to the [facility]. Time spent on discharge 40 minutes.     Signed:  Brenda Abarca DO

## 2023-12-27 ENCOUNTER — HOSPITAL ENCOUNTER (OUTPATIENT)
Dept: WOUND CARE | Age: 48
Discharge: HOME | End: 2023-12-27

## 2023-12-27 ENCOUNTER — TELEPHONE (OUTPATIENT)
Dept: INTERNAL MEDICINE | Age: 48
End: 2023-12-27

## 2023-12-27 NOTE — TELEPHONE ENCOUNTER
Care Transitions Initial Follow Up Call    Outreach made within 2 business days of discharge: Yes    Patient: Megan Eye   Patient : 1975   MRN: [id number]   Reason for Admission: Hyperkalemia  Discharge Date: 23       Spoke with: I left a voicemail, asking the patient to contact the office and schedule a HFU appointment.      Discharge department/facility: [facility]      Follow Up  Future Appointments   Date Time Provider [address] [place] [address]   2024  9:00 AM Tiffanie Guajardo MD [address] [place] [address]

## 2024-01-05 ENCOUNTER — HOSPITAL ENCOUNTER (INPATIENT)
Age: 49
LOS: 5 days | Discharge: HOME HEALTH | DRG: 949 | End: 2024-01-10
Attending: PSYCHIATRY & NEUROLOGY | Admitting: PSYCHIATRY & NEUROLOGY
Payer: MEDICARE

## 2024-01-05 DIAGNOSIS — S06.5XAA SDH (SUBDURAL HEMATOMA) (HCC): Primary | ICD-10-CM

## 2024-01-05 PROCEDURE — 6370000000 HC RX 637 (ALT 250 FOR IP): Performed by: PSYCHIATRY & NEUROLOGY

## 2024-01-05 PROCEDURE — 1180000000 HC REHAB R&B

## 2024-01-05 RX ORDER — MAGNESIUM CARB/ALUMINUM HYDROX 105-160MG
30 TABLET,CHEWABLE ORAL 2 TIMES DAILY
Status: DISCONTINUED | OUTPATIENT
Start: 2024-01-05 | End: 2024-01-06

## 2024-01-05 RX ORDER — ALPRAZOLAM 1 MG/1
1 TABLET ORAL 3 TIMES DAILY
COMMUNITY

## 2024-01-05 RX ORDER — LEVETIRACETAM 250 MG/1
750 TABLET ORAL DAILY
Status: ON HOLD | COMMUNITY
Start: 2024-01-06 | End: 2024-01-10 | Stop reason: HOSPADM

## 2024-01-05 RX ORDER — NIFEDIPINE 60 MG/1
120 TABLET, EXTENDED RELEASE ORAL DAILY
Status: ON HOLD | COMMUNITY
Start: 2024-01-06 | End: 2024-01-10 | Stop reason: HOSPADM

## 2024-01-05 RX ORDER — OXYCODONE AND ACETAMINOPHEN 10; 325 MG/1; MG/1
1 TABLET ORAL EVERY 4 HOURS
Status: DISCONTINUED | OUTPATIENT
Start: 2024-01-05 | End: 2024-01-06

## 2024-01-05 RX ORDER — LOSARTAN POTASSIUM 50 MG/1
50 TABLET ORAL 2 TIMES DAILY
Status: DISCONTINUED | OUTPATIENT
Start: 2024-01-05 | End: 2024-01-06

## 2024-01-05 RX ORDER — CARVEDILOL 12.5 MG/1
25 TABLET ORAL 2 TIMES DAILY WITH MEALS
COMMUNITY
Start: 2024-01-06

## 2024-01-05 RX ORDER — LOSARTAN POTASSIUM 50 MG/1
50 TABLET ORAL 2 TIMES DAILY
Status: ON HOLD | COMMUNITY
Start: 2024-01-05 | End: 2024-01-10 | Stop reason: HOSPADM

## 2024-01-05 RX ORDER — CLONIDINE HYDROCHLORIDE 0.1 MG/1
0.2 TABLET ORAL 3 TIMES DAILY
Status: DISCONTINUED | OUTPATIENT
Start: 2024-01-05 | End: 2024-01-06

## 2024-01-05 RX ORDER — MINOXIDIL 2.5 MG/1
5 TABLET ORAL 2 TIMES DAILY
Status: DISCONTINUED | OUTPATIENT
Start: 2024-01-05 | End: 2024-01-06

## 2024-01-05 RX ORDER — OXYBUTYNIN CHLORIDE 5 MG/1
10 TABLET ORAL DAILY
COMMUNITY

## 2024-01-05 RX ORDER — LEVETIRACETAM 250 MG/1
250 TABLET ORAL
Status: ON HOLD | COMMUNITY
Start: 2024-01-08 | End: 2024-01-10 | Stop reason: HOSPADM

## 2024-01-05 RX ORDER — HYDRALAZINE HYDROCHLORIDE 50 MG/1
100 TABLET, FILM COATED ORAL 3 TIMES DAILY
Status: ON HOLD | COMMUNITY
Start: 2024-01-05 | End: 2024-01-10 | Stop reason: HOSPADM

## 2024-01-05 RX ORDER — DOCUSATE SODIUM 100 MG/1
100 CAPSULE, LIQUID FILLED ORAL 3 TIMES DAILY PRN
Status: ON HOLD | COMMUNITY
End: 2024-01-10 | Stop reason: HOSPADM

## 2024-01-05 RX ORDER — LEVETIRACETAM 500 MG/1
750 TABLET ORAL DAILY
Status: DISCONTINUED | OUTPATIENT
Start: 2024-01-06 | End: 2024-01-06

## 2024-01-05 RX ORDER — MINOXIDIL 2.5 MG/1
5 TABLET ORAL 2 TIMES DAILY
COMMUNITY

## 2024-01-05 RX ORDER — ALPRAZOLAM 1 MG/1
1 TABLET ORAL 3 TIMES DAILY
Status: DISCONTINUED | OUTPATIENT
Start: 2024-01-05 | End: 2024-01-06

## 2024-01-05 RX ORDER — OXYCODONE AND ACETAMINOPHEN 10; 325 MG/1; MG/1
1 TABLET ORAL EVERY 4 HOURS
COMMUNITY

## 2024-01-05 RX ORDER — POLYETHYLENE GLYCOL 3350 17 G/17G
17 POWDER, FOR SOLUTION ORAL DAILY PRN
COMMUNITY

## 2024-01-05 RX ORDER — PANTOPRAZOLE SODIUM 40 MG/1
40 TABLET, DELAYED RELEASE ORAL DAILY
COMMUNITY
Start: 2024-01-06

## 2024-01-05 RX ORDER — HYDRALAZINE HYDROCHLORIDE 25 MG/1
100 TABLET, FILM COATED ORAL 3 TIMES DAILY
Status: DISCONTINUED | OUTPATIENT
Start: 2024-01-05 | End: 2024-01-06

## 2024-01-05 RX ORDER — CARVEDILOL 25 MG/1
25 TABLET ORAL 2 TIMES DAILY
Status: DISCONTINUED | OUTPATIENT
Start: 2024-01-05 | End: 2024-01-06

## 2024-01-05 RX ORDER — FOLIC ACID 1 MG/1
1 TABLET ORAL DAILY
COMMUNITY
Start: 2024-01-06

## 2024-01-05 RX ADMIN — LOSARTAN POTASSIUM 50 MG: 50 TABLET, FILM COATED ORAL at 23:26

## 2024-01-05 RX ADMIN — MINOXIDIL 5 MG: 2.5 TABLET ORAL at 23:23

## 2024-01-05 RX ADMIN — CARVEDILOL 25 MG: 25 TABLET, FILM COATED ORAL at 23:24

## 2024-01-05 RX ADMIN — OXYCODONE HYDROCHLORIDE AND ACETAMINOPHEN 1 TABLET: 10; 325 TABLET ORAL at 23:24

## 2024-01-05 RX ADMIN — ALPRAZOLAM 1 MG: 1 TABLET ORAL at 23:26

## 2024-01-05 RX ADMIN — HYDRALAZINE HYDROCHLORIDE 100 MG: 25 TABLET, FILM COATED ORAL at 23:26

## 2024-01-05 ASSESSMENT — PAIN DESCRIPTION - LOCATION: LOCATION: ABDOMEN;SCROTUM

## 2024-01-05 ASSESSMENT — PAIN DESCRIPTION - DESCRIPTORS: DESCRIPTORS: ACHING

## 2024-01-05 ASSESSMENT — PAIN SCALES - GENERAL: PAINLEVEL_OUTOF10: 5

## 2024-01-06 PROBLEM — S06.5XAA SDH (SUBDURAL HEMATOMA) (HCC): Status: ACTIVE | Noted: 2024-01-06

## 2024-01-06 LAB
BASOPHILS # BLD: 0.1 K/UL (ref 0–0.2)
BASOPHILS NFR BLD: 1 % (ref 0–1)
EOSINOPHIL # BLD: 0.2 K/UL (ref 0–0.6)
EOSINOPHIL NFR BLD: 4.1 % (ref 0–5)
ERYTHROCYTE [DISTWIDTH] IN BLOOD BY AUTOMATED COUNT: 16.7 % (ref 11.5–14.5)
HCT VFR BLD AUTO: 26.4 % (ref 42–52)
HGB BLD-MCNC: 8.8 G/DL (ref 14–18)
IMM GRANULOCYTES # BLD: 0 K/UL
LYMPHOCYTES # BLD: 0.6 K/UL (ref 1.1–4.5)
LYMPHOCYTES NFR BLD: 10.3 % (ref 20–40)
MCH RBC QN AUTO: 29.9 PG (ref 27–31)
MCHC RBC AUTO-ENTMCNC: 33.3 G/DL (ref 33–37)
MCV RBC AUTO: 89.8 FL (ref 80–94)
MONOCYTES # BLD: 0.3 K/UL (ref 0–0.9)
MONOCYTES NFR BLD: 5.8 % (ref 0–10)
NEUTROPHILS # BLD: 4.6 K/UL (ref 1.5–7.5)
NEUTS SEG NFR BLD: 78.1 % (ref 50–65)
PLATELET # BLD AUTO: 110 K/UL (ref 130–400)
PMV BLD AUTO: 9.4 FL (ref 9.4–12.4)
PREALB SERPL-MCNC: 20 MG/DL (ref 20–40)
RBC # BLD AUTO: 2.94 M/UL (ref 4.7–6.1)
WBC # BLD AUTO: 5.8 K/UL (ref 4.8–10.8)

## 2024-01-06 PROCEDURE — 36415 COLL VENOUS BLD VENIPUNCTURE: CPT

## 2024-01-06 PROCEDURE — 96125 COGNITIVE TEST BY HC PRO: CPT

## 2024-01-06 PROCEDURE — 97161 PT EVAL LOW COMPLEX 20 MIN: CPT

## 2024-01-06 PROCEDURE — 84134 ASSAY OF PREALBUMIN: CPT

## 2024-01-06 PROCEDURE — 97535 SELF CARE MNGMENT TRAINING: CPT

## 2024-01-06 PROCEDURE — 6370000000 HC RX 637 (ALT 250 FOR IP): Performed by: PSYCHIATRY & NEUROLOGY

## 2024-01-06 PROCEDURE — 1180000000 HC REHAB R&B

## 2024-01-06 PROCEDURE — 99223 1ST HOSP IP/OBS HIGH 75: CPT | Performed by: PSYCHIATRY & NEUROLOGY

## 2024-01-06 PROCEDURE — 97110 THERAPEUTIC EXERCISES: CPT

## 2024-01-06 PROCEDURE — 85025 COMPLETE CBC W/AUTO DIFF WBC: CPT

## 2024-01-06 PROCEDURE — 97530 THERAPEUTIC ACTIVITIES: CPT

## 2024-01-06 PROCEDURE — 97165 OT EVAL LOW COMPLEX 30 MIN: CPT

## 2024-01-06 PROCEDURE — 97116 GAIT TRAINING THERAPY: CPT

## 2024-01-06 RX ORDER — ALPRAZOLAM 1 MG/1
1 TABLET ORAL 3 TIMES DAILY
Status: DISCONTINUED | OUTPATIENT
Start: 2024-01-06 | End: 2024-01-10 | Stop reason: HOSPADM

## 2024-01-06 RX ORDER — OXYCODONE AND ACETAMINOPHEN 10; 325 MG/1; MG/1
1 TABLET ORAL EVERY 4 HOURS
Status: DISCONTINUED | OUTPATIENT
Start: 2024-01-06 | End: 2024-01-10 | Stop reason: HOSPADM

## 2024-01-06 RX ORDER — SODIUM HYPOCHLORITE 1.25 MG/ML
SOLUTION TOPICAL 2 TIMES DAILY
Status: DISCONTINUED | OUTPATIENT
Start: 2024-01-06 | End: 2024-01-10 | Stop reason: HOSPADM

## 2024-01-06 RX ORDER — LOSARTAN POTASSIUM 50 MG/1
50 TABLET ORAL 2 TIMES DAILY
Status: DISCONTINUED | OUTPATIENT
Start: 2024-01-06 | End: 2024-01-10 | Stop reason: HOSPADM

## 2024-01-06 RX ORDER — NIFEDIPINE 60 MG/1
120 TABLET, EXTENDED RELEASE ORAL DAILY
Status: DISCONTINUED | OUTPATIENT
Start: 2024-01-06 | End: 2024-01-10 | Stop reason: HOSPADM

## 2024-01-06 RX ORDER — CINACALCET 30 MG/1
60 TABLET, FILM COATED ORAL DAILY
Status: DISCONTINUED | OUTPATIENT
Start: 2024-01-06 | End: 2024-01-10 | Stop reason: HOSPADM

## 2024-01-06 RX ORDER — HYDRALAZINE HYDROCHLORIDE 50 MG/1
100 TABLET, FILM COATED ORAL 3 TIMES DAILY
Status: DISCONTINUED | OUTPATIENT
Start: 2024-01-06 | End: 2024-01-06

## 2024-01-06 RX ORDER — ALBUTEROL SULFATE 90 UG/1
2 AEROSOL, METERED RESPIRATORY (INHALATION) EVERY 4 HOURS PRN
Status: DISCONTINUED | OUTPATIENT
Start: 2024-01-06 | End: 2024-01-10 | Stop reason: HOSPADM

## 2024-01-06 RX ORDER — FOLIC ACID 1 MG/1
1 TABLET ORAL DAILY
Status: DISCONTINUED | OUTPATIENT
Start: 2024-01-06 | End: 2024-01-10 | Stop reason: HOSPADM

## 2024-01-06 RX ORDER — LEVETIRACETAM 500 MG/1
750 TABLET ORAL DAILY
Status: DISCONTINUED | OUTPATIENT
Start: 2024-01-06 | End: 2024-01-10 | Stop reason: HOSPADM

## 2024-01-06 RX ORDER — DOCUSATE SODIUM 100 MG/1
100 CAPSULE, LIQUID FILLED ORAL 3 TIMES DAILY PRN
Status: DISCONTINUED | OUTPATIENT
Start: 2024-01-06 | End: 2024-01-06

## 2024-01-06 RX ORDER — HYDRALAZINE HYDROCHLORIDE 50 MG/1
50 TABLET, FILM COATED ORAL ONCE
Status: COMPLETED | OUTPATIENT
Start: 2024-01-06 | End: 2024-01-06

## 2024-01-06 RX ORDER — PANTOPRAZOLE SODIUM 40 MG/1
40 TABLET, DELAYED RELEASE ORAL DAILY
Status: DISCONTINUED | OUTPATIENT
Start: 2024-01-06 | End: 2024-01-10 | Stop reason: HOSPADM

## 2024-01-06 RX ORDER — CLONIDINE HYDROCHLORIDE 0.1 MG/1
0.2 TABLET ORAL 3 TIMES DAILY
Status: DISCONTINUED | OUTPATIENT
Start: 2024-01-06 | End: 2024-01-10 | Stop reason: HOSPADM

## 2024-01-06 RX ORDER — OXYBUTYNIN CHLORIDE 5 MG/1
10 TABLET ORAL DAILY
Status: DISCONTINUED | OUTPATIENT
Start: 2024-01-06 | End: 2024-01-10 | Stop reason: HOSPADM

## 2024-01-06 RX ORDER — HYDRALAZINE HYDROCHLORIDE 50 MG/1
50 TABLET, FILM COATED ORAL 3 TIMES DAILY
Status: DISCONTINUED | OUTPATIENT
Start: 2024-01-06 | End: 2024-01-10 | Stop reason: HOSPADM

## 2024-01-06 RX ORDER — MAGNESIUM CARB/ALUMINUM HYDROX 105-160MG
30 TABLET,CHEWABLE ORAL 2 TIMES DAILY
Status: DISCONTINUED | OUTPATIENT
Start: 2024-01-06 | End: 2024-01-10 | Stop reason: HOSPADM

## 2024-01-06 RX ORDER — SENNA AND DOCUSATE SODIUM 50; 8.6 MG/1; MG/1
1 TABLET, FILM COATED ORAL 2 TIMES DAILY
Status: DISCONTINUED | OUTPATIENT
Start: 2024-01-06 | End: 2024-01-10 | Stop reason: HOSPADM

## 2024-01-06 RX ORDER — CARVEDILOL 25 MG/1
25 TABLET ORAL 2 TIMES DAILY WITH MEALS
Status: DISCONTINUED | OUTPATIENT
Start: 2024-01-06 | End: 2024-01-10 | Stop reason: HOSPADM

## 2024-01-06 RX ORDER — ONDANSETRON 4 MG/1
4 TABLET, FILM COATED ORAL DAILY PRN
Status: DISCONTINUED | OUTPATIENT
Start: 2024-01-06 | End: 2024-01-10 | Stop reason: HOSPADM

## 2024-01-06 RX ORDER — BISACODYL 10 MG
10 SUPPOSITORY, RECTAL RECTAL DAILY PRN
Status: DISCONTINUED | OUTPATIENT
Start: 2024-01-06 | End: 2024-01-10 | Stop reason: HOSPADM

## 2024-01-06 RX ORDER — MAGNESIUM CARB/ALUMINUM HYDROX 105-160MG
30 TABLET,CHEWABLE ORAL 2 TIMES DAILY PRN
Status: DISCONTINUED | OUTPATIENT
Start: 2024-01-06 | End: 2024-01-10 | Stop reason: HOSPADM

## 2024-01-06 RX ORDER — LEVETIRACETAM 250 MG/1
250 TABLET ORAL
Status: DISCONTINUED | OUTPATIENT
Start: 2024-01-08 | End: 2024-01-10 | Stop reason: HOSPADM

## 2024-01-06 RX ORDER — TAMSULOSIN HYDROCHLORIDE 0.4 MG/1
0.4 CAPSULE ORAL DAILY
Status: DISCONTINUED | OUTPATIENT
Start: 2024-01-06 | End: 2024-01-10 | Stop reason: HOSPADM

## 2024-01-06 RX ORDER — ACETAMINOPHEN 325 MG/1
650 TABLET ORAL EVERY 4 HOURS PRN
Status: DISCONTINUED | OUTPATIENT
Start: 2024-01-06 | End: 2024-01-10 | Stop reason: HOSPADM

## 2024-01-06 RX ORDER — MINOXIDIL 2.5 MG/1
5 TABLET ORAL 2 TIMES DAILY
Status: DISCONTINUED | OUTPATIENT
Start: 2024-01-06 | End: 2024-01-10 | Stop reason: HOSPADM

## 2024-01-06 RX ORDER — POLYETHYLENE GLYCOL 3350 17 G/17G
17 POWDER, FOR SOLUTION ORAL DAILY PRN
Status: DISCONTINUED | OUTPATIENT
Start: 2024-01-06 | End: 2024-01-10 | Stop reason: HOSPADM

## 2024-01-06 RX ADMIN — SENNOSIDES AND DOCUSATE SODIUM 1 TABLET: 50; 8.6 TABLET ORAL at 10:01

## 2024-01-06 RX ADMIN — LOSARTAN POTASSIUM 50 MG: 50 TABLET, FILM COATED ORAL at 21:42

## 2024-01-06 RX ADMIN — LEVETIRACETAM 750 MG: 500 TABLET, FILM COATED ORAL at 10:00

## 2024-01-06 RX ADMIN — MINOXIDIL 5 MG: 2.5 TABLET ORAL at 09:59

## 2024-01-06 RX ADMIN — FOLIC ACID 1 MG: 1 TABLET ORAL at 10:01

## 2024-01-06 RX ADMIN — OXYCODONE AND ACETAMINOPHEN 1 TABLET: 10; 325 TABLET ORAL at 23:19

## 2024-01-06 RX ADMIN — CINACALCET HYDROCHLORIDE 60 MG: 30 TABLET, FILM COATED ORAL at 09:59

## 2024-01-06 RX ADMIN — OXYCODONE HYDROCHLORIDE AND ACETAMINOPHEN 1 TABLET: 10; 325 TABLET ORAL at 06:11

## 2024-01-06 RX ADMIN — HYDRALAZINE HYDROCHLORIDE 50 MG: 50 TABLET, FILM COATED ORAL at 21:42

## 2024-01-06 RX ADMIN — OXYCODONE AND ACETAMINOPHEN 1 TABLET: 10; 325 TABLET ORAL at 15:32

## 2024-01-06 RX ADMIN — CARVEDILOL 25 MG: 25 TABLET, FILM COATED ORAL at 10:01

## 2024-01-06 RX ADMIN — ALPRAZOLAM 1 MG: 1 TABLET ORAL at 10:00

## 2024-01-06 RX ADMIN — NIFEDIPINE 120 MG: 60 TABLET, EXTENDED RELEASE ORAL at 10:00

## 2024-01-06 RX ADMIN — OXYBUTYNIN CHLORIDE 10 MG: 5 TABLET ORAL at 09:59

## 2024-01-06 RX ADMIN — HYDRALAZINE HYDROCHLORIDE 50 MG: 50 TABLET, FILM COATED ORAL at 10:09

## 2024-01-06 RX ADMIN — MINOXIDIL 5 MG: 2.5 TABLET ORAL at 21:43

## 2024-01-06 RX ADMIN — ALPRAZOLAM 1 MG: 1 TABLET ORAL at 13:50

## 2024-01-06 RX ADMIN — ALPRAZOLAM 1 MG: 1 TABLET ORAL at 21:42

## 2024-01-06 RX ADMIN — PANTOPRAZOLE SODIUM 40 MG: 40 TABLET, DELAYED RELEASE ORAL at 10:00

## 2024-01-06 RX ADMIN — OXYCODONE AND ACETAMINOPHEN 1 TABLET: 10; 325 TABLET ORAL at 10:16

## 2024-01-06 RX ADMIN — TAMSULOSIN HYDROCHLORIDE 0.4 MG: 0.4 CAPSULE ORAL at 10:01

## 2024-01-06 RX ADMIN — Medication: at 10:01

## 2024-01-06 RX ADMIN — HYDRALAZINE HYDROCHLORIDE 50 MG: 50 TABLET, FILM COATED ORAL at 13:50

## 2024-01-06 RX ADMIN — LOSARTAN POTASSIUM 50 MG: 50 TABLET, FILM COATED ORAL at 10:01

## 2024-01-06 RX ADMIN — OXYCODONE AND ACETAMINOPHEN 1 TABLET: 10; 325 TABLET ORAL at 19:19

## 2024-01-06 ASSESSMENT — PAIN DESCRIPTION - LOCATION
LOCATION: SCROTUM;BACK
LOCATION: GENERALIZED
LOCATION: BACK;ABDOMEN;SCROTUM
LOCATION: SCROTUM;BACK
LOCATION: ABDOMEN;SCROTUM

## 2024-01-06 ASSESSMENT — PAIN DESCRIPTION - ORIENTATION: ORIENTATION: MID

## 2024-01-06 ASSESSMENT — PAIN DESCRIPTION - DESCRIPTORS
DESCRIPTORS: ACHING
DESCRIPTORS: SORE;NAGGING
DESCRIPTORS: ACHING

## 2024-01-06 ASSESSMENT — PAIN - FUNCTIONAL ASSESSMENT
PAIN_FUNCTIONAL_ASSESSMENT: PREVENTS OR INTERFERES SOME ACTIVE ACTIVITIES AND ADLS
PAIN_FUNCTIONAL_ASSESSMENT: PREVENTS OR INTERFERES SOME ACTIVE ACTIVITIES AND ADLS
PAIN_FUNCTIONAL_ASSESSMENT: ACTIVITIES ARE NOT PREVENTED
PAIN_FUNCTIONAL_ASSESSMENT: ACTIVITIES ARE NOT PREVENTED

## 2024-01-06 ASSESSMENT — PAIN SCALES - GENERAL
PAINLEVEL_OUTOF10: 4
PAINLEVEL_OUTOF10: 5
PAINLEVEL_OUTOF10: 5

## 2024-01-06 NOTE — H&P
[facility]   History and Physical        Patient:   Boni Miles  MR#:    [id number]  Account Number:                   [id number]      Room:    [facility]824-   YOB: 1975  Date of Progress Note: 1/6/2024  Time of Note                           6:38 AM  Attending Physician:  Kartik Gutierres MD        Admitting diagnosis: Left subdural hematoma status post meningeal artery embolization    Secondary diagnoses: Seizure disorder, hypertension, end-stage renal disease on dialysis, anxiety    CHIEF COMPLAINT: Generalized weakness and deconditioning      HISTORY OF PRESENT ILLNESS:   This 48 y.o. male  admitted to [facility] on 12/17/23 with altered mental status.  He had not dialyzed for a weeks duration.  Consultation was obtained with nephrology and neurology.  CT of his head showed evidence of multiple subdural hematomas versus hygromas.  Consultation was obtained with neurosurgery.  He was initially admitted to ICU.  He was treated with Cardene for blood pressure support.  He did have some evidence of midline shift.  There was no indication for acute surgical intervention.  He was adequately dialyzed and adjustments were made to his oral antihypertensive regimen.  He was transferred to [facility].  He was there for approximately 36 hours and developed some mental status change once again.  CT showed some increased midline shift compared to his previous study.  He was transferred back to ICU and treated with intravenous Cardene once again.  Neurosurgery felt that he may possibly benefit from meningeal artery embolization.  He discussed with the neurosurgical team at the [facility].  They have agreed to accept him in transfer for ongoing management of his head lesions.  He was transferred to [facility] on 12/24/23. Pt has  hx significant for ESRD on HD MWF, colon cancer  s/p colon resection and colostomy placement, hypertension, chronic pain syndrome and anxiety. The was transferred to [facility]

## 2024-01-07 LAB
ALBUMIN SERPL-MCNC: 2.9 G/DL (ref 3.5–5.2)
ALP SERPL-CCNC: 158 U/L (ref 40–130)
ALT SERPL-CCNC: <5 U/L (ref 5–41)
ANION GAP SERPL CALCULATED.3IONS-SCNC: 16 MMOL/L (ref 7–19)
AST SERPL-CCNC: 10 U/L (ref 5–40)
BILIRUB SERPL-MCNC: 0.3 MG/DL (ref 0.2–1.2)
BUN SERPL-MCNC: 33 MG/DL (ref 6–20)
CALCIUM SERPL-MCNC: 8.3 MG/DL (ref 8.6–10)
CHLORIDE SERPL-SCNC: 99 MMOL/L (ref 98–111)
CO2 SERPL-SCNC: 25 MMOL/L (ref 22–29)
CREAT SERPL-MCNC: 7.6 MG/DL (ref 0.5–1.2)
ERYTHROCYTE [DISTWIDTH] IN BLOOD BY AUTOMATED COUNT: 17 % (ref 11.5–14.5)
GLUCOSE SERPL-MCNC: 98 MG/DL (ref 74–109)
HCT VFR BLD AUTO: 27.6 % (ref 42–52)
HGB BLD-MCNC: 8.8 G/DL (ref 14–18)
MCH RBC QN AUTO: 28.9 PG (ref 27–31)
MCHC RBC AUTO-ENTMCNC: 31.9 G/DL (ref 33–37)
MCV RBC AUTO: 90.5 FL (ref 80–94)
PLATELET # BLD AUTO: 91 K/UL (ref 130–400)
PMV BLD AUTO: 9.6 FL (ref 9.4–12.4)
POTASSIUM SERPL-SCNC: 4.4 MMOL/L (ref 3.5–5)
PROT SERPL-MCNC: 5.6 G/DL (ref 6.6–8.7)
RBC # BLD AUTO: 3.05 M/UL (ref 4.7–6.1)
SODIUM SERPL-SCNC: 140 MMOL/L (ref 136–145)
WBC # BLD AUTO: 5.1 K/UL (ref 4.8–10.8)

## 2024-01-07 PROCEDURE — 36415 COLL VENOUS BLD VENIPUNCTURE: CPT

## 2024-01-07 PROCEDURE — 85027 COMPLETE CBC AUTOMATED: CPT

## 2024-01-07 PROCEDURE — 94760 N-INVAS EAR/PLS OXIMETRY 1: CPT

## 2024-01-07 PROCEDURE — 1180000000 HC REHAB R&B

## 2024-01-07 PROCEDURE — 80053 COMPREHEN METABOLIC PANEL: CPT

## 2024-01-07 PROCEDURE — 6370000000 HC RX 637 (ALT 250 FOR IP): Performed by: PSYCHIATRY & NEUROLOGY

## 2024-01-07 RX ADMIN — OXYCODONE AND ACETAMINOPHEN 1 TABLET: 10; 325 TABLET ORAL at 18:26

## 2024-01-07 RX ADMIN — OXYCODONE AND ACETAMINOPHEN 1 TABLET: 10; 325 TABLET ORAL at 11:37

## 2024-01-07 RX ADMIN — LEVETIRACETAM 750 MG: 500 TABLET, FILM COATED ORAL at 08:38

## 2024-01-07 RX ADMIN — ALPRAZOLAM 1 MG: 1 TABLET ORAL at 08:40

## 2024-01-07 RX ADMIN — CARVEDILOL 25 MG: 25 TABLET, FILM COATED ORAL at 18:26

## 2024-01-07 RX ADMIN — TAMSULOSIN HYDROCHLORIDE 0.4 MG: 0.4 CAPSULE ORAL at 08:41

## 2024-01-07 RX ADMIN — OXYCODONE AND ACETAMINOPHEN 1 TABLET: 10; 325 TABLET ORAL at 04:10

## 2024-01-07 RX ADMIN — CINACALCET HYDROCHLORIDE 60 MG: 30 TABLET, FILM COATED ORAL at 08:38

## 2024-01-07 RX ADMIN — HYDRALAZINE HYDROCHLORIDE 50 MG: 50 TABLET, FILM COATED ORAL at 19:54

## 2024-01-07 RX ADMIN — PANTOPRAZOLE SODIUM 40 MG: 40 TABLET, DELAYED RELEASE ORAL at 08:42

## 2024-01-07 RX ADMIN — OXYCODONE AND ACETAMINOPHEN 1 TABLET: 10; 325 TABLET ORAL at 22:34

## 2024-01-07 RX ADMIN — FOLIC ACID 1 MG: 1 TABLET ORAL at 08:42

## 2024-01-07 RX ADMIN — ALPRAZOLAM 1 MG: 1 TABLET ORAL at 19:53

## 2024-01-07 RX ADMIN — HYDRALAZINE HYDROCHLORIDE 50 MG: 50 TABLET, FILM COATED ORAL at 08:42

## 2024-01-07 RX ADMIN — MINOXIDIL 5 MG: 2.5 TABLET ORAL at 08:38

## 2024-01-07 RX ADMIN — LOSARTAN POTASSIUM 50 MG: 50 TABLET, FILM COATED ORAL at 19:54

## 2024-01-07 RX ADMIN — HYDRALAZINE HYDROCHLORIDE 50 MG: 50 TABLET, FILM COATED ORAL at 14:27

## 2024-01-07 RX ADMIN — OXYCODONE AND ACETAMINOPHEN 1 TABLET: 10; 325 TABLET ORAL at 08:47

## 2024-01-07 RX ADMIN — OXYBUTYNIN CHLORIDE 10 MG: 5 TABLET ORAL at 08:40

## 2024-01-07 RX ADMIN — LOSARTAN POTASSIUM 50 MG: 50 TABLET, FILM COATED ORAL at 08:42

## 2024-01-07 RX ADMIN — NIFEDIPINE 120 MG: 60 TABLET, EXTENDED RELEASE ORAL at 08:41

## 2024-01-07 RX ADMIN — CARVEDILOL 25 MG: 25 TABLET, FILM COATED ORAL at 08:40

## 2024-01-07 RX ADMIN — MINOXIDIL 5 MG: 2.5 TABLET ORAL at 19:53

## 2024-01-07 RX ADMIN — ALPRAZOLAM 1 MG: 1 TABLET ORAL at 14:27

## 2024-01-07 RX ADMIN — SENNOSIDES AND DOCUSATE SODIUM 1 TABLET: 50; 8.6 TABLET ORAL at 19:54

## 2024-01-07 RX ADMIN — OXYCODONE AND ACETAMINOPHEN 1 TABLET: 10; 325 TABLET ORAL at 14:26

## 2024-01-07 RX ADMIN — Medication: at 20:08

## 2024-01-07 RX ADMIN — Medication: at 08:50

## 2024-01-07 ASSESSMENT — PAIN - FUNCTIONAL ASSESSMENT
PAIN_FUNCTIONAL_ASSESSMENT: PREVENTS OR INTERFERES SOME ACTIVE ACTIVITIES AND ADLS
PAIN_FUNCTIONAL_ASSESSMENT: ACTIVITIES ARE NOT PREVENTED

## 2024-01-07 ASSESSMENT — PAIN DESCRIPTION - DESCRIPTORS
DESCRIPTORS: ACHING;SORE
DESCRIPTORS: ACHING
DESCRIPTORS: ACHING;SORE

## 2024-01-07 ASSESSMENT — PAIN DESCRIPTION - LOCATION
LOCATION: BACK;SCROTUM
LOCATION: BACK;BUTTOCKS;SCROTUM
LOCATION: BACK;BUTTOCKS;SCROTUM

## 2024-01-07 ASSESSMENT — PAIN SCALES - GENERAL
PAINLEVEL_OUTOF10: 2
PAINLEVEL_OUTOF10: 5
PAINLEVEL_OUTOF10: 6
PAINLEVEL_OUTOF10: 5
PAINLEVEL_OUTOF10: 6
PAINLEVEL_OUTOF10: 4
PAINLEVEL_OUTOF10: 4

## 2024-01-08 LAB
ALBUMIN SERPL-MCNC: 2.9 G/DL (ref 3.5–5.2)
ALP SERPL-CCNC: 154 U/L (ref 40–130)
ALT SERPL-CCNC: <5 U/L (ref 5–41)
ANION GAP SERPL CALCULATED.3IONS-SCNC: 19 MMOL/L (ref 7–19)
AST SERPL-CCNC: 11 U/L (ref 5–40)
BASOPHILS # BLD: 0 K/UL (ref 0–0.2)
BASOPHILS NFR BLD: 0.6 % (ref 0–1)
BILIRUB SERPL-MCNC: 0.4 MG/DL (ref 0.2–1.2)
BUN SERPL-MCNC: 41 MG/DL (ref 6–20)
CALCIUM SERPL-MCNC: 8.1 MG/DL (ref 8.6–10)
CHLORIDE SERPL-SCNC: 98 MMOL/L (ref 98–111)
CO2 SERPL-SCNC: 22 MMOL/L (ref 22–29)
CREAT SERPL-MCNC: 9.2 MG/DL (ref 0.5–1.2)
EOSINOPHIL # BLD: 0.2 K/UL (ref 0–0.6)
EOSINOPHIL NFR BLD: 3.2 % (ref 0–5)
ERYTHROCYTE [DISTWIDTH] IN BLOOD BY AUTOMATED COUNT: 17.1 % (ref 11.5–14.5)
GLUCOSE SERPL-MCNC: 89 MG/DL (ref 74–109)
HCT VFR BLD AUTO: 28.4 % (ref 42–52)
HGB BLD-MCNC: 8.8 G/DL (ref 14–18)
IMM GRANULOCYTES # BLD: 0 K/UL
LYMPHOCYTES # BLD: 0.2 K/UL (ref 1.1–4.5)
LYMPHOCYTES NFR BLD: 4.4 % (ref 20–40)
MCH RBC QN AUTO: 29.8 PG (ref 27–31)
MCHC RBC AUTO-ENTMCNC: 31 G/DL (ref 33–37)
MCV RBC AUTO: 96.3 FL (ref 80–94)
MONOCYTES # BLD: 0.3 K/UL (ref 0–0.9)
MONOCYTES NFR BLD: 5.3 % (ref 0–10)
NEUTROPHILS # BLD: 4.4 K/UL (ref 1.5–7.5)
NEUTS SEG NFR BLD: 86.1 % (ref 50–65)
PLATELET # BLD AUTO: 71 K/UL (ref 130–400)
PMV BLD AUTO: 9 FL (ref 9.4–12.4)
POTASSIUM SERPL-SCNC: 4.8 MMOL/L (ref 3.5–5)
POTASSIUM SERPL-SCNC: 4.8 MMOL/L (ref 3.5–5)
PROT SERPL-MCNC: 5.7 G/DL (ref 6.6–8.7)
RBC # BLD AUTO: 2.95 M/UL (ref 4.7–6.1)
SODIUM SERPL-SCNC: 139 MMOL/L (ref 136–145)
WBC # BLD AUTO: 5.1 K/UL (ref 4.8–10.8)

## 2024-01-08 PROCEDURE — 6360000002 HC RX W HCPCS: Performed by: INTERNAL MEDICINE

## 2024-01-08 PROCEDURE — 99232 SBSQ HOSP IP/OBS MODERATE 35: CPT | Performed by: PSYCHIATRY & NEUROLOGY

## 2024-01-08 PROCEDURE — 97130 THER IVNTJ EA ADDL 15 MIN: CPT

## 2024-01-08 PROCEDURE — 97530 THERAPEUTIC ACTIVITIES: CPT

## 2024-01-08 PROCEDURE — 97129 THER IVNTJ 1ST 15 MIN: CPT

## 2024-01-08 PROCEDURE — 36415 COLL VENOUS BLD VENIPUNCTURE: CPT

## 2024-01-08 PROCEDURE — 97116 GAIT TRAINING THERAPY: CPT

## 2024-01-08 PROCEDURE — 80053 COMPREHEN METABOLIC PANEL: CPT

## 2024-01-08 PROCEDURE — 85025 COMPLETE CBC W/AUTO DIFF WBC: CPT

## 2024-01-08 PROCEDURE — 92610 EVALUATE SWALLOWING FUNCTION: CPT

## 2024-01-08 PROCEDURE — 5A1D70Z PERFORMANCE OF URINARY FILTRATION, INTERMITTENT, LESS THAN 6 HOURS PER DAY: ICD-10-PCS | Performed by: INTERNAL MEDICINE

## 2024-01-08 PROCEDURE — 97110 THERAPEUTIC EXERCISES: CPT

## 2024-01-08 PROCEDURE — 6370000000 HC RX 637 (ALT 250 FOR IP): Performed by: PSYCHIATRY & NEUROLOGY

## 2024-01-08 PROCEDURE — 8010000000 HC HEMODIALYSIS ACUTE INPT

## 2024-01-08 PROCEDURE — 1180000000 HC REHAB R&B

## 2024-01-08 RX ADMIN — FOLIC ACID 1 MG: 1 TABLET ORAL at 08:22

## 2024-01-08 RX ADMIN — OXYCODONE AND ACETAMINOPHEN 1 TABLET: 10; 325 TABLET ORAL at 22:51

## 2024-01-08 RX ADMIN — OXYCODONE AND ACETAMINOPHEN 1 TABLET: 10; 325 TABLET ORAL at 02:59

## 2024-01-08 RX ADMIN — CINACALCET HYDROCHLORIDE 60 MG: 30 TABLET, FILM COATED ORAL at 08:21

## 2024-01-08 RX ADMIN — OXYCODONE AND ACETAMINOPHEN 1 TABLET: 10; 325 TABLET ORAL at 18:32

## 2024-01-08 RX ADMIN — LEVETIRACETAM 250 MG: 250 TABLET, FILM COATED ORAL at 21:34

## 2024-01-08 RX ADMIN — OXYCODONE AND ACETAMINOPHEN 1 TABLET: 10; 325 TABLET ORAL at 13:07

## 2024-01-08 RX ADMIN — HYDRALAZINE HYDROCHLORIDE 50 MG: 50 TABLET, FILM COATED ORAL at 21:34

## 2024-01-08 RX ADMIN — EPOETIN ALFA-EPBX 10000 UNITS: 10000 INJECTION, SOLUTION INTRAVENOUS; SUBCUTANEOUS at 22:52

## 2024-01-08 RX ADMIN — OXYBUTYNIN CHLORIDE 10 MG: 5 TABLET ORAL at 08:22

## 2024-01-08 RX ADMIN — OXYCODONE AND ACETAMINOPHEN 1 TABLET: 10; 325 TABLET ORAL at 06:02

## 2024-01-08 RX ADMIN — MINOXIDIL 5 MG: 2.5 TABLET ORAL at 21:33

## 2024-01-08 RX ADMIN — ALPRAZOLAM 1 MG: 1 TABLET ORAL at 08:21

## 2024-01-08 RX ADMIN — OXYCODONE AND ACETAMINOPHEN 1 TABLET: 10; 325 TABLET ORAL at 11:21

## 2024-01-08 RX ADMIN — NALOXEGOL OXALATE 25 MG: 12.5 TABLET, FILM COATED ORAL at 06:02

## 2024-01-08 RX ADMIN — TAMSULOSIN HYDROCHLORIDE 0.4 MG: 0.4 CAPSULE ORAL at 08:21

## 2024-01-08 RX ADMIN — PANTOPRAZOLE SODIUM 40 MG: 40 TABLET, DELAYED RELEASE ORAL at 08:22

## 2024-01-08 RX ADMIN — LEVETIRACETAM 750 MG: 500 TABLET, FILM COATED ORAL at 08:22

## 2024-01-08 RX ADMIN — LOSARTAN POTASSIUM 50 MG: 50 TABLET, FILM COATED ORAL at 21:33

## 2024-01-08 RX ADMIN — ALPRAZOLAM 1 MG: 1 TABLET ORAL at 21:34

## 2024-01-08 RX ADMIN — ALPRAZOLAM 1 MG: 1 TABLET ORAL at 13:07

## 2024-01-08 ASSESSMENT — PAIN SCALES - GENERAL
PAINLEVEL_OUTOF10: 5
PAINLEVEL_OUTOF10: 3
PAINLEVEL_OUTOF10: 5
PAINLEVEL_OUTOF10: 4
PAINLEVEL_OUTOF10: 3
PAINLEVEL_OUTOF10: 4
PAINLEVEL_OUTOF10: 4
PAINLEVEL_OUTOF10: 5
PAINLEVEL_OUTOF10: 6

## 2024-01-08 ASSESSMENT — PAIN - FUNCTIONAL ASSESSMENT
PAIN_FUNCTIONAL_ASSESSMENT: ACTIVITIES ARE NOT PREVENTED

## 2024-01-08 ASSESSMENT — PAIN DESCRIPTION - DESCRIPTORS
DESCRIPTORS: ACHING
DESCRIPTORS: ACHING;DISCOMFORT
DESCRIPTORS: ACHING;SORE
DESCRIPTORS: ACHING;SORE
DESCRIPTORS: THROBBING

## 2024-01-08 ASSESSMENT — PAIN DESCRIPTION - ORIENTATION: ORIENTATION: MID

## 2024-01-08 ASSESSMENT — PAIN DESCRIPTION - FREQUENCY: FREQUENCY: CONTINUOUS

## 2024-01-08 ASSESSMENT — PAIN DESCRIPTION - LOCATION
LOCATION: GENERALIZED
LOCATION: SACRUM

## 2024-01-08 ASSESSMENT — PAIN DESCRIPTION - PAIN TYPE: TYPE: CHRONIC PAIN

## 2024-01-08 ASSESSMENT — PAIN DESCRIPTION - ONSET: ONSET: GRADUAL

## 2024-01-08 NOTE — PATIENT CARE CONFERENCE
NUTRITION  Current Wt: Weight - Scale: 84.7 kg (186 lb 11.2 oz) / Body mass index is 25.32 kg/m².  Admission Wt: Admission Body Weight: 84.7 kg (186 lb 11.2 oz)553kte41.2oz  Oral Diet Orders:   Regular  Oral Nutrition Supplement (ONS) Orders:  None  Pt presents adequately nourished with stable wt status over the past 3 months. He has had variable intake over the last few days. He reports good appetite and that he is eating similar to how he eats at home. Pt does have hx ESRD on HD as well as hx of colon CA. Colostomy in place. Pt reports no food intolerances or restrictions. Labs WNL at this time. Will contine Regular diet and monitor closely. Last BM 1/8.Please see nutrition note for details.      NURSING    Past Medical History:        Diagnosis Date    Asthma     during winter months    CAD (coronary artery disease)     Cancer (HCC)     rectal    Colostomy care (HCC)     Hemodialysis patient (HCC)     mon wed fri at [place]    History of blood transfusion     Hx of migraine headaches     Hypercholesterolemia 12/16/2019    Hypertension     Kidney stone     hx of    Palliative care patient 07/11/2019    Pericarditis     Rectal cancer (HCC)     Seizures (HCC)     Testalgia 02/01/2016       Vitals:    01/09/24 1333 01/09/24 1538 01/09/24 1727 01/10/24 0613   BP:  107/70  137/81   Pulse:  91  95   Resp: 16 18 20 20   Temp:  98.1 °F (36.7 °C)  97.8 °F (36.6 °C)   TempSrc:    Temporal   SpO2:  99%  96%   Weight:       Height:            SpO2: 96 %    On Room Air    Wounds/Incisions/Ulcers: Wounds present sacrum , Incision to left side of head     Meng Scale Score: 19    Pain: Patient's pain is currently controlled with schedule Percocet     Consultations/Labs/X-rays: 1/8/24 - H&H 8.8/28.4    Family Education: Family available and participating in education    Fall Risk:  King Malik Total Score: 60    Fall in the last week? No    Sleep Concerns: \"No concerns to address    Last BM: Last BM (including prior to

## 2024-01-09 ENCOUNTER — TELEPHONE (OUTPATIENT)
Dept: HEMATOLOGY | Age: 49
End: 2024-01-09

## 2024-01-09 ENCOUNTER — TELEPHONE (OUTPATIENT)
Dept: NEUROLOGY | Age: 49
End: 2024-01-09

## 2024-01-09 PROBLEM — D69.6 THROMBOCYTOPENIA (HCC): Status: ACTIVE | Noted: 2024-01-09

## 2024-01-09 PROBLEM — D72.810 LYMPHOPENIA: Status: ACTIVE | Noted: 2024-01-09

## 2024-01-09 LAB
BASOPHILS # BLD: 0 K/UL (ref 0–0.2)
BASOPHILS NFR BLD: 0.5 % (ref 0–1)
EOSINOPHIL # BLD: 0.1 K/UL (ref 0–0.6)
EOSINOPHIL NFR BLD: 3.3 % (ref 0–5)
ERYTHROCYTE [DISTWIDTH] IN BLOOD BY AUTOMATED COUNT: 16.9 % (ref 11.5–14.5)
HCT VFR BLD AUTO: 22.7 % (ref 42–52)
HGB BLD-MCNC: 7.5 G/DL (ref 14–18)
IMM GRANULOCYTES # BLD: 0 K/UL
LYMPHOCYTES # BLD: 0.5 K/UL (ref 1.1–4.5)
LYMPHOCYTES NFR BLD: 13.4 % (ref 20–40)
MCH RBC QN AUTO: 30 PG (ref 27–31)
MCHC RBC AUTO-ENTMCNC: 33 G/DL (ref 33–37)
MCV RBC AUTO: 90.8 FL (ref 80–94)
MONOCYTES # BLD: 0.5 K/UL (ref 0–0.9)
MONOCYTES NFR BLD: 11.6 % (ref 0–10)
NEUTROPHILS # BLD: 2.8 K/UL (ref 1.5–7.5)
NEUTS SEG NFR BLD: 70.7 % (ref 50–65)
PLATELET # BLD AUTO: 73 K/UL (ref 130–400)
PMV BLD AUTO: 9.6 FL (ref 9.4–12.4)
RBC # BLD AUTO: 2.5 M/UL (ref 4.7–6.1)
WBC # BLD AUTO: 4 K/UL (ref 4.8–10.8)

## 2024-01-09 PROCEDURE — 1180000000 HC REHAB R&B

## 2024-01-09 PROCEDURE — 6370000000 HC RX 637 (ALT 250 FOR IP): Performed by: PSYCHIATRY & NEUROLOGY

## 2024-01-09 PROCEDURE — 97129 THER IVNTJ 1ST 15 MIN: CPT

## 2024-01-09 PROCEDURE — 97530 THERAPEUTIC ACTIVITIES: CPT

## 2024-01-09 PROCEDURE — 97110 THERAPEUTIC EXERCISES: CPT

## 2024-01-09 PROCEDURE — 97535 SELF CARE MNGMENT TRAINING: CPT

## 2024-01-09 PROCEDURE — 97116 GAIT TRAINING THERAPY: CPT

## 2024-01-09 PROCEDURE — 36415 COLL VENOUS BLD VENIPUNCTURE: CPT

## 2024-01-09 PROCEDURE — 99232 SBSQ HOSP IP/OBS MODERATE 35: CPT | Performed by: PSYCHIATRY & NEUROLOGY

## 2024-01-09 PROCEDURE — 94760 N-INVAS EAR/PLS OXIMETRY 1: CPT

## 2024-01-09 PROCEDURE — 85025 COMPLETE CBC W/AUTO DIFF WBC: CPT

## 2024-01-09 RX ADMIN — OXYCODONE AND ACETAMINOPHEN 1 TABLET: 10; 325 TABLET ORAL at 13:03

## 2024-01-09 RX ADMIN — ALPRAZOLAM 1 MG: 1 TABLET ORAL at 21:37

## 2024-01-09 RX ADMIN — ALPRAZOLAM 1 MG: 1 TABLET ORAL at 13:03

## 2024-01-09 RX ADMIN — OXYBUTYNIN CHLORIDE 10 MG: 5 TABLET ORAL at 07:46

## 2024-01-09 RX ADMIN — OXYCODONE AND ACETAMINOPHEN 1 TABLET: 10; 325 TABLET ORAL at 17:27

## 2024-01-09 RX ADMIN — TAMSULOSIN HYDROCHLORIDE 0.4 MG: 0.4 CAPSULE ORAL at 07:46

## 2024-01-09 RX ADMIN — OXYCODONE AND ACETAMINOPHEN 1 TABLET: 10; 325 TABLET ORAL at 03:20

## 2024-01-09 RX ADMIN — CARVEDILOL 25 MG: 25 TABLET, FILM COATED ORAL at 17:27

## 2024-01-09 RX ADMIN — ALPRAZOLAM 1 MG: 1 TABLET ORAL at 07:46

## 2024-01-09 RX ADMIN — FOLIC ACID 1 MG: 1 TABLET ORAL at 07:50

## 2024-01-09 RX ADMIN — OXYCODONE AND ACETAMINOPHEN 1 TABLET: 10; 325 TABLET ORAL at 21:37

## 2024-01-09 RX ADMIN — Medication: at 11:24

## 2024-01-09 RX ADMIN — NALOXEGOL OXALATE 25 MG: 12.5 TABLET, FILM COATED ORAL at 06:07

## 2024-01-09 RX ADMIN — CINACALCET HYDROCHLORIDE 60 MG: 30 TABLET, FILM COATED ORAL at 07:46

## 2024-01-09 RX ADMIN — LEVETIRACETAM 750 MG: 500 TABLET, FILM COATED ORAL at 07:47

## 2024-01-09 RX ADMIN — OXYCODONE AND ACETAMINOPHEN 1 TABLET: 10; 325 TABLET ORAL at 06:07

## 2024-01-09 RX ADMIN — MINOXIDIL 5 MG: 2.5 TABLET ORAL at 21:37

## 2024-01-09 RX ADMIN — PANTOPRAZOLE SODIUM 40 MG: 40 TABLET, DELAYED RELEASE ORAL at 07:46

## 2024-01-09 RX ADMIN — OXYCODONE AND ACETAMINOPHEN 1 TABLET: 10; 325 TABLET ORAL at 11:23

## 2024-01-09 ASSESSMENT — PAIN DESCRIPTION - FREQUENCY: FREQUENCY: CONTINUOUS

## 2024-01-09 ASSESSMENT — PAIN SCALES - GENERAL
PAINLEVEL_OUTOF10: 2
PAINLEVEL_OUTOF10: 6
PAINLEVEL_OUTOF10: 5
PAINLEVEL_OUTOF10: 6
PAINLEVEL_OUTOF10: 4
PAINLEVEL_OUTOF10: 5
PAINLEVEL_OUTOF10: 1
PAINLEVEL_OUTOF10: 4
PAINLEVEL_OUTOF10: 5
PAINLEVEL_OUTOF10: 5

## 2024-01-09 ASSESSMENT — PAIN DESCRIPTION - ORIENTATION: ORIENTATION: RIGHT

## 2024-01-09 ASSESSMENT — PAIN DESCRIPTION - LOCATION
LOCATION: GENERALIZED
LOCATION: BACK;OTHER (COMMENT)
LOCATION: BACK;GENERALIZED
LOCATION: GENERALIZED
LOCATION: BACK;OTHER (COMMENT)
LOCATION: GENERALIZED

## 2024-01-09 ASSESSMENT — PAIN DESCRIPTION - DESCRIPTORS
DESCRIPTORS: ACHING
DESCRIPTORS: ACHING;THROBBING
DESCRIPTORS: ACHING;THROBBING
DESCRIPTORS: ACHING;SORE
DESCRIPTORS: ACHING;THROBBING
DESCRIPTORS: ACHING;SORE
DESCRIPTORS: ACHING;SORE

## 2024-01-09 ASSESSMENT — PAIN - FUNCTIONAL ASSESSMENT
PAIN_FUNCTIONAL_ASSESSMENT: ACTIVITIES ARE NOT PREVENTED

## 2024-01-09 ASSESSMENT — PAIN DESCRIPTION - PAIN TYPE: TYPE: CHRONIC PAIN

## 2024-01-09 ASSESSMENT — PAIN DESCRIPTION - ONSET: ONSET: GRADUAL

## 2024-01-09 NOTE — TELEPHONE ENCOUNTER
Called and spoke with Siomara to let her know when I have patient scheduled an HFU with Dr. Gutierres. Siomara is aware of the appointment time/date.

## 2024-01-09 NOTE — TELEPHONE ENCOUNTER
Yaakov with [facility] called to schedule a 1 month HFU with Dr Levy. [facility] did not have any availability within that time frame. Please call yaakov back at ext [phone]. Patient is being discharged tomorrow 01/10/24.   Right arm;

## 2024-01-09 NOTE — CONSULTS
Consult received. Patient known to Palliative care. Will place Palliative care evaluation and Palliative RN/ to follow up with pt and spouse tomorrow. We will follow along and assist as needed. Thank you for allowing us to participate in the care of this patient.    CALLY Olsen-CNP  
Nephrology ([facility]) Consult Note      Patient:  Boni Miles  YOB: 1975  Date of Service: 1/6/2024  MRN: [id number]   Acct: [id number]   Primary Care Physician: Andreas Manzo MD  Advance Directive: Full Code  Admit Date: 1/5/2024       Hospital Day: 1  Referring Provider: Kartik Gutierres MD    Patient independently seen and examined, Chart, Consults, Notes, Operative notes, Labs, Cardiology, and Radiology studies reviewed as available.    Chief complaint: Seizure disorder/end-stage renal disease.    Subjective:  Boni Miles is a 48 y.o. male for whom we were consulted for evaluation and treatment of end-stage renal disease on maintenance dialysis.  He was initially admitted to [facility] on December 17 with altered mental status.  CT scan of the head consistent with multiple subdural hematoma.  Consultation was obtained from neurosurgery and he was admitted to ICU.  Patient was treated with Cardene for blood pressure support.  He had an evidence of midline shift.  Due to worsening of subdural hematoma, patient was transferred to [facility] for meningeal artery embolization.  Patient underwent craniotomy to evacuate hematoma as well as meningeal artery embolization.  He has now transferred back to [facility] for rehabilitation.  He gets dialysis on Monday Wednesday Friday.  Patient also has history of colon cancer, status post colectomy/colostomy placement, chronic pain syndrome and anxiety.    Allergies:  Morphine and related and Morphine    Medicines:  Current Facility-Administered Medications   Medication Dose Route Frequency Provider Last Rate Last Admin    mineral oil liquid 30 mL  30 mL Oral BID PRN Kartik Gutierres MD        albuterol sulfate HFA (PROVENTIL;VENTOLIN;PROAIR) 108 (90 Base) MCG/ACT inhaler 2 puff  2 puff Inhalation Q4H PRN Kartik Gutierres MD        cinacalcet (SENSIPAR) tablet 60 mg  
Palliative care note.  Pleasant 48 yr old who was admitted to rehab yesterday.  He has had a long journey of healing with interventions at our facility as well as a  [place] facility.  Dr. Gutierres following.  Visited with pt in his room.  He is attempting to take a nap.  Anxious to get home.  I spoke with him about SCOP at home and he is interested in this service \"If you think it will be helpful, sure\".  Will ask Dr. Gutierres for SCOP order and have it placed.  Explained to pt when he could expect call after dc to set up first appt.  Pt does not voice any needs at this time.    Electronically signed by Jamila Suarez RN on 1/9/2024 at 12:52 PM      
English
the CT scan of the head without contrast.      There is approximately 60-70% stenosis seen within the distal M1 segment of the left middle cerebral artery.  There is no other intracranial stenosis or signs of acute vascular occlusion.  There is no intracranial aneurysm or vascular malformation.  No appreciable stenosis seen within the proximal internal carotid arteries.  The vertebral arteries and subclavian arteries appear patent.  All CT scans are performed using dose optimization techniques as appropriate to the performed exam and include at least one of the following: Automated exposure control, adjustment of the mA and/or kV according to size, and the use of iterative reconstruction technique.  ______________________________________ Electronically signed by: GURPREET WAGNER M.D. Date:     12/17/2023 Time:    15:22     CT HEAD WO CONTRAST    Result Date: 12/17/2023  EXAM:  CT SCAN HEAD WITHOUT CONTRAST.  HISTORY:  Code stroke cerebrovascular accident right-sided weakness altered mental status  COMPARISON:  None.  TECHNIQUE:  Axial scans acquired 5 mm slice thicknesses.  FINDINGS:  Bilateral subdural hygromas are present.  There is an 8 mm left subdural hygroma.  There is a 4 mm right subdural hygroma.  Hemorrhage: None.  Cerebral Parenchyma: Third ventricle is midline.  Lateral ventricles are symmetric.  Cerebellum: C posterior fossa there is edema in the janet.  There is effacement of the fourth ventricle.  Ine..  Masses/Mass Effect: There is mass effect in the posterior fossa with effacement of the fourth ventricle.  Vasculature: Normal.  [place] Structures: Internal auditory canals are symmetric.  Mastoid air cells and paranasal sinuses are pneumatized.  Orbits are intact.  Calvarium is intact..  Soft Tissues: Normal.      There appears to be edema in the region of the janet and effacement of the fourth ventricle. 2.  8 mm left subdural hygroma. 3.  4 mm right subdural hygroma.  These results called to Dr. Dunbar

## 2024-01-09 NOTE — TELEPHONE ENCOUNTER
[facility] Consult    MRN:  [id number]    Name: Boni Miles    : 1975    Room #: 824    Dx:  Thrombocytopenia ; Pancytopenia    Consulting MD: Dr. Gutierres    Nurse: Nunu    ph. [phone]

## 2024-01-10 VITALS
HEIGHT: 72 IN | DIASTOLIC BLOOD PRESSURE: 79 MMHG | TEMPERATURE: 98.6 F | WEIGHT: 186.7 LBS | HEART RATE: 99 BPM | RESPIRATION RATE: 18 BRPM | OXYGEN SATURATION: 95 % | BODY MASS INDEX: 25.29 KG/M2 | SYSTOLIC BLOOD PRESSURE: 155 MMHG

## 2024-01-10 LAB
APTT PPP: 41.5 SEC (ref 26–36.2)
BASOPHILS # BLD: 0 K/UL (ref 0–0.2)
BASOPHILS NFR BLD: 0.6 % (ref 0–1)
EOSINOPHIL # BLD: 0.1 K/UL (ref 0–0.6)
EOSINOPHIL NFR BLD: 4 % (ref 0–5)
ERYTHROCYTE [DISTWIDTH] IN BLOOD BY AUTOMATED COUNT: 17 % (ref 11.5–14.5)
FERRITIN SERPL-MCNC: 745.3 NG/ML (ref 30–400)
FOLATE SERPL-MCNC: 8.3 NG/ML (ref 4.5–32.2)
HAPTOGLOB SERPL-MCNC: 156 MG/DL (ref 30–200)
HAV IGM SERPL QL IA: NORMAL
HBV CORE IGM SERPL QL IA: NORMAL
HBV SURFACE AG SERPL QL IA: NORMAL
HCT VFR BLD AUTO: 22.7 % (ref 42–52)
HCV AB SERPL QL IA: NORMAL
HGB BLD-MCNC: 7.1 G/DL (ref 14–18)
HIV-1 P24 AG: NORMAL
HIV1+2 AB SERPLBLD QL IA.RAPID: NORMAL
IMM GRANULOCYTES # BLD: 0 K/UL
INR PPP: 1.06 (ref 0.88–1.18)
IRON SATN MFR SERPL: 46 % (ref 14–50)
IRON SERPL-MCNC: 56 UG/DL (ref 59–158)
LDH SERPL-CCNC: 134 U/L (ref 91–215)
LYMPHOCYTES # BLD: 0.5 K/UL (ref 1.1–4.5)
LYMPHOCYTES NFR BLD: 14.2 % (ref 20–40)
MCH RBC QN AUTO: 28.7 PG (ref 27–31)
MCHC RBC AUTO-ENTMCNC: 31.3 G/DL (ref 33–37)
MCV RBC AUTO: 91.9 FL (ref 80–94)
MONOCYTES # BLD: 0.3 K/UL (ref 0–0.9)
MONOCYTES NFR BLD: 8.5 % (ref 0–10)
NEUTROPHILS # BLD: 2.6 K/UL (ref 1.5–7.5)
NEUTS SEG NFR BLD: 72.4 % (ref 50–65)
PLATELET # BLD AUTO: 76 K/UL (ref 130–400)
PMV BLD AUTO: 10.6 FL (ref 9.4–12.4)
PROTHROMBIN TIME: 13.5 SEC (ref 12–14.6)
RBC # BLD AUTO: 2.47 M/UL (ref 4.7–6.1)
RETICS # AUTO: 0.01 M/UL (ref 0.03–0.12)
RETICS/RBC NFR: 0.49 % (ref 0.5–1.5)
TIBC SERPL-MCNC: 123 UG/DL (ref 250–400)
TSH SERPL DL<=0.005 MIU/L-ACNC: 19.24 UIU/ML (ref 0.27–4.2)
VIT B12 SERPL-MCNC: 385 PG/ML (ref 211–946)
WBC # BLD AUTO: 3.5 K/UL (ref 4.8–10.8)

## 2024-01-10 PROCEDURE — 86022 PLATELET ANTIBODIES: CPT

## 2024-01-10 PROCEDURE — 80074 ACUTE HEPATITIS PANEL: CPT

## 2024-01-10 PROCEDURE — 83540 ASSAY OF IRON: CPT

## 2024-01-10 PROCEDURE — 87806 HIV AG W/HIV1&2 ANTB W/OPTIC: CPT

## 2024-01-10 PROCEDURE — 83550 IRON BINDING TEST: CPT

## 2024-01-10 PROCEDURE — 99239 HOSP IP/OBS DSCHRG MGMT >30: CPT | Performed by: PSYCHIATRY & NEUROLOGY

## 2024-01-10 PROCEDURE — 82607 VITAMIN B-12: CPT

## 2024-01-10 PROCEDURE — 85730 THROMBOPLASTIN TIME PARTIAL: CPT

## 2024-01-10 PROCEDURE — 6370000000 HC RX 637 (ALT 250 FOR IP): Performed by: PSYCHIATRY & NEUROLOGY

## 2024-01-10 PROCEDURE — 82746 ASSAY OF FOLIC ACID SERUM: CPT

## 2024-01-10 PROCEDURE — 84443 ASSAY THYROID STIM HORMONE: CPT

## 2024-01-10 PROCEDURE — 85045 AUTOMATED RETICULOCYTE COUNT: CPT

## 2024-01-10 PROCEDURE — 83615 LACTATE (LD) (LDH) ENZYME: CPT

## 2024-01-10 PROCEDURE — 82728 ASSAY OF FERRITIN: CPT

## 2024-01-10 PROCEDURE — 8010000000 HC HEMODIALYSIS ACUTE INPT

## 2024-01-10 PROCEDURE — APPSS15 APP SPLIT SHARED TIME 0-15 MINUTES: Performed by: NURSE PRACTITIONER

## 2024-01-10 PROCEDURE — 36415 COLL VENOUS BLD VENIPUNCTURE: CPT

## 2024-01-10 PROCEDURE — 85025 COMPLETE CBC W/AUTO DIFF WBC: CPT

## 2024-01-10 PROCEDURE — 83010 ASSAY OF HAPTOGLOBIN QUANT: CPT

## 2024-01-10 PROCEDURE — 85610 PROTHROMBIN TIME: CPT

## 2024-01-10 RX ORDER — LEVETIRACETAM 750 MG/1
750 TABLET ORAL DAILY
Qty: 60 TABLET | Refills: 3 | Status: SHIPPED | OUTPATIENT
Start: 2024-01-11

## 2024-01-10 RX ORDER — SODIUM HYPOCHLORITE 1.25 MG/ML
SOLUTION TOPICAL 2 TIMES DAILY
Qty: 1 EACH | Refills: 0 | Status: SHIPPED | OUTPATIENT
Start: 2024-01-10

## 2024-01-10 RX ORDER — LEVETIRACETAM 250 MG/1
250 TABLET ORAL
Qty: 60 TABLET | Refills: 3 | Status: SHIPPED | OUTPATIENT
Start: 2024-01-10

## 2024-01-10 RX ADMIN — ALPRAZOLAM 1 MG: 1 TABLET ORAL at 13:12

## 2024-01-10 RX ADMIN — NALOXEGOL OXALATE 25 MG: 12.5 TABLET, FILM COATED ORAL at 06:07

## 2024-01-10 RX ADMIN — LEVETIRACETAM 750 MG: 500 TABLET, FILM COATED ORAL at 07:11

## 2024-01-10 RX ADMIN — ALPRAZOLAM 1 MG: 1 TABLET ORAL at 06:54

## 2024-01-10 RX ADMIN — OXYCODONE AND ACETAMINOPHEN 1 TABLET: 10; 325 TABLET ORAL at 03:09

## 2024-01-10 RX ADMIN — OXYCODONE AND ACETAMINOPHEN 1 TABLET: 10; 325 TABLET ORAL at 06:07

## 2024-01-10 ASSESSMENT — PAIN DESCRIPTION - DESCRIPTORS
DESCRIPTORS: ACHING
DESCRIPTORS: ACHING

## 2024-01-10 ASSESSMENT — PAIN - FUNCTIONAL ASSESSMENT
PAIN_FUNCTIONAL_ASSESSMENT: ACTIVITIES ARE NOT PREVENTED

## 2024-01-10 ASSESSMENT — PAIN SCALES - GENERAL
PAINLEVEL_OUTOF10: 4
PAINLEVEL_OUTOF10: 4
PAINLEVEL_OUTOF10: 6

## 2024-01-10 ASSESSMENT — PAIN DESCRIPTION - PAIN TYPE: TYPE: CHRONIC PAIN

## 2024-01-10 ASSESSMENT — PAIN DESCRIPTION - ONSET: ONSET: PROGRESSIVE

## 2024-01-10 ASSESSMENT — PAIN DESCRIPTION - FREQUENCY: FREQUENCY: CONTINUOUS

## 2024-01-10 ASSESSMENT — PAIN DESCRIPTION - LOCATION
LOCATION: GENERALIZED
LOCATION: BACK;OTHER (COMMENT)
LOCATION: OTHER (COMMENT)

## 2024-01-10 NOTE — PLAN OF CARE
Problem: Discharge Planning  Goal: Discharge to home or other facility with appropriate resources  1/7/2024 2133 by Sophia Eric RN  Outcome: Progressing  Flowsheets (Taken 1/7/2024 1953)  Discharge to home or other facility with appropriate resources: Refer to discharge planning if patient needs post-hospital services based on physician order or complex needs related to functional status, cognitive ability or social support system  1/7/2024 1300 by Feli Pepe RN  Outcome: Progressing     Problem: Safety - Adult  Goal: Free from fall injury  1/7/2024 2133 by Sophia Eric RN  Outcome: Progressing  1/7/2024 1300 by Feli Pepe RN  Outcome: Progressing     Problem: Skin/Tissue Integrity  Goal: Absence of new skin breakdown  Description: 1.  Monitor for areas of redness and/or skin breakdown  2.  Assess vascular access sites hourly  3.  Every 4-6 hours minimum:  Change oxygen saturation probe site  4.  Every 4-6 hours:  If on nasal continuous positive airway pressure, respiratory therapy assess nares and determine need for appliance change or resting period.  1/7/2024 2133 by Sophia Eric RN  Outcome: Progressing  1/7/2024 1300 by Feli Pepe RN  Outcome: Progressing     Problem: Pain  Goal: Verbalizes/displays adequate comfort level or baseline comfort level  1/7/2024 2133 by Sophia Eric RN  Outcome: Progressing  1/7/2024 1300 by Feli Pepe RN  Outcome: Progressing     Problem: Chronic Conditions and Co-morbidities  Goal: Patient's chronic conditions and co-morbidity symptoms are monitored and maintained or improved  1/7/2024 2133 by oSphia Eric RN  Outcome: Progressing  Flowsheets (Taken 1/7/2024 1953)  Care Plan - Patient's Chronic Conditions and Co-Morbidity Symptoms are Monitored and Maintained or Improved: Monitor and assess patient's chronic conditions and comorbid symptoms for stability, deterioration, or improvement  1/7/2024 1300 by Feli Pepe RN  Outcome: 
  Problem: Discharge Planning  Goal: Discharge to home or other facility with appropriate resources  1/9/2024 2241 by Danika Mccormack LPN  Outcome: Progressing  1/9/2024 0929 by Edwige Martinez RN  Outcome: Progressing     Problem: Safety - Adult  Goal: Free from fall injury  1/9/2024 2241 by Danika Mccormack LPN  Outcome: Progressing  1/9/2024 0929 by Edwige Martinez RN  Outcome: Progressing     Problem: Skin/Tissue Integrity  Goal: Absence of new skin breakdown  Description: 1.  Monitor for areas of redness and/or skin breakdown  2.  Assess vascular access sites hourly  3.  Every 4-6 hours minimum:  Change oxygen saturation probe site  4.  Every 4-6 hours:  If on nasal continuous positive airway pressure, respiratory therapy assess nares and determine need for appliance change or resting period.  1/9/2024 2241 by Danika Mccormack LPN  Outcome: Progressing  1/9/2024 0929 by Edwige Martinez RN  Outcome: Progressing     Problem: Pain  Goal: Verbalizes/displays adequate comfort level or baseline comfort level  1/9/2024 2241 by Danika Mccormack LPN  Outcome: Progressing  1/9/2024 0929 by Edwige Martinez RN  Outcome: Progressing     Problem: Chronic Conditions and Co-morbidities  Goal: Patient's chronic conditions and co-morbidity symptoms are monitored and maintained or improved  1/9/2024 2241 by Danika Mccormack LPN  Outcome: Progressing  1/9/2024 0929 by Edwige Martinez RN  Outcome: Progressing     Problem: ABCDS Injury Assessment  Goal: Absence of physical injury  1/9/2024 2241 by Danika Mccormack LPN  Outcome: Progressing  1/9/2024 0929 by Edwige Martinez RN  Outcome: Progressing     Problem: Nutrition Deficit:  Goal: Optimize nutritional status  1/9/2024 2241 by Danika Mccormack LPN  Outcome: Progressing  1/9/2024 0929 by Edwige Martinez RN  Outcome: Progressing     
  Problem: Discharge Planning  Goal: Discharge to home or other facility with appropriate resources  Outcome: Progressing     Problem: Safety - Adult  Goal: Free from fall injury  Outcome: Progressing     Problem: Skin/Tissue Integrity  Goal: Absence of new skin breakdown  Description: 1.  Monitor for areas of redness and/or skin breakdown  2.  Assess vascular access sites hourly  3.  Every 4-6 hours minimum:  Change oxygen saturation probe site  4.  Every 4-6 hours:  If on nasal continuous positive airway pressure, respiratory therapy assess nares and determine need for appliance change or resting period.  Outcome: Progressing     Problem: Pain  Goal: Verbalizes/displays adequate comfort level or baseline comfort level  Outcome: Progressing     Problem: Chronic Conditions and Co-morbidities  Goal: Patient's chronic conditions and co-morbidity symptoms are monitored and maintained or improved  Outcome: Progressing     Problem: ABCDS Injury Assessment  Goal: Absence of physical injury  Outcome: Progressing     
  Problem: Discharge Planning  Goal: Discharge to home or other facility with appropriate resources  Outcome: Progressing     Problem: Safety - Adult  Goal: Free from fall injury  Outcome: Progressing     Problem: Skin/Tissue Integrity  Goal: Absence of new skin breakdown  Outcome: Progressing     Problem: Pain  Goal: Verbalizes/displays adequate comfort level or baseline comfort level  Outcome: Progressing     Problem: Chronic Conditions and Co-morbidities  Goal: Patient's chronic conditions and co-morbidity symptoms are monitored and maintained or improved  Outcome: Progressing     
education, and/or dysphagia therapy based on the above goals.           These goals were reviewed with this patient at the time of assessment and Boni Miles is in agreement.    CASE MANAGEMENT:  Goals:   Assist patient/family with discharge planning, patient/family counseling,  and coordination with insurance during ARU stay.  Patient Goals: Go  Home!               Activities Prior to Admit:      Active : Yes     Occupation: On disability            Boni Miles will be seen a minimum of 3 hours of therapy per day/a minimum of 5 out of 7 days per week.    [x] In this rare instance due to the nature of this patient's medical involvement, this patient will be seen 15 hours per week (900 minutes within a 7 day period).    Treatments may include therapeutic exercises, gait training, neuromuscular re-ed, transfer training, community reintegration, bed mobility, w/c mobility and training, self care, home mgmt, cognitive training, energy conservation,dysphagia tx, speech/language/communication therapy, group therapy, and patient/family education. In addition, dietician/nutritionist may monitor calorie count as well as intake and collaboratively work with SLP on dietary upgrades.  Neuropsychology/Psychology may evaluate and provide necessary support.    Medical issues being managed closely and that require 24 hour availability of a physician:   [] Swallowing Precautions  [] Bowel/Bladder Fx  [] Weight bearing precautions   [] Wound Care    [x] Pain Mgmt   [x] Infection Protection   [x] DVT Prophylaxis   [] Fall Precautions  [] Fluid/Electrolyte/Nutrition Balance   [] Voice Protection   [] Respiratory  [] Other:    Medical Prognosis: [] Good  [x] Fair    [] Guarded   Total expected IRF days:  13  Anticipated discharge destination:    [] Home Independently   [x] Home with supervision    []SNF     [] Other                                           Physician anticipated functional outcomes:  Ambulate household

## 2024-01-10 NOTE — DIALYSIS
Vital signs before treatment: 164/98 91 18 97.7    Vital signs at end of treatment: 197/85 93 18 97.3    Amount of fluid removed: 3500ml    Patient's tolerance of treatment: Tolerated well    Orientation level: Oriented to person, Oriented to place, and Oriented to time     Level of consciousness: Alert

## 2024-01-10 NOTE — CARE COORDINATION
Mr. Miles, insists on and is being discharged today.  Discussed home health care for nursing and speech therapy - he considered and declined.  He agrees to follow up with Dr. Sullivan.  His wife had previously voiced request about change of dialysis placement- that was relayed to Bijal Escalante. She also indicated they did not plan to return to follow up in [place] and did not wish to follow up with Dr. Oliveira.    Just received information from Bijal Escalante he now has a new chair placement and time:  Bijal Gale  Tu Th Sa  11:30 AM,  Arrive at 11:00 am  Next appointment Saturday 1/13/2024

## 2024-01-10 NOTE — PROGRESS NOTES
01/08/24 1000   General   Diagnosis TRAUMATIC SUBDURAL HEMORRHAGE, S/P LION HOLE DRAINAGE OF L 7mm SDH  (COLOSTOMY BAG, FISTULA LUE)   Bed mobility   Rolling to Left Modified independent   Rolling to Right Modified independent   Supine to Sit Modified independent   Sit to Supine Modified independent   Transfers   Sit to Stand Modified independent  (PROPER LOCKING OF ROLLATOR)   Stand to Sit Modified independent  (PROPER LOCKING OF ROLLATOR)   Comment PATIENT INSTRUCTED ON SWINGING LEG RESTS OUT OF WAY PRIOR TO STANDING.  ABLE TO DUE WITH VERBAL CUES. INDEP LOCKING BRAKES   Ambulation   Device Rollator   Assistance Stand by assistance;Supervision   Quality of Gait RECIPROCAL PATTERN.  SLIGHT RIGHTWARD CERVICAL LEAN AS WELL AS RIGHTWARD TRUNK SHIFT.  PATIENT STATES THIS IS PREMORBID D/T RIGHT HIP ARTHRITIS.   Distance 300 FT   Comments PATIENT PERFORMED TURN AT NURSES STATION RATHER THAN FULL LAP D/T FATIGUE AND C/O RIGHT HIP PAIN.   Ambulation 2   Device 2 Single point cane   Assistance 2 Contact guard assistance   Quality of Gait 2 INITIALLY SPC IN LEFT HAND. CONTINUED RIGHT CERVICAL SIDEBEND WITH TRUNK LEAN TO RIGHT. OCCASSIONALLY EXHIBITED DEVIATION RIGHTWARD IN GAIT PATH.  SWITCHED SPC TO RIGHT UE WHICH PATIENT STATED IS HIS PREFERRED TECHNIQUE.  LITTLE CHANGE IN FORM.   Distance 225 FT   Comments RECOMMEND CONT WITH ROLLATOR   PT Exercises   Exercise Treatment // BAR SIDESTEPPING 12 FT TO LEFT AND RIGHT X1   PROM Exercises // BAR BACKWARDS WALKING 12FT X2   A/AROM Exercises // BAR STATIC STANDING FEET NARROW EYES CLOSED X30 SEC.  SLIGHT POSTURAL SWAY, TENDED POSTEROLATERAL RIGHT   Assessment   Assessment CHECKED AND APPROVED FOR UP AD PETRA.  OT OK.  WILL CONVERSE WITH NURSING.  SEE GAIT FOR DETAILS.  INITATED STANDING DYNAMIC // BAR ACTIVITY.       
  Physical Therapy EVALUATION Note  DATE:  2024  NAME:  Boni Miles  :  1975  (48 y.o.,male)  MRN:  [id number]    HEIGHT:     WEIGHT:       PATIENT DIAGNOSIS(ES):    Diagnosis: TRAUMATIC SUBDURAL HEMORRHAGE, S/P LION HOLE DRAINAGE OF L 7mm SDH    Additional Pertinent Hx: HEMODIALYSIS, END STAGE RENAL DISEASE, COLON CA S/P RESECTION WITH OSTOMY, SEIZURE DISORDER  RESTRICTIONS/PRECAUTIONS:    Restrictions/Precautions  Restrictions/Precautions: Fall Risk, Weight Bearing, Seizure     OVERALL  ORIENTATION STATUS:  Overall Orientation Status: Within Normal Limits  PAIN:  Pain Level: 4       Pain Location: Scrotum, Back     Pain Orientation: Mid      GROSS ASSESSMENT        POSTURE/BALANCE  Balance  Sitting - Static: Good  Standing - Dynamic: Good, -       ACTIVITY TOLERANCE         BED MOBILITY  Bed mobility  Rolling to Left: Stand by assistance  Rolling to Right: Stand by assistance  Supine to Sit: Stand by assistance  Sit to Supine: Stand by assistance  Scooting: Stand by assistance        TRANSFERS  Transfers  Sit to Stand: Stand by assistance  Stand to Sit: Stand by assistance  Bed to Chair: Stand by assistance (WITH RW)  Car Transfer: Stand by assistance       AMBULATION 1  Ambulation  Surface: Level tile  Device: Rolling Walker  Assistance: Stand by assistance  Quality of Gait: RECIPROCAL PATTERN, MILD COMPENSATED TRENDELENBURG R  Distance: 450  More Ambulation?: Yes  AMBULATION 2  Ambulation 2  Surface - 2: level tile  Device 2: No device  Assistance 2: Contact guard assistance  Quality of Gait 2: MARKED COMPENSTED TRENDELENBURG RIGHT  Distance: 100  Comments: RECOMMEND AD DUE TO GAIT DISTUBANCE WITHOUT AD  STAIRS  Stairs  # Steps : 12  Stairs Height: 4\"  Rails: Bilateral  Assistance: Stand by assistance  Comment: STEP TO LLE UP RLE DOWN  WHEELCHAIR PROPULSION 1  Propulsion 1  Propulsion: Manual  Level: Level Tile  Method: KAYLA MOSHER  Level of Assistance: Stand by assistance  Description/ Details: 2 
16 staples removed from two head incisions.  Edges well-approximated, no drainage.  Patient tolerated well.  
4 Eyes Skin Assessment     NAME:  Boni Miles  YOB: 1975  MEDICAL RECORD NUMBER:  [id number]    The patient is being assessed for  Admission    I agree that at least one RN has performed a thorough Head to Toe Skin Assessment on the patient. ALL assessment sites listed below have been assessed.      Areas assessed by both nurses:    Head, Face, Ears, Shoulders, Back, Chest, Arms, Elbows, Hands, Sacrum. Buttock, Coccyx, Ischium, and Legs. Feet and Heels        Does the Patient have a Wound? Yes wound(s) were present on assessment. LDA wound assessment was Initiated and completed by RN       Meng Prevention initiated by RN: No  Wound Care Orders initiated by RN: No    Pressure Injury (Stage 3,4, Unstageable, DTI, NWPT, and Complex wounds) if present, place Wound referral order by RN under : No    New Ostomies, if present place, Ostomy referral order under : No     Nurse 1 eSignature: Electronically signed by Latricia Dang RN on 1/6/24 at 4:11 AM CST    **SHARE this note so that the co-signing nurse can place an eSignature**    Nurse 2 eSignature: {Esignature:[id number]}   
Boni Miles arrived to room # 824.   Presented with: Subdural hematoma, seizure  Mental Status: Patient is oriented and alert.   Vitals:    01/06/24 0321   BP: 115/69   Pulse: 76   Resp: 16   Temp: 97.3 °F (36.3 °C)   SpO2: 99%     Patient safety contract and falls prevention contract reviewed with patient Yes.  Oriented Patient and Family to room.  Call light within reach. Yes.  Needs, issues or concerns expressed at this time: no.      Electronically signed by Latricia Dang RN on 1/6/2024 at 4:02 AM  
[facility] Inpatient Rehabilitation Unit  Test for Patient [place] in the Areas of Transfers/Ambulation    Ambulation/Transfers   Independent ambulation in room with assistive device:  Yes     -Device Type:  ROLLATOR  Independent transfers from wheelchair to surface in room:  Yes     Bathroom Transfers  Independent transfers to toilet: Yes   Independent transfers for shower:  No     Ambulation in hallway  Patient able to ambulate in hallway with device:   No          Inpatient Rehabilitation Nursing and Therapies feel as though the patient qualifies for an acute rehabilitation test for independence in the areas of transfers and ambulation prior to discharging from Inpatient Rehabilitation Unit at [facility].  This test for independence in the areas of transfers and ambulation must be agreed upon by the patient's physician, nurse, and therapists.        Nurse Approval:  Electronically signed by Edwige Martinez RN on 1/8/24 at 11:08 AM CST     Physical Therapist Approval:  Electronically signed by Jovany Kam PT on 1/8/24 at 11:00 AM CST      Occupational Therapist Approval: Electronically signed by CASSIDY Hewitt on 1/8/2024 at 12:17 PM       
Facility/Department: [facility] 8 REHAB UNIT  Occupational Therapy     Name: Boni Miles  : 1975  MRN: [id number]  Date of Service: 2024      Patient Diagnosis(es): There were no encounter diagnoses.  Past Medical History:  has a past medical history of Asthma, CAD (coronary artery disease), Cancer (HCC), Colostomy care (HCC), Hemodialysis patient (HCC), History of blood transfusion, Hx of migraine headaches, Hypercholesterolemia, Hypertension, Kidney stone, Palliative care patient, Pericarditis, Rectal cancer (HCC), Seizures (HCC), and Testalgia.  Past Surgical History:  has a past surgical history that includes Kidney removal (Left); Ureter stent placement; Colon surgery; Anterior cruciate ligament repair (); hernia repair; Upper gastrointestinal endoscopy (N/A, 2019); Colonoscopy Biopsy/Stoma (N/A, 2019); Colonoscopy; Endoscopy, colon, diagnostic; Dialysis fistula creation (Bilateral, 2020); vascular surgery (Right, 2022); Scrotal surgery (N/A, 10/3/2023); and XR MIDLINE EQUAL OR GREATER THAN 5 YEARS (2014).    Treatment Diagnosis: s/p subdural hematoma with evacuation      Assessment   Performance deficits / Impairments: Decreased functional mobility ;Decreased ADL status;Decreased endurance;Decreased high-level IADLs  Treatment Diagnosis: s/p subdural hematoma with evacuation  Prognosis: Good  Activity Tolerance  Activity Tolerance: Patient Tolerated treatment well        Plan   Occupational Therapy Plan  Times Per Week: 5-6x/week x 1 week  Current Treatment Recommendations: Strengthening, Functional mobility training, Endurance training, Home management training     Restrictions  Restrictions/Precautions  Restrictions/Precautions: Surgical Protocols  Lower Extremity Weight Bearing Restrictions  Right Lower Extremity Weight Bearing: Weight Bearing As Tolerated  Left Lower Extremity Weight Bearing: Weight Bearing As Tolerated    Subjective   General  Chart Reviewed: 
Facility/Department: [facility] REHAB UNIT   CLINICAL BEDSIDE SWALLOW EVALUATION    NAME: Boni Miles  : 1975  MRN: [id number]    ADMISSION DATE: 2024  ADMITTING DIAGNOSIS: has Benign non-nodular prostatic hyperplasia with lower urinary tract symptoms; Gastroesophageal reflux disease without esophagitis; Chronic insomnia; Anxiety disorder; Hypertension; Chronic migraine without aura without status migrainosus, not intractable; Generalized anxiety disorder; Hx of colon cancer, stage III; Hx antineoplastic chemotherapy; Palliative care patient; Hydrocele; Hypercholesterolemia; Malignant neoplasm of rectum (HCC); Numbness of foot; Obstructive uropathy; Stricture of ureter; ESRD on hemodialysis (HCC); Liver mass; Hypoxia; Scrotal abscess; Ureteral stent retained; Macrocytic anemia; Acute on chronic anemia; History of rectal cancer; History of colon polyps; History of stomach ulcers; Elevated alkaline phosphatase level; Groin incision ulcer, with fat layer exposed (HCC); and SDH (subdural hematoma) (HCC) on their problem list.    Date of Eval: 2024  Evaluating Therapist: ISABELLA Alanis      Clinical Impression    Clinical Bedside Swallow Evaluation completed on this date. Oral prep reveals decreased vertical jaw movement observed with ice chips and regular solids. Adequate labial seal observed. Oral transit timing ranges from 1-2 seconds with ice chips, regular solids, and thin liquids via consecutive sips side of cup. Minimum residue noted with regular solids and cleared with liquid wash. Liquid wash was effective in clearing minimal oral residue. Laryngeal movement observed to be consistently sluggish and mildly decreased for swallow airway protection. Even so, no overt s/s of aspiration/penetration observed with ice chips, regular solids, and thin liquids via consecutive sips side of cup.    Recommend continue least restrictive diet of regular solids with thin liquids. Medications administered whole with 
Nephrology ([facility]) Progress Note      Patient:  Boni Miles  YOB: 1975  Date of Service: 1/7/2024  MRN: [id number]   Acct: [id number]   Primary Care Physician: Andreas Manzo MD  Advance Directive: Full Code  Admit Date: 1/5/2024       Hospital Day: 2  Referring Provider: Kartik Gutierres MD    Patient independently seen and examined, Chart, Consults, Notes, Operative notes, Labs, Cardiology, and Radiology studies reviewed as available.    Chief complaint: Seizure disorder/end-stage renal disease.    Subjective:  Boni Miles is a 48 y.o. male for whom we were consulted for evaluation and treatment of end-stage renal disease on maintenance dialysis.  He was initially admitted to [facility] on December 17 with altered mental status.  CT scan of the head consistent with multiple subdural hematoma.  Consultation was obtained from neurosurgery and he was admitted to ICU.  Patient was treated with Cardene for blood pressure support.  He had an evidence of midline shift.  Due to worsening of subdural hematoma, patient was transferred to [facility] for meningeal artery embolization.  Patient underwent craniotomy to evacuate hematoma as well as meningeal artery embolization.  He has now transferred back to [facility] for rehabilitation.  He gets dialysis on Monday Wednesday Friday.  Patient also has history of colon cancer, status post colectomy/colostomy placement, chronic pain syndrome and anxiety.    This morning patient feels well.  He has participated in physical therapy.  He is scheduled to have hemodialysis treatment tomorrow.    Allergies:  Morphine and related and Morphine    Medicines:  Current Facility-Administered Medications   Medication Dose Route Frequency Provider Last Rate Last Admin    mineral oil liquid 30 mL  30 mL Oral BID PRN Kartik Gutierres MD        albuterol sulfate HFA 
Nutrition Assessment     Type and Reason for Visit: Initial    Nutrition Recommendations/Plan:   Monitor labs, adjust diet as needed.      Malnutrition Assessment:  Malnutrition Status: At risk for malnutrition (Comment)    Nutrition Assessment:  Following for new admit to inpatient rehab unit. Pt presents adequately nourished with stable wt status over the past 3 months. He has had variable intake over the last few days. He reports good appetite and that he is eating similar to how he eats at home. Pt does have hx ESRD on HD as well as hx of colon CA. Colostomy in place. Pt reports no food intolerances or restrictions. Labs WNL at this time. Will contine Regular diet and monitor closely. Last BM 1/8.    Estimated Daily Nutrient Needs:  Energy (kcal):  5629-1166 Weight Used for Energy Requirements: Current     Protein (g):   (1.2-1.5g/kg) Weight Used for Protein Requirements: Ideal        Fluid (ml/day):  1000mL + UOP Method Used for Fluid Requirements: Standard Renal    Nutrition Related Findings:   colostomy Wound Type: Surgical Incision (head)    Current Nutrition Therapies:    ADULT DIET; Regular    Anthropometric Measures:  Height: 182.9 cm (6')  Current Body Wt: 84.7 kg (186 lb 11.2 oz)   BMI: 25.3    Nutrition Diagnosis:   Increased nutrient needs related to acute injury/trauma, renal dysfunction as evidenced by dialysis, wounds    Nutrition Interventions:   Food and/or Nutrient Delivery: Continue Current Diet  Nutrition Education/Counseling: No recommendation at this time  Coordination of Nutrition Care: Continue to monitor while inpatient       Goals:     Goals: PO intake 50% or greater       Nutrition Monitoring and Evaluation:   Behavioral-Environmental Outcomes: None Identified  Food/Nutrient Intake Outcomes: Food and Nutrient Intake, Supplement Intake  Physical Signs/Symptoms Outcomes: Biochemical Data, Weight, Nutrition Focused Physical Findings, Fluid Status or Edema    Discharge Planning:  
Occupational Therapy  Facility/Department: [facility] 8 REHAB UNIT  Occupational Therapy Rehab Assessment    Name: Boni Miles  : 1975  MRN: [id number]  Date of Service: 2024    Discharge Recommendations:             Patient Diagnosis(es): There were no encounter diagnoses.  Past Medical History:  has a past medical history of Asthma, CAD (coronary artery disease), Cancer (HCC), Colostomy care (HCC), Hemodialysis patient (HCC), History of blood transfusion, Hx of migraine headaches, Hypercholesterolemia, Hypertension, Kidney stone, Palliative care patient, Pericarditis, Rectal cancer (HCC), Seizures (HCC), and Testalgia.  Past Surgical History:  has a past surgical history that includes Kidney removal (Left); Ureter stent placement; Colon surgery; Anterior cruciate ligament repair (); hernia repair; Upper gastrointestinal endoscopy (N/A, 2019); Colonoscopy Biopsy/Stoma (N/A, 2019); Colonoscopy; Endoscopy, colon, diagnostic; Dialysis fistula creation (Bilateral, 2020); vascular surgery (Right, 2022); Scrotal surgery (N/A, 10/3/2023); and XR MIDLINE EQUAL OR GREATER THAN 5 YEARS (2014).    Treatment Diagnosis: s/p subdural hematoma with evacuation      Assessment   Performance deficits / Impairments: Decreased functional mobility ;Decreased ADL status;Decreased balance;Decreased endurance  Assessment: Will progress as tolerated  Treatment Diagnosis: s/p subdural hematoma with evacuation  Prognosis: Good  Decision Making: Low Complexity  REQUIRES OT FOLLOW-UP: Yes  Activity Tolerance  Activity Tolerance: Patient Tolerated treatment well        Plan   Occupational Therapy Plan  Times Per Week: 5-6x/week x 1 week     Restrictions       Subjective   General  Chart Reviewed: Yes  Patient assessed for rehabilitation services?: Yes  Additional Pertinent Hx: s/p Colonostomy , ESRD with dialysis MWF  Family / Caregiver Present: No  Diagnosis: s/p subdural hematoma with evacuation  General 
Occupational Therapy  Facility/Department: [facility] 8 REHAB UNIT  Rehabilitation Occupational Therapy Daily Treatment Note    Date: 24  Patient Name: Boni Miles       Room: 0824/824-02  MRN: [id number]  Account: [id number]   : 1975  (48 y.o.) Gender: male        Diagnosis: s/p subdural hematoma with evacuation  Additional Pertinent Hx: s/p Colonostomy , ESRD with dialysis MWF    Treatment Diagnosis: s/p subdural hematoma with evacuation   Past Medical History:  has a past medical history of Asthma, CAD (coronary artery disease), Cancer (HCC), Colostomy care (HCC), Hemodialysis patient (HCC), History of blood transfusion, Hx of migraine headaches, Hypercholesterolemia, Hypertension, Kidney stone, Palliative care patient, Pericarditis, Rectal cancer (HCC), Seizures (HCC), and Testalgia.  Past Surgical History:   has a past surgical history that includes Kidney removal (Left); Ureter stent placement; Colon surgery; Anterior cruciate ligament repair (); hernia repair; Upper gastrointestinal endoscopy (N/A, 2019); Colonoscopy Biopsy/Stoma (N/A, 2019); Colonoscopy; Endoscopy, colon, diagnostic; Dialysis fistula creation (Bilateral, 2020); vascular surgery (Right, 2022); Scrotal surgery (N/A, 10/3/2023); and XR MIDLINE EQUAL OR GREATER THAN 5 YEARS (2014).     24 0930   Restrictions/Precautions   Restrictions/Precautions Surgical Protocols   General   Additional Pertinent Hx s/p Colonostomy , ESRD with dialysis MWF   Diagnosis s/p subdural hematoma with evacuation   Subjective   Subjective Pt requesting discharge ASAP- will probably be after dialysis on Wednesday. Tx focused on home mobility and DME recs.   Functional Mobility   Functional - Mobility Device 4-Wheeled Walker   Activity Retrieve items;Other   Assist Level Modified independent    Functional Mobility Comments trialed rollator vs RW, pt identified benefits for household management   Transfers   Sit to stand 
Patient:   Boni Miles  MR#:    [id number]   Room:    0824/824-02   YOB: 1975  Date of Progress Note: 1/9/2024  Time of Note                           7:39 AM  Consulting Physician:   Kratik Gutierres M.D.  Attending Physician:  Kartik Gutierres MD       CHIEF COMPLAINT: Generalized weakness and deconditioning     Subjective  This 48 y.o. male  admitted to [facility] on 12/17/23 with altered mental status.  He had not dialyzed for a weeks duration.  Consultation was obtained with nephrology and neurology.  CT of his head showed evidence of multiple subdural hematomas versus hygromas.  Consultation was obtained with neurosurgery.  He was initially admitted to ICU.  He was treated with Cardene for blood pressure support.  He did have some evidence of midline shift.  There was no indication for acute surgical intervention.  He was adequately dialyzed and adjustments were made to his oral antihypertensive regimen.  He was transferred to [facility].  He was there for approximately 36 hours and developed some mental status change once again.  CT showed some increased midline shift compared to his previous study.  He was transferred back to ICU and treated with intravenous Cardene once again.  Neurosurgery felt that he may possibly benefit from meningeal artery embolization.  He discussed with the neurosurgical team at the [facility].  They have agreed to accept him in transfer for ongoing management of his head lesions.  He was transferred to [facility] on 12/24/23. Pt has  hx significant for ESRD on HD MWF, colon cancer  s/p colon resection and colostomy placement, hypertension, chronic pain syndrome and anxiety. The was transferred to [facility] from [facility] in [place] for MERT evaluation for SDH.  When he initially presented to [facility] on 12/17 for AMS and R sided weakness, he had a witnessed seizure in ED and was treated and admitted to ICU. There he was found to have 
Per Dr. Gutierres, asked Dr. Sullivan if it is ok for patient to be discharged after dialysis; Dr. Sullivan says it is ok.  
Pharmacy Consult      Boni Miles is a 48 y.o. male for whom pharmacy has been consulted to review inpatient and outpatient medications for Dr Sullivan to see if any may be culprits for thrombocytopenia.    Patient Active Problem List   Diagnosis    Benign non-nodular prostatic hyperplasia with lower urinary tract symptoms    Gastroesophageal reflux disease without esophagitis    Chronic insomnia    Anxiety disorder    Hypertension    Chronic migraine without aura without status migrainosus, not intractable    Generalized anxiety disorder    Hx of colon cancer, stage III    Hx antineoplastic chemotherapy    Palliative care patient    Hydrocele    Hypercholesterolemia    Malignant neoplasm of rectum (HCC)    Numbness of foot    Obstructive uropathy    Stricture of ureter    ESRD on hemodialysis (HCC)    Liver mass    Hypoxia    Scrotal abscess    Ureteral stent retained    Macrocytic anemia    Acute on chronic anemia    History of rectal cancer    History of colon polyps    History of stomach ulcers    Elevated alkaline phosphatase level    Groin incision ulcer, with fat layer exposed (HCC)    SDH (subdural hematoma) (HCC)       Allergies:  Morphine and related and Morphine     Recent Labs     01/07/24  0337 01/08/24  0310   CREATININE 7.6* 9.2*       Ht/Wt:   Ht Readings from Last 1 Encounters:   01/09/24 1.829 m (6')        Wt Readings from Last 1 Encounters:   01/09/24 84.7 kg (186 lb 11.2 oz)         Estimated Creatinine Clearance: 11 mL/min (A) (based on SCr of 9.2 mg/dL (H)).    Assessment/Plan:    The following medications have the ability to cause thrombocytopenia:    Acetaminophen (sporadic incidence)  Acetaminophen; Oxycodone (sporadic incidence)  Carvedilol (1-3 % incidence)  Clonidine (rare incidence)  Levetiracetam (unreported % incidence)  Losartan (rare incidence)  Minoxidil (unreported % incidence)  Nifedipine (<0.5% incidence)  Pantoprazole(<2% incidence)    Thank you for the consult.  
Physical Therapy  Name: Boni Miles  MRN:  [id number]  Date of service:  1/9/2024 01/09/24 1420   General Comment   Comments pt in bed, sleeping; intent to make up 10 min of missed am session   Subjective   Subjective Pt roused to speech though groggy and denies ability to participate. Pt indicates he is too tired/exhausted. Spouse in room and indicates that pt has been \"out of it\". She has questions about discharge needs and requested meeting with .         Electronically signed by Patricia Feng PTA on 1/9/2024 at 2:31 PM    
Physical Therapy  Name: Boni Miles  MRN:  [id number]  Date of service:  1/9/2024 01/09/24 1000   Restrictions/Precautions   Restrictions/Precautions Surgical Protocols   Lower Extremity Weight Bearing Restrictions   Right Lower Extremity Weight Bearing Weight Bearing As Tolerated   Left Lower Extremity Weight Bearing Weight Bearing As Tolerated   General   Additional Pertinent Hx HEMODIALYSIS, END STAGE RENAL DISEASE, COLON CA S/P RESECTION WITH OSTOMY, SEIZURE DISORDER   Diagnosis TRAUMATIC SUBDURAL HEMORRHAGE, S/P LION HOLE DRAINAGE OF L 7mm SDH  (COLOSTOMY BAG, FISTULA LUE)   General Comment   Comments sitting in WC, post OT   Subjective   Subjective Pt with reports of fatigue. States he is having low platelets which is delaying discharge.   Subjective   Subjective chronic pain though not formally quantified   Ambulation   Surface Level tile   Device Rollator   Assistance Modified Independent   Quality of Gait RECIPROCAL PATTERN.  SLIGHT RIGHTWARD CERVICAL LEAN AS WELL AS RIGHTWARD TRUNK SHIFT.  PATIENT STATES THIS IS PREMORBID D/T RIGHT HIP ARTHRITIS. STEADY PACE   Distance 300 FT   Comments multiple turns without issue   Ambulation 2   Surface - 2 level tile   Device 2 Rollator   Assistance 2 Modified Independent   Quality of Gait 2 as above   Distance 200'   PT Exercises   Exercise Treatment // BAR SIDESTEPPING 12 FT TO LEFT AND RIGHT X1   PROM Exercises // BAR BACKWARDS WALKING 12FT X2   Dynamic Standing Balance Exercises 360* turn L and R x 1 with good stability first turn but staggering upon completion of second turn though able to self correct   Standing Open/Closed Kinetic Chain Exercises standing at stabilized rollator x 10: heel raises, toe raises, hip/knee flex, hip ext; STS on foam cushion x 10 from  mat table using UEs minimally with initial imbalance but improved with practice   Assessment   Assessment pt reports good tolerance of up ad alma rosa priveleges; seemingly confident with rollator use and 
SLP ALL NOTES  Facility/Department: [facility] REHAB UNIT  Initial Speech/Language/Cognitive Assessment    NAME: Boni Miles  : 1975   MRN: [id number]  ADMISSION DATE: 2024  ADMITTING DIAGNOSIS: has Benign non-nodular prostatic hyperplasia with lower urinary tract symptoms; Gastroesophageal reflux disease without esophagitis; Chronic insomnia; Anxiety disorder; Hypertension; Chronic migraine without aura without status migrainosus, not intractable; Generalized anxiety disorder; Hx of colon cancer, stage III; Hx antineoplastic chemotherapy; Palliative care patient; Hydrocele; Hypercholesterolemia; Malignant neoplasm of rectum (HCC); Numbness of foot; Obstructive uropathy; Stricture of ureter; ESRD on hemodialysis (HCC); Liver mass; Hypoxia; Scrotal abscess; Ureteral stent retained; Macrocytic anemia; Acute on chronic anemia; History of rectal cancer; History of colon polyps; History of stomach ulcers; Elevated alkaline phosphatase level; Groin incision ulcer, with fat layer exposed (HCC); and SDH (subdural hematoma) (HCC) on their problem list.  DATE ONSET: 23    Date of Eval: 2024   Evaluating Therapist: ISABELLA Crowley    RECENT RESULTS  CT OF HEAD/MRI: 23    Primary Complaint: decreased memory    Pain:  Pain Assessment  Pain Level: 4  Patient's Stated Pain Goal: 0 - No pain  Pain Location: Scrotum, Back  Pain Orientation: Mid  Pain Descriptors: Sore, Nagging  Functional Pain Assessment: Activities are not prevented  Non-Pharmaceutical Pain Intervention(s): Rest    Vision/ Hearing  Vision  Vision: Within Functional Limits  Hearing  Hearing: Within functional limits    Assessment:  Cognitive Diagnosis: Pt demo mild cognitive deficits in the areas of memory, problem solving, and verbal reasoning.   Diagnosis: Pt demo mild cognitive deficits in the areas of memory, problem solving, and verbal reasoning.    Recommendations:  Recommendations  Requires SLP Intervention: Yes     D/C 
[facility]  INPATIENT SPEECH THERAPY  [facility] 8 REHAB UNIT  TIME   1100  1200        [x]Daily Note  []Progress Note    Date: 2024  Patient Name: Boni Miles        MRN: [id number]    Account #: [id number]  : 1975  (48 y.o.)  Gender: male   Primary Provider: Kartik Gutierres MD  Precautions:   Swallowing Status/Diet:  Liquid Consistency Recommendation: Thin  Diet Solids Recommendation: Regular       PATIENT DIAGNOSIS(ES):    Diagnosis: TRAUMATIC SUBDURAL HEMORRHAGE, S/P LION HOLE DRAINAGE OF L 7mm SDH     Additional Pertinent Hx: HEMODIALYSIS, END STAGE RENAL DISEASE, COLON CA S/P RESECTION WITH OSTOMY, SEIZURE DISORDER      RESTRICTIONS/PRECAUTIONS:    Restrictions/Precautions  Restrictions/Precautions: Fall Risk, Weight Bearing, Seizure      Additional Hx for SLP:   No Hx MBSS located in EMR, Migraine, hypoxia, GERD without esophagitis. SDH, colon cancer. He states he is right handed. He states his hearing is WFL.        Subjective:  He reported 5 out of 10 hip/leg pain. He completed all therapy tasks.       Objective:  Subtests of the CLQT were completed. Today he completed symbol cancellation, symbol trails, mazes, clock drawing, design generation.      The Cognitive Linguistic Quick Test (CLQT) was completed this date. The CLQT was developed for use with adults with acquired neurological dysfunction. This individually administered test is designed to gain information about cognitive-linguistic domains, a total composite severity rating and a clock drawing severity rating. The CLQT consists of ten tasks: Personal facts, Symbol cancellation, Confrontation naming, Clock drawing, Story retelling, Symbol trails, Generative naming, Design memory, Mazes, and Design generation.     CLQT  Cognitive Domain Scoring Range Score Severity   Attention 124-50 121 Moderate   Memory 154-141 154 Mild   Executive Function 19-16 17 Moderate   Language 37-29 31 WNL   Visuospatial 41-0 41 Severe   Composite 2.4-1.5 2.4 
[facility]  INPATIENT SPEECH THERAPY  [facility] 8 REHAB UNIT            [x]Daily Note  []Progress Note    Date: 2024  Patient Name: Boni Miles        MRN: [id number]    Account #: [id number]  : 1975  (48 y.o.)  Gender: male   Primary Provider: Kartik Gutierres MD  Precautions:   Swallowing Status/Diet:  Liquid Consistency Recommendation: Thin  Diet Solids Recommendation: Regular        PATIENT DIAGNOSIS(ES):    Diagnosis: TRAUMATIC SUBDURAL HEMORRHAGE, S/P LION HOLE DRAINAGE OF L 7mm SDH     Additional Pertinent Hx: HEMODIALYSIS, END STAGE RENAL DISEASE, COLON CA S/P RESECTION WITH OSTOMY, SEIZURE DISORDER        RESTRICTIONS/PRECAUTIONS:    Restrictions/Precautions  Restrictions/Precautions: Fall Risk, Weight Bearing, Seizure        Additional Hx for SLP:   No Hx MBSS located in EMR, Migraine, hypoxia, GERD without esophagitis. SDH, colon cancer. He states he is right handed. He states his hearing is WFL.           Subjective:  He reports pain of 4 or 5 back, right leg pain after physical and occupational therapies.      Objective:  He states his wife manages the bills/finances mainly. He does use automatic payments for some bills. He has been managing his medications without assistance.       He states he has had memory deficits for years and does not believe he would benefit from therapy. He was able complete a medication management task safely. His wife manages the majority of his finances. He has not exhibited any dysphagia. He is not recommended to continue speech therapy services at this time. He is requesting to return home this week.           The Cognitive Linguistic Quick Test (CLQT) was completed on 24. The CLQT was developed for use with adults with acquired neurological dysfunction. This individually administered test is designed to gain information about cognitive-linguistic domains, a total composite severity rating and a clock drawing severity rating. The CLQT consists of ten tasks: 
[facility]  INPATIENT SPEECH THERAPY  [facility] 8 REHAB UNIT  TIME   0800  0815  0815  0900  60 MINUTES    [x]Daily Note  []Progress Note    Date: 2024  Patient Name: Boni Miles        MRN: [id number]    Account #: [id number]  : 1975  (48 y.o.)  Gender: male   Primary Provider: Kartik Gutierres MD  Swallowing Status/Diet:  Liquid Consistency Recommendation: Thin  Diet Solids Recommendation: Regular    Subjective: Patient alert, cooperative, and upright in bed. No new pain reported at this time.    Objective:     Clinical bedside swallowing evaluation completed on this date. Did recommend to continue patient on regular solids with thin liquids. Medications whole with thin liquids. Did observe patient taking multiple medications whole with thin liquids. No difficulties observed with this task. Did review swallowing compensatory strategies and general aspiration precautions with patient. Patient verbalizes understanding. Do note many instances of belching and hiccups. Patient does report hx of GERD.    Portions of the Cognitive-Linguistic Quick Test were completed this AM for further speech/language/cognitive evaluation. The following sub tests were administered: personal facts, confrontation naming, story retelling, generative naming, and design memory. The following scores were obtained:      personal facts: 8  confrontation naming: 10   story retellin  generative namin   design memory: 5     The remaining sub tests will be administered during the next treatment session. Scores will be uploaded after the test is completed.     SLP will continue to follow and treat.     Diet Solids Recommendation:  Diet Solids Recommendation: Regular  Diet Liquid Recommendation:  Liquid Consistency Recommendation: Thin  Compensatory Swallowing Strategies:  Compensatory Swallowing Strategies : Alternate solids and liquids, Eat/Feed slowly, Upright as possible for all oral intake, Remain upright for 30-45 minutes after 
error  
at 01/08/24 0822    acetaminophen (TYLENOL) tablet 650 mg  650 mg Oral Q4H PRN Kartik Gutierres MD        bisacodyl (DULCOLAX) suppository 10 mg  10 mg Rectal Daily PRN Kartik Gutierres MD        sennosides-docusate sodium (SENOKOT-S) 8.6-50 MG tablet 1 tablet  1 tablet Oral BID Kartik Gutierres MD   1 tablet at 01/07/24 1954    magnesium hydroxide (MILK OF MAGNESIA) 400 MG/5ML suspension 30 mL  30 mL Oral Daily PRN Kartik Gutierres MD        sodium hypochlorite (DAKINS) 0.125 % external solution   Irrigation BID Kartik Gutierres MD   Given by Other at 01/07/24 2008    hydrALAZINE (APRESOLINE) tablet 50 mg  50 mg Oral TID Kartik Gutierres MD   50 mg at 01/07/24 1954    epoetin aliza-epbx (RETACRIT) injection 10,000 Units  10,000 Units SubCUTAneous Once per day on Mon Wed Fri Johnny Simeon MD           Past Medical History:  Past Medical History:   Diagnosis Date    Asthma     during winter months    CAD (coronary artery disease)     Cancer (HCC)     rectal    Colostomy care (HCC)     Hemodialysis patient (HCC)     mon wed fri at [facility]    History of blood transfusion     Hx of migraine headaches     Hypercholesterolemia 12/16/2019    Hypertension     Kidney stone     hx of    Palliative care patient 07/11/2019    Pericarditis     Rectal cancer (HCC)     Seizures (HCC)     Testalgia 02/01/2016       Past Surgical History:  Past Surgical History:   Procedure Laterality Date    ANTERIOR CRUCIATE LIGAMENT REPAIR  2007    ACL and MCL repair    COLON SURGERY      colon resection with colostomy    COLONOSCOPY      DIALYSIS FISTULA CREATION Bilateral 01/21/2020    CREATION  LEFT  BRACHIAL CEPHALIC AV FISTULA performed by Joseph [facility] MD at [facility] OR    ENDOSCOPY, COLON, DIAGNOSTIC      HC COLONOSCOPY BIOPSY/STOMA N/A 07/12/2019    Dr Purdy-w/APC ablation-Inflammatory polyps inside or below the colostomy opening-Tubular AP (-) dysplasia    HERNIA REPAIR      As an infant    KIDNEY REMOVAL Left     cancer    SCROTAL SURGERY N/A 
expect result later today; discussed with Lizzeth in lab   Will arrange outpatient follow-up       (Please note that portions of this note were completed with a voice recognition program. Efforts were made to edit the dictations but occasionally words are mis-transcribed.)    Ania Soni, CALLY  01/10/24  6:39 AM  Physician's attestation/substantial contribution:  I, Dr Delfino Sullivan, independently performed an evaluation on Boni Miles. I have reviewed relevant medical information/data to include but not limited to medication list, relevant appropriate labs and imaging when applicable. I reviewed other physician's notes, ancillary services and nurses assessment. I have reviewed the above documentation completed by the Nurse Practitioner or Physician Assistant. Please see my additional contributions to the history of present illness, physical examination, and assessment/medical decision-making that reflect my findings and impressions. I have seen and examined the patient and the key elements of all parts of the encounter have been performed by me. I agree with the assessment and plan as outlined by the ARNP/PA.  Subjective-no new complaints morning.  Denies any bleeding.  Anxious to go home.  Objective-no change  Assessment/plan:  Thrombocytopenia-mildly improved today.  Workup in process.  Labs 1/9/2024:  HIT antibody to be sent to [place] - IN PROCESS  Acute hepatitis profile: non-reactive  HIV 1&2: non-reactive  PT/INR: 13.5/1.06  PTT: 41.5  Iron profile: IN PROCESS  B12: IN PROCESS   Folate: 4.0 (4.5-32)  reticulocyte count: 0.0121  Haptoglobin: 156  LDH: 134  Path review: IN PROCESS  We will arrange follow-up with Ania.  Follow-up visit pending.    Likely medication-induced.  Pharmacy review.   The following medications have the ability to cause thrombocytopenia:     Acetaminophen (sporadic incidence)  Acetaminophen; Oxycodone (sporadic incidence)  Carvedilol (1-3 % incidence)  Clonidine (rare 
Hyperactive bladder-Ditropan.  Flomax  5.  Anxiety disorder-Xanax scheduled  6.  End-stage renal disease-continue dialysis.  Nephrology consulted  7.  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  Thrombocytopenia-monitor.  Repeat in a.m.     PAYTON: Staff on Wednesday          
which must be physical or occupational therapy and/or speech therapy     Intensive therapy: The patient requires and is reasonably expected to actively participate in at least 3 hours of therapy per day at least 5 days per week, and expected to make measurable improvements that will be of practical value to improve the patient's functional capacity or adaptation to impairments. In addition, therapy treatments will begin within 36 hours from midnight of the day of the patient's admission to the inpatient rehabilitation facility     Expected duration and frequency therapy: 180 minutes per day, 5 days per week     Interdisciplinary team: The patient demonstrates the need for an interdisciplinary team for active management of the following medical issues including ataxia, motor planning, balance, disease management, elimination, endurance, family training, education, independent ADLs, pain management, precautions, range of motion, safety, strength, and transfers     I have reviewed the preadmission screening documents and concur with the findings. I believe the patient meets criteria and is sufficiently stable to allow participation in the program. This requires an intensive level of therapy, close medical supervision, and an interdisciplinary team approach provided through an individualized plan of care. I approve admitting this patient for an intensive inpatient rehabilitation program.        Kartik Gutierres MD  ____________________ _____________________ ________________   Physician Signature      Physician Name (print)   Physician NPI          Date    
CONTRAST.  HISTORY:  Follow-up subdural hematoma.  COMPARISON:  12/17/2023.  TECHNIQUE:  Axial noncontrast CT of the head.  Sagittal and coronal reformats were obtained.  FINDINGS: A left cerebral convexity subdural hematoma is again seen measuring up to 5 mm in thickness on coronal image 22, previously measuring 8 mm.  The previously identified right cerebral convexity subdural hematoma is no longer seen.  There is approximately 3  mm of left to right midline shift, not significantly changed.  Pontine and bilateral occipital hypodensities are again seen and improved compared to the prior exam.  The calvarium is intact.  The visualized paranasal sinuses are unopacified.      Decreased size of the left cerebral convexity subdural hematoma measuring 5 mm, previously measuring 8 mm.  Resolution of the right cerebral convexity subdural hematoma.  Approximately 3 mm right-to-left midline shift, not significantly changed.  Improved pontine and bicerebral occipital hypodensities which could represent improved PRES.  All CT scans are performed using dose optimization techniques as appropriate to the performed exam and include at least one of the following: Automated exposure control, adjustment of the mA and/or kV according to size, and the use of iterative reconstruction technique.  ______________________________________ Electronically signed by: LINWOOD CARNEY M.D. Date:     12/19/2023 Time:    07:58     MRI BRAIN WO CONTRAST    Result Date: 12/18/2023  EXAM:  BRAIN MRI WITHOUT CONTRAST  HISTORY:  Rule out stroke.  TECHNIQUE:  Multiplanar and multisequence MRI of the brain without the administration of intravenous contrast.  COMPARISON:  Brain CT dated 12/17/2023.  FINDINGS:  Patchy and curvilinear foci of restricted diffusion are noted in bilateral occipital lobes, likely representing posterior reversible encephalopathy syndrome (PRES).  Additional patchy T2/FLAIR hyperintensities are noted in the janet which appears 
VIDYA SLAUGHTER Date:     12/20/2023 Time:    08:37     CT HEAD WO CONTRAST    Result Date: 12/19/2023  EXAM:  CT OF THE HEAD WITHOUT CONTRAST.  HISTORY:  Follow-up subdural hematoma.  COMPARISON:  12/17/2023.  TECHNIQUE:  Axial noncontrast CT of the head.  Sagittal and coronal reformats were obtained.  FINDINGS: A left cerebral convexity subdural hematoma is again seen measuring up to 5 mm in thickness on coronal image 22, previously measuring 8 mm.  The previously identified right cerebral convexity subdural hematoma is no longer seen.  There is approximately 3  mm of left to right midline shift, not significantly changed.  Pontine and bilateral occipital hypodensities are again seen and improved compared to the prior exam.  The calvarium is intact.  The visualized paranasal sinuses are unopacified.      Decreased size of the left cerebral convexity subdural hematoma measuring 5 mm, previously measuring 8 mm.  Resolution of the right cerebral convexity subdural hematoma.  Approximately 3 mm right-to-left midline shift, not significantly changed.  Improved pontine and bicerebral occipital hypodensities which could represent improved PRES.  All CT scans are performed using dose optimization techniques as appropriate to the performed exam and include at least one of the following: Automated exposure control, adjustment of the mA and/or kV according to size, and the use of iterative reconstruction technique.  ______________________________________ Electronically signed by: LINWOOD CARNEY M.D. Date:     12/19/2023 Time:    07:58     MRI BRAIN WO CONTRAST    Result Date: 12/18/2023  EXAM:  BRAIN MRI WITHOUT CONTRAST  HISTORY:  Rule out stroke.  TECHNIQUE:  Multiplanar and multisequence MRI of the brain without the administration of intravenous contrast.  COMPARISON:  Brain CT dated 12/17/2023.  FINDINGS:  Patchy and curvilinear foci of restricted diffusion are noted in bilateral occipital lobes, likely representing posterior

## 2024-01-11 ENCOUNTER — CARE COORDINATION (OUTPATIENT)
Dept: CASE MANAGEMENT | Age: 49
End: 2024-01-11

## 2024-01-11 ENCOUNTER — TELEPHONE (OUTPATIENT)
Dept: PALLATIVE CARE | Age: 49
End: 2024-01-11

## 2024-01-11 NOTE — DISCHARGE SUMMARY
200'  Comments: RECOMMEND CONT WITH ROLLATOR  STAIRS  Stairs  # Steps : 12  Stairs Height: 4\"  Rails: Bilateral  Assistance: Stand by assistance  Comment: STEP TO LLE UP RLE DOWN    WHEELCHAIR PROPULSION  Propulsion 2  Propulsion 2: Manual  Level 2: Level Tile  Method 2: RUE, LUE  Distance 2: 150  Assistance 2:  Independent  GOALS:  Short Term Goals  Time Frame for Short Term Goals: 1  WEEK  Short Term Goal 1: INDEP ON BED MOBILITY  Short Term Goal 2: INDEP TRANSFERS  Short Term Goal 3: INDEP + FEET WITH AD  Short Term Goal 4: INDEP CAR TRANSFER  Short Term Goal 5: INDEP 12 STEP 2 RAIL    Long Term Goals  Time Frame for Long Term Goals : 2 WEEKS  Long Term Goal 1: INDEP AMB WITHOUT + FEET  Long Term Goal 2: INDEP HEP  HOME LIVING:     Type of Home: House  Home Layout: One level  Home Access: Stairs to enter with rails  Entrance Stairs - Number of Steps: 2  Entrance Stairs - Rails: Both  ASSESSMENT (IMPAIRMENTS/BARRIERS):  Assessment  Assessment: pt reports good tolerance of up ad alma rosa priveleges; seemingly confident with rollator use and utilizes safely; generally low endurance and fatigue from previous OT session  Performance Deficits/Impairments: Decreased strength, Decreased balance, Decreased coordination, Increased pain  Treatment Diagnosis: INTERFERENCE WITH ACTIVITY  Therapy Prognosis: Excellent  Decision Making: Low Complexity  History: TRAUMATIC SUBDURAL HEMORRHAGE, S/P LION HOLE DRAINAGE OF L 7mm SDH, HEMODIALYSIS, END STAGE RENAL DISEASE, COLON CA S/P RESECTION WITH OSTOMY, SEIZURE DISORDER  Exam: MILD DECREAE BALANCE, MILD GAIT DISSTURBANCE DUE TO PT'S C/O SORE RIGHT HIP CAUSING COMPENSATED TRENDELENBURG R. STAND BY ASSIST FOR ALL DYNAMIC STANDING ACTIVITIES WITH EITHER FIXED SUPPORT OR RW  Clinical Presentation: STABLE  Discharge Recommendations: Home with Home health PT    PLAN:  Physical Therapy Plan  General Plan:  minutes of therapy at least 5 out of 7 days a week  Therapy 
Patient adamant about discharge today.        Discharge Instructions     Patient Instructions:   Home  Therapy orders: SLP   Discharge lab work: none  Code status: Full Code   Activity: activity as tolerated  Diet: ADULT DIET; Regular    Wound Care: as directed  Equipment: as per therapy      Kartik Gutierres MD, MD    At least 35 minutes were spent in discharging the patient

## 2024-01-11 NOTE — TELEPHONE ENCOUNTER
I left a voicemail for the patient to call [facility]. I would like to discuss the referral that we received. I will call back at a later date. I can be reached at [phone].

## 2024-01-11 NOTE — CARE COORDINATION
Care Transitions Initial Follow Up Call    Call within 2 business days of discharge: Yes    Patient Current Location:  [place]    Attempted to make contact with patient/caregiver for an initial Care Transitions Coordination follow up call post discharge from the hospital without success.  Unable to leave a message regarding intent of call and call back information.  Call was forwarded to an unidentifiable voice messaging system/mobile voice mail.  CTN will follow up again at a later time. Any previously scheduled hospital follow up appointments as noted.    Patient: Boni Miles Patient : 1975   MRN: [id number]  Reason for Admission: SDH, et al  Discharge Date: 1/10/24 RARS: Readmission Risk Score: 27.2      Last Discharge Facility       Date Complaint Diagnosis Description Type Department Provider    24  SDH (subdural hematoma) (HCC) Admission (Discharged) [facility] 8 REHAB Kartik Gutierres MD            Was this an external facility discharge? No     Non-face-to-face services provided:  Reviewed encounter information for continuity of care prior to follow up Care Transitions Coordination phone call - chart notes, consults, progress notes, test results, med list, appointments, AVS, other information.    Follow Up  Future Appointments   Date Time Provider Department Center   2024 11:00 AM Anais, Andreas B, MD [facility]   2024  9:00 AM [facility]   2024 11:00 AM Kartik Gutierres MD [facility] Neurology -     Plan for next call: Re-attempt at initial contact for follow up Care Transitions Coordination call following discharge from the hospital on 1/10/2024.    Lucy Valencia RN

## 2024-01-12 ENCOUNTER — CARE COORDINATION (OUTPATIENT)
Dept: CASE MANAGEMENT | Age: 49
End: 2024-01-12

## 2024-01-12 NOTE — CARE COORDINATION
Care Transitions Initial Follow Up Call    Call within 2 business days of discharge: Yes    Patient Current Location:  [place]    Attempted to make contact with patient/caregiver for an initial Care Transitions Coordination follow up call post discharge from the hospital without success.  Unable to leave a message regarding intent of call and call back information.  Call was forwarded to an unidentifiable voice messaging system/mobile voice mail.  As this repeated attempt to make contact was unsuccessful, will disengage at this time.    Any previously scheduled hospital follow up appointments as noted.    Patient: Boni Miles Patient : 1975   MRN: [id number]  Reason for Admission: SDH, et al  Discharge Date: 1/10/24 RARS: Readmission Risk Score: 27.2      Last Discharge Facility       Date Complaint Diagnosis Description Type Department Provider    24  SDH (subdural hematoma) (HCC) Admission (Discharged) [facility] 8 REHAB Kartik Gutierres MD            Was this an external facility discharge? No     Non-face-to-face services provided:  Reviewed encounter information for continuity of care prior to follow up Care Transitions Coordination phone call - chart notes, consults, progress notes, test results, med list, appointments, AVS, other information.    Follow Up  Future Appointments   Date Time Provider Department Center   2024 11:00 AM Anais, Andreas B, MD [facility]   2024  9:00 AM Andreas Manzo MD [facility]   2024 11:00 AM Kartik Gutierres MD [facility] NEURO Neurology -     Lucy Valencia RN

## 2024-02-19 ENCOUNTER — OFFICE VISIT (OUTPATIENT)
Dept: PRIMARY CARE CLINIC | Age: 49
End: 2024-02-19

## 2024-02-19 ENCOUNTER — TELEPHONE (OUTPATIENT)
Dept: HEMATOLOGY | Age: 49
End: 2024-02-19

## 2024-02-19 VITALS
HEIGHT: 72 IN | DIASTOLIC BLOOD PRESSURE: 76 MMHG | WEIGHT: 184 LBS | OXYGEN SATURATION: 99 % | SYSTOLIC BLOOD PRESSURE: 124 MMHG | TEMPERATURE: 97.5 F | HEART RATE: 97 BPM | BODY MASS INDEX: 24.92 KG/M2

## 2024-02-19 DIAGNOSIS — N18.6 END STAGE RENAL DISEASE ON DIALYSIS (HCC): ICD-10-CM

## 2024-02-19 DIAGNOSIS — R53.83 OTHER FATIGUE: ICD-10-CM

## 2024-02-19 DIAGNOSIS — R73.9 HYPERGLYCEMIA: ICD-10-CM

## 2024-02-19 DIAGNOSIS — S06.5XAA SDH (SUBDURAL HEMATOMA) (HCC): Primary | ICD-10-CM

## 2024-02-19 DIAGNOSIS — R56.9 SEIZURES (HCC): ICD-10-CM

## 2024-02-19 DIAGNOSIS — I10 PRIMARY HYPERTENSION: ICD-10-CM

## 2024-02-19 DIAGNOSIS — Z99.2 END STAGE RENAL DISEASE ON DIALYSIS (HCC): ICD-10-CM

## 2024-02-19 RX ORDER — DOCUSATE SODIUM 100 MG/1
100 CAPSULE, LIQUID FILLED ORAL 2 TIMES DAILY
COMMUNITY

## 2024-02-19 ASSESSMENT — PATIENT HEALTH QUESTIONNAIRE - PHQ9
SUM OF ALL RESPONSES TO PHQ9 QUESTIONS 1 & 2: 0
1. LITTLE INTEREST OR PLEASURE IN DOING THINGS: 0
SUM OF ALL RESPONSES TO PHQ QUESTIONS 1-9: 0
2. FEELING DOWN, DEPRESSED OR HOPELESS: 0
SUM OF ALL RESPONSES TO PHQ QUESTIONS 1-9: 0

## 2024-02-19 NOTE — TELEPHONE ENCOUNTER
Called patient to remind of appointment on 02/21/2024 and was unable to leave a message or talk to patient due the phone kept ringing and no one ever answered or no option to leave voicemail.

## 2024-02-19 NOTE — PATIENT INSTRUCTIONS
Over the counter products to get:  Afrin nasal spray  Nasal steroids:  (Flonase, Rhinocort, or Nascort nasal spray)  Nasal saline (Simply saline, Ocean nasal spray, etc)    Nasal regimen:   Days 1 - 3  Use your choice of nasal steroid 1 spray each nostril twice a day.  Afrin nasal spray 1 spray each nostril twice a day  Nasal saline as needed for congestion and drainage     Days 4 - 10: STOP AFRIN  Continue nasal steroid 1 spray each nostril twice a day  Nasal saline as needed    If no relief after 10 days, contact us.

## 2024-02-19 NOTE — PROGRESS NOTES
Progress Note      Pt Name: Boni Miles  YOB: 1975  MRN: [id number]    Date of evaluation: 4/10/24   History Obtained From:  Patient, EMR    Portions of this note have been copied forward, however, changed to reflect the most current clinical status of this patient.    Chief Complaint   Patient presents with    Follow-up     Hospital follow up        INTERVAL HISTORY/HISTORY OF PRESENT ILLNESS:    Adrien Miles is a 48-year-old  gentleman here in hospital follow-up regarding thrombocytopenia and pancytopenia, felt multifactorial in nature.  He reports mild, chronic bleeding via colostomy.  He continues dialysis Tuesdays, Thursdays and Saturdays for ESRD.  Adrien denies further seizure activity status post hospitalization bur hole and evacuation of subdural hematoma 12/2024.  Adrien has routine serology completed at [facility] with dialysis.  CBC 4/2/2024: WBC 7.61, Hgb 11.4, platelets improved at 104,000.    HEMATOLOGY HISTORY:  Adrien Miles was first seen by Dr. Sullivan on 1/9/2024 during patient's inpatient stay at the rehab center at [facility]. Hematology was consulted for findings of thrombocytopenia and pancytopenia.  The patient has a history of hypertension, resected, stage IIIb colorectal carcinoma in 2008 and end-stage renal disease on hemodialysis.    Patient presented to [facility] 12/17/2023 with altered mental status and right-sided weakness.  He had a witnessed seizure in the emergency department.  He was found to have bilateral occipital subdural hematoma.   He was ultimately transferred to [facility] 12/24/2023, taken to the OR 12/24/2024 for middle meningeal artery embolization left-sided bur holes and evacuation of left chronic subdural hematoma.   And underwent meningeal artery embolization at [facility] in [place].  In addition, patient underwent craniotomy for evacuation of hematoma and meningeal artery embolization.  He was transferred back to [facility]

## 2024-02-19 NOTE — PROGRESS NOTES
[facility]  [address]  [address]  [place] [address]  Dept: [phone]  Dept Fax: [phone]  Loc: [phone]    Boni Miles is a 48 y.o. male who presents today for his medical conditions/complaints asnoted below.  Boni Miles is here for Follow-up (HFU after stroke brain bleed)        Post-Discharge Transitional Care Management Services    Admitted December 17 to December 23, 2023  onsults: nephrology, neurology, and neurosurgery     Primary Care Physician:  Andreas Manzo MD     Discharge Diagnoses:  Principal Problem:    Hyperkalemia  Active Problems:    Seizure (HCC)    Subdural hematoma (HCC)    Stroke-like symptom    ESRD on dialysis (HCC)    Altered mental status  Resolved Problems:    * No resolved hospital problems. *    Discharged to rehab  Discharged on January 30, 2024        Inpatient course: Discharge summary reviewed- see chart for full details.  48-year-old presented to the emergency department with altered mental status  -History of end-stage renal disease:   -CT of his head obtained showed evidence of multiple subdural hematomas.  - -Neurosurgery was consulted.  -Initially managed in the ICU and placed on Cardene for elevated blood pressure.  -Showed evidence of midline shift with mental status changes.  -Transfer to [facility] for meningeal artery embolization.    He was transferred to the [facility] on December 24  -Neurosurgery consulted: OR for left-sided bur holes for evacuation of left chronic subdural hematoma.  -Neurology was consulted for treatment of seizures.  - -History of seizure activity over 5 years ago.  - -Placed on Keppra.    Nephrology was involved to continue with dialysis schedule.    Discharged from rehab on January 10    [facility] Risk Score (risk of hospital readmission):Readmission Risk Score: 27.2    Active Problems:    * No active hospital problems. *      Care management risk

## 2024-03-11 RX ORDER — OXYBUTYNIN CHLORIDE 5 MG/1
5 TABLET ORAL DAILY
Qty: 90 TABLET | OUTPATIENT
Start: 2024-03-11

## 2024-03-12 ENCOUNTER — PATIENT MESSAGE (OUTPATIENT)
Dept: PRIMARY CARE CLINIC | Age: 49
End: 2024-03-12

## 2024-03-12 RX ORDER — OXYBUTYNIN CHLORIDE 5 MG/1
10 TABLET ORAL DAILY
Qty: 60 TABLET | Refills: 5 | Status: SHIPPED | OUTPATIENT
Start: 2024-03-12

## 2024-03-12 RX ORDER — MINOXIDIL 2.5 MG/1
5 TABLET ORAL 2 TIMES DAILY
Qty: 60 TABLET | Refills: 5 | Status: SHIPPED | OUTPATIENT
Start: 2024-03-12

## 2024-03-12 RX ORDER — PANTOPRAZOLE SODIUM 40 MG/1
40 TABLET, DELAYED RELEASE ORAL DAILY
Qty: 30 TABLET | Refills: 5 | Status: SHIPPED | OUTPATIENT
Start: 2024-03-12

## 2024-03-12 NOTE — TELEPHONE ENCOUNTER
From: Boni Miles  To: Dr. Andreas Manzo  Sent: 3/12/2024 10:40 AM CDT  Subject: Medication need refills    I am not able to request refills on my list of medications. I am not sure if it is due to me being in the hospital recently but I just saw Dr Burnham 3 weeks ago.   I need the following medications refilled as soon as possible  Pantoprazole 40mg  Omeprazole 20mg  Minoxidil 2.5mg  Oxybutynin 5mg    [facility] pharmacy is fine. Thank you so much.

## 2024-03-12 NOTE — TELEPHONE ENCOUNTER
Boni Miles called to request a refill on his medication.      Last office visit : 2/19/2024   Next office visit : 5/24/2024     Requested Prescriptions     Pending Prescriptions Disp Refills    pantoprazole (PROTONIX) 40 MG tablet 30 tablet 5     Sig: Take 1 tablet by mouth daily    oxyBUTYnin (DITROPAN) 5 MG tablet 60 tablet 5     Sig: Take 2 tablets by mouth daily    minoxidil (LONITEN) 2.5 MG tablet 60 tablet 5     Sig: Take 2 tablets by mouth 2 times daily            Camille Ken MA

## 2024-03-26 ENCOUNTER — PATIENT MESSAGE (OUTPATIENT)
Dept: PRIMARY CARE CLINIC | Age: 49
End: 2024-03-26

## 2024-03-26 DIAGNOSIS — F41.1 GENERALIZED ANXIETY DISORDER: ICD-10-CM

## 2024-03-26 DIAGNOSIS — N40.1 BENIGN NON-NODULAR PROSTATIC HYPERPLASIA WITH LOWER URINARY TRACT SYMPTOMS: ICD-10-CM

## 2024-03-26 RX ORDER — ALPRAZOLAM 1 MG/1
1 TABLET ORAL 3 TIMES DAILY PRN
Qty: 90 TABLET | Refills: 2 | Status: SHIPPED | OUTPATIENT
Start: 2024-03-26 | End: 2024-06-24

## 2024-03-26 RX ORDER — TAMSULOSIN HYDROCHLORIDE 0.4 MG/1
CAPSULE ORAL DAILY
Qty: 90 CAPSULE | Refills: 1 | OUTPATIENT
Start: 2024-03-26

## 2024-03-26 NOTE — TELEPHONE ENCOUNTER
Boni Miles called to request a refill on his medication.      Last office visit : 2/19/2024   Next office visit : 5/24/2024     Last UDS:   Amphetamines   Date Value Ref Range Status   09/20/2013 NEGATIVE  Final     Amphetamine Screen, Urine   Date Value Ref Range Status   08/03/2022 Negative Negative <500 ng/mL Final     Benzodiazepines   Date Value Ref Range Status   09/20/2013 NEGATIVE  Final     Benzodiazepine Screen, Urine   Date Value Ref Range Status   08/03/2022 Negative Negative <150 ng/mL Final     Buprenorphine Urine   Date Value Ref Range Status   08/03/2022 Negative Negative <5 ng/mL Final     Cocaine Metabolite Screen, Urine   Date Value Ref Range Status   08/03/2022 Negative Negative <150 ng/mL Final     Methamphetamine, Urine   Date Value Ref Range Status   08/03/2022 Negative Negative <500 ng/mL Final     Opiate Scrn, Ur   Date Value Ref Range Status   08/03/2022 Negative Negative < 300 ng/mL Final     PCP Screen, Urine   Date Value Ref Range Status   08/03/2022 Negative Negative <25 ng/mL Final       Last [place]: today  Medication Contract: next radha   Last Fill: 10/17/23    Requested Prescriptions      No prescriptions requested or ordered in this encounter         Please approve or refuse this medication.   Sharon Dumont LPN

## 2024-03-26 NOTE — TELEPHONE ENCOUNTER
From: Boni Miles  To: Dr. Andreas Manzo  Sent: 3/26/2024 11:01 AM CDT  Subject: Medication refill    I am needing a refill of Carvedilol 12.5 mg and Alprazolam 1 mg please. I was not able to request those from the medication list. [facility] pharmacy is fine. Thank you so much.

## 2024-04-08 ENCOUNTER — TELEPHONE (OUTPATIENT)
Dept: HEMATOLOGY | Age: 49
End: 2024-04-08

## 2024-04-08 NOTE — TELEPHONE ENCOUNTER
Left detailed vm of patient's appt date and time on 04/10/24. I also left call back number in case patient needed to call and reschedule.

## 2024-04-10 ENCOUNTER — OFFICE VISIT (OUTPATIENT)
Dept: HEMATOLOGY | Age: 49
End: 2024-04-10
Payer: MEDICARE

## 2024-04-10 ENCOUNTER — HOSPITAL ENCOUNTER (OUTPATIENT)
Dept: INFUSION THERAPY | Age: 49
Discharge: HOME | End: 2024-04-10
Payer: MEDICARE

## 2024-04-10 VITALS
HEIGHT: 72 IN | OXYGEN SATURATION: 99 % | BODY MASS INDEX: 24.08 KG/M2 | SYSTOLIC BLOOD PRESSURE: 140 MMHG | DIASTOLIC BLOOD PRESSURE: 90 MMHG | WEIGHT: 177.8 LBS | TEMPERATURE: 98 F | HEART RATE: 99 BPM

## 2024-04-10 DIAGNOSIS — D69.6 THROMBOCYTOPENIA (HCC): Primary | ICD-10-CM

## 2024-04-10 DIAGNOSIS — N18.6 ESRD (END STAGE RENAL DISEASE) ON DIALYSIS (HCC): ICD-10-CM

## 2024-04-10 DIAGNOSIS — D69.6 THROMBOCYTOPENIA (HCC): ICD-10-CM

## 2024-04-10 DIAGNOSIS — Z71.89 CARE PLAN DISCUSSED WITH PATIENT: ICD-10-CM

## 2024-04-10 DIAGNOSIS — Z99.2 ESRD (END STAGE RENAL DISEASE) ON DIALYSIS (HCC): ICD-10-CM

## 2024-04-10 DIAGNOSIS — E03.9 ACQUIRED HYPOTHYROIDISM: ICD-10-CM

## 2024-04-10 DIAGNOSIS — D61.818 PANCYTOPENIA (HCC): Primary | ICD-10-CM

## 2024-04-10 PROCEDURE — G8427 DOCREV CUR MEDS BY ELIG CLIN: HCPCS | Performed by: NURSE PRACTITIONER

## 2024-04-10 PROCEDURE — 3077F SYST BP >= 140 MM HG: CPT | Performed by: NURSE PRACTITIONER

## 2024-04-10 PROCEDURE — 99212 OFFICE O/P EST SF 10 MIN: CPT

## 2024-04-10 PROCEDURE — 99204 OFFICE O/P NEW MOD 45 MIN: CPT | Performed by: NURSE PRACTITIONER

## 2024-04-10 PROCEDURE — G8420 CALC BMI NORM PARAMETERS: HCPCS | Performed by: NURSE PRACTITIONER

## 2024-04-10 PROCEDURE — 3080F DIAST BP >= 90 MM HG: CPT | Performed by: NURSE PRACTITIONER

## 2024-04-10 PROCEDURE — 1036F TOBACCO NON-USER: CPT | Performed by: NURSE PRACTITIONER

## 2024-04-10 ASSESSMENT — ENCOUNTER SYMPTOMS
TROUBLE SWALLOWING: 0
ABDOMINAL PAIN: 0
CONSTIPATION: 0
SORE THROAT: 0
EYE DISCHARGE: 0
WHEEZING: 0
DIARRHEA: 0
VOMITING: 0
EYE ITCHING: 0
NAUSEA: 0
SHORTNESS OF BREATH: 0
COUGH: 0

## 2024-04-11 DIAGNOSIS — D69.6 THROMBOCYTOPENIA (HCC): ICD-10-CM

## 2024-04-11 DIAGNOSIS — R53.83 OTHER FATIGUE: ICD-10-CM

## 2024-04-11 DIAGNOSIS — D61.818 PANCYTOPENIA (HCC): ICD-10-CM

## 2024-04-11 LAB
ALBUMIN SERPL-MCNC: 3.4 G/DL (ref 3.5–5.2)
ALP SERPL-CCNC: 114 U/L (ref 40–130)
ALT SERPL-CCNC: 5 U/L (ref 5–41)
ANION GAP SERPL CALCULATED.3IONS-SCNC: 17 MMOL/L (ref 7–19)
AST SERPL-CCNC: 10 U/L (ref 5–40)
BASOPHILS # BLD: 0.1 K/UL (ref 0–0.2)
BASOPHILS NFR BLD: 1 % (ref 0–1)
BILIRUB SERPL-MCNC: 0.3 MG/DL (ref 0.2–1.2)
BUN SERPL-MCNC: 37 MG/DL (ref 6–20)
CALCIUM SERPL-MCNC: 8.9 MG/DL (ref 8.6–10)
CHLORIDE SERPL-SCNC: 95 MMOL/L (ref 98–111)
CO2 SERPL-SCNC: 24 MMOL/L (ref 22–29)
CREAT SERPL-MCNC: 8.6 MG/DL (ref 0.5–1.2)
EOSINOPHIL # BLD: 0.2 K/UL (ref 0–0.6)
EOSINOPHIL NFR BLD: 3.4 % (ref 0–5)
ERYTHROCYTE [DISTWIDTH] IN BLOOD BY AUTOMATED COUNT: 14.7 % (ref 11.5–14.5)
GLUCOSE SERPL-MCNC: 95 MG/DL (ref 74–109)
HCT VFR BLD AUTO: 29.7 % (ref 42–52)
HGB BLD-MCNC: 10.1 G/DL (ref 14–18)
IMM GRANULOCYTES # BLD: 0 K/UL
LYMPHOCYTES # BLD: 0.7 K/UL (ref 1.1–4.5)
LYMPHOCYTES NFR BLD: 10.5 % (ref 20–40)
MCH RBC QN AUTO: 32.8 PG (ref 27–31)
MCHC RBC AUTO-ENTMCNC: 34 G/DL (ref 33–37)
MCV RBC AUTO: 96.4 FL (ref 80–94)
MONOCYTES # BLD: 0.4 K/UL (ref 0–0.9)
MONOCYTES NFR BLD: 5.6 % (ref 0–10)
NEUTROPHILS # BLD: 4.9 K/UL (ref 1.5–7.5)
NEUTS SEG NFR BLD: 79 % (ref 50–65)
PLATELET # BLD AUTO: 70 K/UL (ref 130–400)
PMV BLD AUTO: 10.3 FL (ref 9.4–12.4)
POTASSIUM SERPL-SCNC: 3.9 MMOL/L (ref 3.5–5)
PROT SERPL-MCNC: 6.3 G/DL (ref 6.6–8.7)
RBC # BLD AUTO: 3.08 M/UL (ref 4.7–6.1)
SODIUM SERPL-SCNC: 136 MMOL/L (ref 136–145)
T4 FREE SERPL-MCNC: 0.99 NG/DL (ref 0.93–1.7)
TSH SERPL DL<=0.005 MIU/L-ACNC: 7.74 UIU/ML (ref 0.27–4.2)
WBC # BLD AUTO: 6.2 K/UL (ref 4.8–10.8)

## 2024-04-14 LAB
ALBUMIN SERPL-MCNC: 3.04 G/DL (ref 3.75–5.01)
ALPHA1 GLOB SERPL ELPH-MCNC: 0.49 G/DL (ref 0.19–0.46)
ALPHA2 GLOB SERPL ELPH-MCNC: 0.64 G/DL (ref 0.48–1.05)
B-GLOBULIN SERPL ELPH-MCNC: 1.07 G/DL (ref 0.48–1.1)
DEPRECATED KAPPA LC FREE/LAMBDA SER: 0.97 {RATIO} (ref 0.26–1.65)
EER MONOCLONAL PROTEIN AND FLC, SERUM: ABNORMAL
GAMMA GLOB SERPL ELPH-MCNC: 0.75 G/DL (ref 0.62–1.51)
IGA SERPL-MCNC: 166 MG/DL (ref 68–408)
IGG SERPL-MCNC: 837 MG/DL (ref 768–1632)
IGM SERPL-MCNC: 78 MG/DL (ref 35–263)
INTERPRETATION SERPL IFE-IMP: ABNORMAL
INTERPRETATION SERPL IFE-IMP: ABNORMAL
KAPPA LC FREE SER-MCNC: 131.59 MG/L (ref 3.3–19.4)
LAMBDA LC FREE SERPL-MCNC: 135.59 MG/L (ref 5.71–26.3)
MONOCLONAL PROTEIN, SERUM: ABNORMAL G/DL
PROT SERPL-MCNC: 6 G/DL (ref 6.3–8.2)

## 2024-04-15 ENCOUNTER — TELEPHONE (OUTPATIENT)
Dept: HEMATOLOGY | Age: 49
End: 2024-04-15

## 2024-04-15 ENCOUNTER — OFFICE VISIT (OUTPATIENT)
Dept: NEUROLOGY | Age: 49
End: 2024-04-15
Payer: MEDICARE

## 2024-04-15 VITALS
HEIGHT: 72 IN | DIASTOLIC BLOOD PRESSURE: 110 MMHG | WEIGHT: 182 LBS | HEART RATE: 78 BPM | SYSTOLIC BLOOD PRESSURE: 167 MMHG | BODY MASS INDEX: 24.65 KG/M2

## 2024-04-15 DIAGNOSIS — G40.909 SEIZURE DISORDER (HCC): ICD-10-CM

## 2024-04-15 DIAGNOSIS — D69.6 THROMBOCYTOPENIA (HCC): Primary | ICD-10-CM

## 2024-04-15 DIAGNOSIS — Z09 HOSPITAL DISCHARGE FOLLOW-UP: Primary | ICD-10-CM

## 2024-04-15 DIAGNOSIS — Z87.448 HISTORY OF END STAGE RENAL DISEASE: ICD-10-CM

## 2024-04-15 DIAGNOSIS — Z86.79 HISTORY OF SUBDURAL HEMATOMA: ICD-10-CM

## 2024-04-15 PROCEDURE — 1111F DSCHRG MED/CURRENT MED MERGE: CPT | Performed by: PSYCHIATRY & NEUROLOGY

## 2024-04-15 PROCEDURE — 99214 OFFICE O/P EST MOD 30 MIN: CPT | Performed by: PSYCHIATRY & NEUROLOGY

## 2024-04-15 PROCEDURE — 3080F DIAST BP >= 90 MM HG: CPT | Performed by: PSYCHIATRY & NEUROLOGY

## 2024-04-15 PROCEDURE — G8420 CALC BMI NORM PARAMETERS: HCPCS | Performed by: PSYCHIATRY & NEUROLOGY

## 2024-04-15 PROCEDURE — 1036F TOBACCO NON-USER: CPT | Performed by: PSYCHIATRY & NEUROLOGY

## 2024-04-15 PROCEDURE — 3077F SYST BP >= 140 MM HG: CPT | Performed by: PSYCHIATRY & NEUROLOGY

## 2024-04-15 PROCEDURE — G8427 DOCREV CUR MEDS BY ELIG CLIN: HCPCS | Performed by: PSYCHIATRY & NEUROLOGY

## 2024-04-15 RX ORDER — ONDANSETRON 4 MG/1
1 TABLET, FILM COATED ORAL
COMMUNITY
Start: 2022-02-18

## 2024-04-15 RX ORDER — ERGOCALCIFEROL 1.25 MG/1
CAPSULE ORAL
COMMUNITY
Start: 2024-03-12

## 2024-04-15 NOTE — TELEPHONE ENCOUNTER
Recommend ultrasound spleen to evaluate spleen size and volume regarding thrombocytopenia.  PT answered phone but then we were disconnected.  Attempted a second call and went to voicemail.

## 2024-04-15 NOTE — PROGRESS NOTES
REVIEW OF SYSTEMS    Constitutional: []Fever []Sweat []Chills [] Recent Injury [x] Denies all unless marked  HEENT:[]Headache  [] Head Injury/Hearing Loss  [] Sore Throat  [] Ear Ache/Dizziness  [x] Denies all unless marked  Spine:  [] Neck pain  [] Back pain  [] Sciaticia  [x] Denies all unless marked  Cardiovascular:[]Heart Disease []Chest Pain [] Palpitations  [x] Denies all unless marked  Pulmonary: []Shortness of Breath []Cough   [x] Denies all unless marke  Gastrointestinal: []Nausea  []Vomiting  []Abdominal Pain  []Constipation  []Diarrhea  []Dark Bloody Stools  [x] Denies all unless marked  Psychiatric/Behavioral:[] Depression [] Anxiety [x] Denies all unless marked  Genitourinary:   [] Frequency  [] Urgency  [] Incontinence [] Pain with Urination  [x] Denies all unless marked  Extremities: []Pain  []Swelling  [x] Denies all unless marked  Musculoskeletal: [] Muscle Pain  [] Joint Pain  [] Arthritis [] Muscle Cramps [] Muscle Twitches  [x] Denies all unless marked  Sleep: [] Insomnia [] Snoring [] Restless Legs [] Sleep Apnea  [] Daytime Sleepiness  [x] Denies all unless marked  Skin:[] Rash [] Skin Discoloration [x] Denies all unless marked   Neurological: [x]Visual Disturbance/Memory Loss [] Loss of Balance [] Slurred Speech/Weakness [] Seizures  [] Vertigo/Dizziness [x] Denies all unless marked       
Never true     Ran Out of Food in the Last Year: Never true   Transportation Needs: No Transportation Needs (1/8/2024)    PRAPARE - Transportation     Lack of Transportation (Medical): No     Lack of Transportation (Non-Medical): No   Physical Activity: Not on file   Stress: Not on file   Social Connections: Not on file   Intimate Partner Violence: Not on file   Housing Stability: Low Risk  (1/8/2024)    Housing Stability Vital Sign     Unable to Pay for Housing in the Last Year: No     Number of Places Lived in the Last Year: 1     Unstable Housing in the Last Year: No   Recent Concern: Housing Stability - High Risk (12/21/2023)    Housing Stability Vital Sign     Unable to Pay for Housing in the Last Year: No     Number of Places Lived in the Last Year: 1     Unstable Housing in the Last Year: Yes       Review of Systems    Constitutional - No fever or chills.  No diaphoresis or significant fatigue.  HENT -  No tinnitus or significant hearing loss.  Eyes - no sudden vision change or eye pain  Respiratory - no significant shortness of breath or cough  Constitutional: []Fever []Sweat []Chills [] Recent Injury [x] Denies all unless marked  HEENT:[]Headache  [] Head Injury/Hearing Loss  [] Sore Throat  [] Ear Ache/Dizziness  [x] Denies all unless marked  Spine:  [] Neck pain  [] Back pain  [] Sciaticia  [x] Denies all unless marked  Cardiovascular:[]Heart Disease []Chest Pain [] Palpitations  [x] Denies all unless marked  Pulmonary: []Shortness of Breath []Cough   [x] Denies all unless marke  Gastrointestinal: []Nausea  []Vomiting  []Abdominal Pain  []Constipation  []Diarrhea  []Dark Bloody Stools  [x] Denies all unless marked  Psychiatric/Behavioral:[] Depression [] Anxiety [x] Denies all unless marked  Genitourinary:   [] Frequency  [] Urgency  [] Incontinence [] Pain with Urination  [x] Denies all unless marked  Extremities: []Pain  []Swelling  [x] Denies all unless marked  Musculoskeletal: [] Muscle Pain  [] Joint

## 2024-04-19 ENCOUNTER — HOSPITAL ENCOUNTER (OUTPATIENT)
Dept: ULTRASOUND IMAGING | Age: 49
Discharge: HOME | End: 2024-04-19
Payer: MEDICARE

## 2024-04-19 DIAGNOSIS — D69.6 THROMBOCYTOPENIA (HCC): ICD-10-CM

## 2024-04-19 PROCEDURE — 76705 ECHO EXAM OF ABDOMEN: CPT

## 2024-04-25 DIAGNOSIS — F41.1 GENERALIZED ANXIETY DISORDER: ICD-10-CM

## 2024-04-25 RX ORDER — ALPRAZOLAM 1 MG/1
TABLET ORAL
Qty: 270 TABLET | OUTPATIENT
Start: 2024-04-25

## 2024-04-25 RX ORDER — MINOXIDIL 2.5 MG/1
5 TABLET ORAL 2 TIMES DAILY
Qty: 360 TABLET | OUTPATIENT
Start: 2024-04-25

## 2024-04-29 ENCOUNTER — TELEPHONE (OUTPATIENT)
Dept: HEMATOLOGY | Age: 49
End: 2024-04-29

## 2024-05-07 DIAGNOSIS — R16.1 SPLENOMEGALY: Primary | ICD-10-CM

## 2024-05-15 ENCOUNTER — HOSPITAL ENCOUNTER (OUTPATIENT)
Dept: INFUSION THERAPY | Age: 49
Discharge: HOME | End: 2024-05-15
Payer: MEDICARE

## 2024-05-15 DIAGNOSIS — R16.1 SPLENOMEGALY: ICD-10-CM

## 2024-05-15 DIAGNOSIS — D69.6 THROMBOCYTOPENIA (HCC): ICD-10-CM

## 2024-05-15 LAB
BASOPHILS # BLD: 0.06 K/UL (ref 0.01–0.08)
BASOPHILS NFR BLD: 1.1 % (ref 0.1–1.2)
EOSINOPHIL # BLD: 0.19 K/UL (ref 0.04–0.54)
EOSINOPHIL NFR BLD: 3.4 % (ref 0.7–7)
ERYTHROCYTE [DISTWIDTH] IN BLOOD BY AUTOMATED COUNT: 15 % (ref 11.6–14.4)
HCT VFR BLD AUTO: 30.3 % (ref 40.1–51)
HGB BLD-MCNC: 10.1 G/DL (ref 13.7–17.5)
LYMPHOCYTES # BLD: 0.52 K/UL (ref 1.18–3.74)
LYMPHOCYTES NFR BLD: 9.2 % (ref 19.3–53.1)
MCH RBC QN AUTO: 31.3 PG (ref 25.7–32.2)
MCHC RBC AUTO-ENTMCNC: 33.3 G/DL (ref 32.3–36.5)
MCV RBC AUTO: 93.8 FL (ref 79–92.2)
MONOCYTES # BLD: 0.33 K/UL (ref 0.24–0.82)
MONOCYTES NFR BLD: 5.8 % (ref 4.7–12.5)
NEUTROPHILS # BLD: 4.53 K/UL (ref 1.56–6.13)
NEUTS SEG NFR BLD: 80 % (ref 34–71.1)
PLATELET # BLD AUTO: 73 K/UL (ref 163–337)
PMV BLD AUTO: 10.6 FL (ref 7.4–10.4)
RBC # BLD AUTO: 3.23 M/UL (ref 4.63–6.08)
WBC # BLD AUTO: 5.66 K/UL (ref 4.23–9.07)

## 2024-05-15 PROCEDURE — 36415 COLL VENOUS BLD VENIPUNCTURE: CPT

## 2024-05-15 PROCEDURE — 85025 COMPLETE CBC W/AUTO DIFF WBC: CPT

## 2024-05-18 LAB
COPPER SERPL-MCNC: 99.7 UG/DL (ref 70–140)
ZINC SERPL-MCNC: 42.8 UG/DL (ref 60–120)

## 2024-05-22 ENCOUNTER — TELEPHONE (OUTPATIENT)
Dept: HEMATOLOGY | Age: 49
End: 2024-05-22

## 2024-05-22 PROBLEM — R09.02 HYPOXIA: Status: RESOLVED | Noted: 2022-08-03 | Resolved: 2024-05-22

## 2024-05-22 PROBLEM — S06.5XAA SDH (SUBDURAL HEMATOMA) (HCC): Status: RESOLVED | Noted: 2024-01-06 | Resolved: 2024-05-22

## 2024-05-22 PROBLEM — N49.2 SCROTAL ABSCESS: Status: RESOLVED | Noted: 2023-10-03 | Resolved: 2024-05-22

## 2024-05-22 NOTE — TELEPHONE ENCOUNTER
Called pt. to remind them of appointment on 5/29/2024 and had to leave a detailed voicemail with appointment date and time.

## 2024-05-24 LAB
CALR EXON 9 MUT ANL BLD/T: NORMAL
CITATION REF LAB TEST: NORMAL
JAK2 GENE MUT ANL BLD/T: NORMAL
JAK2 P.V617F BLD/T QL: NORMAL
LAB DIRECTOR NAME PROVIDER: NORMAL
MPL GENE MUT TESTED MAR: NORMAL
REF LAB TEST METHOD: NORMAL
REFLEX: NORMAL
TEST PERFORMANCE INFO SPEC: NORMAL

## 2024-05-28 NOTE — PROGRESS NOTES
Progress Note      Pt Name: Boni Miles  YOB: 1975  MRN: [id number]    Date of evaluation: 5/29/24   History Obtained From:  Patient, EMR    Portions of this note have been copied forward, however, changed to reflect the most current clinical status of this patient.    Chief Complaint   Patient presents with    Follow-up     Pancytopenia (HCC)     INTERVAL HISTORY/HISTORY OF PRESENT ILLNESS:    Adrien Miles is a 48-year-old  gentleman with a history of pancytopenia.  Anemia is felt multifactorial including history of mild, chronic bleeding via colostomy (history of colon cancer s/p colectomy) and ESRD, continuing dialysis Tuesdays, Thursdays and Saturdays. Adrien denies further seizure activity status post middle meningeal artery embolization left-sided bur holes and evacuation of left chronic subdural hematoma.     CBC 5/29/2024: WBC 5.67, Hgb 10.2, MCV 95.7.  Platelets 87,000    HEMATOLOGY HISTORY: Pancytopenia, thrombocytopenia  Adrien Miles was first seen by Dr. Sullivan on 1/9/2024 during patient's inpatient stay at the rehab center at [facility]. Hematology was consulted for findings of thrombocytopenia and pancytopenia.  The patient has a history of hypertension, resected, stage IIIb colorectal carcinoma in 2008 and end-stage renal disease on hemodialysis.    Patient presented to [facility] 12/17/2023 with altered mental status and right-sided weakness.  He had a witnessed seizure in the emergency department.  He was found to have bilateral occipital subdural hematoma.   He was ultimately transferred to [facility] 12/24/2023, taken to the OR 12/24/2023 for middle meningeal artery embolization left-sided bur holes and evacuation of left chronic subdural hematoma. He was transferred back to [facility] for rehabilitation.  Patient has a past medical history of colon cancer status post colectomy/colostomy placement.  Laboratory workup here revealed elevated creatinine,

## 2024-05-29 ENCOUNTER — HOSPITAL ENCOUNTER (OUTPATIENT)
Dept: INFUSION THERAPY | Age: 49
Discharge: HOME | End: 2024-05-29
Payer: MEDICARE

## 2024-05-29 ENCOUNTER — OFFICE VISIT (OUTPATIENT)
Dept: HEMATOLOGY | Age: 49
End: 2024-05-29
Payer: MEDICARE

## 2024-05-29 VITALS
BODY MASS INDEX: 23.57 KG/M2 | OXYGEN SATURATION: 99 % | HEART RATE: 87 BPM | SYSTOLIC BLOOD PRESSURE: 166 MMHG | DIASTOLIC BLOOD PRESSURE: 100 MMHG | TEMPERATURE: 98.2 F | WEIGHT: 174 LBS | HEIGHT: 72 IN

## 2024-05-29 DIAGNOSIS — D61.818 PANCYTOPENIA (HCC): ICD-10-CM

## 2024-05-29 DIAGNOSIS — E03.9 ACQUIRED HYPOTHYROIDISM: ICD-10-CM

## 2024-05-29 DIAGNOSIS — Z99.2 ESRD (END STAGE RENAL DISEASE) ON DIALYSIS (HCC): ICD-10-CM

## 2024-05-29 DIAGNOSIS — D69.6 THROMBOCYTOPENIA (HCC): ICD-10-CM

## 2024-05-29 DIAGNOSIS — N18.6 ESRD (END STAGE RENAL DISEASE) ON DIALYSIS (HCC): ICD-10-CM

## 2024-05-29 DIAGNOSIS — D69.6 THROMBOCYTOPENIA (HCC): Primary | ICD-10-CM

## 2024-05-29 DIAGNOSIS — Z71.89 CARE PLAN DISCUSSED WITH PATIENT: ICD-10-CM

## 2024-05-29 LAB
BASOPHILS # BLD: 0.07 K/UL (ref 0.01–0.08)
BASOPHILS NFR BLD: 1.2 % (ref 0.1–1.2)
EOSINOPHIL # BLD: 0.28 K/UL (ref 0.04–0.54)
EOSINOPHIL NFR BLD: 4.9 % (ref 0.7–7)
ERYTHROCYTE [DISTWIDTH] IN BLOOD BY AUTOMATED COUNT: 16.3 % (ref 11.6–14.4)
HCT VFR BLD AUTO: 31 % (ref 40.1–51)
HGB BLD-MCNC: 10.2 G/DL (ref 13.7–17.5)
LYMPHOCYTES # BLD: 1.01 K/UL (ref 1.18–3.74)
LYMPHOCYTES NFR BLD: 17.8 % (ref 19.3–53.1)
MCH RBC QN AUTO: 31.5 PG (ref 25.7–32.2)
MCHC RBC AUTO-ENTMCNC: 32.9 G/DL (ref 32.3–36.5)
MCV RBC AUTO: 95.7 FL (ref 79–92.2)
MONOCYTES # BLD: 0.32 K/UL (ref 0.24–0.82)
MONOCYTES NFR BLD: 5.6 % (ref 4.7–12.5)
NEUTROPHILS # BLD: 3.95 K/UL (ref 1.56–6.13)
NEUTS SEG NFR BLD: 69.8 % (ref 34–71.1)
PLATELET # BLD AUTO: 87 K/UL (ref 163–337)
PMV BLD AUTO: 9.8 FL (ref 7.4–10.4)
RBC # BLD AUTO: 3.24 M/UL (ref 4.63–6.08)
WBC # BLD AUTO: 5.67 K/UL (ref 4.23–9.07)

## 2024-05-29 PROCEDURE — 99214 OFFICE O/P EST MOD 30 MIN: CPT | Performed by: NURSE PRACTITIONER

## 2024-05-29 PROCEDURE — 3077F SYST BP >= 140 MM HG: CPT | Performed by: NURSE PRACTITIONER

## 2024-05-29 PROCEDURE — 99212 OFFICE O/P EST SF 10 MIN: CPT

## 2024-05-29 PROCEDURE — G8427 DOCREV CUR MEDS BY ELIG CLIN: HCPCS | Performed by: NURSE PRACTITIONER

## 2024-05-29 PROCEDURE — 85025 COMPLETE CBC W/AUTO DIFF WBC: CPT

## 2024-05-29 PROCEDURE — 36415 COLL VENOUS BLD VENIPUNCTURE: CPT

## 2024-05-29 PROCEDURE — 3080F DIAST BP >= 90 MM HG: CPT | Performed by: NURSE PRACTITIONER

## 2024-05-29 PROCEDURE — G8420 CALC BMI NORM PARAMETERS: HCPCS | Performed by: NURSE PRACTITIONER

## 2024-05-29 PROCEDURE — 1036F TOBACCO NON-USER: CPT | Performed by: NURSE PRACTITIONER

## 2024-05-30 ENCOUNTER — OFFICE VISIT (OUTPATIENT)
Dept: PRIMARY CARE CLINIC | Age: 49
End: 2024-05-30
Payer: MEDICARE

## 2024-05-30 VITALS
BODY MASS INDEX: 24.11 KG/M2 | HEIGHT: 72 IN | HEART RATE: 79 BPM | WEIGHT: 178 LBS | DIASTOLIC BLOOD PRESSURE: 94 MMHG | SYSTOLIC BLOOD PRESSURE: 172 MMHG | OXYGEN SATURATION: 96 % | TEMPERATURE: 96.4 F

## 2024-05-30 DIAGNOSIS — F41.1 GENERALIZED ANXIETY DISORDER: ICD-10-CM

## 2024-05-30 DIAGNOSIS — N40.1 BENIGN NON-NODULAR PROSTATIC HYPERPLASIA WITH LOWER URINARY TRACT SYMPTOMS: ICD-10-CM

## 2024-05-30 DIAGNOSIS — R16.0 LIVER MASS: ICD-10-CM

## 2024-05-30 DIAGNOSIS — F41.9 ANXIETY: ICD-10-CM

## 2024-05-30 DIAGNOSIS — E03.8 SUBCLINICAL HYPOTHYROIDISM: ICD-10-CM

## 2024-05-30 DIAGNOSIS — R53.83 OTHER FATIGUE: ICD-10-CM

## 2024-05-30 DIAGNOSIS — K21.9 GASTROESOPHAGEAL REFLUX DISEASE WITHOUT ESOPHAGITIS: ICD-10-CM

## 2024-05-30 DIAGNOSIS — I10 ESSENTIAL (PRIMARY) HYPERTENSION: ICD-10-CM

## 2024-05-30 DIAGNOSIS — Z00.00 ANNUAL PHYSICAL EXAM: Primary | ICD-10-CM

## 2024-05-30 PROCEDURE — G8428 CUR MEDS NOT DOCUMENT: HCPCS | Performed by: FAMILY MEDICINE

## 2024-05-30 PROCEDURE — G0439 PPPS, SUBSEQ VISIT: HCPCS | Performed by: FAMILY MEDICINE

## 2024-05-30 PROCEDURE — G8420 CALC BMI NORM PARAMETERS: HCPCS | Performed by: FAMILY MEDICINE

## 2024-05-30 PROCEDURE — 3077F SYST BP >= 140 MM HG: CPT | Performed by: FAMILY MEDICINE

## 2024-05-30 PROCEDURE — 99214 OFFICE O/P EST MOD 30 MIN: CPT | Performed by: FAMILY MEDICINE

## 2024-05-30 PROCEDURE — 1036F TOBACCO NON-USER: CPT | Performed by: FAMILY MEDICINE

## 2024-05-30 PROCEDURE — 3080F DIAST BP >= 90 MM HG: CPT | Performed by: FAMILY MEDICINE

## 2024-05-30 RX ORDER — ALPRAZOLAM 1 MG/1
1 TABLET ORAL 3 TIMES DAILY PRN
Qty: 90 TABLET | Refills: 2 | Status: SHIPPED | OUTPATIENT
Start: 2024-05-30 | End: 2024-08-28

## 2024-05-30 RX ORDER — TAMSULOSIN HYDROCHLORIDE 0.4 MG/1
0.4 CAPSULE ORAL DAILY
Qty: 90 CAPSULE | Refills: 3 | Status: SHIPPED | OUTPATIENT
Start: 2024-05-30

## 2024-05-30 RX ORDER — CARVEDILOL 12.5 MG/1
25 TABLET ORAL 2 TIMES DAILY WITH MEALS
Qty: 120 TABLET | Refills: 5 | Status: SHIPPED | OUTPATIENT
Start: 2024-05-30

## 2024-05-30 RX ORDER — OMEPRAZOLE 20 MG/1
20 CAPSULE, DELAYED RELEASE ORAL DAILY
Qty: 90 CAPSULE | Refills: 3 | Status: SHIPPED | OUTPATIENT
Start: 2024-05-30

## 2024-05-30 ASSESSMENT — ENCOUNTER SYMPTOMS
ABDOMINAL PAIN: 0
ANAL BLEEDING: 0
COUGH: 0
CHEST TIGHTNESS: 0
CONSTIPATION: 0
DIARRHEA: 0
NAUSEA: 0
SHORTNESS OF BREATH: 0

## 2024-05-30 ASSESSMENT — PATIENT HEALTH QUESTIONNAIRE - PHQ9
SUM OF ALL RESPONSES TO PHQ9 QUESTIONS 1 & 2: 1
SUM OF ALL RESPONSES TO PHQ QUESTIONS 1-9: 1
SUM OF ALL RESPONSES TO PHQ QUESTIONS 1-9: 1
2. FEELING DOWN, DEPRESSED OR HOPELESS: NOT AT ALL
SUM OF ALL RESPONSES TO PHQ QUESTIONS 1-9: 1
SUM OF ALL RESPONSES TO PHQ QUESTIONS 1-9: 1
1. LITTLE INTEREST OR PLEASURE IN DOING THINGS: SEVERAL DAYS

## 2024-05-30 ASSESSMENT — LIFESTYLE VARIABLES
HOW MANY STANDARD DRINKS CONTAINING ALCOHOL DO YOU HAVE ON A TYPICAL DAY: PATIENT DOES NOT DRINK
HOW OFTEN DO YOU HAVE A DRINK CONTAINING ALCOHOL: NEVER

## 2024-05-30 NOTE — PATIENT INSTRUCTIONS
High blood pressure:   BP Readings from Last 3 Encounters:   05/30/24 (!) 172/94   05/29/24 (!) 166/100   04/15/24 (!) 167/110   Check your home medications with the medication list provided.       Anxiety  Continue with xanax three times a day  Start sertraline 50 mg once a day

## 2024-05-31 LAB
A2 MACROGLOB SERPL-MCNC: 163 MG/DL (ref 110–276)
ALT SERPL W P-5'-P-CCNC: 7 IU/L (ref 0–55)
APO A-I SERPL-MCNC: 86 MG/DL (ref 101–178)
BILIRUB SERPL-MCNC: 0.3 MG/DL (ref 0–1.2)
COMMENT: ABNORMAL
FIBROSIS SCORE: ABNORMAL
FIBROSIS STAGE: ABNORMAL
GGT SERPL-CCNC: 20 IU/L (ref 0–65)
HAPTOGLOB SERPL-MCNC: 72 MG/DL (ref 23–355)
INTERPRETATIONS:: ABNORMAL
LIMITATIONS:: ABNORMAL
NECROINFLAMM ACTIVITY SCORING:: ABNORMAL
NECROINFLAMMAT ACTIVITY GRADE: ABNORMAL
NECROINFLAMMAT ACTIVITY SCORE: 0.01 (ref 0–0.17)

## 2024-06-10 ENCOUNTER — TELEPHONE (OUTPATIENT)
Dept: HEMATOLOGY | Age: 49
End: 2024-06-10

## 2024-06-14 RX ORDER — LEVETIRACETAM 750 MG/1
750 TABLET ORAL DAILY
Qty: 90 TABLET | Refills: 5 | Status: SHIPPED | OUTPATIENT
Start: 2024-06-14

## 2024-06-14 NOTE — TELEPHONE ENCOUNTER
Requested Prescriptions     Pending Prescriptions Disp Refills    levETIRAcetam (KEPPRA) 750 MG tablet [Pharmacy Med Name: LEVETIRACETAM 750MG TABLETS] 90 tablet      Sig: TAKE 1 TABLET BY MOUTH EVERY DAY       Last Office Visit: 4/15/2024  Next Office Visit: F/U PRN  Last Medication Refill: 1/11/24 with 3 refills       Patient was seen in the hospital by Dr. Gutierres, then had hospital follow up in office 4/15/24. He was recommended tos karolyn on this medication for life and told to f/u as PRN.

## 2024-06-18 DIAGNOSIS — F41.1 GENERALIZED ANXIETY DISORDER: ICD-10-CM

## 2024-06-18 RX ORDER — ALPRAZOLAM 1 MG/1
1 TABLET ORAL 3 TIMES DAILY PRN
Qty: 90 TABLET | Refills: 2 | OUTPATIENT
Start: 2024-06-18 | End: 2024-09-16

## 2024-06-19 DIAGNOSIS — F41.1 GENERALIZED ANXIETY DISORDER: ICD-10-CM

## 2024-06-19 RX ORDER — ALPRAZOLAM 1 MG/1
1 TABLET ORAL 3 TIMES DAILY PRN
Qty: 90 TABLET | Refills: 2 | OUTPATIENT
Start: 2024-06-19 | End: 2024-09-17

## 2024-07-16 DIAGNOSIS — E03.9 ACQUIRED HYPOTHYROIDISM: ICD-10-CM

## 2024-07-16 DIAGNOSIS — D69.6 THROMBOCYTOPENIA (HCC): Primary | ICD-10-CM

## 2024-07-16 DIAGNOSIS — E60 ZINC DEFICIENCY: ICD-10-CM

## 2024-07-16 DIAGNOSIS — E53.8 LOW FOLATE: ICD-10-CM

## 2024-08-26 ENCOUNTER — TELEPHONE (OUTPATIENT)
Dept: HEMATOLOGY | Age: 49
End: 2024-08-26

## 2024-08-26 NOTE — TELEPHONE ENCOUNTER
Called pt. to remind them of appointment on 08/28/2024 and had to leave a detailed voicemail with appointment date and time. Reminded patient to just come at appointment time, and to not come at the lab appointment time. Reminded patient that we will not check them in any more than 30 minutes before appointment time. We have now moved to the [facility] cancer [place] that is located between our old office and the ER at the [facility]

## 2024-08-27 DIAGNOSIS — D61.818 PANCYTOPENIA (HCC): Primary | ICD-10-CM

## 2024-08-27 DIAGNOSIS — E03.8 SUBCLINICAL HYPOTHYROIDISM: ICD-10-CM

## 2024-08-27 DIAGNOSIS — D53.9 MACROCYTIC ANEMIA: ICD-10-CM

## 2024-08-27 DIAGNOSIS — D69.6 THROMBOCYTOPENIA (HCC): ICD-10-CM

## 2024-08-27 DIAGNOSIS — E03.9 ACQUIRED HYPOTHYROIDISM: ICD-10-CM

## 2024-08-27 DIAGNOSIS — E60 ZINC DEFICIENCY: ICD-10-CM

## 2024-08-27 DIAGNOSIS — R53.83 OTHER FATIGUE: ICD-10-CM

## 2024-08-27 DIAGNOSIS — E53.8 LOW FOLATE: ICD-10-CM

## 2024-08-27 LAB
BASOPHILS # BLD: 0.1 K/UL (ref 0–0.2)
BASOPHILS NFR BLD: 0.9 % (ref 0–1)
EOSINOPHIL # BLD: 0.4 K/UL (ref 0–0.6)
EOSINOPHIL NFR BLD: 5.2 % (ref 0–5)
ERYTHROCYTE [DISTWIDTH] IN BLOOD BY AUTOMATED COUNT: 15.6 % (ref 11.5–14.5)
FERRITIN SERPL-MCNC: 998.5 NG/ML (ref 30–400)
FOLATE SERPL-MCNC: 4.9 NG/ML (ref 4.5–32.2)
HCT VFR BLD AUTO: 23.6 % (ref 42–52)
HGB BLD-MCNC: 7.6 G/DL (ref 14–18)
IMM GRANULOCYTES # BLD: 0.1 K/UL
IRON SATN MFR SERPL: 15 % (ref 14–50)
IRON SERPL-MCNC: 26 UG/DL (ref 59–158)
LYMPHOCYTES # BLD: 0.8 K/UL (ref 1.1–4.5)
LYMPHOCYTES NFR BLD: 11.9 % (ref 20–40)
MCH RBC QN AUTO: 32.6 PG (ref 27–31)
MCHC RBC AUTO-ENTMCNC: 32.2 G/DL (ref 33–37)
MCV RBC AUTO: 101.3 FL (ref 80–94)
MONOCYTES # BLD: 0.5 K/UL (ref 0–0.9)
MONOCYTES NFR BLD: 7.8 % (ref 0–10)
NEUTROPHILS # BLD: 5 K/UL (ref 1.5–7.5)
NEUTS SEG NFR BLD: 73.3 % (ref 50–65)
PLATELET # BLD AUTO: 146 K/UL (ref 130–400)
PMV BLD AUTO: 9 FL (ref 9.4–12.4)
RBC # BLD AUTO: 2.33 M/UL (ref 4.7–6.1)
TIBC SERPL-MCNC: 177 UG/DL (ref 250–400)
TSH SERPL DL<=0.005 MIU/L-ACNC: 5.28 UIU/ML (ref 0.27–4.2)
VIT B12 SERPL-MCNC: 378 PG/ML (ref 232–1245)
WBC # BLD AUTO: 6.9 K/UL (ref 4.8–10.8)

## 2024-08-27 NOTE — PROGRESS NOTES
Progress Note      Pt Name: Boni Miles  YOB: 1975  MRN: [id number]    Date of evaluation: 11/8/24  History Obtained From:  Patient, spouse - XIMENA Palma    Portions of this note have been copied forward, however, changed to reflect the most current clinical status of this patient.    Chief Complaint   Patient presents with    Follow-up     Pancytopenia, worsening anemia       INTERVAL HISTORY/HISTORY OF PRESENT ILLNESS:    Adrien Miles is a chronically ill 49-year-old gentleman with a history of resected, stage IIIb colorectal carcinoma in 2008, treated with chemoradiation, colectomy with colostomy.  He notes bleeding from and around his stoma. He also has ureteral stents, end-stage renal disease on hemodialysis Tues, Thurs and Sat.  Adrien notes intermittent hematuria.  He has had no further seizure activity, status post middle meningeal artery embolization left-sided bur holes and evacuation of left chronic subdural hematoma 12/2023.    Adrien was initially referred for pancytopenia.  He missed follow-up due to illness.  Serologic evaluation was largely negative aside from occasional low iron, mildly low zinc level and TSH of 19 in January, 2024.  Splenomegaly noted on ultrasound on 4/23/2024, 15.1 x 6.2 x 11.7 cm with splenic volume of 577.1.    Anemia felt multifactorial in nature including ESRD, chronic disease, chronic blood loss.  Patient is treated with iron and ANI therapy per nephrology.   He is transfused PRBC as needed, primarily arranged by nephrology.  Pancytopenia felt related to splenomegaly and multifactorial anemia.    Regarding splenomegaly, JAK2 with reflexes and FLOW cytometry were negative.  PBS was unrevealing.    Patient was evaluated at [facility] ER 11/2/2024, transfused PRBC for Hgb 5.5.  Admission was recommended by ER and Dr. Sullivan for both anemia and CT findings noted below.  Patient opted to leave A following blood transfusion.    11/2/2024 CT Abdomen Pelvis w contrast at

## 2024-08-29 LAB — NUCLEAR IGG SER QL IA: NORMAL

## 2024-08-30 ENCOUNTER — OFFICE VISIT (OUTPATIENT)
Dept: PRIMARY CARE CLINIC | Age: 49
End: 2024-08-30

## 2024-08-30 VITALS
HEART RATE: 106 BPM | SYSTOLIC BLOOD PRESSURE: 124 MMHG | OXYGEN SATURATION: 98 % | WEIGHT: 193 LBS | DIASTOLIC BLOOD PRESSURE: 76 MMHG | BODY MASS INDEX: 26.18 KG/M2 | TEMPERATURE: 98.3 F

## 2024-08-30 DIAGNOSIS — R56.9 SEIZURES (HCC): ICD-10-CM

## 2024-08-30 DIAGNOSIS — N40.1 BENIGN NON-NODULAR PROSTATIC HYPERPLASIA WITH LOWER URINARY TRACT SYMPTOMS: ICD-10-CM

## 2024-08-30 DIAGNOSIS — F41.1 GENERALIZED ANXIETY DISORDER: Primary | ICD-10-CM

## 2024-08-30 DIAGNOSIS — K21.9 GASTROESOPHAGEAL REFLUX DISEASE WITHOUT ESOPHAGITIS: ICD-10-CM

## 2024-08-30 DIAGNOSIS — E03.8 SUBCLINICAL HYPOTHYROIDISM: ICD-10-CM

## 2024-08-30 DIAGNOSIS — I10 ESSENTIAL (PRIMARY) HYPERTENSION: ICD-10-CM

## 2024-08-30 RX ORDER — ALPRAZOLAM 1 MG
1 TABLET ORAL 3 TIMES DAILY PRN
Qty: 90 TABLET | Refills: 2 | Status: SHIPPED | OUTPATIENT
Start: 2024-08-30 | End: 2024-11-28

## 2024-08-30 RX ORDER — CLONIDINE HYDROCHLORIDE 0.2 MG/1
0.2 TABLET ORAL 3 TIMES DAILY PRN
Qty: 90 TABLET | Refills: 11 | Status: SHIPPED | OUTPATIENT
Start: 2024-08-30

## 2024-08-30 ASSESSMENT — ENCOUNTER SYMPTOMS
CONSTIPATION: 0
COUGH: 0
DIARRHEA: 0
ABDOMINAL PAIN: 0
BACK PAIN: 1
SHORTNESS OF BREATH: 0
ANAL BLEEDING: 0
CHEST TIGHTNESS: 0
NAUSEA: 0

## 2024-08-30 NOTE — PROGRESS NOTES
[facility]  [facility]  [address] [place] [address] [address]IVE  [address]  [place] [address]  Dept: [phone]  Dept Fax: [phone]  Loc: [phone]    Boni Miles is a 49 y.o. male who presents today for his medical conditions/complaints as noted below.  Boni Miles is here for 3 Month Follow-Up      Patient History:      Seizure  -[facility] neurology: Dr. Gutierres  - History of seizure 2019  - Seizure activity: December 2023  - -Secondary to multiple subdural hematomas from fall.    End-stage renal disease on dialysis  - [facility]     Pancytopenia  -[facility] hematology     Left middle cerebral artery stenosis  -December 2023: CTA head and neck with contrast: 60 to 70% stenosis     Liver mass  -October 2023: No significant liver abnormality.  -August 4, 2022: Large right hepatic hypodensity measuring 11 cm.  May represent abscess.     pthx        Subjective:   CC:  Here today to discuss the following:    Was last on May 30 for routine follow-up.    Is having increased anxiety related to his chronic medical issues.  -Started on sertraline  - -Tolerating the medication.  He and his wife feel it has helped his irritability, depression and anxiety.  -Remains on Xanax as needed.  - He continues to attribute his chronic medical issues with his major depression.  Primarily he is on dialysis 3 days a week.  Discussed significant stress and excessive worry.    Hypertension  Compliant with medications.  No adverse effects from medication.  No lightheadedness, palpitations, or chest pain.      Gastroesophageal Reflux Disease  Symptoms currently under control.   Medication adequately controls symptoms.  No hematochezia or melena.               Review of Systems   Constitutional:  Positive for fatigue. Negative for chills and fever.   HENT:  Negative for congestion.    Respiratory:  Negative for cough, chest tightness and shortness of breath.    Cardiovascular:  Positive for leg swelling.  Known Problems Maternal Grandfather     No Known Problems Paternal Grandmother     No Known Problems Paternal Grandfather     Social History     Tobacco Use    Smoking status: Never    Smokeless tobacco: Never   Substance Use Topics    Alcohol use: No        _______________________________________________________________    Note dictated using Dragon Dictation software  Sometimes this dictation software makes erroneous transcriptions.

## 2024-09-10 ENCOUNTER — TELEPHONE (OUTPATIENT)
Dept: HEMATOLOGY | Age: 49
End: 2024-09-10

## 2024-10-17 DIAGNOSIS — R53.83 OTHER FATIGUE: Primary | ICD-10-CM

## 2024-10-17 DIAGNOSIS — R06.00 DYSPNEA, UNSPECIFIED TYPE: ICD-10-CM

## 2024-10-17 DIAGNOSIS — M79.10 MYALGIA: ICD-10-CM

## 2024-10-17 DIAGNOSIS — D64.9 ANEMIA, UNSPECIFIED TYPE: ICD-10-CM

## 2024-10-17 RX ORDER — EPINEPHRINE 1 MG/ML
0.3 INJECTION, SOLUTION, CONCENTRATE INTRAVENOUS PRN
OUTPATIENT
Start: 2024-10-17

## 2024-10-17 RX ORDER — ALBUTEROL SULFATE 90 UG/1
4 INHALANT RESPIRATORY (INHALATION) PRN
OUTPATIENT
Start: 2024-10-17

## 2024-10-17 RX ORDER — ONDANSETRON 2 MG/ML
8 INJECTION INTRAMUSCULAR; INTRAVENOUS
OUTPATIENT
Start: 2024-10-17

## 2024-10-17 RX ORDER — SODIUM CHLORIDE 9 MG/ML
20 INJECTION, SOLUTION INTRAVENOUS CONTINUOUS
Status: CANCELLED | OUTPATIENT
Start: 2024-10-17

## 2024-10-17 RX ORDER — DIPHENHYDRAMINE HYDROCHLORIDE 50 MG/ML
50 INJECTION INTRAMUSCULAR; INTRAVENOUS
OUTPATIENT
Start: 2024-10-17

## 2024-10-17 RX ORDER — SODIUM CHLORIDE 9 MG/ML
INJECTION, SOLUTION INTRAVENOUS CONTINUOUS
OUTPATIENT
Start: 2024-10-17

## 2024-10-17 RX ORDER — SODIUM CHLORIDE 0.9 % (FLUSH) 0.9 %
5-40 SYRINGE (ML) INJECTION PRN
Status: CANCELLED | OUTPATIENT
Start: 2024-10-17

## 2024-10-17 RX ORDER — ACETAMINOPHEN 325 MG/1
650 TABLET ORAL
OUTPATIENT
Start: 2024-10-17

## 2024-10-18 ENCOUNTER — TELEPHONE (OUTPATIENT)
Dept: HEMATOLOGY | Age: 49
End: 2024-10-18

## 2024-10-18 ENCOUNTER — HOSPITAL ENCOUNTER (OUTPATIENT)
Dept: INFUSION THERAPY | Age: 49
Setting detail: INFUSION SERIES
Discharge: HOME | End: 2024-10-18
Payer: MEDICARE

## 2024-10-18 VITALS
HEART RATE: 85 BPM | OXYGEN SATURATION: 99 % | RESPIRATION RATE: 18 BRPM | SYSTOLIC BLOOD PRESSURE: 144 MMHG | DIASTOLIC BLOOD PRESSURE: 86 MMHG | TEMPERATURE: 98 F

## 2024-10-18 DIAGNOSIS — D64.9 ANEMIA, UNSPECIFIED TYPE: ICD-10-CM

## 2024-10-18 DIAGNOSIS — M79.10 MYALGIA: ICD-10-CM

## 2024-10-18 DIAGNOSIS — R06.00 DYSPNEA, UNSPECIFIED TYPE: ICD-10-CM

## 2024-10-18 DIAGNOSIS — R53.83 OTHER FATIGUE: ICD-10-CM

## 2024-10-18 DIAGNOSIS — D53.9 MACROCYTIC ANEMIA: Primary | ICD-10-CM

## 2024-10-18 LAB
ABO/RH: NORMAL
ALBUMIN SERPL-MCNC: 3 G/DL (ref 3.5–5.2)
ALP SERPL-CCNC: 112 U/L (ref 40–129)
ALT SERPL-CCNC: <5 U/L (ref 5–41)
ANION GAP SERPL CALCULATED.3IONS-SCNC: 13 MMOL/L (ref 7–19)
ANTIBODY SCREEN: NORMAL
AST SERPL-CCNC: 9 U/L (ref 5–40)
BASOPHILS # BLD: 0 K/UL (ref 0–0.2)
BASOPHILS NFR BLD: 0.9 % (ref 0–1)
BILIRUB SERPL-MCNC: 0.2 MG/DL (ref 0.2–1.2)
BLOOD BANK DISPENSE STATUS: NORMAL
BLOOD BANK PRODUCT CODE: NORMAL
BNP BLD-MCNC: ABNORMAL PG/ML (ref 0–124)
BPU ID: NORMAL
BUN SERPL-MCNC: 20 MG/DL (ref 6–20)
CALCIUM SERPL-MCNC: 7.5 MG/DL (ref 8.6–10)
CHLORIDE SERPL-SCNC: 96 MMOL/L (ref 98–111)
CK SERPL-CCNC: 54 U/L (ref 39–308)
CO2 SERPL-SCNC: 30 MMOL/L (ref 22–29)
CREAT SERPL-MCNC: 4.7 MG/DL (ref 0.7–1.2)
CRP SERPL HS-MCNC: 7.53 MG/DL (ref 0–0.5)
DESCRIPTION BLOOD BANK: NORMAL
EOSINOPHIL # BLD: 0.1 K/UL (ref 0–0.6)
EOSINOPHIL NFR BLD: 2.9 % (ref 0–5)
ERYTHROCYTE [DISTWIDTH] IN BLOOD BY AUTOMATED COUNT: 15 % (ref 11.5–14.5)
ERYTHROCYTE [SEDIMENTATION RATE] IN BLOOD BY WESTERGREN METHOD: 129 MM/HR (ref 0–10)
FERRITIN SERPL-MCNC: 754.4 NG/ML (ref 30–400)
FOLATE SERPL-MCNC: 3.5 NG/ML (ref 4.5–32.2)
GLUCOSE SERPL-MCNC: 89 MG/DL (ref 70–99)
HCT VFR BLD AUTO: 19.4 % (ref 42–52)
HCT VFR BLD AUTO: 20.4 % (ref 42–52)
HGB BLD-MCNC: 6 G/DL (ref 14–18)
HGB BLD-MCNC: 6.4 G/DL (ref 14–18)
IMM GRANULOCYTES # BLD: 0.1 K/UL
LYMPHOCYTES # BLD: 0.7 K/UL (ref 1.1–4.5)
LYMPHOCYTES NFR BLD: 19.8 % (ref 20–40)
MCH RBC QN AUTO: 31.9 PG (ref 27–31)
MCHC RBC AUTO-ENTMCNC: 30.9 G/DL (ref 33–37)
MCV RBC AUTO: 103.2 FL (ref 80–94)
MONOCYTES # BLD: 0.2 K/UL (ref 0–0.9)
MONOCYTES NFR BLD: 5.9 % (ref 0–10)
NEUTROPHILS # BLD: 2.3 K/UL (ref 1.5–7.5)
NEUTS SEG NFR BLD: 68.4 % (ref 50–65)
PLATELET # BLD AUTO: 74 K/UL (ref 130–400)
PMV BLD AUTO: 10 FL (ref 9.4–12.4)
POTASSIUM SERPL-SCNC: 3.5 MMOL/L (ref 3.5–5)
PROT SERPL-MCNC: 5.7 G/DL (ref 6.4–8.3)
RBC # BLD AUTO: 1.88 M/UL (ref 4.7–6.1)
SODIUM SERPL-SCNC: 139 MMOL/L (ref 136–145)
T4 FREE SERPL-MCNC: 0.78 NG/DL (ref 0.93–1.7)
TSH SERPL DL<=0.005 MIU/L-ACNC: 3.58 UIU/ML (ref 0.27–4.2)
VIT B12 SERPL-MCNC: 484 PG/ML (ref 232–1245)
WBC # BLD AUTO: 3.4 K/UL (ref 4.8–10.8)

## 2024-10-18 PROCEDURE — 85014 HEMATOCRIT: CPT

## 2024-10-18 PROCEDURE — 2580000003 HC RX 258: Performed by: CLINICAL NURSE SPECIALIST

## 2024-10-18 PROCEDURE — 82550 ASSAY OF CK (CPK): CPT

## 2024-10-18 PROCEDURE — 86923 COMPATIBILITY TEST ELECTRIC: CPT

## 2024-10-18 PROCEDURE — 85652 RBC SED RATE AUTOMATED: CPT

## 2024-10-18 PROCEDURE — 85025 COMPLETE CBC W/AUTO DIFF WBC: CPT

## 2024-10-18 PROCEDURE — 82607 VITAMIN B-12: CPT

## 2024-10-18 PROCEDURE — 86140 C-REACTIVE PROTEIN: CPT

## 2024-10-18 PROCEDURE — 85018 HEMOGLOBIN: CPT

## 2024-10-18 PROCEDURE — 84439 ASSAY OF FREE THYROXINE: CPT

## 2024-10-18 PROCEDURE — 82728 ASSAY OF FERRITIN: CPT

## 2024-10-18 PROCEDURE — 86901 BLOOD TYPING SEROLOGIC RH(D): CPT

## 2024-10-18 PROCEDURE — 83880 ASSAY OF NATRIURETIC PEPTIDE: CPT

## 2024-10-18 PROCEDURE — P9016 RBC LEUKOCYTES REDUCED: HCPCS

## 2024-10-18 PROCEDURE — 82746 ASSAY OF FOLIC ACID SERUM: CPT

## 2024-10-18 PROCEDURE — 36430 TRANSFUSION BLD/BLD COMPNT: CPT

## 2024-10-18 PROCEDURE — 84443 ASSAY THYROID STIM HORMONE: CPT

## 2024-10-18 PROCEDURE — 86900 BLOOD TYPING SEROLOGIC ABO: CPT

## 2024-10-18 PROCEDURE — 86850 RBC ANTIBODY SCREEN: CPT

## 2024-10-18 PROCEDURE — 80053 COMPREHEN METABOLIC PANEL: CPT

## 2024-10-18 RX ORDER — SODIUM CHLORIDE 9 MG/ML
INJECTION, SOLUTION INTRAVENOUS CONTINUOUS
OUTPATIENT
Start: 2024-10-18

## 2024-10-18 RX ORDER — ALBUTEROL SULFATE 90 UG/1
4 INHALANT RESPIRATORY (INHALATION) PRN
OUTPATIENT
Start: 2024-10-18

## 2024-10-18 RX ORDER — DIPHENHYDRAMINE HYDROCHLORIDE 50 MG/ML
50 INJECTION INTRAMUSCULAR; INTRAVENOUS
OUTPATIENT
Start: 2024-10-18

## 2024-10-18 RX ORDER — SODIUM CHLORIDE 9 MG/ML
20 INJECTION, SOLUTION INTRAVENOUS CONTINUOUS
OUTPATIENT
Start: 2024-10-18

## 2024-10-18 RX ORDER — SODIUM CHLORIDE 9 MG/ML
20 INJECTION, SOLUTION INTRAVENOUS CONTINUOUS
Status: DISCONTINUED | OUTPATIENT
Start: 2024-10-18 | End: 2024-10-20 | Stop reason: HOSPADM

## 2024-10-18 RX ORDER — ACETAMINOPHEN 325 MG/1
650 TABLET ORAL
OUTPATIENT
Start: 2024-10-18

## 2024-10-18 RX ORDER — EPINEPHRINE 1 MG/ML
0.3 INJECTION, SOLUTION, CONCENTRATE INTRAVENOUS PRN
OUTPATIENT
Start: 2024-10-18

## 2024-10-18 RX ORDER — ONDANSETRON 2 MG/ML
8 INJECTION INTRAMUSCULAR; INTRAVENOUS
OUTPATIENT
Start: 2024-10-18

## 2024-10-18 RX ORDER — SODIUM CHLORIDE 0.9 % (FLUSH) 0.9 %
5-40 SYRINGE (ML) INJECTION PRN
OUTPATIENT
Start: 2024-10-18

## 2024-10-18 RX ORDER — OXYBUTYNIN CHLORIDE 5 MG/1
10 TABLET ORAL DAILY
Qty: 60 TABLET | Refills: 5 | Status: SHIPPED | OUTPATIENT
Start: 2024-10-18

## 2024-10-18 RX ORDER — SODIUM CHLORIDE 0.9 % (FLUSH) 0.9 %
5-40 SYRINGE (ML) INJECTION PRN
Status: DISCONTINUED | OUTPATIENT
Start: 2024-10-18 | End: 2024-10-19 | Stop reason: HOSPADM

## 2024-10-18 RX ADMIN — SODIUM CHLORIDE 20 ML/HR: 9 INJECTION, SOLUTION INTRAVENOUS at 10:30

## 2024-10-18 NOTE — PROGRESS NOTES
Pt arrived to [facility]. PIV inserted, brisk blood return noted and labs drawn per order. Pt received 1 unit PRBCs for Hgb 6.0. Post transfusion Hgb 6.4. Orders to administer an additional unit PRBCs, however, pt states he cannot stay and needs to return to [facility] Monday. Pt scheduled for additional unit Monday at 10:15.    Electronically signed by Irma Carlson RN on 10/18/2024 at 1:58 PM

## 2024-10-18 NOTE — TELEPHONE ENCOUNTER
I called patient and reminded patient of their appt on 10/22/24 and patient confirmed they would be here. I also let patient know that we have moved into our new cancer facility and asked patient if they were aware of where we were now located, and patient voiced understanding of our new location. Patient knows not to arrive early and that we will get labs at the time of the follow up appointment and not the lab appointment time.

## 2024-10-21 ENCOUNTER — HOSPITAL ENCOUNTER (OUTPATIENT)
Dept: INFUSION THERAPY | Age: 49
Setting detail: INFUSION SERIES
Discharge: HOME | End: 2024-10-21
Payer: MEDICARE

## 2024-10-21 VITALS
DIASTOLIC BLOOD PRESSURE: 82 MMHG | HEART RATE: 88 BPM | OXYGEN SATURATION: 100 % | SYSTOLIC BLOOD PRESSURE: 146 MMHG | TEMPERATURE: 97.8 F | RESPIRATION RATE: 18 BRPM

## 2024-10-21 DIAGNOSIS — D53.9 MACROCYTIC ANEMIA: ICD-10-CM

## 2024-10-21 LAB
BASOPHILS # BLD: 0 K/UL (ref 0–0.2)
BASOPHILS NFR BLD: 0.8 % (ref 0–1)
EOSINOPHIL # BLD: 0.2 K/UL (ref 0–0.6)
EOSINOPHIL NFR BLD: 3.2 % (ref 0–5)
ERYTHROCYTE [DISTWIDTH] IN BLOOD BY AUTOMATED COUNT: 15.8 % (ref 11.5–14.5)
HCT VFR BLD AUTO: 23.3 % (ref 42–52)
HGB BLD-MCNC: 7.3 G/DL (ref 14–18)
IMM GRANULOCYTES # BLD: 0.1 K/UL
LYMPHOCYTES # BLD: 0.8 K/UL (ref 1.1–4.5)
LYMPHOCYTES NFR BLD: 16.5 % (ref 20–40)
MCH RBC QN AUTO: 31.6 PG (ref 27–31)
MCHC RBC AUTO-ENTMCNC: 31.3 G/DL (ref 33–37)
MCV RBC AUTO: 100.9 FL (ref 80–94)
MONOCYTES # BLD: 0.2 K/UL (ref 0–0.9)
MONOCYTES NFR BLD: 4.7 % (ref 0–10)
NEUTROPHILS # BLD: 3.5 K/UL (ref 1.5–7.5)
NEUTS SEG NFR BLD: 73.1 % (ref 50–65)
PLATELET # BLD AUTO: 72 K/UL (ref 130–400)
PMV BLD AUTO: 9.6 FL (ref 9.4–12.4)
RBC # BLD AUTO: 2.31 M/UL (ref 4.7–6.1)
WBC # BLD AUTO: 4.7 K/UL (ref 4.8–10.8)

## 2024-10-21 PROCEDURE — 36415 COLL VENOUS BLD VENIPUNCTURE: CPT

## 2024-10-21 PROCEDURE — 99211 OFF/OP EST MAY X REQ PHY/QHP: CPT

## 2024-10-21 PROCEDURE — 85025 COMPLETE CBC W/AUTO DIFF WBC: CPT

## 2024-10-21 NOTE — FLOWSHEET NOTE
10/21/24 1105   AVS Reviewed   AVS & discharge instructions reviewed with patient and/or representative? Yes   Reviewed instructions with Patient   Level of Understanding Questions answered;Verbalized understanding     PT ARRIVED AND CBC DRAWN. HGB 7.3. NO NEED FOR BLOOD TRANSFUSION TODAY.

## 2024-11-02 ENCOUNTER — APPOINTMENT (OUTPATIENT)
Dept: GENERAL RADIOLOGY | Age: 49
End: 2024-11-02
Payer: MEDICARE

## 2024-11-02 ENCOUNTER — APPOINTMENT (OUTPATIENT)
Dept: CT IMAGING | Age: 49
End: 2024-11-02
Payer: MEDICARE

## 2024-11-02 ENCOUNTER — HOSPITAL ENCOUNTER (EMERGENCY)
Age: 49
Discharge: LEFT BEFORE DISCHARGE | End: 2024-11-02
Payer: MEDICARE

## 2024-11-02 VITALS
OXYGEN SATURATION: 99 % | TEMPERATURE: 98.2 F | RESPIRATION RATE: 18 BRPM | SYSTOLIC BLOOD PRESSURE: 122 MMHG | BODY MASS INDEX: 20.34 KG/M2 | DIASTOLIC BLOOD PRESSURE: 82 MMHG | HEART RATE: 87 BPM | WEIGHT: 150 LBS

## 2024-11-02 DIAGNOSIS — Z99.2 END STAGE RENAL DISEASE ON DIALYSIS (HCC): ICD-10-CM

## 2024-11-02 DIAGNOSIS — Z96.0 URETERAL STENT RETAINED: ICD-10-CM

## 2024-11-02 DIAGNOSIS — N18.6 END STAGE RENAL DISEASE ON DIALYSIS (HCC): ICD-10-CM

## 2024-11-02 DIAGNOSIS — Z85.048 HISTORY OF RECTAL CANCER: ICD-10-CM

## 2024-11-02 DIAGNOSIS — K94.01 BLEEDING FROM COLOSTOMY (HCC): ICD-10-CM

## 2024-11-02 DIAGNOSIS — D61.818 PANCYTOPENIA (HCC): Primary | ICD-10-CM

## 2024-11-02 DIAGNOSIS — D62 ANEMIA DUE TO ACUTE BLOOD LOSS: ICD-10-CM

## 2024-11-02 LAB
ABO/RH: NORMAL
ALBUMIN SERPL-MCNC: 3.3 G/DL (ref 3.5–5.2)
ALP SERPL-CCNC: 160 U/L (ref 40–129)
ALT SERPL-CCNC: 5 U/L (ref 5–41)
ANION GAP SERPL CALCULATED.3IONS-SCNC: 14 MMOL/L (ref 7–19)
ANTIBODY SCREEN: NORMAL
AST SERPL-CCNC: 12 U/L (ref 5–40)
BASOPHILS # BLD: 0 K/UL (ref 0–0.2)
BASOPHILS NFR BLD: 1.1 % (ref 0–1)
BILIRUB SERPL-MCNC: 0.4 MG/DL (ref 0.2–1.2)
BILIRUB UR STRIP.AUTO-MCNC: ABNORMAL MG/DL
BLOOD BANK DISPENSE STATUS: NORMAL
BLOOD BANK PRODUCT CODE: NORMAL
BPU ID: NORMAL
BUN SERPL-MCNC: 18 MG/DL (ref 6–20)
CALCIUM SERPL-MCNC: 9.1 MG/DL (ref 8.6–10)
CHLORIDE SERPL-SCNC: 93 MMOL/L (ref 98–111)
CLARITY UR: ABNORMAL
CO2 SERPL-SCNC: 31 MMOL/L (ref 22–29)
COLOR UR: ABNORMAL
CREAT SERPL-MCNC: 4.2 MG/DL (ref 0.7–1.2)
DESCRIPTION BLOOD BANK: NORMAL
EOSINOPHIL # BLD: 0.1 K/UL (ref 0–0.6)
EOSINOPHIL NFR BLD: 3.2 % (ref 0–5)
ERYTHROCYTE [DISTWIDTH] IN BLOOD BY AUTOMATED COUNT: 16.6 % (ref 11.5–14.5)
GLUCOSE SERPL-MCNC: 83 MG/DL (ref 70–99)
GLUCOSE UR STRIP.AUTO-MCNC: NEGATIVE MG/DL
HCT VFR BLD AUTO: 20.4 % (ref 42–52)
HGB BLD-MCNC: 6.6 G/DL (ref 14–18)
HGB UR STRIP.AUTO-MCNC: ABNORMAL MG/L
IMM GRANULOCYTES # BLD: 0 K/UL
INR PPP: 1.12 (ref 0.88–1.18)
KETONES UR STRIP.AUTO-MCNC: 15 MG/DL
LEUKOCYTE ESTERASE UR QL STRIP.AUTO: ABNORMAL
LYMPHOCYTES # BLD: 0.6 K/UL (ref 1.1–4.5)
LYMPHOCYTES NFR BLD: 20.4 % (ref 20–40)
MAGNESIUM SERPL-MCNC: 1.7 MG/DL (ref 1.6–2.6)
MCH RBC QN AUTO: 32.7 PG (ref 27–31)
MCHC RBC AUTO-ENTMCNC: 32.4 G/DL (ref 33–37)
MCV RBC AUTO: 101 FL (ref 80–94)
MONOCYTES # BLD: 0.1 K/UL (ref 0–0.9)
MONOCYTES NFR BLD: 2.9 % (ref 0–10)
NEUTROPHILS # BLD: 2 K/UL (ref 1.5–7.5)
NEUTS SEG NFR BLD: 71.7 % (ref 50–65)
NITRITE UR QL STRIP.AUTO: POSITIVE
PH UR STRIP.AUTO: 7.5 [PH] (ref 5–8)
PLATELET # BLD AUTO: 82 K/UL (ref 130–400)
PMV BLD AUTO: 10.1 FL (ref 9.4–12.4)
POTASSIUM SERPL-SCNC: 3.1 MMOL/L (ref 3.5–5)
PROT SERPL-MCNC: 6.4 G/DL (ref 6.4–8.3)
PROT UR STRIP.AUTO-MCNC: >=300 MG/DL
PROTHROMBIN TIME: 14.1 SEC (ref 12–14.6)
RBC # BLD AUTO: 2.02 M/UL (ref 4.7–6.1)
RBC #/AREA URNS HPF: ABNORMAL /HPF (ref 0–2)
SODIUM SERPL-SCNC: 138 MMOL/L (ref 136–145)
SP GR UR STRIP.AUTO: 1.02 (ref 1–1.03)
UROBILINOGEN UR STRIP.AUTO-MCNC: 2 E.U./DL
WBC # BLD AUTO: 2.8 K/UL (ref 4.8–10.8)
WBC #/AREA URNS HPF: ABNORMAL /HPF (ref 0–5)

## 2024-11-02 PROCEDURE — 6360000004 HC RX CONTRAST MEDICATION: Performed by: NURSE PRACTITIONER

## 2024-11-02 PROCEDURE — 96374 THER/PROPH/DIAG INJ IV PUSH: CPT

## 2024-11-02 PROCEDURE — 83735 ASSAY OF MAGNESIUM: CPT

## 2024-11-02 PROCEDURE — 80053 COMPREHEN METABOLIC PANEL: CPT

## 2024-11-02 PROCEDURE — 74177 CT ABD & PELVIS W/CONTRAST: CPT

## 2024-11-02 PROCEDURE — P9016 RBC LEUKOCYTES REDUCED: HCPCS

## 2024-11-02 PROCEDURE — 87086 URINE CULTURE/COLONY COUNT: CPT

## 2024-11-02 PROCEDURE — 87077 CULTURE AEROBIC IDENTIFY: CPT

## 2024-11-02 PROCEDURE — 2580000003 HC RX 258: Performed by: NURSE PRACTITIONER

## 2024-11-02 PROCEDURE — 86901 BLOOD TYPING SEROLOGIC RH(D): CPT

## 2024-11-02 PROCEDURE — 36430 TRANSFUSION BLD/BLD COMPNT: CPT

## 2024-11-02 PROCEDURE — 6370000000 HC RX 637 (ALT 250 FOR IP): Performed by: NURSE PRACTITIONER

## 2024-11-02 PROCEDURE — 86900 BLOOD TYPING SEROLOGIC ABO: CPT

## 2024-11-02 PROCEDURE — 86850 RBC ANTIBODY SCREEN: CPT

## 2024-11-02 PROCEDURE — 71045 X-RAY EXAM CHEST 1 VIEW: CPT

## 2024-11-02 PROCEDURE — 36415 COLL VENOUS BLD VENIPUNCTURE: CPT

## 2024-11-02 PROCEDURE — 99285 EMERGENCY DEPT VISIT HI MDM: CPT

## 2024-11-02 PROCEDURE — 81001 URINALYSIS AUTO W/SCOPE: CPT

## 2024-11-02 PROCEDURE — 86923 COMPATIBILITY TEST ELECTRIC: CPT

## 2024-11-02 PROCEDURE — 6360000002 HC RX W HCPCS: Performed by: NURSE PRACTITIONER

## 2024-11-02 PROCEDURE — 85025 COMPLETE CBC W/AUTO DIFF WBC: CPT

## 2024-11-02 PROCEDURE — 87186 SC STD MICRODIL/AGAR DIL: CPT

## 2024-11-02 PROCEDURE — 85610 PROTHROMBIN TIME: CPT

## 2024-11-02 RX ORDER — ALBUTEROL SULFATE 90 UG/1
2 INHALANT RESPIRATORY (INHALATION) EVERY 4 HOURS PRN
Status: CANCELLED | OUTPATIENT
Start: 2024-11-02

## 2024-11-02 RX ORDER — ALPRAZOLAM 0.5 MG
1 TABLET ORAL 3 TIMES DAILY PRN
Status: CANCELLED | OUTPATIENT
Start: 2024-11-02

## 2024-11-02 RX ORDER — OXYCODONE AND ACETAMINOPHEN 10; 325 MG/1; MG/1
1 TABLET ORAL EVERY 4 HOURS
Status: CANCELLED | OUTPATIENT
Start: 2024-11-02

## 2024-11-02 RX ORDER — LEVETIRACETAM 500 MG/1
250 TABLET ORAL
Status: CANCELLED | OUTPATIENT
Start: 2024-11-04

## 2024-11-02 RX ORDER — CINACALCET 30 MG/1
60 TABLET, FILM COATED ORAL DAILY
Status: CANCELLED | OUTPATIENT
Start: 2024-11-02

## 2024-11-02 RX ORDER — CARVEDILOL 25 MG/1
25 TABLET ORAL 2 TIMES DAILY WITH MEALS
Status: CANCELLED | OUTPATIENT
Start: 2024-11-02

## 2024-11-02 RX ORDER — IOPAMIDOL 755 MG/ML
90 INJECTION, SOLUTION INTRAVASCULAR
Status: COMPLETED | OUTPATIENT
Start: 2024-11-02 | End: 2024-11-02

## 2024-11-02 RX ORDER — SODIUM CHLORIDE 9 MG/ML
INJECTION, SOLUTION INTRAVENOUS PRN
Status: DISCONTINUED | OUTPATIENT
Start: 2024-11-02 | End: 2024-11-03 | Stop reason: HOSPADM

## 2024-11-02 RX ORDER — MINOXIDIL 2.5 MG/1
5 TABLET ORAL 2 TIMES DAILY
Status: CANCELLED | OUTPATIENT
Start: 2024-11-02

## 2024-11-02 RX ORDER — CLONIDINE HYDROCHLORIDE 0.1 MG/1
0.2 TABLET ORAL 3 TIMES DAILY PRN
Status: CANCELLED | OUTPATIENT
Start: 2024-11-02

## 2024-11-02 RX ORDER — LEVETIRACETAM 500 MG/1
750 TABLET ORAL DAILY
Status: CANCELLED | OUTPATIENT
Start: 2024-11-02

## 2024-11-02 RX ORDER — PANTOPRAZOLE SODIUM 40 MG/1
40 TABLET, DELAYED RELEASE ORAL
Status: CANCELLED | OUTPATIENT
Start: 2024-11-03

## 2024-11-02 RX ADMIN — IOPAMIDOL 90 ML: 755 INJECTION, SOLUTION INTRAVENOUS at 21:22

## 2024-11-02 RX ADMIN — POTASSIUM BICARBONATE 40 MEQ: 782 TABLET, EFFERVESCENT ORAL at 18:34

## 2024-11-02 RX ADMIN — WATER 1000 MG: 1 INJECTION INTRAMUSCULAR; INTRAVENOUS; SUBCUTANEOUS at 20:29

## 2024-11-02 ASSESSMENT — ENCOUNTER SYMPTOMS: BLOOD IN STOOL: 1

## 2024-11-02 NOTE — CONSENT
Informed Consent for Blood Component Transfusion Note    I have discussed with the patient the rationale for blood component transfusion; its benefits in treating or preventing fatigue, organ damage, or death; and its risk which includes mild transfusion reactions, rare risk of blood borne infection, or more serious but rare reactions. I have discussed the alternatives to transfusion, including the risk and consequences of not receiving transfusion. The patient had an opportunity to ask questions and had agreed to proceed with transfusion of blood components.    Electronically signed by CALLY Mast on 11/2/24 at 6:05 PM SARAN

## 2024-11-02 NOTE — ED PROVIDER NOTES
[facility] EMERGENCY DEPT  eMERGENCY dEPARTMENT eNCOUnter      Pt Name: Boni Miles  MRN: [id number]  Birthdate 1975  Date of evaluation: 11/2/2024  Provider: CALLY Mast    CHIEF COMPLAINT       Chief Complaint   Patient presents with    Abnormal Lab     Hgb 5.5 on labs today         HISTORY OF PRESENT ILLNESS   (Location/Symptom, Timing/Onset,Context/Setting, Quality, Duration, Modifying Factors, Severity)  Note limiting factors.   Boni Miles is a 49 y.o. male who presents to the emergency department with a low blood count (5.5) after dialysis today.  Patient received a unit of blood about 2 weeks ago.  He does have a history of rectal cancer in 2008 and has a colostomy.  He received radiation and chemo at that time.  He also has ureter stents and states that his urine has blood in it intermittently.  He states his colostomy has blood from it intermittently.    The history is provided by the patient.       NursingNotes were reviewed.    REVIEW OF SYSTEMS    (2-9 systems for level 4, 10 or more for level 5)     Review of Systems   Constitutional:  Positive for fatigue. Negative for fever.   Gastrointestinal:  Positive for blood in stool.   Genitourinary:  Positive for hematuria.   Neurological:  Positive for weakness. Negative for dizziness.       Except as noted above the remainder of the review of systems was reviewed and negative.       PAST MEDICAL HISTORY     Past Medical History:   Diagnosis Date    Asthma     during winter months    CAD (coronary artery disease)     Cancer (HCC)     rectal    Colostomy care (HCC)     Hemodialysis patient (HCC)     mon wed fri at [place]    History of blood transfusion     Hx of migraine headaches     Hypercholesterolemia 12/16/2019    Hypertension     Kidney stone     hx of    Palliative care patient 07/11/2019    Pericarditis     Rectal cancer (HCC)     SDH (subdural hematoma) 01/06/2024    Seizures (HCC)     Testalgia 02/01/2016         SURGICALHISTORY       Past  TO:  nAdreas Manzo MD  [address] [place] [address] [address] [place] [address]  [phone]            DISCHARGE MEDICATIONS:  Discharge Medication List as of 11/2/2024 11:10 PM             (Please note that portions of this note were completed with a voice recognitionprogram.  Efforts were made to edit the dictations but occasionally words are mis-transcribed.)    CALLY Mast (electronically signed)           Clari Martinez APRN  11/02/24 [id number]

## 2024-11-03 LAB
BACTERIA UR CULT: ABNORMAL
ORGANISM: ABNORMAL
ORGANISM: ABNORMAL

## 2024-11-05 LAB
BACTERIA UR CULT: ABNORMAL
ORGANISM: ABNORMAL

## 2024-11-06 ENCOUNTER — TELEPHONE (OUTPATIENT)
Dept: HEMATOLOGY | Age: 49
End: 2024-11-06

## 2024-11-06 NOTE — TELEPHONE ENCOUNTER
Called pt. to remind them of appointment on 11/08/2024 and had to leave a detailed voicemail with appointment date and time. Reminded patient to just come at appointment time, and to not come at the lab appointment time. Reminded patient that we will not check them in any more than 30 minutes before appointment time. We have now moved to the [facility] that is located between our old office and the ER at the [facility]. Letting the Pt know that our front entrance faces the  [place] fields, and also leaving our address.

## 2024-11-08 ENCOUNTER — OFFICE VISIT (OUTPATIENT)
Dept: HEMATOLOGY | Age: 49
End: 2024-11-08
Payer: MEDICARE

## 2024-11-08 ENCOUNTER — HOSPITAL ENCOUNTER (OUTPATIENT)
Dept: INFUSION THERAPY | Age: 49
Discharge: HOME | End: 2024-11-08
Payer: MEDICARE

## 2024-11-08 VITALS
WEIGHT: 180 LBS | HEART RATE: 62 BPM | OXYGEN SATURATION: 97 % | DIASTOLIC BLOOD PRESSURE: 74 MMHG | SYSTOLIC BLOOD PRESSURE: 122 MMHG | HEIGHT: 72 IN | TEMPERATURE: 97.8 F | BODY MASS INDEX: 24.38 KG/M2

## 2024-11-08 DIAGNOSIS — R16.1 SPLENOMEGALY: ICD-10-CM

## 2024-11-08 DIAGNOSIS — D61.818 PANCYTOPENIA (HCC): ICD-10-CM

## 2024-11-08 DIAGNOSIS — E03.9 ACQUIRED HYPOTHYROIDISM: ICD-10-CM

## 2024-11-08 DIAGNOSIS — Z99.2 ESRD (END STAGE RENAL DISEASE) ON DIALYSIS (HCC): ICD-10-CM

## 2024-11-08 DIAGNOSIS — N18.6 ESRD (END STAGE RENAL DISEASE) ON DIALYSIS (HCC): ICD-10-CM

## 2024-11-08 DIAGNOSIS — D53.9 MACROCYTIC ANEMIA: ICD-10-CM

## 2024-11-08 DIAGNOSIS — D61.818 PANCYTOPENIA (HCC): Primary | ICD-10-CM

## 2024-11-08 DIAGNOSIS — Z71.89 CARE PLAN DISCUSSED WITH PATIENT: ICD-10-CM

## 2024-11-08 PROBLEM — D63.1 ANEMIA OF CHRONIC RENAL FAILURE: Status: ACTIVE | Noted: 2024-11-08

## 2024-11-08 PROBLEM — N18.9 ANEMIA OF CHRONIC RENAL FAILURE: Status: ACTIVE | Noted: 2024-11-08

## 2024-11-08 LAB
BASOPHILS # BLD: 0.04 K/UL (ref 0.01–0.08)
BASOPHILS NFR BLD: 1.1 % (ref 0.1–1.2)
EOSINOPHIL # BLD: 0.12 K/UL (ref 0.04–0.54)
EOSINOPHIL NFR BLD: 3.3 % (ref 0.7–7)
ERYTHROCYTE [DISTWIDTH] IN BLOOD BY AUTOMATED COUNT: 19.8 % (ref 11.6–14.4)
HCT VFR BLD AUTO: 19.6 % (ref 40.1–51)
HGB BLD-MCNC: 6.3 G/DL (ref 13.7–17.5)
LYMPHOCYTES # BLD: 0.63 K/UL (ref 1.18–3.74)
LYMPHOCYTES NFR BLD: 17.5 % (ref 19.3–53.1)
MCH RBC QN AUTO: 32.8 PG (ref 25.7–32.2)
MCHC RBC AUTO-ENTMCNC: 32.1 G/DL (ref 32.3–36.5)
MCV RBC AUTO: 102.1 FL (ref 79–92.2)
MONOCYTES # BLD: 0.16 K/UL (ref 0.24–0.82)
MONOCYTES NFR BLD: 4.4 % (ref 4.7–12.5)
NEUTROPHILS # BLD: 2.62 K/UL (ref 1.56–6.13)
NEUTS SEG NFR BLD: 72.9 % (ref 34–71.1)
PLATELET # BLD AUTO: 70 K/UL (ref 163–337)
PMV BLD AUTO: 9.2 FL (ref 7.4–10.4)
RBC # BLD AUTO: 1.92 M/UL (ref 4.63–6.08)
WBC # BLD AUTO: 3.6 K/UL (ref 4.23–9.07)

## 2024-11-08 PROCEDURE — 3078F DIAST BP <80 MM HG: CPT | Performed by: NURSE PRACTITIONER

## 2024-11-08 PROCEDURE — 36415 COLL VENOUS BLD VENIPUNCTURE: CPT

## 2024-11-08 PROCEDURE — 99212 OFFICE O/P EST SF 10 MIN: CPT

## 2024-11-08 PROCEDURE — G8420 CALC BMI NORM PARAMETERS: HCPCS | Performed by: NURSE PRACTITIONER

## 2024-11-08 PROCEDURE — 99214 OFFICE O/P EST MOD 30 MIN: CPT | Performed by: NURSE PRACTITIONER

## 2024-11-08 PROCEDURE — 1036F TOBACCO NON-USER: CPT | Performed by: NURSE PRACTITIONER

## 2024-11-08 PROCEDURE — G8484 FLU IMMUNIZE NO ADMIN: HCPCS | Performed by: NURSE PRACTITIONER

## 2024-11-08 PROCEDURE — 85025 COMPLETE CBC W/AUTO DIFF WBC: CPT

## 2024-11-08 PROCEDURE — G8427 DOCREV CUR MEDS BY ELIG CLIN: HCPCS | Performed by: NURSE PRACTITIONER

## 2024-11-08 PROCEDURE — 3074F SYST BP LT 130 MM HG: CPT | Performed by: NURSE PRACTITIONER

## 2024-11-08 RX ORDER — PANTOPRAZOLE SODIUM 40 MG/1
40 TABLET, DELAYED RELEASE ORAL DAILY
COMMUNITY
Start: 2024-11-06

## 2024-11-08 RX ORDER — SODIUM CHLORIDE 9 MG/ML
20 INJECTION, SOLUTION INTRAVENOUS ONCE
OUTPATIENT
Start: 2024-11-08 | End: 2024-11-08

## 2024-11-08 RX ORDER — MIDODRINE HYDROCHLORIDE 10 MG/1
10 TABLET ORAL PRN
COMMUNITY
Start: 2024-09-24

## 2024-11-09 ASSESSMENT — ENCOUNTER SYMPTOMS
SHORTNESS OF BREATH: 1
COUGH: 0
EYE DISCHARGE: 0
EYE ITCHING: 0
VOMITING: 0
WHEEZING: 0
NAUSEA: 1
SORE THROAT: 0
TROUBLE SWALLOWING: 0
ABDOMINAL PAIN: 1

## 2024-11-11 ENCOUNTER — HOSPITAL ENCOUNTER (OUTPATIENT)
Dept: INFUSION THERAPY | Age: 49
Setting detail: INFUSION SERIES
Discharge: HOME | End: 2024-11-11
Payer: MEDICARE

## 2024-11-11 VITALS
DIASTOLIC BLOOD PRESSURE: 81 MMHG | RESPIRATION RATE: 18 BRPM | OXYGEN SATURATION: 95 % | SYSTOLIC BLOOD PRESSURE: 110 MMHG | HEART RATE: 96 BPM | TEMPERATURE: 98.7 F

## 2024-11-11 DIAGNOSIS — N18.9 ANEMIA OF CHRONIC RENAL FAILURE, UNSPECIFIED CKD STAGE: Primary | ICD-10-CM

## 2024-11-11 DIAGNOSIS — D53.9 MACROCYTIC ANEMIA: ICD-10-CM

## 2024-11-11 DIAGNOSIS — D69.6 THROMBOCYTOPENIA (HCC): ICD-10-CM

## 2024-11-11 DIAGNOSIS — D63.1 ANEMIA OF CHRONIC RENAL FAILURE, UNSPECIFIED CKD STAGE: Primary | ICD-10-CM

## 2024-11-11 LAB
ABO/RH: NORMAL
ANTIBODY SCREEN: NORMAL
BASOPHILS # BLD: 0 K/UL (ref 0–0.2)
BASOPHILS NFR BLD: 0.7 % (ref 0–1)
BLOOD BANK DISPENSE STATUS: NORMAL
BLOOD BANK PRODUCT CODE: NORMAL
BPU ID: NORMAL
DESCRIPTION BLOOD BANK: NORMAL
EOSINOPHIL # BLD: 0.1 K/UL (ref 0–0.6)
EOSINOPHIL NFR BLD: 1.8 % (ref 0–5)
ERYTHROCYTE [DISTWIDTH] IN BLOOD BY AUTOMATED COUNT: 21.1 % (ref 11.5–14.5)
HAPTOGLOB SERPL-MCNC: 114 MG/DL (ref 30–200)
HCT VFR BLD AUTO: 22.8 % (ref 42–52)
HCT VFR BLD AUTO: 22.8 % (ref 42–52)
HGB BLD-MCNC: 7.2 G/DL (ref 14–18)
IMM GRANULOCYTES # BLD: 0.1 K/UL
LDH SERPL-CCNC: 172 U/L (ref 91–215)
LYMPHOCYTES # BLD: 0.9 K/UL (ref 1.1–4.5)
LYMPHOCYTES NFR BLD: 15.2 % (ref 20–40)
MCH RBC QN AUTO: 34 PG (ref 27–31)
MCHC RBC AUTO-ENTMCNC: 31.6 G/DL (ref 33–37)
MCV RBC AUTO: 107.5 FL (ref 80–94)
MONOCYTES # BLD: 0.2 K/UL (ref 0–0.9)
MONOCYTES NFR BLD: 4.1 % (ref 0–10)
NEUTROPHILS # BLD: 4.4 K/UL (ref 1.5–7.5)
NEUTS SEG NFR BLD: 77.3 % (ref 50–65)
PLATELET # BLD AUTO: 72 K/UL (ref 130–400)
PMV BLD AUTO: 10.2 FL (ref 9.4–12.4)
RBC # BLD AUTO: 2.12 M/UL (ref 4.7–6.1)
RETICS # AUTO: 0.1 M/UL (ref 0.03–0.12)
RETICS/RBC NFR: 4.45 % (ref 0.5–1.5)
WBC # BLD AUTO: 5.7 K/UL (ref 4.8–10.8)
ZINC SERPL-MCNC: 37 UG/DL (ref 60–120)

## 2024-11-11 PROCEDURE — P9016 RBC LEUKOCYTES REDUCED: HCPCS

## 2024-11-11 PROCEDURE — 85025 COMPLETE CBC W/AUTO DIFF WBC: CPT

## 2024-11-11 PROCEDURE — 36430 TRANSFUSION BLD/BLD COMPNT: CPT

## 2024-11-11 PROCEDURE — 85045 AUTOMATED RETICULOCYTE COUNT: CPT

## 2024-11-11 PROCEDURE — 86900 BLOOD TYPING SEROLOGIC ABO: CPT

## 2024-11-11 PROCEDURE — 2580000003 HC RX 258: Performed by: CLINICAL NURSE SPECIALIST

## 2024-11-11 PROCEDURE — 86923 COMPATIBILITY TEST ELECTRIC: CPT

## 2024-11-11 PROCEDURE — 83615 LACTATE (LD) (LDH) ENZYME: CPT

## 2024-11-11 PROCEDURE — 86901 BLOOD TYPING SEROLOGIC RH(D): CPT

## 2024-11-11 PROCEDURE — 86850 RBC ANTIBODY SCREEN: CPT

## 2024-11-11 PROCEDURE — 83010 ASSAY OF HAPTOGLOBIN QUANT: CPT

## 2024-11-11 RX ORDER — HYDROCORTISONE SODIUM SUCCINATE 100 MG/2ML
100 INJECTION INTRAMUSCULAR; INTRAVENOUS
OUTPATIENT
Start: 2024-11-11

## 2024-11-11 RX ORDER — DIPHENHYDRAMINE HYDROCHLORIDE 50 MG/ML
50 INJECTION INTRAMUSCULAR; INTRAVENOUS
OUTPATIENT
Start: 2024-11-11

## 2024-11-11 RX ORDER — SODIUM CHLORIDE 9 MG/ML
20 INJECTION, SOLUTION INTRAVENOUS ONCE
OUTPATIENT
Start: 2024-11-11 | End: 2024-11-11

## 2024-11-11 RX ORDER — ACETAMINOPHEN 325 MG/1
650 TABLET ORAL
OUTPATIENT
Start: 2024-11-11

## 2024-11-11 RX ORDER — ONDANSETRON 2 MG/ML
8 INJECTION INTRAMUSCULAR; INTRAVENOUS
OUTPATIENT
Start: 2024-11-11

## 2024-11-11 RX ORDER — EPINEPHRINE 1 MG/ML
0.3 INJECTION, SOLUTION, CONCENTRATE INTRAVENOUS PRN
OUTPATIENT
Start: 2024-11-11

## 2024-11-11 RX ORDER — SODIUM CHLORIDE 0.9 % (FLUSH) 0.9 %
5-40 SYRINGE (ML) INJECTION PRN
OUTPATIENT
Start: 2024-11-11

## 2024-11-11 RX ORDER — SODIUM CHLORIDE 9 MG/ML
INJECTION, SOLUTION INTRAVENOUS CONTINUOUS
OUTPATIENT
Start: 2024-11-11

## 2024-11-11 RX ORDER — SODIUM CHLORIDE 0.9 % (FLUSH) 0.9 %
5-40 SYRINGE (ML) INJECTION PRN
Status: DISCONTINUED | OUTPATIENT
Start: 2024-11-11 | End: 2024-11-12 | Stop reason: HOSPADM

## 2024-11-11 RX ORDER — ALBUTEROL SULFATE 90 UG/1
4 INHALANT RESPIRATORY (INHALATION) PRN
OUTPATIENT
Start: 2024-11-11

## 2024-11-11 RX ORDER — SODIUM CHLORIDE 9 MG/ML
20 INJECTION, SOLUTION INTRAVENOUS ONCE
Status: COMPLETED | OUTPATIENT
Start: 2024-11-11 | End: 2024-11-11

## 2024-11-11 RX ADMIN — SODIUM CHLORIDE 20 ML/HR: 9 INJECTION, SOLUTION INTRAVENOUS at 12:06

## 2024-11-11 RX ADMIN — SODIUM CHLORIDE, PRESERVATIVE FREE 10 ML: 5 INJECTION INTRAVENOUS at 10:50

## 2024-11-11 NOTE — FLOWSHEET NOTE
11/11/24 1408   AVS Reviewed   AVS & discharge instructions reviewed with patient and/or representative? Yes   Reviewed instructions with Patient   Level of Understanding Questions answered;Verbalized understanding     LABS DRAWN. HGB 7.2. PT SOB WITH EXERTION. 1 UNIT PACKED CELLS GIVEN AS ORDERED AND PT TOLERATED WELL.

## 2024-11-25 RX ORDER — DOCUSATE SODIUM 100 MG/1
CAPSULE, LIQUID FILLED ORAL
Qty: 90 CAPSULE | Refills: 0 | Status: SHIPPED | OUTPATIENT
Start: 2024-11-25

## 2024-12-06 ENCOUNTER — HOSPITAL ENCOUNTER (OUTPATIENT)
Dept: INFUSION THERAPY | Age: 49
Setting detail: INFUSION SERIES
Discharge: HOME | End: 2024-12-06
Payer: MEDICARE

## 2024-12-06 VITALS
HEART RATE: 87 BPM | OXYGEN SATURATION: 100 % | RESPIRATION RATE: 18 BRPM | SYSTOLIC BLOOD PRESSURE: 98 MMHG | DIASTOLIC BLOOD PRESSURE: 64 MMHG

## 2024-12-06 DIAGNOSIS — N18.9 ANEMIA OF CHRONIC RENAL FAILURE, UNSPECIFIED CKD STAGE: Primary | ICD-10-CM

## 2024-12-06 DIAGNOSIS — D63.1 ANEMIA OF CHRONIC RENAL FAILURE, UNSPECIFIED CKD STAGE: Primary | ICD-10-CM

## 2024-12-06 LAB
ABO/RH: NORMAL
ANTIBODY SCREEN: NORMAL
BLOOD BANK DISPENSE STATUS: NORMAL
BLOOD BANK PRODUCT CODE: NORMAL
BPU ID: NORMAL
DESCRIPTION BLOOD BANK: NORMAL

## 2024-12-06 PROCEDURE — P9016 RBC LEUKOCYTES REDUCED: HCPCS

## 2024-12-06 PROCEDURE — 86850 RBC ANTIBODY SCREEN: CPT

## 2024-12-06 PROCEDURE — 86900 BLOOD TYPING SEROLOGIC ABO: CPT

## 2024-12-06 PROCEDURE — 36430 TRANSFUSION BLD/BLD COMPNT: CPT

## 2024-12-06 PROCEDURE — 86923 COMPATIBILITY TEST ELECTRIC: CPT

## 2024-12-06 PROCEDURE — 86901 BLOOD TYPING SEROLOGIC RH(D): CPT

## 2024-12-06 RX ORDER — SODIUM CHLORIDE 0.9 % (FLUSH) 0.9 %
5-40 SYRINGE (ML) INJECTION PRN
OUTPATIENT
Start: 2024-12-06

## 2024-12-06 RX ORDER — EPINEPHRINE 1 MG/ML
0.3 INJECTION, SOLUTION, CONCENTRATE INTRAVENOUS PRN
OUTPATIENT
Start: 2024-12-06

## 2024-12-06 RX ORDER — ALBUTEROL SULFATE 90 UG/1
4 INHALANT RESPIRATORY (INHALATION) PRN
OUTPATIENT
Start: 2024-12-06

## 2024-12-06 RX ORDER — SODIUM CHLORIDE 9 MG/ML
20 INJECTION, SOLUTION INTRAVENOUS ONCE
OUTPATIENT
Start: 2024-12-06 | End: 2024-12-06

## 2024-12-06 RX ORDER — SODIUM CHLORIDE 9 MG/ML
20 INJECTION, SOLUTION INTRAVENOUS ONCE
Status: DISCONTINUED | OUTPATIENT
Start: 2024-12-06 | End: 2024-12-08 | Stop reason: HOSPADM

## 2024-12-06 RX ORDER — ONDANSETRON 2 MG/ML
8 INJECTION INTRAMUSCULAR; INTRAVENOUS
OUTPATIENT
Start: 2024-12-06

## 2024-12-06 RX ORDER — ACETAMINOPHEN 325 MG/1
650 TABLET ORAL
OUTPATIENT
Start: 2024-12-06

## 2024-12-06 RX ORDER — HYDROCORTISONE SODIUM SUCCINATE 100 MG/2ML
100 INJECTION INTRAMUSCULAR; INTRAVENOUS
OUTPATIENT
Start: 2024-12-06

## 2024-12-06 RX ORDER — DIPHENHYDRAMINE HYDROCHLORIDE 50 MG/ML
50 INJECTION INTRAMUSCULAR; INTRAVENOUS
OUTPATIENT
Start: 2024-12-06

## 2024-12-06 RX ORDER — SODIUM CHLORIDE 9 MG/ML
INJECTION, SOLUTION INTRAVENOUS CONTINUOUS
OUTPATIENT
Start: 2024-12-06

## 2024-12-06 NOTE — PROGRESS NOTES
Pt arrived to OPIT and PIC placed. Type and screen drawn. 1 unit of PRBC administered. Pt tolerated well.     Electronically signed by Leeanne Zamora RN on 12/6/2024 at 11:33 AM

## 2024-12-13 DIAGNOSIS — F41.1 GENERALIZED ANXIETY DISORDER: ICD-10-CM

## 2024-12-13 RX ORDER — ALPRAZOLAM 1 MG/1
TABLET ORAL
Qty: 90 TABLET | Refills: 5 | Status: SHIPPED | OUTPATIENT
Start: 2024-12-13 | End: 2025-01-12

## 2024-12-13 NOTE — TELEPHONE ENCOUNTER
Boni Miles called to request a refill on his medication.      Last office visit : 8/30/2024   Next office visit : 12/20/2024     Last UDS:   Amphetamines   Date Value Ref Range Status   09/20/2013 NEGATIVE  Final     Benzodiazepines   Date Value Ref Range Status   11/16/2015 Negative Txdnts=561 ng/mL Final     Benzodiazepine Screen, Urine   Date Value Ref Range Status   08/03/2022 Negative Negative <150 ng/mL Final     Benzodiazepines, Urine   Date Value Ref Range Status   01/17/2018 Negative Lzgepx=126 ng/mL Final     Buprenorphine Urine   Date Value Ref Range Status   08/03/2022 Negative Negative <5 ng/mL Final     Cocaine Metabolite Screen, Urine   Date Value Ref Range Status   08/03/2022 Negative Negative <150 ng/mL Final     Tricyclic Antidepressants, Urine   Date Value Ref Range Status   08/03/2022 Negative Negative 300 ng/mL Final       Last [place]: today  Medication Contract: next ov 12/20/24   Last Fill: 08/30/24    Requested Prescriptions     Pending Prescriptions Disp Refills    ALPRAZolam (XANAX) 1 MG tablet [Pharmacy Med Name: ALPRAZOLAM 1MG TABLETS] 90 tablet      Sig: TAKE 1 TABLET BY MOUTH THREE TIMES DAILY AS NEEDED FOR SLEEP OR ANXIETY. MAX DAILY AMOUNT: 3 MG         Please approve or refuse this medication.   Sharon Dumont LPN

## 2025-01-13 ENCOUNTER — HOSPITAL ENCOUNTER (INPATIENT)
Age: 50
LOS: 1 days | DRG: 640 | End: 2025-01-14
Attending: EMERGENCY MEDICINE | Admitting: INTERNAL MEDICINE
Payer: MEDICARE

## 2025-01-13 ENCOUNTER — APPOINTMENT (OUTPATIENT)
Dept: CT IMAGING | Age: 50
DRG: 640 | End: 2025-01-13
Payer: MEDICARE

## 2025-01-13 DIAGNOSIS — N18.6 ESRD (END STAGE RENAL DISEASE) (HCC): ICD-10-CM

## 2025-01-13 DIAGNOSIS — D64.9 CHRONIC ANEMIA: ICD-10-CM

## 2025-01-13 DIAGNOSIS — R62.7 FAILURE TO THRIVE IN ADULT: Primary | ICD-10-CM

## 2025-01-13 PROBLEM — R41.82 ALTERED MENTAL STATUS: Status: ACTIVE | Noted: 2025-01-13

## 2025-01-13 LAB
ABO/RH: NORMAL
ALBUMIN SERPL-MCNC: 1.9 G/DL (ref 3.5–5.2)
ALP SERPL-CCNC: 621 U/L (ref 40–129)
ALT SERPL-CCNC: 16 U/L (ref 5–41)
ANION GAP SERPL CALCULATED.3IONS-SCNC: 19 MMOL/L (ref 7–19)
ANTIBODY SCREEN: NORMAL
AST SERPL-CCNC: 47 U/L (ref 5–40)
BASOPHILS # BLD: 0 K/UL (ref 0–0.2)
BASOPHILS NFR BLD: 0.1 % (ref 0–1)
BILIRUB SERPL-MCNC: 1.3 MG/DL (ref 0.2–1.2)
BUN SERPL-MCNC: 65 MG/DL (ref 6–20)
CALCIUM SERPL-MCNC: 7.4 MG/DL (ref 8.6–10)
CHLORIDE SERPL-SCNC: 94 MMOL/L (ref 98–111)
CO2 SERPL-SCNC: 25 MMOL/L (ref 22–29)
CREAT SERPL-MCNC: 8.6 MG/DL (ref 0.7–1.2)
EKG P AXIS: -14 DEGREES
EKG P-R INTERVAL: 148 MS
EKG Q-T INTERVAL: 358 MS
EKG QRS DURATION: 92 MS
EKG QTC CALCULATION (BAZETT): 410 MS
EKG T AXIS: 3 DEGREES
EOSINOPHIL # BLD: 0 K/UL (ref 0–0.6)
EOSINOPHIL NFR BLD: 0.1 % (ref 0–5)
ERYTHROCYTE [DISTWIDTH] IN BLOOD BY AUTOMATED COUNT: 17.5 % (ref 11.5–14.5)
FERRITIN SERPL-MCNC: 1995 NG/ML (ref 30–400)
FOLATE SERPL-MCNC: 3.7 NG/ML (ref 4.5–32.2)
GLUCOSE BLD-MCNC: 69 MG/DL (ref 70–99)
GLUCOSE BLD-MCNC: 95 MG/DL (ref 70–99)
GLUCOSE SERPL-MCNC: 65 MG/DL (ref 70–99)
HAPTOGLOB SERPL-MCNC: 89 MG/DL (ref 30–200)
HCT VFR BLD AUTO: 24.5 % (ref 42–52)
HGB BLD-MCNC: 7.5 G/DL (ref 14–18)
IMM GRANULOCYTES # BLD: 0.1 K/UL
IRON SATN MFR SERPL: ABNORMAL % (ref 14–50)
IRON SERPL-MCNC: 39 UG/DL (ref 59–158)
LDH SERPL-CCNC: 259 U/L (ref 91–215)
LYMPHOCYTES # BLD: 0.4 K/UL (ref 1.1–4.5)
LYMPHOCYTES NFR BLD: 4.8 % (ref 20–40)
MCH RBC QN AUTO: 32.6 PG (ref 27–31)
MCHC RBC AUTO-ENTMCNC: 30.6 G/DL (ref 33–37)
MCV RBC AUTO: 106.5 FL (ref 80–94)
MONOCYTES # BLD: 0.3 K/UL (ref 0–0.9)
MONOCYTES NFR BLD: 3.7 % (ref 0–10)
NEUTROPHILS # BLD: 6.5 K/UL (ref 1.5–7.5)
NEUTS SEG NFR BLD: 89.4 % (ref 50–65)
PERFORMED ON: ABNORMAL
PERFORMED ON: NORMAL
PLATELET # BLD AUTO: 118 K/UL (ref 130–400)
PMV BLD AUTO: 9.6 FL (ref 9.4–12.4)
POTASSIUM SERPL-SCNC: 5.5 MMOL/L (ref 3.5–5)
POTASSIUM SERPL-SCNC: 5.6 MMOL/L (ref 3.5–5)
PROT SERPL-MCNC: 5.5 G/DL (ref 6.4–8.3)
RBC # BLD AUTO: 2.3 M/UL (ref 4.7–6.1)
SODIUM SERPL-SCNC: 138 MMOL/L (ref 136–145)
T4 FREE SERPL-MCNC: 0.22 NG/DL (ref 0.93–1.7)
TIBC SERPL-MCNC: ABNORMAL UG/DL (ref 250–400)
TSH SERPL DL<=0.005 MIU/L-ACNC: 4.22 UIU/ML (ref 0.27–4.2)
VIT B12 SERPL-MCNC: 846 PG/ML (ref 232–1245)
WBC # BLD AUTO: 7.2 K/UL (ref 4.8–10.8)

## 2025-01-13 PROCEDURE — 82607 VITAMIN B-12: CPT

## 2025-01-13 PROCEDURE — 6360000002 HC RX W HCPCS: Performed by: STUDENT IN AN ORGANIZED HEALTH CARE EDUCATION/TRAINING PROGRAM

## 2025-01-13 PROCEDURE — 86901 BLOOD TYPING SEROLOGIC RH(D): CPT

## 2025-01-13 PROCEDURE — 6360000002 HC RX W HCPCS: Performed by: NURSE PRACTITIONER

## 2025-01-13 PROCEDURE — 6360000002 HC RX W HCPCS: Performed by: EMERGENCY MEDICINE

## 2025-01-13 PROCEDURE — 93010 ELECTROCARDIOGRAM REPORT: CPT | Performed by: INTERNAL MEDICINE

## 2025-01-13 PROCEDURE — 83615 LACTATE (LD) (LDH) ENZYME: CPT

## 2025-01-13 PROCEDURE — 96374 THER/PROPH/DIAG INJ IV PUSH: CPT

## 2025-01-13 PROCEDURE — 84439 ASSAY OF FREE THYROXINE: CPT

## 2025-01-13 PROCEDURE — 93005 ELECTROCARDIOGRAM TRACING: CPT | Performed by: EMERGENCY MEDICINE

## 2025-01-13 PROCEDURE — 83550 IRON BINDING TEST: CPT

## 2025-01-13 PROCEDURE — 70450 CT HEAD/BRAIN W/O DYE: CPT

## 2025-01-13 PROCEDURE — 84132 ASSAY OF SERUM POTASSIUM: CPT

## 2025-01-13 PROCEDURE — 83540 ASSAY OF IRON: CPT

## 2025-01-13 PROCEDURE — 82728 ASSAY OF FERRITIN: CPT

## 2025-01-13 PROCEDURE — 2500000003 HC RX 250 WO HCPCS: Performed by: NURSE PRACTITIONER

## 2025-01-13 PROCEDURE — 99285 EMERGENCY DEPT VISIT HI MDM: CPT

## 2025-01-13 PROCEDURE — 96375 TX/PRO/DX INJ NEW DRUG ADDON: CPT

## 2025-01-13 PROCEDURE — 86850 RBC ANTIBODY SCREEN: CPT

## 2025-01-13 PROCEDURE — 82962 GLUCOSE BLOOD TEST: CPT

## 2025-01-13 PROCEDURE — 36415 COLL VENOUS BLD VENIPUNCTURE: CPT

## 2025-01-13 PROCEDURE — 2580000003 HC RX 258: Performed by: NURSE PRACTITIONER

## 2025-01-13 PROCEDURE — 86900 BLOOD TYPING SEROLOGIC ABO: CPT

## 2025-01-13 PROCEDURE — 83010 ASSAY OF HAPTOGLOBIN QUANT: CPT

## 2025-01-13 PROCEDURE — 1200000000 HC SEMI PRIVATE

## 2025-01-13 PROCEDURE — 84443 ASSAY THYROID STIM HORMONE: CPT

## 2025-01-13 PROCEDURE — 82746 ASSAY OF FOLIC ACID SERUM: CPT

## 2025-01-13 PROCEDURE — 85025 COMPLETE CBC W/AUTO DIFF WBC: CPT

## 2025-01-13 PROCEDURE — 80053 COMPREHEN METABOLIC PANEL: CPT

## 2025-01-13 RX ORDER — POLYETHYLENE GLYCOL 3350 17 G/17G
17 POWDER, FOR SOLUTION ORAL DAILY PRN
Status: DISCONTINUED | OUTPATIENT
Start: 2025-01-13 | End: 2025-01-14

## 2025-01-13 RX ORDER — ONDANSETRON 2 MG/ML
4 INJECTION INTRAMUSCULAR; INTRAVENOUS ONCE
Status: COMPLETED | OUTPATIENT
Start: 2025-01-13 | End: 2025-01-13

## 2025-01-13 RX ORDER — HYDROMORPHONE HYDROCHLORIDE 1 MG/ML
0.25 INJECTION, SOLUTION INTRAMUSCULAR; INTRAVENOUS; SUBCUTANEOUS
Status: DISCONTINUED | OUTPATIENT
Start: 2025-01-13 | End: 2025-01-14

## 2025-01-13 RX ORDER — SODIUM CHLORIDE 9 MG/ML
INJECTION, SOLUTION INTRAVENOUS PRN
Status: DISCONTINUED | OUTPATIENT
Start: 2025-01-13 | End: 2025-01-14

## 2025-01-13 RX ORDER — HYDROMORPHONE HYDROCHLORIDE 1 MG/ML
0.5 INJECTION, SOLUTION INTRAMUSCULAR; INTRAVENOUS; SUBCUTANEOUS ONCE
Status: COMPLETED | OUTPATIENT
Start: 2025-01-13 | End: 2025-01-13

## 2025-01-13 RX ORDER — ACETAMINOPHEN 325 MG/1
650 TABLET ORAL EVERY 6 HOURS PRN
Status: DISCONTINUED | OUTPATIENT
Start: 2025-01-13 | End: 2025-01-14

## 2025-01-13 RX ORDER — ONDANSETRON 2 MG/ML
4 INJECTION INTRAMUSCULAR; INTRAVENOUS EVERY 6 HOURS PRN
Status: DISCONTINUED | OUTPATIENT
Start: 2025-01-13 | End: 2025-01-14

## 2025-01-13 RX ORDER — HYDROMORPHONE HYDROCHLORIDE 1 MG/ML
0.5 INJECTION, SOLUTION INTRAMUSCULAR; INTRAVENOUS; SUBCUTANEOUS
Status: DISCONTINUED | OUTPATIENT
Start: 2025-01-13 | End: 2025-01-14

## 2025-01-13 RX ORDER — SODIUM CHLORIDE 0.9 % (FLUSH) 0.9 %
5-40 SYRINGE (ML) INJECTION PRN
Status: DISCONTINUED | OUTPATIENT
Start: 2025-01-13 | End: 2025-01-14

## 2025-01-13 RX ORDER — ONDANSETRON 4 MG/1
4 TABLET, ORALLY DISINTEGRATING ORAL EVERY 8 HOURS PRN
Status: DISCONTINUED | OUTPATIENT
Start: 2025-01-13 | End: 2025-01-14

## 2025-01-13 RX ORDER — HEPARIN SODIUM 5000 [USP'U]/ML
5000 INJECTION, SOLUTION INTRAVENOUS; SUBCUTANEOUS EVERY 8 HOURS SCHEDULED
Status: DISCONTINUED | OUTPATIENT
Start: 2025-01-13 | End: 2025-01-14

## 2025-01-13 RX ORDER — SODIUM CHLORIDE 0.9 % (FLUSH) 0.9 %
5-40 SYRINGE (ML) INJECTION EVERY 12 HOURS SCHEDULED
Status: DISCONTINUED | OUTPATIENT
Start: 2025-01-13 | End: 2025-01-14

## 2025-01-13 RX ORDER — DEXTROSE MONOHYDRATE 100 MG/ML
INJECTION, SOLUTION INTRAVENOUS CONTINUOUS
Status: DISCONTINUED | OUTPATIENT
Start: 2025-01-13 | End: 2025-01-14

## 2025-01-13 RX ADMIN — ONDANSETRON 4 MG: 2 INJECTION INTRAMUSCULAR; INTRAVENOUS at 16:04

## 2025-01-13 RX ADMIN — DEXTROSE MONOHYDRATE: 100 INJECTION, SOLUTION INTRAVENOUS at 17:34

## 2025-01-13 RX ADMIN — HEPARIN SODIUM 5000 UNITS: 5000 INJECTION INTRAVENOUS; SUBCUTANEOUS at 21:55

## 2025-01-13 RX ADMIN — SODIUM CHLORIDE, PRESERVATIVE FREE 10 ML: 5 INJECTION INTRAVENOUS at 21:58

## 2025-01-13 RX ADMIN — DEXTROSE MONOHYDRATE: 100 INJECTION, SOLUTION INTRAVENOUS at 22:06

## 2025-01-13 RX ADMIN — HYDROMORPHONE HYDROCHLORIDE 0.5 MG: 1 INJECTION, SOLUTION INTRAMUSCULAR; INTRAVENOUS; SUBCUTANEOUS at 16:04

## 2025-01-13 RX ADMIN — HYDROMORPHONE HYDROCHLORIDE 0.5 MG: 1 INJECTION, SOLUTION INTRAMUSCULAR; INTRAVENOUS; SUBCUTANEOUS at 21:58

## 2025-01-13 ASSESSMENT — PAIN SCALES - GENERAL: PAINLEVEL_OUTOF10: 8

## 2025-01-13 NOTE — ED PROVIDER NOTES
normal range or not returned as of this dictation.    Prior records reviewed: ED visit November 2, 2024 -pancytopenia left AMA    EMERGENCY DEPARTMENT COURSE and DIFFERENTIAL DIAGNOSIS/MDM:   Vitals:    Vitals:    01/13/25 1530 01/13/25 1600 01/13/25 1630 01/13/25 1700   BP: (!) 87/61 97/68 100/65 106/78   Pulse: 87 90 86 81   Resp: 16 18 14 12   Temp:       SpO2: 93% 96% 91% 93%   Weight:       Height:           MDM  Patient is a 49-year-old male who presents emergency department by EMS from home with failure to thrive.  History obtained by the patient's wife who is at bedside.  She says the patient has a longstanding history of chronic illness starting from age 31 when he had colorectal cancer complicated by damage to his kidneys requiring him to be on dialysis.  He is got a colostomy.  She says that he has had a generalized decline over the last several months.  He has required several blood transfusions for anemia. He often will miss dialysis because he is too generally weak to get there.  He says he last had dialysis on Tuesday but missed Thursday and Saturday.  She says over the last couple days he has been so weak that he has not been able to walk.  He's had some vomiting and hasn't been eating, drinking or taking his meds. She says they have discussed hospice care and believe it is time for him to begin hospice. He was hoping to have hospice at home however, wife says that he is so weak that she doesn't believe she can care for him at home. The patient denies any pain or difficulty breathing at this time. He is a DNR. There are no other complaints/concerns at this time.       Seeking hospice care.  Hospice physician consulted.  We are unable to get the patient admitted to the hospice care center and wife reports that she cannot care for the patient at home.  Case management consulted.  Plan will be to place the patient under observation to the hospitalist service while awaiting SNF placement and hospice

## 2025-01-13 NOTE — ED NOTES
ED TO INPATIENT SBAR HANDOFF    Patient Name: Boni Miles   : 1975  49 y.o.   Family/Caregiver Present: Yes  Code Status Order: Full Code    C-SSRS: Risk of Suicide: No Risk  Sitter No  Restraints:         Situation  Chief Complaint:   Chief Complaint   Patient presents with    Fatigue     Has missed multiple dialysis treatments      Patient Diagnosis: Altered mental status [R41.82]     Brief Description of Patient's Condition: pt came from home via EMS with complaints of increased weakness, pt has missed multiple dialysis appointments due to fatigue, per pts family he has not been able to get around at home at all, he has not eaten much in the past 4 days per family   Mental Status: oriented  Arrived from: home    Imaging:   CT HEAD WO CONTRAST    (Results Pending)     COVID-19 Results:   Internal Administration   First Dose COVID-19, MODERNA BLUE border, Primary or Immunocompromised, (age 12y+), IM, 100 mcg/0.5mL  11/10/2021   Second Dose           Last COVID Lab SARS-CoV-2, NAAT (no units)   Date Value   2023 Not Detected           Abnormal labs:   Abnormal Labs Reviewed   COMPREHENSIVE METABOLIC PANEL - Abnormal; Notable for the following components:       Result Value    Potassium 5.5 (*)     Chloride 94 (*)     Glucose 65 (*)     BUN 65 (*)     Creatinine 8.6 (*)     Est, Glom Filt Rate 7 (*)     Calcium 7.4 (*)     Total Protein 5.5 (*)     Albumin 1.9 (*)     Total Bilirubin 1.3 (*)     Alkaline Phosphatase 621 (*)     AST 47 (*)     All other components within normal limits   CBC WITH AUTO DIFFERENTIAL - Abnormal; Notable for the following components:    RBC 2.30 (*)     Hemoglobin 7.5 (*)     Hematocrit 24.5 (*)     .5 (*)     MCH 32.6 (*)     MCHC 30.6 (*)     RDW 17.5 (*)     Platelets 118 (*)     Neutrophils % 89.4 (*)     Lymphocytes % 4.8 (*)     Lymphocytes Absolute 0.4 (*)     All other components within normal limits   LACTATE DEHYDROGENASE - Abnormal; Notable for the

## 2025-01-13 NOTE — CARE COORDINATION
SW spoke with pt's wife at bedside regarding hospice options since he would not be able to be in the [facility] for now.     SW gave pt options for either home with hospice or SNF with hospice.     Pt's spouse wants to have time to decide these options as she says it is overwhelming.     SW expressed understanding and said let staff know when she is ready.     Dr. Rocha Notified.

## 2025-01-13 NOTE — H&P
[facility]      Hospitalist - History & Physical      PCP: Andreas Manzo MD    Date of Admission: 1/13/2025    Date of Service: 1/13/2025    Chief Complaint: Fatigue     History Of Present Illness:   The patient is a 49 y.o. male who presented to [facility] ED for evaluation of failure to thrive. Pt has history of nephrectomy, esrd on hemodialysis t,t,s, rectal malignancy, colostomy, hypertension, seizures, subdural hematoma with craniotomy and cad.    Pt relates patient has had rapid decline in mental status over past 4-5 days. He has had problems with worsening nausea and vomiting having missed his last two scheduled hemodialysis treatments. He has not been able to eat or drink. He generally is able to walk however has been unable to with wife relating he has been mostly sleeping. She gives concern for her ability to care for him at home. She tells me that they are interested in hospice care. Pt has history of parastomal hernia as described by his wife. She tells me that she and her  have discussed code status and wish that no heroic measures be taken indicating \"DNR\" wishes.    In ED, potassium 5.5, glucose 65, alk phos 621, t.bili 1.3, hgb 7.5, wbc 7.2, EKG with artifact per ED physician. Pt is admitted inpatient to hospitalist.    Past Medical History:        Diagnosis Date    Asthma     during winter months    CAD (coronary artery disease)     Cancer (HCC)     rectal    Colostomy care (HCC)     Hemodialysis patient (HCC)     mon wed fri at [place]    History of blood transfusion     Hx of migraine headaches     Hypercholesterolemia 12/16/2019    Hypertension     Kidney stone     hx of    Palliative care patient 07/11/2019    Pericarditis     Rectal cancer (HCC)     SDH (subdural hematoma) 01/06/2024    Seizures (HCC)     Testalgia 02/01/2016       Past Surgical History:        Procedure Laterality Date    ANTERIOR CRUCIATE LIGAMENT REPAIR  2007    ACL and MCL repair    LION HOLE FOR SUBDURAL

## 2025-01-14 VITALS
DIASTOLIC BLOOD PRESSURE: 78 MMHG | BODY MASS INDEX: 24.38 KG/M2 | SYSTOLIC BLOOD PRESSURE: 109 MMHG | OXYGEN SATURATION: 80 % | HEART RATE: 107 BPM | RESPIRATION RATE: 26 BRPM | WEIGHT: 180 LBS | TEMPERATURE: 96.7 F | HEIGHT: 72 IN

## 2025-01-14 LAB
ALBUMIN SERPL-MCNC: 2 G/DL (ref 3.5–5.2)
ALP SERPL-CCNC: 659 U/L (ref 40–129)
ALT SERPL-CCNC: 19 U/L (ref 5–41)
AMMONIA PLAS-SCNC: 283 UMOL/L (ref 16–60)
ANION GAP SERPL CALCULATED.3IONS-SCNC: 22 MMOL/L (ref 7–19)
AST SERPL-CCNC: 38 U/L (ref 5–40)
BILIRUB DIRECT SERPL-MCNC: 1 MG/DL (ref 0–0.3)
BILIRUB INDIRECT SERPL-MCNC: 0.4 MG/DL (ref 0–1)
BILIRUB SERPL-MCNC: 1.4 MG/DL (ref 0.2–1.2)
BUN SERPL-MCNC: 72 MG/DL (ref 6–20)
CALCIUM SERPL-MCNC: 7.3 MG/DL (ref 8.6–10)
CHLORIDE SERPL-SCNC: 90 MMOL/L (ref 98–111)
CO2 SERPL-SCNC: 23 MMOL/L (ref 22–29)
CREAT SERPL-MCNC: 9.1 MG/DL (ref 0.7–1.2)
ERYTHROCYTE [DISTWIDTH] IN BLOOD BY AUTOMATED COUNT: 17.4 % (ref 11.5–14.5)
GLUCOSE SERPL-MCNC: 108 MG/DL (ref 70–99)
HCT VFR BLD AUTO: 26.9 % (ref 42–52)
HGB BLD-MCNC: 8.2 G/DL (ref 14–18)
LACTATE BLDV-SCNC: 2.5 MMOL/L (ref 0.5–1.9)
MCH RBC QN AUTO: 32.5 PG (ref 27–31)
MCHC RBC AUTO-ENTMCNC: 30.5 G/DL (ref 33–37)
MCV RBC AUTO: 106.7 FL (ref 80–94)
PLATELET # BLD AUTO: 147 K/UL (ref 130–400)
PMV BLD AUTO: 9.9 FL (ref 9.4–12.4)
POTASSIUM SERPL-SCNC: 6.1 MMOL/L (ref 3.5–5)
PROT SERPL-MCNC: 5.9 G/DL (ref 6.4–8.3)
RBC # BLD AUTO: 2.52 M/UL (ref 4.7–6.1)
SODIUM SERPL-SCNC: 135 MMOL/L (ref 136–145)
WBC # BLD AUTO: 11 K/UL (ref 4.8–10.8)

## 2025-01-14 PROCEDURE — 80048 BASIC METABOLIC PNL TOTAL CA: CPT

## 2025-01-14 PROCEDURE — 6370000000 HC RX 637 (ALT 250 FOR IP): Performed by: STUDENT IN AN ORGANIZED HEALTH CARE EDUCATION/TRAINING PROGRAM

## 2025-01-14 PROCEDURE — 6360000002 HC RX W HCPCS: Performed by: STUDENT IN AN ORGANIZED HEALTH CARE EDUCATION/TRAINING PROGRAM

## 2025-01-14 PROCEDURE — 82140 ASSAY OF AMMONIA: CPT

## 2025-01-14 PROCEDURE — 80076 HEPATIC FUNCTION PANEL: CPT

## 2025-01-14 PROCEDURE — 85027 COMPLETE CBC AUTOMATED: CPT

## 2025-01-14 PROCEDURE — 83605 ASSAY OF LACTIC ACID: CPT

## 2025-01-14 PROCEDURE — 36415 COLL VENOUS BLD VENIPUNCTURE: CPT

## 2025-01-14 RX ORDER — LORAZEPAM 2 MG/ML
4 INJECTION INTRAMUSCULAR
Status: DISCONTINUED | OUTPATIENT
Start: 2025-01-14 | End: 2025-01-14 | Stop reason: HOSPADM

## 2025-01-14 RX ORDER — MORPHINE SULFATE 4 MG/ML
10 INJECTION, SOLUTION INTRAMUSCULAR; INTRAVENOUS
Status: DISCONTINUED | OUTPATIENT
Start: 2025-01-14 | End: 2025-01-14 | Stop reason: HOSPADM

## 2025-01-14 RX ORDER — SCOPOLAMINE 1 MG/3D
1 PATCH, EXTENDED RELEASE TRANSDERMAL
Status: DISCONTINUED | OUTPATIENT
Start: 2025-01-14 | End: 2025-01-14 | Stop reason: HOSPADM

## 2025-01-14 RX ADMIN — MORPHINE SULFATE 10 MG: 4 INJECTION, SOLUTION INTRAMUSCULAR; INTRAVENOUS at 03:56

## 2025-01-14 NOTE — DISCHARGE SUMMARY
would stay in the hospital as an observation while awaiting skilled nursing facility placement with hospice care at SNF. She would like to talk to CM again regarding possible SNF options. Discussed with CM - will talk to wife. Will call hospitalist back regarding final plan.  [JS]   4638 Consultation: Case discussed with Jas the hospitalist.  He reevaluated.  He is going to admit the patient.  Wife reports patient had a prior history of a brain bleed and requesting CT of the head which I have ordered. [JS]   3565 Discussed with NIA - Dr. Aguilar hospice physician notified patient to be admitted [JS]       ED Course User Index  [JS] Coni Rocha,          Physical Exam:  Vital Signs: /78   Pulse (!) 107   Temp (!) 96.7 °F (35.9 °C) (Temporal)   Resp 26   Ht 1.829 m (6')   Wt 81.6 kg (180 lb)   SpO2 (!) 80%   BMI 24.41 kg/m²   Physical Exam         DISCHARGE STATUS:      Time of death 0427 AM      Extended Emergency Contact Information  Primary Emergency Contact: AG KINSEY  Address: [address] [place] [address]  Home Phone: [phone]  Mobile Phone: [phone]  Relation: Spouse  Hearing or visual needs: None  Other needs: None  Preferred language: English   needed? No         Time Spent on discharge is   32 mins       Electronically signed by   Jorge Ordoñez MD,   Internal Medicine Hospitalist   1/14/2025 7:33 AM          EMR Dragon/Transcription disclaimer:   Much of this encounter note is an electronic transcription/translation of spoken language to printed text. The electronic translation of spoken language may permit erroneous, or at times, nonsensical words or phrases to be inadvertently transcribed; although attempts have made to review the note for such errors, some may still exist.

## 2025-01-14 NOTE — PROGRESS NOTES
PATIENT HAS PASSED AWAY AT 0427 ON 2025.  Family at bedside and awaiting rosalba and [facility] and [facility]  home.    Electronically signed by Keysha Ponce RN on 2025 at 8:05 AM

## 2025-01-14 NOTE — PROGRESS NOTES
The pt  prior to Hospice assessment.  This  visited the family and provided grief care and sc support.  Prayer support provided.

## 2025-01-14 NOTE — CARE COORDINATION
Case Management Assessment  Initial Evaluation    Date/Time of Evaluation: 1/14/2025 8:20 AM  Assessment Completed by: Caitlin Sabillon RN    If patient is discharged prior to next notation, then this note serves as note for discharge by case management.    Patient Name: Boni Miles                   YOB: 1975  Diagnosis: Altered mental status [R41.82]  ESRD (end stage renal disease) (HCC) [N18.6]  Failure to thrive in adult [R62.7]  Chronic anemia [D64.9]                   Date / Time: 1/13/2025  2:30 PM    Patient Admission Status: Inpatient   Readmission Risk (Low < 19, Mod (19-27), High > 27): Readmission Risk Score: 22.3    Current PCP: Andreas Manzo MD  PCP verified by CM? Yes    Chart Reviewed: Yes      History Provided by: Other (see comment) (Patient passed 1/14/2025 0427)  Patient Orientation: Other (see comment) (Patient passed at 1/14/2025 0427)    Patient Cognition: Other (see comment) (Patient passed 1/14/2025 0427)    Hospitalization in the last 30 days (Readmission):  No    If yes, Readmission Assessment in CM Navigator will be completed.    Advance Directives:      Code Status: DNR   Patient's Primary Decision Maker is:      Primary Decision Maker: DINESH MILESSIMI - Spouse - [phone]    Discharge Planning:    Patient lives with: Spouse/Significant Other Type of Home: House  Primary Care Giver: Spouse  Patient Support Systems include: Spouse/Significant Other, Children   Current Financial resources: Medicare  Current community resources: None  Current services prior to admission: Durable Medical Equipment            Current DME: Walker            Type of Home Care services:  None    ADLS  Prior functional level: Assistance with the following:, Bathing, Dressing, Toileting, Cooking, Housework, Shopping, Mobility  Current functional level: Assistance with the following:, Bathing, Dressing, Toileting, Cooking, Housework, Shopping, Mobility    PT AM-PAC:   /24  OT AM-PAC:

## 2025-01-14 NOTE — SIGNIFICANT EVENT
Patient continued to have significant deterioration throughout the night.  Discussed with patient wife at bedside and she requested at this time he be transitioned to comfort measures only.  Ordered morphine and ativan prn for comfort.  Hospice consult was placed during the day and is pending.  Oxygen for comfort only can discontinue if air hunger is controlled with morphine appropriately.

## 2025-01-14 NOTE — PROGRESS NOTES
Boni Miles arrived to room # 414.   Presented with: AMS  Mental Status: Patient is RONY .   Vitals:    01/13/25 1820   BP: 113/79   Pulse: 97   Resp: 16   Temp: 97.2 °F (36.2 °C)   SpO2: 93%     Patient safety contract and falls prevention contract reviewed with patient Yes.  Oriented Family to room.  Call light within reach. Yes.  Needs, issues or concerns expressed at this time: no.      Electronically signed by Erin Calov RN on 1/13/2025 at 8:14 PM

## 2025-01-14 NOTE — PROGRESS NOTES
4 Eyes Skin Assessment     NAME:  Boni Miles  YOB: 1975  MEDICAL RECORD NUMBER:  [id number]    The patient is being assessed for  Admission    I agree that at least one RN has performed a thorough Head to Toe Skin Assessment on the patient. ALL assessment sites listed below have been assessed.      Areas assessed by both nurses:    Head, Face, Ears, Shoulders, Back, Chest, Arms, Elbows, Hands, Sacrum. Buttock, Coccyx, Ischium, and Legs. Feet and Heels        Does the Patient have a Wound? Yes wound(s) were present on assessment. LDA wound assessment was Initiated and completed by RN       Meng Prevention initiated by RN: Yes  Wound Care Orders initiated by RN: Yes    Pressure Injury (Stage 3,4, Unstageable, DTI, NWPT, and Complex wounds) if present, place Wound referral order by RN under : Yes    New Ostomies, if present place, Ostomy referral order under : No     Nurse 1 eSignature: Electronically signed by Erin Calvo RN on 1/13/25 at 8:47 PM CST    **SHARE this note so that the co-signing nurse can place an eSignature**    Nurse 2 eSignature: Electronically signed by Roopa Miles RN on 1/13/25 at 10:42 PM CST

## 2025-01-21 NOTE — TELEPHONE ENCOUNTER
Boni Miles called to request a refill on his medication.      Last office visit : 12/22/2022   Next office visit : 3/23/2023     Requested Prescriptions     Pending Prescriptions Disp Refills    docusate sodium (COLACE) 100 MG capsule 60 capsule 5            Kerrie Kaye MA   "No chief complaint on file.      Chief Complaint(s) and History of Present Illness(es)    Patient is here today referred by Dr. Murphy to r/o GVHD since having BMT surgery on August 23rd, 2024. Patient has been experiencing \"grainy and a little itchy\" feeling upon waking. At night it's almost like a foreign body sensation. His symptoms are more in the left eye than the right eye. He has tried using a warm compress at night and in the morning, which does seem to help as well as massaging around the eyes. About a month ago patient's wife mentions that his right eye was \"puffy\", this seems to be mostly resolved. Patient denies both floaters and flashing lights. No other ocular pain or discomfort.  Patient has already had a complete eye exam with Dr. Nicholson with [facility] in [place] about a month ago. He has been diagnosed with cataract and told there is a need to have the cataract surgery but not for at least 9 months to a year  following the transplant. It was suggested he not update his glasses at this time if he is comfortable with what he currently has.   Currently patient is struggling with some folliculitis (chest/head-resolving), blood clot in the right juggler vein (due to pick-line), and lymphedema (left forearm)  Ocular Medications: Carboxymethylcellulose 0.5% 2-3 times per day both eyes          Bobbi Rodriguez, COA      "

## (undated) DEVICE — GOWN,PREVENTION PLUS,XLN/XL,ST,24/CS: Brand: MEDLINE

## (undated) DEVICE — FORCEPS BX L240CM JAW DIA2.4MM ORNG L CAP W/ NDL DISP RAD

## (undated) DEVICE — SUTURE VCRL SZ 3-0 L18IN ABSRB UD W/O NDL POLYGLACTIN 910 J110T

## (undated) DEVICE — CLIP INT SM WIDE RED TI TRNSVRS GRV CHEVRON SHP W/ PRECIS

## (undated) DEVICE — SPONGE LAP W18XL18IN WHT COT 4 PLY FLD STRUNG RADPQ DISP ST 2 PER PACK

## (undated) DEVICE — Z DUP USE 2648250 IMMOBILIZER ORTH L SHLDR BILAT UNISX COT ADJ CLS EL OPN HND

## (undated) DEVICE — DRAPE PELV MICROVASIVE STD SHT W/ FLD PCH NONFENESTRATED

## (undated) DEVICE — FIAPC® PROBE W/ FILTER 2200 C OD 2.3MM/6.9FR; L 2.2M/7.2FT: Brand: ERBE

## (undated) DEVICE — KIT COLL REG FLOCKED SWAB VIABILITY FLEXIBILITY EASE OF USE

## (undated) DEVICE — SUTURE PROL SZ 6-0 L24IN NONABSORBABLE BLU L9.3MM BV-1 3/8 8805H

## (undated) DEVICE — 3M™ STERI-DRAPE™ INSTRUMENT POUCH 1018: Brand: STERI-DRAPE™

## (undated) DEVICE — 3M™ WARMING BLANKET, LOWER BODY, 10 PER CASE, 42568: Brand: BAIR HUGGER™

## (undated) DEVICE — SUTURE VCRL SZ 4-0 L18IN ABSRB UD VCRL POLYGLACTIN 910 COAT J109T

## (undated) DEVICE — SUTURE CHROMIC GUT SZ 2-0 L27IN ABSRB BRN L36MM CT-1 1/2 811H

## (undated) DEVICE — SPONGE SURG WHT KTNR DISECT RADPQ ST

## (undated) DEVICE — PREP RAZOR: Brand: DEROYAL

## (undated) DEVICE — COVER US PRB W15XL120CM W/ GEL RUBBERBAND TAPE STRP FLD GEN

## (undated) DEVICE — SOLUTION IV 500ML 0.9% SOD CHL PH 5 INJ USP VIAFLX PLAS

## (undated) DEVICE — SUTURE ABSORBABLE MONOFILAMENT 3-0 SH 27 IN UD PDS + PDP416H

## (undated) DEVICE — ATHLETIC SUPPORTER LATEX FREE, LARGE

## (undated) DEVICE — TOWEL,OR,DSP,ST,BLUE,DLX,4/PK,20PK/CS: Brand: MEDLINE

## (undated) DEVICE — INTENDED FOR TISSUE SEPARATION, AND OTHER PROCEDURES THAT REQUIRE A SHARP SURGICAL BLADE TO PUNCTURE OR CUT.: Brand: BARD-PARKER ® CARBON RIB-BACK BLADES

## (undated) DEVICE — SOLUTION IV IRRIG POUR BRL 0.9% SODIUM CHL 2F7124

## (undated) DEVICE — ELECTROSURGICAL PENCIL BUTTON SWITCH NON COATED BLADE ELECTRODE 10 FT (3 M) CORD HOLSTER: Brand: MEGADYNE

## (undated) DEVICE — TUBING, SUCTION, 1/4" X 20', STRAIGHT: Brand: MEDLINE INDUSTRIES, INC.

## (undated) DEVICE — MINOR CDS: Brand: MEDLINE INDUSTRIES, INC.

## (undated) DEVICE — GLOVE SURG SZ 7 L12IN FNGR THK79MIL GRN LTX FREE

## (undated) DEVICE — CONTAINER,SPECIMEN,OR STERILE,4OZ: Brand: MEDLINE

## (undated) DEVICE — SUTURE CHROMIC GUT SZ 2-0 L36IN ABSRB BRN SH L26MM 1/2 CIR G173H

## (undated) DEVICE — STERILE LATEX POWDER FREE SURGICAL GLOVES WITH HYDROGEL COATING: Brand: PROTEXIS

## (undated) DEVICE — ENDO KIT,LOURDES HOSPITAL: Brand: MEDLINE INDUSTRIES, INC.

## (undated) DEVICE — Z INACTIVE USE 2535480 CLIP LIG M BLU TI HRT SHP WIRE HORZ 180 PER BX

## (undated) DEVICE — HIGH CAPACITY FAN SPRAY TIP WITH SHIELD: Brand: PULSAVAC®

## (undated) DEVICE — SURGICAL PROCEDURE PACK VASC LOURDES HOSP

## (undated) DEVICE — BANDAGE,GAUZE,4.5"X4.1YD,STERILE,LF: Brand: MEDLINE

## (undated) DEVICE — PAD,ABDOMINAL,8"X10",ST,LF: Brand: MEDLINE

## (undated) DEVICE — SUTURE CHROMIC GUT SZ 3-0 L36IN ABSRB BRN L26MM SH 1/2 CIR G172H

## (undated) DEVICE — ELECTRODE PT RET AD L9FT HI MOIST COND ADH HYDRGEL CORDED

## (undated) DEVICE — COVER TRANSDUCER TELESCOPICALLY FOLDED 3.5X36 IN CIV-FLEX

## (undated) DEVICE — FAN SPRAY KIT: Brand: PULSAVAC®

## (undated) DEVICE — FORCEPS BX L L240CM DIA2.4MM RAD JAW 4 HOT FOR POLYP DISP